# Patient Record
Sex: FEMALE | Race: WHITE | Employment: UNEMPLOYED
[De-identification: names, ages, dates, MRNs, and addresses within clinical notes are randomized per-mention and may not be internally consistent; named-entity substitution may affect disease eponyms.]

---

## 2017-06-17 ENCOUNTER — HEALTH MAINTENANCE LETTER (OUTPATIENT)
Age: 47
End: 2017-06-17

## 2018-09-27 ENCOUNTER — OFFICE VISIT (OUTPATIENT)
Dept: FAMILY MEDICINE | Facility: CLINIC | Age: 48
End: 2018-09-27

## 2018-09-27 VITALS
DIASTOLIC BLOOD PRESSURE: 74 MMHG | HEIGHT: 58 IN | HEART RATE: 59 BPM | SYSTOLIC BLOOD PRESSURE: 116 MMHG | RESPIRATION RATE: 16 BRPM | BODY MASS INDEX: 18.77 KG/M2 | OXYGEN SATURATION: 99 % | WEIGHT: 89.4 LBS

## 2018-09-27 DIAGNOSIS — Z12.11 SCREEN FOR COLON CANCER: ICD-10-CM

## 2018-09-27 DIAGNOSIS — R10.13 ABDOMINAL PAIN, EPIGASTRIC: Primary | ICD-10-CM

## 2018-09-27 DIAGNOSIS — R13.10 DYSPHAGIA, UNSPECIFIED TYPE: ICD-10-CM

## 2018-09-27 DIAGNOSIS — E03.9 HYPOTHYROIDISM, UNSPECIFIED TYPE: ICD-10-CM

## 2018-09-27 PROCEDURE — 99203 OFFICE O/P NEW LOW 30 MIN: CPT | Performed by: FAMILY MEDICINE

## 2018-09-27 RX ORDER — SUCRALFATE ORAL 1 G/10ML
1 SUSPENSION ORAL 4 TIMES DAILY
Qty: 420 ML | Refills: 1 | Status: SHIPPED | OUTPATIENT
Start: 2018-09-27 | End: 2022-01-22

## 2018-09-27 NOTE — LETTER
[facility]  [address] [place] [address] [phone]      September 27, 2018      Miranda Dover  [address] [facility] [address]      EMERGENCY CARE PLAN  Presenting Problem Treatment Plan   Questions or concerns during clinic hours I will call the clinic directly:    [facility]  [address] [place], [place] [id number] [phone]   Questions or concerns outside clinic hours I will call the after-hours on-call line at [phone]   Patient needs to schedule an appointment I will call the scheduling team during business hours at [phone]   Same day treatment  I will call the clinic first, then urgent care and express care if needed   Clinic Care Coordinators VARSHA Ernst:  [phone]  Yesenia Hernandez RN: [phone]   Crisis Services:  Behavioral or Mental Health [facility] ([facility])   [phone]   Emergency treatment--Immediately CALL 261

## 2018-09-27 NOTE — PROGRESS NOTES
Problem(s) Oriented visit      SUBJECTIVE:                                                    Miranda Dover is a 48 year old female who presents to clinic today for:  Patient presents with:  New Patient: To [facility]  Recheck Medication: Not taking thyroid med because patient does not want to. Med changed according to pharmacy  Breast Pain    Pt is new to [facility]. Is transferring care from [facility]. I was pt's PCP at [facility].   Miranda has Down's Syndrome and is here with her parents Ed and Lil who I also know from [facility].     She has a long hx of hypothyroidism. Thyroid function has been difficult to optimize. She has been on levothyroxine but the  of her levothyroxine changed by the pharmacy. She thinks the new pills have a terrible taste and give her GERD, so she has declined to take them. Last TSH 18. She has experienced marked hair loss and fatigue. She is a night own but tends to sleep up to half of the day. She states she occasionally has some difficulty swallowing.     She notes today some pain inferior to her L breast just at the ribs inferior and slightly lateral to breast. No breast changes. Pain started perhaps a month ago. Pain is burning. Local. Does not radiate to her back. Has known scoliosis. Not sure if getting better or worse overall. Pain comes and goes and is worse after she eats. No n/v. She has tried taking on OTC called Acid relief - 360. Unclear active ingredient, but suspect magnesium. Also taking Papaya - she thinks this is helpful.     Problem list, Medication list, Allergies, and Medical/Social/Surgical histories reviewed in [facility] and updated as appropriate.     ROS:  10 point ROS completed and negative except noted above, including Gen, HEENT, CV, Resp, GI, , MS, Neurologic, Psych    Histories:   Patient Active Problem List   Diagnosis     Hypothyroidism     Down's syndrome     CKD (chronic kidney disease) stage 3, GFR 30-59 ml/min     Serum creatinine raised  "    Hair loss     Malaise and fatigue     Routine health maintenance     Scoliosis     Thoracogenic scoliosis of thoracic region     Vitamin D deficiency     Past Medical History:   Diagnosis Date     Down's syndrome      Hypothyroidism      No past surgical history on file.  Social History   Substance Use Topics     Smoking status: Never Smoker     Smokeless tobacco: Never Used     Alcohol use No     Family History   Problem Relation Age of Onset     Asthma Maternal Grandmother        OBJECTIVE:                                                    /74  Pulse 59  Resp 16  Ht 1.473 m (4' 10\")  Wt 40.6 kg (89 lb 6.4 oz)  SpO2 99%  BMI 18.68 kg/m2  Body mass index is 18.68 kg/(m^2).   Gen: Cooperative. No acute distress. Low normal BMI noted. She looks older than her 48 yr due to thin face and marked hair throughout her scalp, with loss of all hair on anterior 25% of scalp. She is soft spoken and a little shy. She sometimes whisper something to her mother before she will speak with me.   Eyes: PERRL, sclera white.    Neck: Supple, without masses, lymphadenopathy or tenderness. No obvious goiter present.   Heart: Regular rate and rhythm without murmurs, rubs, or gallops.   Lungs: Normal respiratory effort. Lungs are clear to auscultation. Good breath sounds bilaterally.   Abdomen: Soft, she notes tenderness w/ light palpation on ribs inferior and slightly lateral to L breast. No lumps or masses appreciated. Normal bowel sounds present. No organomegaly. Overlying skin w/o rash. No crepitus. No breast masses noted.   Musculoskeletal: No lower extremity edema.   Skin: Warm and well perfused. She is quite pale.   Neurologic: Alert.  Psych:  Mood and affect are appropriate.     ASSESSMENT/PLAN:                                                      Miranda was seen today for new patient, recheck medication and breast pain.    Diagnoses and all orders for this visit:    Hypothyroidism, unspecified type  -     " Referral to [facility]  -     levothyroxine (SYNTHROID) 25 mcg/mL SUSP; Take 2 mLs (50 mcg) by mouth every morning (before breakfast)  -     Referral to [facility]    Abdominal pain, epigastric  -     sucralfate (CARAFATE) 1 GM/10ML suspension; Take 10 mLs (1 g) by mouth 4 times daily  -     ranitidine (ZANTAC) 150 MG/10ML syrup; Take 5 mLs (75 mg) by mouth 2 times daily    Dysphagia, unspecified type  -     Referral to [facility]    Screen for colon cancer    She prefers liquid medication to pills. She is willing to liquid levothyroxine.  We discussed, but opted not to check labs today b/c she has not been taking any thyroid medication recently. Thus, would not expect her TSH to be changed significantly from prior. Check labs at her next visit in 1 month.     Pt's mother declines interventions that would exposure Miranda to radiation. Xray not done today.   Plan to tx conservatively as a ulcer. If not better will check H pylori by blood (mother does not think Miranda would be able to collect stool for a H pylori stool test.     Miranda's mother is willing to work with Miranda to try to collect stool for Cologard test. She has not had any colon cancer screening and both parents decline colon cancer screening.     Cologuard form filled out and faxed.  Daphne Barnes CMA 10/4/18       The following health maintenance items are reviewed in Epic and correct as of today:  Health Maintenance   Topic Date Due     PHQ-2 Q1 YR  09/06/1984     HIV SCREEN (SYSTEM ASSIGNED)  09/06/1990     PAP SCREENING Q3 YR (SYSTEM ASSIGNED)  09/06/1993     TSH Q1 YEAR  08/07/2009     LIPID SCREEN Q5 YR FEMALE (SYSTEM ASSIGNED)  09/06/2017     INFLUENZA VACCINE (1) 09/01/2018     TETANUS IMMUNIZATION (SYSTEM ASSIGNED)  02/26/2028       Tea Jenkins MD  Family Medicine    For any issues, please call my office  [facility] at [phone].

## 2018-09-27 NOTE — MR AVS SNAPSHOT
"              After Visit Summary   9/27/2018    Miranda Dover    MRN: [id number]           Patient Information     Date Of Birth          1970        Visit Information        Provider Department      9/27/2018 11:30 AM Tea Jenkins MD [facility]        Today's Diagnoses     Hypothyroidism, unspecified type    -  1    Abdominal pain, epigastric        Dysphagia, unspecified type           Follow-ups after your visit        Additional Services     Referral to [facility]       Referral to [facility]  Phone: [phone]  Fax: [phone]  Reason for referral: hypothryodism - diff to manage. Please assess for nodules. Tnx!                  Who to contact     If you have questions or need follow up information about today's clinic visit or your schedule please contact [facility] directly at [phone].  Normal or non-critical lab and imaging results will be communicated to you by [employer], letter or phone within 4 business days after the clinic has received the results. If you do not hear from us within 7 days, please contact the clinic through [employer] or phone. If you have a critical or abnormal lab result, we will notify you by phone as soon as possible.  Submit refill requests through [employer] or call your pharmacy and they will forward the refill request to us. Please allow 3 business days for your refill to be completed.          Additional Information About Your Visit        MyChart Information     [employer] lets you send messages to your doctor, view your test results, renew your prescriptions, schedule appointments and more. To sign up, go to [email or web address] . Click on \"Log in\" on the left side of the screen, which will take you to the Welcome page. Then click on \"Sign up Now\" on the right side of the page.     You will be asked to enter the access code listed below, as well as some personal information. Please follow the directions to create your " "username and password.     Your access code is: [email or web address]  Expires: 2018 12:20 AM     Your access code will  in 90 days. If you need help or a new code, please call your [place] clinic or [phone].        Care EveryWhere ID     This is your Care EveryWhere ID. This could be used by other organizations to access your [place] medical records  [id number]        Your Vitals Were     Pulse Respirations Height Pulse Oximetry BMI (Body Mass Index)       59 16 1.473 m (4' 10\") 99% 18.68 kg/m2        Blood Pressure from Last 3 Encounters:   18 116/74   08 116/72   08 122/72    Weight from Last 3 Encounters:   18 40.6 kg (89 lb 6.4 oz)   08 60.3 kg (133 lb)   08 60.8 kg (134 lb)              We Performed the Following     Referral to [facility]          Today's Medication Changes          These changes are accurate as of 18 12:50 PM.  If you have any questions, ask your nurse or doctor.               Start taking these medicines.        Dose/Directions    ranitidine 150 MG/10ML syrup   Commonly known as:  Zantac   Used for:  Abdominal pain, epigastric   Started by:  Tea Jenkins MD        Dose:  4 mg/kg/day   Take 5 mLs (75 mg) by mouth 2 times daily   Quantity:  200 mL   Refills:  3       sucralfate 1 GM/10ML suspension   Commonly known as:  CARAFATE   Used for:  Abdominal pain, epigastric   Started by:  Tea Jenkins MD        Dose:  1 g   Take 10 mLs (1 g) by mouth 4 times daily   Quantity:  420 mL   Refills:  1         These medicines have changed or have updated prescriptions.        Dose/Directions    * levothyroxine 50 MCG tablet   Commonly known as:  SYNTHROID/LEVOTHROID   This may have changed:  Another medication with the same name was added. Make sure you understand how and when to take each.   Used for:  Hypothyroidism   Changed by:  Tea Jenkins MD        1 TABLET DAILY   Quantity:  30   Refills:  0       * " levothyroxine 25 mcg/mL Susp   Commonly known as:  SYNTHROID   This may have changed:  You were already taking a medication with the same name, and this prescription was added. Make sure you understand how and when to take each.   Used for:  Hypothyroidism, unspecified type   Changed by:  Tea Jenkins MD        Dose:  50 mcg   Take 2 mLs (50 mcg) by mouth every morning (before breakfast)   Quantity:  200 mL   Refills:  1       * Notice:  This list has 2 medication(s) that are the same as other medications prescribed for you. Read the directions carefully, and ask your doctor or other care provider to review them with you.         Where to get your medicines      These medications were sent to [id number] - [place], [place] - [address]  [address] [address]     Phone:  [phone]     levothyroxine 25 mcg/mL Susp    ranitidine 150 MG/10ML syrup    sucralfate 1 GM/10ML suspension                Primary Care Provider Office Phone # Fax #    Tea Jenkins [phone] [address] [facility] [address]        Equal Access to Services     [facility] AH: Hadii aad ku hadasho Soomaali, waaxda luqadaha, qaybta kaalmada adeegyada, waxay everin hayaan mason workman ah. So [facility] [phone].    ATENCIÓN: Si habla español, tiene a oconnell disposición servicios gratuitos de asistencia lingüística. Llame al [phone].    We comply with applicable federal civil rights laws and [place] laws. We do not discriminate on the basis of race, color, national origin, age, disability, sex, sexual orientation, or gender identity.            Thank you!     Thank you for choosing [facility]  for your care. Our goal is always to provide you with excellent care. Hearing back from our patients is one way we can continue to improve our services. Please take a few minutes to complete the written survey that you may receive in the mail after your visit  with us. Thank you!             Your Updated Medication List - Protect others around you: Learn how to safely use, store and throw away your medicines at www.disposemymeds.org.          This list is accurate as of 9/27/18 12:50 PM.  Always use your most recent med list.                   Brand Name Dispense Instructions for use Diagnosis    * levothyroxine 50 MCG tablet    SYNTHROID/LEVOTHROID    30    1 TABLET DAILY    Hypothyroidism       * levothyroxine 25 mcg/mL Susp    SYNTHROID    200 mL    Take 2 mLs (50 mcg) by mouth every morning (before breakfast)    Hypothyroidism, unspecified type       ranitidine 150 MG/10ML syrup    Zantac    200 mL    Take 5 mLs (75 mg) by mouth 2 times daily    Abdominal pain, epigastric       sucralfate 1 GM/10ML suspension    CARAFATE    420 mL    Take 10 mLs (1 g) by mouth 4 times daily    Abdominal pain, epigastric       * Notice:  This list has 2 medication(s) that are the same as other medications prescribed for you. Read the directions carefully, and ask your doctor or other care provider to review them with you.

## 2018-09-30 PROBLEM — R53.83 MALAISE AND FATIGUE: Status: ACTIVE | Noted: 2017-06-01

## 2018-09-30 PROBLEM — L65.9 HAIR LOSS: Status: ACTIVE | Noted: 2017-06-01

## 2018-09-30 PROBLEM — R53.81 MALAISE AND FATIGUE: Status: ACTIVE | Noted: 2017-06-01

## 2018-09-30 PROBLEM — M41.34 THORACOGENIC SCOLIOSIS OF THORACIC REGION: Status: ACTIVE | Noted: 2018-05-18

## 2018-09-30 PROBLEM — Z00.00 ROUTINE HEALTH MAINTENANCE: Status: ACTIVE | Noted: 2018-05-18

## 2018-09-30 PROBLEM — R79.89 SERUM CREATININE RAISED: Status: ACTIVE | Noted: 2017-06-01

## 2018-09-30 PROBLEM — M41.9 SCOLIOSIS: Status: ACTIVE | Noted: 2017-06-01

## 2018-10-02 ENCOUNTER — TRANSFERRED RECORDS (OUTPATIENT)
Dept: FAMILY MEDICINE | Facility: CLINIC | Age: 48
End: 2018-10-02

## 2018-10-11 ENCOUNTER — TELEPHONE (OUTPATIENT)
Dept: FAMILY MEDICINE | Facility: CLINIC | Age: 48
End: 2018-10-11

## 2018-10-11 DIAGNOSIS — E03.9 HYPOTHYROIDISM: Primary | ICD-10-CM

## 2018-10-11 NOTE — TELEPHONE ENCOUNTER
Spoke with patient mother regarding thyroid U/S results-per Dr Franco patient is to observe and recheck TSH in 4-6 weeks after starting liquid levothyroxine.  Liquid levothyroxine sent to  compounding pharmacy. Future lab order entered.  Erika Moran

## 2019-03-18 DIAGNOSIS — R10.13 ABDOMINAL PAIN, EPIGASTRIC: ICD-10-CM

## 2019-09-09 DIAGNOSIS — R10.13 ABDOMINAL PAIN, EPIGASTRIC: ICD-10-CM

## 2019-09-12 NOTE — TELEPHONE ENCOUNTER
9/12/19 left message for the patient to call the office to schedule an appointment.  Seen over a year ago, did she transfer back to [facility]?    Manuel Brooks,   [facility]  [phone]

## 2019-10-08 ENCOUNTER — TRANSFERRED RECORDS (OUTPATIENT)
Dept: HEALTH INFORMATION MANAGEMENT | Facility: CLINIC | Age: 49
End: 2019-10-08

## 2019-10-08 ENCOUNTER — HOSPITAL ENCOUNTER (EMERGENCY)
Facility: CLINIC | Age: 49
Discharge: HOME | End: 2019-10-08
Attending: EMERGENCY MEDICINE | Admitting: EMERGENCY MEDICINE
Payer: COMMERCIAL

## 2019-10-08 VITALS
DIASTOLIC BLOOD PRESSURE: 82 MMHG | WEIGHT: 89 LBS | TEMPERATURE: 97.5 F | RESPIRATION RATE: 25 BRPM | SYSTOLIC BLOOD PRESSURE: 129 MMHG | BODY MASS INDEX: 20.02 KG/M2 | OXYGEN SATURATION: 100 % | HEIGHT: 56 IN

## 2019-10-08 DIAGNOSIS — R10.13 ABDOMINAL PAIN, EPIGASTRIC: ICD-10-CM

## 2019-10-08 LAB
ALBUMIN SERPL-MCNC: 3.9 G/DL (ref 3.4–5)
ALP SERPL-CCNC: 65 U/L (ref 40–150)
ALT SERPL W P-5'-P-CCNC: 31 U/L (ref 0–50)
ANION GAP SERPL CALCULATED.3IONS-SCNC: 1 MMOL/L (ref 3–14)
AST SERPL W P-5'-P-CCNC: 27 U/L (ref 0–45)
BASOPHILS # BLD AUTO: 0 10E9/L (ref 0–0.2)
BASOPHILS NFR BLD AUTO: 0.2 %
BILIRUB SERPL-MCNC: 0.4 MG/DL (ref 0.2–1.3)
BUN SERPL-MCNC: 23 MG/DL (ref 7–30)
CALCIUM SERPL-MCNC: 9 MG/DL (ref 8.5–10.1)
CHLORIDE SERPL-SCNC: 107 MMOL/L (ref 94–109)
CO2 SERPL-SCNC: 32 MMOL/L (ref 20–32)
CREAT SERPL-MCNC: 1 MG/DL (ref 0.52–1.04)
DIFFERENTIAL METHOD BLD: NORMAL
EOSINOPHIL # BLD AUTO: 0.4 10E9/L (ref 0–0.7)
EOSINOPHIL NFR BLD AUTO: 10.1 %
ERYTHROCYTE [DISTWIDTH] IN BLOOD BY AUTOMATED COUNT: 13.2 % (ref 10–15)
GFR SERPL CREATININE-BSD FRML MDRD: 67 ML/MIN/{1.73_M2}
GLUCOSE SERPL-MCNC: 71 MG/DL (ref 70–99)
HCT VFR BLD AUTO: 44.2 % (ref 35–47)
HGB BLD-MCNC: 14.7 G/DL (ref 11.7–15.7)
IMM GRANULOCYTES # BLD: 0 10E9/L (ref 0–0.4)
IMM GRANULOCYTES NFR BLD: 0 %
INTERPRETATION ECG - MUSE: NORMAL
INTERPRETATION ECG - MUSE: NORMAL
LIPASE SERPL-CCNC: 135 U/L (ref 73–393)
LYMPHOCYTES # BLD AUTO: 1.7 10E9/L (ref 0.8–5.3)
LYMPHOCYTES NFR BLD AUTO: 39.7 %
MCH RBC QN AUTO: 30.9 PG (ref 26.5–33)
MCHC RBC AUTO-ENTMCNC: 33.3 G/DL (ref 31.5–36.5)
MCV RBC AUTO: 93 FL (ref 78–100)
MONOCYTES # BLD AUTO: 0.4 10E9/L (ref 0–1.3)
MONOCYTES NFR BLD AUTO: 8.7 %
NEUTROPHILS # BLD AUTO: 1.7 10E9/L (ref 1.6–8.3)
NEUTROPHILS NFR BLD AUTO: 41.3 %
NRBC # BLD AUTO: 0 10*3/UL
NRBC BLD AUTO-RTO: 0 /100
PLATELET # BLD AUTO: 160 10E9/L (ref 150–450)
POTASSIUM SERPL-SCNC: 3.6 MMOL/L (ref 3.4–5.3)
PROT SERPL-MCNC: 8 G/DL (ref 6.8–8.8)
RBC # BLD AUTO: 4.75 10E12/L (ref 3.8–5.2)
SODIUM SERPL-SCNC: 140 MMOL/L (ref 133–144)
TROPONIN I SERPL-MCNC: <0.015 UG/L (ref 0–0.04)
WBC # BLD AUTO: 4.2 10E9/L (ref 4–11)

## 2019-10-08 PROCEDURE — 96374 THER/PROPH/DIAG INJ IV PUSH: CPT

## 2019-10-08 PROCEDURE — 93005 ELECTROCARDIOGRAM TRACING: CPT | Mod: 76

## 2019-10-08 PROCEDURE — 80053 COMPREHEN METABOLIC PANEL: CPT | Performed by: EMERGENCY MEDICINE

## 2019-10-08 PROCEDURE — 84484 ASSAY OF TROPONIN QUANT: CPT | Performed by: EMERGENCY MEDICINE

## 2019-10-08 PROCEDURE — 83690 ASSAY OF LIPASE: CPT | Performed by: EMERGENCY MEDICINE

## 2019-10-08 PROCEDURE — 99284 EMERGENCY DEPT VISIT MOD MDM: CPT | Mod: 25

## 2019-10-08 PROCEDURE — 85025 COMPLETE CBC W/AUTO DIFF WBC: CPT | Performed by: EMERGENCY MEDICINE

## 2019-10-08 PROCEDURE — 25000125 ZZHC RX 250: Performed by: EMERGENCY MEDICINE

## 2019-10-08 PROCEDURE — 93005 ELECTROCARDIOGRAM TRACING: CPT

## 2019-10-08 RX ADMIN — FAMOTIDINE 20 MG: 10 INJECTION, SOLUTION INTRAVENOUS at 16:01

## 2019-10-08 ASSESSMENT — MIFFLIN-ST. JEOR: SCORE: 896.7

## 2019-10-08 ASSESSMENT — ENCOUNTER SYMPTOMS
COUGH: 0
ABDOMINAL PAIN: 1
FEVER: 0
VOMITING: 0
SHORTNESS OF BREATH: 0

## 2019-10-08 NOTE — ED TRIAGE NOTES
Pt was sent into ED from [facility] clinic for chronic abd pain. PCP thinks pt may have heartburn and was prescribed zantac. Pt had increased abd pain and lower chest pain. Clinic gave 324 ASA and 1 nitrogl. EKG states STEMI

## 2019-10-08 NOTE — ED NOTES
Bed: ED12  Expected date:   Expected time:   Means of arrival:   Comments:  rey - 516 - 49 F abd pain eta 0232

## 2019-10-08 NOTE — DISCHARGE INSTRUCTIONS
Consider over the counter Pepcid since there are concerns for Zantac.  Avoid any greasy, spicy, fatty/rich foods.  Avoid caffeine, cigarettes, alcohol, or any other foods that you noticed worsen your pain.  Heart tests look good today.  Follow-up with your primary care provider to discuss ongoing abdominal pain and if you need further outpatient work-up or treatment including endoscopy or CT scan.  You should also contact your gastroenterologist.  Return immediately with worsening or change in pain or development of any new or concerning symptoms as we discussed.

## 2019-10-08 NOTE — ED PROVIDER NOTES
History     Chief Complaint:  Abdominal Pain    HPI:   The history is provided by the patient and a parent.      Miranda Dover is a 49 year old female with history of hypothyroidism and Down's syndrome who presents with her mother for evaluation of abdominal pain. Miranda has been complaining of intermittent left upper/episgastric abdominal pain, usually after eating, for the past month per mother. Review of records indicates she has had this pain since at least 7/30/19, over 2 months, when she was first seen by GI. Their recommendation was to use omeprazole. Mother reports they were trying Zantac instead - which didn't seem to help the pain - but have stopped this due to a recall. Her pain did seem improved with homeopathic remedies such as Vietnamese cervantes, slippery elm, and papaya.    Miranda's mother became concerned that her symptoms may actually be due to pericarditis as the patient's mother herself had this years ago. As such she went to primary care where a CXR (as below) and an EKG was performed. This was abnormal and prompted administration of aspirin and nitroglycerin - though patient had no pain - and referral to the ER. Miranda has had no epigastric pain today. She denies chest pain. She has had no associated leg pain or swelling, shortness of breath, recent cold symptoms, or fever.     XR Chest 2 Views (10/8/2019  2:22 PM) Two views were obtained.  The lungs and costophrenic angles are clear.  Heart size and pulmonary vascularity are within normal limits.  There is no evidence of pneumothorax or pleural effusion. Bony thorax is unremarkable.    Allergies:  No known drug allergies      Medications:    [place] Thyroid 30 mg     Past Medical History:    Down's syndrome   Hypothyroid   Scoliosis   CKD stage 3     Past Surgical History:    History reviewed. No pertinent surgical history.     Family History:    Asthma     Social History:  PCP: Tea Jenkins   Marital Status:    Smoking status:  "Never  Alcohol use: Negative  Drug use: Negative.     Presents with mother.    Review of Systems   Constitutional: Negative for fever.   Respiratory: Negative for cough and shortness of breath.    Cardiovascular: Negative for chest pain and leg swelling.   Gastrointestinal: Positive for abdominal pain. Negative for vomiting.   All other systems reviewed and are negative.    Physical Exam     Patient Vitals for the past 24 hrs:   BP Temp Temp src Heart Rate Resp SpO2 Height Weight   10/08/19 1559 -- -- -- 68 25 100 % -- --   10/08/19 1456 129/82 97.5  F (36.4  C) Oral 98 16 99 % 1.422 m (4' 8\") 40.4 kg (89 lb)        Physical Exam  General: Thin. Well appearing middle aged  woman. Cooperative.  Head:  Atraumatic.  Alopecia.  Eyes:  Conjunctivae, lids, and sclerae are normal.  ENT:    Normal nose. Moist mucous membranes.  Neck:  Supple. Normal range of motion.  CV:  Regular rate and rhythm. Normal heart sounds with no murmurs, rubs, or gallops detected.  Resp:  No respiratory distress. Clear to auscultation bilaterally without decreased breath sounds, wheezing, rales, or rhonchi.  GI:  Soft. Non-distended.  Minimal tenderness to the left epigastrium.    MS:  Normal ROM. No bilateral lower extremity edema or calf tenderness.  No chest wall tenderness.  Skin:  Warm. Non-diaphoretic. No pallor.  Neuro:  Awake. A&Ox3. Normal strength.  Psych: Normal mood and affect. Normal speech.  Vitals reviewed.    Emergency Department Course     #1 EKG  Indication: abdominal pain  Time: 1447  Rate 73 bpm. [place] interval 122. QRS duration 78. QT/QTc 388/427.   Sinus rhythm with sinus arrhythmia with occasional and consecutive premature ventricular complexes.  Baseline artifact.   No acute ST changes.  No prior for comparison.    #2 EKG  Indication: abdominal pain  Time: 1554  Rate 54 bpm. NY interval 130. QRS duration 84. QT/QTc 404/383.   Sinus bradycardia  Otherwise normal ECG   No acute ST changes.  No change as compared to " prior, above.     Laboratory:  CBC: WNL (WBC 4.2, HGB 14.7, )  CMP: anion gap 1, o/w WNL (Creatinine 1.00)  Troponin I: <0.015  Lipase: 135    Interventions:  1601: Pepcid 20 mg, IV      Emergency Department Course:  Past medical records, nursing notes, and vitals reviewed.  1520: I performed an exam of the patient and obtained history, as documented above.  Blood drawn for laboratory testing. Results are as above.       1625: I rechecked the patient and explained findings to patient and mother. We discussed plan for discharge.     Findings and plan explained to the patient and mother. Patient discharged home with instructions regarding supportive care, medications, and reasons to return. The importance of close follow-up was reviewed.     Impression & Plan      Medical Decision Making:  Miranda is a 49-year-old female who is been struggling with epigastric pain for several months.  She has been seen by GI already who recommended omeprazole and control of hypothyroidism. Mother was using Zantac instead which she has since stopped Zantac due to concerns for recall she read about in the news.  Miranda's pain is worse after eating and somewhat improved with Monegasque [place], slippery elm, and papaya.  However, her mother became concerned that the pain may actually be due to her heart and was particularly concerned for pericarditis as the mother herself has had this in the past prompting visit to primary care.  Primary care performed an EKG which the computer read as STEMI and prompted referral to the emergency department.  However, patient has no current epigastric or chest pain and adamantly states that the pain is not in her chest, but rather in her epigastrium and the area she indicates is actually only a few centimeters from her umbilicus towards the left.  It is highly unlikely, based on her history and exam, that this is cardiac in etiology - particularly with the same pain for greater than 2 months.  Further,  EKGs reviewed from urgent care and also 2 separate EKGs obtained in the emergency department showed no evidence of STEMI including no acute ST elevations or depressions.  Further, there is no evidence of pericarditis on these EKGs.  Laboratory studies are unremarkable including liver enzymes and lipase.  Troponin is negative as expected given low concern for cardiac cause.  Patient was given Pepcid though she has no current pain and because her work-up here is reassuring including normal EKGs, she is appropriate for discharge.  I have high suspicion that her pain is related to gastritis/acid reflux which is likely been inadequately treated as she has stopped Zantac and did not use recommended omeprazole. Because mother is concerned about recall of Zantac, I recommended Pepcid instead as well as dietary changes and information regarding this was provided.  I recommended she follow-up with her primary care provider to discuss ongoing abdominal pain.  At this time, without significant tenderness in the abdomen, CT imaging can safely be deferred.  However, I discussed with her mother that if symptoms worsen or change, she should return to the emergency department or discuss with her primary care provider if she needs a CT.  We also discussed that this could be an atypical presentation of appendicitis and urged her to return should the pain localize to the right lower quadrant or she develops other symptoms such as fever or vomiting.  I also recommended return visit to the gastroenterologist or second opinion if they are not currently pleased with their care.  The patient and her mother verbalized understanding of results, outpatient follow-up, and treatment plan as well as low threshold for return with change or worsening of pain or development of new concerns.  All questions answered.      Diagnosis:    ICD-10-CM    1. Abdominal pain, epigastric R10.13        Disposition:  discharged home    I, Megan Beh, am serving as  a scribe at 3:20 PM on 10/8/2019 to document services personally performed by Blossom Griggs MD based on my observations and the provider's statements to me.      10/8/2019    EMERGENCY DEPARTMENT       Blossom Griggs MD  10/10/19 1027

## 2019-10-08 NOTE — ED AVS SNAPSHOT
Emergency Department  [address] [address]  Phone:  [phone]  Fax:  [phone]                                    Miranda Dover   MRN: [id number]    Department:   Emergency Department   Date of Visit:  10/8/2019           After Visit Summary Signature Page    I have received my discharge instructions, and my questions have been answered. I have discussed any challenges I see with this plan with the nurse or doctor.    ..........................................................................................................................................  Patient/Patient Representative Signature      ..........................................................................................................................................  Patient Representative Print Name and Relationship to Patient    ..................................................               ................................................  Date                                   Time    ..........................................................................................................................................  Reviewed by Signature/Title    ...................................................              ..............................................  Date                                               Time          22EPIC Rev 08/18

## 2021-02-23 ENCOUNTER — HOSPITAL ENCOUNTER (EMERGENCY)
Facility: CLINIC | Age: 51
Discharge: HOME | End: 2021-02-23
Attending: NURSE PRACTITIONER | Admitting: NURSE PRACTITIONER
Payer: COMMERCIAL

## 2021-02-23 VITALS
WEIGHT: 74 LBS | HEIGHT: 60 IN | HEART RATE: 78 BPM | TEMPERATURE: 97.8 F | BODY MASS INDEX: 14.53 KG/M2 | RESPIRATION RATE: 20 BRPM | DIASTOLIC BLOOD PRESSURE: 67 MMHG | SYSTOLIC BLOOD PRESSURE: 99 MMHG | OXYGEN SATURATION: 95 %

## 2021-02-23 DIAGNOSIS — R55 VASOVAGAL SYNCOPE: ICD-10-CM

## 2021-02-23 LAB
ALBUMIN SERPL-MCNC: 3.8 G/DL (ref 3.4–5)
ALBUMIN UR-MCNC: 10 MG/DL
ALP SERPL-CCNC: 75 U/L (ref 40–150)
ALT SERPL W P-5'-P-CCNC: 38 U/L (ref 0–50)
ANION GAP SERPL CALCULATED.3IONS-SCNC: 6 MMOL/L (ref 3–14)
APPEARANCE UR: CLEAR
AST SERPL W P-5'-P-CCNC: 25 U/L (ref 0–45)
BASOPHILS # BLD AUTO: 0 10E9/L (ref 0–0.2)
BASOPHILS NFR BLD AUTO: 0.4 %
BILIRUB SERPL-MCNC: 1.1 MG/DL (ref 0.2–1.3)
BILIRUB UR QL STRIP: NEGATIVE
BUN SERPL-MCNC: 21 MG/DL (ref 7–30)
CALCIUM SERPL-MCNC: 9.2 MG/DL (ref 8.5–10.1)
CHLORIDE SERPL-SCNC: 104 MMOL/L (ref 94–109)
CO2 SERPL-SCNC: 28 MMOL/L (ref 20–32)
COLOR UR AUTO: YELLOW
CREAT SERPL-MCNC: 1.15 MG/DL (ref 0.52–1.04)
DIFFERENTIAL METHOD BLD: ABNORMAL
EOSINOPHIL # BLD AUTO: 0 10E9/L (ref 0–0.7)
EOSINOPHIL NFR BLD AUTO: 0.1 %
ERYTHROCYTE [DISTWIDTH] IN BLOOD BY AUTOMATED COUNT: 12.7 % (ref 10–15)
GFR SERPL CREATININE-BSD FRML MDRD: 56 ML/MIN/{1.73_M2}
GLUCOSE SERPL-MCNC: 117 MG/DL (ref 70–99)
GLUCOSE UR STRIP-MCNC: NEGATIVE MG/DL
HCT VFR BLD AUTO: 41.6 % (ref 35–47)
HGB BLD-MCNC: 13.5 G/DL (ref 11.7–15.7)
HGB UR QL STRIP: NEGATIVE
HYALINE CASTS #/AREA URNS LPF: 3 /LPF (ref 0–2)
IMM GRANULOCYTES # BLD: 0 10E9/L (ref 0–0.4)
IMM GRANULOCYTES NFR BLD: 0.3 %
INTERPRETATION ECG - MUSE: NORMAL
KETONES UR STRIP-MCNC: 5 MG/DL
LEUKOCYTE ESTERASE UR QL STRIP: NEGATIVE
LYMPHOCYTES # BLD AUTO: 0.7 10E9/L (ref 0.8–5.3)
LYMPHOCYTES NFR BLD AUTO: 9.8 %
MCH RBC QN AUTO: 30.5 PG (ref 26.5–33)
MCHC RBC AUTO-ENTMCNC: 32.5 G/DL (ref 31.5–36.5)
MCV RBC AUTO: 94 FL (ref 78–100)
MONOCYTES # BLD AUTO: 0.2 10E9/L (ref 0–1.3)
MONOCYTES NFR BLD AUTO: 3.4 %
MUCOUS THREADS #/AREA URNS LPF: PRESENT /LPF
NEUTROPHILS # BLD AUTO: 5.9 10E9/L (ref 1.6–8.3)
NEUTROPHILS NFR BLD AUTO: 86 %
NITRATE UR QL: NEGATIVE
NRBC # BLD AUTO: 0 10*3/UL
NRBC BLD AUTO-RTO: 0 /100
PH UR STRIP: 7 PH (ref 5–7)
PLATELET # BLD AUTO: 212 10E9/L (ref 150–450)
POTASSIUM SERPL-SCNC: 4.1 MMOL/L (ref 3.4–5.3)
PROT SERPL-MCNC: 7.7 G/DL (ref 6.8–8.8)
RBC # BLD AUTO: 4.42 10E12/L (ref 3.8–5.2)
RBC #/AREA URNS AUTO: <1 /HPF (ref 0–2)
SODIUM SERPL-SCNC: 138 MMOL/L (ref 133–144)
SOURCE: ABNORMAL
SP GR UR STRIP: 1.02 (ref 1–1.03)
T4 FREE SERPL-MCNC: 0.76 NG/DL (ref 0.76–1.46)
TROPONIN I SERPL-MCNC: <0.015 UG/L (ref 0–0.04)
TSH SERPL DL<=0.005 MIU/L-ACNC: 15.02 MU/L (ref 0.4–4)
UROBILINOGEN UR STRIP-MCNC: 0 MG/DL (ref 0–2)
WBC # BLD AUTO: 6.9 10E9/L (ref 4–11)
WBC #/AREA URNS AUTO: 1 /HPF (ref 0–5)

## 2021-02-23 PROCEDURE — 84443 ASSAY THYROID STIM HORMONE: CPT | Performed by: NURSE PRACTITIONER

## 2021-02-23 PROCEDURE — 80053 COMPREHEN METABOLIC PANEL: CPT | Performed by: NURSE PRACTITIONER

## 2021-02-23 PROCEDURE — 84484 ASSAY OF TROPONIN QUANT: CPT | Performed by: NURSE PRACTITIONER

## 2021-02-23 PROCEDURE — 85025 COMPLETE CBC W/AUTO DIFF WBC: CPT | Performed by: NURSE PRACTITIONER

## 2021-02-23 PROCEDURE — 99284 EMERGENCY DEPT VISIT MOD MDM: CPT

## 2021-02-23 PROCEDURE — 93005 ELECTROCARDIOGRAM TRACING: CPT

## 2021-02-23 PROCEDURE — 81001 URINALYSIS AUTO W/SCOPE: CPT | Performed by: NURSE PRACTITIONER

## 2021-02-23 PROCEDURE — 84439 ASSAY OF FREE THYROXINE: CPT | Performed by: NURSE PRACTITIONER

## 2021-02-23 ASSESSMENT — MIFFLIN-ST. JEOR: SCORE: 887.16

## 2021-02-23 NOTE — ED TRIAGE NOTES
Pt went to clinic today for abdominal pain. Family note pt has had decreased appetite and weight loss. Pt had a 2-3 min syncope episode while getting her blood drawn. Clinic staff found pt's BP to be in the 60's systolic.

## 2021-02-24 NOTE — ED PROVIDER NOTES
"  History   Chief Complaint:  Syncope and Abdominal Pain       HPI   Miranda Dover is a 50 year old female with history of Down's Syndrome and hypothyroidism who presents after a syncopal episode. The patient was seen at her primary care provider's office earlier today to draw labs and discuss treatment regarding her hypothyroidism when she fainted during a blood draw. She lost consciousness for one-two minutes and, upon regaining consciousness, her blood pressure was found to be 61/35 and the paramedics were called. Her blood pressure had returned to baseline, about 100/67 with a pulse of 65, by the time the paramedics arrived and bought the patient here to the emergency department.  Of note, her mother states that she had not eaten anything today.  She does not have a history of syncope but her mother notes she was extremely anxious about the blood draw today.  She has follow-up scheduled with neurology for newer onset of involuntary tongue movements and finger movements.  Was also recommended to have EGD per gastroenterology which has not been completed yet.    Documentation from Office Visit, [facility] Family Visit, 2/23/2021  HPI/ROS: Miranda is a 50 y.o. female with Down's syndrome here with her mother. Her mother would like \"all labs drawn\". Miranda continues to loose weight. She last saw the gastroenterologist about 14 months ago for issues of abdominal pain and wt loss. At that time, she had a CT scan done which showed thickening of the pylorus. Gastroenterologist recommended an EGD. This has not been pursued. Additionally there was discussion about under-treated hypothyroidism. Mom prefers to treat this with homeopathic remedies although currently Miranda is not taking any of them. Miranda is normally not eating breakfast and eats a late lunch and dinner. Mom would like to check current lab values. Has developed involuntary tongue movements and finger movements    Assessment/ Plan: "   1. Involuntary movements   2. Hypothyroidism, unspecified type (HR)   3. Abnormal weight loss   4. Syncope and collapse   5. Loss of appetite   6. Constipation, unspecified constipation type   7. Abnormal CT of the abdomen   8. Down's syndrome ([facility])       Review of Systems   Neurological: Positive for syncope.   All other systems reviewed and are negative.  ROS obtained from patient and mother    Allergies:  The patient has no known allergies.     Medications:  Levothyroxine  Zantac  Carafate    Past Medical History:    Down's Syndrome  Hypothyroidism  CKD  Thoracogenic scoliosis of thoracic region     Social History:  The patient presents in the emergency department with her mother.    Physical Exam     Patient Vitals for the past 24 hrs:   BP Temp Temp src Pulse Resp SpO2 Height Weight   02/23/21 2100 -- -- -- -- -- 95 % -- --   02/23/21 1927 99/67 -- -- 78 -- -- -- --   02/23/21 1653 -- 97.8  F (36.6  C) Temporal 76 20 97 % 1.524 m (5') 33.6 kg (74 lb)       Physical Exam  Nursing notes reviewed. Vitals reviewed.  General: Alert. Cachectic.  Eyes:  Conjunctiva non-injected, non-icteric.  Neck/Throat: Moist mucous membranes.  Normal voice.  Cardiac: Regular rhythm. Normal heart sounds with no murmur/rubs/click.   Pulmonary: Clear and equal breath sounds bilaterally. No crackles/rales. No wheezing  Abdomen: Soft. Non-distended. Non-tender to palpation. No masses. No guarding or rebound.  Musculoskeletal: Normal gross range of motion of all 4 extremities.    Neurological: Alert and oriented x person, situation.   Skin: Warm and dry without rashes or petechiae. Normal appearance of visualized exposed skin.  Psych: Affect normal. [facility]    Emergency Department Course   ECG  ECG taken at 1904, ECG read at 1931  Normal sinus rhythm.   Rate 77 bpm. MS interval 120 ms. QRS duration 76 ms. QT/QTc 392/443 ms. P-R-T axes 72 74 75.     Laboratory:  CBC: WNL (WBC 6.9, HGB 13.5, )     CMP: Glucose 117 (H),  Creatinine 1.15 (H), GFR 56 (L), o/w WNL     (Collected 1931) Troponin I: <0.015    TSH with Free T4: 15.02 (H)    T4 Free: 0.76    UA: Ketones 5 (A), Albumin 10 (A), mucous present (A), hyaline cases 3 (H), o/w Negative      Emergency Department Course:  Reviewed:  I reviewed the patient's nursing notes, vitals, past medical records, Care Everywhere.     Assessments:  1810 I performed an exam of the patient and obtained history, as documented above.     2041 I rechecked the patient and updated her and her mother. Both are comfortable with discharge at this time.    Disposition:  The patient was discharged to home.     Impression & Plan   Medical Decision Making:  Miranda Dover is a 50 year old female who presents with a history and clinical exam consistent with syncope. While vasovagal syncope was the most likely etiology given the history of this patient, I considered a broad differential for their syncope today including cardiac arrhythmia, ACS, aortic stenosis, HOCM, PE, orthostatic hypotension, drugs, situational, carotid hypersensitivity, seizure, TIA, and stroke.  She has no signs of a concerning etiology for syncope at this point. In addition,she has no family history of sudden death, no chest pain, no seizure activity or post-ictal period, no murmur, and no signs of orthostasis in the ED, no focal neurologic symptoms, and no complaints of concerning headache.  She ambulated in the ED without difficulty and tolerated a full meal prior to discharge.  The workup in the ED is negative and the physical exam is re-assuring. Supportive outpatient management is therefore indicated.      Diagnosis:    ICD-10-CM    1. Vasovagal syncope  R55        Discharge Medications:  New Prescriptions    No medications on file       Scribe Disclosure:  Jennifer FRANCISCO, am serving as a scribe at 6:10 PM on 2/23/2021 to document services personally performed by Jeannie Kelly DNP based on my observations and the provider's  statements to me.     February 23, 2021   [facility] Emergency Department     Jeannie Kelly, CNP  02/24/21 0034

## 2021-02-24 NOTE — ED NOTES
Patient refusing blood pressure and EKG. Patient's guardian states she has not eaten all day so brought her a courtesy meal. Told patient and guardian I will be back after she has eaten to try for vitals and tests.

## 2022-01-22 ENCOUNTER — HOSPITAL ENCOUNTER (INPATIENT)
Facility: CLINIC | Age: 52
LOS: 157 days | Discharge: NURSING, REHAB OR LONG-TERM CARE | DRG: 177 | End: 2022-06-28
Attending: EMERGENCY MEDICINE | Admitting: HOSPITALIST
Payer: COMMERCIAL

## 2022-01-22 ENCOUNTER — APPOINTMENT (OUTPATIENT)
Dept: CT IMAGING | Facility: CLINIC | Age: 52
DRG: 177 | End: 2022-01-22
Attending: EMERGENCY MEDICINE
Payer: COMMERCIAL

## 2022-01-22 DIAGNOSIS — K59.09 CHRONIC CONSTIPATION: Primary | ICD-10-CM

## 2022-01-22 DIAGNOSIS — R09.02 HYPOXIA: ICD-10-CM

## 2022-01-22 DIAGNOSIS — U07.1 COVID-19: ICD-10-CM

## 2022-01-22 DIAGNOSIS — Q90.9 DOWN'S SYNDROME: ICD-10-CM

## 2022-01-22 LAB
ANION GAP SERPL CALCULATED.3IONS-SCNC: 4 MMOL/L (ref 3–14)
ATRIAL RATE - MUSE: 78 BPM
ATRIAL RATE - MUSE: 83 BPM
BASOPHILS # BLD AUTO: 0 10E3/UL (ref 0–0.2)
BASOPHILS NFR BLD AUTO: 0 %
BUN SERPL-MCNC: 21 MG/DL (ref 7–30)
CALCIUM SERPL-MCNC: 8.2 MG/DL (ref 8.5–10.1)
CHLORIDE BLD-SCNC: 105 MMOL/L (ref 94–109)
CO2 SERPL-SCNC: 32 MMOL/L (ref 20–32)
CREAT SERPL-MCNC: 0.74 MG/DL (ref 0.52–1.04)
CRP SERPL-MCNC: 31.7 MG/L (ref 0–8)
D DIMER PPP FEU-MCNC: 0.81 UG/ML FEU (ref 0–0.5)
D DIMER PPP FEU-MCNC: 1.66 UG/ML FEU (ref 0–0.5)
DIASTOLIC BLOOD PRESSURE - MUSE: NORMAL MMHG
DIASTOLIC BLOOD PRESSURE - MUSE: NORMAL MMHG
EOSINOPHIL # BLD AUTO: 0 10E3/UL (ref 0–0.7)
EOSINOPHIL NFR BLD AUTO: 0 %
ERYTHROCYTE [DISTWIDTH] IN BLOOD BY AUTOMATED COUNT: 13.2 % (ref 10–15)
FLUAV RNA SPEC QL NAA+PROBE: NEGATIVE
FLUBV RNA RESP QL NAA+PROBE: NEGATIVE
GFR SERPL CREATININE-BSD FRML MDRD: >90 ML/MIN/1.73M2
GLUCOSE BLD-MCNC: 101 MG/DL (ref 70–99)
HCT VFR BLD AUTO: 45.1 % (ref 35–47)
HGB BLD-MCNC: 14.1 G/DL (ref 11.7–15.7)
IMM GRANULOCYTES # BLD: 0 10E3/UL
IMM GRANULOCYTES NFR BLD: 0 %
INTERPRETATION ECG - MUSE: NORMAL
INTERPRETATION ECG - MUSE: NORMAL
LYMPHOCYTES # BLD AUTO: 0.6 10E3/UL (ref 0.8–5.3)
LYMPHOCYTES NFR BLD AUTO: 18 %
MCH RBC QN AUTO: 30.1 PG (ref 26.5–33)
MCHC RBC AUTO-ENTMCNC: 31.3 G/DL (ref 31.5–36.5)
MCV RBC AUTO: 96 FL (ref 78–100)
MONOCYTES # BLD AUTO: 0.2 10E3/UL (ref 0–1.3)
MONOCYTES NFR BLD AUTO: 6 %
NEUTROPHILS # BLD AUTO: 2.7 10E3/UL (ref 1.6–8.3)
NEUTROPHILS NFR BLD AUTO: 76 %
NRBC # BLD AUTO: 0 10E3/UL
NRBC BLD AUTO-RTO: 0 /100
P AXIS - MUSE: 54 DEGREES
P AXIS - MUSE: 56 DEGREES
PLAT MORPH BLD: NORMAL
PLATELET # BLD AUTO: 119 10E3/UL (ref 150–450)
POTASSIUM BLD-SCNC: 3.8 MMOL/L (ref 3.4–5.3)
PR INTERVAL - MUSE: 114 MS
PR INTERVAL - MUSE: 118 MS
QRS DURATION - MUSE: 70 MS
QRS DURATION - MUSE: 74 MS
QT - MUSE: 342 MS
QT - MUSE: 354 MS
QTC - MUSE: 401 MS
QTC - MUSE: 403 MS
R AXIS - MUSE: 43 DEGREES
R AXIS - MUSE: 46 DEGREES
RBC # BLD AUTO: 4.68 10E6/UL (ref 3.8–5.2)
RBC MORPH BLD: NORMAL
SARS-COV-2 RNA RESP QL NAA+PROBE: POSITIVE
SODIUM SERPL-SCNC: 141 MMOL/L (ref 133–144)
SYSTOLIC BLOOD PRESSURE - MUSE: NORMAL MMHG
SYSTOLIC BLOOD PRESSURE - MUSE: NORMAL MMHG
T AXIS - MUSE: 82 DEGREES
T AXIS - MUSE: 99 DEGREES
TROPONIN I SERPL HS-MCNC: 23 NG/L
VENTRICULAR RATE- MUSE: 78 BPM
VENTRICULAR RATE- MUSE: 83 BPM
WBC # BLD AUTO: 3.6 10E3/UL (ref 4–11)

## 2022-01-22 PROCEDURE — 85379 FIBRIN DEGRADATION QUANT: CPT | Performed by: EMERGENCY MEDICINE

## 2022-01-22 PROCEDURE — 71275 CT ANGIOGRAPHY CHEST: CPT

## 2022-01-22 PROCEDURE — 250N000011 HC RX IP 250 OP 636: Performed by: EMERGENCY MEDICINE

## 2022-01-22 PROCEDURE — 85025 COMPLETE CBC W/AUTO DIFF WBC: CPT | Performed by: EMERGENCY MEDICINE

## 2022-01-22 PROCEDURE — 86140 C-REACTIVE PROTEIN: CPT | Performed by: HOSPITALIST

## 2022-01-22 PROCEDURE — 250N000013 HC RX MED GY IP 250 OP 250 PS 637: Performed by: HOSPITALIST

## 2022-01-22 PROCEDURE — 80048 BASIC METABOLIC PNL TOTAL CA: CPT | Performed by: EMERGENCY MEDICINE

## 2022-01-22 PROCEDURE — 36415 COLL VENOUS BLD VENIPUNCTURE: CPT | Performed by: HOSPITALIST

## 2022-01-22 PROCEDURE — 258N000003 HC RX IP 258 OP 636: Performed by: HOSPITALIST

## 2022-01-22 PROCEDURE — 250N000011 HC RX IP 250 OP 636: Performed by: HOSPITALIST

## 2022-01-22 PROCEDURE — 85379 FIBRIN DEGRADATION QUANT: CPT | Performed by: HOSPITALIST

## 2022-01-22 PROCEDURE — 87636 SARSCOV2 & INF A&B AMP PRB: CPT | Performed by: EMERGENCY MEDICINE

## 2022-01-22 PROCEDURE — 250N000009 HC RX 250: Performed by: EMERGENCY MEDICINE

## 2022-01-22 PROCEDURE — C9803 HOPD COVID-19 SPEC COLLECT: HCPCS

## 2022-01-22 PROCEDURE — 36415 COLL VENOUS BLD VENIPUNCTURE: CPT | Performed by: EMERGENCY MEDICINE

## 2022-01-22 PROCEDURE — 99223 1ST HOSP IP/OBS HIGH 75: CPT | Mod: AI | Performed by: HOSPITALIST

## 2022-01-22 PROCEDURE — 250N000009 HC RX 250: Performed by: HOSPITALIST

## 2022-01-22 PROCEDURE — 120N000001 HC R&B MED SURG/OB

## 2022-01-22 PROCEDURE — 99285 EMERGENCY DEPT VISIT HI MDM: CPT | Mod: 25

## 2022-01-22 PROCEDURE — 84484 ASSAY OF TROPONIN QUANT: CPT | Performed by: EMERGENCY MEDICINE

## 2022-01-22 PROCEDURE — 93005 ELECTROCARDIOGRAM TRACING: CPT

## 2022-01-22 PROCEDURE — 93005 ELECTROCARDIOGRAM TRACING: CPT | Mod: 76

## 2022-01-22 RX ORDER — ONDANSETRON 2 MG/ML
4 INJECTION INTRAMUSCULAR; INTRAVENOUS EVERY 6 HOURS PRN
Status: DISCONTINUED | OUTPATIENT
Start: 2022-01-22 | End: 2022-03-27

## 2022-01-22 RX ORDER — IOPAMIDOL 755 MG/ML
55 INJECTION, SOLUTION INTRAVASCULAR ONCE
Status: COMPLETED | OUTPATIENT
Start: 2022-01-22 | End: 2022-01-22

## 2022-01-22 RX ORDER — SODIUM CHLORIDE, SODIUM LACTATE, POTASSIUM CHLORIDE, CALCIUM CHLORIDE 600; 310; 30; 20 MG/100ML; MG/100ML; MG/100ML; MG/100ML
INJECTION, SOLUTION INTRAVENOUS CONTINUOUS
Status: DISCONTINUED | OUTPATIENT
Start: 2022-01-22 | End: 2022-01-23

## 2022-01-22 RX ORDER — LIDOCAINE 40 MG/G
CREAM TOPICAL
Status: DISCONTINUED | OUTPATIENT
Start: 2022-01-22 | End: 2022-03-27

## 2022-01-22 RX ORDER — ACETAMINOPHEN 650 MG/1
650 SUPPOSITORY RECTAL EVERY 6 HOURS PRN
Status: DISCONTINUED | OUTPATIENT
Start: 2022-01-22 | End: 2022-03-14

## 2022-01-22 RX ORDER — ALBUTEROL SULFATE 90 UG/1
2 AEROSOL, METERED RESPIRATORY (INHALATION) 4 TIMES DAILY
Status: DISCONTINUED | OUTPATIENT
Start: 2022-01-22 | End: 2022-02-13

## 2022-01-22 RX ORDER — ALBUTEROL SULFATE 90 UG/1
2 AEROSOL, METERED RESPIRATORY (INHALATION) EVERY 4 HOURS PRN
Status: DISCONTINUED | OUTPATIENT
Start: 2022-01-22 | End: 2022-06-28 | Stop reason: HOSPADM

## 2022-01-22 RX ORDER — POLYETHYLENE GLYCOL 3350 17 G/17G
17 POWDER, FOR SOLUTION ORAL DAILY PRN
Status: DISCONTINUED | OUTPATIENT
Start: 2022-01-22 | End: 2022-01-31

## 2022-01-22 RX ORDER — ACETAMINOPHEN 325 MG/1
650 TABLET ORAL EVERY 6 HOURS PRN
Status: DISCONTINUED | OUTPATIENT
Start: 2022-01-22 | End: 2022-06-28 | Stop reason: HOSPADM

## 2022-01-22 RX ORDER — ONDANSETRON 4 MG/1
4 TABLET, ORALLY DISINTEGRATING ORAL EVERY 6 HOURS PRN
Status: DISCONTINUED | OUTPATIENT
Start: 2022-01-22 | End: 2022-06-28 | Stop reason: HOSPADM

## 2022-01-22 RX ADMIN — DEXAMETHASONE 6 MG: 2 TABLET ORAL at 18:26

## 2022-01-22 RX ADMIN — REMDESIVIR 200 MG: 100 INJECTION, POWDER, LYOPHILIZED, FOR SOLUTION INTRAVENOUS at 19:22

## 2022-01-22 RX ADMIN — SODIUM CHLORIDE 50 ML: 9 INJECTION, SOLUTION INTRAVENOUS at 19:24

## 2022-01-22 RX ADMIN — SODIUM CHLORIDE 74 ML: 9 INJECTION, SOLUTION INTRAVENOUS at 13:17

## 2022-01-22 RX ADMIN — ALBUTEROL SULFATE 2 PUFF: 90 AEROSOL, METERED RESPIRATORY (INHALATION) at 20:34

## 2022-01-22 RX ADMIN — IOPAMIDOL 55 ML: 755 INJECTION, SOLUTION INTRAVENOUS at 13:17

## 2022-01-22 RX ADMIN — ALBUTEROL SULFATE 2 PUFF: 90 AEROSOL, METERED RESPIRATORY (INHALATION) at 22:53

## 2022-01-22 RX ADMIN — SODIUM CHLORIDE, POTASSIUM CHLORIDE, SODIUM LACTATE AND CALCIUM CHLORIDE: 600; 310; 30; 20 INJECTION, SOLUTION INTRAVENOUS at 16:55

## 2022-01-22 ASSESSMENT — MIFFLIN-ST. JEOR: SCORE: 930.13

## 2022-01-22 ASSESSMENT — ACTIVITIES OF DAILY LIVING (ADL)
ADLS_ACUITY_SCORE: 14
ADLS_ACUITY_SCORE: 8
ADLS_ACUITY_SCORE: 14
ADLS_ACUITY_SCORE: 14
ADLS_ACUITY_SCORE: 8

## 2022-01-22 NOTE — ED NOTES
[facility]  ED Nurse Handoff Report    ED Chief complaint: Shortness of Breath      ED Diagnosis:   Final diagnoses:   None       Code Status: TBD by hospitalist at admission    Allergies: No Known Allergies    Patient Story:   51 year old female with history of Down's syndrome who presents via EMS with shortness of breath. RN reports that the patient recently developed shortness of breath and was satting at 92% on room air. States that the patient's family have Covid at home. Patient denies abdominal pain, headache and chest pain. Not vaccinated to Covid    Focused Assessment:    Alert to self, can answer some questions appropriately, Has Downs and would seem to be at her baseline of functioning. Lung sounds decreased slightly right lower posteriorly. Has not walked since arrival- seems very weak and deconditioned    Labs Ordered and Resulted from Time of ED Arrival to Time of ED Departure   BASIC METABOLIC PANEL - Abnormal       Result Value    Sodium 141      Potassium 3.8      Chloride 105      Carbon Dioxide (CO2) 32      Anion Gap 4      Urea Nitrogen 21      Creatinine 0.74      Calcium 8.2 (*)     Glucose 101 (*)     GFR Estimate >90     INFLUENZA A/B & SARS-COV2 PCR MULTIPLEX - Abnormal    Influenza A PCR Negative      Influenza B PCR Negative      SARS CoV2 PCR Positive (*)    D DIMER QUANTITATIVE - Abnormal    D-Dimer Quantitative 0.81 (*)    CBC WITH PLATELETS AND DIFFERENTIAL - Abnormal    WBC Count 3.6 (*)     RBC Count 4.68      Hemoglobin 14.1      Hematocrit 45.1      MCV 96      MCH 30.1      MCHC 31.3 (*)     RDW 13.2      Platelet Count 119 (*)     % Neutrophils 76      % Lymphocytes 18      % Monocytes 6      % Eosinophils 0      % Basophils 0      % Immature Granulocytes 0      NRBCs per 100 WBC 0      Absolute Neutrophils 2.7      Absolute Lymphocytes 0.6 (*)     Absolute Monocytes 0.2      Absolute Eosinophils 0.0      Absolute Basophils 0.0      Absolute Immature  "Granulocytes 0.0      Absolute NRBCs 0.0     TROPONIN I - Normal    Troponin I High Sensitivity 23     RBC AND PLATELET MORPHOLOGY    Platelet Assessment        Value: Automated Count Confirmed. Platelet morphology is normal.    RBC Morphology Confirmed RBC Indices          CT Chest Pulmonary Embolism w Contrast   Preliminary Result   IMPRESSION:   1.  No evidence for pulmonary embolism.   2.  Patchy predominantly groundglass opacities in both lungs, likely related to COVID-19 pneumonia.   3.  Indeterminate left thyroid nodule measures 1.6 cm. Thyroid ultrasound may be helpful for further characterization.               Treatments and/or interventions provided:     Medications   iopamidol (ISOVUE-370) solution 55 mL (55 mLs Intravenous Given 1/22/22 1317)   Saline (74 mLs As instructed Given 1/22/22 1317)        Patient's response to treatments and/or interventions:       To be done/followed up on inpatient unit:    See any in-patient orders    Does this patient have any cognitive concerns?: Downs Synd at baseline    Activity level - Baseline/Home:    Unknown- but answers yes to using a wheelchair    Activity Level - Current:     Total Care    Patient's Preferred language: English     Needed?: No    Isolation: COVID r/o and special precautions  Infection: COVID r/o and special precautions  Patient tested for COVID 19 prior to admission: YES    Bariatric?: No    Vital Signs:   Vitals:    01/22/22 1140 01/22/22 1148   BP: 100/68    Pulse: 85 82   Resp: 18 18   Temp: 98.8  F (37.1  C)    TempSrc: Temporal    SpO2: (!) 89% (!) 87%   Weight: 33.6 kg (74 lb 1.2 oz)    Height: 1.6 m (5' 3\")        Cardiac Rhythm:     Was the PSS-3 completed:   Yes  What interventions are required if any?               Family Comments: brother available by phone. Mother and father both admitted currently to this hospital  OBS brochure/video discussed/provided to patient/family: Yes              Name of person given brochure if " not patient:               Relationship to patient:     For the majority of the shift this patient's behavior was Green.   Behavioral interventions performed were.    ED NURSE PHONE NUMBER: *[phone]

## 2022-01-22 NOTE — ED NOTES
Bed: ED02  Expected date:   Expected time:   Means of arrival:   Comments:  Keegan - 513 - 51 f SOB eta 1125

## 2022-01-22 NOTE — ED PROVIDER NOTES
"  History   Chief Complaint:  Shortness of Breath     The history is provided by the EMS personnel. The history is limited by a developmental delay.      Miranda Dover is a 51 year old female with history of Down's syndrome who presents via EMS with shortness of breath. RN reports that the patient recently developed shortness of breath and was satting at 92% on room air. States that the patient's family have Covid at home. Patient denies abdominal pain, headache and chest pain. Not vaccinated to Covid.     Review of Systems   Reason unable to perform ROS: Developmental Delay.     Allergies:  No Known Allergies    Medications:  Synthroid  Carafate    Past Medical History:     Down's syndrome  Hypothyroidism  CKD    Social History:  Patient presents alone  Presents via EMS  Lives with mom and dad    Physical Exam     Patient Vitals for the past 24 hrs:   BP Temp Temp src Pulse Resp SpO2 Height Weight   01/22/22 1148 -- -- -- 82 18 (!) 87 % -- --   01/22/22 1140 100/68 98.8  F (37.1  C) Temporal 85 18 (!) 89 % 1.6 m (5' 3\") 33.6 kg (74 lb 1.2 oz)       Physical Exam  Physical Exam   General:  Sitting on bed.   HENT:  No obvious trauma to head  Right Ear:  External ear normal.   Left Ear:  External ear normal.   Nose:  Nose normal.   Eyes:  Conjunctivae and EOM are normal. Pupils are equal, round, and reactive.   Neck: Normal range of motion. Neck supple. No tracheal deviation present.   CV:  Normal heart sounds. No murmur heard.  Pulm/Chest: Effort normal and breath sounds normal.   Abd: Soft. No distension. There is no tenderness. There is no rigidity, no rebound and no guarding.   M/S: Normal range of motion.   Neuro: Alert. GCS 15.  Skin: Skin is warm and dry. No rash noted. Not diaphoretic.   Psych: Normal mood and affect. Behavior is normal.     Emergency Department Course   ECG #1  ECG obtained at 1203, ECG read at 1204  NSR  Possible left atrial enlargement  ST elevation, consider inferior injury or acute " infarct  Consider right ventricular involvement in acute inferior infarct   No significant change as compared to prior, dated 02/23/21.  Rate 83 bpm. AL interval 114 ms. QRS duration 70 ms. QT/QTc 342/401 ms. P-R-T axes 54 43 99.    ECG #2  ECG taken at 1209, ECG read at 1209  NSR  ST elevation, consider inferior injury or acute infarct  Consider right ventricular involvement in acute inferior infarct  No significant change as compared to prior, dated 02/23/21.  Rate 78 bpm. AL interval 118 ms. QRS duration 74 ms. QT/QTc 354/403 ms. P-R-T axes 56 46 82.      Imaging:  CT Chest Pulmonary Embolism w Contrast   Preliminary Result   IMPRESSION:   1.  No evidence for pulmonary embolism.   2.  Patchy predominantly groundglass opacities in both lungs, likely related to COVID-19 pneumonia.   3.  Indeterminate left thyroid nodule measures 1.6 cm. Thyroid ultrasound may be helpful for further characterization.           Report per radiology.    Laboratory:  Labs Ordered and Resulted from Time of ED Arrival to Time of ED Departure   BASIC METABOLIC PANEL - Abnormal       Result Value    Sodium 141      Potassium 3.8      Chloride 105      Carbon Dioxide (CO2) 32      Anion Gap 4      Urea Nitrogen 21      Creatinine 0.74      Calcium 8.2 (*)     Glucose 101 (*)     GFR Estimate >90     INFLUENZA A/B & SARS-COV2 PCR MULTIPLEX - Abnormal    Influenza A PCR Negative      Influenza B PCR Negative      SARS CoV2 PCR Positive (*)    D DIMER QUANTITATIVE - Abnormal    D-Dimer Quantitative 0.81 (*)    CBC WITH PLATELETS AND DIFFERENTIAL - Abnormal    WBC Count 3.6 (*)     RBC Count 4.68      Hemoglobin 14.1      Hematocrit 45.1      MCV 96      MCH 30.1      MCHC 31.3 (*)     RDW 13.2      Platelet Count 119 (*)     % Neutrophils 76      % Lymphocytes 18      % Monocytes 6      % Eosinophils 0      % Basophils 0      % Immature Granulocytes 0      NRBCs per 100 WBC 0      Absolute Neutrophils 2.7      Absolute Lymphocytes 0.6 (*)      Absolute Monocytes 0.2      Absolute Eosinophils 0.0      Absolute Basophils 0.0      Absolute Immature Granulocytes 0.0      Absolute NRBCs 0.0     TROPONIN I - Normal    Troponin I High Sensitivity 23     RBC AND PLATELET MORPHOLOGY    Platelet Assessment        Value: Automated Count Confirmed. Platelet morphology is normal.    RBC Morphology Confirmed RBC Indices         Procedures    Emergency Department Course:  Reviewed:  I reviewed nursing notes, vitals, past medical history, Care Everywhere and [facility]    Assessments/Consults:  ED Course as of 01/22/22 1532   Sat Jan 22, 2022   1138 I obtained history and examined the patient.   1403 I spoke to Dr. Mendoza, of the hospital regarding admission of this patient.    1416 Rechecked the patient.   1532 Updated mother on patient's condition.     Disposition:  The patient was admitted to the hospital under the care of Dr. Mendoza.     Impression & Plan   Medical Decision Making:  Miranda Dover is a very pleasant 51 year old year old patient who presents to the emergency department with concern of shortness of breath.  The patient's father is hospitalized with COVID.  The patient's mother was diagnosed with COVID as well today.  The patient was found to be hypoxic.  She responded well to supplemental nasal cannula oxygen.  She reports some diffuse body aches and feeling short of breath but improved while on oxygen.  The patient denies any definitive chest pain.  I did obtain a screening EKG with the admission in the computer question if there is ST elevation in inferior leads.  Troponin is negative.  In light of the COVID-positive diagnosis I do not believe activation of the Cath Lab is indicated at this time.  Serial troponins could be of benefit and additional EKGs if she develops symptoms.  I spoke to the hospitalist,  who has agreed to admit the patient for continued evaluation and treatment.  Since the patient is developmentally delayed, I did inform  her mother of the patient's status as well.    Covid-19  Miranda Dover was evaluated during a global COVID-19 pandemic, which necessitated consideration that the patient might be at risk for infection with the SARS-CoV-2 virus that causes COVID-19.   Applicable protocols for evaluation were followed during the patient's care.   COVID-19 was considered as part of the patient's evaluation. The plan for testing is:  a test was obtained during this visit.     Diagnosis:    ICD-10-CM    1. COVID-19  U07.1    2. Hypoxia  R09.02        Scribe Disclosure:  Nabil FRANCISCO, am serving as a scribe at 11:34 AM on 1/22/2022 to document services personally performed by Ignacio Gonzalez DO based on my observations and the provider's statements to me.             Ignacio Gonzalez DO  01/22/22 1532

## 2022-01-22 NOTE — PHARMACY-ADMISSION MEDICATION HISTORY
Pharmacy Medication History  Admission medication history interview status for the 1/22/2022  admission is complete. See EPIC admission navigator for prior to admission medications     Location of Interview: Phone  Medication history sources: Patient's family/friend (mother)    Significant changes made to the medication list:  Removed all    In the past week, patient estimated taking medication this percent of the time: N/A    Additional medication history information:   Spoke on the phone with the patient's mother who is herself also a patient in the ED. Difficult to understand but she appears to be saying the patient does not take any medications.    Medication reconciliation completed by provider prior to medication history? No    Time spent in this activity: 5min    Prior to Admission medications    Not on File       The information provided in this note is only as accurate as the sources available at the time of update(s)

## 2022-01-22 NOTE — PROGRESS NOTES
RECEIVING UNIT ED HANDOFF REVIEW    ED Nurse Handoff Report was reviewed by: Dior Reed RN on January 22, 2022 at 3:33 PM

## 2022-01-22 NOTE — H&P
[facility]  History and Physical - Hospitalist Service       Date of Admission:  1/22/2022    Assessment & Plan      Miranda Dover is a 51 year old female with Down syndrome, was brought to the ER for evaluation of concern for COVID-19 and is being admitted on 1/22/2022 for further management.    # Confirmed COVID-19 infection    # Acute Hypoxic Respiratory Failure secondary to COVID-19 infection  # Viral Pneumonia secondary to COVID-19 infection     Symptom Onset 1/19/22   Date of 1st Positive Test 1/22/22   Vaccination Status Declines Vaccine       - COVID-19 special precautions, continuous pulse-ox  - Oxygen: continue current support with nasal cannula at 2 L/min; titrate to keep SpO2 between 90-96%  - Labs: Standard COVID admission labs ordered (CBC with diff, CMP, INR, D-dimer, CRP).   - Imaging: CT PE protocol in ER  showed no PE but  Patchy predominantly groundglass opacities in both lungs, likely related to COVID-19 pneumonia. Indeterminate left thyroid nodule measures 1.6 cm. Thyroid ultrasound may be helpful for further characterization.  - Breathing treatments: albuterol inhaler four times a day scheduled and PRN; avoid nebulizers in favor of MDIs   - IV fluids: ordered at a rate of 75 mL/hr; hydrate cautiously to avoid worsening respiratory status with volume overload.  - Antibiotics: not indicated   - COVID-Focused Medications: Dexamethasone 6 mg x 10 days or until hospital discharge, started on 1/22/22 and Remdesivir x 5 days or until hospital discharge, started on 1/22/22    - DVT Prophylaxis: at high risk of thrombotic complications due to COVID-19 (DDimer = 0.81 ug/mL FEU (Ref range: 0.00 - 0.50 ug/mL FEU) ).          - PROPHYLACTIC dosing: lovenox 40mg daily        - consider anticoag on discharge for 30 days & until return to normal mobility    Thrombocytopenia  Leukopenia  Platelet count 119 and WBC count 3.6.  Suspect low cell counts due to acute  "infection.  -Monitor  -Hold anticoagulation if platelet count<50 K or any signs of bleeding    Thyroid nodule  Incidentally noted on CT PE study.  Further work-up as outpatient once acute illness resolves.  -Patient does have elevated TSH in the past, check thyroid function.    Down syndrome  Noted, patient lives with her parents, unfortunately they both have been admitted to the hospital.  - for discharge planning       Diet:   regular diet  DVT Prophylaxis: Enoxaparin (Lovenox) SQ  Zhu Catheter: Not present  Central Lines: None  Cardiac Monitoring: None  Code Status:   full code    Clinically Significant Risk Factors Present on Admission              # Thrombocytopenia: Plts = 119 10e3/uL (Ref range: 150 - 450 10e3/uL) on admission, will monitor for bleeding       Disposition Plan   Expected Discharge:  2-3 days   Anticipated discharge location:  Awaiting care coordination huddle  Delays:              The patient's care was discussed with the Bedside Nurse, Patient, Patient's Family and ER physician.    Lynn Mendoza MD  Hospitalist Service  [facility]  Securely message with the Vocera Web Console (learn more here)  Text page via [employer] Paging/Directory         ______________________________________________________________________    Chief Complaint   \"I may have COVID infection\"      History is obtained from the patient, chart review, her mom, discussion with ER physician    History of Present Illness   Miranda Dover is a 51 year old female with Down syndrome who stays with her parents.  Her father has been admitted to the hospital for COVID-19.  Her mom was just brought to the ER with COVID-19-like symptoms and has been diagnosed and is being admitted.  Patient tells that she was also suspected to have COVID-19 and was brought to the ER.  Patient denies any fever, cough or chest pain.  She did report \"my stomach hurts\".  No nausea.  No diarrhea.  History is " somewhat limited given her Down syndrome and possible MR.    In ER, patient was evaluated by Dr. Gonzalez.  Patient was mildly hypoxic, 87% on room air, was placed on 2 LPM NC O2.  Labs including CBC BMP was done which showed mild leukopenia and thrombocytopenia otherwise unremarkable.  High-sensitivity troponin was negative. D-dimer mildly elevated at 0.81 and so CT PE study was done which was negative for PE but showed bilateral groundglass opacity concerning for COVID-19 pneumonia.  An admission was requested for further management.  Patient will be started on dexamethasone and remdesivir.    Review of Systems    The 10 point Review of Systems is negative other than noted in the HPI or here.      Past Medical History    I have reviewed this patient's medical history and updated it with pertinent information if needed.   Past Medical History:   Diagnosis Date     Down's syndrome      Hypothyroidism        Past Surgical History   I have reviewed this patient's surgical history and updated it with pertinent information if needed.  History reviewed. No pertinent surgical history.    Social History   I have reviewed this patient's social history and updated it with pertinent information if needed.  Social History     Tobacco Use     Smoking status: Never Smoker     Smokeless tobacco: Never Used   Substance Use Topics     Alcohol use: No     Drug use: No       Family History   I have reviewed this patient's family history and updated it with pertinent information if needed.  Family History   Problem Relation Age of Onset     Asthma Maternal Grandmother        Prior to Admission Medications   None     Allergies   No Known Allergies    Physical Exam   Vital Signs: Temp: 98.8  F (37.1  C) Temp src: Temporal BP: 100/68 Pulse: 82   Resp: 18 SpO2: (!) 87 % O2 Device: None (Room air)    Weight: 74 lbs 1.19 oz    General: Alert awake, oriented to self, knows he is in hospital, appears comfortable.  HEENT: PERRLA EOMI. Mucosa  dry  Lungs: Bilateral equal air entry.  Breath sounds are coarse with no overt crackles or wheezing, normal work of breathing.   CVS: S1S2 regular, no tachycardia or murmur.   Abdomen: Soft, mild epigastric tenderness otherwise no guarding or rigidity, no abdominal distention. BS heard.  MSK: No edema or deformities.  Neuro: AAOX3. CN 2-12 normal. Strength symmetrical.  Skin: No rash.       Data   Data reviewed today: I reviewed all medications, new labs and imaging results over the last 24 hours. I personally reviewed  CT pe protocol as above, no PE but GGO bilateral and also noted thyroid nodule    Recent Labs   Lab 01/22/22  1202   WBC 3.6*   HGB 14.1   MCV 96   *      POTASSIUM 3.8   CHLORIDE 105   CO2 32   BUN 21   CR 0.74   ANIONGAP 4   JOSUÉ 8.2*   *     3.6 (L)    \    14.1    /    119 (L)   N 76    L N/A    141    105    21 /   ------------------------------------ 101 (H)   ALT N/A   AST N/A   AP N/A   ALB N/A   Ca 8.2 (L)  3.8    32    0.74 \    % RETIC N/A    LDH N/A  Troponin N/A    BNP N/A    CK N/A  INR N/A   PTT N/A    D-dimer 0.81 (H)    Fibrinogen N/A    Antithrombin N/A  Ferritin N/A  CRP N/A    IL-6 N/A  Recent Results (from the past 24 hour(s))   CT Chest Pulmonary Embolism w Contrast    Narrative    EXAM: CT CHEST PULMONARY EMBOLISM WITH CONTRAST  LOCATION: [facility]  DATE/TIME: 01/22/2022, 1:01 PM    INDICATION: Shortness of breath. Positive d-dimer.  COMPARISON: None.  TECHNIQUE: CT chest pulmonary angiogram during arterial phase injection of IV contrast. Multiplanar reformats and MIP reconstructions were performed. Dose reduction techniques were used.   CONTRAST: 55 mL Isovue-370.    FINDINGS: Multiple images through the chest are degraded by artifact related to patient motion.    ANGIOGRAM CHEST: Pulmonary arteries are normal caliber and negative for pulmonary emboli. Thoracic aorta is negative for dissection. No CT evidence of right heart  strain.    LUNGS AND PLEURA: Patchy predominantly groundglass opacities in both lungs are likely related to COVID-19 pneumonia. No pneumothorax. No pleural effusions.    MEDIASTINUM/AXILLAE: Indeterminate left thyroid nodule measures 1.6 cm, not well defined on this exam. No enlarged lymph nodes are identified in the chest. Small pericardial effusion.    CORONARY ARTERY CALCIFICATION: None.    UPPER ABDOMEN: Limited views of the upper abdomen are unremarkable.    MUSCULOSKELETAL: Mild thoracolumbar curve.      Impression    IMPRESSION:  1.  No evidence for pulmonary embolism.  2.  Patchy predominantly groundglass opacities in both lungs, likely related to COVID-19 pneumonia.  3.  Indeterminate left thyroid nodule measures 1.6 cm. Thyroid ultrasound may be helpful for further characterization.

## 2022-01-22 NOTE — ED TRIAGE NOTES
Arrives via EMS from home, reports of SOB. Mother and father both sick with covid-like symptoms. Father hospitalized currently, mother in ED. Room air sats reported in low 90's by EMS.

## 2022-01-23 LAB
ANION GAP SERPL CALCULATED.3IONS-SCNC: 3 MMOL/L (ref 3–14)
BUN SERPL-MCNC: 19 MG/DL (ref 7–30)
CALCIUM SERPL-MCNC: 8.1 MG/DL (ref 8.5–10.1)
CHLORIDE BLD-SCNC: 105 MMOL/L (ref 94–109)
CO2 SERPL-SCNC: 32 MMOL/L (ref 20–32)
CREAT SERPL-MCNC: 0.75 MG/DL (ref 0.52–1.04)
CRP SERPL-MCNC: 41.1 MG/L (ref 0–8)
D DIMER PPP FEU-MCNC: 1.27 UG/ML FEU (ref 0–0.5)
ERYTHROCYTE [DISTWIDTH] IN BLOOD BY AUTOMATED COUNT: 13.1 % (ref 10–15)
GFR SERPL CREATININE-BSD FRML MDRD: >90 ML/MIN/1.73M2
GLUCOSE BLD-MCNC: 134 MG/DL (ref 70–99)
HCT VFR BLD AUTO: 46.5 % (ref 35–47)
HGB BLD-MCNC: 14.6 G/DL (ref 11.7–15.7)
LDH SERPL L TO P-CCNC: 260 U/L (ref 81–234)
MCH RBC QN AUTO: 30.2 PG (ref 26.5–33)
MCHC RBC AUTO-ENTMCNC: 31.4 G/DL (ref 31.5–36.5)
MCV RBC AUTO: 96 FL (ref 78–100)
PLATELET # BLD AUTO: 125 10E3/UL (ref 150–450)
POTASSIUM BLD-SCNC: 4.4 MMOL/L (ref 3.4–5.3)
RBC # BLD AUTO: 4.84 10E6/UL (ref 3.8–5.2)
SODIUM SERPL-SCNC: 140 MMOL/L (ref 133–144)
WBC # BLD AUTO: 4.3 10E3/UL (ref 4–11)

## 2022-01-23 PROCEDURE — 250N000011 HC RX IP 250 OP 636: Performed by: HOSPITALIST

## 2022-01-23 PROCEDURE — 258N000003 HC RX IP 258 OP 636: Performed by: HOSPITALIST

## 2022-01-23 PROCEDURE — 83615 LACTATE (LD) (LDH) ENZYME: CPT | Performed by: HOSPITALIST

## 2022-01-23 PROCEDURE — 82310 ASSAY OF CALCIUM: CPT | Performed by: HOSPITALIST

## 2022-01-23 PROCEDURE — XW033E5 INTRODUCTION OF REMDESIVIR ANTI-INFECTIVE INTO PERIPHERAL VEIN, PERCUTANEOUS APPROACH, NEW TECHNOLOGY GROUP 5: ICD-10-PCS | Performed by: HOSPITALIST

## 2022-01-23 PROCEDURE — C9113 INJ PANTOPRAZOLE SODIUM, VIA: HCPCS | Performed by: HOSPITALIST

## 2022-01-23 PROCEDURE — 250N000013 HC RX MED GY IP 250 OP 250 PS 637: Performed by: HOSPITALIST

## 2022-01-23 PROCEDURE — 36415 COLL VENOUS BLD VENIPUNCTURE: CPT | Performed by: HOSPITALIST

## 2022-01-23 PROCEDURE — 250N000009 HC RX 250: Performed by: HOSPITALIST

## 2022-01-23 PROCEDURE — 85014 HEMATOCRIT: CPT | Performed by: HOSPITALIST

## 2022-01-23 PROCEDURE — 99233 SBSQ HOSP IP/OBS HIGH 50: CPT | Performed by: HOSPITALIST

## 2022-01-23 PROCEDURE — 85379 FIBRIN DEGRADATION QUANT: CPT | Performed by: HOSPITALIST

## 2022-01-23 PROCEDURE — 86140 C-REACTIVE PROTEIN: CPT | Performed by: HOSPITALIST

## 2022-01-23 PROCEDURE — 120N000001 HC R&B MED SURG/OB

## 2022-01-23 RX ORDER — DEXAMETHASONE SODIUM PHOSPHATE 4 MG/ML
6 INJECTION, SOLUTION INTRA-ARTICULAR; INTRALESIONAL; INTRAMUSCULAR; INTRAVENOUS; SOFT TISSUE EVERY 24 HOURS
Status: DISPENSED | OUTPATIENT
Start: 2022-01-23 | End: 2022-02-01

## 2022-01-23 RX ADMIN — REMDESIVIR 100 MG: 100 INJECTION, POWDER, LYOPHILIZED, FOR SOLUTION INTRAVENOUS at 17:01

## 2022-01-23 RX ADMIN — DEXAMETHASONE 6 MG: 2 TABLET ORAL at 08:59

## 2022-01-23 RX ADMIN — SODIUM CHLORIDE, POTASSIUM CHLORIDE, SODIUM LACTATE AND CALCIUM CHLORIDE 200 ML: 600; 310; 30; 20 INJECTION, SOLUTION INTRAVENOUS at 10:28

## 2022-01-23 RX ADMIN — SODIUM CHLORIDE 50 ML: 9 INJECTION, SOLUTION INTRAVENOUS at 17:04

## 2022-01-23 RX ADMIN — PANTOPRAZOLE SODIUM 40 MG: 40 INJECTION, POWDER, FOR SOLUTION INTRAVENOUS at 10:27

## 2022-01-23 RX ADMIN — RIVAROXABAN 10 MG: 10 TABLET, FILM COATED ORAL at 17:01

## 2022-01-23 ASSESSMENT — ACTIVITIES OF DAILY LIVING (ADL)
ADLS_ACUITY_SCORE: 14

## 2022-01-23 NOTE — PLAN OF CARE
Summary: Covid positive, hypoxia, hx: Down Syndrom    DATE & TIME: 1/22/22 5582-5121  Cognitive Concerns/ Orientation : Oriented to person and place and situation, tearful, expressing fear at times  BEHAVIOR & AGGRESSION TOOL COLOR: Green  CIWA SCORE: N/A  ABNL VS/O2: VSS on 2L O2 NC sats 90-94%  MOBILITY: A X1/SBA with gaitbelt, refused walker to BSC  PAIN MANAGMENT: Denies pain  DIET: Regular  BOWEL/BLADDER: Continent of B&B  ABNL LAB/BG: d-dimer 1.66, CRP 31.7,   DRAIN/DEVICES: PIV left forearm infusing LR @ 75 ml/hr  TELEMETRY RHYTHM:N/A  SKIN: Redness blanchable, WDL  TESTS/PROCEDURES: none this shift  D/C DAY/GOALS/PLACE: pending improvement  OTHER IMPORTANT INFO: Both parents who are pt's caregivers are also hospitalized.   Remdesivir first dose given last shift, refused Lovenox on evening shift

## 2022-01-23 NOTE — PROGRESS NOTES
VS on 1L with soft BP. Bolus of fluid given this afternoon. PIV SL. A&Ox3, disoriented to situation. Refusing repositioning. Buttock area has dry skin, refusing lotion. AX1 to BSC. Regular diet, poor appetite. Needs encouragement. Trying to keep a list on pt's board in room of what pt seems to enjoy eating. Updated brother Maxi today on telephone. CRP 41.1 and D-dimer 1.27. SW consult placed. Discharge pending.

## 2022-01-23 NOTE — PROGRESS NOTES
[facility]    Hospitalist Progress Note      Assessment & Plan   Miranda Dover is a 51 year old female who was admitted on 1/22/2022 with hypoxic respiratory failure in setting of COVID-19 pneumonia.    Confirmed COVID-19 infection    Acute Hypoxic Respiratory Failure secondary to COVID-19 infection  Viral Pneumonia secondary to COVID-19 infection  Symptom Onset 1/19/22   Date of 1st Positive Test 1/22/22   Vaccination Status Declines Vaccine   - Continue NC at 1-2LPM titrate to keep SpO2 between 90-96%  -CT PE protocol in ER showed no PE but Patchy predominantly groundglass opacities in both lungs, likely related to COVID-19 pneumonia. Indeterminate left thyroid nodule measures 1.6 cm. Thyroid ultrasound may be helpful for further characterization.  -albuterol inhaler four times a day scheduled and PRN; avoid nebulizers in favor of MDIs   - Received IVF x12 hr on admission. Give small intermittent bolus for poor oral intake  - Dexamethasone 6 mg x 10 days or until hospital discharge (IV or oral, whichever she will take), started on 1/22/22 and Remdesivir x 5 days or until hospital discharge, started on 1/22/22    - Refused lovenox, switched to xarelto daily on 1/23 (hopefully she will agree)  - protonix IV daily     Thrombocytopenia  Leukopenia  Platelet count 119 and WBC count 3.6.  Suspect low cell counts due to acute infection.  -Monitor  -Hold anticoagulation if platelet count<50 K or any signs of bleeding     Thyroid nodule  Incidentally noted on CT PE study. TSH elevated at 15.02, T4 free WNL  - Further work-up as outpatient with dedicated thyroid US once acute illness resolves.     Down syndrome  Noted, patient lives with her parents, unfortunately they both have been admitted to the hospital.  -care transitions consulted as patient will likely be ready for discharge prior to her parents being able to care for her again    Clinically Significant Risk Factors Present on Admission                   DVT Prophylaxis: DOAC  Code Status: Full Code  Expected Discharge: 01/25/2022     Anticipated discharge location:  Awaiting care coordination huddle  Delays:    wean O2, stable respiratory status and plan for       Chiquis Blanton DO  Hospitalist [facility]  Securely message with the Vocera Web Console (learn more here)  Text Page (7am - 6pm) via [employer] Paging/Directory      Interval History   Patient seen and examined. She is feeling okay, having a little belly pain. She has a poor appetite. She doesn't want to take any medications or foods that might make her stomach hurt. She was maybe agreeable to toast. She denies any trouble breathing. Discussed with RN.  Spent 35 minutes with >50% time reviewing chart, evaluating patient, discussing with RN and updating brother Maxi via phone.    -Data reviewed today: I reviewed all new labs and imaging results over the last 24 hours. I personally reviewed no images or EKG's today.    Physical Exam   Temp: 98.4  F (36.9  C) Temp src: Oral BP: 90/58 Pulse: 60   Resp: 16 SpO2: 92 % O2 Device: Nasal cannula Oxygen Delivery: 1 LPM  Vitals:    01/22/22 1140   Weight: 33.6 kg (74 lb 1.2 oz)     Vital Signs with Ranges  Temp:  [97.4  F (36.3  C)-98.4  F (36.9  C)] 98.4  F (36.9  C)  Pulse:  [59-80] 60  Resp:  [16] 16  BP: ()/(54-66) 90/58  SpO2:  [92 %-95 %] 92 %  I/O last 3 completed shifts:  In: -   Out: 200 [Urine:200]    Constitutional: Awake, alert, cooperative, no apparent distress, tacky mucosa  Respiratory: Clear to auscultation bilaterally, no crackles or wheezing  Cardiovascular: Regular rate and rhythm, normal S1 and S2, and no murmur noted  GI: Normal bowel sounds, soft, non-distended, non-tender  Skin/Integumen: No rashes, no cyanosis, no edema  Other: Oriented to person, place, time.    Medications     - MEDICATION INSTRUCTIONS -         remdesivir  100 mg Intravenous Q24H    And     sodium chloride 0.9%  50 mL  Intravenous Q24H     albuterol  2 puff Inhalation 4x Daily     dexamethasone  6 mg Oral Q24H    Or     dexamethasone  6 mg Intravenous Q24H     pantoprazole (PROTONIX) IV  40 mg Intravenous Daily with breakfast     rivaroxaban ANTICOAGULANT  10 mg Oral Daily with supper     sodium chloride (PF)  3 mL Intracatheter Q8H       Data   Recent Labs   Lab 01/23/22  1050 01/22/22  1202   WBC 4.3 3.6*   HGB 14.6 14.1   MCV 96 96   * 119*    141   POTASSIUM 4.4 3.8   CHLORIDE 105 105   CO2 32 32   BUN 19 21   CR 0.75 0.74   ANIONGAP 3 4   JOSUÉ 8.1* 8.2*   * 101*       No results found for this or any previous visit (from the past 24 hour(s)).

## 2022-01-23 NOTE — PLAN OF CARE
Summary: Covid positive, hypoxia, hx: Down Syndrom    DATE & TIME: 1/22/22 7158-5586  Cognitive Concerns/ Orientation : Oriented to person and place, tearful, expressing fear at times  BEHAVIOR & AGGRESSION TOOL COLOR: green  CIWA SCORE: N/A  ABNL VS/O2: VSS on 2L O2 NC sats 90-94%  MOBILITY: SBA with gaitbelt, refused walker  PAIN MANAGMENT: denies pain  DIET: regular  BOWEL/BLADDER: continent  ABNL LAB/BG: d-dimer 1.66, CRP 31.7,   DRAIN/DEVICES: PIV left forearm infusing LR @ 75 ml/hr  TELEMETRY RHYTHM:N/A  SKIN: WDL  TESTS/PROCEDURES: none this shift  D/C DAY/GOALS/PLACE: pending improvement  OTHER IMPORTANT INFO: Both parents who are pt's caregivers are also hospitalized.   Remdesivir first dose given, refused Lovenox

## 2022-01-23 NOTE — PROGRESS NOTES
Spoke with lab regarding pt's status on lab draws. They are delayed due to short staffing and should be coming at some point this AM to draw; hence why labs may be delayed until mid-morning/afternoon.

## 2022-01-24 ENCOUNTER — APPOINTMENT (OUTPATIENT)
Dept: PHYSICAL THERAPY | Facility: CLINIC | Age: 52
DRG: 177 | End: 2022-01-24
Attending: HOSPITALIST
Payer: COMMERCIAL

## 2022-01-24 ENCOUNTER — APPOINTMENT (OUTPATIENT)
Dept: OCCUPATIONAL THERAPY | Facility: CLINIC | Age: 52
DRG: 177 | End: 2022-01-24
Attending: HOSPITALIST
Payer: COMMERCIAL

## 2022-01-24 PROCEDURE — 250N000013 HC RX MED GY IP 250 OP 250 PS 637: Performed by: HOSPITALIST

## 2022-01-24 PROCEDURE — 250N000011 HC RX IP 250 OP 636: Performed by: HOSPITALIST

## 2022-01-24 PROCEDURE — 250N000009 HC RX 250: Performed by: HOSPITALIST

## 2022-01-24 PROCEDURE — 99233 SBSQ HOSP IP/OBS HIGH 50: CPT | Performed by: HOSPITALIST

## 2022-01-24 PROCEDURE — 258N000003 HC RX IP 258 OP 636: Performed by: HOSPITALIST

## 2022-01-24 PROCEDURE — 97165 OT EVAL LOW COMPLEX 30 MIN: CPT | Mod: GO | Performed by: OCCUPATIONAL THERAPIST

## 2022-01-24 PROCEDURE — 97162 PT EVAL MOD COMPLEX 30 MIN: CPT | Mod: GP

## 2022-01-24 PROCEDURE — 258N000003 HC RX IP 258 OP 636: Performed by: INTERNAL MEDICINE

## 2022-01-24 PROCEDURE — 120N000001 HC R&B MED SURG/OB

## 2022-01-24 PROCEDURE — C9113 INJ PANTOPRAZOLE SODIUM, VIA: HCPCS | Performed by: HOSPITALIST

## 2022-01-24 PROCEDURE — 97530 THERAPEUTIC ACTIVITIES: CPT | Mod: GP

## 2022-01-24 RX ORDER — SODIUM CHLORIDE 9 MG/ML
INJECTION, SOLUTION INTRAVENOUS CONTINUOUS
Status: DISCONTINUED | OUTPATIENT
Start: 2022-01-24 | End: 2022-01-24

## 2022-01-24 RX ADMIN — ACETAMINOPHEN 650 MG: 325 TABLET, FILM COATED ORAL at 16:13

## 2022-01-24 RX ADMIN — SODIUM CHLORIDE 250 ML: 9 INJECTION, SOLUTION INTRAVENOUS at 21:05

## 2022-01-24 RX ADMIN — SODIUM CHLORIDE: 9 INJECTION, SOLUTION INTRAVENOUS at 02:11

## 2022-01-24 RX ADMIN — ALBUTEROL SULFATE 2 PUFF: 90 AEROSOL, METERED RESPIRATORY (INHALATION) at 10:51

## 2022-01-24 RX ADMIN — SODIUM CHLORIDE 50 ML: 9 INJECTION, SOLUTION INTRAVENOUS at 16:20

## 2022-01-24 RX ADMIN — RIVAROXABAN 10 MG: 10 TABLET, FILM COATED ORAL at 16:13

## 2022-01-24 RX ADMIN — DEXAMETHASONE 6 MG: 2 TABLET ORAL at 10:36

## 2022-01-24 RX ADMIN — PANTOPRAZOLE SODIUM 40 MG: 40 INJECTION, POWDER, FOR SOLUTION INTRAVENOUS at 10:36

## 2022-01-24 RX ADMIN — SODIUM CHLORIDE 500 ML: 9 INJECTION, SOLUTION INTRAVENOUS at 01:09

## 2022-01-24 RX ADMIN — ALBUTEROL SULFATE 2 PUFF: 90 AEROSOL, METERED RESPIRATORY (INHALATION) at 19:47

## 2022-01-24 RX ADMIN — REMDESIVIR 100 MG: 100 INJECTION, POWDER, LYOPHILIZED, FOR SOLUTION INTRAVENOUS at 16:16

## 2022-01-24 ASSESSMENT — ACTIVITIES OF DAILY LIVING (ADL)
ADLS_ACUITY_SCORE: 14

## 2022-01-24 NOTE — PLAN OF CARE
Summary: Covid positive, hypoxia, hx: Down Syndrom    DATE & TIME: 1/23/22 2652-9493  Cognitive Concerns/ Orientation : Oriented to person and place and situation, tearful, expressing fear at times  BEHAVIOR & AGGRESSION TOOL COLOR: Green  CIWA SCORE: N/A  ABNL VS/O2: VSS, except. Low BP 84/56, bolus given and IVF infusing, on 1L O2 NC sats 90-94%  MOBILITY: A X1/SBA with gaitbelt, refused walker to [facility]  PAIN MANAGMENT: Denies pain  DIET: Regular, very poor intake, only eating pudding  BOWEL/BLADDER: Continent of B&B  ABNL LAB/BG: d-dimer 1.27, CRP 41.1   DRAIN/DEVICES: PIV left forearm SL  TELEMETRY RHYTHM: N/A  SKIN: Redness blanchable coccyx  TESTS/PROCEDURES: none this shift  D/C DAY/GOALS/PLACE: pending improvement  OTHER IMPORTANT INFO: Both parents who are pt's caregivers are also hospitalized.   Remdesivir first dose 1/22/22. refused albuterol inhaler

## 2022-01-24 NOTE — PROGRESS NOTES
01/24/22 1136   Quick Adds   Type of Visit Initial PT Evaluation   Living Environment   People in home parent(s)   Living Environment Comments Pt unable to provide accurate history 2/2 developmental delay. Per chart review pt lives with her parents. Unclear if pt needs to negotiate stairs. Per chart pt typically moves extremeties against gravity, unassisted gait.    Self-Care   Equipment Currently Used at Home none   Activity/Exercise/Self-Care Comment Appears pt does not use walker at baseline, will need to confrim with family. MD updated on pt performance and will be talking with family today. Per chart pt's baseline unassisted gait 2/2021.    Disability/Function   Change in Functional Status Since Onset of Current Illness/Injury yes   General Information   Onset of Illness/Injury or Date of Surgery 01/22/22   Referring Physician Chiquis Blanton, DO   Pertinent History of Current Problem (include personal factors and/or comorbidities that impact the POC) Miranda Dover is a 51 year old female with Down syndrome, was brought to the ER for evaluation of concern for COVID-19 and is being admitted on 1/22/2022 for further management.   Existing Precautions/Restrictions fall   Cognition   Orientation Status (Cognition) person   Cognitive Status Comments Pt poor historian, confused at times. Pt has baseline developmental delay    Posture    Posture Forward head position   Range of Motion (ROM)   ROM Comment BLE WFL   Strength   Strength Comments Pt demonstrates gross functional weakness, unable to lift UE or LE from bed. Unclear if pt giving full effort.    Bed Mobility   Comment (Bed Mobility) Supine > sit mod A x 1    Transfers   Transfer Safety Comments STS HHA min A, pt refuses walker   Gait/Stairs (Locomotion)   Comment (Gait/Stairs) Pt unable to safely ambulate   Balance   Balance Comments Impaired dynamic balance from baseline   Clinical Impression   Criteria for Skilled Therapeutic Intervention  yes, treatment indicated   PT Diagnosis (PT) impaired IND with mobility from baseline.    Influenced by the following impairments Functional weakness, impaired balance, cognition   Functional limitations due to impairments Pt demonstrates impaired bed mobility, transfers, and gait. Pt unable to safely ambulate this AM during PT    Clinical Presentation Evolving/Changing   Clinical Presentation Rationale level of assist, cognition, clinical judgement   Clinical Decision Making (Complexity) low complexity   Therapy Frequency (PT) 5x/week   Predicted Duration of Therapy Intervention (days/wks) 1 week   Planned Therapy Interventions (PT) balance training;bed mobility training;gait training;patient/family education;strengthening;transfer training   Risk & Benefits of therapy have been explained evaluation/treatment results reviewed;care plan/treatment goals reviewed;risks/benefits reviewed;patient   PT Discharge Planning    PT Discharge Recommendation (DC Rec) Transitional Care Facility;home with assist;home with home care physical therapy   PT Rationale for DC Rec Pt demonstrates gross functional weakness on evaluation this AM, unable to lift UE or LE against gravity. Pt requires min-mod A with bed mobility and transfers. Pt unsteady, refusing walker, unable to safely ambulate this AM. Recommend TCU at DC as pt appears below baseline fo rmobility. Pending LOS and progress pt may progress to be safe to DC to home with family assist. Will continue to update recs   Total Evaluation Time   Total Evaluation Time (Minutes) 15

## 2022-01-24 NOTE — PLAN OF CARE
Summary: Covid positive, hypoxia, hx: Down Syndrom    DATE & TIME: 1/23/22 2363-4717  Cognitive Concerns/ Orientation : Oriented to person and place and situation, tearful, expressing fear at times  BEHAVIOR & AGGRESSION TOOL COLOR: Green  CIWA SCORE: N/A  ABNL VS/O2: VSS on 1L O2 NC sats 90-94%  MOBILITY: A X1/SBA with gaitbelt, refused walker to [facility]  PAIN MANAGMENT: Denies pain  DIET: Regular, very poor intake, only eating pudding  BOWEL/BLADDER: Continent of B&B  ABNL LAB/BG: d-dimer 1.27, CRP 41.1   DRAIN/DEVICES: PIV left forearm SL  TELEMETRY RHYTHM: N/A  SKIN: Redness blanchable coccyx  TESTS/PROCEDURES: none this shift  D/C DAY/GOALS/PLACE: pending improvement  OTHER IMPORTANT INFO: Both parents who are pt's caregivers are also hospitalized.   Remdesivir first dose 1/22/22. refused albuterol inhaler

## 2022-01-24 NOTE — PLAN OF CARE
Summary: Covid positive, hypoxia, hx: Down Syndrome    DATE & TIME: 1/24/22 8042-5336  Cognitive Concerns/ Orientation : Oriented to person and place and situation, tearful, expressing fear at times  BEHAVIOR & AGGRESSION TOOL COLOR: Green  CIWA SCORE: N/A  ABNL VS/O2: 1L O2 NC sats 90-94%  MOBILITY: A X1/SBA with gaitbelt, refused walker to B[facility]  PAIN MANAGMENT: Denies pain  DIET: Regular, very poor intake, eats with assistance  BOWEL/BLADDER: Continent of B&B  ABNL LAB/BG: d-dimer 1.27, CRP 41.1   DRAIN/DEVICES: PIV left forearm SL  TELEMETRY RHYTHM: N/A  SKIN: Redness blanchable coccyx  TESTS/PROCEDURES: none this shift  D/C DAY/GOALS/PLACE: pending improvement  OTHER IMPORTANT INFO: Both parents who are pt's caregivers are also hospitalized.   Remdesivir first dose 1/22/22.

## 2022-01-24 NOTE — PROGRESS NOTES
Xcoverage: paged by RN because soft BP 84/56, she refused to eat or drink; renal function was OK; will give a bolus NS 500cc now, followed by IV fluids NS at 75cc/h overnight; monitor fluid status in the setting of COVID infection.    Janna Alvarado MD

## 2022-01-24 NOTE — PROGRESS NOTES
[facility]    Hospitalist Progress Note      Assessment & Plan   Miranda Dover is a 51 year old female who was admitted on 1/22/2022 with hypoxic respiratory failure in setting of COVID-19 pneumonia.    Confirmed COVID-19 infection    Acute Hypoxic Respiratory Failure secondary to COVID-19 infection  Viral Pneumonia secondary to COVID-19 infection  Symptom Onset 1/19/22   Date of 1st Positive Test 1/22/22   Vaccination Status Declines Vaccine   - Continue NC at 1-2LPM titrate to keep SpO2 between 90-96%  -CT PE protocol in ER showed no PE but Patchy predominantly groundglass opacities in both lungs, likely related to COVID-19 pneumonia. Indeterminate left thyroid nodule measures 1.6 cm. Thyroid ultrasound may be helpful for further characterization.  -albuterol inhaler four times a day scheduled and PRN; avoid nebulizers in favor of MDIs   - Received IVF x12 hr on admission. Give small intermittent bolus for SBP<90 (available q4h)  - Dexamethasone 6 mg x 10 days or until hospital discharge (IV or oral, whichever she will take), started on 1/22/22 and Remdesivir x 5 days or until hospital discharge, started on 1/22/22    - Refused lovenox, switched to xarelto daily on 1/23, continue for 30 days total  - protonix IV daily     Thrombocytopenia  Leukopenia  Platelet count 119 and WBC count 3.6.  Suspect low cell counts due to acute infection.  -Monitor  -Hold anticoagulation if platelet count<50 K or any signs of bleeding     Thyroid nodule  Incidentally noted on CT PE study. TSH elevated at 15.02, T4 free WNL  - Further work-up as outpatient with dedicated thyroid US once acute illness resolves.(not yet discussed with brother Maxi given severity of illness of other family members)     Down syndrome  Developmental delay  Noted, patient lives with her parents, unfortunately they both have been admitted to the hospital.  - recommended TCU by PT/OT     Clinically Significant Risk Factors  Present on Admission                  DVT Prophylaxis: DOAC  Code Status: Full Code  Expected Discharge: 01/27/2022     Anticipated discharge location:  Awaiting care coordination huddle  Delays:    wean O2, stable respiratory status and plan for TCU      Chiquis Blanton DO  Hospitalist Service  [facility]  Securely message with the Vocera Web Console (learn more here)  Text Page (7am - 6pm) via Personaling Paging/Directory      Interval History   Patient seen and examined. She reports that her stomach is fragile. She took one bite of a plain turkey on white bread sandwich and refused the rest. Agreeable to albuterol this morning, but not this afternoon. Did take her decadron this morning and her xarelto last night. Worked with physical therapy, but difficulty following commands.  Brother reports he would not be able to accommodate patient coming to live with him and she would need to go to facility and that in the future patient likely won't be able to return home with her parents.  Spent 35 minutes with >50% time reviewing chart, evaluating patient, discussing with RN and updating brother Maxi via phone.    -Data reviewed today: I reviewed all new labs and imaging results over the last 24 hours. I personally reviewed no images or EKG's today.    Physical Exam   Temp: 98.9  F (37.2  C) Temp src: Axillary BP: 95/59 Pulse: 70   Resp: 16 SpO2: 94 % O2 Device: Nasal cannula Oxygen Delivery: 2 LPM  Vitals:    01/22/22 1140   Weight: 33.6 kg (74 lb 1.2 oz)     Vital Signs with Ranges  Temp:  [97.9  F (36.6  C)-98.9  F (37.2  C)] 98.9  F (37.2  C)  Pulse:  [59-70] 70  Resp:  [16] 16  BP: ()/(56-63) 95/59  SpO2:  [93 %-95 %] 94 %  I/O last 3 completed shifts:  In: 620 [P.O.:620]  Out: 400 [Urine:400]    Constitutional: Awake, alert, cooperative, no apparent distress, chapped lips  Respiratory: Clear to auscultation bilaterally, no crackles or wheezing  Cardiovascular: Regular rate and rhythm,  normal S1 and S2, and no murmur noted  GI: Normal bowel sounds, soft, non-distended, non-tender  Skin/Integumen: No rashes, no cyanosis, no edema  Other: Oriented to person, place    Medications     - MEDICATION INSTRUCTIONS -         albuterol  2 puff Inhalation 4x Daily     dexamethasone  6 mg Oral Q24H    Or     dexamethasone  6 mg Intravenous Q24H     pantoprazole (PROTONIX) IV  40 mg Intravenous Daily with breakfast     remdesivir  100 mg Intravenous Q24H     rivaroxaban ANTICOAGULANT  10 mg Oral Daily with supper     sodium chloride (PF)  3 mL Intracatheter Q8H       Data   Recent Labs   Lab 01/23/22  1050 01/22/22  1202   WBC 4.3 3.6*   HGB 14.6 14.1   MCV 96 96   * 119*    141   POTASSIUM 4.4 3.8   CHLORIDE 105 105   CO2 32 32   BUN 19 21   CR 0.75 0.74   ANIONGAP 3 4   JOSUÉ 8.1* 8.2*   * 101*       No results found for this or any previous visit (from the past 24 hour(s)).

## 2022-01-24 NOTE — PROVIDER NOTIFICATION
MD Notification    Notified Person: MD    Notified Person Name: YUE LOPEZ MD    Notification Date/Time:01/21/22 0037    Notification Interaction: Amc    Purpose of Notification: 634-2 BP 84/56. pt refused to eat or drink. PIV SL. Please advice  Thanks  MAXX BLAKE  *[id number]    Orders Received:    Comments:

## 2022-01-24 NOTE — PROGRESS NOTES
"   01/24/22 9103   Quick Adds   Type of Visit Initial Occupational Therapy Evaluation   Living Environment   People in home parent(s)   Transportation Anticipated family or friend will provide   Living Environment Comments Lives in a home with her parents as caregivers. currently both parents are hospitalized with covid. pt unable to provide PLOF or information about home   Self-Care   Usual Activity Tolerance good   Current Activity Tolerance fair   Activity/Exercise/Self-Care Comment pt states her mom cooks and she is able to bath and dress herself. unsure about any other info.    Instrumental Activities of Daily Living (IADL)   IADL Comments appears her parents complete all IADL's   General Information   Onset of Illness/Injury or Date of Surgery 01/22/22   Referring Physician Chiquis Blanton DO   Patient/Family Therapy Goal Statement (OT) none stated, was perseverating on IV   Additional Occupational Profile Info/Pertinent History of Current Problem Miranda Dover is a 51 year old female with Down syndrome, was brought to the ER for evaluation of concern for COVID-19 and is being admitted on 1/22/2022 for further management.   Existing Precautions/Restrictions fall   General Observations and Info pt was supine in bed, very fixated on her L arm IV. not very participitory   Cognitive Status Examination   Orientation Status person;place   Cognitive Status Comments knew it was \"[place]\" and here with coronavirus   Visual Perception   Visual Impairment/Limitations WNL   Sensory   Sensory Quick Adds No deficits were identified   Pain Assessment   Patient Currently in Pain Yes, see Vital Sign flowsheet   Integumentary/Edema   Integumentary/Edema no deficits were identifed   Posture   Posture not impaired   Range of Motion Comprehensive   Comment, General Range of Motion pt only let me PROM R UE as she was afraid to move her L arm with the IV   Strength Comprehensive (MMT)   Comment, General Manual Muscle Testing " (MMT) Assessment unable to test   Muscle Tone Assessment   Muscle Tone Quick Adds No deficits were identified   Coordination   Upper Extremity Coordination No deficits were identified   Bed Mobility   Comment (Bed Mobility) pt refused   Clinical Impression   Criteria for Skilled Therapeutic Interventions Met (OT) yes;skilled treatment is necessary   OT Diagnosis decreased I with ADL's and functional mobility   OT Problem List-Impairments impacting ADL problems related to;activity tolerance impaired;balance;cognition;strength;inability to direct their own care   Assessment of Occupational Performance 1-3 Performance Deficits   Planned Therapy Interventions (OT) ADL retraining;balance training;bed mobility training;cognition;ROM;strengthening;transfer training;progressive activity/exercise   Intervention Comments decreased I with ADL's and functional mobility   Clinical Decision Making Complexity (OT) low complexity   Therapy Frequency (OT) 3x/week   Predicted Duration of Therapy 2 weeks   Risk & Benefits of therapy have been explained evaluation/treatment results reviewed;care plan/treatment goals reviewed;risks/benefits reviewed;current/potential barriers reviewed;participants voiced agreement with care plan;participants included;patient   OT Discharge Planning    OT Discharge Recommendation (DC Rec) Transitional Care Facility   OT Rationale for DC Rec pt is below baseline with ADLs and functional mobility, would benefit from TCU stay to increase I with ADL's and functional mobility prior to returning home   Total Evaluation Time (Minutes)   Total Evaluation Time (Minutes) 15

## 2022-01-25 PROCEDURE — 99232 SBSQ HOSP IP/OBS MODERATE 35: CPT | Performed by: HOSPITALIST

## 2022-01-25 PROCEDURE — C9113 INJ PANTOPRAZOLE SODIUM, VIA: HCPCS | Performed by: HOSPITALIST

## 2022-01-25 PROCEDURE — 250N000011 HC RX IP 250 OP 636: Performed by: HOSPITALIST

## 2022-01-25 PROCEDURE — 250N000013 HC RX MED GY IP 250 OP 250 PS 637: Performed by: HOSPITALIST

## 2022-01-25 PROCEDURE — 258N000003 HC RX IP 258 OP 636: Performed by: HOSPITALIST

## 2022-01-25 PROCEDURE — 250N000009 HC RX 250: Performed by: HOSPITALIST

## 2022-01-25 PROCEDURE — 120N000001 HC R&B MED SURG/OB

## 2022-01-25 PROCEDURE — 93005 ELECTROCARDIOGRAM TRACING: CPT

## 2022-01-25 RX ORDER — PANTOPRAZOLE SODIUM 40 MG/1
40 TABLET, DELAYED RELEASE ORAL
Status: DISCONTINUED | OUTPATIENT
Start: 2022-01-26 | End: 2022-06-28 | Stop reason: HOSPADM

## 2022-01-25 RX ADMIN — PANTOPRAZOLE SODIUM 40 MG: 40 INJECTION, POWDER, FOR SOLUTION INTRAVENOUS at 10:04

## 2022-01-25 RX ADMIN — REMDESIVIR 100 MG: 100 INJECTION, POWDER, LYOPHILIZED, FOR SOLUTION INTRAVENOUS at 13:33

## 2022-01-25 RX ADMIN — ACETAMINOPHEN 650 MG: 325 TABLET, FILM COATED ORAL at 13:36

## 2022-01-25 RX ADMIN — SODIUM CHLORIDE 50 ML: 900 INJECTION INTRAVENOUS at 13:40

## 2022-01-25 RX ADMIN — DEXAMETHASONE SODIUM PHOSPHATE 6 MG: 4 INJECTION, SOLUTION INTRAMUSCULAR; INTRAVENOUS at 10:05

## 2022-01-25 RX ADMIN — ONDANSETRON 4 MG: 2 INJECTION INTRAMUSCULAR; INTRAVENOUS at 13:41

## 2022-01-25 ASSESSMENT — ACTIVITIES OF DAILY LIVING (ADL)
ADLS_ACUITY_SCORE: 10
WHICH_OF_THE_ABOVE_FUNCTIONAL_RISKS_HAD_A_RECENT_ONSET_OR_CHANGE?: AMBULATION;TOILETING
WALKING_OR_CLIMBING_STAIRS_DIFFICULTY: NO
ADLS_ACUITY_SCORE: 14
DRESSING/BATHING_DIFFICULTY: NO
ADLS_ACUITY_SCORE: 14
DIFFICULTY_COMMUNICATING: NO
ADLS_ACUITY_SCORE: 14
ADLS_ACUITY_SCORE: 10
ADLS_ACUITY_SCORE: 10
ADLS_ACUITY_SCORE: 14
WEAR_GLASSES_OR_BLIND: NO
ADLS_ACUITY_SCORE: 14
PATIENT_/_FAMILY_COMMUNICATION_STYLE: SPOKEN LANGUAGE (ENGLISH OR BILINGUAL)
ADLS_ACUITY_SCORE: 14
ADLS_ACUITY_SCORE: 10
DIFFICULTY_EATING/SWALLOWING: NO
ADLS_ACUITY_SCORE: 14
ADLS_ACUITY_SCORE: 14
HEARING_DIFFICULTY_OR_DEAF: NO
ADLS_ACUITY_SCORE: 10
CONCENTRATING,_REMEMBERING_OR_MAKING_DECISIONS_DIFFICULTY: NO
DOING_ERRANDS_INDEPENDENTLY_DIFFICULTY: YES
ADLS_ACUITY_SCORE: 10
FALL_HISTORY_WITHIN_LAST_SIX_MONTHS: NO
ADLS_ACUITY_SCORE: 10
ADLS_ACUITY_SCORE: 14
ADLS_ACUITY_SCORE: 10
TOILETING_ISSUES: NO
ADLS_ACUITY_SCORE: 14

## 2022-01-25 NOTE — PLAN OF CARE
Summary: Covid positive  DATE & TIME: 1/25/22 5513-9181  Cognitive Concerns/ Orientation : A&O x3-4. Forgetful to situation at times. Tearful, expressing fear at times. Hx developmental delay/down syndrome  BEHAVIOR & AGGRESSION TOOL COLOR: Green; refuses cares a times.  CIWA SCORE: N/A  ABNL VS/O2: BP soft 96/64 this shift, (has PRN bolus for <90 SBP) Other VSS on 2-3 O2 NC- wean as able    MOBILITY: SBA w/GB. Up in chair today.  PAIN MANAGMENT: Chest pain improved after RRT. Intermittent abdominal pain but none reported this shift  DIET: Regular, very poor intake, eats with assistance- needs encouragement  BOWEL/BLADDER: Continent of B&B  ABNL LAB/BG: d-dimer 1.27, CRP 41.1  DRAIN/DEVICES: PIV left forearm SL   TELEMETRY RHYTHM: Refused placement of telemetry after RRT  SKIN: Redness blanchable coccyx  TESTS/PROCEDURES: plan for echo and serial trops   D/C DAY/GOALS/PLACE: pending able to wean oxygen, TCU placement   OTHER IMPORTANT INFO: Both parents who are pt's caregivers are also hospitalized. Needs a lot of encouragement to eat and ambulate. Continue decadron and remdesivir (last dose 1/26)

## 2022-01-25 NOTE — CODE/RAPID RESPONSE
House officer note:  RRT called for concern of chest pain.  Difficult to get a good exam as and has Down syndrome and is quite upset with being monitored and touched.  She is in no acute distress, warm, dry, appears adequately perfused, and is nontoxic in appearance.  EKG concerning for ST segment changes in the inferior and lateral leads.  EKG obtained on 1/22/2022 in the emergency department had concern for same.  At that time cardiology consultation was not pursued as high-sensitivity troponin returned at 23; serial troponin was not obtained.  EKG today repeated with out significant ST segment changes in the inferior and lateral leads.    Plan:  -Discussed with Dr. Mendoza, hospitalist  -Serial troponin, D-dimer  -Echocardiogram      NAN Hollingsworth, CNP  Hospitalist Service, House Officer  [facility]     Text Page  Pager: [phone]

## 2022-01-25 NOTE — PROVIDER NOTIFICATION
Notified Dr. Mendoza that patient is refusing troponin and d-dimer, Xaerlto and telemetry. He said to keep trying every 4 hrs for trops if patient allows otherwise no other interventions.

## 2022-01-25 NOTE — CONSULTS
Care Management Initial Consult    General Information  Assessment completed with: Other (brother, bridgett),    Type of CM/SW Visit: Initial Assessment    Primary Care Provider verified and updated as needed:     Readmission within the last 30 days: no previous admission in last 30 days      Reason for Consult: discharge planning  Advance Care Planning: Advance Care Planning Reviewed: questions answered,concerns discussed          Communication Assessment  Patient's communication style: spoken language (English or Bilingual)    Hearing Difficulty or Deaf: no   Wear Glasses or Blind: no    Cognitive  Cognitive/Neuro/Behavioral: .WDL except  Level of Consciousness: alert  Arousal Level: opens eyes spontaneously  Orientation: disoriented to,place  Mood/Behavior: anxious  Best Language: 0 - No aphasia  Speech: clear    Living Environment:   People in home: parent(s)     Current living Arrangements: house      Able to return to prior arrangements: no       Family/Social Support:  Care provided by: parent(s)  Provides care for: no one, unable/limited ability to care for self  Marital Status: Single  Parent(s),Sibling(s)          Description of Support System: Supportive,Involved    Support Assessment: Adequate family and caregiver support,Adequate social supports    Current Resources:   Patient receiving home care services:       Community Resources:    Equipment currently used at home: none  Supplies currently used at home:      Employment/Financial:  Employment Status: disabled        Financial Concerns: No concerns identified           Lifestyle & Psychosocial Needs:  Social Determinants of Health     Tobacco Use: Low Risk      Smoking Tobacco Use: Never Smoker     Smokeless Tobacco Use: Never Used   Alcohol Use: Not on file   Financial Resource Strain: Not on file   Food Insecurity: Not on file   Transportation Needs: Not on file   Physical Activity: Not on file   Stress: Not on file   Social Connections: Not on file    Intimate Partner Violence: Not on file   Depression: Not on file   Housing Stability: Not on file       Functional Status:  Prior to admission patient needed assistance:              Mental Health Status:          Chemical Dependency Status:                Values/Beliefs:  Spiritual, Cultural Beliefs, Alevism Practices, Values that affect care:  (Confucianism)               Additional Information:    Pt was admitted on 1/22/22 for hypoxia; pt is COVID+.  PT/OT recommend TCU.  Per PT, pt is 2/2 developmental delay.  Pt is diagnosed with down's syndrome; parents are caregivers and also hospitalized with COVID currently, per chart review.  Sw consult ordered for discharge planning.    Sw attempted to connect with pt via phone but phone was not answered.  Elsi then called emergency contact, Maxi (sibling to pt), to discuss pt's living situation and discharge plans.  Maxi confirmed that pt resides in a house with parents.  Per Maxi, pt is unable to care for herself, is unable to feed/drink independently, was independent with mobility at baseline but had been weakening as of recent (prior to COVID infection).  Maxi was unsure if parents have guardianship of pt.  Maxi confirmed that parents (pt's next of kin) are ill and currently hospitalized with COVID.  Maxi stated that prior to becoming infected with COVID, pt's mom had stated that they needed more supports at home and was considering looking into group home.  Now that pt's parents are hospitalized with COVID, Maxi is concerned that parent's will no longer be able to maintain care-taking of pt.  Elsi explained that guardianship status will likely need to be determined prior to finding placement.  Elsi also explained that given COVID status, this will be barrier to placement until pt is COVID recovered.  Maxi stated that he will do what is needed in terms of securing guardianship for sister (if necessary) but would not be able to be her full-time caretaker if parents are unable to  resume this role.  Sw to determine guardianship status with parents and continue to involve brother, Maxi, on discharge plans.        Connie Smalls, LICSW

## 2022-01-25 NOTE — PROGRESS NOTES
[facility]  Hospitalist Progress Note    When I evaluated patient: 01/25/2022    Assessment & Plan   Miranda Dover is a 51 year old female who was admitted on 1/22/2022 with hypoxic respiratory failure in setting of COVID-19 pneumonia.    Confirmed COVID-19 infection    Acute Hypoxic Respiratory Failure secondary to COVID-19 infection  Viral Pneumonia secondary to COVID-19 infection  Symptom Onset 1/19/22   Date of 1st Positive Test 1/22/22   Vaccination Status Declines Vaccine   - Continue NC at 1-2LPM titrate to keep SpO2 between 90-96%, wean as able. Patient not using IS per staff.   - CT PE protocol in ER showed no PE but Patchy predominantly groundglass opacities in both lungs, likely related to COVID-19 pneumonia. Indeterminate left thyroid nodule measures 1.6 cm. Thyroid ultrasound may be helpful for further characterization- defer as outpatient to PCP.  -albuterol inhaler four times a day scheduled and PRN; avoid nebulizers in favor of MDIs   - Received IVF x12 hr on admission. Give small intermittent bolus for SBP<90 (available q4h)  - Dexamethasone 6 mg x 10 days or until hospital discharge (IV or oral, whichever she will take), started on 1/22/22 and Remdesivir x 5 days or until hospital discharge, started on 1/22/22    - Refused lovenox, switched to xarelto daily on 1/23, continue for 30 days total  - protonix IV daily     Thrombocytopenia  Leukopenia  Platelet count 119 and WBC count 3.6.  Suspect low cell counts due to acute infection.  -Monitor  -Hold anticoagulation if platelet count<50 K or any signs of bleeding     Thyroid nodule  Incidentally noted on CT PE study. TSH elevated at 15.02, T4 free WNL  - Further work-up as outpatient with dedicated thyroid US once acute illness resolves.(not yet discussed with brother Maxi given severity of illness of other family members)     Down syndrome  Developmental delay  Noted, patient lives with her parents, unfortunately they  "both have been admitted to the hospital.  - recommended TCU by PT/OT     Clinically Significant Risk Factors Present on Admission                  DVT Prophylaxis: DOAC  Code Status: Full Code  Expected Discharge: 01/31/2022     Anticipated discharge location: inpatient rehabilitation facility    Delays:    wean O2, stable respiratory status and plan for TCU      Lynn Mendoza MD  Hospitalist Service  [facility]  Securely message with the Vocera Web Console (learn more here)  Text Page (7am - 6pm) via Friends Around Paging/Directory      Interval History   Patient seen and examined. Sitter in the room. Patient remains afebrile, stable on 1-2 LPM O2, declines IS per RN. BP was soft, 86/55 and received bolus last night. Patient states \"don't do anything now\". Denies pain or dyspnea.     I have called her brother multiple times this am and have not been able to reach but per prior discusison with my colleague, he  would not be able to accommodate patient coming to live with him and she would need to go to facility and that in the future patient likely won't be able to return home with her parents.     -Data reviewed today: I reviewed all new labs and imaging results over the last 24 hours. I personally reviewed no images or EKG's today.    Physical Exam   Temp: 98.7  F (37.1  C) Temp src: Axillary BP: 100/65 Pulse: 75   Resp: 16 SpO2: 92 % O2 Device: Nasal cannula Oxygen Delivery: 2 LPM  Vitals:    01/22/22 1140   Weight: 33.6 kg (74 lb 1.2 oz)     Vital Signs with Ranges  Temp:  [97.8  F (36.6  C)-98.9  F (37.2  C)] 98.7  F (37.1  C)  Pulse:  [65-75] 75  Resp:  [16-17] 16  BP: ()/(55-65) 100/65  SpO2:  [92 %-96 %] 92 %  I/O last 3 completed shifts:  In: 360 [P.O.:360]  Out: -     Constitutional: Awake, alert, cooperative, no apparent distress, chapped lips  Respiratory: Clear to auscultation bilaterally, no crackles or wheezing. On 1-2 LPM O2  Cardiovascular: Regular S1 and S2, no murmur / " tachycardia  GI: Normal bowel sounds, soft, non-distended, non-tender  Skin/Integumen: No rashes, no cyanosis, no edema  Other: Oriented to person, place    Medications     - MEDICATION INSTRUCTIONS -         albuterol  2 puff Inhalation 4x Daily     dexamethasone  6 mg Oral Q24H    Or     dexamethasone  6 mg Intravenous Q24H     [START ON 1/26/2022] pantoprazole  40 mg Oral QAM AC     rivaroxaban ANTICOAGULANT  10 mg Oral Daily with supper     sodium chloride (PF)  3 mL Intracatheter Q8H       Data   Recent Labs   Lab 01/23/22  1050 01/22/22  1202   WBC 4.3 3.6*   HGB 14.6 14.1   MCV 96 96   * 119*    141   POTASSIUM 4.4 3.8   CHLORIDE 105 105   CO2 32 32   BUN 19 21   CR 0.75 0.74   ANIONGAP 3 4   JOSUÉ 8.1* 8.2*   * 101*       No results found for this or any previous visit (from the past 24 hour(s)).

## 2022-01-25 NOTE — PLAN OF CARE
Summary: Covid positive, hypoxia, hx: Down Syndrome    DATE & TIME: 1/25/22 4088-8785  Cognitive Concerns/ Orientation : Oriented to person and place and situation, tearful, expressing fear at times  BEHAVIOR & AGGRESSION TOOL COLOR: Green  CIWA SCORE: N/A  ABNL VS/O2: VSS on 2L O2 NC sats 90-94% Softer Bp's. MD added PRN bolus for when SBP less than 90.   MOBILITY: SBA with gb to bsc. Up to chair this shift  PAIN MANAGMENT: no pain   DIET: Regular, very poor intake, eats with assistance  BOWEL/BLADDER: Continent of B&B  ABNL LAB/BG: d-dimer 1.27, CRP 41.1 Plt 125  DRAIN/DEVICES: PIV left forearm SL with int. Remdesivir  TELEMETRY RHYTHM: N/A  SKIN: Redness blanchable coccyx  TESTS/PROCEDURES: none this shift  D/C DAY/GOALS/PLACE: pending improvement  OTHER IMPORTANT INFO: Both parents who are pt's caregivers are also hospitalized. Needs a lot of encouragement to eat and ambulate.   Remdesivir second dose 1/25/22. PT RRT called for EKG following chest pain.

## 2022-01-25 NOTE — PLAN OF CARE
Summary: Covid positive, hypoxia, hx: Down Syndrome    DATE & TIME: 1/24/22-1/25/22 4546-9984  Cognitive Concerns/ Orientation : Oriented to person and place and situation, tearful, expressing fear at times  BEHAVIOR & AGGRESSION TOOL COLOR: Green  CIWA SCORE: N/A  ABNL VS/O2: VSS on 2L O2 NC sats 90-94% Softer Bp's. MD added PRN bolus for when SBP less than 90.   MOBILITY: SBA with gb to bsc. Up to chair this shift  PAIN MANAGMENT: no pain   DIET: Regular, very poor intake, eats with assistance  BOWEL/BLADDER: Continent of B&B  ABNL LAB/BG: d-dimer 1.27, CRP 41.1 Plt 125  DRAIN/DEVICES: PIV left forearm SL with int. Remdesivir  TELEMETRY RHYTHM: N/A  SKIN: Redness blanchable coccyx  TESTS/PROCEDURES: none this shift  D/C DAY/GOALS/PLACE: pending improvement  OTHER IMPORTANT INFO: Both parents who are pt's caregivers are also hospitalized. Needs a lot of encouragement to eat and ambulate.   Remdesivir first dose 1/22/22.

## 2022-01-25 NOTE — PROGRESS NOTES
Lab called, there are STAT troponin's ordered at 1500 and no one has come to draw. They stated they would send someone...    Called lab again at 1/25/2022 as STAT labs still not drawn

## 2022-01-25 NOTE — PLAN OF CARE
Summary: Covid positive, hypoxia, hx: Down Syndrome    DATE & TIME: 1/24/22 5495-0851  Cognitive Concerns/ Orientation : Oriented to person and place and situation, tearful, expressing fear at times  BEHAVIOR & AGGRESSION TOOL COLOR: Green  CIWA SCORE: N/A  ABNL VS/O2: VSS on 2L O2 NC sats 90-94% Softer Bp's. MD added PRN bolus for when SBP less than 90.   MOBILITY: SBA with gb to bsc. Up to chair this shift  PAIN MANAGMENT: Tylenol given x1 for abdominal pain   DIET: Regular, very poor intake, eats with assistance  BOWEL/BLADDER: Continent of B&B  ABNL LAB/BG: d-dimer 1.27, CRP 41.1 Plt 125  DRAIN/DEVICES: PIV left forearm SL with int. Remdesivir  TELEMETRY RHYTHM: N/A  SKIN: Redness blanchable coccyx  TESTS/PROCEDURES: none this shift  D/C DAY/GOALS/PLACE: pending improvement  OTHER IMPORTANT INFO: Both parents who are pt's caregivers are also hospitalized. Needs a lot of encouragement to eat and ambulate.   Remdesivir first dose 1/22/22.

## 2022-01-26 ENCOUNTER — APPOINTMENT (OUTPATIENT)
Dept: OCCUPATIONAL THERAPY | Facility: CLINIC | Age: 52
DRG: 177 | End: 2022-01-26
Payer: COMMERCIAL

## 2022-01-26 ENCOUNTER — APPOINTMENT (OUTPATIENT)
Dept: PHYSICAL THERAPY | Facility: CLINIC | Age: 52
DRG: 177 | End: 2022-01-26
Payer: COMMERCIAL

## 2022-01-26 ENCOUNTER — APPOINTMENT (OUTPATIENT)
Dept: GENERAL RADIOLOGY | Facility: CLINIC | Age: 52
DRG: 177 | End: 2022-01-26
Attending: NURSE PRACTITIONER
Payer: COMMERCIAL

## 2022-01-26 LAB
ATRIAL RATE - MUSE: 63 BPM
ATRIAL RATE - MUSE: 65 BPM
DIASTOLIC BLOOD PRESSURE - MUSE: NORMAL MMHG
DIASTOLIC BLOOD PRESSURE - MUSE: NORMAL MMHG
INTERPRETATION ECG - MUSE: NORMAL
INTERPRETATION ECG - MUSE: NORMAL
P AXIS - MUSE: 43 DEGREES
P AXIS - MUSE: 63 DEGREES
PR INTERVAL - MUSE: 114 MS
PR INTERVAL - MUSE: 116 MS
QRS DURATION - MUSE: 66 MS
QRS DURATION - MUSE: 70 MS
QT - MUSE: 350 MS
QT - MUSE: 376 MS
QTC - MUSE: 358 MS
QTC - MUSE: 391 MS
R AXIS - MUSE: 48 DEGREES
R AXIS - MUSE: 48 DEGREES
SYSTOLIC BLOOD PRESSURE - MUSE: NORMAL MMHG
SYSTOLIC BLOOD PRESSURE - MUSE: NORMAL MMHG
T AXIS - MUSE: 74 DEGREES
T AXIS - MUSE: 81 DEGREES
VENTRICULAR RATE- MUSE: 63 BPM
VENTRICULAR RATE- MUSE: 65 BPM

## 2022-01-26 PROCEDURE — 97110 THERAPEUTIC EXERCISES: CPT | Mod: GP

## 2022-01-26 PROCEDURE — 120N000001 HC R&B MED SURG/OB

## 2022-01-26 PROCEDURE — 250N000011 HC RX IP 250 OP 636: Performed by: HOSPITALIST

## 2022-01-26 PROCEDURE — 97530 THERAPEUTIC ACTIVITIES: CPT | Mod: GP

## 2022-01-26 PROCEDURE — 250N000009 HC RX 250: Performed by: HOSPITALIST

## 2022-01-26 PROCEDURE — 258N000003 HC RX IP 258 OP 636: Performed by: HOSPITALIST

## 2022-01-26 PROCEDURE — 250N000013 HC RX MED GY IP 250 OP 250 PS 637: Performed by: HOSPITALIST

## 2022-01-26 PROCEDURE — 71045 X-RAY EXAM CHEST 1 VIEW: CPT

## 2022-01-26 PROCEDURE — 250N000011 HC RX IP 250 OP 636: Performed by: NURSE PRACTITIONER

## 2022-01-26 PROCEDURE — 99232 SBSQ HOSP IP/OBS MODERATE 35: CPT | Performed by: INTERNAL MEDICINE

## 2022-01-26 PROCEDURE — 97535 SELF CARE MNGMENT TRAINING: CPT | Mod: GO | Performed by: OCCUPATIONAL THERAPIST

## 2022-01-26 PROCEDURE — 250N000009 HC RX 250

## 2022-01-26 RX ORDER — LORAZEPAM 2 MG/ML
0.25 INJECTION INTRAMUSCULAR ONCE
Status: COMPLETED | OUTPATIENT
Start: 2022-01-26 | End: 2022-01-26

## 2022-01-26 RX ORDER — OLANZAPINE 10 MG/2ML
1.25 INJECTION, POWDER, FOR SOLUTION INTRAMUSCULAR ONCE
Status: COMPLETED | OUTPATIENT
Start: 2022-01-26 | End: 2022-01-26

## 2022-01-26 RX ORDER — WATER 10 ML/10ML
INJECTION INTRAMUSCULAR; INTRAVENOUS; SUBCUTANEOUS
Status: COMPLETED
Start: 2022-01-26 | End: 2022-01-26

## 2022-01-26 RX ADMIN — OLANZAPINE 1.3 MG: 10 INJECTION, POWDER, FOR SOLUTION INTRAMUSCULAR at 20:29

## 2022-01-26 RX ADMIN — LORAZEPAM 0.25 MG: 2 INJECTION INTRAMUSCULAR; INTRAVENOUS at 03:43

## 2022-01-26 RX ADMIN — DEXAMETHASONE SODIUM PHOSPHATE 6 MG: 4 INJECTION, SOLUTION INTRAMUSCULAR; INTRAVENOUS at 08:31

## 2022-01-26 RX ADMIN — SODIUM CHLORIDE 50 ML: 900 INJECTION INTRAVENOUS at 12:13

## 2022-01-26 RX ADMIN — WATER 10 ML: 1 INJECTION, SOLUTION INTRAMUSCULAR; INTRAVENOUS; SUBCUTANEOUS at 20:29

## 2022-01-26 RX ADMIN — REMDESIVIR 100 MG: 100 INJECTION, POWDER, LYOPHILIZED, FOR SOLUTION INTRAVENOUS at 12:12

## 2022-01-26 ASSESSMENT — ACTIVITIES OF DAILY LIVING (ADL)
ADLS_ACUITY_SCORE: 12
ADLS_ACUITY_SCORE: 10
ADLS_ACUITY_SCORE: 10
ADLS_ACUITY_SCORE: 12
ADLS_ACUITY_SCORE: 10
ADLS_ACUITY_SCORE: 12
ADLS_ACUITY_SCORE: 12
ADLS_ACUITY_SCORE: 10
ADLS_ACUITY_SCORE: 12
ADLS_ACUITY_SCORE: 10

## 2022-01-26 NOTE — PROGRESS NOTES
MD Notification    Notified Person: NP    Notified Person Name: Perla    Notification Date/Time: 1/25/22 4701    Notification Interaction: Paged    Purpose of Notification: Pt needing something IV for anxiety. Very hysterical and inconsolable.    Orders Received: One time dose of 0.25 mg IV Ativan ordered.     Comments:

## 2022-01-26 NOTE — PROGRESS NOTES
Spoke with brother Maxi, per report from previous RN plan was to allow patient to go downstairs to see her father who is dying. Notified Maxi of RRT, that patient is refusing cares and lab work and not cooperating with staff. With recent chest pain, patient being uncooperative, and because she does not know her father is not doing well, writer advised against patient leaving floor to see father. Maxi was in agreement with this and unsure how she would respond to seeing her dad like this anyway.

## 2022-01-26 NOTE — CONSULTS
"CLINICAL NUTRITION SERVICES  -  ASSESSMENT NOTE      RECOMMENDATIONS FOR MD/PROVIDER TO ORDER: Consider nutrition support due to limited intake x 5 days and very low body weight   Recommendations Ordered by Registered Dietitian (RD): Ensure with meals   Malnutrition: % Weight Loss:  None noted (stable over the last year per Epic)  % Intake:  </= 50% for >/= 5 days (severe malnutrition)  Subcutaneous Fat Loss:  Unable to observe but suspected  Muscle Loss:  Unable to observe but suspected   Fluid Retention:  None noted    Malnutrition Diagnosis: Unable to determine due to inability to perform a Nutrition Focused Physical Exam but malnutrition suspected         REASON FOR ASSESSMENT  Miranda Dover is a 51 year old female seen by Registered Dietitian for Admission Nutrition Risk Screen for Have you recently lost weight without trying? - Unsure   Have you eaten poorly because of a decreased appetite? - Yes       NUTRITION HISTORY  - Information obtained from [facility] as patient in COVID isolation, also with history of Down's Syndrome and is tearful, fearful, and agitated.    - Patient had noted to MD that her \"stomach is fragile\" and that is why she isn't eating much.      CURRENT NUTRITION ORDERS  Diet Order:     Regular     Current Intake/Tolerance:   Patient is refusing meals (taking a few bites here and there)  Day #5 sub-optimal nutrition       NUTRITION FOCUSED PHYSICAL ASSESSMENT FOR DIAGNOSING MALNUTRITION)  No:  COVID+                Observed:    Unable     Obtained from Chart/Interdisciplinary Team:  None     ANTHROPOMETRICS  Height: 5' 3\"  Weight: 33.6 kg (74#)(1/22)  Body mass index is 13.12 kg/m   Weight Status:  Underweight BMI <18.5  IBW: 52.3 kg   % IBW: 64%  Weight History:   Wt Readings from Last 10 Encounters:   01/22/22 33.6 kg (74 lb 1.2 oz)   02/23/21 33.6 kg (74 lb)   10/08/19 40.4 kg (89 lb)   09/27/18 40.6 kg (89 lb 6.4 oz)   08/26/08 60.3 kg (133 lb)   08/07/08 60.8 kg (134 lb)     Appears that " weight has been stable over the last year     LABS  Labs reviewed    MEDICATIONS  Medications reviewed      ASSESSED NUTRITION NEEDS PER APPROVED PRACTICE GUIDELINES:    Dosing Weight 33.6 kg   Estimated Energy Needs: 8975-5558 kcals (35-40 Kcal/Kg)  Justification: repletion and underweight  Estimated Protein Needs: 50-70 grams protein (1.5-2 g pro/Kg)  Justification: repletion and hypercatabolism with acute illness  Estimated Fluid Needs: 1675-4356 mL (1 mL/Kcal)  Justification: maintenance    MALNUTRITION:  % Weight Loss:  None noted (stable over the last year per Epic)  % Intake:  </= 50% for >/= 5 days (severe malnutrition)  Subcutaneous Fat Loss:  Unable to observe but suspected  Muscle Loss:  Unable to observe but suspected   Fluid Retention:  None noted    Malnutrition Diagnosis: Unable to determine due to inability to perform a Nutrition Focused Physical Exam but malnutrition suspected     NUTRITION DIAGNOSIS:  Inadequate oral intake related to developmental delay, likely reduced appetite with COVID as evidenced by minimal intake since admit       NUTRITION INTERVENTIONS  Recommendations / Nutrition Prescription  Continue regular diet as tolerated  Ensure with meals    Consider nutrition support due to limited intake x 5 days and very low body weight    Implementation  Nutrition education: Not appropriate at this time due to patient condition  Medical Food Supplement:  Ordered as above     Nutrition Goals  Patient will consume at least 50% meals and supplements     MONITORING AND EVALUATION:  Progress towards goals will be monitored and evaluated per protocol and Practice Guidelines    Maddy Salguero RD, LD, CNSC   Clinical Dietitian - [facility]

## 2022-01-26 NOTE — PLAN OF CARE
Summary: Covid positive  DATE & TIME: 1/25/22 7467-5707  Cognitive Concerns/ Orientation : A&Ox3-4. Forgetful to situation at times. Tearful, expressing fear at times. Yelling out overnight, wanting someone to stay with her at all times. Ativan given x1.  Hx developmental delay/down syndrome  BEHAVIOR & AGGRESSION TOOL COLOR: Green; refuses cares a times.  CIWA SCORE: N/A  ABNL VS/O2: VSS, hypotensive at times (has PRN bolus for <90 SBP). On 2-3 O2 NC- wean as able    MOBILITY: SBA w/ GB.   PAIN MANAGMENT: C/o ear and arm pain at the IV site. Tylenol offered but patient refused  DIET: Regular, very poor intake, eats with assistance- needs encouragement  BOWEL/BLADDER: Continent of B&B  ABNL LAB/BG: Refused labs, last d-dimer 1.27, CRP 41.1  DRAIN/DEVICES: PIV LFA SL   TELEMETRY RHYTHM: Refused placement of telemetry after RRT 1/25  SKIN: Redness blanchable coccyx  TESTS/PROCEDURES: plan for echo and serial trops   D/C DAY/GOALS/PLACE: pending able to wean oxygen, TCU placement   OTHER IMPORTANT INFO: Both parents who are pt's caregivers are also hospitalized. Needs a lot of encouragement to eat and ambulate. Continue decadron and remdesivir (last dose 1/26)

## 2022-01-26 NOTE — PLAN OF CARE
"Summary: Covid positive  DATE & TIME: 1/26/22 6578-3107  Cognitive Concerns/ Orientation : A&O x3-4. Forgetful to situation at times. Tearful, cortney out, expressing fear at times. Hx developmental delay/down syndrome  BEHAVIOR & AGGRESSION TOOL COLOR: Green; refuses most cares  CIWA SCORE: N/A  ABNL VS/O2: BP soft 98/59 this shift, (has PRN bolus for <90 SBP) Other VSS on 4 LPM NC- wean as able. Desats with activity to mid 80's, unable to wean  MOBILITY: Ax1 w/GB. Up in chair today with much encouragement.  PAIN MANAGMENT: Denies chest pain. C/o left arm pain (on-going, does not like IV site, no redness/swelling). Also complains of \"suffocating\" though sats mid 90's. Keeps asking for us to take \"it\" off her throat. O2 tubing is loose around neck.  DIET: Regular, very poor intake- wont eat or drink anything  BOWEL/BLADDER: Continent of B&B  ABNL LAB/BG: d-dimer 1.27, CRP 41.1  DRAIN/DEVICES: PIV left forearm SL   TELEMETRY RHYTHM: NA  SKIN: Redness blanchable coccyx  TESTS/PROCEDURES: needs echo and labs/trops but patient is refusing (MD aware)  D/C DAY/GOALS/PLACE: pending able to wean oxygen, TCU placement   OTHER IMPORTANT INFO: Both parents who are pt's caregivers are also hospitalized. Continue decadron and remdesivir (last dose 1/26). Very dry mouth/crusted lips- refuses oral cares. Sitter bedside but patient has not been impulsive nor pulling at lines, likely no longer needed (calls out scared but can be reassured)  "

## 2022-01-26 NOTE — PROGRESS NOTES
[facility]    Medicine Progress Note - Hospitalist Service       Date of Admission:  1/22/2022    Assessment & Plan   Miranda Dover is a 51 year old female with hx of Down syndrome who was admitted on 1/22/2022 for acute hypoxic respiratory failure due to COVID-19 pneumonia    Acute hypoxic respiratory failure due to COVID-19 pneumonia  * Unvaccinated  * Symptom onset 1/19/22  * Tested positive for COVID on 1/22/22  * Chest CT on arrival showed bilateral infiltrates  * Initiated on dexamethasone and remdesivir on 1/22  - On 4 lpm/NC, wean as tolerated  - Will complete 5-day course of remdesivir today, 1/26  - Continues on dexamethasone to complete a 10 day course (last day: 1/31)  - On rivaroxaban for VTE ppx, plan 30-day course  Recent Labs   Lab 01/23/22  1050 01/22/22  1649 01/22/22  1202   CRP 41.1*  --  31.7*   DD 1.27* 1.66* 0.81*   *  --   --        Leukopenia and thrombocytopenia, Resolving  WBC ron 3.6k; PLT ron 119k - likely due to infection  - WBC normal, PLT 125k today    Thyroid nodule, possible hypothyroidism  Incidentally noted on admission chest CT. TSH was elevated to 15, but T4 was normal  - Recommend repeat thyroid studies and possible thyroid US once patient recovers from COVID    Down syndrome  Developmental delay  Patient lives with her parents who unfortunately were hospitalized with COVID on 1/22 as well. Her father passed away on 1/26; her mother remains hospitalized with guarded prognosis  - PT/OT recommending TCU; will need placement following TCU stay     Diet: Combination Diet Regular Diet Adult  Snacks/Supplements Pediatric: Ensure Enlive; With Meals    DVT Prophylaxis: DOAC - rivaroxaban  Zhu Catheter: Not present  Code Status: Full Code         Disposition: Expected discharge to TCU pending ongoing improvement in respiratory status    The patient's care was discussed with the Bedside Nurse, Patient and Patient's Family.    Briana Desouza  MD Galen  Hospitalist Service  [facility]  Contact information available via [facility] Paging/Directory    ______________________________________________________________________    Interval History   No events overnight. Offers no complaints. Does not want to engage in conversation     Data reviewed today: I reviewed all medications, new labs and imaging results over the last 24 hours. I personally reviewed no images or EKG's today.    Physical Exam   Vital Signs: Temp: 97.8  F (36.6  C) Temp src: Oral BP: 98/59 Pulse: 61   Resp: 18 SpO2: 92 % O2 Device: Nasal cannula Oxygen Delivery: 4 LPM (tried to wean to 3, dropped to mid 80's)  Weight: 74 lbs 1.19 oz  Constitutional: Resting in bed, NAD  HEENT: Sclera white, MMM  Respiratory: Breathing non-labored. Lungs CTAB - no wheezes, crackles, or rhonchi  Cardiovascular: Heart RRR, no m/r/g. No pedal edema  GI: +BS, abd soft/NT  Skin/Integument: No rash  Musculoskeletal: Normal muscle bulk and tone  Neuro: Alert, +mental delay. CARBONE  Psych: Flat affect    Data   Recent Labs   Lab 01/23/22  1050 01/22/22  1202   WBC 4.3 3.6*   HGB 14.6 14.1   MCV 96 96   * 119*    141   POTASSIUM 4.4 3.8   CHLORIDE 105 105   CO2 32 32   BUN 19 21   CR 0.75 0.74   ANIONGAP 3 4   JOSUÉ 8.1* 8.2*   * 101*         No results found for this or any previous visit (from the past 24 hour(s)).    Medications     - MEDICATION INSTRUCTIONS -         remdesivir  100 mg Intravenous Q24H    And     sodium chloride 0.9%  50 mL Intravenous Q24H     albuterol  2 puff Inhalation 4x Daily     dexamethasone  6 mg Oral Q24H    Or     dexamethasone  6 mg Intravenous Q24H     pantoprazole  40 mg Oral QAM AC     rivaroxaban ANTICOAGULANT  10 mg Oral Daily with supper     sodium chloride (PF)  3 mL Intracatheter Q8H

## 2022-01-27 ENCOUNTER — DOCUMENTATION ONLY (OUTPATIENT)
Dept: OTHER | Facility: CLINIC | Age: 52
End: 2022-01-27
Payer: COMMERCIAL

## 2022-01-27 ENCOUNTER — APPOINTMENT (OUTPATIENT)
Dept: PHYSICAL THERAPY | Facility: CLINIC | Age: 52
DRG: 177 | End: 2022-01-27
Payer: COMMERCIAL

## 2022-01-27 PROCEDURE — 250N000013 HC RX MED GY IP 250 OP 250 PS 637: Performed by: HOSPITALIST

## 2022-01-27 PROCEDURE — 250N000011 HC RX IP 250 OP 636: Performed by: INTERNAL MEDICINE

## 2022-01-27 PROCEDURE — 250N000011 HC RX IP 250 OP 636: Performed by: HOSPITALIST

## 2022-01-27 PROCEDURE — 97110 THERAPEUTIC EXERCISES: CPT | Mod: GP

## 2022-01-27 PROCEDURE — 99232 SBSQ HOSP IP/OBS MODERATE 35: CPT | Performed by: INTERNAL MEDICINE

## 2022-01-27 PROCEDURE — 120N000001 HC R&B MED SURG/OB

## 2022-01-27 RX ORDER — LORAZEPAM 2 MG/ML
.25-.5 INJECTION INTRAMUSCULAR EVERY 4 HOURS PRN
Status: DISCONTINUED | OUTPATIENT
Start: 2022-01-27 | End: 2022-02-08

## 2022-01-27 RX ORDER — LORAZEPAM 0.5 MG/1
.25-.5 TABLET ORAL EVERY 4 HOURS PRN
Status: DISCONTINUED | OUTPATIENT
Start: 2022-01-27 | End: 2022-02-08

## 2022-01-27 RX ORDER — LORAZEPAM 2 MG/ML
1 INJECTION INTRAMUSCULAR ONCE
Status: DISCONTINUED | OUTPATIENT
Start: 2022-01-27 | End: 2022-01-28

## 2022-01-27 RX ORDER — LORAZEPAM 1 MG/1
1 TABLET ORAL ONCE
Status: DISCONTINUED | OUTPATIENT
Start: 2022-01-27 | End: 2022-01-27

## 2022-01-27 RX ADMIN — LORAZEPAM 0.25 MG: 2 INJECTION INTRAMUSCULAR; INTRAVENOUS at 21:28

## 2022-01-27 RX ADMIN — DEXAMETHASONE SODIUM PHOSPHATE 6 MG: 4 INJECTION, SOLUTION INTRAMUSCULAR; INTRAVENOUS at 09:27

## 2022-01-27 RX ADMIN — RIVAROXABAN 10 MG: 10 TABLET, FILM COATED ORAL at 16:40

## 2022-01-27 RX ADMIN — ACETAMINOPHEN 650 MG: 325 TABLET, FILM COATED ORAL at 16:39

## 2022-01-27 ASSESSMENT — ACTIVITIES OF DAILY LIVING (ADL)
ADLS_ACUITY_SCORE: 10
ADLS_ACUITY_SCORE: 11
ADLS_ACUITY_SCORE: 10
ADLS_ACUITY_SCORE: 11
ADLS_ACUITY_SCORE: 10
ADLS_ACUITY_SCORE: 11
ADLS_ACUITY_SCORE: 10

## 2022-01-27 NOTE — PLAN OF CARE
"Summary: Covid positive  DATE & TIME: 1/26/22 1900- 1/27/22 4427  Cognitive Concerns/ Orientation : A&O x3-4. Forgetful to situation at times. Tearful, cries out, irritable/restless. Hx developmental delay/down syndrome  BEHAVIOR & AGGRESSION TOOL COLOR: Green; refuses most cares  CIWA SCORE: N/A  ABNL VS/O2:  BP soft (91/60 and 105/70) on 4 L NC   MOBILITY: Ax1 w/GB. Up in chair today-needs encouragement.  PAIN MANAGMENT:  Hard to assess, pt is reporting pain to back at times and stating \"my skin\", refused interventions.  Stated her chest hurt, MD notified, Ativan 1 time dose ordered to help with anxiety but pt calmed down and slept.   DIET: Regular, very poor intake- won't eat or drink anything  BOWEL/BLADDER: BS active x 4. Continent of B&B  ABNL LAB/BG: None new, pt refusing lab draws  DRAIN/DEVICES: PIV left forearm SL   TELEMETRY RHYTHM: NA  SKIN: Redness blanchable coccyx  TESTS/PROCEDURES: needs echo and labs/trops but patient is refusing (MD aware).  CXR complete, slightly increased patchy infiltrates  D/C DAY/GOALS/PLACE: pending able to wean oxygen, TCU placement   OTHER IMPORTANT INFO: Continues on  decadron, remdesivir complete. Very dry mouth/crusted lips- refuses oral cares at this time. Special precautions maintained.  Zyprex IM one time dose given for anxiety, pt not keeping nasal cannula on.    "

## 2022-01-27 NOTE — PROVIDER NOTIFICATION
MD Notification    Notified Person: MD    Notified Person Name: Galen     Notification Date/Time: 1/27/22, 1658    Notification Interaction: [employer] messaging     Purpose of Notification: Patient becoming more anxious, yelling out, restless. Increased O2 to 6L because of this. Wondering if we can get a PRN medication? Please advise, thanks!    Orders Received:    Comments:

## 2022-01-27 NOTE — PROGRESS NOTES
Paged for recurrent chest pain; per nursing staff she complains of pain but declines blood draws or other interventions, and asks for someone to keep her company in her room because she feels very anxious. Case reviewed. I will order a one time dose of Ativan. If her symptoms persist after that we will try again to convince her to undergo EKG and serial enzyme testing.

## 2022-01-27 NOTE — PLAN OF CARE
"Summary: Covid positive  DATE & TIME: 1/26/22, 4363-6172   Cognitive Concerns/ Orientation : A&O x3-4. Forgetful to situation at times. Tearful, cortney out, irritable/restless. Hx developmental delay/down syndrome  BEHAVIOR & AGGRESSION TOOL COLOR: Green; refuses most cares  CIWA SCORE: N/A  ABNL VS/O2:  BP soft other VSS on 6 L NC (increased from 4 L throughout the day), O2 maintained at low to mid 90s, appears to not be in respiratory distress at this time  MOBILITY: Ax1 w/GB. Up in chair today-needs encouragement.  PAIN MANAGMENT: hard to assess, pt. Is reporting pain to back and stating \"my skin\", refused repositioning, tylenol, lotion, and heat packs  DIET: Regular, very poor intake- wont eat or drink anything, offered throughout shift  BOWEL/BLADDER: BS active x 4. Continent of B&B  ABNL LAB/BG: d-dimer 1.27, CRP 41.1  DRAIN/DEVICES: PIV left forearm SL   TELEMETRY RHYTHM: NA  SKIN: Redness blanchable coccyx  TESTS/PROCEDURES: needs echo and labs/trops but patient is refusing (MD aware)  D/C DAY/GOALS/PLACE: pending able to wean oxygen, TCU placement   OTHER IMPORTANT INFO: Continues on  decadron and remdesivir (last dose 1/26). Very dry mouth/crusted lips- refuses oral cares at this time. Special precautions maintained.         "

## 2022-01-27 NOTE — PROVIDER NOTIFICATION
MD Notification    Notified Person: MD    Notified Person Name:  Rachel    Notification Date/Time: 1/27/22 4816    Notification Interaction: Phone conversation    Purpose of Notification: Pt with pain in her chest; VSS, no SOB.  Similar episode 2 nights ago, refused all labs and interventions. Asking for someone to stay with her.    Orders Received: Ativan 1 time dose    Comments:

## 2022-01-27 NOTE — PROVIDER NOTIFICATION
Hospitalist service cross cover:    Paged by nursing staff regarding agitation and hypoxia    PLAN:  -- 1.3 mg IM zyprexa  -- pCXR for hypoxia    Please page with additional concerns,     NAN Waggoner, CNP  Hospitalist  Text Page

## 2022-01-27 NOTE — PLAN OF CARE
"DATE & TIME: 1/27/22, 2206-7282:   Cognitive Concerns/ Orientation : alert and oriented x3-4, speech slower, anxious at times  BEHAVIOR & AGGRESSION TOOL COLOR: green  CIWA SCORE: N/A   ABNL VS/O2: VSS on 6L/NC. LS diminished. Encouraging use of IS, refuses a lot of the time, refuses scheduled inhaler  MOBILITY: up assist x1, W/GB  PAIN MANAGMENT: hard to assess at times, c/o generalized pain, \"my skin\", refusing ice/heat, lotion, occasionally compliant with repos. Took tylenol this evening with pudding.   DIET: regular diet, does not feed herself well. Feeder because of this.  BOWEL/BLADDER: continent, no BM, bowel sounds audible, active  ABNL LAB/BG: refusing labs still  DRAIN/DEVICES: PIV x1 - patent/SL  TELEMETRY RHYTHM: N/A  SKIN: blanchable redness to coccyx, turn and reposition Q2H, refuses sometimes. Skin very dry.  TESTS/PROCEDURES: none  D/C DATE: TBD  Discharge Barriers: safe placement, needs TCU  OTHER IMPORTANT INFO: Continues on  decadron, remdesivir complete. Oral cares done today. Special precautions maintained. Anxious and tearful at times. Increased O2 needs this evening d/t anxiety. Low dose PRN ativan added. Not impulsive. No episodes of chest pain today, has refused workup in the past. Monitoring for now. Patient likes vanilla pudding. Took medications tonight whole with pudding.     IS THE PATIENT ON REMDESIVIR? DATE OF LAST SCHEDULED DOSE. No, completed remdesivir 1/26.   "

## 2022-01-27 NOTE — PROGRESS NOTES
[facility]    Medicine Progress Note - Hospitalist Service       Date of Admission:  1/22/2022    Assessment & Plan   Miranda Dover is a 51 year old female with hx of Down syndrome who was admitted on 1/22/2022 for acute hypoxic respiratory failure due to COVID-19 pneumonia    Acute hypoxic respiratory failure due to COVID-19 pneumonia  * Unvaccinated  * Symptom onset 1/19/22  * Tested positive for COVID on 1/22/22  * Chest CT on arrival showed bilateral infiltrates  * Initiated on dexamethasone and remdesivir on 1/22  * Completed 5-day course of remdesivir on 1/26  - On 4 lpm/NC, wean as tolerated  - Continues on dexamethasone to complete a 10 day course (last day: 1/31)  - On rivaroxaban for VTE ppx, plan 30-day course  Recent Labs   Lab 01/23/22  1050 01/22/22  1649 01/22/22  1202   CRP 41.1*  --  31.7*   DD 1.27* 1.66* 0.81*   *  --   --        Chest pain  Intermittent chest pain noted during this hospitalization. Patient refused serial troponins and echo.    Leukopenia and thrombocytopenia, Resolving  WBC ron 3.6k; PLT ron 119k - likely due to infection  - Has been refusing labs. Will follow periodically    Thyroid nodule, possible hypothyroidism  Incidentally noted on admission chest CT. TSH was elevated to 15, but T4 was normal  - Recommend repeat thyroid studies and possible thyroid US once patient recovers from COVID    Down syndrome  Developmental delay  Patient lives with her parents who unfortunately were hospitalized with COVID on 1/22 as well. Her father passed away on 1/26; her mother remains hospitalized with guarded prognosis  - PT/OT recommending TCU; will need placement following TCU stay     Diet: Combination Diet Regular Diet Adult  Snacks/Supplements Pediatric: Ensure Enlive; With Meals    DVT Prophylaxis: DOAC - rivaroxaban  Zhu Catheter: Not present  Code Status: Full Code         Disposition: Expected discharge to TCU once stable on 2-3 lpm/NC, 3-5  more days    The patient's care was discussed with the Patient and Patient's Family.    Briana Aaron MD  Hospitalist Service  [facility]  Contact information available via [facility] Paging/Directory    ______________________________________________________________________    Interval History   Reported chest pain overnight, but has refused interventions. Reports left-sided neck pain today, improved with gentle massage    Data reviewed today: I reviewed all medications, new labs and imaging results over the last 24 hours. I personally reviewed no images or EKG's today.    Physical Exam   Vital Signs: Temp: 97.6  F (36.4  C) Temp src: Axillary BP: 106/75 Pulse: 63   Resp: 22 SpO2: 94 % O2 Device: Nasal cannula with humidification Oxygen Delivery: 4 LPM  Weight: 74 lbs 1.19 oz  Constitutional: Resting in bed, NAD  HEENT: Sclera white, MMM  Respiratory: Breathing non-labored. Lungs CTAB - no wheezes, crackles, or rhonchi  Cardiovascular: Heart RRR, no m/r/g. No pedal edema  GI: +BS, abd soft/NT  Skin/Integument: No rash  Musculoskeletal: Normal muscle bulk and tone  Neuro: Alert, +mental delay. CARBONE  Psych: Flat affect    Data   Recent Labs   Lab 01/23/22  1050 01/22/22  1202   WBC 4.3 3.6*   HGB 14.6 14.1   MCV 96 96   * 119*    141   POTASSIUM 4.4 3.8   CHLORIDE 105 105   CO2 32 32   BUN 19 21   CR 0.75 0.74   ANIONGAP 3 4   JOSUÉ 8.1* 8.2*   * 101*         Recent Results (from the past 24 hour(s))   XR Chest Port 1 View    Narrative    EXAM: XR CHEST PORT 1 VIEW  LOCATION: [facility]  DATE/TIME: 1/26/2022 8:30 PM    INDICATION: hypoxia  COMPARISON: 01/22/2022      Impression    IMPRESSION: Patchy infiltrates in the left upper lobe and bilateral lung bases, consistent with pneumonia, slightly increased since the CT on 01/22/2022. No pleural effusion or pneumothorax. Normal heart size.       Medications     - MEDICATION INSTRUCTIONS -          albuterol  2 puff Inhalation 4x Daily     dexamethasone  6 mg Oral Q24H    Or     dexamethasone  6 mg Intravenous Q24H     LORazepam  1 mg Intravenous Once     pantoprazole  40 mg Oral QAM AC     rivaroxaban ANTICOAGULANT  10 mg Oral Daily with supper     sodium chloride (PF)  3 mL Intracatheter Q8H

## 2022-01-28 PROCEDURE — 120N000001 HC R&B MED SURG/OB

## 2022-01-28 PROCEDURE — 250N000011 HC RX IP 250 OP 636: Performed by: INTERNAL MEDICINE

## 2022-01-28 PROCEDURE — 250N000011 HC RX IP 250 OP 636: Performed by: HOSPITALIST

## 2022-01-28 PROCEDURE — 99233 SBSQ HOSP IP/OBS HIGH 50: CPT | Performed by: INTERNAL MEDICINE

## 2022-01-28 PROCEDURE — 250N000013 HC RX MED GY IP 250 OP 250 PS 637: Performed by: HOSPITALIST

## 2022-01-28 RX ADMIN — DEXAMETHASONE SODIUM PHOSPHATE 6 MG: 4 INJECTION, SOLUTION INTRAMUSCULAR; INTRAVENOUS at 09:29

## 2022-01-28 RX ADMIN — LORAZEPAM 0.25 MG: 2 INJECTION INTRAMUSCULAR; INTRAVENOUS at 01:37

## 2022-01-28 RX ADMIN — ACETAMINOPHEN 650 MG: 325 TABLET, FILM COATED ORAL at 21:27

## 2022-01-28 ASSESSMENT — ACTIVITIES OF DAILY LIVING (ADL)
ADLS_ACUITY_SCORE: 13
ADLS_ACUITY_SCORE: 11
ADLS_ACUITY_SCORE: 11
ADLS_ACUITY_SCORE: 13
ADLS_ACUITY_SCORE: 11
ADLS_ACUITY_SCORE: 13
ADLS_ACUITY_SCORE: 13
ADLS_ACUITY_SCORE: 11
ADLS_ACUITY_SCORE: 13
ADLS_ACUITY_SCORE: 11
ADLS_ACUITY_SCORE: 13
ADLS_ACUITY_SCORE: 11
ADLS_ACUITY_SCORE: 13
ADLS_ACUITY_SCORE: 11

## 2022-01-28 NOTE — PLAN OF CARE
DATE & TIME: 1/28/22 (5455-1618)   Cognitive Concerns/ Orientation : A&O x3-4. Understands some parts of situation. Pt has down syndrome. Anxious at times, needs lots of reassurance, PRN Ativan available   BEHAVIOR & AGGRESSION TOOL COLOR: Green  ABNL VS/O2: VSS on 4-6L NC.  Encouraging use of IS, refusing. Refused labs, refusing oral medications/inhaler.  Iv Decadron given.    MOBILITY: A x1 GB/W. Needs encouragement to ambulate. T&R promoted while in bed, often refuses, up in recliner chair this shift.  Up to commode x2.    PAIN MANAGMENT: Throat pain, appears extremely dry and flaky. Declines medication or interventions. Encouraged water intake.   DIET: Regular diet. Total feed. Needs encouragement. Patient only ate 2 vanilla puddings and sips of water this shift.   BOWEL/BLADDER: Continent, up to BR   ABNL LAB/BG: COVID positive 1/22, refused labs today  DRAIN/DEVICES: PIV SL  TELEMETRY RHYTHM: N/A  SKIN: Blanchable redness to coccyx (new Mepilex placed), skin and mouth very dry, cracked and flaky   TESTS/PROCEDURES: None  D/C DATE: TBD  Discharge Barriers: Safe placement. Pt mom is caregiver and hospitalized at [facility].  OTHER IMPORTANT INFO:Special precautions maintained.

## 2022-01-28 NOTE — PROGRESS NOTES
[facility]    Medicine Progress Note - Hospitalist Service       Date of Admission:  1/22/2022    Assessment & Plan   Miranda Dover is a 51 year old female with hx of Down syndrome who was admitted on 1/22/2022 for acute hypoxic respiratory failure due to COVID-19 pneumonia    Acute hypoxic respiratory failure due to COVID-19 pneumonia  * Unvaccinated  * Symptom onset 1/19/22  * Tested positive for COVID on 1/22/22  * Chest CT on arrival showed bilateral infiltrates  * Initiated on dexamethasone and remdesivir on 1/22  * Completed 5-day course of remdesivir on 1/26  - On 4 lpm/NC, wean as tolerated  - Continues on dexamethasone to complete a 10 day course (last day: 1/31)  - On rivaroxaban for VTE ppx, plan 30-day course  Recent Labs   Lab 01/23/22  1050 01/22/22  1649 01/22/22  1202   CRP 41.1*  --  31.7*   DD 1.27* 1.66* 0.81*   *  --   --        Chest pain  Intermittent chest pain noted during this hospitalization. Patient refused serial troponins and echo.    Leukopenia and thrombocytopenia, Resolving  WBC ron 3.6k; PLT ron 119k - likely due to infection  - Has been refusing labs. Will follow periodically    Thyroid nodule, possible hypothyroidism  Incidentally noted on admission chest CT. TSH was elevated to 15, but T4 was normal  - Recommend repeat thyroid studies and possible thyroid US once patient recovers from COVID    Down syndrome  Developmental delay  Patient lives with her parents who unfortunately were hospitalized with COVID on 1/22 as well. Her father passed away on 1/26; her mother remains hospitalized with guarded prognosis  - Patient has been intermittent anxious and agitated, sometimes yelling/screaming. Lorazepam prn available, but I suspect she is scared and lonely. Long-arm sitter currently in place  - PT/OT recommending TCU; placement will be difficult as patient will need guardianship re-established. Guardianship will need to be discussed with the  "patient's son; conversation currently on hold while he navigates recent events regarding his parents    Underweight  BMI ~13  - On Ensure Enlive as per Nutrition     Diet: Combination Diet Regular Diet Adult  Snacks/Supplements Pediatric: Ensure Enlive; With Meals    DVT Prophylaxis: DOAC - rivaroxaban  Zhu Catheter: Not present  Code Status: Full Code         Disposition: Expected discharge pending placement, TBD    The patient's care was discussed with the Care Coordinator/ and Patient.    Briana Aaron MD  Hospitalist Service  [facility]  Contact information available via [facility] Paging/Directory    ______________________________________________________________________    Interval History   No acute events overnight. Intermittently anxious and agitated. Asked \"what's wrong with me?\" today. Explained her diagnosis of pneumonia    Time Spent on this Encounter   I spent 35 minutes on the unit/floor managing the care of Miranda Dover. Over 50% of my time was spent on the following:   - Coordination of care with the: care coordinator/ and nurse. Patient has been intermittently yelling and screaming. PRN medication was requested, but I believe she is simply lonely and scared. Sitter requested    Briana Aaron MD    Data reviewed today: I reviewed all medications, new labs and imaging results over the last 24 hours. I personally reviewed no images or EKG's today.    Physical Exam   Vital Signs: Temp: 97.7  F (36.5  C) Temp src: Oral BP: 91/63 Pulse: 66   Resp: 20 SpO2: 98 % O2 Device: Nasal cannula with humidification Oxygen Delivery: 4 LPM  Weight: 74 lbs 1.19 oz  Constitutional: Resting in bed, NAD  HEENT: Sclera white, MMM  Respiratory: Breathing non-labored. Lungs CTAB - no wheezes, crackles, or rhonchi  Cardiovascular: Heart RRR, no m/r/g. No pedal edema  GI: +BS, abd soft/NT  Skin/Integument: No rash  Musculoskeletal: Normal muscle bulk and " tone  Neuro: Alert, +mental delay. CARBONE  Psych: Flat affect    Data   Recent Labs   Lab 01/23/22  1050 01/22/22  1202   WBC 4.3 3.6*   HGB 14.6 14.1   MCV 96 96   * 119*    141   POTASSIUM 4.4 3.8   CHLORIDE 105 105   CO2 32 32   BUN 19 21   CR 0.75 0.74   ANIONGAP 3 4   JOSUÉ 8.1* 8.2*   * 101*         No results found for this or any previous visit (from the past 24 hour(s)).    Medications     - MEDICATION INSTRUCTIONS -         albuterol  2 puff Inhalation 4x Daily     dexamethasone  6 mg Oral Q24H    Or     dexamethasone  6 mg Intravenous Q24H     pantoprazole  40 mg Oral QAM AC     rivaroxaban ANTICOAGULANT  10 mg Oral Daily with supper     sodium chloride (PF)  3 mL Intracatheter Q8H

## 2022-01-28 NOTE — PLAN OF CARE
DATE & TIME: 1/27/22 1900-0730   Cognitive Concerns/ Orientation : A&O x3-4. Understands some parts of situation. Pt has down syndrome. Anxious at times, needs lots of reassurance, PRN 0.25mg IV Ativan given x2 with relief. Voice slow, hoarse at times  BEHAVIOR & AGGRESSION TOOL COLOR: Green   ABNL VS/O2: VSS on 4-6L NC. Increased O2 when anxious. Encouraging use of IS, refusing. Refused q4h vitals  MOBILITY: A x1 GB/W. Needs encouragement to ambulate. T&R promoted while in bed, often refuses  PAIN MANAGMENT: Throat pain, appears extremely dry and flaky. Declines medication or interventions. Encouraged water intake.   DIET: Regular diet. Total feed. Needs encouragement. Ate 1 pudding cup and drank 50mL's overnight   BOWEL/BLADDER: Continent, up to BR   ABNL LAB/BG: COVID positive 1/22  DRAIN/DEVICES: PIV SL  TELEMETRY RHYTHM: N/A  SKIN: Blanchable redness to coccyx (refused mepilex), skin and mouth very dry, cracked and flaky   TESTS/PROCEDURES: None  D/C DATE: TBD  Discharge Barriers: Safe placement. Pt mom is caregiver and hospitalized at [facility].  OTHER IMPORTANT INFO: Continues on  IV decadron. Special precautions maintained.

## 2022-01-29 PROCEDURE — 258N000003 HC RX IP 258 OP 636: Performed by: HOSPITALIST

## 2022-01-29 PROCEDURE — 250N000013 HC RX MED GY IP 250 OP 250 PS 637: Performed by: HOSPITALIST

## 2022-01-29 PROCEDURE — 258N000003 HC RX IP 258 OP 636: Performed by: INTERNAL MEDICINE

## 2022-01-29 PROCEDURE — 250N000011 HC RX IP 250 OP 636: Performed by: HOSPITALIST

## 2022-01-29 PROCEDURE — 120N000001 HC R&B MED SURG/OB

## 2022-01-29 PROCEDURE — 250N000011 HC RX IP 250 OP 636: Performed by: INTERNAL MEDICINE

## 2022-01-29 PROCEDURE — 99232 SBSQ HOSP IP/OBS MODERATE 35: CPT | Performed by: INTERNAL MEDICINE

## 2022-01-29 RX ADMIN — PANTOPRAZOLE SODIUM 40 MG: 40 TABLET, DELAYED RELEASE ORAL at 10:04

## 2022-01-29 RX ADMIN — SODIUM CHLORIDE 250 ML: 9 INJECTION, SOLUTION INTRAVENOUS at 15:39

## 2022-01-29 RX ADMIN — RIVAROXABAN 10 MG: 10 TABLET, FILM COATED ORAL at 18:58

## 2022-01-29 RX ADMIN — DEXAMETHASONE 6 MG: 2 TABLET ORAL at 10:04

## 2022-01-29 RX ADMIN — DEXTROSE AND SODIUM CHLORIDE: 5; 900 INJECTION, SOLUTION INTRAVENOUS at 19:44

## 2022-01-29 RX ADMIN — SODIUM CHLORIDE 250 ML: 9 INJECTION, SOLUTION INTRAVENOUS at 00:44

## 2022-01-29 RX ADMIN — SODIUM CHLORIDE 1000 ML: 9 INJECTION, SOLUTION INTRAVENOUS at 16:14

## 2022-01-29 RX ADMIN — DEXTROSE AND SODIUM CHLORIDE: 5; 900 INJECTION, SOLUTION INTRAVENOUS at 10:21

## 2022-01-29 RX ADMIN — LORAZEPAM 0.25 MG: 2 INJECTION INTRAMUSCULAR; INTRAVENOUS at 01:29

## 2022-01-29 ASSESSMENT — ACTIVITIES OF DAILY LIVING (ADL)
ADLS_ACUITY_SCORE: 12
ADLS_ACUITY_SCORE: 11
ADLS_ACUITY_SCORE: 13
ADLS_ACUITY_SCORE: 12
ADLS_ACUITY_SCORE: 13
ADLS_ACUITY_SCORE: 11
ADLS_ACUITY_SCORE: 12
ADLS_ACUITY_SCORE: 11
ADLS_ACUITY_SCORE: 12
ADLS_ACUITY_SCORE: 11
ADLS_ACUITY_SCORE: 12
ADLS_ACUITY_SCORE: 12
ADLS_ACUITY_SCORE: 11
ADLS_ACUITY_SCORE: 12
ADLS_ACUITY_SCORE: 12
ADLS_ACUITY_SCORE: 11
ADLS_ACUITY_SCORE: 12
ADLS_ACUITY_SCORE: 11
ADLS_ACUITY_SCORE: 13
ADLS_ACUITY_SCORE: 11
ADLS_ACUITY_SCORE: 11

## 2022-01-29 NOTE — PROVIDER NOTIFICATION
Notified Dr. Aaron that patient was hypotensive at 80/48, HR 67. MD ordered 1 L bolus over 1 hour. Started.

## 2022-01-29 NOTE — PLAN OF CARE
DATE & TIME: 1/28/22 3345-7932   Cognitive Concerns/ Orientation : A&O x3-4. Understands some parts of situation. Pt has down syndrome. Anxious at times, needs lots of reassurance, PRN Ativan available   BEHAVIOR & AGGRESSION TOOL COLOR: Green  ABNL VS/O2: VSS on 4-6L NC.  Encouraging use of IS, refusing. Refused labs, refusing oral medications/inhaler.  MOBILITY: A x1 GB/W. Needs encouragement to ambulate. T&R promoted while in bed, up in recliner chair this shift.    PAIN MANAGMENT: Throat , neck and shoulder pain, appears extremely dry and flaky. Gave Tylenol x1, refused heat back. Encouraged water intake.   DIET: Regular diet. Total feed. Needs encouragement. Patient only ate 3 vanilla puddings and sips of water this shift.   BOWEL/BLADDER: Continent, up to BR   ABNL LAB/BG: COVID positive 1/22, refused labs today  DRAIN/DEVICES: PIV SL  TELEMETRY RHYTHM: N/A  SKIN: Blanchable redness to coccyx (new Mepilex placed), skin and mouth very dry, cracked and flaky   TESTS/PROCEDURES: None  D/C DATE: TBD  Discharge Barriers: Safe placement. Pt mom is caregiver and hospitalized at [facility].  OTHER IMPORTANT INFO:Special precautions maintained.

## 2022-01-29 NOTE — PLAN OF CARE
Cognitive Concerns/ Orientation : A&O x3-4. Understands some parts of situation. Pt has down syndrome. Anxious at times, needs lots of reassurance, PRN Ativan given   BEHAVIOR & AGGRESSION TOOL COLOR: Green  ABNL VS/O2: VSS on 4-6L NC.  Soft BP.Encouraging use of IS, refusing. Refused labs, refusing oral medications/inhaler.  MOBILITY: A x1 GB/W. Needs encouragement to ambulate. T&R promoted while in bed, up in recliner chair this shift.    PAIN MANAGMENT: Throat , neck and shoulder pain, appears extremely dry and flaky. refused heat back. Encouraged water intake.   DIET: Regular diet. Total feed. Needs encouragement. Patient only ate 1 vanilla puddings and sips of water this shift.   BOWEL/BLADDER: Continent, up to BR   ABNL LAB/BG: COVID positive 1/22, DRAIN/DEVICES: PIV SL  TELEMETRY RHYTHM: N/A  SKIN: Blanchable redness to coccyx (new Mepilex placed), skin and mouth very dry, cracked and flaky   TESTS/PROCEDURES: None  D/C DATE: TBD  Discharge Barriers: Safe placement. Pt mom is caregiver and hospitalized at [facility].  OTHER IMPORTANT INFO:Special precautions maintained.Systolic BP was low 90s PRN bolus given.Poor oral intake too.Refuse morning labs.

## 2022-01-29 NOTE — PROGRESS NOTES
"[facility]    Medicine Progress Note - Hospitalist Service       Date of Admission:  1/22/2022    Assessment & Plan   Miranda Dover is a 51 year old female with hx of Down syndrome who was admitted on 1/22/2022 for acute hypoxic respiratory failure due to COVID-19 pneumonia    Acute hypoxic respiratory failure due to COVID-19 pneumonia  * Unvaccinated  * Symptom onset 1/19/22  * Tested positive for COVID on 1/22/22  * Chest CT on arrival showed bilateral infiltrates  * Initiated on dexamethasone and remdesivir on 1/22  * Completed 5-day course of remdesivir on 1/26  - On 4 lpm/NC, wean as tolerated  - Continues on dexamethasone to complete a 10 day course (last day: 1/31)  - On rivaroxaban for VTE ppx, plan 30-day course  Recent Labs   Lab 01/23/22  1050 01/22/22  1649 01/22/22  1202   CRP 41.1*  --  31.7*   DD 1.27* 1.66* 0.81*   *  --   --          Down syndrome  Developmental delay  * Patient lives with her parents who unfortunately were hospitalized with COVID on 1/22 as well. Her father passed away on 1/26; her mother remains hospitalized with guarded prognosis  * Patient has been intermittently anxious and agitated, sometimes yelling/screaming. She doesn't know why she's here and has repeatedly asked \"what's wrong with me?\" Since admission, she has become increasingly withdrawn, refusing medications, and now taking minimal PO  - Lorazepam prn available for agitation, but she is mainly scared and lonely  - Offer bedside sitter staffing allows  - PT/OT recommending TCU; placement will be difficult as patient will need guardianship re-established. Guardianship will need to be discussed with the patient's son; conversation currently on hold while he navigates recent events with his parents    Borderline hypotension  Underweight with cachexia  Failure to thrive  Baseline blood pressures 90s-100s systolic. Decreased to mid-80s systolic in the setting of minimal PO intake. BMI " ~13  - Start D5NS at 100 ml/h today, 1/29  - On Ensure Enlive as per Nutrition    Chest pain  Intermittent chest pain noted during this hospitalization. Patient refused serial troponins and echo.    Leukopenia and thrombocytopenia, Resolving  WBC ron 3.6k; PLT ron 119k - likely due to infection  - Has been refusing labs. Will follow periodically    Thyroid nodule, possible hypothyroidism  Incidentally noted on admission chest CT. TSH was elevated to 15, but T4 was normal  - Consider repeat thyroid studies and possible thyroid US once patient recovers from COVID     Diet: Combination Diet Regular Diet Adult  Snacks/Supplements Pediatric: Ensure Enlive; With Meals    DVT Prophylaxis: DOAC - rivaroxaban  Zhu Catheter: Not present  Code Status: Full Code         Disposition: Expected discharge pending placement, TBD    The patient's care was discussed with the Bedside Nurse.    Briana Aaron MD  Hospitalist Service  [facility]  Contact information available via [facility] Paging/Directory    ______________________________________________________________________    Interval History   Given a small bolus of IV fluids overnight for decrease in blood pressure to 80s systolic. Has been refusing meds and taking minimal PO. Remains anxious, intermittently agitated.    Briana Aaron MD    Data reviewed today: I reviewed all medications, new labs and imaging results over the last 24 hours. I personally reviewed no images or EKG's today.    Physical Exam   Vital Signs: Temp: 97.8  F (36.6  C) Temp src: Axillary BP: 97/63 Pulse: 61   Resp: 16 SpO2: 98 % O2 Device: Nasal cannula with humidification Oxygen Delivery: 4 LPM  Weight: 74 lbs 1.19 oz  Constitutional: Resting in bed, NAD  HEENT: Sclera white, MMM  Respiratory: Breathing non-labored. Lungs CTAB - no wheezes, crackles, or rhonchi  Cardiovascular: Heart RRR, no m/r/g. No pedal edema  GI: +BS, abd soft/NT  Skin/Integument: No  rash  Musculoskeletal: Normal muscle bulk and tone  Neuro: Alert, +mental delay. CARBONE  Psych: Flat affect    Data   Recent Labs   Lab 01/23/22  1050 01/22/22  1202   WBC 4.3 3.6*   HGB 14.6 14.1   MCV 96 96   * 119*    141   POTASSIUM 4.4 3.8   CHLORIDE 105 105   CO2 32 32   BUN 19 21   CR 0.75 0.74   ANIONGAP 3 4   JOSUÉ 8.1* 8.2*   * 101*         No results found for this or any previous visit (from the past 24 hour(s)).    Medications     dextrose 5% and 0.9% NaCl       - MEDICATION INSTRUCTIONS -         albuterol  2 puff Inhalation 4x Daily     dexamethasone  6 mg Oral Q24H    Or     dexamethasone  6 mg Intravenous Q24H     pantoprazole  40 mg Oral QAM AC     rivaroxaban ANTICOAGULANT  10 mg Oral Daily with supper     sodium chloride (PF)  3 mL Intracatheter Q8H

## 2022-01-29 NOTE — PLAN OF CARE
DATE & TIME: 1/29/22 Day shift  Cognitive Concerns/ Orientation : A&O x2. Understands some parts of situation. Pt has down syndrome. Anxious at times, needs lots of reassurance, but redirectable  BEHAVIOR & AGGRESSION TOOL COLOR: Green  ABNL VS/O2: VSS on 1L NC.  Soft BP.Encouraging use of IS, refusing. Refused labs, refusing oral medications/inhaler.  MOBILITY: A x1 GB/W. Needs encouragement to ambulate. T&R promoted while in bed, up in recliner chair this shift.    PAIN MANAGMENT: Throat , neck and shoulder pain, appears extremely dry and flaky. refused heat back. Encouraged water intake.   DIET: Regular diet. Total feed. Needs encouragement. Patient only ate 3 vanilla puddings and sips of water this shift.   BOWEL/BLADDER: Continent, up to BR   ABNL LAB/BG: COVID positive 1/22, DRAIN/DEVICES: PIV SL  TELEMETRY RHYTHM: N/A  SKIN: Blanchable redness to coccyx (new Mepilex placed), skin and mouth very dry, cracked and flaky   TESTS/PROCEDURES: None  D/C DATE: TBD  Discharge Barriers: Safe placement. Will possible need a new guardian  OTHER IMPORTANT INFO:Special precautions maintained.Systolic BP was low 90s- started IV fluids

## 2022-01-30 PROCEDURE — 250N000011 HC RX IP 250 OP 636: Performed by: INTERNAL MEDICINE

## 2022-01-30 PROCEDURE — 250N000011 HC RX IP 250 OP 636: Performed by: HOSPITALIST

## 2022-01-30 PROCEDURE — 120N000001 HC R&B MED SURG/OB

## 2022-01-30 PROCEDURE — 258N000003 HC RX IP 258 OP 636: Performed by: INTERNAL MEDICINE

## 2022-01-30 PROCEDURE — 99232 SBSQ HOSP IP/OBS MODERATE 35: CPT | Performed by: INTERNAL MEDICINE

## 2022-01-30 PROCEDURE — 250N000013 HC RX MED GY IP 250 OP 250 PS 637: Performed by: HOSPITALIST

## 2022-01-30 RX ADMIN — LORAZEPAM 0.5 MG: 2 INJECTION INTRAMUSCULAR; INTRAVENOUS at 01:44

## 2022-01-30 RX ADMIN — RIVAROXABAN 10 MG: 10 TABLET, FILM COATED ORAL at 16:58

## 2022-01-30 RX ADMIN — PANTOPRAZOLE SODIUM 40 MG: 40 TABLET, DELAYED RELEASE ORAL at 10:13

## 2022-01-30 RX ADMIN — DEXTROSE AND SODIUM CHLORIDE: 5; 900 INJECTION, SOLUTION INTRAVENOUS at 05:25

## 2022-01-30 RX ADMIN — DEXAMETHASONE 6 MG: 2 TABLET ORAL at 10:13

## 2022-01-30 ASSESSMENT — ACTIVITIES OF DAILY LIVING (ADL)
ADLS_ACUITY_SCORE: 18
ADLS_ACUITY_SCORE: 12
ADLS_ACUITY_SCORE: 18
ADLS_ACUITY_SCORE: 12
ADLS_ACUITY_SCORE: 18
ADLS_ACUITY_SCORE: 12
ADLS_ACUITY_SCORE: 18
ADLS_ACUITY_SCORE: 18
ADLS_ACUITY_SCORE: 12
ADLS_ACUITY_SCORE: 18
ADLS_ACUITY_SCORE: 12
ADLS_ACUITY_SCORE: 18
ADLS_ACUITY_SCORE: 18

## 2022-01-30 NOTE — PROGRESS NOTES
"[facility]    Medicine Progress Note - Hospitalist Service       Date of Admission:  1/22/2022    Assessment & Plan   Miranda Dover is a 51 year old female with hx of Down syndrome who was admitted on 1/22/2022 for acute hypoxic respiratory failure due to COVID-19 pneumonia    Acute hypoxic respiratory failure due to COVID-19 pneumonia, Improving  * Unvaccinated  * Symptom onset 1/19/22  * Tested positive for COVID on 1/22/22  * Chest CT on arrival showed bilateral infiltrates  * Initiated on dexamethasone and remdesivir on 1/22  * Completed 5-day course of remdesivir on 1/26  - On 1 lpm/NC, wean as tolerated  - Will complete 10-day course of dexamethasone on 1/31  - On rivaroxaban for VTE ppx, plan 30-day course (last day: 2/22)  Recent Labs   Lab 01/23/22  1050   CRP 41.1*   DD 1.27*   *       Down syndrome  Developmental delay  * Patient lives with her parents who unfortunately were hospitalized with COVID on 1/22 as well. Her father passed away on 1/26; her mother remains hospitalized with guarded prognosis  * Patient has been intermittently anxious and agitated, sometimes yelling/screaming. She doesn't know why she's here and has repeatedly asked \"what's wrong with me?\" Following admission, she became increasingly withdrawn, refusing medications and meals at times  - Offer bedside sitter as staffing allows  - PT/OT recommending TCU; placement will be difficult as patient will need guardianship re-established. Guardianship will need to be discussed with the patient's son; conversation currently on hold while he navigates recent events with his parents  - Palliative Care consult requested for emotional/decisional support    Borderline hypotension  Underweight with cachexia  Failure to thrive  Baseline blood pressures 90s-100s systolic. Decreased to mid-80s systolic in the setting of minimal PO intake. BMI ~13  - She was initiated on IV fluids on 1/30: D5NS at 75 ml/h   - On " Ensure Enlive as per Nutrition    Chest pain  Intermittent chest pain noted during this hospitalization. Patient refused serial troponins and echo.    Leukopenia and thrombocytopenia, Resolving  WBC ron 3.6k; PLT ron 119k - likely due to infection  - Has been refusing labs. Will follow periodically    Thyroid nodule, possible hypothyroidism  Incidentally noted on admission chest CT. TSH was elevated to 15, but T4 was normal  - Consider repeat thyroid studies and possible thyroid US once patient recovers from COVID     Diet: Combination Diet Regular Diet Adult  Snacks/Supplements Pediatric: Ensure Enlive; With Meals    DVT Prophylaxis: DOAC - rivaroxaban  Zhu Catheter: Not present  Code Status: Full Code         Disposition: Expected discharge pending placement, which will be difficult    The patient's care was discussed with the Bedside Nurse.    Briana Aaron MD  Hospitalist Service  [facility]  Contact information available via [facility] Paging/Directory    ______________________________________________________________________    Interval History   No events overnight. Blood pressure stable in 90s-100s range since initiation of IV fluids yesterday. Has been taking more PO with strong encouragement    Briana Aaron MD    Data reviewed today: I reviewed all medications, new labs and imaging results over the last 24 hours. I personally reviewed no images or EKG's today.    Physical Exam   Vital Signs: Temp: 97.9  F (36.6  C) Temp src: Axillary BP: 90/53 Pulse: 89   Resp: 18 SpO2: 94 % O2 Device: None (Room air) Oxygen Delivery: 1 LPM  Weight: 74 lbs 1.19 oz  Constitutional: Resting in chair, NAD  Respiratory: Breathing non-labored. Lungs CTAB - no wheezes, crackles, or rhonchi  Cardiovascular: Heart RRR, no m/r/g. No pedal edema  GI: +BS, abd soft/NT  Skin/Integument: No rash  Musculoskeletal: +cachexia  Neuro: Alert, +mental delay. CARBONE  Psych: Flat affect    Data   Recent  Labs   Lab 01/23/22  1050   WBC 4.3   HGB 14.6   MCV 96   *      POTASSIUM 4.4   CHLORIDE 105   CO2 32   BUN 19   CR 0.75   ANIONGAP 3   JOSUÉ 8.1*   *         No results found for this or any previous visit (from the past 24 hour(s)).    Medications     dextrose 5% and 0.9% NaCl 100 mL/hr at 01/30/22 0525     - MEDICATION INSTRUCTIONS -         albuterol  2 puff Inhalation 4x Daily     dexamethasone  6 mg Oral Q24H    Or     dexamethasone  6 mg Intravenous Q24H     pantoprazole  40 mg Oral QAM AC     rivaroxaban ANTICOAGULANT  10 mg Oral Daily with supper     sodium chloride (PF)  3 mL Intracatheter Q8H

## 2022-01-30 NOTE — PLAN OF CARE
DATE & TIME: 1/29/22 Evening shift  Cognitive Concerns/ Orientation : A&Ox2. Understands some parts of situation. Pt has down syndrome. Anxious at times, needs lots of reassurance, but redirectable  BEHAVIOR & AGGRESSION TOOL COLOR: Green  ABNL VS/O2: VSS on 1L NC.  Soft BP.Encouraging use of IS, refusing. Refused labs, refusing inhaler.    MOBILITY: A x1 GB/W. Needs encouragement to ambulate. T&R promoted while in bed, up in recliner chair this shift and to commode in bathroom  PAIN MANAGMENT: Throat , neck and shoulder pain- but doesn't really make senses; Appears extremely dry and flaky. refused heat back.   DIET: Regular diet. Total feed. Needs encouragement. Patient ate dinner in between bites of pudding.  BOWEL/BLADDER: Continent, up to BR at times, other times incontinent. No BM.  ABNL LAB/BG: COVID positive 1/22, DRAIN/DEVICES: PIV SL  TELEMETRY RHYTHM: N/A  SKIN: Blanchable redness to coccyx (new Mepilex placed), skin and mouth very dry, cracked and flaky   TESTS/PROCEDURES: None  D/C DATE: TBD  Discharge Barriers: Safe placement. Will possible need a new guardian  OTHER IMPORTANT INFO:Special precautions maintained. Brother called to get update, hasn't come up to visit because he doesn't want to leave mom's side in ICU

## 2022-01-30 NOTE — PLAN OF CARE
DATE & TIME: 1/30/22 Day shift  Cognitive Concerns/ Orientation : A&Ox1.  Pt has down syndrome. Anxious at times.   BEHAVIOR & AGGRESSION TOOL COLOR: Green  ABNL VS/O2: VSS on RA.  Soft BP, on IVF    MOBILITY: A x1 GB/W. Needs encouragement to ambulate  PAIN MANAGMENT: Unable to verbalize pain location  DIET: Regular diet. Total feed. Needs encouragement. BOWEL/BLADDER: Continent, up to BR at times, other times incontinent. No BM. Patient likes to sit in commode and not move  ABNL LAB/BG: COVID positive 1/22, DRAIN/DEVICES: PIV SL  TELEMETRY RHYTHM: N/A  SKIN: Blanchable redness to coccyx (new Mepilex placed), skin and mouth very dry, cracked and flaky; pale and cool skin  TESTS/PROCEDURES: None  D/C DATE: TBD  Discharge Barriers: Safe placement. Will possibly need a new guardian  OTHER IMPORTANT INFO:Special precautions maintained. Mom in ICU.

## 2022-01-30 NOTE — PLAN OF CARE
DATE & TIME: 1/29/22 Night  Cognitive Concerns/ Orientation : A&Ox2. Understands some parts of situation. Pt has down syndrome. Anxious at times, PRN Ativan x1 during night  BEHAVIOR & AGGRESSION TOOL COLOR: Green  ABNL VS/O2: VSS on 1L NC.  Soft BP, now on IVF    MOBILITY: A x1 GB/W. Needs encouragement to ambulate  PAIN MANAGMENT: Unable to verbalize pain location  DIET: Regular diet. Total feed. Needs encouragement. Patient ate dinner in between bites of pudding.  BOWEL/BLADDER: Continent, up to BR at times, other times incontinent. No BM.  ABNL LAB/BG: COVID positive 1/22, DRAIN/DEVICES: PIV SL  TELEMETRY RHYTHM: N/A  SKIN: Blanchable redness to coccyx (new Mepilex placed), skin and mouth very dry, cracked and flaky   TESTS/PROCEDURES: None  D/C DATE: TBD  Discharge Barriers: Safe placement. Will possibly need a new guardian  OTHER IMPORTANT INFO:Special precautions maintained. Mom in ICU.

## 2022-01-31 PROCEDURE — 250N000011 HC RX IP 250 OP 636: Performed by: HOSPITALIST

## 2022-01-31 PROCEDURE — 99232 SBSQ HOSP IP/OBS MODERATE 35: CPT | Performed by: INTERNAL MEDICINE

## 2022-01-31 PROCEDURE — 250N000013 HC RX MED GY IP 250 OP 250 PS 637: Performed by: HOSPITALIST

## 2022-01-31 PROCEDURE — 120N000001 HC R&B MED SURG/OB

## 2022-01-31 PROCEDURE — 99221 1ST HOSP IP/OBS SF/LOW 40: CPT | Performed by: REGISTERED NURSE

## 2022-01-31 PROCEDURE — 250N000013 HC RX MED GY IP 250 OP 250 PS 637: Performed by: REGISTERED NURSE

## 2022-01-31 PROCEDURE — 250N000011 HC RX IP 250 OP 636: Performed by: INTERNAL MEDICINE

## 2022-01-31 RX ORDER — POLYETHYLENE GLYCOL 3350 17 G/17G
17 POWDER, FOR SOLUTION ORAL DAILY
Status: DISCONTINUED | OUTPATIENT
Start: 2022-01-31 | End: 2022-05-08

## 2022-01-31 RX ADMIN — PANTOPRAZOLE SODIUM 40 MG: 40 TABLET, DELAYED RELEASE ORAL at 09:52

## 2022-01-31 RX ADMIN — DEXAMETHASONE 6 MG: 2 TABLET ORAL at 09:52

## 2022-01-31 RX ADMIN — LORAZEPAM 0.5 MG: 2 INJECTION INTRAMUSCULAR; INTRAVENOUS at 00:41

## 2022-01-31 ASSESSMENT — ACTIVITIES OF DAILY LIVING (ADL)
ADLS_ACUITY_SCORE: 18

## 2022-01-31 NOTE — PROGRESS NOTES
CLINICAL NUTRITION SERVICES - REASSESSMENT NOTE    Recommendations Ordered by Registered Dietitian (RD):   Regular diet  Ensure w/ meals   Future/Additional Recommendations:   Consider Nutrition support if oral intakes do not improve    Malnutrition: (1/26)  % Weight Loss:  None noted (stable over the last year per Epic)  % Intake:  </= 50% for >/= 5 days (severe malnutrition)  Subcutaneous Fat Loss:  Unable to observe but suspected  Muscle Loss:  Unable to observe but suspected   Fluid Retention:  None noted     Malnutrition Diagnosis: Unable to determine due to inability to perform a Nutrition Focused Physical Exam but malnutrition suspected      EVALUATION OF PROGRESS TOWARD GOALS   Diet: Regular + Ensure Enlive w/ meals  Intake/Tolerance:   - Eating poorly. Noted requiring encouragement. This morning she took just 3 bites of pudding. Reviewed flowhsheet records over the past 1 week. She is eating mostly just vanilla pudding and little else. Supplements are not documented.     - No new weight measured.   - No BM recorded since admission? --> miralax scheduled    ASSESSED NUTRITION NEEDS:  Dosing Weight 33.6 kg   Estimated Energy Needs: 6884-7720 kcals (35-40 Kcal/Kg)  Justification: repletion and underweight  Estimated Protein Needs: 50-70 grams protein (1.5-2 g pro/Kg)  Justification: repletion and hypercatabolism with acute illness    NEW FINDINGS:   - Discharge planning to inpatient rehab    Previous Goals:   Patient will consume at least 50% meals and supplements   Evaluation: Not met    Previous Nutrition Diagnosis:   Inadequate oral intake related to developmental delay, likely reduced appetite with COVID as evidenced by minimal intake since admit   Evaluation: No change    CURRENT NUTRITION DIAGNOSIS  Inadequate oral intake related to poor appetite, ?disinterst in meals ordered as evidenced by pt getting most of her nourishment from pudding for the past 9 days.     INTERVENTIONS  Recommendations /  Nutrition Prescription  Regular diet  Ensure w/ meals    Consider Nutrition support if oral intakes do not improve     Implementation  None new - continue the above interventions    Goals  Pt to tolerate at least 50% of meals BID-TID vs consider nutrition support in the next 3 days.       MONITORING AND EVALUATION:  Progress towards goals will be monitored and evaluated per protocol and Practice Guidelines    Rosalba Adams RD, LD  [facility] [place], [facility], [facility], [facility]  Pager: [phone]  Weekend Pager: [phone]

## 2022-01-31 NOTE — PROGRESS NOTES
"SPIRITUAL HEALTH SERVICES  SPIRITUAL ASSESSMENT Progress Note (Palliative Focus)  [facility]    PRIMARY FOCUS:   Emotional/spiritual/Nondenominational distress  Support for coping    REFERRAL SOURCE: palliative consult    ILLNESS CIRCUMSTANCES:   Reviewed documentation. Reflective conversation shared with pt Miranda's brother, Maxi, which integrated elements of illness and family narratives.   Context of Serious Illness/Symptom(s) - Miranda and Maxi's parents were hospitalized with COVID, as well. Their father passed away 1/26. Maxi was present with his mother, in-hospital, during phone conversation.  Resources for Support - Maxi shared that he has support - but due to his COVID exposure, it is all over the phone, at this point.    DISTRESS:   Emotional/Spiritual/Existential Distress - Maxi's primary concern is for his mother right now, and that he will make \"good decisions.\" He shared that \"Miranda is doing fine.\"  Social Distress - While Maxi has supportive connections in his life, he reflected on his experience of isolation due to his COVID exposure - and only being able to connect via telephone.    SPIRITUAL/[employer]ybeterian COPING:   Spiritual Practice(s) - Maxi finds prayer meaningful. We shared in prayer together.  Existential Connections - Maxi named his dora in God's will. \"I'm continuing to hope and pray,\" he shared.   Meaning/Sense-Making - Maxi said he's finding peace in \"doing all he can\" and \"leaving the rest to God.\"    I offered emotional support through reflective listening, validation of experiences/emotions, and words of comfort.    PLAN: Spiritual Health remains available, as needed.    Alda Peraza  Associate   Phone: [phone]   "

## 2022-01-31 NOTE — PROGRESS NOTES
"[facility]    Medicine Progress Note - Hospitalist Service    Date of Admission:  1/22/2022    Assessment & Plan        Miranda Dover is a 51 year old female with hx of Down syndrome who was admitted on 1/22/2022 for acute hypoxic respiratory failure due to COVID-19 pneumonia     Acute hypoxic respiratory failure due to COVID-19 pneumonia. Resolved   * Unvaccinated  * Symptom onset 1/19/22  * Tested positive for COVID on 1/22/22  * Chest CT on arrival showed bilateral infiltrates  * Initiated on dexamethasone and remdesivir on 1/22  * Completed 5-day course of remdesivir on 1/26  - O2 as needed   - Last day of 10-day course of dexamethasone on 1/31  - On rivaroxaban for VTE ppx, plan 30-day course (last day: 2/22)    Down syndrome  Developmental delay  * Patient lives with her parents who unfortunately were hospitalized with COVID on 1/22 as well. Her father passed away on 1/26; her mother remains hospitalized with guarded prognosis  * Patient has been intermittently anxious and agitated, sometimes yelling/screaming. She doesn't know why she's here and has repeatedly asked \"what's wrong with me?\" Following admission, she became increasingly withdrawn, refusing medications and meals at times  - Offer bedside sitter as staffing allows  - PT/OT recommending TCU; placement will be difficult as patient will need guardianship re-established. Guardianship will need to be discussed with the patient's son; conversation currently on hold while he navigates recent events with his parents   - Palliative Care consult requested for emotional/decisional support     Borderline hypotension  Underweight with cachexia  Failure to thrive  Baseline blood pressures 90s-100s systolic. Decreased to mid-80s systolic in the setting of minimal PO intake. BMI ~13  - She was initiated on IV fluids on 1/30: D5NS at 75 ml/h   - On Ensure Enlive as per Nutrition     Chest pain.  Resolved   Intermittent chest pain " noted during this hospitalization. Patient refused serial troponins and echo.  No recent complaints      Leukopenia and thrombocytopenia.  Improved   WBC ron 3.6k; PLT ron 119k - likely due to infection  - Has been refusing labs. Will follow periodically     Thyroid nodule, possible hypothyroidism  Incidentally noted on admission chest CT. TSH was elevated to 15, but T4 was normal  - Consider repeat thyroid studies and possible thyroid US once patient recovers from COVID           Diet: Combination Diet Regular Diet Adult  Snacks/Supplements Pediatric: Ensure Enlive; With Meals    DVT Prophylaxis: DOAC  Zhu Catheter: Not present  Central Lines: None  Cardiac Monitoring: None  Code Status: Full Code      Disposition Plan   Expected Discharge: 02/28/2022     Anticipated discharge location: inpatient rehabilitation facility    Delays:     Placement - TCU            The patient's care was discussed with the Bedside Nurse, Care Coordinator/ and Patient.    Tariq Loaiza DO  Hospitalist Service  [facility]  Securely message with the Vocera Web Console (learn more here)  Text page via [employer] Paging/Directory         Clinically Significant Risk Factors Present on Admission                    ______________________________________________________________________    Interval History   Patient seen and evaluated.  No acute events over night.  Pulled out her IV.  No pain.  No difficulties with breathing on RA.  Tolerating diet.  No fevers     Data reviewed today: I reviewed all medications, new labs and imaging results over the last 24 hours. I personally reviewed no images or EKG's today.    Physical Exam   Vital Signs: Temp: 98.5  F (36.9  C) Temp src: Axillary BP: 116/80 Pulse: 91   Resp: 16 SpO2: 93 % O2 Device: None (Room air)    Weight: 74 lbs 1.19 oz  General Appearance: Resting comfortably. NAD   Respiratory: No respiratory distress on RA   Cardiovascular: RRR.  Appears  well perfused  GI: Soft.  Non-distended  Skin: No obvious rashes or cyanosis  Other: Alert.  Moving all extremities grossly      Data   No lab results found in last 7 days.    No results found for this or any previous visit (from the past 24 hour(s)).

## 2022-01-31 NOTE — PROGRESS NOTES
[facility]  Palliative Care Consultation Note    Patient: Miranda Dover  Date of Admission:  2022    Requesting Clinician / Team: Dr Aaron  Reason for consult: Decisional support  Patient and family support    Recommendations:    Goals of Care (see in depth discussion below): Miranda is recovering from Covid 19 pneumonia. Her brother Maxi is the only remaining relative to help with decisions at this time as their father   (palliative followed) of Covid here at the hospital and mother is also very ill in ICU with Covid. Miranda's parents were her caretakers.    Miranda's brother Maxi has spoken with palliative  Alda today and expresses that his focus is on his mother's health at this time as her condition is critical; he defers any discussion regarding Miranda at this time. Palliative care will remain available to offer support to patient and family.     Code status: Full Code    Advanced Care Planning:    Patient has a completed Health Care Directive: No.     Symptom Management:    Constipation related to decreased activity, decreased intake. No BM recorded since admission.  -Recommend scheduled Miralax daily.     Anxiety related to hospitalization, isolation from family.  -Offer supportive milieu   -Agree with lorazepam as needed.  -Palliative care KRYSTIN, Erika following.    Patient case was reviewed with RN,     Kalee Herron, CNS  [facility]  Contact information available via [facility] Paging/Directory        Thank you for the opportunity to participate in the care of this patient and family. Our team: will continue to follow.     During regular M-F work hours (6204-4414) -- if you are not sure who specifically to contact -- please contact us on [facility] Smart Web.     After regular work hours and on weekends/holidays, you can call our answering service at [phone].     Attestation:  Total time on the floor involved in the patient's  care: 45 minutes  Total time spent in counseling/care coordination: >50% discussing patient condition, assessment of symptoms, reviewing current status with MD and RN,  and SW.    Assessments:  Miranda Dover is a 51 year old female with PMH significant for Down's syndrome, hypothyroidism, GERD, CKD, thoracogenic scoliosis of thoracic region. She was brought to ED for suspected Covid 19 infection. Upon evaluation she was found to have mild hypoxia 87%, mild leukopenia and thrombocytopenia. She was admitted for management with dexamethasone and remdesivir.      Today, the patient was seen for:   Decisional support, symptom assessment  Patient and family support     Brother Maxi is unavailable at this time. Palliative  had met with Maxi earlier this morning and he explained that he is very overwhelmed and has deferred any other activites/discussion besides being at his mother's bedside, as she is critically ill with Covid. I had spoken with Maxi last week as palliative care was consulted to see their father, Charisma, who passed away on 1/26 due to Covid. At that time Maxi stated that   It is unlikely that Miranda will be able to return home, as his parents were her caregivers and Maxi would not be able to provide care for her. Per KRYSTIN notes the parents may have recently been looking into an assisted living facility for Miranda prior to gissell Covid.    Current condition:   I met with patient in her room- she is still on Covid isolation. Patient is lying in bed, appears very thin and frail. She is alert and protests when RN is taking her vital signs, lightly crying, and refusing to take her neb. She is unable to explain how she is feeling or discuss possible symptoms; is very emotionally labile. She appears sad at time and becomes mad as the RN takes her blood pressure. RN states that Miranda pulled her IV our earlier this morning and it was decided not to replace it. Miranda does not appear  to have any symptoms such as pain, sob, or nausea at this time.      Prognosis, Goals, & Planning:        Prognosis, Goals, and/or Advance Care Planning were addressed today: No      Functional Status just prior to hospitalization: 2 (Ambulatory and capable of all selfcare but unable to carry out any work activities; may need help with IADLs up and about > 50% of waking hours)          Patient has decision-making capacity today for complex decisions: No      Patient's decision making preferences: unable to assess          I have concerns about the patient/family's health literacy today: No           Coping, Meaning, & Spirituality:   Mood, coping, and/or meaning in the context of serious illness were addressed today: Yes  Summary/Comments: palliative  met with brother Maxi this morning and will follow for support as needed.    Social:     Living situation: had lived at home with father and mother; entire family contracted Covid and were hospitalized. Father passed away and mother critically ill in ICU.    Key family / caregivers: Maxi Dover - brother    Occupational history: Has not worked for 6-7 years    History of Present Illness:   History gathered today from: medical chart, medical team members, unit team members  Adopted from H&P:  Miranda Dover is a 51 year old female with PMH significant for Down's syndrome, hypothyroidism (hx of small goiter), GERD, CKD, thoracogenic scoliosis of thoracic region. She was brought to ED for suspected Covid 19 infection. Upon evaluation she was found to be Covid positive with mild hypoxia 87%, mild leukopenia and thrombocytopenia. She was admitted for management with dexamethasone and remdesivir.     Key Palliative Symptom Data:   We are not helping to manage these symptoms currently in this patient.    ROS:  Comprehensive ROS is unobtainable as patient cannot answer questions.     Past Medical History:  Past Medical History:   Diagnosis Date     Down's syndrome       Hypothyroidism         Past Surgical History:  History reviewed. No pertinent surgical history.      Family History:  Family History   Problem Relation Age of Onset     Asthma Maternal Grandmother          Allergies:  No Known Allergies     Medications:  I have reviewed this patient's medication profile and medications from this hospitalization.     Noted scheduled meds are:    albuterol  2 puff Inhalation 4x Daily     dexamethasone  6 mg Oral Q24H    Or     dexamethasone  6 mg Intravenous Q24H     pantoprazole  40 mg Oral QAM AC     rivaroxaban ANTICOAGULANT  10 mg Oral Daily with supper     sodium chloride (PF)  3 mL Intracatheter Q8H       Noted PRN meds are:  sodium chloride 0.9%, acetaminophen **OR** acetaminophen, albuterol, lidocaine 4%, lidocaine (buffered or not buffered), LORazepam **OR** LORazepam, - MEDICATION INSTRUCTIONS -, ondansetron **OR** ondansetron, polyethylene glycol, sodium chloride (PF)    Physical Exam:  Vital Signs: Temp: 98.5  F (36.9  C) Temp src: Axillary BP: 116/80 Pulse: 91   Resp: 16 SpO2: 93 % O2 Device: None (Room air)    Weight: 74 lbs 1.19 oz  GENERAL:  Alert, mild distress, low BMI, pale.  HEAD: Normocephalic atraumatic  SCLERA: Anicteric  EXTREMITIES: Warm; no edema  RESPIRATORY: Breathing non labored on 1L.  ABDOMEN: Soft, flat   NEUROLOGIC: Alert.  PSYCH: Anxious, uncooperative.    Data reviewed:  Recent imaging reviewed.  Results for orders placed or performed during the hospital encounter of 01/22/22   CT Chest Pulmonary Embolism w Contrast    Impression    IMPRESSION:  1.  No evidence for pulmonary embolism.  2.  Patchy predominantly groundglass opacities in both lungs, likely related to COVID-19 pneumonia.  3.  Indeterminate left thyroid nodule measures 1.6 cm. Thyroid ultrasound may be helpful for further characterization.     XR Chest Port 1 View    Impression    IMPRESSION: Patchy infiltrates in the left upper lobe and bilateral lung bases, consistent with  pneumonia, slightly increased since the CT on 01/22/2022. No pleural effusion or pneumothorax. Normal heart size.     Lab Results   Component Value Date    WBC 4.3 01/23/2022    WBC 3.6 (L) 01/22/2022    WBC 6.9 02/23/2021    HGB 14.6 01/23/2022    HGB 14.1 01/22/2022    HGB 13.5 02/23/2021    HCT 46.5 01/23/2022    HCT 45.1 01/22/2022    HCT 41.6 02/23/2021     (L) 01/23/2022     (L) 01/22/2022     02/23/2021     01/23/2022     01/22/2022     02/23/2021    POTASSIUM 4.4 01/23/2022    POTASSIUM 3.8 01/22/2022    POTASSIUM 4.1 02/23/2021    CHLORIDE 105 01/23/2022    CHLORIDE 105 01/22/2022    CHLORIDE 104 02/23/2021    CO2 32 01/23/2022    CO2 32 01/22/2022    CO2 28 02/23/2021    BUN 19 01/23/2022    BUN 21 01/22/2022    BUN 21 02/23/2021    CR 0.75 01/23/2022    CR 0.74 01/22/2022    CR 1.15 (H) 02/23/2021     (H) 01/23/2022     (H) 01/22/2022     (H) 02/23/2021    DD 1.27 (H) 01/23/2022    DD 1.66 (H) 01/22/2022    DD 0.81 (H) 01/22/2022    TROPI <0.015 02/23/2021    TROPI <0.015 10/08/2019    AST 25 02/23/2021    AST 27 10/08/2019    ALT 38 02/23/2021    ALT 31 10/08/2019    ALKPHOS 75 02/23/2021    ALKPHOS 65 10/08/2019    BILITOTAL 1.1 02/23/2021    BILITOTAL 0.4 10/08/2019      Lab Results   Component Value Date    ALBUMIN 3.8 02/23/2021

## 2022-01-31 NOTE — PLAN OF CARE
DATE & TIME: 1/30/22 Evening shift  Cognitive Concerns/ Orientation : A&Ox1.  Pt has down syndrome. Anxious at times.   BEHAVIOR & AGGRESSION TOOL COLOR: Green  ABNL VS/O2: VSS on RA.  Soft BP, on IVF    MOBILITY: A x1 GB/W. Needs encouragement to ambulate  PAIN MANAGMENT: Unable to verbalize pain location  DIET: Regular diet. Total feed. Needs encouragement. BOWEL/BLADDER: Incontinent this shift; No BM. Patient likes to sit in commode and not move  ABNL LAB/BG: COVID positive 1/22   DRAIN/DEVICES: PIV SL  TELEMETRY RHYTHM: N/A  SKIN: Blanchable redness to coccyx (new Mepilex placed), skin and mouth very dry, cracked and flaky; pale and cool skin  TESTS/PROCEDURES: None  D/C DATE: TBD  Discharge Barriers: Safe placement.   OTHER IMPORTANT INFO:Special precautions maintained. Mom in ICU and is sole care-giver

## 2022-01-31 NOTE — PLAN OF CARE
DATE & TIME: 1/31/22 1430  Cognitive Concerns/ Orientation : Alert and Oriented x1  Pt has down syndrome. Anxious and tearful at times.   BEHAVIOR & AGGRESSION TOOL COLOR: Green  ABNL VS/O2: VSS, on RA   MOBILITY: A x1 GB/W. Needs encouragement to ambulate  PAIN MANAGMENT:Denies   DIET: Regular diet. Total feed. Needs encouragement.   BOWEL/BLADDER: Incontinent.  ABNL LAB/BG: COVID positive 1/22   DRAIN/DEVICES: PIV SL  TELEMETRY RHYTHM: N/A  SKIN: Cool and Pale skin.  TESTS/PROCEDURES: None  D/C DATE: TBD  Discharge Barriers: Safe placement.   OTHER IMPORTANT INFO:Special precautions maintained. Mom in CICU and is sole care-giver.

## 2022-01-31 NOTE — PLAN OF CARE
"DATE & TIME: 1/31/22  Cognitive Concerns/ Orientation : Alert and Oriented x1  Pt has down syndrome. Anxious at times.   BEHAVIOR & AGGRESSION TOOL COLOR: Green  ABNL VS/O2: Patient refusing to allow vital signs to be taken, would yell at nurse and say \"your gonna break my funny bone\".     MOBILITY: A x1 GB/W. Needs encouragement to ambulate  PAIN MANAGMENT: Unable to verbalize pain location, possibly sore throat, refusing anything to drink.    DIET: Regular diet. Total feed. Needs encouragement.   BOWEL/BLADDER: Incontinent, patient refusing to have her brief changed or checked.    ABNL LAB/BG: COVID positive 1/22   DRAIN/DEVICES: PIV SL  TELEMETRY RHYTHM: N/A  SKIN: Cool and Pale skin, refusing full skin assessment, continues to fear a broken funny bone from skin assessment.  Writer attempted to reassure with no success.    TESTS/PROCEDURES: None  D/C DATE: TBD  Discharge Barriers: Safe placement.   OTHER IMPORTANT INFO:Special precautions maintained. Mom in ICU and is sole care-giver.  Ativan given x1 for anxiety, patient appeared to rest comfortably afterwards, slept with lights on.      "

## 2022-02-01 ENCOUNTER — APPOINTMENT (OUTPATIENT)
Dept: OCCUPATIONAL THERAPY | Facility: CLINIC | Age: 52
DRG: 177 | End: 2022-02-01
Payer: COMMERCIAL

## 2022-02-01 PROCEDURE — 250N000013 HC RX MED GY IP 250 OP 250 PS 637: Performed by: REGISTERED NURSE

## 2022-02-01 PROCEDURE — 97535 SELF CARE MNGMENT TRAINING: CPT | Mod: GO | Performed by: OCCUPATIONAL THERAPIST

## 2022-02-01 PROCEDURE — 99232 SBSQ HOSP IP/OBS MODERATE 35: CPT | Performed by: INTERNAL MEDICINE

## 2022-02-01 PROCEDURE — 120N000001 HC R&B MED SURG/OB

## 2022-02-01 PROCEDURE — 250N000013 HC RX MED GY IP 250 OP 250 PS 637: Performed by: HOSPITALIST

## 2022-02-01 ASSESSMENT — ACTIVITIES OF DAILY LIVING (ADL)
ADLS_ACUITY_SCORE: 22
ADLS_ACUITY_SCORE: 20
ADLS_ACUITY_SCORE: 22
ADLS_ACUITY_SCORE: 20
ADLS_ACUITY_SCORE: 20
ADLS_ACUITY_SCORE: 22
ADLS_ACUITY_SCORE: 22
ADLS_ACUITY_SCORE: 20
ADLS_ACUITY_SCORE: 22

## 2022-02-01 NOTE — PLAN OF CARE
Cognitive Concerns/ Orientation : Alert and Oriented x1  Pt has down syndrome. Anxious and tearful at times. Refuses and resistive with cares at times  BEHAVIOR & AGGRESSION TOOL COLOR: Green  ABNL VS/O2: VSS, on RA   MOBILITY: A x1-2 GB/W. Needs encouragement to ambulate  PAIN MANAGMENT:Denies   DIET: Regular diet. Total feed. Needs encouragement.   BOWEL/BLADDER: Incontinent.  ABNL LAB/BG: COVID positive 1/22   DRAIN/DEVICES: PIV SL  TELEMETRY RHYTHM: N/A  SKIN: Cool and Pale skin.  TESTS/PROCEDURES: None  D/C DATE: TBD. Pending TCU placement  Discharge Barriers: Safe placement.   OTHER IMPORTANT INFO:Special precautions maintained. Mom in CICU and is sole care-giver.

## 2022-02-01 NOTE — PROGRESS NOTES
"[facility]    Medicine Progress Note - Hospitalist Service    Date of Admission:  1/22/2022    Assessment & Plan         Miranda Dover is a 51 year old female with hx of Down syndrome who was admitted on 1/22/2022 for acute hypoxic respiratory failure due to COVID-19 pneumonia     Acute hypoxic respiratory failure due to COVID-19 pneumonia. Resolved   * Unvaccinated  * Symptom onset 1/19/22  * Tested positive for COVID on 1/22/22  * Chest CT on arrival showed bilateral infiltrates  * Initiated on dexamethasone and remdesivir on 1/22  * Completed 5-day course of remdesivir on 1/26  - O2 as needed   - Last day of 10-day course of dexamethasone on 1/31  - On rivaroxaban for VTE ppx, plan 30-day course (last day: 2/22)  - Can be considered COVID recovered at midnight     Down syndrome  Developmental delay  * Patient lives with her parents who unfortunately were hospitalized with COVID on 1/22 as well. Her father passed away on 1/26; her mother remains hospitalized with guarded prognosis  * Patient has been intermittently anxious and agitated, sometimes yelling/screaming. She doesn't know why she's here and has repeatedly asked \"what's wrong with me?\" Following admission, she became increasingly withdrawn, refusing medications and meals at times  - Offer bedside sitter as staffing allows  - PT/OT recommending TCU; placement will be difficult as patient will need guardianship re-established. Guardianship will need to be discussed with the patient's son; conversation currently on hold while he navigates recent events with his parents   - Palliative Care consult requested for emotional/decisional support     Borderline hypotension  Underweight with cachexia  Failure to thrive  Baseline blood pressures 90s-100s systolic. Decreased to mid-80s systolic in the setting of minimal PO intake. BMI ~13  - She was initiated on IV fluids on 1/30: D5NS at 75 ml/h   - On Ensure Enlive as per " Nutrition     Chest pain.  Resolved   Intermittent chest pain noted during this hospitalization. Patient refused serial troponins and echo.  No recent complaints      Leukopenia and thrombocytopenia.  Improved   WBC ron 3.6k; PLT ron 119k - likely due to infection  - Has been refusing labs. Will follow periodically     Thyroid nodule, possible hypothyroidism  Incidentally noted on admission chest CT. TSH was elevated to 15, but T4 was normal  - Consider repeat thyroid studies and possible thyroid US once patient recovers from COVID             Diet: Combination Diet Regular Diet Adult  Snacks/Supplements Pediatric: Ensure Enlive; With Meals    DVT Prophylaxis: DOAC  Zhu Catheter: Not present  Central Lines: None  Cardiac Monitoring: None  Code Status: Full Code      Disposition Plan   Expected Discharge: 02/28/2022     Anticipated discharge location: inpatient rehabilitation facility    Delays:     Placement - TCU            The patient's care was discussed with the Bedside Nurse and Care Coordinator/.    Tariq Loaiza DO  Hospitalist Service  [facility]  Securely message with the Vocera Web Console (learn more here)  Text page via [employer] Paging/Directory         Clinically Significant Risk Factors Present on Admission                    ______________________________________________________________________    Interval History   Patient seen and evaluated.  No acute events over night.  Poor PO intake.  No fevers.  No hypoxia noted.      Data reviewed today: I reviewed all medications, new labs and imaging results over the last 24 hours. I personally reviewed no images or EKG's today.    Physical Exam   Vital Signs: Temp: 98  F (36.7  C) Temp src: Axillary BP: 99/55 Pulse: 81   Resp: 18 SpO2: 91 % O2 Device: None (Room air) Oxygen Delivery: 1 LPM (per patient's request)  Weight: 74 lbs 1.19 oz  General Appearance: Resting comfortably.  NAD   Respiratory: No respiratory  distress  Cardiovascular: RRR.  Appears well perfused  GI: Soft.  Non-distended  Skin: No obvious rashes or cyanosis  Other: Alert.  Moving all extremities grossly      Data   No lab results found in last 7 days.    No results found for this or any previous visit (from the past 24 hour(s)).

## 2022-02-01 NOTE — PLAN OF CARE
DATE & TIME: 2/1/22 1300  Cognitive Concerns/ Orientation : Alert and Oriented x1  Pt has down syndrome. Anxious and tearful at times. Refuses and resistive with cares most of time. Refusing all meds.  BEHAVIOR & AGGRESSION TOOL COLOR: Green  ABNL VS/O2: VSS, on RA   MOBILITY: A x1-2 GB/W. Has been unwilling to get out of bed today to sit in chair.  PAIN MANAGMENT:Denies   DIET: Regular diet. Total feed. Needs much encouragement, ate a couple puddings around lunch time, unwilling to take in any fluids however.  BOWEL/BLADDER: Incontinent.  ABNL LAB/BG: COVID positive 1/22   DRAIN/DEVICES: PIV SL  TELEMETRY RHYTHM: N/A  SKIN: Cool and Pale skin.  TESTS/PROCEDURES: None  D/C DATE: TBD. Pending TCU placement  Discharge Barriers: Safe placement.   OTHER IMPORTANT INFO:Special precautions maintained. Mom in CICU and is sole care-giver.

## 2022-02-02 PROCEDURE — 250N000013 HC RX MED GY IP 250 OP 250 PS 637: Performed by: HOSPITALIST

## 2022-02-02 PROCEDURE — 99232 SBSQ HOSP IP/OBS MODERATE 35: CPT | Performed by: PHYSICIAN ASSISTANT

## 2022-02-02 PROCEDURE — 120N000001 HC R&B MED SURG/OB

## 2022-02-02 PROCEDURE — 258N000003 HC RX IP 258 OP 636: Performed by: PHYSICIAN ASSISTANT

## 2022-02-02 RX ADMIN — DEXTROSE AND SODIUM CHLORIDE: 5; 900 INJECTION, SOLUTION INTRAVENOUS at 15:14

## 2022-02-02 RX ADMIN — RIVAROXABAN 10 MG: 10 TABLET, FILM COATED ORAL at 17:52

## 2022-02-02 ASSESSMENT — ACTIVITIES OF DAILY LIVING (ADL)
ADLS_ACUITY_SCORE: 20
ADLS_ACUITY_SCORE: 14
ADLS_ACUITY_SCORE: 16
ADLS_ACUITY_SCORE: 20
ADLS_ACUITY_SCORE: 14
ADLS_ACUITY_SCORE: 20
ADLS_ACUITY_SCORE: 20
ADLS_ACUITY_SCORE: 14
ADLS_ACUITY_SCORE: 20
ADLS_ACUITY_SCORE: 20
ADLS_ACUITY_SCORE: 16
ADLS_ACUITY_SCORE: 20
ADLS_ACUITY_SCORE: 14
ADLS_ACUITY_SCORE: 20
ADLS_ACUITY_SCORE: 16
ADLS_ACUITY_SCORE: 20
ADLS_ACUITY_SCORE: 14
ADLS_ACUITY_SCORE: 20
ADLS_ACUITY_SCORE: 14

## 2022-02-02 NOTE — PLAN OF CARE
DATE & TIME: 2/2/22 2938-3282  Cognitive Concerns/ Orientation : Alert and Oriented x1  Pt has down syndrome. Anxious and tearful at times. Refuses and resistive with cares most of time. Refused all meds.  Refused all oral intake; tongue dry and cracked.  BEHAVIOR & AGGRESSION TOOL COLOR: Green  ABNL VS/O2: VSS, on 1L per Pt request;   MOBILITY: A x1-2 GB/W. Refused out of bed.  PAIN MANAGMENT:Denies   DIET: Regular diet. Total feed. Refused any intake despite cajoling and encouragement, offering many alternatives.  BOWEL/BLADDER: Incontinent but no void this shift.  ABNL LAB/BG:   DRAIN/DEVICES: PIV SL  TELEMETRY RHYTHM: N/A  SKIN: Cool and Pale skin.  TESTS/PROCEDURES: None  D/C DATE: TBD. Pending TCU placement  Discharge Barriers: Safe placement.   OTHER IMPORTANT INFO:Special precautions maintained. patient's brother, Maxi, is working on establishing guardianship. Her mother passed away today, per Maxi, he will come and talke with patient.  SW is following.  Since no intake and no void, updated PA via text page.    Addendum:  IVF ordered;  Able to place saline lock and started IVF.

## 2022-02-02 NOTE — PROGRESS NOTES
Care Management Follow Up    Length of Stay (days): 11    Expected Discharge Date: 02/28/2022     Concerns to be Addressed: discharge planning     Patient plan of care discussed at interdisciplinary rounds: Yes    Anticipated Discharge Disposition: Transitional Care     Anticipated Discharge Services:    Anticipated Discharge DME:      Patient/family educated on Medicare website which has current facility and service quality ratings: no  Education Provided on the Discharge Plan:    Patient/Family in Agreement with the Plan: yes    Referrals Placed by CM/SW: Post Acute Facilities  Private pay costs discussed: Not applicable    Additional Information:  Received a message from Bao (financial counselor) stating that writer needs to contact Pito (financial counselor) about patient possibly getting MA. Emailed Pito asking if she could look into this. She said that she would follow up with patient's brother in the next couple of days.    Addendum: Hospitalist stated that patient's mother just passed away. He asked for writer to check in with patient's son on Friday about guardianship again. RN said that brother said that he's pursuing guardianship for patient and is coming in this afternoon to talk to her about her mother's passing.    Will continue to follow.    VARSHA Demarco

## 2022-02-02 NOTE — PROGRESS NOTES
Paged by RN. Poor oral intake today. Low urine output. Recommend re-establishing IV as patient will allow and restarting IVF. Orders placed. Further goals of care discussions tomorrow if appetite continues to be poor.

## 2022-02-02 NOTE — PROGRESS NOTES
"[facility]    Medicine Progress Note - Hospitalist Service    Date of Admission:  1/22/2022    Assessment & Plan         Miranda Dover is a 51 year old female with hx of Down syndrome who was admitted on 1/22/2022 for acute hypoxic respiratory failure due to COVID-19 pneumonia.      Acute hypoxic respiratory failure due to COVID-19 pneumonia, resolved, now COVID RECOVERED  * Unvaccinated  * Symptom onset 1/19/22  * Tested positive for COVID on 1/22/22  * Chest CT on arrival showed bilateral infiltrates  * Initiated on dexamethasone and remdesivir on 1/22  * Completed 5-day course of remdesivir on 1/26  - O2 as needed   - Last day of 10-day course of dexamethasone on 1/31  - On rivaroxaban for VTE ppx, plan 30-day course (last day: 2/22)    Down syndrome  Developmental delay  * Patient lives with her parents who unfortunately were hospitalized with COVID on 1/22 as well. Her father passed away on 1/26; her mother remains hospitalized with guarded prognosis  * Patient has been intermittently anxious and agitated, sometimes yelling/screaming. She doesn't know why she's here and has repeatedly asked \"what's wrong with me?\" Following admission, she became increasingly withdrawn, refusing medications and meals at times  - Offer bedside sitter as staffing allows  - PT/OT recommending TCU; placement will be difficult as patient will need guardianship re-established. Guardianship will need to be discussed with the patient's son; conversation currently on hold while he navigates recent events with his parents. Refer to SW note from 2/1.   - Palliative Care consult requested for emotional/decisional support, refer to note from 1/31/22      Borderline hypotension, resolved  Underweight with cachexia  Failure to thrive  Baseline blood pressures 90s-100s systolic. Decreased to mid-80s systolic in the setting of minimal PO intake. BMI ~13  * Treated with IVF from 1/29-1/31   - On Ensure Enlive as per " Nutrition  - Encourage oral intake, pt currently refusing     Chest pain, resolved   Intermittent chest pain noted during this hospitalization. Patient refused serial troponins and echo.  No recent complaints      Leukopenia and thrombocytopenia, improved   WBC ron 3.6k; PLT ron 119k - likely due to infection  - Has been refusing labs. Will follow periodically     Thyroid nodule, possible hypothyroidism: Incidentally noted on admission chest CT. TSH was elevated to 15, but T4 was normal  - Consider repeat thyroid studies and possible thyroid US once patient recovers from COVID     Diet: Combination Diet Regular Diet Adult  Snacks/Supplements Pediatric: Ensure Enlive; With Meals    DVT Prophylaxis: DOAC  Zhu Catheter: Not present  Central Lines: None  Cardiac Monitoring: None  Code Status: Full Code      Disposition Plan   Expected Discharge: 02/28/2022     Anticipated discharge location: inpatient rehabilitation facility    Delays:     Placement - TCU          The patient's care was discussed with bedside RN and Dr. Loaiza who agrees with the plan as outlined above.     Eleanor Larry PA-C  Hospitalist Service  [facility]  Securely message with the Vocera Web Console (learn more here)  Text page via [employer] Paging/Directory   ___________________________________________________________    Interval History   Refusing cares, lab draws. Refusing to eat. Vitals stable. Saturating in the low 90s on room air. Afebrile.     Data reviewed today: I reviewed all medications, new labs and imaging results over the last 24 hours. I personally reviewed no images or EKG's today.    Physical Exam   Vital Signs: Temp: 98.3  F (36.8  C) Temp src: Axillary BP: 119/70 Pulse: 100   Resp: 18 SpO2: 95 % O2 Device: Nasal cannula Oxygen Delivery: 1 LPM  Weight: 74 lbs 1.19 oz    CONSTITUTIONAL: Frail, cachectic female laying in bed, dressed in hospital garb. Appears comfortable, but politely  requesting to be left alone.   HEENT: Normocephalic, atraumatic. Oral mucosa dry appearing (applied lip balm and offered beverage for which patient refused)  CARDIOVASCULAR: Pt declined physical exam.   RESPIRATORY: No increased work of breathing.  Supplemental oxygenation  weened off and saturating in the low 90s on room air. Pt declined physical exam   GASTROINTESTINAL:  Pt declined physical exam.   MUSCULOSKELETAL: Moves all four extremities spontaneously.   NEUROLOGIC: Alert, but declining interview.   SKIN: Warm, dry, intact.      Data   No lab results found in last 7 days.    No results found for this or any previous visit (from the past 24 hour(s)).

## 2022-02-02 NOTE — PLAN OF CARE
"Cognitive Concerns/ Orientation : Alert and Oriented x1  Pt has down syndrome. Anxious and tearful at times. Refuses and resistive with cares most of time. Refused all meds.  BEHAVIOR & AGGRESSION TOOL COLOR: Green  ABNL VS/O2: VSS, on 1L per Pt request; stated ,\"I can't breath\". O2 sats 93% on RA; 95-96% on 1L.  MOBILITY: A x1-2 GB/W. Up in the chair x1 this evening  PAIN MANAGMENT:Denies   DIET: Regular diet. Total feed. Refused any liquids; had a bite of Vanila pudding  BOWEL/BLADDER: Incontinent.  ABNL LAB/BG: COVID positive 1/22   DRAIN/DEVICES: PIV SL  TELEMETRY RHYTHM: N/A  SKIN: Cool and Pale skin.  TESTS/PROCEDURES: None  D/C DATE: TBD. Pending TCU placement  Discharge Barriers: Safe placement.   OTHER IMPORTANT INFO:Special precautions maintained. Mom in CICU, she was sole care-giver. The plan is for patient's brother,Maxi, to work on establishing guardianship. SW is following.  "

## 2022-02-03 ENCOUNTER — APPOINTMENT (OUTPATIENT)
Dept: SPEECH THERAPY | Facility: CLINIC | Age: 52
DRG: 177 | End: 2022-02-03
Attending: INTERNAL MEDICINE
Payer: COMMERCIAL

## 2022-02-03 PROCEDURE — 92526 ORAL FUNCTION THERAPY: CPT | Mod: GN | Performed by: SPEECH-LANGUAGE PATHOLOGIST

## 2022-02-03 PROCEDURE — 99232 SBSQ HOSP IP/OBS MODERATE 35: CPT | Performed by: INTERNAL MEDICINE

## 2022-02-03 PROCEDURE — 250N000013 HC RX MED GY IP 250 OP 250 PS 637: Performed by: REGISTERED NURSE

## 2022-02-03 PROCEDURE — 250N000011 HC RX IP 250 OP 636: Performed by: INTERNAL MEDICINE

## 2022-02-03 PROCEDURE — 250N000013 HC RX MED GY IP 250 OP 250 PS 637: Performed by: HOSPITALIST

## 2022-02-03 PROCEDURE — 250N000013 HC RX MED GY IP 250 OP 250 PS 637: Performed by: INTERNAL MEDICINE

## 2022-02-03 PROCEDURE — 120N000001 HC R&B MED SURG/OB

## 2022-02-03 PROCEDURE — 92610 EVALUATE SWALLOWING FUNCTION: CPT | Mod: GN | Performed by: SPEECH-LANGUAGE PATHOLOGIST

## 2022-02-03 PROCEDURE — 258N000003 HC RX IP 258 OP 636: Performed by: PHYSICIAN ASSISTANT

## 2022-02-03 RX ADMIN — DEXTROSE AND SODIUM CHLORIDE: 5; 900 INJECTION, SOLUTION INTRAVENOUS at 04:01

## 2022-02-03 RX ADMIN — RIVAROXABAN 10 MG: 10 TABLET, FILM COATED ORAL at 17:39

## 2022-02-03 RX ADMIN — DEXTROSE AND SODIUM CHLORIDE: 5; 900 INJECTION, SOLUTION INTRAVENOUS at 17:42

## 2022-02-03 RX ADMIN — POLYETHYLENE GLYCOL 3350 17 G: 17 POWDER, FOR SOLUTION ORAL at 10:56

## 2022-02-03 RX ADMIN — LORAZEPAM 0.5 MG: 2 INJECTION INTRAMUSCULAR; INTRAVENOUS at 00:28

## 2022-02-03 RX ADMIN — LORAZEPAM 0.5 MG: 2 INJECTION INTRAMUSCULAR; INTRAVENOUS at 11:13

## 2022-02-03 ASSESSMENT — ACTIVITIES OF DAILY LIVING (ADL)
ADLS_ACUITY_SCORE: 18
ADLS_ACUITY_SCORE: 12
ADLS_ACUITY_SCORE: 18
ADLS_ACUITY_SCORE: 12
ADLS_ACUITY_SCORE: 18
ADLS_ACUITY_SCORE: 12
ADLS_ACUITY_SCORE: 18
ADLS_ACUITY_SCORE: 12
ADLS_ACUITY_SCORE: 14
ADLS_ACUITY_SCORE: 14
ADLS_ACUITY_SCORE: 18
ADLS_ACUITY_SCORE: 12
ADLS_ACUITY_SCORE: 18
ADLS_ACUITY_SCORE: 12
ADLS_ACUITY_SCORE: 18

## 2022-02-03 NOTE — PROGRESS NOTES
CLINICAL NUTRITION SERVICES - REASSESSMENT NOTE    RECOMMENDATIONS FOR MD/PROVIDER TO ORDER:   - Bowel program?  - Consider nutrition support if intakes remain poor & if in alignment with goals of care    Recommendations Ordered by Registered Dietitian (RD):   - Weigh patient  - Stop Ensure, Trial [place] Instant Breakfast in Whole milk   Malnutrition: (2/3)  % Weight Loss: Unable to evaluate   % Intake:  </= 50% for >/= 5 days (severe malnutrition)  Subcutaneous Fat Loss:  Orbital region moderate depletion, Upper arm region moderate depletion and Thoracic region moderate depletion  Muscle Loss:  Temporal region moderate depletion, Clavicle bone region moderate depletion and Dorsal hand region moderate depletion  Fluid Retention:  None noted    Malnutrition Diagnosis: Severe malnutrition  In Context of:  Acute illness or injury  Chronic illness or disease     EVALUATION OF PROGRESS TOWARD GOALS   Diet: Soft and Bite sized Level 6 + Thin liquids Level 0 (2/3)   Ensure TID w/ meals     Intake/Tolerance:   - For the past few days pt has been refusing most PO. She occasionally will eat pudding. Reportedly accepted some deli turkey and pudding during SLP eval today.     - Pt seen in room. She does not like the Ensure and doesn't want it sent. She did note that she would try a vanilla flavored supplement of another kind (will try [place]). Dislikes jello, potatoes, chicken, chocolate. She does like pudding.   - No updated wt on file  - No BM this admission? - note miralax is ordered 1x daily.     ASSESSED NUTRITION NEEDS:  Dosing Weight 33.6 kg   Estimated Energy Needs: 1642-3595 kcals (35-40 Kcal/Kg)  Justification: repletion and underweight  Estimated Protein Needs: 50-70 grams protein (1.5-2 g pro/Kg)  Justification: repletion and hypercatabolism with acute illness    NEW FINDINGS:   2/3 - Swallow Eval completed by SLP: diet downgraded to Soft and Bite sized (level 6) + Thin liquids    Previous Goals:   Pt to  tolerate at least 50% of meals BID-TID vs consider nutrition support in the next 3 days  Evaluation: Not met    Previous Nutrition Diagnosis:   Inadequate oral intake related to poor appetite, ?disinterst in meals ordered as evidenced by pt getting most of her nourishment from pudding for the past 9 days  Evaluation: No change    MALNUTRITION  % Weight Loss: Unable to evaluate   % Intake:  </= 50% for >/= 5 days (severe malnutrition)  Subcutaneous Fat Loss:  Orbital region moderate depletion, Upper arm region moderate depletion and Thoracic region moderate depletion  Muscle Loss:  Temporal region moderate depletion, Clavicle bone region moderate depletion and Dorsal hand region moderate depletion  Fluid Retention:  None noted    Malnutrition Diagnosis: Severe malnutrition  In Context of:  Acute illness or injury  Chronic illness or disease    CURRENT NUTRITION DIAGNOSIS  Inadequate oral intake related to lack of appetite, disinterest in food as evidenced by patient declines most food aside from pudding for the past 12 days of admission.     INTERVENTIONS  Recommendations / Nutrition Prescription  Diet per SLP  Stop ensure per patient request   Trial [place] Instant breakfast - send @ 10 am     Implementation  Medical Food Supplement: updated     Goals  Intake of 50% of meals vs consider nutrition support in the next 1-2 days.       MONITORING AND EVALUATION:  Progress towards goals will be monitored and evaluated per protocol and Practice Guidelines    Rosalba Adams RD, LD  [facility]  Pager: [phone]  Weekend Pager: [phone]

## 2022-02-03 NOTE — PROGRESS NOTES
CLINICAL SWALLOW EVALUATION       02/03/22 1200   General Information   Onset of Illness/Injury or Date of Surgery 01/22/22   Referring Physician Dr. Loaiza   Patient/Family Therapy Goal Statement (SLP) Patient agreeable to eating turkey and pudding.   Pertinent History of Current Problem Patient admitted with COVID pneumonia, with PMH significant for Down Syndrome.  She previously lived at home with both parents, who have passed away since her hospital admission.     General Observations Patient reported to refuse intake, poor intake currently.    Past History of Dysphagia None noted per EMR   Type of Evaluation   Type of Evaluation Swallow Evaluation   Oral Motor   Oral Musculature generally intact   Structural Abnormalities present  (Large tongue associated with DS)   Mucosal Quality good;dry   Dentition (Oral Motor)   Dentition (Oral Motor) some missing teeth;natural dentition   Facial Symmetry (Oral Motor)   Facial Symmetry (Oral Motor) WNL   Lip Function (Oral Motor)   Lip Range of Motion (Oral Motor) WNL   Tongue Function (Oral Motor)   Tongue ROM (Oral Motor) WNL   Vocal Quality/Secretion Management (Oral Motor)   Vocal Quality (Oral Motor) WNL   General Swallowing Observations   Current Diet/Method of Nutritional Intake (General Swallowing Observations, NIS) regular diet;thin liquids (level 0)   Respiratory Support (General Swallowing Observations) none   Comment, Secretions/Suctioning Managing own secretions, dried mucus in nose   Swallowing Evaluation Clinical swallow evaluation   Clinical Swallow Evaluation   Feeding Assistance frequent cues/help required   Additional evaluation(s) completed today No   Clinical Swallow Evaluation Textures Trialed thin liquids;pureed;soft & bite-sized;solid foods   Clinical Swallow Eval: Thin Liquid Texture Trial   Mode of Presentation, Thin Liquids cup;straw;self-fed;fed by clinician   Volume of Liquid or Food Presented 4 oz   Oral Phase of Swallow WFL;premature  pharyngeal entry   Pharyngeal Phase of Swallow intact;other (see comments)   Diagnostic Statement Possible esophageal dysfunction vs. air swallowing with premature entry, belching after thin liquids without overt Sx of aspiration by straw   Clinical Swallow Evaluation: Puree Solid Texture Trial   Mode of Presentation, Puree spoon;fed by clinician   Volume of Puree Presented 4 oz   Oral Phase, Puree WFL   Pharyngeal Phase, Puree intact   Diagnostic Statement No overt Sx of aspiratio, tongue thrust during A-P propulsion   Clinical Swallow Eval: Soft & Bite Sized   Mode of Presentation spoon;fed by clinician   Volume Presented Cut deli turkey   Oral Phase WFL   Pharyngeal Phase intact   Diagnostic Statement No overt Sx of aspiratio, tongue thrust during A-P propulsion   Clinical Swallow Evaluation: Solid Food Texture Trial   Mode of Presentation fed by clinician   Volume Presented 2 bites turkey sandwich   Diagnostic Statement Diffcult and slow oral phase with bread, ineffective clearance. No overt Sx of aspiratio, tongue thrust during A-P propulsion   Swallowing Recommendations   Diet Consistency Recommendations soft & bite-sized (level 6);thin liquids (level 0)   Supervision Level for Intake 1:1 supervision needed   Mode of Delivery Recommendations bolus size, small  (Finger foods)   Swallowing Maneuver Recommendations alternate food and liquid intake   Monitoring/Assistance Required (Eating/Swallowing) stop eating activities when fatigue is present;monitor for cough or change in vocal quality with intake   SLP Therapy Assessment/Plan   Criteria for Skilled Therapeutic Interventions Met (SLP Eval) yes;treatment indicated   SLP Diagnosis Mild oropharyngeal dysphagia, deconditioned   Rehab Potential (SLP Eval) good, to achieve stated therapy goals   Therapy Frequency (SLP Eval) 3 times/wk   Predicted Duration of Therapy Intervention (SLP Eval) 1 week   Therapy Plan Review/Discharge Plan (SLP)   Therapy Plan Review  (SLP) evaluation/treatment results reviewed;patient   SLP Discharge Planning    SLP Discharge Recommendation (DC Rec) home with assist   SLP Rationale for DC Rec Expect patient's swallowing goals to be met by discharge, will likely require 24 hour care as her baseline at discharge defer to care team for other safety/mobility needs after this illness   SLP Brief overview of current status  Clinical swallow evaluation completed: recommend soft and bite sized diet with thin liquids by straw, 1:1 feeding assistance and encouragement.  Patient ate deli turkey and vanilla pudding, may benefit from further nutrition consult to determine menu item likes/dislikes, tolerable supplements as patient's interest in food is limited at this time.     Total Evaluation Time   Total Evaluation Time (Minutes) 45

## 2022-02-03 NOTE — PLAN OF CARE
DATE & TIME: 2/2/22 3-11pm  Cognitive Concerns/ Orientation : Alert and Oriented to self  Pt has down syndrome. Anxious and tearful at times. Refuses and resistive with cares most of time. Refused her Albuterol inhaler except Xarelto crushed and mixed with small vanilla pudding.   BEHAVIOR & AGGRESSION TOOL COLOR: Green  ABNL VS/O2: VSS, on RA  MOBILITY: A x1-2 GB/W.   PAIN MANAGMENT:Denies   DIET: Regular diet. Total feed. Refused dinner  BOWEL/BLADDER: Incontinent. Up to bedside commode, voided. No bm  ABNL LAB/BG: none today  DRAIN/DEVICES: PIV infusing D5+NS @ 75ml/hr  TELEMETRY RHYTHM: N/A  SKIN: Pale skin  TESTS/PROCEDURES: None  D/C DATE: TBD. Pending TCU placement  Discharge Barriers: Safe placement.   OTHER IMPORTANT INFO: Patient is recovered COVID. patient's brother, Maxi, is working on establishing guardianship. Her mother passed away today, per Maxi, he will come and talk with patient.  SW is following.

## 2022-02-03 NOTE — PROGRESS NOTES
Palliative care brief progress note.    Palliative care APRN reviewed patient case with Dr Loaiza, KRYSTIN and RN. Discussed how our service could best assist at this time. Miranda is very distraught and upset so will have our palliative SW check in with her to offer emotional support.

## 2022-02-03 NOTE — PLAN OF CARE
"DATE & TIME: 02/3/22 0689-6319  Cognitive Concerns/ Orientation : Alert and Oriented to self.  Pt has down syndrome. Anxious, tearful, and agitated all shift. Refuses and resistive with cares.   BEHAVIOR & AGGRESSION TOOL COLOR: Green/yellow  ABNL VS/O2: VSS on RA except tachycardia  MOBILITY: SBA w/ gait belt. Did not want walker. Writer ambulated pt to BR did well.   PAIN MANAGMENT: C/O of pain in back and all over. Refused Tylenol or any intervention I offered.   DIET: Soft and Bite Sized Diet (level 6); Thin Liquids (level 0). Has only eaten very small amount of soft food. Has taken in no oral liquids.   BOWEL/BLADDER: Was continent this shift, called for \"toilet\". Urinated x1. No BM charted this stay, added Miralax in water but pt won't drink.   ABNL LAB/BG: Refused lab draw.   DRAIN/DEVICES: PIV infusing D5+NS @ 75 mL/hr  TELEMETRY RHYTHM: N/A  SKIN: Pale skin, very dry. Abrasion scratches to R collar bone. Buttock is red blanchable/non blanchable with small open areas. WOC consulted. Mepilex placed and chair cushion ordered.   TESTS/PROCEDURES: None  D/C DATE: TBD. Pending   Discharge Barriers: Safe placement.   OTHER IMPORTANT INFO: Patient is recovered COVID. Pt has been refusing all care all shift. Gets increasingly agitated when attempting anything. Yells/screams/swears. IV Ativan given x1 for anxiety. Most of the pt's statements today were that she fears of multiple bones breaking, encouraged her they were not. Wanting to go home. Thinking she swallowed her tongue. Many disorganized thoughts.   "

## 2022-02-03 NOTE — PLAN OF CARE
DATE & TIME: 2/3/22 6498-8433  Cognitive Concerns/ Orientation : Alert and Oriented to self  Pt has down syndrome. Anxious and tearful at times. Refuses and resistive with cares most of the time.   BEHAVIOR & AGGRESSION TOOL COLOR: Green  ABNL VS/O2: VSS, on RA  MOBILITY: A x1-2 GB/W.   PAIN MANAGMENT:Denies   DIET: Regular diet. Total feed. Refused dinner  BOWEL/BLADDER: Incontinent.   ABNL LAB/BG: none today  DRAIN/DEVICES: PIV infusing D5+NS @ 75ml/hr  TELEMETRY RHYTHM: N/A  SKIN: Pale skin  TESTS/PROCEDURES: None  D/C DATE: TBD. Pending TCU placement  Discharge Barriers: Safe placement.   OTHER IMPORTANT INFO: Patient is recovered COVID. patient's brother, Maxi, is working on establishing guardianship. Her mother passed away today, per Maxi, he will come and talk with patient.  SW is following. Patient refused all assessments and VS this shift. Ativan given x1 for anxiety.

## 2022-02-03 NOTE — PROGRESS NOTES
"[facility]    Medicine Progress Note - Hospitalist Service    Date of Admission:  1/22/2022    Assessment & Plan        Miranda Dover is a 51 year old female with hx of Down syndrome who was admitted on 1/22/2022 for acute hypoxic respiratory failure due to COVID-19 pneumonia.      Acute hypoxic respiratory failure due to COVID-19 pneumonia, resolved, now COVID RECOVERED  * Unvaccinated  * Symptom onset 1/19/22  * Tested positive for COVID on 1/22/22  * Chest CT on arrival showed bilateral infiltrates  * Initiated on dexamethasone and remdesivir on 1/22  * Completed 5-day course of remdesivir on 1/26  - O2 as needed   - Last day of 10-day course of dexamethasone on 1/31  - On rivaroxaban for VTE ppx, plan 30-day course (last day: 2/22)     Down syndrome  Developmental delay  * Patient lives with her parents who unfortunately were hospitalized with COVID on 1/22 as well. Her father passed away on 1/26; her mother remains hospitalized with guarded prognosis  * Patient has been intermittently anxious and agitated, sometimes yelling/screaming. She doesn't know why she's here and has repeatedly asked \"what's wrong with me?\" Following admission, she became increasingly withdrawn, refusing medications and meals at times  - PT/OT recommending TCU; placement will be difficult as patient will need guardianship re-established. Guardianship will need to be discussed with the patient's son; conversation currently on hold while he navigates recent events with his parents. Refer to SW note from 2/1.   - Palliative Care consult requested for emotional/decisional support, refer to note from 1/31/22     Poor nutrition and dehydration  During past 1-2 days patient's PO continues to worsen.  Now she is reporting concerns with swallowing but it is unclear if this is due to pain, dysphagia or other cause.  Fortunately patient has been agreeable to restarting IVF  - Continue IVF  - Continue to try to " encourage PO   - Speech consult for possible dysphagia.  If nothing found there may consider GI evaluation   - If continues to deteriorate will need to discuss goals with patient's brother.  Unfortunately the son has been dealing with the deaths of their parents from COVID over the past week      Borderline hypotension, resolved  Underweight with cachexia  Failure to thrive  Baseline blood pressures 90s-100s systolic. Decreased to mid-80s systolic in the setting of minimal PO intake. BMI ~13  * Treated with IVF from 1/29-1/31   - On Ensure Enlive as per Nutrition  - Encourage oral intake, pt currently refusing     Chest pain, resolved   Intermittent chest pain noted during this hospitalization. Patient refused serial troponins and echo.  No recent complaints      Leukopenia and thrombocytopenia, improved   WBC ron 3.6k; PLT ron 119k - likely due to infection  - Has been refusing labs. Will follow periodically     Thyroid nodule, possible hypothyroidism: Incidentally noted on admission chest CT. TSH was elevated to 15, but T4 was normal  - Consider repeat thyroid studies and possible thyroid US once patient recovers from COVID         Diet: Combination Diet Regular Diet Adult  Snacks/Supplements Pediatric: Ensure Enlive; With Meals    DVT Prophylaxis: DOAC  Zhu Catheter: Not present  Central Lines: None  Cardiac Monitoring: None  Code Status: Full Code      Disposition Plan   Expected Discharge: 02/28/2022     Anticipated discharge location: inpatient rehabilitation facility    Delays:     Placement - TCU            The patient's care was discussed with the Bedside Nurse, Care Coordinator/ and Patient.    Tariq Loaiza DO  Hospitalist Service  [facility]  Securely message with the Vocera Web Console (learn more here)  Text page via [email or web address] Paging/Directory         Clinically Significant Risk Factors Present on Admission                     ______________________________________________________________________    Interval History   Patient seen and examined.  Continues to refuse PO intake.  Unclear why but patient is very anxious today and keeps stating that she doesn't want to swallow.  No fevers.  No hypoxia.      Data reviewed today: I reviewed all medications, new labs and imaging results over the last 24 hours. I personally reviewed no images or EKG's today.    Physical Exam   Vital Signs: Temp: 98  F (36.7  C) Temp src: Axillary BP: 117/63 Pulse: (!) 122   Resp: 20 SpO2: 90 % O2 Device: None (Room air)    Weight: 74 lbs 1.19 oz  General Appearance: Anxious, otherwise appears comfortable  Respiratory: No respiratory distress.  Lungs sound clear  Cardiovascular: Tachycardiac.  Appears well perfused  GI: Soft.  Non-distended  Skin: No obvious rashes or cyanosis  Other: Alert.  Dry mucous membranes      Data   No lab results found in last 7 days.    No results found for this or any previous visit (from the past 24 hour(s)).

## 2022-02-04 ENCOUNTER — APPOINTMENT (OUTPATIENT)
Dept: SPEECH THERAPY | Facility: CLINIC | Age: 52
DRG: 177 | End: 2022-02-04
Payer: COMMERCIAL

## 2022-02-04 LAB
CREAT SERPL-MCNC: 0.52 MG/DL (ref 0.52–1.04)
ERYTHROCYTE [DISTWIDTH] IN BLOOD BY AUTOMATED COUNT: 12.4 % (ref 10–15)
GFR SERPL CREATININE-BSD FRML MDRD: >90 ML/MIN/1.73M2
HCT VFR BLD AUTO: 43.3 % (ref 35–47)
HGB BLD-MCNC: 13.6 G/DL (ref 11.7–15.7)
MCH RBC QN AUTO: 30 PG (ref 26.5–33)
MCHC RBC AUTO-ENTMCNC: 31.4 G/DL (ref 31.5–36.5)
MCV RBC AUTO: 95 FL (ref 78–100)
PLATELET # BLD AUTO: 149 10E3/UL (ref 150–450)
RBC # BLD AUTO: 4.54 10E6/UL (ref 3.8–5.2)
WBC # BLD AUTO: 6 10E3/UL (ref 4–11)

## 2022-02-04 PROCEDURE — 120N000001 HC R&B MED SURG/OB

## 2022-02-04 PROCEDURE — 99232 SBSQ HOSP IP/OBS MODERATE 35: CPT | Performed by: INTERNAL MEDICINE

## 2022-02-04 PROCEDURE — 250N000013 HC RX MED GY IP 250 OP 250 PS 637: Performed by: HOSPITALIST

## 2022-02-04 PROCEDURE — 85027 COMPLETE CBC AUTOMATED: CPT | Performed by: HOSPITALIST

## 2022-02-04 PROCEDURE — 92526 ORAL FUNCTION THERAPY: CPT | Mod: GN | Performed by: SPEECH-LANGUAGE PATHOLOGIST

## 2022-02-04 PROCEDURE — 82565 ASSAY OF CREATININE: CPT | Performed by: INTERNAL MEDICINE

## 2022-02-04 PROCEDURE — 250N000013 HC RX MED GY IP 250 OP 250 PS 637: Performed by: INTERNAL MEDICINE

## 2022-02-04 PROCEDURE — 250N000011 HC RX IP 250 OP 636: Performed by: INTERNAL MEDICINE

## 2022-02-04 PROCEDURE — 36415 COLL VENOUS BLD VENIPUNCTURE: CPT | Performed by: INTERNAL MEDICINE

## 2022-02-04 PROCEDURE — 258N000003 HC RX IP 258 OP 636: Performed by: PHYSICIAN ASSISTANT

## 2022-02-04 PROCEDURE — 250N000013 HC RX MED GY IP 250 OP 250 PS 637: Performed by: REGISTERED NURSE

## 2022-02-04 PROCEDURE — G0463 HOSPITAL OUTPT CLINIC VISIT: HCPCS

## 2022-02-04 RX ADMIN — RIVAROXABAN 10 MG: 10 TABLET, FILM COATED ORAL at 18:34

## 2022-02-04 RX ADMIN — LORAZEPAM 0.5 MG: 2 INJECTION INTRAMUSCULAR; INTRAVENOUS at 22:07

## 2022-02-04 RX ADMIN — DEXTROSE AND SODIUM CHLORIDE: 5; 900 INJECTION, SOLUTION INTRAVENOUS at 18:40

## 2022-02-04 RX ADMIN — LORAZEPAM 0.5 MG: 0.5 TABLET ORAL at 02:50

## 2022-02-04 RX ADMIN — DEXTROSE AND SODIUM CHLORIDE: 5; 900 INJECTION, SOLUTION INTRAVENOUS at 05:48

## 2022-02-04 RX ADMIN — POLYETHYLENE GLYCOL 3350 17 G: 17 POWDER, FOR SOLUTION ORAL at 09:54

## 2022-02-04 RX ADMIN — LORAZEPAM 0.5 MG: 2 INJECTION INTRAMUSCULAR; INTRAVENOUS at 11:34

## 2022-02-04 RX ADMIN — ACETAMINOPHEN 650 MG: 325 TABLET, FILM COATED ORAL at 18:34

## 2022-02-04 ASSESSMENT — ACTIVITIES OF DAILY LIVING (ADL)
ADLS_ACUITY_SCORE: 12
ADLS_ACUITY_SCORE: 16
ADLS_ACUITY_SCORE: 12
ADLS_ACUITY_SCORE: 12
ADLS_ACUITY_SCORE: 16
ADLS_ACUITY_SCORE: 12
ADLS_ACUITY_SCORE: 16

## 2022-02-04 NOTE — PROGRESS NOTES
"[facility]  Palliative Care Social Work Note:    Patient Info:  Miranda Dover is a 51 year old female with history of down syndrome, history of  COVID19, not covid19 recovered.  Her parents also had COVID19 and were hospitalized at the same time as Miranda and both  during this hospitalization. Mirnada has not been told of this yet.      Brief summary of visit: Visited with Miranda this morning.  She allowed me to visit and asked for help with positioning cotton balls on her hands.  Shared that she felt scared, asked me if she was dying. Asked that I listen to her breathing (I asked her RN to follow up on this). She was calm when I left the room.    I called her brother Maxi to introduce my role and to offer support given the multiple losses that he is facing.  Maxi shared that most of his time and energy have been going towards making arrangements for his parents.  He is working with a  to start the process for emergency guardianship for Miranda, but he does not know how long it will take. He gave some of Miranda's more recent social history--    Miranda has always lived with her parents.  It sounds like in recent years due to a combination of his parents aging and possible changes in Miranda's memory Miranda and her parents spent very little time outside of the home.  He observed some possible cognitive and behavioral changes in Miranda in recent years, the one he shared today was a habit she developed of \"writing everything down\" -- she used a notebook to keep track of sleep/wake habits, what she ate \"she spent more time writing down what she ate than she spent eating\"    Maxi has not told Miranda about her parents yet as he has been distracted by getting his parents affairs in order and has not been able to come to the hospital.  She is also just recently out of COVID19 precautions.            Date of Admission: 2022    Reason for consult: Patient and " family support    Sources of information: Patient  Family member Brother Maxi    Recommendations & Plan:  Will continue to follow  Provided Maxi with my contact information      These recommendations have been discussed with team, family.    Symptoms & Concerns Addressed Today:      Strengths Identified:    Maxi providing support as best he can    Relationships & Support:  Aspects of relationships and support assessed today:    Identified family members: Brother Maxi.  Both parents  from COVID19 in the past 2 weeks     Professional supports:     Family coping:     Bereavement Risk concerns: see above    Coping, Mental Health & Adjustment to Illness:       Goals, Decision Making & Advance Care Planning:   Prognosis, Goals, and/or Advance Care Planning were assessed today: No  If yes, brief summary of discussion:   Preferred language:  Patient speaks english but is from a bilingual house hold where [place] was spoken  Patient's decision making preferences: not assessed  I have concerns about the patient/family's health literacy today: No  Patient has a completed Health Care Directive: No.   Code status per chart review: Full Code    Key Palliative Symptom Data:  We are not helping to manage these symptoms currently in this patient.      Clinical Social Work Interventions:   Assessment of palliative specific issues    Introduction of Palliative clinical social work interventions  Facilitation of processing of thoughts/feelings  Family communication facilitated      JOSE Massey, [facility]   Palliative Care Clinical   Ph: [phone]

## 2022-02-04 NOTE — PROGRESS NOTES
"[facility]    Medicine Progress Note - Hospitalist Service    Date of Admission:  1/22/2022    Assessment & Plan          Miranda Dover is a 51 year old female with hx of Down syndrome who was admitted on 1/22/2022 for acute hypoxic respiratory failure due to COVID-19 pneumonia.      Acute hypoxic respiratory failure due to COVID-19 pneumonia, resolved  COVID RECOVERED  * Unvaccinated  * Symptom onset 1/19/22  * Tested positive for COVID on 1/22/22  * Chest CT on arrival showed bilateral infiltrates  * Initiated on dexamethasone and remdesivir on 1/22  * Completed 5-day course of remdesivir on 1/26  - O2 as needed   - Last day of 10-day course of dexamethasone on 1/31  - On rivaroxaban for VTE ppx, plan 30-day course (last day: 2/22)     Down syndrome  Developmental delay  * Patient lives with her parents who unfortunately were hospitalized with COVID on 1/22 as well. Her father passed away on 1/26; her mother passed on 2/2  * Patient has been intermittently anxious and agitated, sometimes yelling/screaming. She doesn't know why she's here and has repeatedly asked \"what's wrong with me?\" Following admission, she became increasingly withdrawn, refusing medications and meals at times  - PT/OT recommending TCU; placement will be difficult as patient will need guardianship re-established. Guardianship will need to be discussed with the patient's brother; conversation currently on hold while he navigates recent events with his parents passing. Refer to SW note from 2/1.   - Palliative Care consult requested for emotional/decisional support, refer to note from 1/31/22     Poor nutrition and dehydration  Underweight with cachexia  Failure to thrive  During past 1-2 days patient's PO continues to worsen.  Now she is reporting concerns with swallowing but it is unclear if this is due to pain, dysphagia or other cause.  This appears to be a chronic issues for patient.  BMI is ~13 Fortunately " patient has been agreeable to restarting IVF.  Discussed dietary needs with patient and what she enjoys.  She states she likes turkey and white bread sandwiches  - Continue IVF  - On Ensure Enlive as per Nutrition  - Continue to try to encourage PO   - Speech consult for possible dysphagia.  If nothing found there may consider GI evaluation   - If continues to deteriorate will need to discuss goals with patient's brother.  Unfortunately the son has been dealing with the deaths of their parents from COVID over the past week      Borderline hypotension, resolved  Underweight with cachexia  Failure to thrive  Baseline blood pressures 90s-100s systolic. Decreased to mid-80s systolic in the setting of minimal PO intake.  * Treated with IVF from 1/29-1/31      Chest pain, resolved   Intermittent chest pain noted during this hospitalization. Patient refused serial troponins and echo.  No recent complaints      Leukopenia and thrombocytopenia, improved   WBC ron 3.6k; PLT ron 119k - likely due to infection  - Has been refusing labs. Will follow periodically     Thyroid nodule, possible hypothyroidism  Incidentally noted on admission chest CT. TSH was elevated to 15, but T4 was normal  - Consider repeat thyroid studies and possible thyroid US once patient recovers from COVID         Diet: Combination Diet Soft and Bite Sized Diet (level 6); Thin Liquids (level 0)  Snacks/Supplements Pediatric: Other - Please comment; [place] Instant Breakfast @ 10 am (vanilla + whole milk); Between Meals    DVT Prophylaxis: DOAC  Zhu Catheter: Not present  Central Lines: None  Cardiac Monitoring: None  Code Status: Full Code      Disposition Plan   Expected Discharge: 02/28/2022     Anticipated discharge location: inpatient rehabilitation facility    Delays:     Placement - TCU            The patient's care was discussed with the Bedside Nurse, Care Coordinator/ and Patient.    Tariq Loaiza DO  Hospitalist  [facility]  Securely message with the [employer] Web Console (learn more here)  Text page via [employer] Paging/Directory         Clinically Significant Risk Factors Present on Admission                    ______________________________________________________________________    Interval History   Patient seen and examined.  No acute events over night.  Intermittently refusing PO but at times will eat.  Discussed with patient her favorite things to eat and she stated turkey sandwichs.  Nursing aware and will try to accomodate.  No fevers.  No hypoxia.  Tolerating IVF.  Patient complained of right foot pain but unable to reproduce     Data reviewed today: I reviewed all medications, new labs and imaging results over the last 24 hours. I personally reviewed no images or EKG's today.    Physical Exam   Vital Signs: Temp: 97  F (36.1  C) Temp src: Axillary BP: 97/65 Pulse: 79   Resp: 16 SpO2: 95 % O2 Device: None (Room air)    Weight: 74 lbs 1.19 oz  General Appearance: Resting comfortably. NAD   Respiratory: Clear to auscultation.  No respiratory distress  Cardiovascular: RRR.  No obvious murmurs  GI: Soft.  Non-distended  Skin: No obvious rashes or cyanosis to exposed skin.  Skin is very dry   Other: Right foot examined.  Could not reproduce any pain with palpation or ROM.  No edema    Data   Recent Labs   Lab 02/04/22  0903   WBC 6.0   HGB 13.6   MCV 95   *   CR 0.52     No results found for this or any previous visit (from the past 24 hour(s)).

## 2022-02-04 NOTE — PLAN OF CARE
SUMMARY: COVID recovered  DATE & TIME: 02/3/22 15  Cognitive Concerns/ Orientation : A&O to self, able to spell last name and state .  Answers no when asking to take vitals, perform cares, but allows some cares without agitation.   BEHAVIOR & AGGRESSION TOOL COLOR: Green  ABNL VS/O2: VSS on RA   MOBILITY: SBA w/ gait belt. Sitting in recliner, refuses to assist with weight shifting or repos.  PAIN MANAGMENT: Appears comfortable.   DIET: Soft and Bite Sized Diet (level 6); Thin Liquids (level 0). Ate 1 container of pudding, with whole pill, declines water when offered.   BOWEL/BLADDER: No void.   ABNL LAB/BG: Refused lab draw.   DRAIN/DEVICES: R PIV infusing D5+NS @ 75 mL/hr.  TELEMETRY RHYTHM: N/A  SKIN: Pale skin, very dry. Abrasion scratches to R collar bone. CAM/refused to get out of chair, refused repos in chair, refused chair cushion.   TESTS/PROCEDURES: None  D/C DATE: TBD. Pending   Discharge Barriers: Safe placement.  OTHER IMPORTANT INFO:Content watching tv all shift while sitting in the recliner. When touched for vitals or fed pudding, pt fixated on broken bones, legs or chin. Cont pulse ox on. Needs current weight, bed zero'd, refuses at this time.

## 2022-02-04 NOTE — PLAN OF CARE
SUMMARY: COVID recovered  DATE & TIME: 02/3/22-2/4/22 8026-5670  Cognitive Concerns/ Orientation : A&O to self,   BEHAVIOR & AGGRESSION TOOL COLOR: Green > agitated and irritable at times.  ABNL VS/O2: VSS on RA  MOBILITY: SBA w/ gait belt. Sitting in recliner all night  PAIN MANAGMENT: Appears comfortable. But fixated on broken thumb, bones  DIET: Soft and Bite Sized Diet (level 6); Thin Liquids (level 0).   BOWEL/BLADDER: Incontinent of urine, no BM  ABNL LAB/BG: Refused lab draw yesterday  DRAIN/DEVICES: R PIV infusing D5+NS @ 75 mL/hr.  TELEMETRY RHYTHM: N/A  SKIN: Pale skin, very dry lips. Abrasion scratches to R collar bone. CAM rest of skin, refused assessment,   TESTS/PROCEDURES: None  D/C DATE: TBD. Pending   Discharge Barriers: Safe placement.  OTHER IMPORTANT INFO:Cont pulse ox on. Needs current weight.

## 2022-02-04 NOTE — CONSULTS
[facility]  WO Nurse Inpatient Wound Assessment     Reason for consultation: Evaluate and treat buttocks      Assessment  Coccyx/ buttocks: reddened but appear intact and blanchable today 2/4.  Pt at risk for pressure injury due to strong resistance to repositioning and lying in the chair for extended periods of time.      Treatment Plan  Coccyx/ buttocks: every other day and prn:   1.  Cleanse as needed with soap and water, dry well  2.  Swab coccyx/ inner buttock areas with Cavilon, let dry  3.  Apply Mepilex Sacral to upper coccyx/sacrum  4.  PIP measures.  Be very persistent with attempts to reposition.     Pressure Injury Prevention (PIP) Plan:  -If patient is declining pressure injury prevention interventions: Explore reason why and address patient's concerns  -Mattress: Follow bed algorithm, reassess daily and order specialty mattress, if indicated.  -HOB: Maintain at or below 30 degrees, unless contraindicated  -Repositioning in bed: Every 1-2 hours , Left/right positioning; avoid supine and Raise foot of bed prior to raising head of bed, to reduce patient sliding down (shear)  -Heels: Keep elevated off mattress and Pillows under calves  -Protective Dressing: Sacral Mepilex for prevention (#869972),  especially for the agitated patient   -Chair positioning: Chair cushion (#[id number])  and Assist patient to reposition hourly   -Moisture Management: Perineal cleansing /protection: Follow Incontinence Protocol  -Under Devices: Inspect skin under all medical devices during skin inspection , Ensure tubes are stabilized without tension and Ensure patient is not lying on medical devices or equipment when repositioned      Orders Written  Recommended provider order: None, at this time  [facility] Nurse follow-up plan: signing off - please reconsult if further issues  Nursing to notify the Provider(s) and re-consult the [facility] Nurse if wound(s) deteriorates or new skin concern.    Patient  History  According to provider note(s):  Miranda Dover is a 51 year old female with hx of Down syndrome who was admitted on 1/22/2022 for acute hypoxic respiratory failure due to COVID-19 pneumonia.     Objective Data  Containment of urine/stool: up to bathroom with much encouragement    Active Diet Order:  Orders Placed This Encounter      Combination Diet Soft and Bite Sized Diet (level 6); Thin Liquids (level 0)    Output:   I/O last 3 completed shifts:  In: 2557 [I.V.:2557]  Out: -     Risk Assessment:   Sensory Perception: 3-->slightly limited  Moisture: 3-->occasionally moist  Activity: 3-->walks occasionally  Mobility: 3-->slightly limited  Nutrition: 2-->probably inadequate  Friction and Shear: 2-->potential problem  Troy Score: 16                          Labs: Recent Labs   Lab 02/04/22  0903   HGB 13.6   WBC 6.0       Physical Exam  Skin inspection: focused coccyx/buttocks, feet    Wound Location:  Coccyx/ inner buttocks  Date of last photo:  2-4-22      Wound History: pt has been refusing to get out of chair, does not want to move or reposition.  Previously refused chair cushion per notes.  Briefs were clean/dry.  Pt got up with assist x 1-2 after a lot of encouragement from staff, but continues to yell out.   Skin: intact, reddened but blanchable, inner buttocks look mildly dry/scuffed but no obvious open areas noted.  Coccyx intact.  No c/o pain to area.    Interventions  Current support surface: Standard  Atmos Air mattress  Current off-loading measures: Pillows; chair cushion ordered again  Visual inspection of wound(s) completed  Wound Care: done per plan of care  Supplies: reviewed  Education provided: importance of repositioning, plan of care and Off-loading pressure  Discussed plan of care with Patient and Nurse    Aileen Holcomb RN, CWOCN

## 2022-02-05 LAB
ERYTHROCYTE [DISTWIDTH] IN BLOOD BY AUTOMATED COUNT: 12.4 % (ref 10–15)
HCT VFR BLD AUTO: 37 % (ref 35–47)
HGB BLD-MCNC: 11.8 G/DL (ref 11.7–15.7)
MCH RBC QN AUTO: 29.9 PG (ref 26.5–33)
MCHC RBC AUTO-ENTMCNC: 31.9 G/DL (ref 31.5–36.5)
MCV RBC AUTO: 94 FL (ref 78–100)
PLATELET # BLD AUTO: 148 10E3/UL (ref 150–450)
RBC # BLD AUTO: 3.94 10E6/UL (ref 3.8–5.2)
WBC # BLD AUTO: 6.7 10E3/UL (ref 4–11)

## 2022-02-05 PROCEDURE — 250N000011 HC RX IP 250 OP 636: Performed by: INTERNAL MEDICINE

## 2022-02-05 PROCEDURE — 99232 SBSQ HOSP IP/OBS MODERATE 35: CPT | Performed by: INTERNAL MEDICINE

## 2022-02-05 PROCEDURE — 120N000001 HC R&B MED SURG/OB

## 2022-02-05 PROCEDURE — 258N000003 HC RX IP 258 OP 636: Performed by: PHYSICIAN ASSISTANT

## 2022-02-05 PROCEDURE — 85027 COMPLETE CBC AUTOMATED: CPT | Performed by: HOSPITALIST

## 2022-02-05 PROCEDURE — 250N000013 HC RX MED GY IP 250 OP 250 PS 637: Performed by: REGISTERED NURSE

## 2022-02-05 PROCEDURE — 36415 COLL VENOUS BLD VENIPUNCTURE: CPT | Performed by: HOSPITALIST

## 2022-02-05 PROCEDURE — 250N000013 HC RX MED GY IP 250 OP 250 PS 637: Performed by: HOSPITALIST

## 2022-02-05 RX ADMIN — DEXTROSE AND SODIUM CHLORIDE: 5; 900 INJECTION, SOLUTION INTRAVENOUS at 09:18

## 2022-02-05 RX ADMIN — LORAZEPAM 0.5 MG: 2 INJECTION INTRAMUSCULAR; INTRAVENOUS at 23:52

## 2022-02-05 RX ADMIN — LORAZEPAM 0.5 MG: 2 INJECTION INTRAMUSCULAR; INTRAVENOUS at 12:01

## 2022-02-05 RX ADMIN — PANTOPRAZOLE SODIUM 40 MG: 40 TABLET, DELAYED RELEASE ORAL at 09:24

## 2022-02-05 RX ADMIN — ACETAMINOPHEN 650 MG: 325 TABLET, FILM COATED ORAL at 09:25

## 2022-02-05 RX ADMIN — DEXTROSE AND SODIUM CHLORIDE: 5; 900 INJECTION, SOLUTION INTRAVENOUS at 22:04

## 2022-02-05 RX ADMIN — POLYETHYLENE GLYCOL 3350 17 G: 17 POWDER, FOR SOLUTION ORAL at 09:24

## 2022-02-05 ASSESSMENT — ACTIVITIES OF DAILY LIVING (ADL)
ADLS_ACUITY_SCORE: 16

## 2022-02-05 ASSESSMENT — MIFFLIN-ST. JEOR: SCORE: 1032.3

## 2022-02-05 NOTE — PROGRESS NOTES
"[facility]    Medicine Progress Note - Hospitalist Service    Date of Admission:  1/22/2022    Assessment & Plan        brynn Dover is a 51 year old female with hx of Down syndrome who was admitted on 1/22/2022 for acute hypoxic respiratory failure due to COVID-19 pneumonia.      Acute hypoxic respiratory failure due to COVID-19 pneumonia. Resolved   COVID RECOVERED  * Unvaccinated  * Symptom onset 1/19/22  * Tested positive for COVID on 1/22/22  * Chest CT on arrival showed bilateral infiltrates  * Initiated on dexamethasone and remdesivir on 1/22  * Completed 5-day course of remdesivir on 1/26  - O2 as needed   - Last day of 10-day course of dexamethasone on 1/31  - On rivaroxaban for VTE ppx, plan 30-day course (last day: 2/22)     Down syndrome  Developmental delay  * Patient lives with her parents who unfortunately were hospitalized with COVID on 1/22 as well. Her father passed away on 1/26; her mother passed on 2/2  * Patient has been intermittently anxious and agitated, sometimes yelling/screaming. She doesn't know why she's here and has repeatedly asked \"what's wrong with me?\" Following admission, she became increasingly withdrawn, refusing medications and meals at times  - PT/OT recommending TCU; placement will be difficult as patient will need guardianship re-established. Guardianship will need to be discussed with the patient's brother; conversation currently on hold while he navigates recent events with his parents passing. Refer to SW note from 2/1.   - Palliative Care consult requested for emotional/decisional support, refer to note from 1/31/22     Poor nutrition and dehydration  Underweight with cachexia  Failure to thrive  During past 1-2 days patient's PO continues to worsen.  Now she is reporting concerns with swallowing but it is unclear if this is due to pain, dysphagia or other cause.  This appears to be a chronic issues for patient.  BMI is ~13 Fortunately patient " has been agreeable to restarting IVF.  Discussed dietary needs with patient and what she enjoys.  She states she likes turkey and white bread sandwiches  - Continue IVF  - On Ensure Enlive as per Nutrition  - Continue to try to encourage PO   - Speech consult for possible dysphagia   - If continues to deteriorate will need to discuss goals with patient's brother.  Unfortunately the son has been dealing with the deaths of their parents from COVID over the past week      Borderline hypotension, resolved  Underweight with cachexia  Failure to thrive  Baseline blood pressures 90s-100s systolic. Decreased to mid-80s systolic in the setting of minimal PO intake.  * Treated with IVF from 1/29-1/31      Chest pain, resolved   Intermittent chest pain noted during this hospitalization. Patient refused serial troponins and echo.  No recent complaints      Leukopenia and thrombocytopenia, improved   WBC ron 3.6k; PLT ron 119k - likely due to infection  - Has been refusing labs. Will follow periodically     Thyroid nodule, possible hypothyroidism  Incidentally noted on admission chest CT. TSH was elevated to 15, but T4 was normal  - Consider repeat thyroid studies and possible thyroid US once patient recovers from COVID           Diet: Combination Diet Soft and Bite Sized Diet (level 6); Thin Liquids (level 0)  Snacks/Supplements Pediatric: Other - Please comment; [place] Instant Breakfast @ 10 am (vanilla + whole milk); Between Meals    DVT Prophylaxis: DOAC  Zhu Catheter: Not present  Central Lines: None  Cardiac Monitoring: None  Code Status: Full Code      Disposition Plan   Expected Discharge: 02/28/2022     Anticipated discharge location: inpatient rehabilitation facility    Delays:     Placement - TCU            The patient's care was discussed with the Bedside Nurse, Care Coordinator/ and Patient.    Tariq Loaiza DO  Hospitalist Service  [facility]  Securely message  with the [employer] Web Console (learn more here)  Text page via [employer] Paging/Directory         Clinically Significant Risk Factors Present on Admission                    ______________________________________________________________________    Interval History   Patient seen and examined.  No acute events over night.  Continues to refuse PO majority of the time.  Complaining of left 5th finger pain on my evaluation today.  No hypoxia.  No fevers.  Anxious     Data reviewed today: I reviewed all medications, new labs and imaging results over the last 24 hours. I personally reviewed no images or EKG's today.    Physical Exam   Vital Signs: Temp: 97.8  F (36.6  C) Temp src: Axillary BP: 93/56 Pulse: 66   Resp: 14 SpO2: 97 % O2 Device: None (Room air)    Weight: 96 lbs 9.6 oz  General Appearance: Anxious but otherwise appears comfortable  Respiratory: Clear to auscultation.  No respiratory distress  Cardiovascular: RRR.  No obvious murmurs  GI: Soft.  Non-distended  Skin: No obvious rashes or cyanosis.  Skin is very dry  Other: Alert.  Dry mucous membranes      Data   Recent Labs   Lab 02/04/22  0903   WBC 6.0   HGB 13.6   MCV 95   *   CR 0.52     No results found for this or any previous visit (from the past 24 hour(s)).

## 2022-02-05 NOTE — PLAN OF CARE
"SUMMARY: COVID recovered  DATE & TIME: 02/4/22 7568-7266  Cognitive Concerns/ Orientation : A&O to self; needs frequent reassurance/encouragement/support - fixated on \"being broken\" and states finger, foot, teeth and chest are broken  BEHAVIOR & AGGRESSION TOOL COLOR: Yellow - agitated and irritable at times - Ativan given x1  ABNL VS/O2: VSS on RA  MOBILITY: SBA w/ gait belt - ambulated to the BR x3; otherwise sitting in recliner all day  PAIN MANAGMENT: Tylenol given x1 for generalized pain - \"I hurt all over.\"  DIET: Soft and Bite Sized Diet (level 6); Thin Liquids (level 0) - refused to eat - only had a few bites of pudding throughout the day  BOWEL/BLADDER: Incontinent of urine at times; no BM  ABNL LAB/BG: WNL  DRAIN/DEVICES: R PIV infusing D5NS @ 75 mL/hr.  TELEMETRY RHYTHM: N/A  SKIN: Pale skin, very dry lips. Abrasion scratches to R collar bone. Blanchable erythema on buttocks - mepilex applied (WOC following)  TESTS/PROCEDURES: None  D/C DATE: TBD. Pending   Discharge Barriers: Safe placement.  OTHER IMPORTANT INFO: Father passed on 1/26 and mother passed on 2/2. Brother Maxi has not mentioned this to the patient per Palliative SW. He is in the process of acquiring emergency guardianship for Miranda.     "

## 2022-02-05 NOTE — PLAN OF CARE
"SUMMARY: COVID recovered  DATE & TIME: 02/4/22-2/5/22 6665-2930  Cognitive Concerns/ Orientation : A&O to self; needs frequent reassurance/encouragement/support - fixated on \"being broken\" and states finger, foot, teeth and chest are broken  BEHAVIOR & AGGRESSION TOOL COLOR: Yellow - agitated and irritable at times - Ativan given x1  ABNL VS/O2: VSS on RA  MOBILITY: SBA w/ gait belt - otherwise sitting in recliner, refused to get up into bed  PAIN MANAGMENT: Appears comfortable. But fixated on broken bones  DIET: Soft and Bite Sized Diet (level 6); Thin Liquids (level 0) - refused to eat   BOWEL/BLADDER: Incontinent of urine x1, no BM  ABNL LAB/BG: WNL  DRAIN/DEVICES: R PIV infusing D5NS @ 75 mL/hr.  TELEMETRY RHYTHM: N/A  SKIN: Pale skin, very dry lips. Abrasion scratches to R collar bone. Blanchable erythema on buttocks - mepilex applied  TESTS/PROCEDURES: None  D/C DATE: TBD. Pending   Discharge Barriers: Safe placement.  OTHER IMPORTANT INFO:Cont pulse ox on. Needs current weight.   "

## 2022-02-06 LAB
ERYTHROCYTE [DISTWIDTH] IN BLOOD BY AUTOMATED COUNT: 12.5 % (ref 10–15)
HCT VFR BLD AUTO: 34.8 % (ref 35–47)
HGB BLD-MCNC: 11.2 G/DL (ref 11.7–15.7)
MCH RBC QN AUTO: 30.3 PG (ref 26.5–33)
MCHC RBC AUTO-ENTMCNC: 32.2 G/DL (ref 31.5–36.5)
MCV RBC AUTO: 94 FL (ref 78–100)
PLATELET # BLD AUTO: 136 10E3/UL (ref 150–450)
RBC # BLD AUTO: 3.7 10E6/UL (ref 3.8–5.2)
WBC # BLD AUTO: 5.1 10E3/UL (ref 4–11)

## 2022-02-06 PROCEDURE — 258N000003 HC RX IP 258 OP 636: Performed by: PHYSICIAN ASSISTANT

## 2022-02-06 PROCEDURE — 85027 COMPLETE CBC AUTOMATED: CPT | Performed by: HOSPITALIST

## 2022-02-06 PROCEDURE — 99232 SBSQ HOSP IP/OBS MODERATE 35: CPT | Performed by: INTERNAL MEDICINE

## 2022-02-06 PROCEDURE — 120N000001 HC R&B MED SURG/OB

## 2022-02-06 PROCEDURE — 250N000011 HC RX IP 250 OP 636: Performed by: INTERNAL MEDICINE

## 2022-02-06 PROCEDURE — 36415 COLL VENOUS BLD VENIPUNCTURE: CPT | Performed by: HOSPITALIST

## 2022-02-06 RX ADMIN — DEXTROSE AND SODIUM CHLORIDE: 5; 900 INJECTION, SOLUTION INTRAVENOUS at 10:23

## 2022-02-06 RX ADMIN — LORAZEPAM 0.5 MG: 2 INJECTION INTRAMUSCULAR; INTRAVENOUS at 17:01

## 2022-02-06 RX ADMIN — LORAZEPAM 0.5 MG: 2 INJECTION INTRAMUSCULAR; INTRAVENOUS at 08:00

## 2022-02-06 RX ADMIN — DEXTROSE AND SODIUM CHLORIDE: 5; 900 INJECTION, SOLUTION INTRAVENOUS at 23:12

## 2022-02-06 ASSESSMENT — ACTIVITIES OF DAILY LIVING (ADL)
ADLS_ACUITY_SCORE: 16
ADLS_ACUITY_SCORE: 18
ADLS_ACUITY_SCORE: 18
ADLS_ACUITY_SCORE: 16
ADLS_ACUITY_SCORE: 18
ADLS_ACUITY_SCORE: 16
ADLS_ACUITY_SCORE: 18
ADLS_ACUITY_SCORE: 16
ADLS_ACUITY_SCORE: 18
ADLS_ACUITY_SCORE: 16
ADLS_ACUITY_SCORE: 16
ADLS_ACUITY_SCORE: 18
ADLS_ACUITY_SCORE: 16
ADLS_ACUITY_SCORE: 16

## 2022-02-06 NOTE — PLAN OF CARE
"SUMMARY: COVID recovered  DATE & TIME: 02/5/22 6898-8463  Cognitive Concerns/ Orientation : A&O to self; needs frequent reassurance/encouragement/support - still fixated on \"being broken\" and states finger, foot, teeth and chest are broken  BEHAVIOR & AGGRESSION TOOL COLOR: Yellow - anxious, agitated and irritable at times - Ativan given x1  ABNL VS/O2: VSS on RA - refuses at times  MOBILITY: SBA w/ gait belt - ambulated to the BR x2; up in chair this pm - refuses repositioning at times  PAIN MANAGMENT: Tylenol given x1 for generalized pain - \"I hurt all over.\"  DIET: Soft and Bite Sized Diet (level 6); Thin Liquids (level 0) - refused to eat - only had a few bites of pudding throughout the day  BOWEL/BLADDER: Incontinent of urine at times; has not had a BM for days (last unknown)  ABNL LAB/BG: WNL  DRAIN/DEVICES: R PIV infusing D5NS @ 75 mL/hr.  TELEMETRY RHYTHM: N/A  SKIN: Pale skin, very dry lips. Blanchable erythema on buttocks - mepilex applied (WOC following)  TESTS/PROCEDURES: None  D/C DATE: TBD. Pending   Discharge Barriers: Safe placement.  OTHER IMPORTANT INFO: Father passed on 1/26 and mother passed on 2/2. Brother Maxi has not mentioned this to the patient per Palliative SW. He is in the process of acquiring emergency guardianship for Miranda.     "

## 2022-02-06 NOTE — PLAN OF CARE
"SUMMARY: COVID recovered  DATE & TIME: 02/5/22-2/6/22 9813-4104  Cognitive Concerns/ Orientation : A&O to self; needs frequent reassurance/encouragement/support - still fixated on \"being broken\" and states finger, foot, teeth and chest are broken  BEHAVIOR & AGGRESSION TOOL COLOR: Yellow - agitated and irritable at times, ativan given x1  ABNL VS/O2: VSS on RA  MOBILITY: SBA w/ gait belt - ambulated to the BR x1; up in chair this pm  PAIN MANAGMENT: denied pain  DIET: Soft and Bite Sized Diet (level 6); Thin Liquids (level 0) - refused to eat   BOWEL/BLADDER: Incontinent of urine at times; no BM  ABNL LAB/BG: WNL  DRAIN/DEVICES: R PIV infusing D5NS @ 75 mL/hr.  TELEMETRY RHYTHM: N/A  SKIN: Pale skin, very dry lips. Blanchable erythema on buttocks - mepilex applied (WOC following)  TESTS/PROCEDURES: None  D/C DATE: TBD. Pending   Discharge Barriers: Safe placement.  OTHER IMPORTANT INFO: Father passed on 1/26 and mother passed on 2/2. Brother Maxi has not mentioned this to the patient per Palliative SW. He is in the process of acquiring emergency guardianship for Miranda.   "

## 2022-02-07 ENCOUNTER — APPOINTMENT (OUTPATIENT)
Dept: OCCUPATIONAL THERAPY | Facility: CLINIC | Age: 52
DRG: 177 | End: 2022-02-07
Payer: COMMERCIAL

## 2022-02-07 ENCOUNTER — APPOINTMENT (OUTPATIENT)
Dept: PHYSICAL THERAPY | Facility: CLINIC | Age: 52
DRG: 177 | End: 2022-02-07
Payer: COMMERCIAL

## 2022-02-07 PROCEDURE — 250N000013 HC RX MED GY IP 250 OP 250 PS 637: Performed by: INTERNAL MEDICINE

## 2022-02-07 PROCEDURE — 999N000128 HC STATISTIC PERIPHERAL IV START W/O US GUIDANCE

## 2022-02-07 PROCEDURE — 99207 PR CDG-MDM COMPONENT: MEETS MODERATE - UP CODED: CPT | Performed by: HOSPITALIST

## 2022-02-07 PROCEDURE — 250N000013 HC RX MED GY IP 250 OP 250 PS 637: Performed by: REGISTERED NURSE

## 2022-02-07 PROCEDURE — 120N000001 HC R&B MED SURG/OB

## 2022-02-07 PROCEDURE — 250N000013 HC RX MED GY IP 250 OP 250 PS 637: Performed by: HOSPITALIST

## 2022-02-07 PROCEDURE — 97530 THERAPEUTIC ACTIVITIES: CPT | Mod: GO | Performed by: OCCUPATIONAL THERAPIST

## 2022-02-07 PROCEDURE — 97530 THERAPEUTIC ACTIVITIES: CPT | Mod: GP | Performed by: PHYSICAL THERAPIST

## 2022-02-07 PROCEDURE — 99232 SBSQ HOSP IP/OBS MODERATE 35: CPT | Performed by: HOSPITALIST

## 2022-02-07 PROCEDURE — 258N000003 HC RX IP 258 OP 636: Performed by: PHYSICIAN ASSISTANT

## 2022-02-07 PROCEDURE — 250N000011 HC RX IP 250 OP 636: Performed by: INTERNAL MEDICINE

## 2022-02-07 RX ORDER — AMOXICILLIN 250 MG
1 CAPSULE ORAL 2 TIMES DAILY
Status: DISCONTINUED | OUTPATIENT
Start: 2022-02-07 | End: 2022-06-28 | Stop reason: HOSPADM

## 2022-02-07 RX ORDER — BISACODYL 10 MG
10 SUPPOSITORY, RECTAL RECTAL DAILY PRN
Status: DISCONTINUED | OUTPATIENT
Start: 2022-02-07 | End: 2022-06-28 | Stop reason: HOSPADM

## 2022-02-07 RX ADMIN — DEXTROSE AND SODIUM CHLORIDE: 5; 900 INJECTION, SOLUTION INTRAVENOUS at 14:07

## 2022-02-07 RX ADMIN — ALBUTEROL SULFATE 2 PUFF: 90 AEROSOL, METERED RESPIRATORY (INHALATION) at 09:02

## 2022-02-07 RX ADMIN — PANTOPRAZOLE SODIUM 40 MG: 40 TABLET, DELAYED RELEASE ORAL at 09:02

## 2022-02-07 RX ADMIN — POLYETHYLENE GLYCOL 3350 17 G: 17 POWDER, FOR SOLUTION ORAL at 09:02

## 2022-02-07 RX ADMIN — LORAZEPAM 0.5 MG: 2 INJECTION INTRAMUSCULAR; INTRAVENOUS at 01:18

## 2022-02-07 RX ADMIN — BISACODYL 10 MG: 10 SUPPOSITORY RECTAL at 13:19

## 2022-02-07 RX ADMIN — LORAZEPAM 0.5 MG: 0.5 TABLET ORAL at 13:02

## 2022-02-07 RX ADMIN — DEXTROSE AND SODIUM CHLORIDE: 5; 900 INJECTION, SOLUTION INTRAVENOUS at 22:14

## 2022-02-07 RX ADMIN — ALBUTEROL SULFATE 2 PUFF: 90 AEROSOL, METERED RESPIRATORY (INHALATION) at 21:14

## 2022-02-07 ASSESSMENT — ACTIVITIES OF DAILY LIVING (ADL)
ADLS_ACUITY_SCORE: 17
ADLS_ACUITY_SCORE: 18
ADLS_ACUITY_SCORE: 17
ADLS_ACUITY_SCORE: 18
ADLS_ACUITY_SCORE: 17
ADLS_ACUITY_SCORE: 18
ADLS_ACUITY_SCORE: 19
ADLS_ACUITY_SCORE: 18
ADLS_ACUITY_SCORE: 16
ADLS_ACUITY_SCORE: 19
ADLS_ACUITY_SCORE: 19
ADLS_ACUITY_SCORE: 17
ADLS_ACUITY_SCORE: 16
ADLS_ACUITY_SCORE: 17
ADLS_ACUITY_SCORE: 18
ADLS_ACUITY_SCORE: 19
ADLS_ACUITY_SCORE: 19
ADLS_ACUITY_SCORE: 17
ADLS_ACUITY_SCORE: 17
ADLS_ACUITY_SCORE: 18

## 2022-02-07 NOTE — PROGRESS NOTES
"[facility]  Medicine Progress Note - Hospitalist Service    Date of Admission:  1/22/2022    Assessment & Plan          Miranda Dover is a 51 year old female with hx of Down syndrome who was admitted on 1/22/2022 for acute hypoxic respiratory failure due to COVID-19 pneumonia.      Acute hypoxic respiratory failure due to COVID-19 pneumonia. Resolved   COVID RECOVERED  * Unvaccinated  * Symptom onset 1/19/22  * Tested positive for COVID on 1/22/22  * Chest CT on arrival showed bilateral infiltrates  * Initiated on dexamethasone and remdesivir on 1/22  * Completed 5-day course of remdesivir on 1/26  - O2 as needed   - completed 10-day course of dexamethasone on 1/31  - On rivaroxaban for VTE ppx, plan 30-day course (last day: 2/22)     Down syndrome  Developmental delay  * Patient lives with her parents who unfortunately were hospitalized with COVID on 1/22 as well. Her father passed away on 1/26; her mother passed on 2/2  * Patient has been intermittently anxious and agitated, sometimes yelling/screaming. She doesn't know why she's here and has repeatedly asked \"what's wrong with me?\" Following admission, she became increasingly withdrawn, refusing medications and meals at times  - PT/OT recommending TCU; placement will be difficult as patient will need guardianship re-established. Guardianship will need to be discussed with the patient's brother; conversation currently on hold while he navigates recent events with his parents passing. Refer to SW note from 2/1.   - Palliative Care consult requested for emotional/decisional support, refer to note from 1/31/22     Poor nutrition and dehydration  Underweight with cachexia  Failure to thrive  During past 1-2 days patient's PO continues to worsen.  Now she is reporting concerns with swallowing but it is unclear if this is due to pain, dysphagia or other cause.  This appears to be a chronic issues for patient.  BMI is ~13 Fortunately patient " has been agreeable to restarting IVF. Discussed dietary needs with patient and what she enjoys.  She has stated she likes turkey and white bread sandwiches  - Continue IVF, Continue to try to encourage PO  - On Ensure Enlive as per Nutrition  - Speech consult for possible dysphagia. She actually did well with speech.      Borderline hypotension. Resolved   Baseline blood pressures 90s-100s systolic. Decreased to mid-80s systolic in the setting of minimal PO intake.  * Treated with IVF from 1/29-1/31      Chest pain.  Resolved  Intermittent chest pain noted during this hospitalization. Patient refused serial troponins and echo.  No recent complaints      Leukopenia and thrombocytopenia, improved   WBC ron 3.6k; PLT ron 119k - likely due to infection  - Has been refusing labs. Will follow periodically     Thyroid nodule, possible hypothyroidism  Incidentally noted on admission chest CT. TSH was elevated to 15, but T4 was normal  - Consider repeat thyroid studies and possible thyroid US once patient recovers from COVID    Malnutrition:    - Level of malnutrition: Severe   - nutrition supplement per RD    Constipation  Per RN, has not had BM for several days  Bowel regimen added  Encourage PO fluid       Diet: Combination Diet Soft and Bite Sized Diet (level 6); Thin Liquids (level 0)  Snacks/Supplements Adult: Beneprotein; With Meals    DVT Prophylaxis: DOAC  Zhu Catheter: Not present  Central Lines: None  Cardiac Monitoring: None  Code Status: Full Code      Disposition Plan   Expected Discharge: 02/28/2022     Anticipated discharge location: inpatient rehabilitation facility    Delays:     Placement - TCU            The patient's care was discussed with the Bedside Nurse and Patient.    Lynn Mendoza MD  Hospitalist Service  [facility]  Securely message with the [employer] Web Console (learn more here)  Text page via [employer] Paging/Directory         Clinically Significant Risk Factors  Present on Admission                    ______________________________________________________________________    Interval History   Patient seen and examined.  No acute events over night. Still not taking in good PO. No fevers noted. No hypoxia. No vomiting or diarrhea.     Data reviewed today: I reviewed all medications, new labs and imaging results over the last 24 hours. I personally reviewed no images or EKG's today.    Physical Exam   Vital Signs: Temp: 98.5  F (36.9  C) Temp src: Axillary BP: 92/56 Pulse: 69   Resp: 16 SpO2: 94 % O2 Device: None (Room air)    Weight: 96 lbs 9.6 oz  General Appearance: Resting comfortably.  Not anxious on my evaluation   Respiratory: No respiratory distress  Cardiovascular: RRR  GI: Soft.  Non-distended  Skin: Skin is still dry.  No rashes  Other: Alert.  Frail appearing      Data   Recent Labs   Lab 02/06/22  1247 02/05/22  1400 02/04/22  0903   WBC 5.1 6.7 6.0   HGB 11.2* 11.8 13.6   MCV 94 94 95   * 148* 149*   CR  --   --  0.52     No results found for this or any previous visit (from the past 24 hour(s)).

## 2022-02-07 NOTE — PROGRESS NOTES
"CLINICAL NUTRITION SERVICES - REASSESSMENT NOTE      RECOMMENDATIONS FOR MD/PROVIDER TO ORDER:   More aggressive bowel program needed - no BM this admission x16 days     Patient unable to meet nutritional needs from PO alone - discuss appropriatness & acceptance of feeding tube    Recommendations Ordered by Registered Dietitian (RD):   Discontinue [place] Instant Breakfast at patient not drinking   Add high protein pudding (vanilla pudding + beneprotein powder) TID with all meals    Malnutrition: (2/3)  % Weight Loss: Unable to evaluate   % Intake:  </= 50% for >/= 5 days (severe malnutrition)  Subcutaneous Fat Loss:  Orbital region moderate depletion, Upper arm region moderate depletion and Thoracic region moderate depletion  Muscle Loss:  Temporal region moderate depletion, Clavicle bone region moderate depletion and Dorsal hand region moderate depletion  Fluid Retention:  None noted     Malnutrition Diagnosis: Severe malnutrition  In Context of:  Acute illness or injury  Chronic illness or disease       EVALUATION OF PROGRESS TOWARD GOALS   Diet:  Soft and Bite sized diet, thin liquids  [place] instant breakfast at 10am     Intake/Tolerance:  Met with patient today  She does not communicate/respond to questions asked about appetite or food intake, focusing mainly on the pillows she resting on in the chair   Encouraged sips of protein drink and bites of egg (breakfast tray had just arrived) and patient responds with \"I think I have a hole in my tongue\"  Addressed not having a BM since admission and asked her about her refusal of miralax - she again does not respond       ASSESSED NUTRITION NEEDS:  Dosing Weight 33.6 kg   Estimated Energy Needs: 3542-8936 kcals (35-40 Kcal/Kg)  Justification: repletion and underweight  Estimated Protein Needs: 50-70 grams protein (1.5-2 g pro/Kg)  Justification: repletion and hypercatabolism with acute illness      NEW FINDINGS:   Per LICSW, pt needs guardianship and MA " application p/t TCU acceptance     Weight 43.8 kg  Vitals:    01/22/22 1140 02/05/22 0729   Weight: 33.6 kg (74 lb 1.2 oz) 43.8 kg (96 lb 9.6 oz)       Previous Goals:   Intake of 50% of meals vs consider nutrition support in the next 1-2 days.   Evaluation: Not met    Previous Nutrition Diagnosis:   Inadequate oral intake related to lack of appetite, disinterest in food as evidenced by patient declines most food aside from pudding for the past 12 days of admission.  Evaluation: No change      CURRENT NUTRITION DIAGNOSIS  Inadequate oral intake related to lack of appetite, disinterest in food as evidenced by patient declines most food aside from pudding for the past 16 days of admission.    INTERVENTIONS  Recommendations / Nutrition Prescription  Diet per SLP  Ongoing PO encouragement   Sending pudding mixed with beneprotein power TID with meals     Implementation  Medical Food Supplement: as above    Goals  Patient will consume 50% meals and supplements at least BID vs nutrition support  Patient will have a BM in 48 hrs       MONITORING AND EVALUATION:  Progress towards goals will be monitored and evaluated per protocol and Practice Guidelines      Sho Vo RD, LD  Clinical Dietitian

## 2022-02-07 NOTE — PLAN OF CARE
DATE & TIME: 2/7/22 1623-6140   Cognitive Concerns/ Orientation : Alert to self only  BEHAVIOR & AGGRESSION TOOL COLOR: Green.  Can be anxious and yell out at times   ABNL VS/O2: VSS  MOBILITY: Assist of 1 with GB  PAIN MANAGMENT: No s/s of pain  DIET: Small bite size, thin liquids.  Poor appetite  BOWEL/BLADDER: Inc at times.  Constipation. Refuses to drink miralax. Suppository ordered and given  ABNL LAB/BG: Refused labs this AM  DRAIN/DEVICES: Loss of IV site.  Will attempt to replace  SKIN: pale/dusky.  Blanchable redness on buttock.  Mepilex in place  TESTS/PROCEDURES: None  D/C DATE: 2/28.  Brother in process of acquiring guardianship  Discharge Barriers: Safe placement/guardianship  OTHER IMPORTANT INFO: COVID recovered.  Father and mother both passed away from COVID.  Pt unaware at this time.  Ativan given X1 for anxiousness

## 2022-02-07 NOTE — PLAN OF CARE
"SUMMARY: COVID recovered  DATE & TIME: 02/6/22-2/7/22 6820-0369  Cognitive Concerns/ Orientation : A&O to self; needs frequent reassurance/encouragement/support - still fixated on \"being broken\" and states \"You broke me;\" \"My pinky is broken;\" \"I lost my tongue;\" or \"I swallowed my tooth.\"  BEHAVIOR & AGGRESSION TOOL COLOR: Yellow - anxious, agitated and irritable at times - Ativan given x1  ABNL VS/O2: VSS on RA - refuses at times  MOBILITY: SBA w/ gait belt, up to commode; refuses repositioning at times  PAIN MANAGMENT: Denies pain  DIET: Soft and Bite Sized Diet (level 6); Thin Liquids (level 0) - refused to eat - was able to get her to eat one pudding and drink water  BOWEL/BLADDER: Incontinent of urine at times; has not had a BM for days (last unknown)  ABNL LAB/BG: WNL  DRAIN/DEVICES: R PIV infusing D5NS @ 75 mL/hr.  TELEMETRY RHYTHM: N/A  SKIN: Pale skin, very dry lips. Blanchable erythema on buttocks - mepilex applied (WOC following)  TESTS/PROCEDURES: None  D/C DATE: TBD. Pending   Discharge Barriers: Safe placement.  OTHER IMPORTANT INFO: Father passed on 1/26 and mother passed on 2/2. Brother Maxi has not mentioned this to the patient per Palliative SW. He is in the process of acquiring emergency guardianship for Miranda.   "

## 2022-02-07 NOTE — PROGRESS NOTES
Care Management Follow Up    Length of Stay (days): 16    Expected Discharge Date: 02/28/2022     Concerns to be Addressed: discharge planning     Patient plan of care discussed at interdisciplinary rounds: Yes    Anticipated Discharge Disposition: Transitional Care     Anticipated Discharge Services:    Anticipated Discharge DME:      Patient/family educated on Medicare website which has current facility and service quality ratings: no  Education Provided on the Discharge Plan:    Patient/Family in Agreement with the Plan: yes    Referrals Placed by CM/SW: Post Acute Facilities  Private pay costs discussed: Not applicable    Additional Information:    Sw sent TCU referrals at the request of the care team last week.  Sw confirmed with 2 facilities ([facility] and [facility]) that they will not consider pt for admission until 1.  Guardianship is obtained and 2. MA application is pending.      Sw reached out to FA Counseling to check in on progress with filling out MA maikel with brother; FA counseling is aware of case but also recognizes the sensitive nature of brother's situation and will f/u with Sw on progress made.    Sw will reach out to brother after FA counseling connects with him to further discuss guardianship and MA maikel status.        Connie Smalls, BARBIESW

## 2022-02-07 NOTE — PLAN OF CARE
"SUMMARY: COVID recovered  DATE & TIME: 02/6/22 9905-6502  Cognitive Concerns/ Orientation : A&O to self; needs frequent reassurance/encouragement/support - still fixated on \"being broken\" and repeatedly states \"You broke me;\" \"My pinky is broken;\" \"I lost my tongue;\" or \"I swallowed my tooth.\"  BEHAVIOR & AGGRESSION TOOL COLOR: Yellow - anxious, agitated and irritable at times - Ativan given x2  ABNL VS/O2: VSS on RA - refuses at times  MOBILITY: SBA w/ gait belt - ambulated to the BR x1 - refuses repositioning at times  PAIN MANAGMENT: Patient frequently moans and yells and states she is \"broken\" but refused intervention - repositioning helped  DIET: Soft and Bite Sized Diet (level 6); Thin Liquids (level 0) - refused to eat - only had 2 bites of vanilla pudding throughout the day  BOWEL/BLADDER: Incontinent of urine at times; has not had a BM for days (last unknown) - refuses miralax  ABNL LAB/BG: WNL  DRAIN/DEVICES: R PIV infusing D5NS @ 75 mL/hr.  TELEMETRY RHYTHM: N/A  SKIN: Pale skin, very dry lips. Blanchable erythema on buttocks - mepilex applied (WOC following)  TESTS/PROCEDURES: None  D/C DATE: TBD. Pending - Brother Maxi is in the process of acquiring emergency guardianship for Miranda.   Discharge Barriers: Safe placement.  OTHER IMPORTANT INFO: Father passed on 1/26 and mother passed on 2/2. Maxi has not mentioned this to the patient per Palliative SW.     "

## 2022-02-08 ENCOUNTER — APPOINTMENT (OUTPATIENT)
Dept: SPEECH THERAPY | Facility: CLINIC | Age: 52
DRG: 177 | End: 2022-02-08
Payer: COMMERCIAL

## 2022-02-08 LAB
ALBUMIN UR-MCNC: NEGATIVE MG/DL
APPEARANCE UR: ABNORMAL
BILIRUB UR QL STRIP: NEGATIVE
COLOR UR AUTO: YELLOW
ERYTHROCYTE [DISTWIDTH] IN BLOOD BY AUTOMATED COUNT: 12.4 % (ref 10–15)
GLUCOSE UR STRIP-MCNC: NEGATIVE MG/DL
HCT VFR BLD AUTO: 39.2 % (ref 35–47)
HGB BLD-MCNC: 12.4 G/DL (ref 11.7–15.7)
HGB UR QL STRIP: ABNORMAL
KETONES UR STRIP-MCNC: 10 MG/DL
LEUKOCYTE ESTERASE UR QL STRIP: ABNORMAL
MCH RBC QN AUTO: 29.8 PG (ref 26.5–33)
MCHC RBC AUTO-ENTMCNC: 31.6 G/DL (ref 31.5–36.5)
MCV RBC AUTO: 94 FL (ref 78–100)
MUCOUS THREADS #/AREA URNS LPF: PRESENT /LPF
NITRATE UR QL: NEGATIVE
PH UR STRIP: 7 [PH] (ref 5–7)
PLATELET # BLD AUTO: 140 10E3/UL (ref 150–450)
RBC # BLD AUTO: 4.16 10E6/UL (ref 3.8–5.2)
RBC URINE: 6 /HPF
SP GR UR STRIP: 1.01 (ref 1–1.03)
SQUAMOUS EPITHELIAL: 1 /HPF
UROBILINOGEN UR STRIP-MCNC: 4 MG/DL
WBC # BLD AUTO: 7.3 10E3/UL (ref 4–11)
WBC URINE: 5 /HPF

## 2022-02-08 PROCEDURE — 250N000013 HC RX MED GY IP 250 OP 250 PS 637: Performed by: HOSPITALIST

## 2022-02-08 PROCEDURE — 81001 URINALYSIS AUTO W/SCOPE: CPT | Performed by: HOSPITALIST

## 2022-02-08 PROCEDURE — 99232 SBSQ HOSP IP/OBS MODERATE 35: CPT | Performed by: HOSPITALIST

## 2022-02-08 PROCEDURE — 250N000013 HC RX MED GY IP 250 OP 250 PS 637: Performed by: REGISTERED NURSE

## 2022-02-08 PROCEDURE — 92526 ORAL FUNCTION THERAPY: CPT | Mod: GN

## 2022-02-08 PROCEDURE — 250N000013 HC RX MED GY IP 250 OP 250 PS 637: Performed by: INTERNAL MEDICINE

## 2022-02-08 PROCEDURE — 87086 URINE CULTURE/COLONY COUNT: CPT | Performed by: HOSPITALIST

## 2022-02-08 PROCEDURE — 85027 COMPLETE CBC AUTOMATED: CPT | Performed by: HOSPITALIST

## 2022-02-08 PROCEDURE — 36415 COLL VENOUS BLD VENIPUNCTURE: CPT | Performed by: HOSPITALIST

## 2022-02-08 PROCEDURE — 120N000001 HC R&B MED SURG/OB

## 2022-02-08 RX ADMIN — QUETIAPINE 12.5 MG: 25 TABLET, FILM COATED ORAL at 15:52

## 2022-02-08 RX ADMIN — ACETAMINOPHEN 650 MG: 325 TABLET, FILM COATED ORAL at 14:00

## 2022-02-08 RX ADMIN — ALBUTEROL SULFATE 2 PUFF: 90 AEROSOL, METERED RESPIRATORY (INHALATION) at 17:28

## 2022-02-08 RX ADMIN — LORAZEPAM 0.5 MG: 0.5 TABLET ORAL at 14:00

## 2022-02-08 RX ADMIN — SENNOSIDES AND DOCUSATE SODIUM 1 TABLET: 8.6; 5 TABLET ORAL at 21:59

## 2022-02-08 RX ADMIN — Medication 1 MG: at 21:59

## 2022-02-08 RX ADMIN — RIVAROXABAN 10 MG: 10 TABLET, FILM COATED ORAL at 17:27

## 2022-02-08 RX ADMIN — POLYETHYLENE GLYCOL 3350 17 G: 17 POWDER, FOR SOLUTION ORAL at 08:54

## 2022-02-08 ASSESSMENT — ACTIVITIES OF DAILY LIVING (ADL)
ADLS_ACUITY_SCORE: 19
ADLS_ACUITY_SCORE: 17
ADLS_ACUITY_SCORE: 19
ADLS_ACUITY_SCORE: 18
ADLS_ACUITY_SCORE: 19
ADLS_ACUITY_SCORE: 19
ADLS_ACUITY_SCORE: 18
ADLS_ACUITY_SCORE: 20
ADLS_ACUITY_SCORE: 17
ADLS_ACUITY_SCORE: 19
ADLS_ACUITY_SCORE: 17
ADLS_ACUITY_SCORE: 20
ADLS_ACUITY_SCORE: 19
ADLS_ACUITY_SCORE: 17
ADLS_ACUITY_SCORE: 19
ADLS_ACUITY_SCORE: 19

## 2022-02-08 NOTE — PLAN OF CARE
SUMMARY: COVID recovered  DATE & TIME: 02/8/22 6924-4939  Cognitive Concerns/ Orientation : Alert and Oriented to self.  BEHAVIOR & AGGRESSION TOOL COLOR: Yellow - anxious, agitated and irritable at times, refusing cares and assessments.    ABNL VS/O2: VSS  MOBILITY: SBA w/ gait belt, up to commode; Up in chair part of shift.  Walked in leyva  PAIN MANAGMENT: C/O HA.  Tylenol given  DIET: Soft and Bite Sized Diet (level 6); Thin Liquids (level 0) - Ate 4 cups of pudding.    BOWEL/BLADDER: Incontinent of urine at times, BM this shift.    ABNL LAB/BG: WNL  DRAIN/DEVICES: Removed IV.  Refusing IV placement.  MD aware.  Ok to leave out at this time  TELEMETRY RHYTHM: N/A  SKIN: Pale skin, very dry lips. Blanchable erythema on buttocks - mepilex in place (WOC following). Left arm + 3 edema from iv infiltration.  Blisters noted.  Refusing warm pack on left arm. Dry skin peeling on tongue, oral cares performed.    TESTS/PROCEDURES: None  D/C DATE: TBD. Pending   Discharge Barriers: Safe placement.  OTHER IMPORTANT INFO: Father passed on 1/26 and mother passed on 2/2. Brother Maxi has not mentioned this to the patient per Palliative SW. He is in the process of acquiring emergency guardianship for Miranda.  Become agitated and restless in afternoon.  Ativan given.  Refused AM meds

## 2022-02-08 NOTE — PROVIDER NOTIFICATION
MD Notification    Notified Person: MD    Notified Person Name:  Kelly    Notification Date/Time:  02/08 1445    Notification Interaction:  Vocera page    Purpose of Notification:  Pt becoming increasingly agitated with hallucinations in afternoons.  Ativan ineffective.    Orders Received:  pending    Comments:

## 2022-02-08 NOTE — PLAN OF CARE
SUMMARY: COVID recovered  DATE & TIME: 02/7/22 3-11pm  Cognitive Concerns/ Orientation : A&O to self.  BEHAVIOR & AGGRESSION TOOL COLOR: Yellow - anxious, agitated and irritable at times.  ABNL VS/O2: VSS on RA - refuses at times  MOBILITY: SBA w/ gait belt, up to commode; refuses repositioning at times  PAIN MANAGMENT: Denies pain  DIET: Soft and Bite Sized Diet (level 6); Thin Liquids (level 0) - refused dinner  BOWEL/BLADDER: Incontinent of urine and stool. Had loose stool. Senna held.  ABNL LAB/BG: none  DRAIN/DEVICES: L PIV infusing D5 +NS @ 75 mL/hr.  TELEMETRY RHYTHM: N/A  SKIN: Pale skin, very dry lips. Blanchable erythema on buttocks - mepilex in place (WOC following). Left arm + 2 edema from iv infiltration. Refusing warm pack on left arm. Left arm elevated on pillow.  TESTS/PROCEDURES: None  D/C DATE: TBD. Pending   Discharge Barriers: Safe placement.  OTHER IMPORTANT INFO: Father passed on 1/26 and mother passed on 2/2. Brother Maxi has not mentioned this to the patient per Palliative SW. He is in the process of acquiring emergency guardianship for Miranda.

## 2022-02-08 NOTE — PROGRESS NOTES
MD Notification    Notified Person: MD    Notified Person Name:  Kelly    Notification Date/Time:  02/08 1200    Notification Interaction:  Face to face    Purpose of Notification:  Loss of IV access.     Orders Received:  Ok to leave out at this time    Comments:

## 2022-02-08 NOTE — PROGRESS NOTES
"[facility]  Medicine Progress Note - Hospitalist Service    Date of Admission:  1/22/2022    Assessment & Plan          Miranda Dover is a 51 year old female with hx of Down syndrome who was admitted on 1/22/2022 for acute hypoxic respiratory failure due to COVID-19 pneumonia.      Acute hypoxic respiratory failure due to COVID-19 pneumonia. Resolved   COVID RECOVERED  * Unvaccinated  * Symptom onset 1/19/22  * Tested positive for COVID on 1/22/22  * Chest CT on arrival showed bilateral infiltrates  * Initiated on dexamethasone and remdesivir on 1/22  * Completed 5-day course of remdesivir on 1/26  - O2 as needed   - completed 10-day course of dexamethasone on 1/31  - On rivaroxaban for VTE ppx, plan 30-day course (last day: 2/22)     Down syndrome  Developmental delay  * Patient lives with her parents who unfortunately were hospitalized with COVID on 1/22 as well. Her father passed away on 1/26; her mother passed on 2/2  * Patient has been intermittently anxious and agitated, sometimes yelling/screaming. She doesn't know why she's here and has repeatedly asked \"what's wrong with me?\" Following admission, she became increasingly withdrawn, refusing medications and meals at times  - PT/OT recommending TCU; placement will be difficult as patient will need guardianship re-established. Guardianship will need to be discussed with the patient's brother; conversation currently on hold while he navigates recent events with his parents passing. Refer to SW note from 2/1.   - Palliative Care consult requested for emotional/decisional support, refer to note from 1/31/22     Poor nutrition and dehydration  Underweight with cachexia  Failure to thrive  During past 1-2 days patient's PO continues to worsen.  Now she is reporting concerns with swallowing but it is unclear if this is due to pain, dysphagia or other cause.  This appears to be a chronic issues for patient.  BMI is ~13 Fortunately patient " has been agreeable to restarting IVF. Discussed dietary needs with patient and what she enjoys.  She has stated she likes turkey and white bread sandwiches  - Continue IVF, Continue to try to encourage PO  - On Ensure Enlive as per Nutrition  - Speech consult for possible dysphagia. She actually did well with speech.      Borderline hypotension. Resolved   Baseline blood pressures 90s-100s systolic. Decreased to mid-80s systolic in the setting of minimal PO intake.  * Treated with IVF from 1/29-1/31      Chest pain.  Resolved  Intermittent chest pain noted during this hospitalization. Patient refused serial troponins and echo.  No recent complaints      Leukopenia and thrombocytopenia, improved   WBC ron 3.6k; PLT ron 119k - likely due to infection  - Has been refusing labs. Will follow periodically     Thyroid nodule, possible hypothyroidism  Incidentally noted on admission chest CT. TSH was elevated to 15, but T4 was normal  - Consider repeat thyroid studies and possible thyroid US once patient recovers from COVID    Malnutrition:    - Level of malnutrition: Severe   - nutrition supplement per RD    Agitation/restlessness  Hallucinations  -check UA  - seroquel low dose PRN  - melatonin for sleep.     Constipation  Per RN, has not had BM for several days  Bowel regimen added  Encourage PO fluid       Diet: Combination Diet Soft and Bite Sized Diet (level 6); Thin Liquids (level 0)  Snacks/Supplements Adult: Beneprotein; With Meals    DVT Prophylaxis: DOAC  Zhu Catheter: Not present  Central Lines: None  Cardiac Monitoring: None  Code Status: Full Code      Disposition Plan   Expected Discharge: 02/28/2022     Anticipated discharge location: inpatient rehabilitation facility    Delays:     Placement - TCU            The patient's care was discussed with the Bedside Nurse and Patient.    Lynn Mendoza MD  Hospitalist Service  [facility]  Securely message with the Vocera Web Console  (learn more here)  Text page via [facility] Paging/Directory         Clinically Significant Risk Factors Present on Admission                    ______________________________________________________________________    Interval History   Patient seen and examined. She was restless and agitated this afternoon, ativan did not work. Per RN, patient also hallucinates although I was not able to obtain pertinent ROS as she is not communicating much.   - PO intake better today per RN.   - BM (+)    Data reviewed today: I reviewed all medications, new labs and imaging results over the last 24 hours. I personally reviewed no images or EKG's today.    Physical Exam   Vital Signs: Temp: 97.6  F (36.4  C) Temp src: Axillary BP: 99/65 Pulse: 81   Resp: 17 SpO2: 96 % O2 Device: None (Room air)    Weight: 96 lbs 9.6 oz     General Appearance: Resting comfortably.  Not anxious on my evaluation   Respiratory: No respiratory distress  Cardiovascular: RRR  GI: Soft.  Non-distended  Skin: Skin is still dry.  No rashes  Other: Alert.  Frail appearing      Data   Recent Labs   Lab 02/08/22  0843 02/06/22  1247 02/05/22  1400 02/04/22  0903   WBC 7.3 5.1 6.7 6.0   HGB 12.4 11.2* 11.8 13.6   MCV 94 94 94 95   * 136* 148* 149*   CR  --   --   --  0.52     No results found for this or any previous visit (from the past 24 hour(s)).

## 2022-02-08 NOTE — PROGRESS NOTES
[facility]  Palliative Care Social Work Note:    Patient Info:  Miranda Dover is a 51 year old female with history of down syndrome, history of  COVID19, not covid19 recovered.  Her parents also had COVID19 and were hospitalized at the same time as Miranda and both  during this hospitalization. Miranda has not been told of this yet.      Brief summary of visit: Visited with Miranda this afternoon.  She allowed me to sit with her and visit.  She appear to be experiencing hallucinations when I was there-- it is possible she believed she was conversing with her mother.      Staff are not sure if her brother has been to the hospital yet, we do not know if she has been told that her parents have .     Tried to call Maxi but did not get an answer.  Left a voice mail.       Date of Admission: 2022    Reason for consult: Patient and family support    Sources of information: Patient  Family member Brother Maxi    Recommendations & Plan:  Will continue to follow  Kristie German with my contact information      These recommendations have been discussed with team, family.    Symptoms & Concerns Addressed Today:      Strengths Identified:    Maxi providing support as best he can    Relationships & Support:  Aspects of relationships and support assessed today:    Identified family members: Brother Maxi.  Both parents  from COVID19 in the past 2 weeks     Professional supports:     Family coping:     Bereavement Risk concerns: see above    Coping, Mental Health & Adjustment to Illness:       Goals, Decision Making & Advance Care Planning:   Prognosis, Goals, and/or Advance Care Planning were assessed today: No  If yes, brief summary of discussion:   Preferred language:  Patient speaks english but is from a bilingual house hold where Polish was spoken  Patient's decision making preferences: not assessed  I have concerns about the patient/family's health literacy today: No  Patient  has a completed Health Care Directive: No.   Code status per chart review: Full Code    Key Palliative Symptom Data:  We are not helping to manage these symptoms currently in this patient.      Clinical Social Work Interventions:   Assessment of palliative specific issues    Introduction of Palliative clinical social work interventions  Facilitation of processing of thoughts/feelings  Family communication facilitated      JOSE Massey, [facility]   Palliative Care Clinical   Ph: [phone]

## 2022-02-08 NOTE — PLAN OF CARE
SUMMARY: COVID recovered  DATE & TIME: 02/8/22   Cognitive Concerns/ Orientation : Alert and Oriented to self.  BEHAVIOR & AGGRESSION TOOL COLOR: Yellow - anxious, agitated and irritable at times, refusing cares and assessments.    ABNL VS/O2: Patient wouldn't allow vitals to be taken, became very irritable when writer attempted.    MOBILITY: SBA w/ gait belt, up to commode; refuses repositioning all night.  Got up in chair at 0630.    PAIN MANAGMENT: Denies pain  DIET: Soft and Bite Sized Diet (level 6); Thin Liquids (level 0) - refused dinner  BOWEL/BLADDER: Incontinent of urine, no BM.    ABNL LAB/BG: none  DRAIN/DEVICES: L PIV infusing D5 +NS @ 75 mL/hr.  TELEMETRY RHYTHM: N/A  SKIN: Pale skin, very dry lips. Blanchable erythema on buttocks - mepilex in place (WOC following). Left arm + 3 edema from iv infiltration. Refusing warm pack on left arm. Left arm elevated on pillow.  Dry skin peeling on tongue, oral cares performed.    TESTS/PROCEDURES: None  D/C DATE: TBD. Pending   Discharge Barriers: Safe placement.  OTHER IMPORTANT INFO: Father passed on 1/26 and mother passed on 2/2. Brother Maxi has not mentioned this to the patient per Palliative SW. He is in the process of acquiring emergency guardianship for Miranda. Patient wouldn't allow assessments of vitals overnight.  She pulled out her IV and wouldn't let staff reinsert a new one, will continue to try when patient calms down.

## 2022-02-08 NOTE — PROGRESS NOTES
Care Management Follow Up    Length of Stay (days): 17    Expected Discharge Date: 02/28/2022     Concerns to be Addressed: discharge planning     Patient plan of care discussed at interdisciplinary rounds: Yes    Anticipated Discharge Disposition: Transitional Care     Anticipated Discharge Services:    Anticipated Discharge DME:      Patient/family educated on Medicare website which has current facility and service quality ratings: no  Education Provided on the Discharge Plan:    Patient/Family in Agreement with the Plan: yes    Referrals Placed by CM/SW: Post Acute Facilities  Private pay costs discussed: Not applicable    Additional Information:    Sw reached out to [facility] services to inquire into setting up a [place] assessment to connect pt to services within the community.  This would also begin the process towards getting pt placed into a group home and possibly assigning her a DD CM worker.  Per [facility] rep, they cannot take any referral until 1.  Emergency guardianship is obtained and 2. Medical assistance application is completed.  Sw reached out to [facility] to check on MA maikel status; left vm.  Sw will continue to follow for discharge planning services.      Connie Smalls, BARBIESW

## 2022-02-09 ENCOUNTER — APPOINTMENT (OUTPATIENT)
Dept: PHYSICAL THERAPY | Facility: CLINIC | Age: 52
DRG: 177 | End: 2022-02-09
Payer: COMMERCIAL

## 2022-02-09 ENCOUNTER — APPOINTMENT (OUTPATIENT)
Dept: OCCUPATIONAL THERAPY | Facility: CLINIC | Age: 52
DRG: 177 | End: 2022-02-09
Payer: COMMERCIAL

## 2022-02-09 LAB
ANION GAP SERPL CALCULATED.3IONS-SCNC: 6 MMOL/L (ref 3–14)
BUN SERPL-MCNC: 6 MG/DL (ref 7–30)
CALCIUM SERPL-MCNC: 8.9 MG/DL (ref 8.5–10.1)
CHLORIDE BLD-SCNC: 104 MMOL/L (ref 94–109)
CO2 SERPL-SCNC: 31 MMOL/L (ref 20–32)
CREAT SERPL-MCNC: 0.58 MG/DL (ref 0.52–1.04)
CREAT SERPL-MCNC: 0.64 MG/DL (ref 0.52–1.04)
GFR SERPL CREATININE-BSD FRML MDRD: >90 ML/MIN/1.73M2
GFR SERPL CREATININE-BSD FRML MDRD: >90 ML/MIN/1.73M2
GLUCOSE BLD-MCNC: 98 MG/DL (ref 70–99)
POTASSIUM BLD-SCNC: 2.8 MMOL/L (ref 3.4–5.3)
SODIUM SERPL-SCNC: 141 MMOL/L (ref 133–144)

## 2022-02-09 PROCEDURE — 36415 COLL VENOUS BLD VENIPUNCTURE: CPT | Performed by: INTERNAL MEDICINE

## 2022-02-09 PROCEDURE — 97116 GAIT TRAINING THERAPY: CPT | Mod: GP

## 2022-02-09 PROCEDURE — 120N000001 HC R&B MED SURG/OB

## 2022-02-09 PROCEDURE — 82565 ASSAY OF CREATININE: CPT | Performed by: INTERNAL MEDICINE

## 2022-02-09 PROCEDURE — 80048 BASIC METABOLIC PNL TOTAL CA: CPT | Performed by: HOSPITALIST

## 2022-02-09 PROCEDURE — 99232 SBSQ HOSP IP/OBS MODERATE 35: CPT | Performed by: HOSPITALIST

## 2022-02-09 PROCEDURE — 250N000013 HC RX MED GY IP 250 OP 250 PS 637: Performed by: REGISTERED NURSE

## 2022-02-09 PROCEDURE — 97530 THERAPEUTIC ACTIVITIES: CPT | Mod: GP

## 2022-02-09 PROCEDURE — 250N000013 HC RX MED GY IP 250 OP 250 PS 637: Performed by: HOSPITALIST

## 2022-02-09 PROCEDURE — 97530 THERAPEUTIC ACTIVITIES: CPT | Mod: GO | Performed by: OCCUPATIONAL THERAPIST

## 2022-02-09 RX ORDER — CEFUROXIME AXETIL 250 MG/1
250 TABLET ORAL EVERY 12 HOURS SCHEDULED
Status: DISCONTINUED | OUTPATIENT
Start: 2022-02-09 | End: 2022-02-12

## 2022-02-09 RX ADMIN — ACETAMINOPHEN 650 MG: 325 TABLET, FILM COATED ORAL at 13:31

## 2022-02-09 RX ADMIN — SENNOSIDES AND DOCUSATE SODIUM 1 TABLET: 8.6; 5 TABLET ORAL at 08:05

## 2022-02-09 RX ADMIN — QUETIAPINE 12.5 MG: 25 TABLET, FILM COATED ORAL at 16:14

## 2022-02-09 RX ADMIN — ALBUTEROL SULFATE 2 PUFF: 90 AEROSOL, METERED RESPIRATORY (INHALATION) at 13:30

## 2022-02-09 RX ADMIN — CEFUROXIME AXETIL 250 MG: 250 TABLET ORAL at 09:44

## 2022-02-09 RX ADMIN — CEFUROXIME AXETIL 250 MG: 250 TABLET ORAL at 21:21

## 2022-02-09 RX ADMIN — RIVAROXABAN 10 MG: 10 TABLET, FILM COATED ORAL at 16:14

## 2022-02-09 RX ADMIN — PANTOPRAZOLE SODIUM 40 MG: 40 TABLET, DELAYED RELEASE ORAL at 08:05

## 2022-02-09 RX ADMIN — SENNOSIDES AND DOCUSATE SODIUM 1 TABLET: 8.6; 5 TABLET ORAL at 21:21

## 2022-02-09 RX ADMIN — QUETIAPINE 12.5 MG: 25 TABLET, FILM COATED ORAL at 08:05

## 2022-02-09 RX ADMIN — POLYETHYLENE GLYCOL 3350 17 G: 17 POWDER, FOR SOLUTION ORAL at 08:05

## 2022-02-09 RX ADMIN — Medication 1 MG: at 21:21

## 2022-02-09 ASSESSMENT — ACTIVITIES OF DAILY LIVING (ADL)
ADLS_ACUITY_SCORE: 20
ADLS_ACUITY_SCORE: 22
ADLS_ACUITY_SCORE: 21
ADLS_ACUITY_SCORE: 20
ADLS_ACUITY_SCORE: 21
ADLS_ACUITY_SCORE: 20
ADLS_ACUITY_SCORE: 21
ADLS_ACUITY_SCORE: 20
ADLS_ACUITY_SCORE: 22
ADLS_ACUITY_SCORE: 20
ADLS_ACUITY_SCORE: 23
ADLS_ACUITY_SCORE: 21
ADLS_ACUITY_SCORE: 20
ADLS_ACUITY_SCORE: 22
ADLS_ACUITY_SCORE: 20
ADLS_ACUITY_SCORE: 20
ADLS_ACUITY_SCORE: 23
ADLS_ACUITY_SCORE: 20
ADLS_ACUITY_SCORE: 20
ADLS_ACUITY_SCORE: 23

## 2022-02-09 NOTE — PLAN OF CARE
SUMMARY: COVID recovered  DATE & TIME: 02/9/22 8879-7759  Cognitive Concerns/ Orientation : Alert and Oriented to self.  BEHAVIOR & AGGRESSION TOOL COLOR: Yellow - anxious, agitated and irritable at times, refusing cares and assessments.    ABNL VS/O2: Refused VS  MOBILITY: Assist x1 w/ gait belt, up to commode; Got up in wheelchair x1   PAIN MANAGMENT: Denies pain  DIET: Soft and Bite Sized Diet (level 6); Thin Liquids (level 0)   BOWEL/BLADDER: Incontinent of urine, no BM.    ABNL LAB/BG: none  DRAIN/DEVICES: No IV access  TELEMETRY RHYTHM: N/A  SKIN: Pale skin, very dry lips. Blanchable erythema on buttocks - mepilex in place (WOC following). Left arm + 2 edema from iv infiltration.  Dry skin peeling on tongue, refused oral cares.    TESTS/PROCEDURES: None  D/C DATE: TBD. Pending   Discharge Barriers: Safe placement.  OTHER IMPORTANT INFO: Father passed on 1/26 and mother passed on 2/2. Brother Maxi has not mentioned this to the patient per Palliative SW. He is in the process of acquiring emergency guardianship for [place]. Patient wouldn't allow assessments of vitals overnight.

## 2022-02-09 NOTE — PLAN OF CARE
DATE & TIME: 02/8/22 4362-2712  Cognitive Concerns/ Orientation : Alert and Oriented to self.  BEHAVIOR & AGGRESSION TOOL COLOR: Yellow - anxious, agitated and irritable at times, refusing cares and assessments.    ABNL VS/O2: VSS  MOBILITY: SBA w/ gait belt, up to commode; Up in chair most of shift. Refused to go to bed  Walked in leyva  PAIN MANAGMENT:  C/O General aches and pains.  Tylenol given.    DIET: Soft and Bite Sized Diet (level 6); Thin Liquids (level 0) - Ate 1 cup of pudding. Only sips taken   BOWEL/BLADDER: Incontinent of urine at times, Urine Rosalina colored    ABNL LAB/BG: +UA.  Oral Ceftin ordered q12 hrs.    DRAIN/DEVICES: Removed IV.  Refusing IV placement.  MD aware.  Ok to leave out at this time  TELEMETRY RHYTHM: N/A  SKIN: Pale skin, very dry lips. Blanchable erythema on buttocks - mepilex changed (WOC following). Left arm + 3 edema from iv infiltration.  Blisters noted.  Refusing warm pack on left arm. Dry skin peeling on tongue, oral cares performed.    TESTS/PROCEDURES: None  D/C DATE: TBD. Pending   Discharge Barriers: Safe placement.  OTHER IMPORTANT INFO: Father passed on 1/26 and mother passed on 2/2. Brother Maxi has not mentioned this to the patient per Palliative SW. He is in the process of acquiring emergency guardianship. Can become agitated and restless.  Seroquel given this AM.

## 2022-02-09 NOTE — PROGRESS NOTES
"[facility]  Medicine Progress Note - Hospitalist Service    Date of Admission:  1/22/2022    Assessment & Plan          Miranda Dover is a 51 year old female with hx of Down syndrome who was admitted on 1/22/2022 for acute hypoxic respiratory failure due to COVID-19 pneumonia.      Acute hypoxic respiratory failure due to COVID-19 pneumonia. Resolved   COVID RECOVERED  * Unvaccinated  * Symptom onset 1/19/22  * Tested positive for COVID on 1/22/22  * Chest CT on arrival showed bilateral infiltrates  * Initiated on dexamethasone and remdesivir on 1/22  * Completed 5-day course of remdesivir on 1/26  - O2 as needed   - completed 10-day course of dexamethasone on 1/31  - On rivaroxaban for VTE ppx, plan 30-day course (last day: 2/22)     Down syndrome  Developmental delay  * Patient lives with her parents who unfortunately were hospitalized with COVID on 1/22 as well. Her father passed away on 1/26; her mother passed on 2/2  * Patient has been intermittently anxious and agitated, sometimes yelling/screaming. She doesn't know why she's here and has repeatedly asked \"what's wrong with me?\" Following admission, she became increasingly withdrawn, refusing medications and meals at times  - PT/OT recommending TCU; placement will be difficult as patient will need guardianship re-established. Guardianship will need to be discussed with the patient's brother; conversation currently on hold while he navigates recent events with his parents passing. Refer to SW note from 2/1.   - Palliative Care consult requested for emotional/decisional support, refer to note from 1/31/22     Poor nutrition and dehydration  Underweight with cachexia  Failure to thrive  During past 1-2 days patient's PO continues to worsen.  Now she is reporting concerns with swallowing but it is unclear if this is due to pain, dysphagia or other cause.  This appears to be a chronic issues for patient.  BMI is ~13 Fortunately patient " has been agreeable to restarting IVF. Discussed dietary needs with patient and what she enjoys.  She has stated she likes turkey and white bread sandwiches  - Continue IVF, Continue to try to encourage PO  - On Ensure Enlive as per Nutrition  - Speech consult for possible dysphagia. She actually did well with speech.     UTI  Started ceftin PO 2/9, plan 5 days, follow culture     Borderline hypotension. Resolved   Baseline blood pressures 90s-100s systolic. Decreased to mid-80s systolic in the setting of minimal PO intake.  * Treated with IVF from 1/29-1/31      Chest pain.  Resolved  Intermittent chest pain noted during this hospitalization. Patient refused serial troponins and echo.  No recent complaints      Leukopenia and thrombocytopenia, improved   WBC ron 3.6k; PLT ron 119k - likely due to infection  - Has been refusing labs. Will follow periodically     Thyroid nodule, possible hypothyroidism  Incidentally noted on admission chest CT. TSH was elevated to 15, but T4 was normal  - Consider repeat thyroid studies and possible thyroid US once patient recovers from COVID    Malnutrition:    - Level of malnutrition: Severe   - nutrition supplement per RD    Agitation/restlessness  Hallucinations  -check UA  - seroquel low dose PRN  - melatonin for sleep.     Constipation  Per RN, did not have BM for several days  Bowel regimen added, BM (+)  Encourage PO fluid       Diet: Combination Diet Soft and Bite Sized Diet (level 6); Thin Liquids (level 0)  Snacks/Supplements Adult: Beneprotein; With Meals    DVT Prophylaxis: DOAC  Zhu Catheter: Not present  Central Lines: None  Cardiac Monitoring: None  Code Status: Full Code      Disposition Plan   Expected Discharge: 02/28/2022     Anticipated discharge location: inpatient rehabilitation facility    Delays:     Placement - TCU            The patient's care was discussed with the Bedside Nurse and Patient. Discussed with her brother Maxi 2/9    Lynn Mendoza,  MD  Hospitalist Service  [facility]  Securely message with the [employer] Web Console (learn more here)  Text page via [employer] Paging/Directory         Clinically Significant Risk Factors Present on Admission                    ______________________________________________________________________    Interval History   Patient seen and examined. Slept good per RN. Appears irritable this morning, minimally conversant but c/o about the pillow under the leg.   - minimal PO intake   - noted abnormal UA    Data reviewed today: I reviewed all medications, new labs and imaging results over the last 24 hours. I personally reviewed no images or EKG's today.    Physical Exam   Vital Signs: Temp: 98.9  F (37.2  C) Temp src: Oral BP: 96/65 Pulse: 90   Resp: 18 SpO2: 96 % O2 Device: None (Room air)    Weight: 96 lbs 9.6 oz     General Appearance: Resting comfortably.   HEENT: sunken eyes and temporal wasting.   Respiratory: No respiratory distress  Cardiovascular: RRR  GI: Soft.  Non-distended  Skin: Skin is still dry.  No rashes  Other: Alert. Frail appearing      Data   Recent Labs   Lab 02/09/22  1002 02/08/22  0843 02/06/22  1247 02/05/22  1400 02/04/22  0903   WBC  --  7.3 5.1 6.7 6.0   HGB  --  12.4 11.2* 11.8 13.6   MCV  --  94 94 94 95   PLT  --  140* 136* 148* 149*   CR 0.58  --   --   --  0.52     No results found for this or any previous visit (from the past 24 hour(s)).

## 2022-02-09 NOTE — PLAN OF CARE
SUMMARY: COVID recovered  DATE & TIME: 02/8/22 5833-3704  Cognitive Concerns/ Orientation : Alert and Oriented to self.  BEHAVIOR & AGGRESSION TOOL COLOR: Yellow - anxious, agitated and irritable at times, refusing cares and assessments.    ABNL VS/O2: VSS on RA.    MOBILITY: SBA w/ gait belt, up to commode;  Got up in wheelchair multiple times.    PAIN MANAGMENT: Denies pain  DIET: Soft and Bite Sized Diet (level 6); Thin Liquids (level 0) - refused dinner - ate magic cup, and multiple puddings  BOWEL/BLADDER: Incontinent of urine, no BM.    ABNL LAB/BG: none  DRAIN/DEVICES:   TELEMETRY RHYTHM: N/A  SKIN: Pale skin, very dry lips. Blanchable erythema on buttocks - mepilex in place (WOC following). Left arm + 2 edema from iv infiltration.  Dry skin peeling on tongue, refused oral cares.    TESTS/PROCEDURES: None  D/C DATE: TBD. Pending   Discharge Barriers: Safe placement.  OTHER IMPORTANT INFO: Father passed on 1/26 and mother passed on 2/2. Brother Maxi has not mentioned this to the patient per Palliative SW. He is in the process of acquiring emergency guardianship for Miranda. Patient wouldn't allow assessments of vitals overnight.  She pulled out her IV and wouldn't let staff reinsert a new one, will continue to try when patient calms down.

## 2022-02-10 LAB
BACTERIA UR CULT: ABNORMAL
BACTERIA UR CULT: ABNORMAL
POTASSIUM BLD-SCNC: 3.3 MMOL/L (ref 3.4–5.3)

## 2022-02-10 PROCEDURE — 250N000013 HC RX MED GY IP 250 OP 250 PS 637: Performed by: HOSPITALIST

## 2022-02-10 PROCEDURE — 36415 COLL VENOUS BLD VENIPUNCTURE: CPT | Performed by: HOSPITALIST

## 2022-02-10 PROCEDURE — 120N000001 HC R&B MED SURG/OB

## 2022-02-10 PROCEDURE — 84132 ASSAY OF SERUM POTASSIUM: CPT | Performed by: HOSPITALIST

## 2022-02-10 PROCEDURE — 99232 SBSQ HOSP IP/OBS MODERATE 35: CPT | Performed by: HOSPITALIST

## 2022-02-10 RX ORDER — POTASSIUM CHLORIDE 1500 MG/1
20 TABLET, EXTENDED RELEASE ORAL ONCE
Status: COMPLETED | OUTPATIENT
Start: 2022-02-10 | End: 2022-02-10

## 2022-02-10 RX ORDER — POTASSIUM CHLORIDE 1500 MG/1
40 TABLET, EXTENDED RELEASE ORAL ONCE
Status: COMPLETED | OUTPATIENT
Start: 2022-02-10 | End: 2022-02-10

## 2022-02-10 RX ADMIN — PANTOPRAZOLE SODIUM 40 MG: 40 TABLET, DELAYED RELEASE ORAL at 09:20

## 2022-02-10 RX ADMIN — CEFUROXIME AXETIL 250 MG: 250 TABLET ORAL at 09:17

## 2022-02-10 RX ADMIN — RIVAROXABAN 10 MG: 10 TABLET, FILM COATED ORAL at 17:40

## 2022-02-10 RX ADMIN — POTASSIUM CHLORIDE 40 MEQ: 1500 TABLET, EXTENDED RELEASE ORAL at 09:16

## 2022-02-10 RX ADMIN — CEFUROXIME AXETIL 250 MG: 250 TABLET ORAL at 20:20

## 2022-02-10 RX ADMIN — QUETIAPINE 12.5 MG: 25 TABLET, FILM COATED ORAL at 01:48

## 2022-02-10 RX ADMIN — POTASSIUM CHLORIDE 20 MEQ: 1500 TABLET, EXTENDED RELEASE ORAL at 15:47

## 2022-02-10 RX ADMIN — POTASSIUM CHLORIDE 40 MEQ: 1500 TABLET, EXTENDED RELEASE ORAL at 09:17

## 2022-02-10 ASSESSMENT — ACTIVITIES OF DAILY LIVING (ADL)
ADLS_ACUITY_SCORE: 17
ADLS_ACUITY_SCORE: 17
ADLS_ACUITY_SCORE: 20
ADLS_ACUITY_SCORE: 17
ADLS_ACUITY_SCORE: 20
ADLS_ACUITY_SCORE: 17
ADLS_ACUITY_SCORE: 20
ADLS_ACUITY_SCORE: 20
ADLS_ACUITY_SCORE: 17
ADLS_ACUITY_SCORE: 20
ADLS_ACUITY_SCORE: 17
ADLS_ACUITY_SCORE: 20
ADLS_ACUITY_SCORE: 17
ADLS_ACUITY_SCORE: 20
ADLS_ACUITY_SCORE: 17
ADLS_ACUITY_SCORE: 20
ADLS_ACUITY_SCORE: 20
ADLS_ACUITY_SCORE: 17

## 2022-02-10 NOTE — PROGRESS NOTES
CLINICAL NUTRITION SERVICES - REASSESSMENT NOTE      RECOMMENDATIONS FOR MD/PROVIDER TO ORDER:   ** Consider Nutrition support **  Poor PO/refusal of meals x19 day admission    Recommendations Ordered by Registered Dietitian (RD):   - Weigh patient  - Send pudding + benepro just 1x daily  - Encouraged intakes    Malnutrition:   (2/3)  % Weight Loss: Unable to evaluate   % Intake:  </= 50% for >/= 5 days (severe malnutrition)  Subcutaneous Fat Loss:  Orbital region moderate depletion, Upper arm region moderate depletion and Thoracic region moderate depletion  Muscle Loss:  Temporal region moderate depletion, Clavicle bone region moderate depletion and Dorsal hand region moderate depletion  Fluid Retention:  None noted     Malnutrition Diagnosis: Severe malnutrition  In Context of:  Acute illness or injury  Chronic illness or disease     EVALUATION OF PROGRESS TOWARD GOALS   Diet: Soft and Bite Sized + Thin Liquids  Vanilla pudding + Beneprotein w/ meals     Intake/Tolerance:   - Continues to refuse meals, will take pudding and sometimes magic cup. Tray had 4 vanilla puddings mixed with beneprotein on it, none appeared to be touched indicated pt has not been eating them.   - Noted pt preferences are listed on whiteboard.     - Pt seen in room this morning. She tells me she is looking at pictures. She didn't eat breakfast, tells me she is full. I showed her all the different containers of pudding on her tray table - she declined all. The only thing she wanted was 7up.     - Stooling: BMs x1-2 on 2/7, 2/8, 2/9 (first of admission!)  - Meds: Miralax daily (started 1/31), Senokot BID (started 2/7)  - Weight trending: No new wt since 2/5    ASSESSED NUTRITION NEEDS:  Dosing Weight 33.6 kg   Estimated Energy Needs: 6070-6485 kcals (35-40 Kcal/Kg)  Justification: repletion and underweight  Estimated Protein Needs: 50-70 grams protein (1.5-2 g pro/Kg)  Justification: repletion and hypercatabolism with acute illness    NEW  FINDINGS:   - Pt's brother working on emergency guardianship    Previous Goals:   Patient will consume 50% meals and supplements at least BID vs nutrition support  Evaluation: Not met  Patient will have a BM in 48 hrs   Evaluation: Met    Previous Nutrition Diagnosis:   Inadequate oral intake related to lack of appetite, disinterest in food as evidenced by patient declines most food aside from pudding for the past 16 days of admission.  Evaluation: No change    CURRENT NUTRITION DIAGNOSIS  Inadequate oral intake related to lack of appetite, disinterest in food as evidenced by patient declines most food aside from pudding for the past 19 days of admission.    INTERVENTIONS  Recommendations / Nutrition Prescription  Diet per SLP  Continue Pudding + Benepro, reduce to just 1/day given patient not taking consistently     Implementation  Medical Food Supplement: adjusted as above     Goals  Intake of 50% meals at least BID vs begin nutrition support in the next 3 days.       MONITORING AND EVALUATION:  Progress towards goals will be monitored and evaluated per protocol and Practice Guidelines    Rosalba Adams RD, LD  [facility]  Pager: [phone]  Weekend Pager: [phone]

## 2022-02-10 NOTE — PLAN OF CARE
SUMMARY: COVID recovered  DATE & TIME: 02/9/22-2/10/22 5660-7313  Cognitive Concerns/ Orientation : Alert and Oriented to self.  BEHAVIOR & AGGRESSION TOOL COLOR: Yellow - anxious, agitated and irritable at times, refusing cares.  Gave PRN seroquel x1   ABNL VS/O2: VSS. On RA  MOBILITY: Assist x1 w/ gait belt, up to bathroom; Up in chair   PAIN MANAGMENT: Denies pain  DIET: Soft and Bite Sized Diet (level 6); Thin Liquids (level 0).  BOWEL/BLADDER: Continent of urine, can be incontinent at times, no BM.    ABNL LAB/BG:   DRAIN/DEVICES: none  TELEMETRY RHYTHM: N/A  SKIN: Pale skin, very dry lips. Mepilex on coccyx CDI (WOC following). Left arm +1 edema from iv infiltration. Intact blister noted on left arm. Arm elevated on pillow.  TESTS/PROCEDURES: UC pending  D/C DATE: discharge to TCU on 2/28/22 per  note.   Discharge Barriers: Safe placement.  OTHER IMPORTANT INFO: Father passed on 1/26 and mother passed on 2/2. Brother Maxi has not mentioned this to the patient per Palliative SW. He is in the process of acquiring emergency guardianship for Miranda. Patient  on po antibiotic, Ceftin. Pills crush and mix with small amount of pudding.

## 2022-02-10 NOTE — PLAN OF CARE
SUMMARY: COVID recovered  DATE & TIME: 02/9/22 3-11pm  Cognitive Concerns/ Orientation : Alert and Oriented to self.  BEHAVIOR & AGGRESSION TOOL COLOR: Yellow - anxious, agitated and irritable at times, refusing cares.  Gave PRN seroquel  ABNL VS/O2: VSS. On RA  MOBILITY: Assist x1 w/ gait belt, up to commode; Up in chair   PAIN MANAGMENT: Denies pain  DIET: Soft and Bite Sized Diet (level 6); Thin Liquids (level 0). Refused dinner.   BOWEL/BLADDER: Incontinent of urine, no BM.    ABNL LAB/BG:   DRAIN/DEVICES: none  TELEMETRY RHYTHM: N/A  SKIN: Pale skin, very dry lips. Mepilex on coccyx CDI (WOC following). Left arm +1 edema from iv infiltration. Intact blister noted on left arm. Arm elevated on pillow.  TESTS/PROCEDURES: UC pending  D/C DATE: discharge to TCU on 2/28/22 per  note.   Discharge Barriers: Safe placement.  OTHER IMPORTANT INFO: Father passed on 1/26 and mother passed on 2/2. Brother Maxi has not mentioned this to the patient per Palliative SW. He is in the process of acquiring emergency guardianship for Miranda. Patient  on po antibiotic, Ceftin. Pills crush and mix with small amount of pudding. Patient took her pills but refused her Albuterol inhaler.

## 2022-02-10 NOTE — PROGRESS NOTES
"[facility]  Medicine Progress Note - Hospitalist Service    Date of Admission:  1/22/2022    Assessment & Plan          Miranda Dover is a 51 year old female with hx of Down syndrome who was admitted on 1/22/2022 for acute hypoxic respiratory failure due to COVID-19 pneumonia.      Acute hypoxic respiratory failure due to COVID-19 pneumonia. Resolved   COVID RECOVERED  * Unvaccinated  * Symptom onset 1/19/22  * Tested positive for COVID on 1/22/22  * Chest CT on arrival showed bilateral infiltrates  * Initiated on dexamethasone and remdesivir on 1/22  * Completed 5-day course of remdesivir on 1/26  - O2 as needed   - completed 10-day course of dexamethasone on 1/31  - On rivaroxaban for VTE ppx, plan 30-day course (last day: 2/22)     Down syndrome  Developmental delay  * Patient lives with her parents who unfortunately were hospitalized with COVID on 1/22 as well. Her father passed away on 1/26; her mother passed on 2/2  * Patient has been intermittently anxious and agitated, sometimes yelling/screaming. She doesn't know why she's here and has repeatedly asked \"what's wrong with me?\" Following admission, she became increasingly withdrawn, refusing medications and meals at times  - PT/OT recommending TCU; placement will be difficult as patient will need guardianship re-established. Guardianship will need to be discussed with the patient's brother; conversation currently on hold while he navigates recent events with his parents passing. Refer to SW note from 2/1.   - Palliative Care consult requested for emotional/decisional support, refer to note from 1/31/22     Poor nutrition and dehydration  Underweight with cachexia  Failure to thrive  During past 1-2 days patient's PO continues to worsen.  Now she is reporting concerns with swallowing but it is unclear if this is due to pain, dysphagia or other cause.  This appears to be a chronic issues for patient.  BMI is ~13 Fortunately patient " has been agreeable to restarting IVF. Discussed dietary needs with patient and what she enjoys.  She has stated she likes turkey and white bread sandwiches  - Continue IVF, Continue to try to encourage PO  - On Ensure Enlive as per Nutrition  - Speech consult for possible dysphagia. She actually did well with speech.     UTI  Started ceftin PO 2/9, plan 5 days, follow culture     Borderline hypotension. Resolved   Baseline blood pressures 90s-100s systolic. Decreased to mid-80s systolic in the setting of minimal PO intake.  * Treated with IVF from 1/29-1/31      Chest pain.  Resolved  Intermittent chest pain noted during this hospitalization. Patient refused serial troponins and echo.  No recent complaints      Leukopenia and thrombocytopenia, improved   WBC ron 3.6k; PLT ron 119k - likely due to infection  - Has been refusing labs. Will follow periodically    Hypokalemia  Suspect due to poor PO intake, replace per protocol.     Thyroid nodule, possible hypothyroidism  Incidentally noted on admission chest CT. TSH was elevated to 15, but T4 was normal  - Consider repeat thyroid studies and possible thyroid US once patient recovers from COVID    Malnutrition:    - Level of malnutrition: Severe   - nutrition supplement per RD    Agitation/restlessness  Hallucinations  -check UA  - seroquel low dose PRN  - melatonin for sleep.     Constipation  Per RN, did not have BM for several days  Bowel regimen added, BM (+)  Encourage PO fluid       Diet: Combination Diet Soft and Bite Sized Diet (level 6); Thin Liquids (level 0)  Snacks/Supplements Adult: Beneprotein; With Meals    DVT Prophylaxis: DOAC  Zhu Catheter: Not present  Central Lines: None  Cardiac Monitoring: None  Code Status: Full Code      Disposition Plan   Expected Discharge: 02/28/2022     Anticipated discharge location: inpatient rehabilitation facility    Delays:     Placement - TCU            The patient's care was discussed with the Bedside Nurse and  Patient. Discussed with her brother Maxi 2/9    Lynn Mendoza MD  Hospitalist Service  [facility]  Securely message with the [employer] Web Console (learn more here)  Text page via [employer] Paging/Directory         Clinically Significant Risk Factors Present on Admission                    ______________________________________________________________________    Interval History   Patient seen and examined. Ongoing poor PO intake. Feels irritated with cares and refuses at times.  Denies pain or dyspnea.       Data reviewed today: I reviewed all medications, new labs results over the last 24 hours. I personally reviewed no images or EKG's today.    Physical Exam   Vital Signs: Temp: 98.5  F (36.9  C) Temp src: Oral BP: 114/65 Pulse: 96   Resp: 16 SpO2: 96 % O2 Device: None (Room air)    Weight: 96 lbs 9.6 oz     General Appearance: Resting comfortably.   HEENT: sunken eyes and temporal wasting.   Respiratory: No respiratory distress  Cardiovascular: RRR  GI: Soft.  Non-distended  Skin: Skin is still dry.  No rashes  Other: Alert. Frail appearing      Data   Recent Labs   Lab 02/10/22  1303 02/09/22  1002 02/08/22  0843 02/06/22  1247 02/05/22  1400 02/04/22  0903   WBC  --   --  7.3 5.1 6.7 6.0   HGB  --   --  12.4 11.2* 11.8 13.6   MCV  --   --  94 94 94 95   PLT  --   --  140* 136* 148* 149*   NA  --  141  --   --   --   --    POTASSIUM 3.3* 2.8*  --   --   --   --    CHLORIDE  --  104  --   --   --   --    CO2  --  31  --   --   --   --    BUN  --  6*  --   --   --   --    CR  --  0.64  0.58  --   --   --  0.52   ANIONGAP  --  6  --   --   --   --    JOSUÉ  --  8.9  --   --   --   --    GLC  --  98  --   --   --   --      No results found for this or any previous visit (from the past 24 hour(s)).

## 2022-02-11 ENCOUNTER — APPOINTMENT (OUTPATIENT)
Dept: SPEECH THERAPY | Facility: CLINIC | Age: 52
DRG: 177 | End: 2022-02-11
Payer: COMMERCIAL

## 2022-02-11 LAB — POTASSIUM BLD-SCNC: 3.4 MMOL/L (ref 3.4–5.3)

## 2022-02-11 PROCEDURE — 250N000013 HC RX MED GY IP 250 OP 250 PS 637: Performed by: HOSPITALIST

## 2022-02-11 PROCEDURE — 92526 ORAL FUNCTION THERAPY: CPT | Mod: GN | Performed by: SPEECH-LANGUAGE PATHOLOGIST

## 2022-02-11 PROCEDURE — 36415 COLL VENOUS BLD VENIPUNCTURE: CPT | Performed by: HOSPITALIST

## 2022-02-11 PROCEDURE — 120N000001 HC R&B MED SURG/OB

## 2022-02-11 PROCEDURE — 99233 SBSQ HOSP IP/OBS HIGH 50: CPT | Performed by: HOSPITALIST

## 2022-02-11 PROCEDURE — 84132 ASSAY OF SERUM POTASSIUM: CPT | Performed by: HOSPITALIST

## 2022-02-11 RX ORDER — POTASSIUM CHLORIDE 1500 MG/1
20 TABLET, EXTENDED RELEASE ORAL ONCE
Status: COMPLETED | OUTPATIENT
Start: 2022-02-11 | End: 2022-02-11

## 2022-02-11 RX ORDER — POTASSIUM CHLORIDE 1500 MG/1
20 TABLET, EXTENDED RELEASE ORAL ONCE
Status: DISCONTINUED | OUTPATIENT
Start: 2022-02-11 | End: 2022-02-11

## 2022-02-11 RX ADMIN — ALBUTEROL SULFATE 2 PUFF: 90 AEROSOL, METERED RESPIRATORY (INHALATION) at 17:53

## 2022-02-11 RX ADMIN — RIVAROXABAN 10 MG: 10 TABLET, FILM COATED ORAL at 17:53

## 2022-02-11 RX ADMIN — CEFUROXIME AXETIL 250 MG: 250 TABLET ORAL at 09:16

## 2022-02-11 RX ADMIN — POTASSIUM CHLORIDE 20 MEQ: 1500 TABLET, EXTENDED RELEASE ORAL at 13:38

## 2022-02-11 RX ADMIN — QUETIAPINE 12.5 MG: 25 TABLET, FILM COATED ORAL at 01:50

## 2022-02-11 RX ADMIN — ACETAMINOPHEN 650 MG: 325 TABLET, FILM COATED ORAL at 17:59

## 2022-02-11 RX ADMIN — PANTOPRAZOLE SODIUM 40 MG: 40 TABLET, DELAYED RELEASE ORAL at 09:16

## 2022-02-11 ASSESSMENT — ACTIVITIES OF DAILY LIVING (ADL)
ADLS_ACUITY_SCORE: 17
ADLS_ACUITY_SCORE: 17
ADLS_ACUITY_SCORE: 16
ADLS_ACUITY_SCORE: 17
ADLS_ACUITY_SCORE: 18
ADLS_ACUITY_SCORE: 18
ADLS_ACUITY_SCORE: 17
ADLS_ACUITY_SCORE: 18
ADLS_ACUITY_SCORE: 16
ADLS_ACUITY_SCORE: 17
ADLS_ACUITY_SCORE: 16
ADLS_ACUITY_SCORE: 17

## 2022-02-11 NOTE — PLAN OF CARE
"SUMMARY: COVID recovered  DATE & TIME: 02/10/22 8805-0119  Cognitive Concerns/ Orientation : Alert and Oriented to self.  BEHAVIOR & AGGRESSION TOOL COLOR: Yellow - anxious, agitated and refusing cares at times; frequently moans/cries stating \"My mommy has passed and my dad is gone.\" - per chart, brother has not told patient their parents have passed.  ABNL VS/O2: Refused  MOBILITY: Assist x1 w/ gait belt, up to bathroom; Up in chair   PAIN MANAGMENT: Denies pain  DIET: Soft and Bite Sized Diet (level 6); Thin Liquids (level 0) - very poor appetite - only had a few bites of pudding and a few sips of 7-up and water today  BOWEL/BLADDER: Incontinent at times; no BM.    ABNL LAB/BG: K+ 2.8 to 3.3 - replaced; recheck in am  DRAIN/DEVICES: None  TELEMETRY RHYTHM: N/A  SKIN: Pale skin, very dry lips. Mepilex on coccyx CDI (WOC following). Left arm +1 edema from iv infiltration. Intact blister noted on left arm. Arm elevated on pillow.  TESTS/PROCEDURES: None  D/C DATE: discharge to TCU on 2/28/22 per  note.   Discharge Barriers: Safe placement.  OTHER IMPORTANT INFO: Father passed on 1/26 and mother passed on 2/2. Brother Maxi has not mentioned this to the patient per Palliative SW. He is in the process of acquiring emergency guardianship for Miranda. Patient  on po antibiotic, Ceftin. Takes pills crushed and mixed with small amount of pudding; however, refuses most of the time.    "

## 2022-02-11 NOTE — PLAN OF CARE
SUMMARY: COVID recovered  DATE & TIME: 02/10/22-2/11/22 1238-6723  Cognitive Concerns/ Orientation : Alert and Oriented to self.  BEHAVIOR & AGGRESSION TOOL COLOR: Yellow - anxious, agitated and and refusing cares at times.  ABNL VS/O2: Refused  MOBILITY: Assist x1 w/ gait belt, up to bathroom; Up in chair   PAIN MANAGMENT: Denies pain  DIET: Soft and Bite Sized Diet (level 6); Thin Liquids (level 0).  BOWEL/BLADDER: Incontinent at times up to bathroom x2; no BM.    ABNL LAB/BG: K+ 2.8 to 3.3 after replacement - will repeat protocol and recheck in am  DRAIN/DEVICES: None  TELEMETRY RHYTHM: N/A  SKIN: Pale skin, very dry lips. Mepilex on coccyx CDI (WOC following). Left arm +1 edema from iv infiltration. Intact blister noted on left arm. Arm elevated on pillow.  TESTS/PROCEDURES: None  D/C DATE: discharge to TCU on 2/28/22 per  note.   Discharge Barriers: Safe placement.  OTHER IMPORTANT INFO: Father passed on 1/26 and mother passed on 2/2. Brother Maxi has not mentioned this to the patient per Palliative SW. He is in the process of acquiring emergency guardianship for Miranda. Patient  on po antibiotic, Ceftin. Takes pills crushed and mixed with small amount of pudding; however, refuses most of the time. PRN Seroquel given for agitation, restlessness.

## 2022-02-11 NOTE — PROGRESS NOTES
Care Management Follow Up    Length of Stay (days): 20    Expected Discharge Date: 03/30/2022     Concerns to be Addressed: discharge planning     Patient plan of care discussed at interdisciplinary rounds: Yes    Anticipated Discharge Disposition: Group Home     Anticipated Discharge Services:    Anticipated Discharge DME:      Patient/family educated on Medicare website which has current facility and service quality ratings: no  Education Provided on the Discharge Plan:    Patient/Family in Agreement with the Plan: yes    Referrals Placed by CM/SW: Post Acute Facilities  Private pay costs discussed:     Additional Information:Patient's brother has petitioned for an Emergency Guardianship.  Today patient was interviewed via video by a court appointed , Sonali Hannah,  and on Monday the court appointed  to represent patient  will visit with patient via video.  SW on Monday will set up this video for 11:30.  The Court SW explained in simple terms to patient that her brother is seeking guardianship for patient in order to help arrange for her personal needs, housing and medical needs.  When asked if patient wants Matt to be her guardian she said yes.  The Guardianship hearing is on Tuesday.  Per Court SW, this  weekend brother Matt will be bringing papers for patient relating to the guardianship.        BARBIE MullinsSW

## 2022-02-11 NOTE — PROGRESS NOTES
Speech Language Therapy Discharge Summary    Reason for therapy discharge:    All goals and outcomes met, no further needs identified.    Progress towards therapy goal(s). See goals on Care Plan in [facility] electronic health record for goal details.  Goals met    Therapy recommendation(s):    No further therapy is recommended.

## 2022-02-11 NOTE — PLAN OF CARE
"SUMMARY: COVID recovered  DATE & TIME: 02/11/22 3475-8611  Cognitive Concerns/ Orientation : Alert and Oriented to self.  BEHAVIOR & AGGRESSION TOOL COLOR: Yellow - anxious, agitated and refusing cares at times; occasionally moans/cries stating \"My mommy and my dad are gone.\" - per chart, brother has not told patient their parents have passed.  ABNL VS/O2: Refused  MOBILITY: Assist x1 w/ gait belt, up to bathroom; Up in chair   PAIN MANAGMENT: Denies pain  DIET: Advanced to regular - ate a few bites of pudding, turkey and bread but appetite remains poor  BOWEL/BLADDER: Incontinent at times; no BM.    ABNL LAB/BG: K+ 3.4 - replaced, recheck in am  DRAIN/DEVICES: None  TELEMETRY RHYTHM: N/A  SKIN: Pale skin, very dry lips. Mepilex on coccyx CDI (WOC following). Left arm +1 edema from iv infiltration. Intact blister noted on left arm. Arm elevated on pillow.  TESTS/PROCEDURES: None  D/C DATE: discharge to TCU on 2/28/22 per  note.   Discharge Barriers: Safe placement.  OTHER IMPORTANT INFO: Father passed on 1/26 and mother passed on 2/2. Brother Maxi has not mentioned this to the patient per Palliative SW. He is in the process of acquiring emergency guardianship for Miranda. Patient  on po antibiotic, Ceftin. Takes pills crushed and mixed with small amount of pudding; however, refuses most of the time.     "

## 2022-02-11 NOTE — PROGRESS NOTES
"[facility]  Medicine Progress Note - Hospitalist Service    Date of Admission:  1/22/2022    Assessment & Plan          Miranda Dover is a 51 year old female with hx of Down syndrome who was admitted on 1/22/2022 for acute hypoxic respiratory failure due to COVID-19 pneumonia.      Acute hypoxic respiratory failure due to COVID-19 pneumonia. Resolved   COVID recovered  * Unvaccinated  * Symptom onset 1/19/22  * Tested positive for COVID on 1/22/22  * Chest CT on arrival showed bilateral infiltrates  * Initiated on dexamethasone and remdesivir on 1/22  * Completed 5-day course of remdesivir on 1/26  - O2 as needed   - completed 10-day course of dexamethasone on 1/31  - On rivaroxaban for VTE ppx, plan 30-day course (last day: 2/22)     Down syndrome  Developmental delay  * Patient lives with her parents who unfortunately were hospitalized with COVID on 1/22 as well. Her father passed away on 1/26; her mother passed on 2/2  * Patient has been intermittently anxious and agitated, sometimes yelling/screaming. She doesn't know why she's here and has repeatedly asked \"what's wrong with me?\" Following admission, she became increasingly withdrawn, refusing medications and meals at times  - PT/OT recommending TCU; placement will be difficult as patient will need guardianship re-established. Guardianship will need to be discussed with the patient's brother; conversation currently on hold while he navigates recent events with his parents passing. Refer to SW note from 2/1.   - Palliative Care consult requested for emotional/decisional support, refer to note from 1/31/22     Poor nutrition and dehydration  Underweight with cachexia  Failure to thrive  During past 1-2 days patient's PO continues to worsen.  Now she is reporting concerns with swallowing but it is unclear if this is due to pain, dysphagia or other cause.  This appears to be a chronic issues for patient.  BMI is ~13 Fortunately patient " has been agreeable to restarting IVF. Discussed dietary needs with patient and what she enjoys.  She has stated she likes turkey and white bread sandwiches  - Continue IVF, Continue to try to encourage PO  - On Ensure Enlive as per Nutrition  - Speech consulted for possible dysphagia, She actually did well with speech.     E coli UTI  Started ceftin PO 2/9, plan 5 days, 2 strains of E coli noted, pansensitive     Borderline hypotension. Resolved   Baseline blood pressures 90s-100s systolic. Decreased to mid-80s systolic in the setting of minimal PO intake.  * Treated with IVF from 1/29-1/31   - patient lost IV multiple times, and has refused.   -encourage PO     Chest pain.  Resolved  Intermittent chest pain noted during this hospitalization. Patient refused serial troponins and echo.  No recent complaints      Leukopenia and thrombocytopenia, improved   WBC ron 3.6k; PLT ron 119k - likely due to infection  - Has been refusing labs. Will follow periodically    Hypokalemia  Suspect due to poor PO intake, replace per protocol.     Thyroid nodule, possible hypothyroidism  Incidentally noted on admission chest CT. TSH was elevated to 15, but T4 was normal  - Consider repeat thyroid studies and possible thyroid US once patient recovers from COVID    Malnutrition:    - Level of malnutrition: Severe   - nutrition supplement per RD    Agitation/restlessness  Hallucinations  - not noted once UTI is being treated  - seroquel low dose PRN  - melatonin for sleep.     Constipation  Per RN, did not have BM for several days  Bowel regimen added, BM (+)  Encourage PO fluid       Diet: Snacks/Supplements Adult: Beneprotein; With Meals  Combination Diet Regular Diet    DVT Prophylaxis: DOAC  Zhu Catheter: Not present  Central Lines: None  Cardiac Monitoring: None  Code Status: Full Code      Disposition Plan   Expected Discharge: 03/30/2022     Anticipated discharge location: inpatient rehabilitation facility    Delays:      Placement - TCU            The patient's care was discussed with the Bedside Nurse and Patient. Discussed with her brother Maxi 2/9    Lynn Mendoza MD  Hospitalist Service  [facility]  Securely message with the [employer] Web Console (learn more here)  Text page via [employer] Paging/Directory         Clinically Significant Risk Factors Present on Admission                    ______________________________________________________________________    Interval History   Patient seen and examined. Minimal PO intake. Did not express any symptoms.    Per RN, patient talked last evening about her parents passing out and appeared sad. She is asking for her mom today though.     Data reviewed today: I reviewed all medications, new labs results over the last 24 hours. I personally reviewed no images or EKG's today.    Physical Exam   Vital Signs: Temp: 98.5  F (36.9  C) Temp src: Oral BP: 106/68 Pulse: 90   Resp: 16 SpO2: 91 % O2 Device: None (Room air)    Weight: 96 lbs 9.6 oz     General Appearance: Resting comfortably.   HEENT: sunken eyes and temporal wasting. Lips are extremely dry  Respiratory: No respiratory distress  Cardiovascular: RRR  GI: Soft.  Non-distended  Skin: Skin is still dry.  No rashes  Other: Alert. Frail appearing      Data   Recent Labs   Lab 02/11/22  0911 02/10/22  1303 02/09/22  1002 02/08/22  0843 02/06/22  1247 02/05/22  1400   WBC  --   --   --  7.3 5.1 6.7   HGB  --   --   --  12.4 11.2* 11.8   MCV  --   --   --  94 94 94   PLT  --   --   --  140* 136* 148*   NA  --   --  141  --   --   --    POTASSIUM 3.4 3.3* 2.8*  --   --   --    CHLORIDE  --   --  104  --   --   --    CO2  --   --  31  --   --   --    BUN  --   --  6*  --   --   --    CR  --   --  0.64  0.58  --   --   --    ANIONGAP  --   --  6  --   --   --    JOSUÉ  --   --  8.9  --   --   --    GLC  --   --  98  --   --   --      No results found for this or any previous visit (from the past 24 hour(s)).

## 2022-02-12 LAB — GLUCOSE BLDC GLUCOMTR-MCNC: 96 MG/DL (ref 70–99)

## 2022-02-12 PROCEDURE — 250N000013 HC RX MED GY IP 250 OP 250 PS 637: Performed by: HOSPITALIST

## 2022-02-12 PROCEDURE — 120N000001 HC R&B MED SURG/OB

## 2022-02-12 PROCEDURE — 99232 SBSQ HOSP IP/OBS MODERATE 35: CPT | Performed by: HOSPITALIST

## 2022-02-12 RX ORDER — CEFUROXIME AXETIL 250 MG/1
250 TABLET ORAL EVERY 12 HOURS SCHEDULED
Status: DISCONTINUED | OUTPATIENT
Start: 2022-02-12 | End: 2022-02-16

## 2022-02-12 RX ADMIN — QUETIAPINE 12.5 MG: 25 TABLET, FILM COATED ORAL at 13:26

## 2022-02-12 RX ADMIN — ALBUTEROL SULFATE 2 PUFF: 90 AEROSOL, METERED RESPIRATORY (INHALATION) at 13:07

## 2022-02-12 RX ADMIN — Medication 1 MG: at 21:32

## 2022-02-12 RX ADMIN — ALBUTEROL SULFATE 2 PUFF: 90 AEROSOL, METERED RESPIRATORY (INHALATION) at 17:49

## 2022-02-12 RX ADMIN — QUETIAPINE 12.5 MG: 25 TABLET, FILM COATED ORAL at 17:36

## 2022-02-12 RX ADMIN — ACETAMINOPHEN 650 MG: 325 TABLET, FILM COATED ORAL at 13:26

## 2022-02-12 RX ADMIN — SENNOSIDES AND DOCUSATE SODIUM 1 TABLET: 8.6; 5 TABLET ORAL at 21:32

## 2022-02-12 RX ADMIN — RIVAROXABAN 10 MG: 10 TABLET, FILM COATED ORAL at 17:48

## 2022-02-12 RX ADMIN — PANTOPRAZOLE SODIUM 40 MG: 40 TABLET, DELAYED RELEASE ORAL at 13:02

## 2022-02-12 RX ADMIN — CEFUROXIME AXETIL 250 MG: 250 TABLET ORAL at 21:32

## 2022-02-12 ASSESSMENT — ACTIVITIES OF DAILY LIVING (ADL)
ADLS_ACUITY_SCORE: 18
ADLS_ACUITY_SCORE: 17
ADLS_ACUITY_SCORE: 18
ADLS_ACUITY_SCORE: 17
ADLS_ACUITY_SCORE: 18
ADLS_ACUITY_SCORE: 18
ADLS_ACUITY_SCORE: 17
ADLS_ACUITY_SCORE: 18
ADLS_ACUITY_SCORE: 17

## 2022-02-12 NOTE — PROGRESS NOTES
"[facility]  Medicine Progress Note - Hospitalist Service    Date of Admission:  1/22/2022    Assessment & Plan          Miranda Dover is a 51 year old female with hx of Down syndrome who was admitted on 1/22/2022 for acute hypoxic respiratory failure due to COVID-19 pneumonia.      Acute hypoxic respiratory failure due to COVID-19 pneumonia. Resolved   COVID recovered  * Unvaccinated  * Symptom onset 1/19/22  * Tested positive for COVID on 1/22/22  * Chest CT on arrival showed bilateral infiltrates  * Initiated on dexamethasone and remdesivir on 1/22  * Completed 5-day course of remdesivir on 1/26  - O2 as needed   - completed 10-day course of dexamethasone on 1/31  - On rivaroxaban for VTE ppx, plan 30-day course (last day: 2/22)     Down syndrome  Developmental delay  * Patient lives with her parents who unfortunately were hospitalized with COVID on 1/22 as well. Her father passed away on 1/26; her mother passed on 2/2  * Patient has been intermittently anxious and agitated, sometimes yelling/screaming. She doesn't know why she's here and has repeatedly asked \"what's wrong with me?\" Following admission, she became increasingly withdrawn, refusing medications and meals at times  - PT/OT recommending TCU; placement will be difficult as patient will need guardianship re-established. Guardianship will need to be discussed with the patient's brother; conversation currently on hold while he navigates recent events with his parents passing. Refer to SW note from 2/1.   - Palliative Care consult requested for emotional/decisional support, refer to note from 1/31/22   -Patient's brother has petition for emergency guardianship, court hearing on 2/15    Poor nutrition and dehydration  Underweight with cachexia  Failure to thrive  During past 1-2 days patient's PO continues to worsen.  Now she is reporting concerns with swallowing but it is unclear if this is due to pain, dysphagia or other " cause.  This appears to be a chronic issues for patient.  BMI is ~13 Fortunately patient has been agreeable to restarting IVF. Discussed dietary needs with patient and what she enjoys.  She has stated she likes turkey and white bread sandwiches  - Continue IVF, Continue to try to encourage PO  - On Ensure Enlive as per Nutrition  - Speech consulted for possible dysphagia, She actually did well with speech.     E coli UTI  Started ceftin PO 2/9, plan 7 days, 2 strains of E coli noted, pansensitive     Borderline hypotension. Resolved   Baseline blood pressures 90s-100s systolic. Decreased to mid-80s systolic in the setting of minimal PO intake.  * Treated with IVF from 1/29-1/31   - patient lost IV multiple times, and has refused.   -encourage PO     Chest pain.  Resolved  Intermittent chest pain noted during this hospitalization. Patient refused serial troponins and echo.  No recent complaints      Leukopenia and thrombocytopenia, improved   WBC ron 3.6k; PLT ron 119k - likely due to infection  - Has been refusing labs. Will follow periodically    Hypokalemia  Suspect due to poor PO intake, replace per protocol.     Thyroid nodule, possible hypothyroidism  Incidentally noted on admission chest CT. TSH was elevated to 15, but T4 was normal  - Consider repeat thyroid studies and possible thyroid US once patient recovers from COVID    Malnutrition:    - Level of malnutrition: Severe   - nutrition supplement per RD    Agitation/restlessness  Hallucinations  - not noted once UTI is being treated  - seroquel low dose PRN  - melatonin for sleep.     Constipation  Per RN, did not have BM for several days  Bowel regimen added, BM (+)  Encourage PO fluid       Diet: Snacks/Supplements Adult: Beneprotein; With Meals  Combination Diet Regular Diet    DVT Prophylaxis: DOAC  Zhu Catheter: Not present  Central Lines: None  Cardiac Monitoring: None  Code Status: Full Code      Disposition Plan   Expected Discharge: 03/30/2022      Anticipated discharge location: inpatient rehabilitation facility    Delays:     Placement - TCU            The patient's care was discussed with the Bedside Nurse and Patient. Discharge pending guardianship, court hearing 2/15, and there after.    Lynn Mendoza MD  Hospitalist Service  [facility]  Securely message with the Vocera Web Console (learn more here)  Text page via [employer] Paging/Directory         Clinically Significant Risk Factors Present on Admission                    ______________________________________________________________________    Interval History     Discussed with nursing staff and evaluated patient this morning.  No acute issues reported.          Data reviewed today: I reviewed all medications, new labs results over the last 24 hours. I personally reviewed no images or EKG's today.    Physical Exam   Vital Signs: Temp: 98.5  F (36.9  C) Temp src: Oral BP: 96/63 Pulse: 70   Resp: 18 SpO2: 90 % O2 Device: None (Room air)    Weight: 96 lbs 9.6 oz     General Appearance: Resting comfortably.   HEENT: sunken eyes and temporal wasting.    Respiratory: No respiratory distress  Cardiovascular: RRR  GI: Soft.  Non-distended  Skin:  No rashes  Other: Alert. Frail appearing      Data   Recent Labs   Lab 02/11/22  0911 02/10/22  1303 02/09/22  1002 02/08/22  0843 02/06/22  1247 02/05/22  1400   WBC  --   --   --  7.3 5.1 6.7   HGB  --   --   --  12.4 11.2* 11.8   MCV  --   --   --  94 94 94   PLT  --   --   --  140* 136* 148*   NA  --   --  141  --   --   --    POTASSIUM 3.4 3.3* 2.8*  --   --   --    CHLORIDE  --   --  104  --   --   --    CO2  --   --  31  --   --   --    BUN  --   --  6*  --   --   --    CR  --   --  0.64  0.58  --   --   --    ANIONGAP  --   --  6  --   --   --    JOSUÉ  --   --  8.9  --   --   --    GLC  --   --  98  --   --   --      No results found for this or any previous visit (from the past 24 hour(s)).

## 2022-02-12 NOTE — PLAN OF CARE
SUMMARY: COVID recovered  DATE & TIME: 02/11/22 8617-6486  Cognitive Concerns/ Orientation : Alert and Oriented to self. confused  BEHAVIOR & AGGRESSION TOOL COLOR: Yellow - anxious at times. Cooperative with cares.  ABNL VS/O2: Stable on room air except soft BP.  MOBILITY: Assist x1 w/ gait belt, up to bathroom; up in chair  PAIN MANAGMENT: Denies pain  DIET: Advanced to regular - poor appetite. Takes pills whole with water.  BOWEL/BLADDER: Incontinent at times; no BM.    ABNL LAB/BG: K+ 3.4 - replaced, recheck in am  DRAIN/DEVICES: None  TELEMETRY RHYTHM: N/A  SKIN: Pale skin, very dry lips. Mepilex on coccyx CDI (WOC following). Left arm +1 edema from iv infiltration. Intact blister noted on left arm. Arm elevated on pillow.  TESTS/PROCEDURES: None  D/C DATE: discharge to TCU on 2/28/22 per  note.   Discharge Barriers: Safe placement.  OTHER IMPORTANT INFO:  SW following for placement and Emergency guardianship.

## 2022-02-12 NOTE — PLAN OF CARE
DATE & TIME: 02/11/22 4380-8885  Cognitive Concerns/ Orientation : Alert, disoriented to situation at times. Mostly calm. Hallucinating during evening, having conversations with people that are not in the room. Reports her heart is missing and flies are in her ears at times. Refused bedtime medications, attempted multiple times. Woke for a couple of hours and sat in the hallway for an hour coloring and listening to music.   BEHAVIOR & AGGRESSION TOOL COLOR: Green to yellow  ABNL VS/O2: Refused, continued to attempt. On RA  MOBILITY: A x1 GB/W. Refuses repositioning. Needs lots of encouragement for mobility.  PAIN MANAGMENT: Denies pain  DIET: Regular diet, very poor appetite   BOWEL/BLADDER: Continent. 1 small BM  ABNL LAB/BG: None  DRAIN/DEVICES: None  SKIN: Pale skin, very dry lips. Bruised, scabbed. Mepilex on coccyx CDI (WOC following). L arm trace edema from previous IV site, elevated  TESTS/PROCEDURES: None  D/C DATE: Pending. Discharge to TCU  Discharge Barriers: Guardianship, MA  OTHER IMPORTANT INFO: Father passed on 1/26 and mother passed on 2/2. Per notes it does not appear as though brother Matt has shared this with the patient. Brother Matt has petitioned for an Emergency Guardianship.     Plan:  Monday- video visit with court appointed  at 11:30AM  Tuesday- guardianship hearing

## 2022-02-13 PROCEDURE — 250N000013 HC RX MED GY IP 250 OP 250 PS 637: Performed by: REGISTERED NURSE

## 2022-02-13 PROCEDURE — 250N000013 HC RX MED GY IP 250 OP 250 PS 637: Performed by: HOSPITALIST

## 2022-02-13 PROCEDURE — 120N000001 HC R&B MED SURG/OB

## 2022-02-13 PROCEDURE — 99232 SBSQ HOSP IP/OBS MODERATE 35: CPT | Performed by: HOSPITALIST

## 2022-02-13 RX ADMIN — ALBUTEROL SULFATE 2 PUFF: 90 AEROSOL, METERED RESPIRATORY (INHALATION) at 14:35

## 2022-02-13 RX ADMIN — SENNOSIDES AND DOCUSATE SODIUM 1 TABLET: 8.6; 5 TABLET ORAL at 21:42

## 2022-02-13 RX ADMIN — ACETAMINOPHEN 650 MG: 325 TABLET, FILM COATED ORAL at 17:45

## 2022-02-13 RX ADMIN — ALBUTEROL SULFATE 2 PUFF: 90 AEROSOL, METERED RESPIRATORY (INHALATION) at 17:45

## 2022-02-13 RX ADMIN — Medication 1 MG: at 21:42

## 2022-02-13 RX ADMIN — ALBUTEROL SULFATE 2 PUFF: 90 AEROSOL, METERED RESPIRATORY (INHALATION) at 09:04

## 2022-02-13 RX ADMIN — QUETIAPINE 12.5 MG: 25 TABLET, FILM COATED ORAL at 08:56

## 2022-02-13 RX ADMIN — SENNOSIDES AND DOCUSATE SODIUM 1 TABLET: 8.6; 5 TABLET ORAL at 08:56

## 2022-02-13 RX ADMIN — CEFUROXIME AXETIL 250 MG: 250 TABLET ORAL at 08:56

## 2022-02-13 RX ADMIN — POLYETHYLENE GLYCOL 3350 17 G: 17 POWDER, FOR SOLUTION ORAL at 08:56

## 2022-02-13 RX ADMIN — RIVAROXABAN 10 MG: 10 TABLET, FILM COATED ORAL at 17:45

## 2022-02-13 RX ADMIN — CEFUROXIME AXETIL 250 MG: 250 TABLET ORAL at 21:42

## 2022-02-13 RX ADMIN — PANTOPRAZOLE SODIUM 40 MG: 40 TABLET, DELAYED RELEASE ORAL at 08:56

## 2022-02-13 ASSESSMENT — ACTIVITIES OF DAILY LIVING (ADL)
ADLS_ACUITY_SCORE: 14
ADLS_ACUITY_SCORE: 17
ADLS_ACUITY_SCORE: 17
ADLS_ACUITY_SCORE: 14
ADLS_ACUITY_SCORE: 17
ADLS_ACUITY_SCORE: 17
ADLS_ACUITY_SCORE: 14
ADLS_ACUITY_SCORE: 17
ADLS_ACUITY_SCORE: 14
ADLS_ACUITY_SCORE: 17
ADLS_ACUITY_SCORE: 14
ADLS_ACUITY_SCORE: 14
ADLS_ACUITY_SCORE: 17
ADLS_ACUITY_SCORE: 14
ADLS_ACUITY_SCORE: 17

## 2022-02-13 NOTE — PLAN OF CARE
Pt here covid recovered. A&O to self and place. VSS on room air. Regular diet, thin liquids. Patients appetite is very poor and needs encouragement to eat. Takes pills crushed in pudding. Up with A1 + GB + walker. Denies pain. Pt scoring yellow on the Aggression Stop Light Tool for disorientation. Plan to discharge to TCU when medically ready.

## 2022-02-13 NOTE — PROGRESS NOTES
Pt allowed writer to assist with feeding dinner. Amb with SBA and GB to BR, washed up while sitting on toilet, finger nails trimmed and filed.

## 2022-02-13 NOTE — PLAN OF CARE
"DATE & TIME: 02/12/22 0072-5523  Cognitive Concerns/ Orientation : Alert, disoriented to time, place and situation at times.  Calm but anxious at times. Hallucinating during day, having conversations with people that are not in the room. Reports her legs are \"broken\" and cannot feel her legs but can feel me touching her legs. Also points to items that are not in the room.  Refused her antibiotic medication, despite being reapproached.  Sat up in wheelchair per her request later on in shift.  Refused coloring book when offered.    BEHAVIOR & AGGRESSION TOOL COLOR: Green to yellow  ABNL VS/O2: VSS, on RA  MOBILITY: A x1 GB/W. Refuses repositioning. Needs lots of encouragement for mobility.  PAIN MANAGMENT: Received prn tylenol for c/o abdominal pain  DIET: Regular diet, very poor appetite   BOWEL/BLADDER: Continent.  No BM noted.  ABNL LAB/BG: None  DRAIN/DEVICES: None  SKIN: Pale skin, very dry lips. Bruised, scabbed. Mepilex on coccyx CDI (WOC following). L arm trace edema from previous IV site, elevated  TESTS/PROCEDURES: None  D/C DATE: Pending. Discharge to TCU  Discharge Barriers: Guardianship, MA  OTHER IMPORTANT INFO: Father passed on 1/26 and mother passed on 2/2. Per notes it does not appear as though brother Matt has shared this with the patient. Brother Matt has petitioned for an Emergency Guardianship.      Plan:  Monday- video visit with court appointed  at 11:30AM  Tuesday- guardianship hearing.    "

## 2022-02-13 NOTE — PROGRESS NOTES
"[facility]  Medicine Progress Note - Hospitalist Service    Date of Admission:  1/22/2022    Assessment & Plan          Miranda Dover is a 51 year old female with hx of Down syndrome who was admitted on 1/22/2022 for acute hypoxic respiratory failure due to COVID-19 pneumonia.      Acute hypoxic respiratory failure due to COVID-19 pneumonia. Resolved   COVID recovered  * Unvaccinated  * Symptom onset 1/19/22  * Tested positive for COVID on 1/22/22  * Chest CT on arrival showed bilateral infiltrates  * Initiated on dexamethasone and remdesivir on 1/22  * Completed 5-day course of remdesivir on 1/26  - O2 as needed, on room air mostly now  - completed 10-day course of dexamethasone on 1/31  - On rivaroxaban for VTE ppx, plan 30-day course (last day: 2/22)     Down syndrome  Developmental delay  * Patient lives with her parents who unfortunately were hospitalized with COVID on 1/22 as well. Her father passed away on 1/26; her mother passed on 2/2  * Patient has been intermittently anxious and agitated, sometimes yelling/screaming. She doesn't know why she's here and has repeatedly asked \"what's wrong with me?\" Following admission, she became increasingly withdrawn, refusing medications and meals at times  - PT/OT recommending TCU; placement will be difficult as patient will need guardianship re-established. Guardianship will need to be discussed with the patient's brother; conversation currently on hold while he navigates recent events with his parents passing. Refer to SW note from 2/1.   - Palliative Care consult requested for emotional/decisional support, refer to note from 1/31/22   - Patient's brother has petition for emergency guardianship, per , video visit with court appointed  2/14 and guardianship hearing on 2/15    Poor nutrition and dehydration  Underweight with cachexia  Failure to thrive  During past 1-2 days patient's PO continues to worsen.  Now she is reporting " concerns with swallowing but it is unclear if this is due to pain, dysphagia or other cause.  This appears to be a chronic issues for patient.  BMI is ~13 Fortunately patient has been agreeable to restarting IVF. Discussed dietary needs with patient and what she enjoys.  She has stated she likes turkey and white bread sandwiches  - Continue IVF, Continue to try to encourage PO  - On Ensure Enlive as per Nutrition  - Speech consulted for possible dysphagia, She actually did well with speech.     E coli UTI  Started ceftin PO 2/9, plan 7 days, 2 strains of E coli noted, pansensitive     Borderline hypotension. Resolved   Baseline blood pressures 90s-100s systolic. Decreased to mid-80s systolic in the setting of minimal PO intake.  * Treated with IVF from 1/29-1/31   - patient lost IV multiple times, and has refused.   -encourage PO     Chest pain.  Resolved  Intermittent chest pain noted during this hospitalization. Patient refused serial troponins and echo.  No recent complaints      Leukopenia and thrombocytopenia, improved   WBC ron 3.6k; PLT ron 119k - likely due to infection  - Has been refusing labs. Will follow periodically    Hypokalemia  Suspect due to poor PO intake, replace per protocol.     Thyroid nodule, possible hypothyroidism  Incidentally noted on admission chest CT. TSH was elevated to 15, but T4 was normal  - Consider repeat thyroid studies and possible thyroid US once patient recovers from COVID    Malnutrition:    - Level of malnutrition: Severe   - nutrition supplement per RD    Agitation/restlessness  Hallucinations  - not noted once UTI is being treated  - seroquel low dose PRN  - melatonin for sleep.     Constipation  Per RN, did not have BM for several days  Bowel regimen added, BM (+)  Encourage PO fluid       Diet: Snacks/Supplements Adult: Beneprotein; With Meals  Combination Diet Regular Diet    DVT Prophylaxis: DOAC  Zhu Catheter: Not present  Central Lines: None  Cardiac  Monitoring: None  Code Status: Full Code      Disposition Plan   Expected Discharge: 03/30/2022     Anticipated discharge location: inpatient rehabilitation facility    Delays:     Placement - TCU            The patient's care was discussed with the Bedside Nurse and Patient. Discharge pending guardianship.    Lynn Mendoza MD  Hospitalist Service  [facility]  Securely message with the Vocera Web Console (learn more here)  Text page via [employer] Paging/Directory         Clinically Significant Risk Factors Present on Admission                    ______________________________________________________________________    Interval History     Discussed with nursing staff and evaluated patient this morning.  No acute issues reported.   Patient reports stomach upset after morning medication.   Reports she had BM this am. No diarrhea        Data reviewed today: I reviewed all medications, new labs results over the last 24 hours. I personally reviewed no images or EKG's today.    Physical Exam   Vital Signs: Temp: 99  F (37.2  C) Temp src: Oral BP: 94/64 Pulse: 70   Resp: 18 SpO2: 93 % O2 Device: None (Room air)    Weight: 96 lbs 9.6 oz     General Appearance: Resting comfortably.   HEENT: sunken eyes and temporal wasting.    Respiratory: No respiratory distress  Cardiovascular: RRR  GI: Soft.  Non-distended  Skin:  No rashes  Other: Alert. Frail appearing      Data   Recent Labs   Lab 02/12/22  2127 02/11/22  0911 02/10/22  1303 02/09/22  1002 02/08/22  0843 02/06/22  1247   WBC  --   --   --   --  7.3 5.1   HGB  --   --   --   --  12.4 11.2*   MCV  --   --   --   --  94 94   PLT  --   --   --   --  140* 136*   NA  --   --   --  141  --   --    POTASSIUM  --  3.4 3.3* 2.8*  --   --    CHLORIDE  --   --   --  104  --   --    CO2  --   --   --  31  --   --    BUN  --   --   --  6*  --   --    CR  --   --   --  0.64  0.58  --   --    ANIONGAP  --   --   --  6  --   --    JOSUÉ  --   --   --  8.9  --    --    GLC 96  --   --  98  --   --      No results found for this or any previous visit (from the past 24 hour(s)).

## 2022-02-13 NOTE — PLAN OF CARE
Shift Summary [id number]    Admitting Diagnosis: Hypoxia [R09.02]  COVID-19 [U07.1]   Vitals :CAM   Pain : denies pain. /10.   A&Ox self, knows she is in the hospital and able to name February as month of year. Intermittent confusion. Restless and occasional agitation.     Voiding: incontinent of bladder. Frequent toileting encouraged.   Mobility : A x1 transfers.   Room air. Denies shortness of breath at rest.   GI : incontinent of bowel; .       Orders Placed This Encounter      Combination Diet Regular Diet

## 2022-02-13 NOTE — PLAN OF CARE
SUMMARY: COVID recovered  DATE & TIME: 02/12/22 1900- 2/13/22 9247  Cognitive Concerns/ Orientation : Alert, disoriented to situation at times. Mostly calm. Anxious at times.  BEHAVIOR & AGGRESSION TOOL COLOR: Green to yellow  ABNL VS/O2: VSS RA  MOBILITY: A x1 GB/W. Refuses repositioning. Needs lots of encouragement for mobility.  PAIN MANAGMENT: Stomach ache this morning, seems to have improved.  DIET: Regular diet, very poor appetite   BOWEL/BLADDER: Continent.   ABNL LAB/BG: None  DRAIN/DEVICES: None  SKIN: Pale skin, very dry lips. Bruised, scabbed. Mepilex on coccyx CDI (WOC following). L arm trace edema from previous IV site, elevated  TESTS/PROCEDURES: None  D/C DATE: Pending. Discharge to TCU  Discharge Barriers: Guardianship, MA  OTHER IMPORTANT INFO: Father passed on 1/26 and mother passed on 2/2. Per notes it does not appear as though brother Matt has shared this with the patient. Brother Matt has petitioned for an Emergency Guardianship.     Plan:  Monday- video visit with court appointed  at 11:30AM  Tuesday- guardianship hearing

## 2022-02-14 PROCEDURE — 250N000013 HC RX MED GY IP 250 OP 250 PS 637: Performed by: HOSPITALIST

## 2022-02-14 PROCEDURE — 99232 SBSQ HOSP IP/OBS MODERATE 35: CPT | Performed by: INTERNAL MEDICINE

## 2022-02-14 PROCEDURE — 120N000001 HC R&B MED SURG/OB

## 2022-02-14 RX ADMIN — PANTOPRAZOLE SODIUM 40 MG: 40 TABLET, DELAYED RELEASE ORAL at 08:32

## 2022-02-14 RX ADMIN — RIVAROXABAN 10 MG: 10 TABLET, FILM COATED ORAL at 16:49

## 2022-02-14 RX ADMIN — QUETIAPINE 12.5 MG: 25 TABLET, FILM COATED ORAL at 08:32

## 2022-02-14 RX ADMIN — CEFUROXIME AXETIL 250 MG: 250 TABLET ORAL at 20:49

## 2022-02-14 RX ADMIN — SENNOSIDES AND DOCUSATE SODIUM 1 TABLET: 8.6; 5 TABLET ORAL at 20:49

## 2022-02-14 RX ADMIN — QUETIAPINE 12.5 MG: 25 TABLET, FILM COATED ORAL at 16:49

## 2022-02-14 RX ADMIN — CEFUROXIME AXETIL 250 MG: 250 TABLET ORAL at 08:32

## 2022-02-14 RX ADMIN — Medication 1 MG: at 20:49

## 2022-02-14 RX ADMIN — SENNOSIDES AND DOCUSATE SODIUM 1 TABLET: 8.6; 5 TABLET ORAL at 08:32

## 2022-02-14 ASSESSMENT — ACTIVITIES OF DAILY LIVING (ADL)
ADLS_ACUITY_SCORE: 15
ADLS_ACUITY_SCORE: 19
ADLS_ACUITY_SCORE: 15
ADLS_ACUITY_SCORE: 19
ADLS_ACUITY_SCORE: 17
ADLS_ACUITY_SCORE: 15
ADLS_ACUITY_SCORE: 19
ADLS_ACUITY_SCORE: 15

## 2022-02-14 ASSESSMENT — MIFFLIN-ST. JEOR: SCORE: 971.52

## 2022-02-14 NOTE — PROGRESS NOTES
"[facility]    Medicine Progress Note - Hospitalist Service       Date of Admission:  2022    Assessment & Plan   Miranda Dover is a 51 year old female with hx of Down syndrome who was admitted on 2022 for acute hypoxic respiratory failure due to COVID-19 pneumonia, which has resolved. Hospitalization has been prolonged due to need to establish guardianship following the deaths of her parents during this hospitalization     Discharge planning issues  Down syndrome  Developmental delay  * Patient's primary caregivers were her parents who were also hospitalized on  for COVID. Patient's father  on  and her mother  on .   * Patient has been intermittently anxious and agitated, sometimes yelling/screaming. She doesn't know why she's here and has repeatedly asked \"what's wrong with me?\" Following admission, she became increasingly withdrawn, refusing medications and meals at times  * Discharge planning has been complicated by need for placement, but placement has been limited by need for guardianship and payor source for TCU.   - Patient's brother Matt has petitioned for emergency guardianship, virtual visit with court-appointed  planned on , guardianship hearing on 2/15  - MA application pending  - Palliative Care following for emotional support  - SW following for discharge planning needs    Severe malnutrition  Underweight with cachexia  Failure to thrive  BMI ~13 with poor PO intake. SLP was consulted for possible dysphagia, and no evidence of dysphagia was noted  - On regular diet with Beneprotein with meals. Dietary preferences were discussed with the patient, and she likes turkey and white bread sandwiches.   - Patient was treated with IV fluids -. She has since lost IV access, and has refused replacement. Encourage PO fluids  - Continues to have poor PO intake; will need to discuss PEG tube for supplemental tube feeds with patient's son " once guardianship is established.     Hypokalemia  Due to poor PO intake  - On potassium replacement protocol    Dyspepsia  Due to potassium supplements  - On PPI    E coli UTI  Pan-sensitive E.coli noted on 2/8 urine culture. She was initiated on cefuroxime on 2/9.  - Will complete 7 day course on 2/15     Thyroid nodule, possible hypothyroidism  Incidentally noted on admission chest CT. TSH was elevated to 15, but T4 was normal  - Repeat TSH/FT4 ordered    Constipation, Improved   She was initiated on a bowel regimen during this hospitalization with good response  - On Miralax daily and senna-docusate #1 BID    Borderline hypotension, Stable  Baseline blood pressures 90s-100s systolic. Decreased to mid-80s systolic in the setting of minimal PO intake. Treated with IVF from 1/29-1/31       Resolved conditions  Acute hypoxic respiratory failure due to COVID-19 pneumonia, Resolved  COVID recovered  * Unvaccinated  * Symptom onset 1/19/22  * Tested positive for COVID on 1/22/22  * Chest CT on arrival showed bilateral infiltrates  * Initiated on dexamethasone and remdesivir on 1/22  * Completed 5-day course of remdesivir on 1/26  * Completed 10-day course of dexamethasone on 1/31  - Now breathing comfortably on room air  - On rivaroxaban for VTE ppx, plan 30-day course (last day: 2/22)    Chest pain, Resolved  Intermittent chest pain noted during this hospitalization. Patient refused serial troponins and echo.  No recent complaints      Leukopenia and thrombocytopenia, Improved  WBC ron 3.6k; PLT ron 119k - likely due to infection. Subsequently improved, and she has since refused labs.        Agitation, restlessness, hallucinations, Resolved  Resolved following treatment of UTI     Diet: Snacks/Supplements Adult: Beneprotein; With Meals  Combination Diet Regular Diet    DVT Prophylaxis: DOAC  Zhu Catheter: Not present  Code Status: Full Code         Disposition: Expected discharge pending placement, 7+  days    The patient's care was discussed with the Patient.    Briana Aaron MD  Hospitalist Service  [facility]  Contact information available via [facility] Paging/Directory    ______________________________________________________________________    Interval History   No events overnight. Offers no complaints. Continues to have poor PO intake. Plan for virtual meeting between patient's brother and court-appointed  today     Data reviewed today: I reviewed all medications, new labs and imaging results over the last 24 hours. I personally reviewed no images or EKG's today.    Physical Exam   Vital Signs: Temp: 99  F (37.2  C) Temp src: Oral BP: 94/64 Pulse: 70   Resp: 18 SpO2: 93 % O2 Device: None (Room air)    Weight: 96 lbs 9.6 oz  Constitutional: Frail female, NAD  HEENT: +temporal wasting  Respiratory: Breathing non-labored. Lungs CTAB - no wheezes, crackles, or rhonchi  Cardiovascular: Heart RRR, no m/r/g. No pedal edema  GI: +BS, abd soft/NT  Skin/Integument: No rash  Musculoskeletal: +cachexia. Mild TTP over bilateral LE - chronic and stable.   Neuro: Alert and appropriate, CARBONE  Psych: Calm and cooperative    Data   Recent Labs   Lab 02/12/22  2127 02/11/22  0911 02/10/22  1303 02/09/22  1002 02/08/22  0843   WBC  --   --   --   --  7.3   HGB  --   --   --   --  12.4   MCV  --   --   --   --  94   PLT  --   --   --   --  140*   NA  --   --   --  141  --    POTASSIUM  --  3.4 3.3* 2.8*  --    CHLORIDE  --   --   --  104  --    CO2  --   --   --  31  --    BUN  --   --   --  6*  --    CR  --   --   --  0.64  0.58  --    ANIONGAP  --   --   --  6  --    JOSUÉ  --   --   --  8.9  --    GLC 96  --   --  98  --          No results found for this or any previous visit (from the past 24 hour(s)).    Medications     - MEDICATION INSTRUCTIONS -         albuterol  2 puff Inhalation 4x Daily     cefuroxime  250 mg Oral Q12H NIMO     melatonin  1 mg Oral At Bedtime     pantoprazole  40 mg  Oral QAM AC     polyethylene glycol  17 g Oral Daily     rivaroxaban ANTICOAGULANT  10 mg Oral Daily with supper     senna-docusate  1 tablet Oral BID

## 2022-02-14 NOTE — PLAN OF CARE
SUMMARY: COVID recovered  DATE & TIME: 02/13/22 1900- 2/14/22 0730  Cognitive Concerns/ Orientation : Alert, disoriented to situation at times. Mostly calm. Anxious at times.  BEHAVIOR & AGGRESSION TOOL COLOR: Green to yellow  ABNL VS/O2: VSS RA  MOBILITY: A x1 GB/W. Refuses repositioning. Needs lots of encouragement for mobility.  PAIN MANAGMENT: Stomach ache at times, declined interventions.  DIET: Regular diet, very poor appetite   BOWEL/BLADDER: Continent.   ABNL LAB/BG: None  DRAIN/DEVICES: None  SKIN: Pale skin, very dry lips. Bruised, scabbed. Mepilex on coccyx CDI (WOC following). L arm trace edema from previous IV site, elevated  TESTS/PROCEDURES: None  D/C DATE: Pending. Discharge to TCU  Discharge Barriers: Guardianship, MA  OTHER IMPORTANT INFO: Father passed on 1/26 and mother passed on 2/2. Per notes it does not appear as though brother Matt has shared this with the patient. Brother Matt has petitioned for an Emergency Guardianship.     Plan:  Monday 2/14/22- video visit with court appointed  at 11:30AM  Tuesday 2/15/22- guardianship hearing

## 2022-02-14 NOTE — PROGRESS NOTES
CLINICAL NUTRITION SERVICES - REASSESSMENT NOTE      RECOMMENDATIONS FOR MD/PROVIDER TO ORDER:   ** Recommend nutrition support **  Poor PO/refusal of meals x23 day admission     Consider adding appetite stimulant such as Remeron    Recommendations Ordered by Registered Dietitian (RD):   Weigh patient   Future/Additional Recommendations:   - If able for PEG - consider TF goal rate Jevity 1.5 Julio @ 40ml/hr (960ml/day) to provide: 1440 kcals, 61 g PRO, 729 ml free H20, 207 g CHO, and 20 g fiber daily (pending updated weight)  - Plan slow TF advancement as patient at high risk for refeeding    Malnutrition:   (2/3)  % Weight Loss: Unable to evaluate   % Intake:  </= 50% for >/= 5 days (severe malnutrition)  Subcutaneous Fat Loss:  Orbital region moderate depletion, Upper arm region moderate depletion and Thoracic region moderate depletion  Muscle Loss:  Temporal region moderate depletion, Clavicle bone region moderate depletion and Dorsal hand region moderate depletion  Fluid Retention:  None noted     Malnutrition Diagnosis: Severe malnutrition  In Context of:  Acute illness or injury  Chronic illness or disease       EVALUATION OF PROGRESS TOWARD GOALS   Diet:  Soft and Bite Sized + Thin Liquids  Vanilla pudding + Beneprotein 1x/day     Intake/Tolerance:    Patient has a video visit w/ court appointed  11:30 am today - unable to visit with today   Ongoing poor appetite with no improvement in oral intakes throughout hospitalization x 23 days   RN charting patient c/o stomach aches yesterday   Flowsheets document 0-25% meal consumptions, mostly of pudding     Stooled x1/day over the weekend (senokot + miralax)     ASSESSED NUTRITION NEEDS:  Dosing Weight 33.6 kg   Estimated Energy Needs: 1302-2343 kcals (35-40 Kcal/Kg)  Justification: repletion and underweight  Estimated Protein Needs: 50-70 grams protein (1.5-2 g pro/Kg)  Justification: repletion and hypercatabolism with acute illness      NEW FINDINGS:   Chart  reviewed  Guardianship hearing tomorrow 2/15   No weight obtained in 9 days   SLP signed off 2/11     Previous Goals:   Intake of 50% meals at least BID vs begin nutrition support in the next 3 days.   Evaluation: Not met    Previous Nutrition Diagnosis:   Inadequate oral intake related to lack of appetite, disinterest in food as evidenced by patient declines most food aside from pudding for the past 19 days of admission.  Evaluation: No change      CURRENT NUTRITION DIAGNOSIS  Inadequate oral intake related to lack of appetite, disinterest in food as evidenced by patient declines most food aside from pudding for the past 23 days of admission.    INTERVENTIONS  Recommendations / Nutrition Prescription  Continue PO encouragement and supplements  Strongly recommend nutrition support when able pending guardianship  ?Remeron     Implementation  None new today     Goals  Nutrition support initiation in <72 hrs       MONITORING AND EVALUATION:  Progress towards goals will be monitored and evaluated per protocol and Practice Guidelines      Sho Vo RD, LD  Clinical Dietitian

## 2022-02-14 NOTE — PROGRESS NOTES
Care Management Follow Up    Length of Stay (days): 23    Expected Discharge Date: 03/30/2022     Concerns to be Addressed: discharge planning     Patient plan of care discussed at interdisciplinary rounds: Yes    Anticipated Discharge Disposition: Transitional Care     Anticipated Discharge Services:    Anticipated Discharge DME:      Patient/family educated on Medicare website which has current facility and service quality ratings: no  Education Provided on the Discharge Plan:    Patient/Family in Agreement with the Plan: yes    Referrals Placed by CM/SW: Post Acute Facilities  Private pay costs discussed: Not applicable    Additional Information:    Pt had a meeting with her  today, Jia Oliveira ph:  [phone]; [email or web address], at 1130AM.  Sw sat in the meeting.  Pt's  asked pt if she was agreeable to brother, Maxi, helping assist her with decision making and pt shook her head yes.  Pt asked questions about living arrangements and expressed fear in living alone.  Pt stated that she wants to live with her brother.  Sw explained that she will not live alone, is safe at the hospital, and explained possible options (group home); pt was tearful over this.  Pt has a hearing tomorrow, 2/15/22, at 1100 with a , her , and brother Maxi.  Sw will assist pt in attending this hearing.  Sw attempted to reach out to paul (left vm) to inquire if he could come to the hospital to have the hearing with his sister; likely would provide her with some emotional support.  Sw waiting on return call back.        Connie Smalls, BARBIESW

## 2022-02-14 NOTE — PLAN OF CARE
DATE & TIME: 02/14 1373-0319  Cognitive Concerns/ Orientation : Alert, disoriented to situation at times. Mostly calm. Anxious at times.  Talks to twin brother she claims is standing in her room  BEHAVIOR & AGGRESSION TOOL COLOR: Green to yellow  ABNL VS/O2: VSS RA  MOBILITY: A x1 GB/W. Up in chair Needs lots of encouragement for mobility.  PAIN MANAGMENT: Stomach ache at times, declined interventions.  DIET: Regular diet, very poor appetite   BOWEL/BLADDER: Incontinent at times   ABNL LAB/BG: None  DRAIN/DEVICES: None  SKIN: Pale skin, very dry lips. Bruised, scabbed. Mepilex on coccyx CDI (WOC following). L arm trace edema from previous IV site, elevated  TESTS/PROCEDURES: None  D/C DATE: Pending. Discharge to TCU  Discharge Barriers: Guardianship, MA  OTHER IMPORTANT INFO: Father passed on 1/26 and mother passed on 2/2. Per notes it does not appear as though brother Matt has shared this with the patient. Brother Matt has petitioned for an Emergency Guardianship.     Plan:  Monday 2/14/22- video visit with court appointed  at 11:30AM ( See SW note)  Tuesday 2/15/22- guardianship hearing

## 2022-02-15 PROCEDURE — 250N000013 HC RX MED GY IP 250 OP 250 PS 637: Performed by: REGISTERED NURSE

## 2022-02-15 PROCEDURE — 250N000013 HC RX MED GY IP 250 OP 250 PS 637: Performed by: HOSPITALIST

## 2022-02-15 PROCEDURE — 99232 SBSQ HOSP IP/OBS MODERATE 35: CPT | Performed by: INTERNAL MEDICINE

## 2022-02-15 PROCEDURE — 120N000001 HC R&B MED SURG/OB

## 2022-02-15 RX ADMIN — QUETIAPINE 12.5 MG: 25 TABLET, FILM COATED ORAL at 16:19

## 2022-02-15 RX ADMIN — CEFUROXIME AXETIL 250 MG: 250 TABLET ORAL at 08:31

## 2022-02-15 RX ADMIN — CEFUROXIME AXETIL 250 MG: 250 TABLET ORAL at 21:02

## 2022-02-15 RX ADMIN — POLYETHYLENE GLYCOL 3350 17 G: 17 POWDER, FOR SOLUTION ORAL at 08:31

## 2022-02-15 RX ADMIN — QUETIAPINE 12.5 MG: 25 TABLET, FILM COATED ORAL at 08:32

## 2022-02-15 RX ADMIN — PANTOPRAZOLE SODIUM 40 MG: 40 TABLET, DELAYED RELEASE ORAL at 08:31

## 2022-02-15 RX ADMIN — QUETIAPINE 12.5 MG: 25 TABLET, FILM COATED ORAL at 02:27

## 2022-02-15 RX ADMIN — SENNOSIDES AND DOCUSATE SODIUM 1 TABLET: 8.6; 5 TABLET ORAL at 08:31

## 2022-02-15 RX ADMIN — SENNOSIDES AND DOCUSATE SODIUM 1 TABLET: 8.6; 5 TABLET ORAL at 21:02

## 2022-02-15 RX ADMIN — RIVAROXABAN 10 MG: 10 TABLET, FILM COATED ORAL at 16:19

## 2022-02-15 ASSESSMENT — ACTIVITIES OF DAILY LIVING (ADL)
ADLS_ACUITY_SCORE: 21
ADLS_ACUITY_SCORE: 19
ADLS_ACUITY_SCORE: 20
ADLS_ACUITY_SCORE: 21
ADLS_ACUITY_SCORE: 19
ADLS_ACUITY_SCORE: 20
ADLS_ACUITY_SCORE: 19
ADLS_ACUITY_SCORE: 19
ADLS_ACUITY_SCORE: 21
ADLS_ACUITY_SCORE: 20
ADLS_ACUITY_SCORE: 19
ADLS_ACUITY_SCORE: 20
ADLS_ACUITY_SCORE: 19
ADLS_ACUITY_SCORE: 20
ADLS_ACUITY_SCORE: 19
ADLS_ACUITY_SCORE: 19
ADLS_ACUITY_SCORE: 21

## 2022-02-15 NOTE — PLAN OF CARE
DATE & TIME: 2/14/22, 0170 - 3331   Cognitive Concerns/ Orientation : A&O x 3. Disoriented to situation   BEHAVIOR & AGGRESSION TOOL COLOR: Green  CIWA SCORE: NA   ABNL VS/O2: BP soft. Other VSS  MOBILITY: Assist x 1 and GB to the bathroom  PAIN MANAGMENT: Complained of stomach discomfort but declined intervention  DIET: Regular  BOWEL/BLADDER: Voided in bathroom with some incontinence at times  ABNL LAB/BG: NA  DRAIN/DEVICES: None  TELEMETRY RHYTHM: NA  SKIN: Bruises. Mepilex in place to coccyx. +1 edema to left arm frm previous IV. Elevated as allowed by patient  TESTS/PROCEDURES: NA  D/C DATE: Pending  Discharge Barriers: Guardianship  OTHER IMPORTANT INFO: Guardianship hearing scheduled at 1100 today, 2/15/22. Brother, Matt will be present for hearing. SW following

## 2022-02-15 NOTE — PROGRESS NOTES
Care Management Follow Up    Length of Stay (days): 24    Expected Discharge Date: 03/30/2022     Concerns to be Addressed: discharge planning     Patient plan of care discussed at interdisciplinary rounds: Yes    Anticipated Discharge Disposition: Transitional Care     Anticipated Discharge Services:    Anticipated Discharge DME:      Patient/family educated on Medicare website which has current facility and service quality ratings: no  Education Provided on the Discharge Plan:    Patient/Family in Agreement with the Plan: yes    Referrals Placed by CM/SW: Post Acute Facilities  Private pay costs discussed: Not applicable    Additional Information:    Elsi spoke with Maxi and confirmed that he will be arriving to [facility] for pt's hearing today at 1100.  Maxi will bring his own device and will be sent the zoom link ahead of time; prior to hearing.  Maxi and Elsi discussed different housing options (LTC vs GH).  Maxi also inquired into PCA services for pt, if she was to return home.  After discussing all options, Maxi believes that a GH setting is likely the best option for pt given her social/emotional/physical needs.  Elsi explained the general process for finding a GH and stated that there could be limited options for GHs given staffing shortages.  Maxi stated that he wants to be apart of this process and would like to be able to visit/see these homes before admission/acceptance; Elsi stated that this could likely be arranged but again mentioned that options are likely limited.  Elsi also confirmed with both Maxi and Shanda ([facility]) that MA application has been submitted and is in a pending status.  Shanda is going to try to get this application expedited as the process for approval can take 30+ days; Shanda to keep Elsi updated.  Elsi will continue to keep Maxi/pt updated on discharge plans and GH options as we move through the process.    Update:  Elsi met with Maxi in person.  Maxi confirmed that he was granted 60 days of  emergency guardianship; next hearing is scheduled for 4/1/22 for permanent guardianship.  Elsi has yet to confirm this info with Nish Alonso and pt's .  Elsi and Maxi also discussed process towards placement again; Maxi is interested in reviewing GHs in the area.  Elsi will work to see if a list exists and if so, will email to Maxi at:  [email or web address].  Maxi would like to be proactive in finding placement for sister/pt, Elsi agreed that this is useful and provides best chance at finding the appropriate/best home for her.  Elsi connected with Shanda in [place] and she stated that state has not yet received maikel despite it being faxed on 2/10/22; [facility] re-faxed application with notice to expedite today.  Elsi to continue to follow for discharge planning needs.      Connie Smalls, LICSW

## 2022-02-15 NOTE — PROGRESS NOTES
Palliative Care Social Work Note    Palliative has been following peripherally for support.  At this time goals are clear and guardianship process has been initiated.  My visits have not seemed to be terribly beneficial to Miranda, and she is getting good care from the nurses on the unit.      Palliative will sign off for now    If new support needs emerge please reconsult our team.       JOSE Massey, [facility]   Palliative Care Clinical   Ph: [phone]

## 2022-02-15 NOTE — PROGRESS NOTES
"[facility]    Medicine Progress Note - Hospitalist Service       Date of Admission:  2022    Assessment & Plan   Miranda Dover is a 51 year old female with hx of Down syndrome who was admitted on 2022 for acute hypoxic respiratory failure due to COVID-19 pneumonia, which has resolved. Hospitalization has been prolonged due to need to establish guardianship following the deaths of her parents during this hospitalization     Discharge planning issues  Down syndrome  Developmental delay  * Patient's primary caregivers were her parents who were also hospitalized on  for COVID. Patient's father  on  and her mother  on .   * Patient has been intermittently anxious and agitated, sometimes yelling/screaming. She doesn't know why she's here and has repeatedly asked \"what's wrong with me?\" Following admission, she became increasingly withdrawn, refusing medications and meals at times  * Discharge planning has been complicated by need for placement, but placement has been limited by need for guardianship and payor source for TCU.   - Patient's brother Matt has petitioned for emergency guardianship; guardianship hearing today, 2/15  - MA application pending  - Palliative Care following for emotional support  - SW following for discharge planning needs    Severe malnutrition  Underweight with cachexia  Failure to thrive  BMI ~13 with poor PO intake. SLP was consulted for possible dysphagia, and no evidence of dysphagia was noted  - On regular diet with Beneprotein with meals. Dietary preferences were discussed with the patient, and she likes turkey and white bread sandwiches.   - Patient was treated with IV fluids -. She has since lost IV access, and has refused replacement. Encourage PO fluids  - Continues to have poor PO intake; will need to discuss PEG tube for supplemental tube feeds with patient's son once guardianship is established.     Hypokalemia  Due to " poor PO intake  - On potassium replacement protocol    Dyspepsia  Due to potassium supplements  - On PPI    E coli UTI  Pan-sensitive E.coli noted on 2/8 urine culture. She was initiated on cefuroxime on 2/9.  - Will complete 7 day course on 2/15     Thyroid nodule, possible hypothyroidism  Incidentally noted on admission chest CT. TSH was elevated to 15, but T4 was normal; likely due to severe malnutrition  - Repeat TSH/FT4 ordered, but patient refused lab draw    Constipation, Improved   She was initiated on a bowel regimen during this hospitalization with good response  - On Miralax daily and senna-docusate #1 BID    Borderline hypotension, Stable  Baseline blood pressures 90s-100s systolic. Decreased to mid-80s systolic in the setting of minimal PO intake. Treated with IVF from 1/29-1/31       Resolved conditions  Acute hypoxic respiratory failure due to COVID-19 pneumonia, Resolved  COVID recovered  * Unvaccinated  * Symptom onset 1/19/22  * Tested positive for COVID on 1/22/22  * Chest CT on arrival showed bilateral infiltrates  * Initiated on dexamethasone and remdesivir on 1/22  * Completed 5-day course of remdesivir on 1/26  * Completed 10-day course of dexamethasone on 1/31  - Now breathing comfortably on room air  - On rivaroxaban for VTE ppx, plan 30-day course (last day: 2/22)    Chest pain, Resolved  Intermittent chest pain noted during this hospitalization. Patient refused serial troponins and echo.  No recent complaints      Leukopenia and thrombocytopenia, Improved  WBC ron 3.6k; PLT ron 119k - likely due to infection. Subsequently improved, and she has since refused labs.        Agitation, restlessness, hallucinations, Resolved  Resolved following treatment of UTI     Diet: Snacks/Supplements Adult: Beneprotein; With Meals  Combination Diet Regular Diet    DVT Prophylaxis: DOAC  Zhu Catheter: Not present  Code Status: Full Code         Disposition: Expected discharge pending placement, 7+  days    The patient's care was discussed with the Patient.    Briana Aaron MD  Hospitalist Service  [facility]  Contact information available via [facility] Paging/Directory    ______________________________________________________________________    Interval History   No events overnight. Offers no complaints. Continues to have poor PO intake. Virtual court hearing today    Data reviewed today: I reviewed all medications, new labs and imaging results over the last 24 hours. I personally reviewed no images or EKG's today.    Physical Exam   Vital Signs: Temp: 98.1  F (36.7  C) Temp src: Oral BP: (!) 88/58 Pulse: 62   Resp: 18 SpO2: 97 % O2 Device: None (Room air)    Weight: 83 lbs 3.2 oz  Constitutional: Frail female, NAD  HEENT: +temporal wasting  Respiratory: Breathing non-labored.   Skin/Integument: No rash  Musculoskeletal: +cachexia. Mild TTP over bilateral LE - chronic and stable.   Neuro: Alert and appropriate, CARBONE  Psych: Calm and cooperative    Data   Recent Labs   Lab 02/12/22  2127 02/11/22  0911 02/10/22  1303 02/09/22  1002   NA  --   --   --  141   POTASSIUM  --  3.4 3.3* 2.8*   CHLORIDE  --   --   --  104   CO2  --   --   --  31   BUN  --   --   --  6*   CR  --   --   --  0.64  0.58   ANIONGAP  --   --   --  6   JOSUÉ  --   --   --  8.9   GLC 96  --   --  98         No results found for this or any previous visit (from the past 24 hour(s)).    Medications     - MEDICATION INSTRUCTIONS -         cefuroxime  250 mg Oral Q12H NIMO     melatonin  1 mg Oral At Bedtime     pantoprazole  40 mg Oral QAM AC     polyethylene glycol  17 g Oral Daily     rivaroxaban ANTICOAGULANT  10 mg Oral Daily with supper     senna-docusate  1 tablet Oral BID

## 2022-02-15 NOTE — PLAN OF CARE
DATE & TIME: 2/15/22, 8886-6772           Cognitive Concerns/ Orientation : A&O x 3. Disoriented to situation   BEHAVIOR & AGGRESSION TOOL COLOR: Green  CIWA SCORE: NA    ABNL VS/O2: BP soft. Other VSS  MOBILITY: Assist x 1 and GB to the bathroom  PAIN MANAGMENT: Complained of stomach discomfort but declined intervention  DIET: Regular  BOWEL/BLADDER: Voided in bathroom with some incontinence at times  ABNL LAB/BG: NA  DRAIN/DEVICES: None  TELEMETRY RHYTHM: NA  SKIN: Bruises. Refused mepilex on coccyx. +1 edema to left arm frm previous IV. Elevated as allowed by patient  TESTS/PROCEDURES: NA  D/C DATE: Pending  Discharge Barriers: Guardianship  OTHER IMPORTANT INFO: Pt did agree to shower today.  Guardianship hearing scheduled at 1100 today, 2/15/22. BrotherMaxi will be present for hearing. SW following

## 2022-02-15 NOTE — PLAN OF CARE
DATE & TIME: 02/14 9491-5510  Cognitive Concerns/ Orientation : Alert, disoriented to situation at times. Mostly calm. Anxious at times.   BEHAVIOR & AGGRESSION TOOL COLOR: Green  ABNL VS/O2: VSS on RA- pt's BP soft this morning, but improved this afternoon.  MOBILITY: A x1 GB/W. Up in wheel chair most of shift, pt refused to get into recliner chair until her brother visited and encouraged her.  PAIN MANAGMENT: Stomach ache at times, declined interventions.  DIET: Regular diet, ate all of her dinner tonight  BOWEL/BLADDER: Incontinent at times, up to BSC, no BM this shift.  ABNL LAB/BG: None  DRAIN/DEVICES: None  SKIN: Pale skin, very dry lips. Bruised, scabbed. Mepilex on coccyx CDI (WOC following). L arm trace edema from previous IV site, elevated when able  TESTS/PROCEDURES: None  D/C DATE: Pending. Discharge to TCU  Discharge Barriers: Guardianship, MA  OTHER IMPORTANT INFO: Father passed on 1/26 and mother passed on 2/2. Brother Matt has petitioned for an Emergency Guardianship. Pt refusing bedtime pills because nurse crushed them in pudding as instructed by previous nurses, pt wants to take pills whole with water. Only ate 1 of two spoonfuls of meds crushed (melatonin, ceftin, and senna).    Plan:  Tuesday 2/15/22- guardianship hearing at 1100, Matt will be present with patient for hearing

## 2022-02-16 ENCOUNTER — APPOINTMENT (OUTPATIENT)
Dept: OCCUPATIONAL THERAPY | Facility: CLINIC | Age: 52
DRG: 177 | End: 2022-02-16
Payer: COMMERCIAL

## 2022-02-16 PROCEDURE — 97535 SELF CARE MNGMENT TRAINING: CPT | Mod: GO | Performed by: OCCUPATIONAL THERAPIST

## 2022-02-16 PROCEDURE — 250N000013 HC RX MED GY IP 250 OP 250 PS 637: Performed by: HOSPITALIST

## 2022-02-16 PROCEDURE — 120N000001 HC R&B MED SURG/OB

## 2022-02-16 PROCEDURE — 99232 SBSQ HOSP IP/OBS MODERATE 35: CPT | Performed by: INTERNAL MEDICINE

## 2022-02-16 PROCEDURE — 250N000011 HC RX IP 250 OP 636: Performed by: HOSPITALIST

## 2022-02-16 RX ADMIN — Medication 1 MG: at 21:20

## 2022-02-16 RX ADMIN — PANTOPRAZOLE SODIUM 40 MG: 40 TABLET, DELAYED RELEASE ORAL at 09:31

## 2022-02-16 RX ADMIN — SENNOSIDES AND DOCUSATE SODIUM 1 TABLET: 8.6; 5 TABLET ORAL at 21:20

## 2022-02-16 RX ADMIN — ONDANSETRON 4 MG: 4 TABLET, ORALLY DISINTEGRATING ORAL at 20:00

## 2022-02-16 RX ADMIN — QUETIAPINE 12.5 MG: 25 TABLET, FILM COATED ORAL at 09:31

## 2022-02-16 RX ADMIN — RIVAROXABAN 10 MG: 10 TABLET, FILM COATED ORAL at 19:10

## 2022-02-16 ASSESSMENT — ACTIVITIES OF DAILY LIVING (ADL)
ADLS_ACUITY_SCORE: 20

## 2022-02-16 NOTE — PHARMACY-ADMISSION MEDICATION HISTORY
Pharmacy Medication History  Admission medication history interview status for the 1/22/2022  admission is complete. See EPIC admission navigator for prior to admission medications     Location of Interview: Phone  Medication history sources: Patient's family/friend (brother Maxi)    Significant changes made to the medication list:  Added: Vit D, and Slippery Elm    In the past week, patient estimated taking medication this percent of the time: unknown    Additional medication history information:   Patients brother states she takes one capsule of each of these meds in the evening. He states she takes a few other supplements but would like her to resume these two medications. The slippery elm helps her stomach issues.     Medication reconciliation completed by provider prior to medication history? No    Time spent in this activity: 15 min    Prior to Admission medications    Medication Sig Last Dose Taking? Auth Provider   cholecalciferol (VITAMIN D3) 125 mcg (5000 units) capsule Take 125 mcg by mouth every evening unknown Yes Unknown, Entered By History   NONFORMULARY Take 1 capsule by mouth every evening Slippery Elm Supplement unknown Yes Unknown, Entered By History       The information provided in this note is only as accurate as the sources available at the time of update(s)

## 2022-02-16 NOTE — PLAN OF CARE
DATE & TIME: 2/15/22 6684-0647         Cognitive Concerns/ Orientation : A&O x 3. Disoriented to situation   BEHAVIOR & AGGRESSION TOOL COLOR: Green  CIWA SCORE: NA    ABNL VS/O2: BP soft 94/66. Other VSS, O2 98% RA   MOBILITY: Assist x 1 and GB to the bathroom  PAIN MANAGMENT: c/o abd pain, declined intervention.   DIET: Regular, poor appetite, need assist with feeding.   BOWEL/BLADDER: continent, small BM x1.   ABNL LAB/BG: NA  DRAIN/DEVICES: None  TELEMETRY RHYTHM: NA  SKIN: dryness, scattered Bruising, trace edema to left arm frm previous IV.   TESTS/PROCEDURES: NA  D/C DATE: possible discharge to LTC vs , pending guardianship process and placement.   Discharge Barriers: Guardianship  OTHER IMPORTANT INFO: SW following

## 2022-02-16 NOTE — PLAN OF CARE
DATE & TIME: 2/15/22 2725-4843         Cognitive Concerns/ Orientation : A&O x 3. Disoriented to situation   BEHAVIOR & AGGRESSION TOOL COLOR: Green  CIWA SCORE: NA    ABNL VS/O2: BP soft 94/66. Other VSS, O2 98% RA   MOBILITY: Assist x 1 and GB to the bathroom  PAIN MANAGMENT: c/o abd pain, declined intervention.   DIET: Regular, poor appetite, need assist with feeding.   BOWEL/BLADDER: continent, small BM x1.   ABNL LAB/BG: NA  DRAIN/DEVICES: None  TELEMETRY RHYTHM: NA  SKIN: dryness, scattered Bruising, trace edema to left arm frm previous IV.   TESTS/PROCEDURES: NA  D/C DATE: possible discharge to LTC vs , pending guardianship process and placement.   Discharge Barriers: Guardianship  OTHER IMPORTANT INFO: SW following, refused some bedtime meds.

## 2022-02-16 NOTE — PLAN OF CARE
Goal Outcome Evaluation:    Plan of Care Reviewed With: patient     Overall Patient Progress: improving             DATE & TIME: 2/15/22, 2300 - 5912    Cognitive Concerns/ Orientation : A&O x 2, disoriented to time and situation   BEHAVIOR & AGGRESSION TOOL COLOR: Green  CIWA SCORE: NA   ABNL VS/O2: BP soft. Other VSS on rooma ir  MOBILITY: Assist x 1, GB  PAIN MANAGMENT: Complains of stomach/abdominal discomfort but declined intervention  DIET: Regular  BOWEL/BLADDER: Incontinent at times  ABNL LAB/BG: NA  DRAIN/DEVICES: None  TELEMETRY RHYTHM: NA  SKIN: Bruises, mepilex to coccyx. +1 edema to left arm from previous IV  TESTS/PROCEDURES: Na  D/C DATE: Possible discharge to LTC vs    Discharge Barriers: Placement  OTHER IMPORTANT INFO: SW following

## 2022-02-16 NOTE — PROGRESS NOTES
"[facility]    Medicine Progress Note - Hospitalist Service       Date of Admission:  2022    Assessment & Plan   Miranda Dover is a 51 year old female with hx of Down syndrome who was admitted on 2022 for acute hypoxic respiratory failure due to COVID-19 pneumonia, which has resolved. Hospitalization has been prolonged due to need to establish guardianship following the deaths of her parents during this hospitalization     Discharge planning issues  Down syndrome  Developmental delay  * Patient's primary caregivers were her parents who were also hospitalized on  for COVID. Patient's father  on  and her mother  on .   * Patient has been intermittently anxious and agitated, sometimes yelling/screaming. She doesn't know why she's here and has repeatedly asked \"what's wrong with me?\" Following admission, she became increasingly withdrawn, refusing medications and meals at times.   * Palliative Care was initially consulted for emotional support, but this was not particularly helpful  * Discharge planning has been complicated by need for guardianship and payor source for placement  - Patient's brother, Maxi, was granted 60 days of emergency guardianship on 2/15. Next hearing scheduled on 22 for permanent guardianship  - Plan to transition patient to group home, patient's brother will be involved in this process  - MA application pending  -  following closely    Severe malnutrition  Underweight with cachexia  Failure to thrive  BMI ~13 with poor PO intake. SLP was consulted for possible dysphagia, and no evidence of dysphagia was noted  - On regular diet with Beneprotein with meals. Dietary preferences were discussed with the patient, and she likes turkey and white bread sandwiches.   - Patient was treated with IV fluids -. She has since lost IV access, and has refused replacement. Encourage PO fluids  - Continues to have poor PO intake; will need to " discuss PEG tube for supplemental tube feeds with patient's brother in the next few days     Hypokalemia  Due to poor PO intake  - On potassium replacement protocol    Dyspepsia  Due to potassium supplements  - On PPI     Thyroid nodule, possible hypothyroidism  Incidentally noted on admission chest CT. TSH was elevated to 15, but T4 was normal; likely due to severe malnutrition  - Repeat TSH/FT4 ordered, but patient refused lab draw    Constipation, Improved   She was initiated on a bowel regimen during this hospitalization with good response  - On Miralax daily and senna-docusate #1 BID    Borderline hypotension, Stable  Baseline blood pressures 90s-100s systolic. Decreased to mid-80s systolic in the setting of minimal PO intake. Treated with IVF from 1/29-1/31       Resolved conditions  Acute hypoxic respiratory failure due to COVID-19 pneumonia, Resolved  COVID recovered  * Unvaccinated  * Symptom onset 1/19/22  * Tested positive for COVID on 1/22/22  * Chest CT on arrival showed bilateral infiltrates  * Initiated on dexamethasone and remdesivir on 1/22  * Completed 5-day course of remdesivir on 1/26  * Completed 10-day course of dexamethasone on 1/31  - Now breathing comfortably on room air  - On rivaroxaban for VTE ppx, plan 30-day course (last day: 2/22)    E coli UTI, Resolved  Pan-sensitive E.coli noted on 2/8 urine culture. She was initiated on cefuroxime on 2/9 and completed a 7 day course on 2/15    Chest pain, Resolved  Intermittent chest pain noted during this hospitalization. Patient refused serial troponins and echo.  No recent complaints      Leukopenia and thrombocytopenia, Improved  WBC ron 3.6k; PLT ron 119k - likely due to infection. Subsequently improved, and she has since refused labs.        Agitation, restlessness, hallucinations, Resolved  Resolved following treatment of UTI     Diet: Snacks/Supplements Adult: Beneprotein; With Meals  Combination Diet Regular Diet    DVT Prophylaxis:  DOAC  Zhu Catheter: Not present  Code Status: Full Code         Disposition: Expected discharge pending placement, 7+ days    The patient's care was discussed with the Patient.    Briana Aaron MD  Hospitalist Service  [facility]  Contact information available via [facility] Paging/Directory    ______________________________________________________________________    Interval History   No events overnight. Offers no complaints. Continues to have poor PO intake. Patient's brother, Maxi, was granted 60 days of emergency guardianship yesterday. I was unable to reach him today; I gather he is preparing for his parents'     Data reviewed today: I reviewed all medications, new labs and imaging results over the last 24 hours. I personally reviewed no images or EKG's today.    Physical Exam   Vital Signs: Temp: 98.6  F (37  C) Temp src: Oral BP: 94/64 Pulse: 51   Resp: 16 SpO2: 97 % O2 Device: None (Room air)    Weight: 83 lbs 3.2 oz  Constitutional: Frail female, NAD  HEENT: +temporal wasting  Respiratory: Breathing non-labored.   Skin/Integument: No rash  Musculoskeletal: +cachexia. Mild TTP over bilateral LE - chronic and stable.   Neuro: Alert and appropriate, CARBONE  Psych: Flat affect; calm and cooperative    Data   Recent Labs   Lab 22  2127 22  0911 02/10/22  1303   POTASSIUM  --  3.4 3.3*   GLC 96  --   --          No results found for this or any previous visit (from the past 24 hour(s)).    Medications     - MEDICATION INSTRUCTIONS -         melatonin  1 mg Oral At Bedtime     pantoprazole  40 mg Oral QAM AC     polyethylene glycol  17 g Oral Daily     rivaroxaban ANTICOAGULANT  10 mg Oral Daily with supper     senna-docusate  1 tablet Oral BID

## 2022-02-16 NOTE — PLAN OF CARE
DATE & TIME: 2/16/2022 2630-5054                   Cognitive Concerns/ Orientation : A&O x 2, disoriented to time and situation   BEHAVIOR & AGGRESSION TOOL COLOR: Green  ABNL VS/O2: BP soft. Other VSS on RA  MOBILITY: Assist x 1, GB/walker, up to chair and wheelchair today  PAIN MANAGMENT: Complains of stomach/abdominal discomfort but declined intervention. Denies nausea  DIET: Regular- needs assistance with feeding or wont eat much, diet improved  BOWEL/BLADDER: Incontinent at times  ABNL LAB/BG: NA  DRAIN/DEVICES: None  TELEMETRY RHYTHM: NA  SKIN: Bruises, mepilex to coccyx. +1 edema to left arm from previous IV  TESTS/PROCEDURES: NA  D/C DATE: Possible discharge to LTC vs GH pending placement  OTHER IMPORTANT INFO: SW following. Patient's brother, Maxi, was granted 60 days of emergency guardianship on 2/15. Next hearing scheduled on 4/1/22 for permanent guardianship

## 2022-02-16 NOTE — PROGRESS NOTES
Care Management Follow Up    Length of Stay (days): 25    Expected Discharge Date: 03/30/2022     Concerns to be Addressed: discharge planning     Patient plan of care discussed at interdisciplinary rounds: Yes    Anticipated Discharge Disposition: Transitional Care     Anticipated Discharge Services:    Anticipated Discharge DME:      Patient/family educated on Medicare website which has current facility and service quality ratings: no  Education Provided on the Discharge Plan:    Patient/Family in Agreement with the Plan: yes    Referrals Placed by CM/SW: Post Acute Facilities  Private pay costs discussed: Not applicable    Additional Information:    Maxi sent Krystin a copy of the guardianship paperwork that has granted him emergency guardianship for the next 60 days; expires 4/15/22.  The next court hearing for permanent guardianship is 4/1/22.      Krystin submitted a referral through [facility] ([facility]) to get pt connected with community resources/disabled services (funding for a ).  Pt should be assigned a worker within the next 1-2 weeks that will then lead into an assessment process for eligibility; Maxi and KRYSTIN will be the primary points of contact throughout this process.  Lindsay Bhatti (ph:  [phone]) is who took today's referral and will be able to provide status updates on the case if needed.  Lindsay was also able to verify that MA maikel has been received and is in a pending status.        BARBIE TiptonSW

## 2022-02-17 PROCEDURE — 99232 SBSQ HOSP IP/OBS MODERATE 35: CPT | Performed by: INTERNAL MEDICINE

## 2022-02-17 PROCEDURE — 250N000011 HC RX IP 250 OP 636: Performed by: HOSPITALIST

## 2022-02-17 PROCEDURE — 250N000013 HC RX MED GY IP 250 OP 250 PS 637: Performed by: HOSPITALIST

## 2022-02-17 PROCEDURE — 120N000001 HC R&B MED SURG/OB

## 2022-02-17 RX ADMIN — Medication 1 MG: at 21:28

## 2022-02-17 RX ADMIN — RIVAROXABAN 10 MG: 10 TABLET, FILM COATED ORAL at 18:29

## 2022-02-17 RX ADMIN — SENNOSIDES AND DOCUSATE SODIUM 1 TABLET: 8.6; 5 TABLET ORAL at 08:23

## 2022-02-17 RX ADMIN — PANTOPRAZOLE SODIUM 40 MG: 40 TABLET, DELAYED RELEASE ORAL at 08:23

## 2022-02-17 RX ADMIN — ONDANSETRON 4 MG: 4 TABLET, ORALLY DISINTEGRATING ORAL at 06:42

## 2022-02-17 ASSESSMENT — ACTIVITIES OF DAILY LIVING (ADL)
ADLS_ACUITY_SCORE: 19
ADLS_ACUITY_SCORE: 20
ADLS_ACUITY_SCORE: 19
ADLS_ACUITY_SCORE: 20
ADLS_ACUITY_SCORE: 20
ADLS_ACUITY_SCORE: 19
ADLS_ACUITY_SCORE: 20
ADLS_ACUITY_SCORE: 19
ADLS_ACUITY_SCORE: 20
ADLS_ACUITY_SCORE: 19
ADLS_ACUITY_SCORE: 20
ADLS_ACUITY_SCORE: 20
ADLS_ACUITY_SCORE: 19
ADLS_ACUITY_SCORE: 20
ADLS_ACUITY_SCORE: 20

## 2022-02-17 NOTE — PLAN OF CARE
DATE & TIME: 2/16/2022 6788-1013                   Cognitive Concerns/ Orientation : A&O x 2, disoriented to time and situation   BEHAVIOR & AGGRESSION TOOL COLOR: Green  ABNL VS/O2: VSS on RA  MOBILITY: Assist x 1, GB/walker, up to chair and wheelchair today.  PAIN MANAGMENT: Complains of stomach/abdominal discomfort administered Zofran (effective).  DIET: Regular- needs assistance with feeding, eats small amounts.  BOWEL/BLADDER: Incontinent at times  ABNL LAB/BG: NA  DRAIN/DEVICES: None  TELEMETRY RHYTHM: NA  SKIN: Bruises, mepilex to coccyx.  TESTS/PROCEDURES: NA  D/C DATE: Possible discharge to LTC vs GH pending placement  OTHER IMPORTANT INFO: SW following. Patient's brother, Maxi, was granted 60 days of emergency guardianship on 2/15. Next hearing scheduled on 4/1/22 for permanent guardianship

## 2022-02-17 NOTE — PROGRESS NOTES
"[facility]    Internal Medicine Hospitalist Progress Note  2022  I evaluated patient on the above date.    Andrew Tafoya Jr., MD  [phone] (p)  Text Page  Vocera        Assessment & Plan New actions/orders today (2022) are underlined.    Miranda Dover is a 51 year old female with hx of Down syndrome who was admitted on 2022 for acute hypoxic respiratory failure due to COVID-19 pneumonia, which has resolved. Hospitalization has been prolonged due to need to establish guardianship following the deaths of her parents during this hospitalization.     Discharge planning issues.  Down syndrome.  Developmental delay.  * Patient's primary caregivers were her parents who were also hospitalized on  for COVID. Patient's father  on  and her mother  on .   * Patient has been intermittently anxious and agitated, sometimes yelling/screaming. She doesn't know why she's here and has repeatedly asked \"what's wrong with me?\" Following admission, she became increasingly withdrawn, refusing medications and meals at times.   * Palliative Care was initially consulted for emotional support, but this was not particularly helpful.  * Discharge planning has been complicated by need for guardianship and payor source for placement.  * Patient's brother, Maxi, was granted 60 days of emergency guardianship on 2/15.   - Next hearing scheduled on 22 for permanent guardianship  - Plan to transition patient to group home, patient's brother will be involved in this process.  - MA application pending.  -  following closely.    Severe malnutrition.  Underweight with cachexia.  Failure to thrive.  * BMI ~13 with poor PO intake. SLP was consulted for possible dysphagia, and no evidence of dysphagia was noted.  * Patient was treated with IV fluids -. She later lost IV access, and refused replacement.  - Continue regular diet with Beneprotein with meals. Dietary preferences " were discussed with the patient, and she likes turkey and white bread sandwiches.   - Encourage PO fluids  - Continues to have poor PO intake; will need to discuss PEG tube for supplemental tube feeds with patient's brother in the next few days.     Hypokalemia due to poor PO intake.  - Continue PRN potassium replacement.    Dyspepsia, due to potassium supplements.  - Continue pantoprazole.     Thyroid nodule, possible hypothyroidism.  * Incidentally noted on admission chest CT. TSH was elevated to 15, but T4 was normal; likely due to severe malnutrition.  * Repeat TSH/FT4 ordered, but patient refused lab draw.  - Follow-up this hospitalization or outpatient.    Constipation, Improved.  * She was initiated on a bowel regimen during this hospitalization with good response.  - Continue Miralax daily and senna-docusate #1 BID.    Borderline hypotension, Stable.  * Baseline blood pressures 90s-100s systolic. Decreased to mid-80s systolic in the setting of minimal PO intake. Treated with IVF from 1/29-1/31       Resolved conditions  Acute hypoxic respiratory failure due to COVID-19 pneumonia, Resolved.  COVID recovered.  * Unvaccinated.  * Symptom onset 1/19/22.  * Tested positive for COVID on 1/22/22.   * Chest CT on arrival showed bilateral infiltrates.  * Initiated on dexamethasone and remdesivir on 1/22  * Completed 5-day course of remdesivir on 1/26.  * Completed 10-day course of dexamethasone on 1/31.  - Now breathing comfortably on room air  - Continue rivaroxaban for VTE ppx, plan 30-day course (last day: 2/22).    E coli UTI, Resolved.  * Pan-sensitive E.coli noted on 2/8 urine culture. She was initiated on cefuroxime on 2/9 and completed a 7 day course on 2/15    Chest pain, Resolved.  * Intermittent chest pain noted during this hospitalization. Patient refused serial troponins and echo. * No recent complaints.  - Monitor clinically.     Leukopenia and thrombocytopenia, suspect due to viral infection,  "Improved.  * WBC ron 3.6k; PLT ron 119k - likely due to infection. Subsequently improved, and she has since refused labs.        Agitation, restlessness, hallucinations (metabolic and infection encephalopathy), Resolved.  * Resolved following treatment of UTI.         Clinically Significant Risk Factors Present on Admission                      COVID-19 testing.  COVID-19 PCR Results    COVID-19 PCR Results 1/22/22   SARS CoV2 PCR Positive (A)   (A) Abnormal value       Comments are available for some flowsheets but are not being displayed.         COVID-19 Antibody Results, Testing for Immunity    COVID-19 Antibody Results, Testing for Immunity   No data to display.             Diet: Snacks/Supplements Adult: Beneprotein; With Meals  Combination Diet Regular Diet    Prophylaxis: PCD's, ambulation. Rivaroxaban.  Zhu Catheter: Not present  Central Lines: None  Code Status: Full Code    Disposition Plan   Expected discharge: 1-2d recommended to group home pending bed availability.  Entered: Andrew Tafoya MD 02/17/2022, 4:01 PM         Interval History   No acute events o/n.  C/o intermittent abdominal discomfort; eating OK and had BM today.    -Data reviewed today: I reviewed all new labs and imaging over the last 24 hours. I personally reviewed no images or EKG's today.    Physical Exam    , Blood pressure 95/67, pulse 73, temperature 97.8  F (36.6  C), temperature source Oral, resp. rate 16, height 1.6 m (5' 3\"), weight 37.7 kg (83 lb 3.2 oz), SpO2 97 %.  Vitals:    01/22/22 1140 02/05/22 0729 02/14/22 1329   Weight: 33.6 kg (74 lb 1.2 oz) 43.8 kg (96 lb 9.6 oz) 37.7 kg (83 lb 3.2 oz)     Vital Signs with Ranges  Temp:  [97.8  F (36.6  C)-98  F (36.7  C)] 97.8  F (36.6  C)  Pulse:  [73-75] 73  Resp:  [16] 16  BP: (95)/(65-67) 95/67  SpO2:  [97 %-98 %] 97 %  Patient Vitals for the past 24 hrs:   BP Temp Temp src Pulse Resp SpO2   02/17/22 0841 95/67 97.8  F (36.6  C) Oral 73 16 97 %   02/16/22 2147 95/65 " 98  F (36.7  C) Oral 75 16 98 %     I/O's Last 24 hours  I/O last 3 completed shifts:  In: 500 [P.O.:500]  Out: -     Constitutional: Awake, alert, pleasant.  Respiratory:  Diminished in bases. No crackles or wheezes.  Cardiovascular: RRR, no m/r/g.  GI: Soft, no r/g tenderness, nd, +BS.  Skin/Integumen:   Other:        Data   Recent Labs   Lab 02/12/22 2127 02/11/22  0911   POTASSIUM  --  3.4   GLC 96  --      Recent Labs   Lab Test 02/12/22 2127 02/09/22  1002 01/23/22  1050 01/22/22  1202 02/23/21  1931   GLC 96 98 134* 101* 117*     No results for input(s): WBC, CRP, DD, LDH, FIBR, SHERRY, IL6B, LACT, LACTS, PCAL in the last 168 hours.      No results found for this or any previous visit (from the past 24 hour(s)).    Medications   All medications were reviewed.    - MEDICATION INSTRUCTIONS -         melatonin  1 mg Oral At Bedtime     pantoprazole  40 mg Oral QAM AC     polyethylene glycol  17 g Oral Daily     rivaroxaban ANTICOAGULANT  10 mg Oral Daily with supper     senna-docusate  1 tablet Oral BID     sodium chloride 0.9%, acetaminophen **OR** acetaminophen, albuterol, bisacodyl, lidocaine 4%, lidocaine (buffered or not buffered), - MEDICATION INSTRUCTIONS -, ondansetron **OR** ondansetron, QUEtiapine

## 2022-02-17 NOTE — PLAN OF CARE
DATE & TIME: 2/16/22 5874-2149    Cognitive Concerns/ Orientation : Pt A/Ox2, disoriented to time and situation   BEHAVIOR & AGGRESSION TOOL COLOR: Green   ABNL VS/O2: VSS on RA  MOBILITY: Assist x1 with gait belt and walker, fall risk  PAIN MANAGMENT: Complained of intermittent abdominal pain overnight  DIET: Regular  BOWEL/BLADDER: Incontinent at times  SKIN: Bruises, scabs  D/C DATE: Discharge pending placement  OTHER IMPORTANT INFO: SW following. Brother granted emergency guardianship for 60 days. Scheduled hearing for permanent guardianship on 4/1.

## 2022-02-17 NOTE — PROGRESS NOTES
CLINICAL NUTRITION SERVICES - REASSESSMENT NOTE    RECOMMENDATIONS FOR MD/PROVIDER TO ORDER:   - Pt is eating a bit better for the past 3 days. Still consider GT, continue to monitor for consistency of oral intakes.     - Pt may benefit from Remeron for appetite stimulation   Recommendations Ordered by Registered Dietitian (RD):   - Continue pudding w/ Beneprotein w/ breakfast   Malnutrition: (2/3)  % Weight Loss: Unable to evaluate   % Intake:  </= 50% for >/= 5 days (severe malnutrition)  Subcutaneous Fat Loss:  Orbital region moderate depletion, Upper arm region moderate depletion and Thoracic region moderate depletion  Muscle Loss:  Temporal region moderate depletion, Clavicle bone region moderate depletion and Dorsal hand region moderate depletion  Fluid Retention:  None noted     Malnutrition Diagnosis: Severe malnutrition  In Context of:  Acute illness or injury  Chronic illness or disease     EVALUATION OF PROGRESS TOWARD GOALS   Diet: Regular diet   Vanilla mixed w/ beneprotein q AM w/ breakfast  Intake/Tolerance:  - Pt is eating a little more the past few days, since 2/14  - Yesterday 2/16 pt tolerated 50% of three meals, providing around 1000 kcal and 40 grams of protein.   - On the 15th pt had 50% of one meal and 75% of another.     - Last BM recorded on 2/15.  - Miralax daily (held today), Senokot BID (given)  - Last wt 37.7 kg recorded 2/14.    ASSESSED NUTRITION NEEDS:  Dosing Weight 33.6 kg   Estimated Energy Needs: 4650-4007 kcals (35-40 Kcal/Kg)  Justification: repletion and underweight  Estimated Protein Needs: 50-70 grams protein (1.5-2 g pro/Kg)  Justification: repletion and hypercatabolism with acute illness    NEW FINDINGS:   - Court hearing 2/15 - brother granted emergency guardianship.    Previous Goals:   Nutrition support initiation in <72 hrs   Evaluation: Not met    Previous Nutrition Diagnosis:   Inadequate oral intake related to lack of appetite, disinterest in food as evidenced by  patient declines most food aside from pudding for the past 23 days of admission  Evaluation: Improving, continues     CURRENT NUTRITION DIAGNOSIS  Inadequate oral intake related to lack of appetite, disinterest in eating as evidenced by patient declining food for most of 26 day admission, intakes improving w/ encouragement the past 3 days.     INTERVENTIONS  Recommendations / Nutrition Prescription  Regular diet  - Continue pudding w/ beneprotein w/ breakfast    - Pt is eating a bit better for the past 3 days. Still consider GT, continue to monitor for consistency of oral intakes.     - Pt may benefit from Remeron for appetite stimulation    Implementation  See recommendations above.     Goals  Intake of at least 50% adequate meals on average.       MONITORING AND EVALUATION:  Progress towards goals will be monitored and evaluated per protocol and Practice Guidelines    Rosalba Adams RD, LD  [facility]  Pager: [phone]  Weekend Pager: [phone]

## 2022-02-18 ENCOUNTER — APPOINTMENT (OUTPATIENT)
Dept: OCCUPATIONAL THERAPY | Facility: CLINIC | Age: 52
DRG: 177 | End: 2022-02-18
Payer: COMMERCIAL

## 2022-02-18 LAB
CREAT SERPL-MCNC: 0.63 MG/DL (ref 0.52–1.04)
GFR SERPL CREATININE-BSD FRML MDRD: >90 ML/MIN/1.73M2

## 2022-02-18 PROCEDURE — 120N000001 HC R&B MED SURG/OB

## 2022-02-18 PROCEDURE — 97535 SELF CARE MNGMENT TRAINING: CPT | Mod: GO | Performed by: OCCUPATIONAL THERAPIST

## 2022-02-18 PROCEDURE — 250N000013 HC RX MED GY IP 250 OP 250 PS 637: Performed by: HOSPITALIST

## 2022-02-18 PROCEDURE — 99232 SBSQ HOSP IP/OBS MODERATE 35: CPT | Performed by: INTERNAL MEDICINE

## 2022-02-18 PROCEDURE — 250N000013 HC RX MED GY IP 250 OP 250 PS 637: Performed by: REGISTERED NURSE

## 2022-02-18 PROCEDURE — 250N000011 HC RX IP 250 OP 636: Performed by: HOSPITALIST

## 2022-02-18 PROCEDURE — 36415 COLL VENOUS BLD VENIPUNCTURE: CPT | Performed by: INTERNAL MEDICINE

## 2022-02-18 PROCEDURE — 82565 ASSAY OF CREATININE: CPT | Performed by: INTERNAL MEDICINE

## 2022-02-18 RX ADMIN — PANTOPRAZOLE SODIUM 40 MG: 40 TABLET, DELAYED RELEASE ORAL at 09:43

## 2022-02-18 RX ADMIN — RIVAROXABAN 10 MG: 10 TABLET, FILM COATED ORAL at 17:37

## 2022-02-18 RX ADMIN — SENNOSIDES AND DOCUSATE SODIUM 1 TABLET: 8.6; 5 TABLET ORAL at 09:43

## 2022-02-18 RX ADMIN — POLYETHYLENE GLYCOL 3350 17 G: 17 POWDER, FOR SOLUTION ORAL at 09:44

## 2022-02-18 RX ADMIN — ONDANSETRON 4 MG: 4 TABLET, ORALLY DISINTEGRATING ORAL at 06:22

## 2022-02-18 ASSESSMENT — ACTIVITIES OF DAILY LIVING (ADL)
ADLS_ACUITY_SCORE: 17
ADLS_ACUITY_SCORE: 17
ADLS_ACUITY_SCORE: 19
ADLS_ACUITY_SCORE: 17
ADLS_ACUITY_SCORE: 19
ADLS_ACUITY_SCORE: 19
ADLS_ACUITY_SCORE: 17
ADLS_ACUITY_SCORE: 14
ADLS_ACUITY_SCORE: 17
ADLS_ACUITY_SCORE: 15
ADLS_ACUITY_SCORE: 17
ADLS_ACUITY_SCORE: 19
ADLS_ACUITY_SCORE: 17
ADLS_ACUITY_SCORE: 17
ADLS_ACUITY_SCORE: 14
ADLS_ACUITY_SCORE: 19

## 2022-02-18 NOTE — PLAN OF CARE
DATE & TIME: 2/17/22 3061-9750    Cognitive Concerns/ Orientation : Pt A/Ox2, disoriented to time and situation. Tearful at times.   BEHAVIOR & AGGRESSION TOOL COLOR: Green   ABNL VS/O2: VSS on RA  MOBILITY: Assist x1 with gait belt and walker, fall risk  PAIN MANAGMENT: Complaints of intermittent abdominal pain at times  DIET: Regular  BOWEL/BLADDER: Incontinent of urine at times  SKIN: Scattered scabs and bruises  D/C DATE: Discharge to group home pending placement  OTHER IMPORTANT INFO: SW following. Brother granted 60 day emergency guardianship. Scheduled hearing for permanent guardianship 4/1.

## 2022-02-18 NOTE — PLAN OF CARE
"Goal Outcome Evaluation:    Plan of Care Reviewed With: patient     Overall Patient Progress: improving    DATE & TIME: 2/17/22 0632-6061    Cognitive Concerns/ Orientation : Pt A/Ox2, disoriented to time and situation   BEHAVIOR & AGGRESSION TOOL COLOR: Green   ABNL VS/O2: VSS on RA  MOBILITY: Assist x1 with gait belt and walker; up in chair  PAIN MANAGMENT: Patient c/o intermittent abdominal discomfort but denies N/V - \"I think I may have swallowed my saliva.\"  DIET: Regular - ate all of the ham and most of the bread for breakfast, declined lunch and had about 75% of dinner (fish, bread and cantaloupe)  BOWEL/BLADDER: Incontinent at times; had a small BM this shift  SKIN: Scattered bruises and scabs  D/C DATE: 1-2d recommended to group home pending bed availability.  OTHER IMPORTANT INFO: SW following. Brother granted emergency guardianship for 60 days. Scheduled hearing for permanent guardianship is on 4/1.                    "

## 2022-02-18 NOTE — PROGRESS NOTES
"[facility]    Internal Medicine Hospitalist Progress Note  2022  I evaluated patient on the above date.    Andrew Tafoya Jr., MD  [phone] (p)  Text Page  Vocera        Assessment & Plan New actions/orders today (2022) are underlined.    Miranda Dover is a 51 year old female with hx of Down syndrome who was admitted on 2022 for acute hypoxic respiratory failure due to COVID-19 pneumonia, which has resolved. Hospitalization has been prolonged due to need to establish guardianship following the deaths of her parents during this hospitalization.     Discharge planning issues.  Down syndrome.  Developmental delay.  * Patient's primary caregivers were her parents who were also hospitalized on  for COVID. Patient's father  on  and her mother  on .   * Patient became intermittently anxious and agitated, sometimes yelling/screaming. She did not know why she was here and had repeatedly asked \"what's wrong with me?\" Following admission, she became increasingly withdrawn, refusing medications and meals at times.   * Palliative Care was initially consulted for emotional support, but this was not particularly helpful for pt.  * Discharge planning has been complicated by need for guardianship and payor source for placement.  * Patient's brother, Maxi, was granted 60 days of emergency guardianship on 2/15.   * Pt calm and cooperative .  - Next hearing scheduled on 22 for permanent guardianship  - Plan to transition patient to group home, patient's brother will be involved in this process.  - MA application pending.  -  following closely.    Severe malnutrition.  Underweight with cachexia.  Failure to thrive.  * BMI ~13 with poor PO intake. SLP was consulted for possible dysphagia, and no evidence of dysphagia was noted.  * Patient was treated with IV fluids -. She later lost IV access, and refused replacement.  - Continue regular diet with " Beneprotein with meals. Dietary preferences were discussed with the patient, and she likes turkey and white bread sandwiches.   - Encourage PO fluids  - Continues to have poor PO intake; will need to discuss PEG tube for supplemental tube feeds with patient's brother in the next few days.     Hypokalemia due to poor PO intake.  - Continue PRN potassium replacement.    Dyspepsia, due to potassium supplements.  - Continue pantoprazole.     Thyroid nodule, possible hypothyroidism.  * Incidentally noted on admission chest CT. TSH was elevated to 15, but T4 was normal; likely due to severe malnutrition.  * Repeat TSH/FT4 ordered, but patient refused lab draw.  - Follow-up this hospitalization or outpatient.    Constipation, Improved.  * She was initiated on a bowel regimen during this hospitalization with good response.  - Continue Miralax daily and senna-docusate #1 BID.    Borderline hypotension, Stable.  * Baseline blood pressures 90s-100s systolic. Decreased to mid-80s systolic in the setting of minimal PO intake. Treated with IVF from 1/29-1/31       Resolved conditions  Acute hypoxic respiratory failure due to COVID-19 pneumonia, Resolved.  COVID recovered.  * Unvaccinated.  * Symptom onset 1/19/22.  * Tested positive for COVID on 1/22/22.   * Chest CT on arrival showed bilateral infiltrates.  * Initiated on dexamethasone and remdesivir on 1/22  * Completed 5-day course of remdesivir on 1/26.  * Completed 10-day course of dexamethasone on 1/31.  - Now breathing comfortably on room air  - Continue rivaroxaban for VTE ppx, plan 30-day course (last day: 2/22).    E coli UTI, Resolved.  * Pan-sensitive E.coli noted on 2/8 urine culture. She was initiated on cefuroxime on 2/9 and completed a 7 day course on 2/15    Chest pain, Resolved.  * Intermittent chest pain noted during this hospitalization. Patient refused serial troponins and echo. * No recent complaints.  - Monitor clinically.     Leukopenia and  "thrombocytopenia, suspect due to viral infection, Improved.  * WBC ron 3.6k; PLT ron 119k - likely due to infection. Subsequently improved, and she has since refused labs.        Agitation, restlessness, hallucinations due to metabolic and infectious encephalopathy, Resolved.  * Resolved following treatment of UTI.         Clinically Significant Risk Factors Present on Admission                      COVID-19 testing.  COVID-19 PCR Results    COVID-19 PCR Results 1/22/22   SARS CoV2 PCR Positive (A)   (A) Abnormal value       Comments are available for some flowsheets but are not being displayed.         COVID-19 Antibody Results, Testing for Immunity    COVID-19 Antibody Results, Testing for Immunity   No data to display.             Diet: Snacks/Supplements Adult: Beneprotein; With Meals  Combination Diet Regular Diet    Prophylaxis: PCD's, ambulation. Rivaroxaban.  Zhu Catheter: Not present  Central Lines: None  Code Status: Full Code    Disposition Plan   Expected discharge: Recommended to group home pending placement.  Entered: Andrew Tafoya MD 02/18/2022, 1:13 PM         Interval History   No acute events o/n.  Doing OK presently.    -Data reviewed today: I reviewed all new labs and imaging over the last 24 hours. I personally reviewed no images or EKG's today.    Physical Exam    , Blood pressure 92/55, pulse 61, temperature 98  F (36.7  C), temperature source Oral, resp. rate 14, height 1.6 m (5' 3\"), weight 37.7 kg (83 lb 3.2 oz), SpO2 98 %.  Vitals:    01/22/22 1140 02/05/22 0729 02/14/22 1329   Weight: 33.6 kg (74 lb 1.2 oz) 43.8 kg (96 lb 9.6 oz) 37.7 kg (83 lb 3.2 oz)     Vital Signs with Ranges  Temp:  [97.8  F (36.6  C)-98  F (36.7  C)] 98  F (36.7  C)  Pulse:  [61-62] 61  Resp:  [14-16] 14  BP: (92-95)/(55-61) 92/55  SpO2:  [96 %-98 %] 98 %  Patient Vitals for the past 24 hrs:   BP Temp Temp src Pulse Resp SpO2   02/18/22 0739 92/55 98  F (36.7  C) Oral 61 14 98 %   02/17/22 2138 95/61 " 97.8  F (36.6  C) Oral 62 16 96 %     I/O's Last 24 hours  No intake/output data recorded.    Constitutional: Awake, alert, pleasant.  Respiratory:  Clear anteriorly, no crackles/wheezes.  Cardiovascular: RRR, no m/r/g.  GI:   Skin/Integumen:   Other:        Data   Recent Labs   Lab 02/18/22  0956 02/12/22 2127   CR 0.63  --    GLC  --  96     Recent Labs   Lab Test 02/12/22 2127 02/09/22  1002 01/23/22  1050 01/22/22  1202 02/23/21  1931   GLC 96 98 134* 101* 117*     No results for input(s): WBC, CRP, DD, LDH, FIBR, SHERRY, IL6B, LACT, LACTS, PCAL in the last 168 hours.      No results found for this or any previous visit (from the past 24 hour(s)).    Medications   All medications were reviewed.    - MEDICATION INSTRUCTIONS -         melatonin  1 mg Oral At Bedtime     pantoprazole  40 mg Oral QAM AC     polyethylene glycol  17 g Oral Daily     rivaroxaban ANTICOAGULANT  10 mg Oral Daily with supper     senna-docusate  1 tablet Oral BID     Vitamin D3  125 mcg Oral QPM     sodium chloride 0.9%, acetaminophen **OR** acetaminophen, albuterol, bisacodyl, lidocaine 4%, lidocaine (buffered or not buffered), - MEDICATION INSTRUCTIONS -, ondansetron **OR** ondansetron, QUEtiapine

## 2022-02-18 NOTE — PLAN OF CARE
Goal Outcome Evaluation:    Plan of Care Reviewed With: patient     Overall Patient Progress: improving     DATE & TIME: 2/18/22 6581-6493   Cognitive Concerns/ Orientation : Pt A/Ox2, disoriented to time and situation. Tearful at times.   BEHAVIOR & AGGRESSION TOOL COLOR: Green   ABNL VS/O2: VSS on RA  MOBILITY: Assist x1 with gait belt and walker, fall risk  PAIN MANAGMENT: Complaints of intermittent abdominal pain at times.   DIET: Regular  BOWEL/BLADDER: Incontinent of urine at times  SKIN: Scattered scabs and bruises  D/C DATE: Discharge to group home pending placement  OTHER IMPORTANT INFO: SW following. Brother granted 60 day emergency guardianship. Scheduled hearing for permanent guardianship 4/1.

## 2022-02-19 PROCEDURE — 250N000013 HC RX MED GY IP 250 OP 250 PS 637: Performed by: HOSPITALIST

## 2022-02-19 PROCEDURE — 99232 SBSQ HOSP IP/OBS MODERATE 35: CPT | Performed by: INTERNAL MEDICINE

## 2022-02-19 PROCEDURE — 120N000001 HC R&B MED SURG/OB

## 2022-02-19 PROCEDURE — 250N000011 HC RX IP 250 OP 636: Performed by: HOSPITALIST

## 2022-02-19 PROCEDURE — 250N000013 HC RX MED GY IP 250 OP 250 PS 637: Performed by: REGISTERED NURSE

## 2022-02-19 PROCEDURE — 250N000013 HC RX MED GY IP 250 OP 250 PS 637: Performed by: INTERNAL MEDICINE

## 2022-02-19 RX ADMIN — PANTOPRAZOLE SODIUM 40 MG: 40 TABLET, DELAYED RELEASE ORAL at 08:49

## 2022-02-19 RX ADMIN — RIVAROXABAN 10 MG: 10 TABLET, FILM COATED ORAL at 18:07

## 2022-02-19 RX ADMIN — ONDANSETRON 4 MG: 4 TABLET, ORALLY DISINTEGRATING ORAL at 00:15

## 2022-02-19 RX ADMIN — SENNOSIDES AND DOCUSATE SODIUM 1 TABLET: 8.6; 5 TABLET ORAL at 08:48

## 2022-02-19 RX ADMIN — POLYETHYLENE GLYCOL 3350 17 G: 17 POWDER, FOR SOLUTION ORAL at 08:48

## 2022-02-19 RX ADMIN — Medication 1 MG: at 22:29

## 2022-02-19 RX ADMIN — Medication 1 MG: at 00:15

## 2022-02-19 ASSESSMENT — ACTIVITIES OF DAILY LIVING (ADL)
ADLS_ACUITY_SCORE: 14

## 2022-02-19 NOTE — PROGRESS NOTES
"[facility]    Internal Medicine Hospitalist Progress Note  2022  I evaluated patient on the above date.    Andrew Tafoya Jr., MD  [phone] (p)  Text Page  Vocera        Assessment & Plan New actions/orders today (2022) are underlined.    Miranda Dover is a 51 year old female with hx of Down syndrome who was admitted on 2022 for acute hypoxic respiratory failure due to COVID-19 pneumonia, which has resolved. Hospitalization has been prolonged due to need to establish guardianship following the deaths of her parents during this hospitalization.     Discharge planning issues.  Down syndrome.  Developmental delay.  * Patient's primary caregivers were her parents who were also hospitalized on  for COVID. Patient's father  on  and her mother  on .   * Patient became intermittently anxious and agitated, sometimes yelling/screaming. She did not know why she was here and had repeatedly asked \"what's wrong with me?\" Following admission, she became increasingly withdrawn, refusing medications and meals at times.   * Palliative Care was initially consulted for emotional support, but this was not particularly helpful for pt.  * Discharge planning has been complicated by need for guardianship and payor source for placement.  * Patient's brother, Maxi, was granted 60 days of emergency guardianship on 2/15.   * Pt calm and cooperative .  - Next hearing scheduled on 22 for permanent guardianship  - Plan to transition patient to group home, patient's brother will be involved in this process.  - MA application pending.  -  following closely.    Severe malnutrition.  Underweight with cachexia.  Failure to thrive.  * BMI ~13 with poor PO intake. SLP was consulted for possible dysphagia, and no evidence of dysphagia was noted.  * Patient was treated with IV fluids -. She later lost IV access, and refused replacement.  * Overall, pt noted to be very " picky with foods.  * 2/18: Noted with erratic oral intake.  - Continue regular diet with Beneprotein with meals. Dietary preferences were discussed with the patient, and she likes turkey and white bread sandwiches.   - Encourage PO fluids  - If continues to have poor PO intake, will need to discuss g-tube for supplemental tube feeds with patient's brother.     Hypokalemia due to poor PO intake.  - Continue PRN potassium replacement.    Dyspepsia, due to potassium supplements.  - Continue pantoprazole.     Thyroid nodule, possible hypothyroidism.  * Incidentally noted on admission chest CT. TSH was elevated to 15, but T4 was normal; likely due to severe malnutrition.  * Repeat TSH/FT4 ordered, but patient refused lab draw.  - Follow-up this hospitalization or outpatient.    Constipation, Improved.  * She was initiated on a bowel regimen during this hospitalization with good response.  - Continue Miralax daily and senna-docusate #1 BID.    Borderline hypotension, Stable.  * Baseline blood pressures 90s-100s systolic. Decreased to mid-80s systolic in the setting of minimal PO intake. Treated with IVF from 1/29-1/31       Resolved conditions  Acute hypoxic respiratory failure due to COVID-19 pneumonia, Resolved.  COVID recovered.  * Unvaccinated.  * Symptom onset 1/19/22.  * Tested positive for COVID on 1/22/22.   * Chest CT on arrival showed bilateral infiltrates.  * Initiated on dexamethasone and remdesivir on 1/22  * Completed 5-day course of remdesivir on 1/26.  * Completed 10-day course of dexamethasone on 1/31.  - Now breathing comfortably on room air  - Continue rivaroxaban for VTE ppx, plan 30-day course (last day: 2/22).    E coli UTI, Resolved.  * Pan-sensitive E.coli noted on 2/8 urine culture. She was initiated on cefuroxime on 2/9 and completed a 7 day course on 2/15    Chest pain, Resolved.  * Intermittent chest pain noted during this hospitalization. Patient refused serial troponins and echo. * No recent  "complaints.  - Monitor clinically.     Leukopenia and thrombocytopenia, suspect due to viral infection, Improved.  * WBC ron 3.6k; PLT ron 119k - likely due to infection. Subsequently improved, and she has since refused labs.        Agitation, restlessness, hallucinations due to metabolic and infectious encephalopathy, Resolved.  * Resolved following treatment of UTI.         Clinically Significant Risk Factors Present on Admission                      COVID-19 testing.  COVID-19 PCR Results    COVID-19 PCR Results 1/22/22   SARS CoV2 PCR Positive (A)   (A) Abnormal value       Comments are available for some flowsheets but are not being displayed.         COVID-19 Antibody Results, Testing for Immunity    COVID-19 Antibody Results, Testing for Immunity   No data to display.             Diet: Snacks/Supplements Adult: Beneprotein; With Meals  Combination Diet Regular Diet    Prophylaxis: PCD's, ambulation. Rivaroxaban.  Zhu Catheter: Not present  Central Lines: None  Code Status: Full Code    Disposition Plan   Expected discharge: Recommended to group home pending placement.  Entered: Andrew Tafoya MD 02/19/2022, 10:18 AM         Interval History   No acute events o/n.  Doing OK.  Wants to eat breakfast now.    -Data reviewed today: I reviewed all new labs and imaging over the last 24 hours. I personally reviewed no images or EKG's today.    Physical Exam    , Blood pressure 93/53, pulse 59, temperature 97.9  F (36.6  C), temperature source Oral, resp. rate 16, height 1.6 m (5' 3\"), weight 37.7 kg (83 lb 3.2 oz), SpO2 100 %.  Vitals:    01/22/22 1140 02/05/22 0729 02/14/22 1329   Weight: 33.6 kg (74 lb 1.2 oz) 43.8 kg (96 lb 9.6 oz) 37.7 kg (83 lb 3.2 oz)     Vital Signs with Ranges  Temp:  [97.9  F (36.6  C)-98.8  F (37.1  C)] 97.9  F (36.6  C)  Pulse:  [58-67] 59  Resp:  [16] 16  BP: (85-93)/(49-58) 93/53  SpO2:  [93 %-100 %] 100 %  Patient Vitals for the past 24 hrs:   BP Temp Temp src Pulse Resp " SpO2   02/19/22 0835 93/53 -- -- 59 -- --   02/19/22 0742 (!) 85/58 97.9  F (36.6  C) Oral 58 16 100 %   02/18/22 2100 90/49 98.8  F (37.1  C) Oral 60 16 100 %   02/18/22 1602 91/54 98.5  F (36.9  C) Oral 67 16 93 %     I/O's Last 24 hours  I/O last 3 completed shifts:  In: 700 [P.O.:700]  Out: -     Constitutional: Awake, alert, pleasant, cooperative.  Respiratory:  Clear anteriorly, no crackles/wheezes.  Cardiovascular: RRR, no m/r/g.  GI:   Skin/Integumen:   Other:        Data   Recent Labs   Lab 02/18/22  0956 02/12/22 2127   CR 0.63  --    GLC  --  96     Recent Labs   Lab Test 02/12/22 2127 02/09/22  1002 01/23/22  1050 01/22/22  1202 02/23/21  1931   GLC 96 98 134* 101* 117*     No results for input(s): WBC, CRP, DD, LDH, FIBR, SHERRY, IL6B, LACT, LACTS, PCAL in the last 168 hours.      No results found for this or any previous visit (from the past 24 hour(s)).    Medications   All medications were reviewed.    - MEDICATION INSTRUCTIONS -         melatonin  1 mg Oral At Bedtime     pantoprazole  40 mg Oral QAM AC     polyethylene glycol  17 g Oral Daily     rivaroxaban ANTICOAGULANT  10 mg Oral Daily with supper     senna-docusate  1 tablet Oral BID     Vitamin D3  125 mcg Oral QPM     sodium chloride 0.9%, acetaminophen **OR** acetaminophen, albuterol, bisacodyl, lidocaine 4%, lidocaine (buffered or not buffered), - MEDICATION INSTRUCTIONS -, ondansetron **OR** ondansetron, QUEtiapine

## 2022-02-19 NOTE — PLAN OF CARE
DATE & TIME: 2/18/22 1900-0730   Cognitive Concerns/ Orientation : Pt A/Ox2, disoriented to time and situation. Tearful at times.   BEHAVIOR & AGGRESSION TOOL COLOR: Green   ABNL VS/O2: VSS on RA, soft BP  MOBILITY: Assist x1 with gait belt and walker  PAIN MANAGMENT: Complaints of intermittent abdominal pain at times, zofran given x1 with some relief  DIET: Regular, very poor appetite  BOWEL/BLADDER: Incontinent of urine at times, no BM during shift, hypoactive BS  SKIN: Scattered scabs and bruises  D/C DATE: Discharge to group home pending placement  OTHER IMPORTANT INFO: SW following. Brother granted 60 day emergency guardianship. Scheduled hearing for permanent guardianship 4/1.

## 2022-02-20 PROCEDURE — 99232 SBSQ HOSP IP/OBS MODERATE 35: CPT | Performed by: INTERNAL MEDICINE

## 2022-02-20 PROCEDURE — 250N000013 HC RX MED GY IP 250 OP 250 PS 637: Performed by: REGISTERED NURSE

## 2022-02-20 PROCEDURE — 999N000040 HC STATISTIC CONSULT NO CHARGE VASC ACCESS

## 2022-02-20 PROCEDURE — 250N000013 HC RX MED GY IP 250 OP 250 PS 637: Performed by: HOSPITALIST

## 2022-02-20 PROCEDURE — 99207 PR CDG-MDM COMPONENT: MEETS MODERATE - UP CODED: CPT | Performed by: INTERNAL MEDICINE

## 2022-02-20 PROCEDURE — 120N000001 HC R&B MED SURG/OB

## 2022-02-20 PROCEDURE — 250N000013 HC RX MED GY IP 250 OP 250 PS 637: Performed by: INTERNAL MEDICINE

## 2022-02-20 RX ADMIN — POLYETHYLENE GLYCOL 3350 17 G: 17 POWDER, FOR SOLUTION ORAL at 09:21

## 2022-02-20 RX ADMIN — PANTOPRAZOLE SODIUM 40 MG: 40 TABLET, DELAYED RELEASE ORAL at 09:21

## 2022-02-20 RX ADMIN — Medication 1 MG: at 21:00

## 2022-02-20 RX ADMIN — SENNOSIDES AND DOCUSATE SODIUM 1 TABLET: 8.6; 5 TABLET ORAL at 09:21

## 2022-02-20 RX ADMIN — Medication 125 MCG: at 20:59

## 2022-02-20 ASSESSMENT — ACTIVITIES OF DAILY LIVING (ADL)
ADLS_ACUITY_SCORE: 14

## 2022-02-20 NOTE — PROGRESS NOTES
"[facility]    Internal Medicine Hospitalist Progress Note  2022  I evaluated patient on the above date.    Andrew Tafoya Jr., MD  [phone] (p)  Text Page  Vocera        Assessment & Plan New actions/orders today (2022) are underlined.    Miranda Dover is a 51 year old female with hx of Down syndrome who was admitted on 2022 for acute hypoxic respiratory failure due to COVID-19 pneumonia, which has resolved. Hospitalization has been prolonged due to need to establish guardianship following the deaths of her parents during this hospitalization.     Discharge planning issues.  Down syndrome.  Developmental delay.  Failure to thrive.  * Patient's primary caregivers were her parents who were also hospitalized on  for COVID. Patient's father  on  and her mother  on .   * Patient became intermittently anxious and agitated, sometimes yelling/screaming. She did not know why she was here and had repeatedly asked \"what's wrong with me?\" Following admission, she became increasingly withdrawn, refusing medications and meals at times.   * Palliative Care was initially consulted for emotional support, but this was not particularly helpful for pt.  * Discharge planning has been complicated by need for guardianship and payor source for placement.  * Patient's brother, Maxi, was granted 60 days of emergency guardianship on 2/15.   * Pt calm and cooperative .  - Next hearing scheduled on 22 for permanent guardianship  - Plan to transition patient to group home, patient's brother will be involved in this process.  - MA application pending.  -  following closely.    Severe malnutrition.  Underweight with cachexia.  * BMI ~13 with poor PO intake. SLP was consulted for possible dysphagia, and no evidence of dysphagia was noted.  * Patient was treated with IV fluids -. She later lost IV access, and refused replacement.  * Overall, pt noted to be very " picky with foods.   * During this hospitalization, has had frequent complaints of abdominal pain (per pt's brother, this is chronic dating back years), see below.  * 2/18: Continues to have erratic oral intake.  - Continue regular diet with Beneprotein with meals. Dietary preferences were previously discussed with the patient, and she likes turkey and white bread sandwiches.   - Encourage PO fluids  - I d/w pt's brother 2/20; overall, oral intake is probably acceptable at this point for discharge and did not feel we needed to pursue enteral feedings/g-tube at this point; desired workup for etiology of abdominal pain which he felt was contributing to her decreased PO intake.  - Workup of abdominal pain as below.    Chronic abdominal pain.  * Pt with chronic abdominal pain for years PTA; felt to limit her oral intake. US from 8/2019 did not show definite significant abnormality. CT 11/2019 showed prominence/thickening of the pylorus of the stomach is nonspecific, differential diagnosis would include inflammatory etiologies as well as underlying lesion, consider GI consultation; small amount of pelvic free fluid and mild diffuse anasarca, nonspecific. Otherwise, unclear of prior workup.  * As above, pt with frequent c/o abdominal pain this hospitalization.   * Started on pantoprazole this hospitalization for dyspepsia related to K supplements.  * Per d/w pt's brother 2/20, he wanted further evaluation for possible treatable/reversible etiologies of pt's chronic abdominal pain.  - CT abdomen/plevis with contrast.  - Ask GI to see, ?would benefit from EGD, appreciate help.  - Keep NPO after midnight.  - Continue pantoprazole daily.     Hypokalemia due to poor PO intake.  - Monitor potassium periodically.  - Continue PRN potassium replacement.     Thyroid nodule, possible hypothyroidism.  * Incidentally noted on admission chest CT. TSH was elevated to 15, but T4 was normal; likely due to severe malnutrition.  * Repeat  TSH/FT4 ordered, but patient refused lab draw.  - Follow-up   - Follow-up this hospitalization or outpatient.    Constipation, Improved.  * She was initiated on a bowel regimen during this hospitalization with good response.  - Continue Miralax daily and senna-docusate #1 BID.    Borderline hypotension, Stable.  * Baseline blood pressures 90s-100s systolic. Decreased to mid-80s systolic in the setting of minimal PO intake. Treated with IVF from 1/29-1/31       Resolved conditions  Acute hypoxic respiratory failure due to COVID-19 pneumonia, Resolved.  COVID recovered.  * Unvaccinated.  * Symptom onset 1/19/22.  * Tested positive for COVID on 1/22/22.   * Chest CT on arrival showed bilateral infiltrates.  * Initiated on dexamethasone and remdesivir on 1/22  * Completed 5-day course of remdesivir on 1/26.  * Completed 10-day course of dexamethasone on 1/31.  - Now breathing comfortably on room air  - Stop rivaroxaban for VTE prophylaxis (close to 30 days, may need EGD).    E coli UTI, Resolved.  * Pan-sensitive E.coli noted on 2/8 urine culture. She was initiated on cefuroxime on 2/9 and completed a 7 day course on 2/15    Chest pain, Resolved.  * Intermittent chest pain noted during this hospitalization. Patient refused serial troponins and echo. * No recent complaints.  - Monitor clinically.     Leukopenia and thrombocytopenia, suspect due to viral infection, Improved.  * WBC ron 3.6k; PLT ron 119k - likely due to infection. Subsequently improved, and she has since refused labs.        Agitation, restlessness, hallucinations due to metabolic and infectious encephalopathy, Resolved.  * Resolved following treatment of UTI.         Clinically Significant Risk Factors Present on Admission                      COVID-19 testing.  COVID-19 PCR Results    COVID-19 PCR Results 1/22/22   SARS CoV2 PCR Positive (A)   (A) Abnormal value       Comments are available for some flowsheets but are not being displayed.        "  COVID-19 Antibody Results, Testing for Immunity    COVID-19 Antibody Results, Testing for Immunity   No data to display.             Diet: Snacks/Supplements Adult: Beneprotein; With Meals  Combination Diet Regular Diet    Prophylaxis: PCD's, ambulation.  Zhu Catheter: Not present  Central Lines: None  Code Status: Full Code    Disposition Plan   Expected discharge: 1-2d recommended to group home pending placement and GI workup.  Entered: Andrew Tafoya MD 02/20/2022, 11:58 AM     Communication.  - I d/w pt's brother, Maxi, 2/20.      Interval History   No acute events o/n.  Ate turkey this am.  Feels that she may have eaten too much and belly feels sore.    -Data reviewed today: I reviewed all new labs and imaging over the last 24 hours. I personally reviewed no images or EKG's today.    Physical Exam    , Blood pressure 102/74, pulse 71, temperature 97.1  F (36.2  C), temperature source Oral, resp. rate 18, height 1.6 m (5' 3\"), weight 37.7 kg (83 lb 3.2 oz), SpO2 92 %.  Vitals:    01/22/22 1140 02/05/22 0729 02/14/22 1329   Weight: 33.6 kg (74 lb 1.2 oz) 43.8 kg (96 lb 9.6 oz) 37.7 kg (83 lb 3.2 oz)     Vital Signs with Ranges  Temp:  [97.1  F (36.2  C)-97.9  F (36.6  C)] 97.1  F (36.2  C)  Pulse:  [59-71] 71  Resp:  [16-18] 18  BP: ()/(55-74) 102/74  SpO2:  [92 %-99 %] 92 %  Patient Vitals for the past 24 hrs:   BP Temp Temp src Pulse Resp SpO2   02/20/22 0831 102/74 97.1  F (36.2  C) Oral 71 18 92 %   02/19/22 2256 92/55 97.3  F (36.3  C) Oral 59 18 99 %   02/19/22 1532 102/62 97.9  F (36.6  C) Oral 69 16 95 %     I/O's Last 24 hours  I/O last 3 completed shifts:  In: 120 [P.O.:120]  Out: -     Constitutional: Awake, alert, pleasant, cooperative.  Respiratory:  Clear anteriorly, no crackles/wheezes.  Cardiovascular: RRR, no m/r/g.  GI: Soft, nd, few BS, tender w/o r/g.  Skin/Integumen:   Other:        Data   Recent Labs   Lab 02/18/22  0956   CR 0.63     Recent Labs   Lab Test 02/12/22  2127 " 02/09/22  1002 01/23/22  1050 01/22/22  1202 02/23/21  1931   GLC 96 98 134* 101* 117*     No results for input(s): WBC, CRP, DD, LDH, FIBR, SHERRY, IL6B, LACT, LACTS, PCAL in the last 168 hours.      No results found for this or any previous visit (from the past 24 hour(s)).    Medications   All medications were reviewed.    - MEDICATION INSTRUCTIONS -         melatonin  1 mg Oral At Bedtime     pantoprazole  40 mg Oral QAM AC     polyethylene glycol  17 g Oral Daily     rivaroxaban ANTICOAGULANT  10 mg Oral Daily with supper     senna-docusate  1 tablet Oral BID     Vitamin D3  125 mcg Oral QPM     sodium chloride 0.9%, acetaminophen **OR** acetaminophen, albuterol, bisacodyl, lidocaine 4%, lidocaine (buffered or not buffered), - MEDICATION INSTRUCTIONS -, ondansetron **OR** ondansetron, QUEtiapine

## 2022-02-20 NOTE — PLAN OF CARE
DATE & TIME: 2/19/22 1900- 5330  Cognitive Concerns/ Orientation : Pt A/Ox2, disoriented to time and situation.   BEHAVIOR & AGGRESSION TOOL COLOR: Green   ABNL VS/O2: VSS on RA, soft BP  MOBILITY: Assist x1 with gait belt and walker  PAIN MANAGMENT: Complaints of intermittent abdominal pain at times. Hot packs used to relieve pain.  DIET: Regular, very poor appetite  BOWEL/BLADDER: Incontinent of urine at times, no BM during shift, normoactive BS  SKIN: Scattered scabs and bruises  D/C DATE: Discharge to group home pending placement  OTHER IMPORTANT INFO: SW following. Brother granted 60 day emergency guardianship. Scheduled hearing for permanent guardianship 4/1.

## 2022-02-20 NOTE — PLAN OF CARE
Goal Outcome Evaluation:    Plan of Care Reviewed With: patient     Overall Patient Progress: improving     DATE & TIME: 2/19/22 5608-2522   Cognitive Concerns/ Orientation : Pt A/Ox2, disoriented to time and situation. Tearful at times.   BEHAVIOR & AGGRESSION TOOL COLOR: Green   ABNL VS/O2: VSS on RA, soft BP  MOBILITY: Assist x1 with gait belt and walker  PAIN MANAGMENT: Complaints of intermittent abdominal pain at times  DIET: Regular, very poor appetite  BOWEL/BLADDER: Incontinent of urine at times, no BM during shift, normoactive BS  SKIN: Scattered scabs and bruises  D/C DATE: Discharge to group home pending placement  OTHER IMPORTANT INFO: SW following. Brother granted 60 day emergency guardianship. Scheduled hearing for permanent guardianship 4/1.

## 2022-02-21 ENCOUNTER — APPOINTMENT (OUTPATIENT)
Dept: PHYSICAL THERAPY | Facility: CLINIC | Age: 52
DRG: 177 | End: 2022-02-21
Payer: COMMERCIAL

## 2022-02-21 ENCOUNTER — ANESTHESIA EVENT (OUTPATIENT)
Dept: GASTROENTEROLOGY | Facility: CLINIC | Age: 52
DRG: 177 | End: 2022-02-21
Payer: COMMERCIAL

## 2022-02-21 ENCOUNTER — APPOINTMENT (OUTPATIENT)
Dept: CT IMAGING | Facility: CLINIC | Age: 52
DRG: 177 | End: 2022-02-21
Attending: INTERNAL MEDICINE
Payer: COMMERCIAL

## 2022-02-21 LAB
T4 FREE SERPL-MCNC: 1 NG/DL (ref 0.76–1.46)
TSH SERPL DL<=0.005 MIU/L-ACNC: 9.95 MU/L (ref 0.4–4)

## 2022-02-21 PROCEDURE — 120N000001 HC R&B MED SURG/OB

## 2022-02-21 PROCEDURE — 999N000127 HC STATISTIC PERIPHERAL IV START W US GUIDANCE

## 2022-02-21 PROCEDURE — 84443 ASSAY THYROID STIM HORMONE: CPT | Performed by: INTERNAL MEDICINE

## 2022-02-21 PROCEDURE — 250N000009 HC RX 250: Performed by: INTERNAL MEDICINE

## 2022-02-21 PROCEDURE — 999N000040 HC STATISTIC CONSULT NO CHARGE VASC ACCESS

## 2022-02-21 PROCEDURE — 99232 SBSQ HOSP IP/OBS MODERATE 35: CPT | Performed by: INTERNAL MEDICINE

## 2022-02-21 PROCEDURE — 84439 ASSAY OF FREE THYROXINE: CPT | Performed by: INTERNAL MEDICINE

## 2022-02-21 PROCEDURE — 97530 THERAPEUTIC ACTIVITIES: CPT | Mod: GP | Performed by: PHYSICAL THERAPIST

## 2022-02-21 PROCEDURE — 87338 HPYLORI STOOL AG IA: CPT | Performed by: NURSE PRACTITIONER

## 2022-02-21 PROCEDURE — 74177 CT ABD & PELVIS W/CONTRAST: CPT

## 2022-02-21 PROCEDURE — 97110 THERAPEUTIC EXERCISES: CPT | Mod: GP | Performed by: PHYSICAL THERAPIST

## 2022-02-21 PROCEDURE — 250N000011 HC RX IP 250 OP 636: Performed by: INTERNAL MEDICINE

## 2022-02-21 PROCEDURE — 250N000013 HC RX MED GY IP 250 OP 250 PS 637: Performed by: INTERNAL MEDICINE

## 2022-02-21 PROCEDURE — 36415 COLL VENOUS BLD VENIPUNCTURE: CPT | Performed by: INTERNAL MEDICINE

## 2022-02-21 PROCEDURE — 250N000013 HC RX MED GY IP 250 OP 250 PS 637: Performed by: HOSPITALIST

## 2022-02-21 RX ORDER — IOPAMIDOL 755 MG/ML
45 INJECTION, SOLUTION INTRAVASCULAR ONCE
Status: COMPLETED | OUTPATIENT
Start: 2022-02-21 | End: 2022-02-21

## 2022-02-21 RX ADMIN — IOPAMIDOL 45 ML: 755 INJECTION, SOLUTION INTRAVENOUS at 14:41

## 2022-02-21 RX ADMIN — PANTOPRAZOLE SODIUM 40 MG: 40 TABLET, DELAYED RELEASE ORAL at 10:38

## 2022-02-21 RX ADMIN — SODIUM CHLORIDE 60 ML: 9 INJECTION, SOLUTION INTRAVENOUS at 14:41

## 2022-02-21 RX ADMIN — Medication 1 MG: at 21:22

## 2022-02-21 RX ADMIN — SENNOSIDES AND DOCUSATE SODIUM 1 TABLET: 8.6; 5 TABLET ORAL at 10:38

## 2022-02-21 ASSESSMENT — ACTIVITIES OF DAILY LIVING (ADL)
ADLS_ACUITY_SCORE: 14
ADLS_ACUITY_SCORE: 16
ADLS_ACUITY_SCORE: 14
ADLS_ACUITY_SCORE: 16
ADLS_ACUITY_SCORE: 14
ADLS_ACUITY_SCORE: 16
ADLS_ACUITY_SCORE: 14
ADLS_ACUITY_SCORE: 16
ADLS_ACUITY_SCORE: 14
ADLS_ACUITY_SCORE: 16
ADLS_ACUITY_SCORE: 16
ADLS_ACUITY_SCORE: 14
ADLS_ACUITY_SCORE: 14
ADLS_ACUITY_SCORE: 16
ADLS_ACUITY_SCORE: 14
ADLS_ACUITY_SCORE: 16
ADLS_ACUITY_SCORE: 14
ADLS_ACUITY_SCORE: 16

## 2022-02-21 NOTE — PLAN OF CARE
DATE & TIME: 2/20/22 1037-1235  Cognitive Concerns/ Orientation : Pt A/Ox3  BEHAVIOR & AGGRESSION TOOL COLOR: Green             ABNL VS/O2: VSS on RA  MOBILITY: Assist x1 with gait belt and walker  PAIN MANAGMENT: Sleeping throughout shift, not reports of pain.   DIET: Npo at midnight  BOWEL/BLADDER: Incontinent of urine at times, no BM during shift, normoactive BS  PROCEDURES: CT ordered, needs IV to be placed prior to completing. GI consult in place needing to be completed. Patient refused IV and imaging.   SKIN: Scattered scabs and bruises  D/C DATE: Discharge to group home pending placement  OTHER IMPORTANT INFO: SW following. Brother granted 60 day emergency guardianship. Scheduled hearing for permanent guardianship 4/1. Patient refused IV placement as well as the CT scan. Plan to try in the AM

## 2022-02-21 NOTE — PLAN OF CARE
"DATE & TIME: 02/21/22 7-3 pm  Cognitive Concerns/ Orientation : A & O x2-3. Appears forgetful. Cooperative.   BEHAVIOR & AGGRESSION TOOL COLOR: Green   ABNL VS/O2: VSS on RA  MOBILITY: SBA/Assist x1 with gait belt and walker  PAIN MANAGMENT: Complaints of intermittent abdominal pain at times. Did not want heat pack when offered.   DIET: Was NPO all day. CT completed. Regular diet tonight. NPO at midnight for EGD tomorrow.   BOWEL/BLADDER: Continent this shift. Can be incontinent. No BM. BS hyperactive.   PROCEDURES: CT completed. EGD tomorrow.  SKIN: Pale. Scattered scabs and bruises to arms. +1-2 BLE edema. Buttock red.   D/C DATE: Per MD note \"1-2d recommended to group home pending placement and GI workup\".  OTHER IMPORTANT INFO: Brother granted 60 day emergency guardianship. Scheduled hearing for permanent guardianship 4/1. No changes. Able to complete all tests.       Goal Outcome Evaluation:    Plan of Care Reviewed With: Patient. Did not speak with brother personally. Held phone for pt while she talked with him x1.     Overall Patient Progress: no change               "

## 2022-02-21 NOTE — PLAN OF CARE
DATE & TIME: 2/20/22 6255-6874  Cognitive Concerns/ Orientation : Pt A/Ox3  BEHAVIOR & AGGRESSION TOOL COLOR: Green   ABNL VS/O2: VSS on RA, soft BP  MOBILITY: Assist x1 with gait belt and walker  PAIN MANAGMENT: Complaints of intermittent abdominal pain at times. Hot packs used to relieve pain.  DIET: Regular, very poor appetite. NPO after midnight.  BOWEL/BLADDER: Incontinent of urine at times, no BM during shift, normoactive BS  PROCEDURES: CT ordered, needs IV to be placed prior to completing. GI consult in place needing to be completed. Patient refused IV and imaging.   SKIN: Scattered scabs and bruises  D/C DATE: Discharge to group home pending placement  OTHER IMPORTANT INFO: SW following. Brother granted 60 day emergency guardianship. Scheduled hearing for permanent guardianship 4/1. Patient refused IV placement as well as the CT scan. Plan to try in the AM

## 2022-02-21 NOTE — PROGRESS NOTES
Patient resistant to PIV, allows attempt while seated in chair, but not successful.  VAT was to return to attempt when patient back in bed, but upon VAT return, patient not cooperative, will not move arm to properly visualize potential IV site.  Brother in room but on call.  Inform charge RN of difficulty, to update primary RN.

## 2022-02-21 NOTE — PLAN OF CARE
Goal Outcome Evaluation:    Plan of Care Reviewed With: and patient/guardian     Overall Patient Progress: met   Problem: Adult Inpatient Plan of Care  Goal: Plan of Care Review  Outcome: Ongoing, Progressing  Flowsheets (Taken 2/21/2022 0616)  Plan of Care Reviewed With: guardian  Overall Patient Progress: no change

## 2022-02-21 NOTE — CONSULTS
"GASTROENTEROLOGY CONSULTATION    Miranda Dover  [address] [address]  51 year old female    Admission Date/Time: 2022  Primary Care Provider: Lissy Vang    We were asked to see the patient in consultation by Dr. Tafoya for evaluation of chronic abdominal pain, malnutrition, question if needs EGD/other workup.       HPI: Miranda Dover is a 51 year old female with PMH of Down Syndrome admitted on 2022 for acute hypoxic respiratory failure due to COVID-19 pneumonia, which has resolved, who has had poor PO intake while hospitalized with complaints of chronic postprandial epigastric pain.     Hospitalization has been prolonged due to need to establish guardianship following the death of her parents during this hospitalization. Patient's father  on  and her mother  on . Her brother Maxi was granted emergency guardianship 2/15.     She has had poor PO intake while here and is picky about what she will eat. Patient and brother report chronic postprandial epigastric pain. She does not have pain with liquids. She is 88 lbs, BMI 13. She was started on daily PPI without much help. No nausea/vomiting, apparent constipation or diarrhea. She has tried different antacids over the years without improvement per brother. No NSAID use.     US 2019 showed trace amount of free fluid lateral to the right hepatic lobe, liver and gallbladder normal.   CT 2019 showed prominence/thickening of the pylorus of the stomach.   She has never had an EGD.    Abdominal CT scan was ordered  but she did not have IV access currently. She is scared to have it done, says she'll do it \"maybe later.\"     No family history of any GI related conditions or malignancies.     ROS: A comprehensive ten point review of systems was negative aside from those in mentioned in the HPI.      MEDICATIONS: No current facility-administered medications on file prior to encounter.  cholecalciferol (VITAMIN " "D3) 125 mcg (5000 units) capsule, Take 125 mcg by mouth every evening  NONFORMULARY, Take 1 capsule by mouth every evening [place] Supplement        ALLERGIES: No Known Allergies    Past Medical History:   Diagnosis Date     Down's syndrome      Hypothyroidism        History reviewed. No pertinent surgical history.    SOCIAL HISTORY:  Social History     Tobacco Use     Smoking status: Never Smoker     Smokeless tobacco: Never Used   Substance Use Topics     Alcohol use: No     Drug use: No       FAMILY HISTORY:  Family History   Problem Relation Age of Onset     Asthma Maternal Grandmother        PHYSICAL EXAM:   /67 (BP Location: Right arm, Patient Position: Supine)   Pulse 72   Temp 98.7  F (37.1  C) (Oral)   Resp 16   Ht 1.6 m (5' 3\")   Wt 37.7 kg (83 lb 3.2 oz)   SpO2 99%   BMI 14.74 kg/m     Constitutional: alert, no acute distress  Cardiovascular: regular rate and rhythm  Respiratory: clear to auscultation bilaterally  ENT: mucous membranes are dry  Abdomen: soft, non-distended, normally active bowel sounds. No rebound tenderness or guarding  Neuro: Alert to self, answers simple questions   Skin: warm, dry, no rashes are noted    LABORATORY WORK  Recent Labs   Lab Test 02/08/22  0843 02/06/22  1247 02/05/22  1400   WBC 7.3 5.1 6.7   HGB 12.4 11.2* 11.8   MCV 94 94 94   * 136* 148*     Recent Labs   Lab Test 02/18/22  0956 02/11/22  0911 02/10/22  1303 02/09/22  1002 02/04/22  0903 01/23/22  1050 01/22/22  1202   NA  --   --   --  141  --  140 141   POTASSIUM  --  3.4 3.3* 2.8*  --  4.4 3.8   CHLORIDE  --   --   --  104  --  105 105   CO2  --   --   --  31  --  32 32   BUN  --   --   --  6*  --  19 21   CR 0.63  --   --  0.64  0.58 0.52 0.75 0.74   ANIONGAP  --   --   --  6  --  3 4   JOSUÉ  --   --   --  8.9  --  8.1* 8.2*     Recent Labs   Lab Test 02/08/22 1924 02/23/21  1931 02/23/21  1922 10/08/19  1452   ALBUMIN  --  3.8  --  3.9   BILITOTAL  --  1.1  --  0.4   ALT  --  38  --  31 "   AST  --  25  --  27   ALKPHOS  --  75  --  65   PROTEIN Negative  --  10*  --    LIPASE  --   --   --  135       IMAGING   CT 11/15/2019  IMPRESSION:     1. Prominence/thickening of the pylorus of the stomach is nonspecific, differential diagnosis would include inflammatory etiologies as well as underlying lesion. Consider GI consultation.   2. Small amount of pelvic free fluid and mild diffuse anasarca, nonspecific.   3. No bowel obstruction.   4. Diffuse thickening of the urinary bladder wall, correlation for cystitis is recommended.    CONSULTATION ASSESSMENT AND PLAN  51 year old female with PMH of Down Syndrome admitted on 1/22/2022 for acute hypoxic respiratory failure due to COVID-19 pneumonia, which has resolved, who has had poor PO intake while hospitalized with complaints of chronic postprandial epigastric pain. CT 2019 showed prominence of the pylorus. Differentials are broad but include gastritis, PUD, H pylori, gastroparesis, GERD, dyspepsia, celiac (celiac serologies negative 2019), biliary abnormality, etc.     Plan  -Continue PPI   -Check for H pylori  -CT scan if/when agreeable today -- will need IV access first  -Consider repeat ultrasound  -Possible EGD tomorrow     I will discuss the patient plan with Dr. Watts. Thank you for asking us to participate in the care of this patient.    Total time: 45 minutes including 15 minute telephone discussion with brother, MaxiBob Alas, [facility]  Cell: [phone] until 12PM  Office: [phone]

## 2022-02-21 NOTE — PLAN OF CARE
Patient currently resting in bed; appears asleep. She is drowsy yet oriented to person, place, and situation. Declines any pain and has no needs or concerns at this time. Will continue to monitor.

## 2022-02-21 NOTE — PLAN OF CARE
Goal Outcome Evaluation:    Plan of Care Reviewed With: patient     Overall Patient Progress: improving     DATE & TIME: 2/20/22 8751-2098  Cognitive Concerns/ Orientation : Pt A/Ox2, disoriented to time and situation.   BEHAVIOR & AGGRESSION TOOL COLOR: Green   ABNL VS/O2: VSS on RA, soft BP  MOBILITY: Assist x1 with gait belt and walker  PAIN MANAGMENT: Complaints of intermittent abdominal pain at times. Hot packs used to relieve pain.  DIET: Regular, very poor appetite. NPO after midnight.  BOWEL/BLADDER: Incontinent of urine at times, no BM during shift, normoactive BS  PROCEDURES: CT ordered, needs IV to be placed prior to completing. IV team/flying squad aware. GI consult in place needing to be completed.   SKIN: Scattered scabs and bruises  D/C DATE: Discharge to group home pending placement  OTHER IMPORTANT INFO: SW following. Brother granted 60 day emergency guardianship. Scheduled hearing for permanent guardianship 4/1.

## 2022-02-21 NOTE — ANESTHESIA PREPROCEDURE EVALUATION
Anesthesia Pre-Procedure Evaluation    Patient: Miranda Dover   MRN: [id number] : 1970        Procedure : Procedure(s):  ESOPHAGOGASTRODUODENOSCOPY (EGD)          Past Medical History:   Diagnosis Date     Down's syndrome      Hypothyroidism       History reviewed. No pertinent surgical history.   No Known Allergies   Social History     Tobacco Use     Smoking status: Never Smoker     Smokeless tobacco: Never Used   Substance Use Topics     Alcohol use: No      Wt Readings from Last 1 Encounters:   22 37.7 kg (83 lb 3.2 oz)        Anesthesia Evaluation   Pt has had prior anesthetic.     No history of anesthetic complications       ROS/MED HX  ENT/Pulmonary:    (-) sleep apnea   Neurologic:       Cardiovascular:       METS/Exercise Tolerance:     Hematologic:       Musculoskeletal:       GI/Hepatic: Comment: Failure to thrive   (-) GERD   Renal/Genitourinary:       Endo:     (+) thyroid problem, hypothyroidism,     Psychiatric/Substance Use:       Infectious Disease: Comment: Admitted with COVID induced respiratory failure 22  Both of patients parents  while she was hospitalized      Malignancy:       Other: Comment: Down syndrome           Physical Exam    Airway        Mallampati: III   TM distance: > 3 FB   Neck ROM: full   Mouth opening: > 3 cm    Respiratory Devices and Support         Dental     Comment: Loose front lower teeth per patient    (+) missing and loose      Cardiovascular   cardiovascular exam normal          Pulmonary   pulmonary exam normal                OUTSIDE LABS:  CBC:   Lab Results   Component Value Date    WBC 7.3 2022    WBC 5.1 2022    HGB 12.4 2022    HGB 11.2 (L) 2022    HCT 39.2 2022    HCT 34.8 (L) 2022     (L) 2022     (L) 2022     BMP:   Lab Results   Component Value Date     2022     2022    POTASSIUM 3.4 2022    POTASSIUM 3.3 (L) 02/10/2022    CHLORIDE 104  02/09/2022    CHLORIDE 105 01/23/2022    CO2 31 02/09/2022    CO2 32 01/23/2022    BUN 6 (L) 02/09/2022    BUN 19 01/23/2022    CR 0.63 02/18/2022    CR 0.58 02/09/2022    CR 0.64 02/09/2022    GLC 96 02/12/2022    GLC 98 02/09/2022     COAGS: No results found for: PTT, INR, FIBR  POC: No results found for: BGM, HCG, HCGS  HEPATIC:   Lab Results   Component Value Date    ALBUMIN 3.8 02/23/2021    PROTTOTAL 7.7 02/23/2021    ALT 38 02/23/2021    AST 25 02/23/2021    ALKPHOS 75 02/23/2021    BILITOTAL 1.1 02/23/2021     OTHER:   Lab Results   Component Value Date    JOSUÉ 8.9 02/09/2022    LIPASE 135 10/08/2019    TSH 9.95 (H) 02/21/2022    T4 1.00 02/21/2022    CRP 41.1 (H) 01/23/2022       Anesthesia Plan    ASA Status:  3   NPO Status:  NPO Appropriate    Anesthesia Type: MAC.     - Reason for MAC: chronic cardiopulmonary disease              Consents    Anesthesia Plan(s) and associated risks, benefits, and realistic alternatives discussed. Questions answered and patient/representative(s) expressed understanding.    - Discussed:     - Discussed with:  Patient         Postoperative Care    Pain management: IV analgesics.        Comments:    Other Comments: H&P reviewed    Patient will likely be sensitive to anesthetics            Ac Monae MD

## 2022-02-21 NOTE — PROGRESS NOTES
"[facility]    Internal Medicine Hospitalist Progress Note  2022  I evaluated patient on the above date.    Andrew Tafoya Jr., MD  [phone] (p)  Text Page  Vocera        Assessment & Plan New actions/orders today (2022) are underlined.    Miranda Dover is a 51 year old female with hx of Down syndrome who was admitted on 2022 for acute hypoxic respiratory failure due to COVID-19 pneumonia, which has resolved. Hospitalization has been prolonged due to need to establish guardianship following the deaths of her parents during this hospitalization.     Discharge planning issues  Down syndrome  Developmental delay  Failure to thrive  * Patient's primary caregivers were her parents who were also hospitalized on  for COVID. Patient's father  on  and her mother  on .   * Patient became intermittently anxious and agitated, sometimes yelling/screaming. She did not know why she was here and had repeatedly asked \"what's wrong with me?\" Following admission, she became increasingly withdrawn, refusing medications and meals at times.   * Palliative Care was initially consulted for emotional support, but this was not particularly helpful for pt.  * Discharge planning has been complicated by need for guardianship and payor source for placement.  * Patient's brother, Maxi, was granted 60 days of emergency guardianship on 2/15.   * Pt calm and cooperative .  - Next hearing scheduled on 22 for permanent guardianship  - Plan to transition patient to group home; patient's brother involved in this process  - MA application pending  -  following closely    Severe malnutrition  Underweight with cachexia  * BMI ~13 with poor PO intake. SLP was consulted for possible dysphagia, and no evidence of dysphagia was noted.  * Patient was treated with IV fluids -. She later lost IV access, and refused replacement.  * Overall, pt noted to be very picky with foods.   * " During this hospitalization, has had frequent complaints of abdominal pain (per pt's brother, this is chronic dating back years), see below.  * 2/18: Continued to have erratic oral intake.  * 2/20:  Discussed with pt's brother; overall, felt that oral intake was probably acceptable at this point for discharge and did not feel we needed to pursue enteral feedings/g-tube at this point; desired workup for etiology of abdominal pain which he felt was contributing to her decreased PO intake. Workup for abdominal pain started as below.  - Continue regular diet with Beneprotein with meals. Dietary preferences were previously discussed with the patient, and she likes turkey and white bread sandwiches.   - Continue to encourage PO fluids  - Workup of abdominal pain as below    Chronic abdominal pain  * Pt with chronic abdominal pain for years PTA; felt to limit her oral intake. US from 8/2019 did not show definite significant abnormality. CT 11/2019 showed prominence/thickening of the pylorus of the stomach is nonspecific, differential diagnosis would include inflammatory etiologies as well as underlying lesion, consider GI consultation; small amount of pelvic free fluid and mild diffuse anasarca, nonspecific. Otherwise, unclear of prior workup.  * As above, pt with frequent c/o abdominal pain this hospitalization.   * Started on pantoprazole this hospitalization for dyspepsia related to K supplements.  * Per d/w pt's brother 2/20, he wanted further evaluation for possible treatable/reversible etiologies of pt's chronic abdominal pain. GI consulted 2/20. CT abdomen ordered 2/20, but pt refused IV placement, so delayed.   - CT abdomen/plevis today 2/21 (IV in place)  - Possible EGD tomorrow 2/22 per GI  - Continue pantoprazole daily  - Appreciate GI help     Hypokalemia due to poor PO intake  - Monitor potassium periodically  - Continue PRN potassium replacement     Thyroid nodule, possible hypothyroidism  * Incidentally  noted on admission chest CT. TSH was elevated to 15, but T4 was normal; likely due to severe malnutrition.  * Repeat TSH/FT4 ordered, but patient refused lab draw  - Add-on TSH and free T4 to labs from 2/18  - Follow-up this hospitalization or outpatient    Constipation, Improved  * She was initiated on a bowel regimen during this hospitalization with good response.  - Continue Miralax daily and senna-docusate #1 BID    Borderline hypotension, Stable  * Baseline blood pressures 90s-100s systolic. Decreased to mid-80s systolic in the setting of minimal PO intake. Treated with IVF from 1/29-1/31       Resolved conditions  Acute hypoxic respiratory failure due to COVID-19 pneumonia  COVID recovered  * Unvaccinated.  * Symptom onset 1/19/22.  * Tested positive for COVID on 1/22/22.   * Chest CT on arrival showed bilateral infiltrates.  * Initiated on dexamethasone and remdesivir on 1/22  * Completed 5-day course of remdesivir on 1/26.  * Completed 10-day course of dexamethasone on 1/31.  * Stopped rivaroxaban 2/20.    E coli UTI  * Pan-sensitive E.coli noted on 2/8 urine culture. She was initiated on cefuroxime on 2/9 and completed a 7 day course on 2/15.    Chest pain  * Intermittent chest pain noted during this hospitalization. Patient refused serial troponins and echo.   * No recent complaints.  - Monitor clinically     Leukopenia and thrombocytopenia, suspect due to viral infection  * WBC ron 3.6k; PLT ron 119k - likely due to infection. Subsequently improved, and she has since refused labs.        Agitation, restlessness, hallucinations due to metabolic and infectious encephalopathy  * Resolved following treatment of UTI.       Clinically Significant Risk Factors Present on Admission                      COVID-19 testing.  COVID-19 PCR Results    COVID-19 PCR Results 1/22/22   SARS CoV2 PCR Positive (A)   (A) Abnormal value       Comments are available for some flowsheets but are not being displayed.        "  COVID-19 Antibody Results, Testing for Immunity    COVID-19 Antibody Results, Testing for Immunity   No data to display.             Diet: Snacks/Supplements Adult: Beneprotein; With Meals  NPO for Medical/Clinical Reasons Except for: Meds    Prophylaxis: PCD's, ambulation.  Zhu Catheter: Not present  Central Lines: None  Code Status: Full Code    Disposition Plan   Expected discharge: 1-2d recommended to group home pending placement and GI workup.  Entered: Andrew Tafoya MD 02/21/2022, 10:48 AM     Communication.  - I d/w pt's brother, Maxi, 2/20.      Interval History   Pt refused IV last evening  Pt agreed to IV and this was placed.    -Data reviewed today: I reviewed all new labs and imaging over the last 24 hours. I personally reviewed no images or EKG's today.    Physical Exam    , Blood pressure 103/64, pulse 77, temperature 98.3  F (36.8  C), temperature source Oral, resp. rate 16, height 1.6 m (5' 3\"), weight 37.7 kg (83 lb 3.2 oz), SpO2 100 %.  Vitals:    01/22/22 1140 02/05/22 0729 02/14/22 1329   Weight: 33.6 kg (74 lb 1.2 oz) 43.8 kg (96 lb 9.6 oz) 37.7 kg (83 lb 3.2 oz)     Vital Signs with Ranges  Temp:  [98.3  F (36.8  C)-98.7  F (37.1  C)] 98.3  F (36.8  C)  Pulse:  [60-77] 77  Resp:  [16] 16  BP: ()/(50-67) 103/64  SpO2:  [99 %-100 %] 100 %  Patient Vitals for the past 24 hrs:   BP Temp Temp src Pulse Resp SpO2   02/21/22 1033 103/64 -- -- 77 -- --   02/21/22 0927 (!) 83/50 98.3  F (36.8  C) Oral 60 16 100 %   02/20/22 2057 101/67 98.7  F (37.1  C) Oral 72 16 99 %     I/O's Last 24 hours  No intake/output data recorded.    Constitutional: Awake, alert, pleasant, conversant.  Respiratory:  Clear anteriorly, no crackles/wheezes.  Cardiovascular: RRR, no m/r/g.  GI: Soft, nd, nt to light touch, +BS.  Skin/Integumen:   Other:        Data   Recent Labs   Lab 02/18/22  0956   CR 0.63     Recent Labs   Lab Test 02/12/22  2127 02/09/22  1002 01/23/22  1050 01/22/22  1202 02/23/21  1931 "   GLC 96 98 134* 101* 117*     No results for input(s): WBC, CRP, DD, LDH, FIBR, SHERRY, IL6B, LACT, LACTS, PCAL in the last 168 hours.      No results found for this or any previous visit (from the past 24 hour(s)).    Medications   All medications were reviewed.    - MEDICATION INSTRUCTIONS -         melatonin  1 mg Oral At Bedtime     pantoprazole  40 mg Oral QAM AC     polyethylene glycol  17 g Oral Daily     senna-docusate  1 tablet Oral BID     Vitamin D3  125 mcg Oral QPM     sodium chloride 0.9%, acetaminophen **OR** acetaminophen, albuterol, bisacodyl, lidocaine 4%, lidocaine (buffered or not buffered), - MEDICATION INSTRUCTIONS -, ondansetron **OR** ondansetron, QUEtiapine

## 2022-02-22 ENCOUNTER — APPOINTMENT (OUTPATIENT)
Dept: ULTRASOUND IMAGING | Facility: CLINIC | Age: 52
DRG: 177 | End: 2022-02-22
Attending: INTERNAL MEDICINE
Payer: COMMERCIAL

## 2022-02-22 ENCOUNTER — ANESTHESIA (OUTPATIENT)
Dept: GASTROENTEROLOGY | Facility: CLINIC | Age: 52
DRG: 177 | End: 2022-02-22
Payer: COMMERCIAL

## 2022-02-22 LAB
H PYLORI AG STL QL IA: NEGATIVE
MAGNESIUM SERPL-MCNC: 2.1 MG/DL (ref 1.6–2.3)
POTASSIUM BLD-SCNC: 4 MMOL/L (ref 3.4–5.3)
UPPER GI ENDOSCOPY: NORMAL

## 2022-02-22 PROCEDURE — 120N000001 HC R&B MED SURG/OB

## 2022-02-22 PROCEDURE — 0DB98ZX EXCISION OF DUODENUM, VIA NATURAL OR ARTIFICIAL OPENING ENDOSCOPIC, DIAGNOSTIC: ICD-10-PCS | Performed by: INTERNAL MEDICINE

## 2022-02-22 PROCEDURE — 43239 EGD BIOPSY SINGLE/MULTIPLE: CPT | Performed by: INTERNAL MEDICINE

## 2022-02-22 PROCEDURE — 250N000013 HC RX MED GY IP 250 OP 250 PS 637: Performed by: INTERNAL MEDICINE

## 2022-02-22 PROCEDURE — 36415 COLL VENOUS BLD VENIPUNCTURE: CPT | Performed by: INTERNAL MEDICINE

## 2022-02-22 PROCEDURE — 250N000011 HC RX IP 250 OP 636

## 2022-02-22 PROCEDURE — 88305 TISSUE EXAM BY PATHOLOGIST: CPT | Mod: TC | Performed by: INTERNAL MEDICINE

## 2022-02-22 PROCEDURE — 250N000009 HC RX 250

## 2022-02-22 PROCEDURE — 0DB68ZX EXCISION OF STOMACH, VIA NATURAL OR ARTIFICIAL OPENING ENDOSCOPIC, DIAGNOSTIC: ICD-10-PCS | Performed by: INTERNAL MEDICINE

## 2022-02-22 PROCEDURE — 258N000003 HC RX IP 258 OP 636: Performed by: HOSPITALIST

## 2022-02-22 PROCEDURE — 83735 ASSAY OF MAGNESIUM: CPT | Performed by: INTERNAL MEDICINE

## 2022-02-22 PROCEDURE — 999N000010 HC STATISTIC ANES STAT CODE-CRNA PER MINUTE: Performed by: INTERNAL MEDICINE

## 2022-02-22 PROCEDURE — 258N000003 HC RX IP 258 OP 636

## 2022-02-22 PROCEDURE — 76705 ECHO EXAM OF ABDOMEN: CPT

## 2022-02-22 PROCEDURE — 370N000017 HC ANESTHESIA TECHNICAL FEE, PER MIN: Performed by: INTERNAL MEDICINE

## 2022-02-22 PROCEDURE — 250N000013 HC RX MED GY IP 250 OP 250 PS 637: Performed by: REGISTERED NURSE

## 2022-02-22 PROCEDURE — 0DB48ZX EXCISION OF ESOPHAGOGASTRIC JUNCTION, VIA NATURAL OR ARTIFICIAL OPENING ENDOSCOPIC, DIAGNOSTIC: ICD-10-PCS | Performed by: INTERNAL MEDICINE

## 2022-02-22 PROCEDURE — 84132 ASSAY OF SERUM POTASSIUM: CPT | Performed by: INTERNAL MEDICINE

## 2022-02-22 PROCEDURE — 99232 SBSQ HOSP IP/OBS MODERATE 35: CPT | Performed by: INTERNAL MEDICINE

## 2022-02-22 PROCEDURE — 250N000013 HC RX MED GY IP 250 OP 250 PS 637: Performed by: HOSPITALIST

## 2022-02-22 RX ORDER — NALOXONE HYDROCHLORIDE 0.4 MG/ML
0.2 INJECTION, SOLUTION INTRAMUSCULAR; INTRAVENOUS; SUBCUTANEOUS
Status: DISCONTINUED | OUTPATIENT
Start: 2022-02-22 | End: 2022-03-27

## 2022-02-22 RX ORDER — LIDOCAINE 40 MG/G
CREAM TOPICAL
Status: DISCONTINUED | OUTPATIENT
Start: 2022-02-22 | End: 2022-02-22 | Stop reason: HOSPADM

## 2022-02-22 RX ORDER — NALOXONE HYDROCHLORIDE 0.4 MG/ML
0.4 INJECTION, SOLUTION INTRAMUSCULAR; INTRAVENOUS; SUBCUTANEOUS
Status: DISCONTINUED | OUTPATIENT
Start: 2022-02-22 | End: 2022-03-27

## 2022-02-22 RX ORDER — ONDANSETRON 2 MG/ML
INJECTION INTRAMUSCULAR; INTRAVENOUS PRN
Status: DISCONTINUED | OUTPATIENT
Start: 2022-02-22 | End: 2022-02-22

## 2022-02-22 RX ORDER — DEXMEDETOMIDINE HYDROCHLORIDE 4 UG/ML
INJECTION, SOLUTION INTRAVENOUS PRN
Status: DISCONTINUED | OUTPATIENT
Start: 2022-02-22 | End: 2022-02-22

## 2022-02-22 RX ORDER — LIDOCAINE HYDROCHLORIDE 20 MG/ML
INJECTION, SOLUTION INFILTRATION; PERINEURAL PRN
Status: DISCONTINUED | OUTPATIENT
Start: 2022-02-22 | End: 2022-02-22

## 2022-02-22 RX ORDER — FLUMAZENIL 0.1 MG/ML
0.2 INJECTION, SOLUTION INTRAVENOUS
Status: ACTIVE | OUTPATIENT
Start: 2022-02-22 | End: 2022-02-23

## 2022-02-22 RX ORDER — PROPOFOL 10 MG/ML
INJECTION, EMULSION INTRAVENOUS CONTINUOUS PRN
Status: DISCONTINUED | OUTPATIENT
Start: 2022-02-22 | End: 2022-02-22

## 2022-02-22 RX ORDER — SODIUM CHLORIDE, SODIUM LACTATE, POTASSIUM CHLORIDE, CALCIUM CHLORIDE 600; 310; 30; 20 MG/100ML; MG/100ML; MG/100ML; MG/100ML
INJECTION, SOLUTION INTRAVENOUS CONTINUOUS PRN
Status: DISCONTINUED | OUTPATIENT
Start: 2022-02-22 | End: 2022-02-22

## 2022-02-22 RX ADMIN — Medication 125 MCG: at 21:03

## 2022-02-22 RX ADMIN — PROPOFOL 50 MCG/KG/MIN: 10 INJECTION, EMULSION INTRAVENOUS at 11:54

## 2022-02-22 RX ADMIN — SENNOSIDES AND DOCUSATE SODIUM 1 TABLET: 8.6; 5 TABLET ORAL at 09:58

## 2022-02-22 RX ADMIN — SODIUM CHLORIDE, POTASSIUM CHLORIDE, SODIUM LACTATE AND CALCIUM CHLORIDE: 600; 310; 30; 20 INJECTION, SOLUTION INTRAVENOUS at 11:49

## 2022-02-22 RX ADMIN — PANTOPRAZOLE SODIUM 40 MG: 40 TABLET, DELAYED RELEASE ORAL at 09:58

## 2022-02-22 RX ADMIN — Medication 1 MG: at 21:03

## 2022-02-22 RX ADMIN — MIDAZOLAM 1 MG: 1 INJECTION INTRAMUSCULAR; INTRAVENOUS at 11:52

## 2022-02-22 RX ADMIN — SODIUM CHLORIDE 250 ML: 9 INJECTION, SOLUTION INTRAVENOUS at 14:02

## 2022-02-22 RX ADMIN — POLYETHYLENE GLYCOL 3350 17 G: 17 POWDER, FOR SOLUTION ORAL at 09:54

## 2022-02-22 RX ADMIN — Medication 4 MCG: at 11:51

## 2022-02-22 RX ADMIN — ONDANSETRON 4 MG: 2 INJECTION INTRAMUSCULAR; INTRAVENOUS at 11:54

## 2022-02-22 RX ADMIN — Medication 8 MCG: at 11:54

## 2022-02-22 RX ADMIN — LIDOCAINE HYDROCHLORIDE 40 MG: 20 INJECTION, SOLUTION INFILTRATION; PERINEURAL at 11:53

## 2022-02-22 RX ADMIN — MIDAZOLAM 1 MG: 1 INJECTION INTRAMUSCULAR; INTRAVENOUS at 11:55

## 2022-02-22 RX ADMIN — SENNOSIDES AND DOCUSATE SODIUM 1 TABLET: 8.6; 5 TABLET ORAL at 21:03

## 2022-02-22 ASSESSMENT — ACTIVITIES OF DAILY LIVING (ADL)
ADLS_ACUITY_SCORE: 16

## 2022-02-22 NOTE — PROGRESS NOTES
"CLINICAL NUTRITION SERVICES - REASSESSMENT NOTE    Recommendations by Registered Dietitian (RD):   Diet advancement post op  Continue pudding + benepro q AM  Order meals BID-TID, encourage intakes   Malnutrition:   (2/3)  % Weight Loss: Unable to evaluate   % Intake:  </= 50% for >/= 5 days (severe malnutrition)  Subcutaneous Fat Loss:  Orbital region moderate depletion, Upper arm region moderate depletion and Thoracic region moderate depletion  Muscle Loss:  Temporal region moderate depletion, Clavicle bone region moderate depletion and Dorsal hand region moderate depletion  Fluid Retention:  None noted     Malnutrition Diagnosis: Severe malnutrition  In Context of:  Acute illness or injury  Chronic illness or disease     EVALUATION OF PROGRESS TOWARD GOALS   Diet: NPO for EGD (usually regular diet ordered)    Intake/Tolerance:   - Tolerating PO, though inconsistent. She c/o abdominal pain often, which according to pt's brother has been an issue for years.   - Pt has been eating between 25-75% of meals since last assessment on 2/17. ~50% of meals on average. Meal review shows pt is receiving ~2 small meals daily.     - Stooling: BM x1 on 2/21, BM x1 on 2/20  - Weight last measured on 2/14, one week ago. - 37.7 kg.   - CT abdomen/pelvis yesterday --> \"large amount of stool throughout colon\"  ->> getting Miralax daily and Senokot BID    ASSESSED NUTRITION NEEDS:  Dosing Weight 33.6 kg   Estimated Energy Needs: 2510-9257 kcals (35-40 Kcal/Kg)  Justification: repletion and underweight  Estimated Protein Needs: 50-70 grams protein (1.5-2 g pro/Kg)  Justification: repletion and hypercatabolism with acute illness    NEW FINDINGS:   2/22 - Plan EGD per GI today    Previous Goals:   Intake of at least 50% adequate meals on average  Evaluation: Not met - meals are suboptimal     Previous Nutrition Diagnosis:   Inadequate oral intake related to lack of appetite, disinterest in eating as evidenced by patient declining food for " most of 26 day admission, intakes improving w/ encouragement the past 3 days  Evaluation: Improving slightly     CURRENT NUTRITION DIAGNOSIS  Inadequate oral intake related to fluctuating appetite/interest in food, reported abdominal pain, and constipation as evidenced by patient is eating between 25-75% of small meals 1-2x daily for the past 7 days or so.     INTERVENTIONS  Recommendations / Nutrition Prescription  Diet advancement post op  Continue pudding + benepro q AM  Order meals BID-TID, encourage intake     Implementation  None new    Goals  Intake of >50% adequate meals BID on average.     MONITORING AND EVALUATION:  Progress towards goals will be monitored and evaluated per protocol and Practice Guidelines    Rosalba Adams RD, LD  [facility]  Pager: [phone]  Weekend Pager: [phone]

## 2022-02-22 NOTE — PLAN OF CARE
"DATE & TIME: 02/22/22 7-3 pm  Cognitive Concerns/ Orientation : A & O x2-3. Cooperative. Particular.   BEHAVIOR & AGGRESSION TOOL COLOR: Green   ABNL VS/O2: VSS on RA. Soft BPs. PRN NS bolus x1 after procedure. BP improved.   MOBILITY: SBA with gait belt and walker. Sometimes doesn't;t use walker. Steady.   PAIN MANAGMENT: Complaints of intermittent abdominal pain at times.   DIET: Advanced back to regular after EGD. Eating now.   BOWEL/BLADDER: Continent this shift. Can be incontinent. Small BM x1  PROCEDURES: EGD and US completed. Pt refused XR Colon Water Soluble.   SKIN: Pale. Dry. Scattered scabs and bruises to arms. +1-2 BLE edema. Buttock pink/brown. Refused mepilex on bottom again.  D/C DATE: Per MD note \"1-2d recommended to group home pending placement and GI workup\".  OTHER IMPORTANT INFO: Brother granted 60 day emergency guardianship. Scheduled hearing for permanent guardianship 4/1. Tolerated procedure well. Spoke with brother x1. Doing well overall, no changes.     Goal Outcome Evaluation:    Plan of Care Reviewed With: guardian     Overall Patient Progress: no change               "

## 2022-02-22 NOTE — ANESTHESIA POSTPROCEDURE EVALUATION
Patient: Miranda Dover    Procedure: Procedure(s):  ESOPHAGOGASTRODUODENOSCOPY, WITH BIOPSY       Anesthesia Type:  MAC    Note:  Disposition: Inpatient   Postop Pain Control: Uneventful            Sign Out: Well controlled pain   PONV: No   Neuro/Psych: Uneventful            Sign Out: Acceptable/Baseline neuro status   Airway/Respiratory: Uneventful            Sign Out: Acceptable/Baseline resp. status   CV/Hemodynamics: Uneventful            Sign Out: Acceptable CV status; No obvious hypovolemia; No obvious fluid overload   Other NRE: NONE   DID A NON-ROUTINE EVENT OCCUR? No           Last vitals:  Vitals Value Taken Time   BP 98/64 02/22/22 1228   Temp     Pulse 66 02/22/22 1228   Resp 14 02/22/22 1229   SpO2 95 % 02/22/22 1229   Vitals shown include unvalidated device data.    Electronically Signed By: Ac Monae MD  February 22, 2022  12:59 PM

## 2022-02-22 NOTE — ANESTHESIA CARE TRANSFER NOTE
Patient: Miranda Dover    Procedure: Procedure(s):  ESOPHAGOGASTRODUODENOSCOPY (EGD)       Diagnosis: Abdominal pain [R10.9]  Diagnosis Additional Information: No value filed.    Anesthesia Type:   MAC     Note:    Oropharynx: spontaneously breathing  Level of Consciousness: awake  Oxygen Supplementation: room air    Independent Airway: airway patency satisfactory and stable  Dentition: dentition unchanged      Patient transferred to: Phase II    Handoff Report: Identifed the Patient, Identified the Reponsible Provider, Reviewed the pertinent medical history, Discussed the surgical course, Reviewed Intra-OP anesthesia mangement and issues during anesthesia, Set expectations for post-procedure period and Allowed opportunity for questions and acknowledgement of understanding      Vitals:  Vitals Value Taken Time   BP     Temp     Pulse     Resp     SpO2         Electronically Signed By: NAN Reyes CRNA  February 22, 2022  12:11 PM

## 2022-02-22 NOTE — PLAN OF CARE
"Goal Outcome Evaluation:    Plan of Care Reviewed With: pt    Overall Patient Progress: no change         DATE & TIME: 02/21/22 1900- 2/22/22 0730  Cognitive Concerns/ Orientation : A & O x2-3. Cooperative.   BEHAVIOR & AGGRESSION TOOL COLOR: Green   ABNL VS/O2: VSS on RA  MOBILITY: SBA/Assist x1 with gait belt and walker  PAIN MANAGMENT: Complaints of intermittent abdominal pain at times. Did not want heat pack when offered.   DIET: NPO since midnight for EGD today; was regular diet with poor intake.  BOWEL/BLADDER: Continent this shift. Can be incontinent. BM x1, stool sample sent. BS hyperactive.   PROCEDURES: EGD today at 1115  SKIN: Pale. Scattered scabs and bruises to arms. +1-2 BLE edema. Buttock red. Refused mepilex on bottom.  D/C DATE: Per MD note \"1-2d recommended to group home pending placement and GI workup\".  OTHER IMPORTANT INFO: Brother granted 60 day emergency guardianship. Scheduled hearing for permanent guardianship 4/1. No changes.       "

## 2022-02-22 NOTE — PROGRESS NOTES
"[facility]  Hospitalist Progress Note  Yosi Gerber MD  2022    Assessment & Plan   Miranda Dover is a 51 year old female with hx of Down syndrome who was admitted on 2022 for acute hypoxic respiratory failure due to COVID-19 pneumonia, which has resolved. Hospitalization has been prolonged due to need to establish guardianship following the deaths of her parents during this hospitalization.     Down syndrome  Developmental delay  Failure to thrive  * Patient's primary caregivers were her parents who were also hospitalized on  for COVID. Patient's father  on  and her mother  on .   * Patient became intermittently anxious and agitated, sometimes yelling/screaming. She did not know why she was here and had repeatedly asked \"what's wrong with me?\" Following admission, she became increasingly withdrawn, refusing medications and meals at times.   * Palliative Care was initially consulted for emotional support, but this was not particularly helpful for pt.  * Discharge planning has been complicated by need for guardianship and payor source for placement.  * Patient's brother, Maxi, was granted 60 days of emergency guardianship on 2/15.   * Pt calm and cooperative .  - Next hearing scheduled on 22 for permanent guardianship  - Plan to transition patient to group home; patient's brother involved in this process  - MA application pending  -  following closely     Severe malnutrition  Underweight with cachexia  * BMI ~13 with poor PO intake. SLP was consulted for possible dysphagia, and no evidence of dysphagia was noted.  * Patient was treated with IV fluids -. She later lost IV access, and refused replacement.  * Overall, pt noted to be very picky with foods.   * During this hospitalization, has had frequent complaints of abdominal pain (per pt's brother, this is chronic dating back years), see below.  * : Continued to have erratic oral " intake.  * 2/20:  Discussed with pt's brother; overall, felt that oral intake was probably acceptable at this point for discharge and did not feel we needed to pursue enteral feedings/g-tube at this point; desired workup for etiology of abdominal pain which he felt was contributing to her decreased PO intake. Workup for abdominal pain started as below.  - Continue regular diet with Beneprotein with meals. Dietary preferences were previously discussed with the patient, and she likes turkey and white bread sandwiches.   - Continue to encourage PO fluids  - Workup of abdominal pain as below     Chronic abdominal pain  * Pt with chronic abdominal pain for years PTA; felt to limit her oral intake. US from 8/2019 did not show definite significant abnormality. CT 11/2019 showed prominence/thickening of the pylorus of the stomach is nonspecific, differential diagnosis would include inflammatory etiologies as well as underlying lesion, consider GI consultation; small amount of pelvic free fluid and mild diffuse anasarca, nonspecific. Otherwise, unclear of prior workup.  * As above, pt with frequent c/o abdominal pain this hospitalization.   * Started on pantoprazole this hospitalization for dyspepsia related to K supplements.  * Per d/w pt's brother 2/20, he wanted further evaluation for possible treatable/reversible etiologies of pt's chronic abdominal pain. GI consulted 2/20. CT abdomen ordered 2/20, but pt refused IV placement, so delayed.   - CT abdomen/plevis today 2/21 (IV in place) shows constipation  -  EGD  2/22 per GI showed no acute pathology, US pending  - Continue pantoprazole daily  - may need to work on constipation with gastrograffin enema     Hypokalemia due to poor PO intake  - Monitor potassium periodically  - Continue PRN potassium replacement  - check Mg levels and replace as needed     Thyroid nodule, possible hypothyroidism  * Incidentally noted on admission chest CT. TSH was elevated to 15, but T4 was  normal; likely due to severe malnutrition.  * Repeat TSH/FT4 ordered, but patient refused lab draw  - Add-on TSH and free T4 to labs from 2/18 shows TSH elevated at 9.95 but FT4 at 1.00  - Follow-up this hospitalization or outpatient     Constipation   * She was initiated on a bowel regimen during this hospitalization with good response.  - Continue Miralax daily and senna-docusate #1 BID  - CT from 2/21 still shows significant constipation  - add water soluble enema     Borderline hypotension, Stable  * Baseline blood pressures 90s-100s systolic. Decreased to mid-80s systolic in the setting of minimal PO intake. Treated with IVF from 1/29-1/31      Resolved conditions  Acute hypoxic respiratory failure due to COVID-19 pneumonia  COVID recovered  * Unvaccinated.  * Symptom onset 1/19/22.  * Tested positive for COVID on 1/22/22.   * Chest CT on arrival showed bilateral infiltrates.  * Initiated on dexamethasone and remdesivir on 1/22  * Completed 5-day course of remdesivir on 1/26.  * Completed 10-day course of dexamethasone on 1/31.  * Stopped rivaroxaban 2/20.     E coli UTI  * Pan-sensitive E.coli noted on 2/8 urine culture. She was initiated on cefuroxime on 2/9 and completed a 7 day course on 2/15.     Chest pain  * Intermittent chest pain noted during this hospitalization. Patient refused serial troponins and echo.   * No recent complaints.  - Monitor clinically     Leukopenia and thrombocytopenia, suspect due to viral infection  * WBC ron 3.6k; PLT ron 119k - likely due to infection. Subsequently improved, and she has since refused labs.        Agitation, restlessness, hallucinations due to metabolic and infectious encephalopathy  * Resolved following treatment of UTI.    COVID-19 testing.       COVID-19 PCR Results         COVID-19 PCR Results 1/22/22    SARS CoV2 PCR Positive (A)    (A) Abnormal value          Comments are available for some flowsheets but are not being displayed.           COVID-19  "Antibody Results, Testing for Immunity    COVID-19 Antibody Results, Testing for Immunity   No data to display.                 Diet: Snacks/Supplements Adult: Beneprotein; With Meals  NPO for Medical/Clinical Reasons Except for: Meds    Prophylaxis: PCD's, ambulation.  Zhu Catheter: Not present  Central Lines: None  Code Status: Full Code     Disposition Plan     Expected discharge: 1-2d recommended to group home pending placement and GI workup.      Communication.  - I d/w GI      Interval History   -- chart reviewed  -- EGD negative and US pending  -- discussed with GI    -Data reviewed today: I reviewed all new labs and imaging over the last 24 hours. I personally reviewed no images or EKG's today.    Physical Exam    , Blood pressure 95/58, pulse 73, temperature 98.2  F (36.8  C), temperature source Oral, resp. rate 25, height 1.6 m (5' 3\"), weight 37.7 kg (83 lb 3.2 oz), SpO2 93 %.  Vitals:    01/22/22 1140 02/05/22 0729 02/14/22 1329   Weight: 33.6 kg (74 lb 1.2 oz) 43.8 kg (96 lb 9.6 oz) 37.7 kg (83 lb 3.2 oz)     Vital Signs with Ranges  Temp:  [97.7  F (36.5  C)-98.2  F (36.8  C)] 98.2  F (36.8  C)  Pulse:  [57-96] 73  Resp:  [10-25] 25  BP: ()/(44-78) 95/58  SpO2:  [90 %-100 %] 93 %  I/O's Last 24 hours  I/O last 3 completed shifts:  In: 60 [P.O.:60]  Out: 600 [Urine:600]    Constitutional: Awake, alert, cooperative, no apparent distress  Respiratory: Clear to auscultation bilaterally, no crackles or wheezing  Cardiovascular: Regular rate and rhythm, normal S1 and S2  GI: Normal bowel sounds, soft, non-distended, non-tender  Skin/Integumen: No rashes, no cyanosis, no edema  Other:      Medications   All medications were reviewed.    - MEDICATION INSTRUCTIONS -         melatonin  1 mg Oral At Bedtime     pantoprazole  40 mg Oral QAM AC     polyethylene glycol  17 g Oral Daily     senna-docusate  1 tablet Oral BID     sodium chloride (PF)  3 mL Intracatheter Q8H     Vitamin D3  125 mcg Oral QPM    "     Data   Recent Labs   Lab 02/18/22  0956   CR 0.63       Recent Results (from the past 24 hour(s))   CT Abdomen Pelvis w Contrast    Narrative    CT ABDOMEN PELVIS WITH CONTRAST 2/21/2022 2:52 PM    CLINICAL HISTORY: Abdominal pain, acute, nonlocalized.    TECHNIQUE: CT scan of the abdomen and pelvis was performed following  injection of IV contrast. Multiplanar reformats were obtained. Dose  reduction techniques were used.  CONTRAST: 45mL Isovue-370    COMPARISON: Chest CT on 1/22/2022.    FINDINGS:   LOWER CHEST: Linear and groundglass opacities in both lung bases,  improved compared to previous, consistent with sequelae of COVID-19  infection.    HEPATOBILIARY: Normal.    PANCREAS: Normal.    SPLEEN: Normal.    ADRENAL GLANDS: Normal.    KIDNEYS/BLADDER: Normal.    BOWEL: Large amount of stool throughout the colon likely indicates  constipation. No obstruction or inflammatory changes.    PELVIC ORGANS: Normal.    ADDITIONAL FINDINGS: None.    MUSCULOSKELETAL: Normal.      Impression    IMPRESSION:   1. Large amount of stool throughout the colon likely indicates  constipation. No other abnormality to explain abdominal pain.  2. Improved opacities in both lung bases, consistent with sequelae of  previous COVID-19 pneumonia.      ALHAJI GARCIA MD         SYSTEM ID:  [id number]       Yosi Gerber MD  Text Page  (7am to 6pm)

## 2022-02-23 ENCOUNTER — DOCUMENTATION ONLY (OUTPATIENT)
Dept: OTHER | Facility: CLINIC | Age: 52
End: 2022-02-23
Payer: COMMERCIAL

## 2022-02-23 LAB
PATH REPORT.COMMENTS IMP SPEC: NORMAL
PATH REPORT.COMMENTS IMP SPEC: NORMAL
PATH REPORT.FINAL DX SPEC: NORMAL
PATH REPORT.GROSS SPEC: NORMAL
PATH REPORT.MICROSCOPIC SPEC OTHER STN: NORMAL
PATH REPORT.RELEVANT HX SPEC: NORMAL
PHOTO IMAGE: NORMAL

## 2022-02-23 PROCEDURE — 120N000001 HC R&B MED SURG/OB

## 2022-02-23 PROCEDURE — 250N000013 HC RX MED GY IP 250 OP 250 PS 637: Performed by: INTERNAL MEDICINE

## 2022-02-23 PROCEDURE — 250N000013 HC RX MED GY IP 250 OP 250 PS 637: Performed by: HOSPITALIST

## 2022-02-23 PROCEDURE — 88305 TISSUE EXAM BY PATHOLOGIST: CPT | Mod: 26 | Performed by: PATHOLOGY

## 2022-02-23 PROCEDURE — 99232 SBSQ HOSP IP/OBS MODERATE 35: CPT | Performed by: INTERNAL MEDICINE

## 2022-02-23 RX ADMIN — SENNOSIDES AND DOCUSATE SODIUM 1 TABLET: 8.6; 5 TABLET ORAL at 21:29

## 2022-02-23 RX ADMIN — PANTOPRAZOLE SODIUM 40 MG: 40 TABLET, DELAYED RELEASE ORAL at 08:30

## 2022-02-23 RX ADMIN — Medication 125 MCG: at 21:29

## 2022-02-23 RX ADMIN — Medication 1 MG: at 21:29

## 2022-02-23 ASSESSMENT — ACTIVITIES OF DAILY LIVING (ADL)
ADLS_ACUITY_SCORE: 16
ADLS_ACUITY_SCORE: 15
ADLS_ACUITY_SCORE: 16
ADLS_ACUITY_SCORE: 15
ADLS_ACUITY_SCORE: 16
ADLS_ACUITY_SCORE: 15
ADLS_ACUITY_SCORE: 16

## 2022-02-23 NOTE — PLAN OF CARE
"       DATE & TIME: 02/23/22 6336-0044  Cognitive Concerns/ Orientation : A & O x2-3. Cooperative. Particular.   BEHAVIOR & AGGRESSION TOOL COLOR: Green   ABNL VS/O2: Refused VS this shift, have been stable with soft BPs on RA.  MOBILITY: SBA with gait belt and walker. Sometimes doesn't use walker. Steady.   PAIN MANAGMENT: Complaints of intermittent abdominal pain at times.   DIET: Regular, poor intake  BOWEL/BLADDER: Continent this shift. Can be incontinent. No BM this shift.  PROCEDURES: None  SKIN: Pale. Dry. Scattered scabs and bruises to arms. +1-2 BLE edema. Refused mepilex on bottom for blanchable redness.  D/C DATE: Per MD note \"1-2d recommended to group home pending placement and GI workup\".  OTHER IMPORTANT INFO: Brother granted 60 day emergency guardianship. Scheduled hearing for permanent guardianship 4/1.         "

## 2022-02-23 NOTE — PROGRESS NOTES
"  GASTROENTEROLOGY PROGRESS NOTE     IMPRESSION:  1. Abdominal pain - EGD 22 negative for a source (Esophageal biopsies with mild chronic inflammation, gastric and duodenal biopsies normal), ultrasound 22 shows gallbladder sludge, otherwise negative, CT shows a large amount of stool in the colon, consistent with constipation. No other source of abd pain.  - Suspect the constipation is a significant component of the chronic pain, could certainly be other associated functional disease, as well.   2. Down's syndrome - currently her bother is her guardian, as her parents recently passed away from COVID infections  3. Underweight - patient is a picky eater per chart. Agree with plan for liquid supplement and try to maximize oral nutrition.  4. Recent COVID infection    RECOMMENDATIONS:  1. Continue bowel regimen  2. Agree with gastrograffin enema, if patient is willing to have this. If not willing, then just manage with bowle regimen.  3. We will sign off, please call with any questions or concerns.     Bao Watts MD  [facility]  [phone]      ________________________________________________________________________      SUBJECTIVE:  Patient reports the pain is not present.        OBJECTIVE:  BP 91/58 (BP Location: Left arm)   Pulse 55   Temp 98.1  F (36.7  C) (Oral)   Resp 20   Ht 1.6 m (5' 3\")   Wt 37.7 kg (83 lb 3.2 oz)   SpO2 92%   BMI 14.74 kg/m    Temp (24hrs), Av.9  F (36.6  C), Min:97.7  F (36.5  C), Max:98.1  F (36.7  C)    No data found.     PHYSICAL EXAM  GEN: Alert, NAD.    HRT: reg  LUNGS: CTA  ABD: +BS, non-tender, non-distended, no rebound or guarding.    Additional Data:  I have reviewed the patient's new clinical lab results:     Recent Labs   Lab Test 22  0843 22  1247 22  1400   WBC 7.3 5.1 6.7   HGB 12.4 11.2* 11.8   MCV 94 94 94   * 136* 148*     Recent Labs   Lab Test 22  1311 22  0956 227 22  0911 " 02/10/22  1303 02/09/22  1002 02/04/22  0903 01/23/22  1050 01/22/22  1202   NA  --   --   --   --   --  141  --  140 141   POTASSIUM 4.0  --   --  3.4 3.3* 2.8*  --  4.4 3.8   CHLORIDE  --   --   --   --   --  104  --  105 105   CO2  --   --   --   --   --  31  --  32 32   BUN  --   --   --   --   --  6*  --  19 21   CR  --  0.63  --   --   --  0.64  0.58 0.52 0.75 0.74   ANIONGAP  --   --   --   --   --  6  --  3 4   JOSUÉ  --   --   --   --   --  8.9  --  8.1* 8.2*   GLC  --   --  96  --   --  98  --  134* 101*     Recent Labs   Lab Test 02/08/22 1924 02/23/21  1931 02/23/21  1922 10/08/19  1452   ALBUMIN  --  3.8  --  3.9   BILITOTAL  --  1.1  --  0.4   ALT  --  38  --  31   AST  --  25  --  27   PROTEIN Negative  --  10*  --    LIPASE  --   --   --  135

## 2022-02-23 NOTE — PLAN OF CARE
"Goal Outcome Evaluation:    Overall Patient Progress: no change    Summary: Recovered COVID, placement needed  DATE & TIME: 02/23/22 6816-8777  Cognitive Concerns/ Orientation : A & O x2-3 disoriented to time and situation. Cooperative. Particular.   BEHAVIOR & AGGRESSION TOOL COLOR: Green   ABNL VS/O2: BP soft 91/58, other VSS on RA  MOBILITY: SBA with GB and walker. Steady. Up to chair today- encouraged repositioning but often refuses to get out of chair   PAIN MANAGMENT: Complaints of intermittent abdominal pain at times- tests have been negative. Declines any interventions   DIET: Regular, fair intake, needs assistance with feeding and will eat more  BOWEL/BLADDER: Continent this shift. Can be incontinent. No BM this shift.  PROCEDURES: None- refused XR colon water soluble and labs today. US abd/EGD negative 2/22  SKIN: Pale. Dry. Scattered scabs and bruises to arms. +1-2 BLE edema. Refused mepilex on bottom for blanchable redness.  D/C DATE: Per MD note \"1-2d recommended to group home pending placement and GI workup\".  OTHER IMPORTANT INFO: Brother granted 60 day emergency guardianship. Scheduled hearing for permanent guardianship 4/1.         "

## 2022-02-23 NOTE — PROGRESS NOTES
Care Management Follow Up    Length of Stay (days): 32    Expected Discharge Date: 03/29/2022     Concerns to be Addressed: discharge planning     Patient plan of care discussed at interdisciplinary rounds: Yes    Anticipated Discharge Disposition: Transitional Care     Anticipated Discharge Services:    Anticipated Discharge DME:      Patient/family educated on Medicare website which has current facility and service quality ratings: no  Education Provided on the Discharge Plan:    Patient/Family in Agreement with the Plan: yes    Referrals Placed by CM/SW: Post Acute Facilities  Private pay costs discussed: Not applicable    Additional Information:  Spoke with Lindsay Bhatti, [phone]. Pt was assigned a  with adult access today. Her name is Kamryn Mcintyre, cell [phone], office [phone]. SW left her a voicemail. Emergency guardian documents emailed to [employer] to be scanned in to the chart.       JOSE Richardson, LGSW  [phone]  [facility]

## 2022-02-23 NOTE — PLAN OF CARE
"DATE & TIME: 02/22/22 7489-8617  Cognitive Concerns/ Orientation : A & O x2-3. Cooperative. Particular.   BEHAVIOR & AGGRESSION TOOL COLOR: Green   ABNL VS/O2: VSS on RA. Soft BPs. MOBILITY: SBA with gait belt and walker. Sometimes doesn't;t use walker. Steady.   PAIN MANAGMENT: Complaints of intermittent abdominal pain at times.   DIET: Advanced back to regular after EGD. Eating now.   BOWEL/BLADDER: Continent this shift. Can be incontinent. BMx2  PROCEDURES: EGD and US completed in a.m. Pt refused XR Colon Water Soluble.   SKIN: Pale. Dry. Scattered scabs and bruises to arms. +1-2 BLE edema. Buttock pink/brown. Refused mepilex on bottom again.  D/C DATE: Per MD note \"1-2d recommended to group home pending placement and GI workup\".  OTHER IMPORTANT INFO: Brother granted 60 day emergency guardianship. Scheduled hearing for permanent guardianship 4/1. Tolerated procedure well. Spoke with brother x1. Doing well overall, no changes.     "

## 2022-02-23 NOTE — PROGRESS NOTES
"[facility]  Hospitalist Progress Note  Yosi Gerber MD  2022    Assessment & Plan   Miranda Dover is a 51 year old female with hx of Down syndrome who was admitted on 2022 for acute hypoxic respiratory failure due to COVID-19 pneumonia, which has resolved. Hospitalization has been prolonged due to need to establish guardianship following the deaths of her parents during this hospitalization.     Down syndrome  Developmental delay  Failure to thrive  * Patient's primary caregivers were her parents who were also hospitalized on  for COVID. Patient's father  on  and her mother  on .   * Patient became intermittently anxious and agitated, sometimes yelling/screaming. She did not know why she was here and had repeatedly asked \"what's wrong with me?\" Following admission, she became increasingly withdrawn, refusing medications and meals at times.   * Palliative Care was initially consulted for emotional support, but this was not particularly helpful for pt.  * Discharge planning has been complicated by need for guardianship and payor source for placement.  * Patient's brother, Maxi, was granted 60 days of emergency guardianship on 2/15.   * Pt calm and cooperative .  - Next hearing scheduled on 22 for permanent guardianship  - Plan to transition patient to group home; patient's brother involved in this process  - MA application pending  -  following closely for placement     Severe malnutrition  Underweight with cachexia  * BMI ~13 with poor PO intake. SLP was consulted for possible dysphagia, and no evidence of dysphagia was noted.  * Patient was treated with IV fluids -. She later lost IV access, and refused replacement.  * Overall, pt noted to be very picky with foods.   * During this hospitalization, has had frequent complaints of abdominal pain (per pt's brother, this is chronic dating back years), see below.  * : Continued to have " erratic oral intake.  * 2/20:  Discussed with pt's brother; overall, felt that oral intake was probably acceptable at this point for discharge and did not feel we needed to pursue enteral feedings/g-tube at this point; desired workup for etiology of abdominal pain which he felt was contributing to her decreased PO intake. Workup for abdominal pain started as below.  - Continue regular diet with Beneprotein with meals. Dietary preferences were previously discussed with the patient, and she likes turkey and white bread sandwiches.   - Continue to encourage PO fluids  - Workup of abdominal pain as below     Chronic abdominal pain - component of constipation and functional  * Pt with chronic abdominal pain for years PTA; felt to limit her oral intake. US from 8/2019 did not show definite significant abnormality. CT 11/2019 showed prominence/thickening of the pylorus of the stomach is nonspecific, differential diagnosis would include inflammatory etiologies as well as underlying lesion, consider GI consultation; small amount of pelvic free fluid and mild diffuse anasarca, nonspecific. Otherwise, unclear of prior workup.  * As above, pt with frequent c/o abdominal pain this hospitalization.   * Started on pantoprazole this hospitalization for dyspepsia related to K supplements.  * Per d/w pt's brother 2/20, he wanted further evaluation for possible treatable/reversible etiologies of pt's chronic abdominal pain. GI consulted 2/20. CT abdomen ordered 2/20, but pt refused IV placement, so delayed.   - CT abdomen/plevis today 2/21 (IV in place) shows constipation  -  EGD  2/22 per GI showed no acute pathology, US shows no acute abnormality  - Continue pantoprazole daily  - having multiple BMs and refused GE     Hypokalemia due to poor PO intake  - Monitor potassium periodically  - Continue PRN potassium replacement  - check Mg and K levels are normal     Thyroid nodule, possible hypothyroidism  * Incidentally noted on  admission chest CT. TSH was elevated to 15, but T4 was normal; likely due to severe malnutrition.  * Repeat TSH/FT4 ordered, but patient refused lab draw  - Add-on TSH and free T4 to labs from 2/18 shows TSH elevated at 9.95 but FT4 at 1.00  - Follow-up this hospitalization or outpatient     Constipation   * She was initiated on a bowel regimen during this hospitalization with good response.  - Continue Miralax daily and senna-docusate #1 BID  - CT from 2/21 still shows significant constipation  - add water soluble enema     Borderline hypotension, Stable  * Baseline blood pressures 90s-100s systolic. Decreased to mid-80s systolic in the setting of minimal PO intake. Treated with IVF from 1/29-1/31      Resolved conditions  Acute hypoxic respiratory failure due to COVID-19 pneumonia  COVID recovered  * Unvaccinated.  * Symptom onset 1/19/22.  * Tested positive for COVID on 1/22/22.   * Chest CT on arrival showed bilateral infiltrates.  * Initiated on dexamethasone and remdesivir on 1/22  * Completed 5-day course of remdesivir on 1/26.  * Completed 10-day course of dexamethasone on 1/31.  * Stopped rivaroxaban 2/20.     E coli UTI  * Pan-sensitive E.coli noted on 2/8 urine culture. She was initiated on cefuroxime on 2/9 and completed a 7 day course on 2/15.     Chest pain  * Intermittent chest pain noted during this hospitalization. Patient refused serial troponins and echo.   * No recent complaints.  - Monitor clinically     Leukopenia and thrombocytopenia, suspect due to viral infection  * WBC ron 3.6k; PLT ron 119k - likely due to infection. Subsequently improved, and she has since refused labs.        Agitation, restlessness, hallucinations due to metabolic and infectious encephalopathy  * Resolved following treatment of UTI.    COVID-19 testing.       COVID-19 PCR Results         COVID-19 PCR Results 1/22/22    SARS CoV2 PCR Positive (A)    (A) Abnormal value          Comments are available for some  "flowsheets but are not being displayed.           COVID-19 Antibody Results, Testing for Immunity    COVID-19 Antibody Results, Testing for Immunity   No data to display.                 Diet: Snacks/Supplements Adult: Beneprotein; With Meals  NPO for Medical/Clinical Reasons Except for: Meds    Prophylaxis: PCD's, ambulation.  Zhu Catheter: Not present  Central Lines: None  Code Status: Full Code     Disposition Plan     Expected discharge: when TCU bed is set up    Interval History   -- eating a diet  -- having multiple BM  -- note some functional component to her abdominal pain.    -Data reviewed today: I reviewed all new labs and imaging over the last 24 hours. I personally reviewed no images or EKG's today.    Physical Exam    , Blood pressure 91/58, pulse 55, temperature 98.1  F (36.7  C), temperature source Oral, resp. rate 20, height 1.6 m (5' 3\"), weight 37.7 kg (83 lb 3.2 oz), SpO2 92 %.  Vitals:    01/22/22 1140 02/05/22 0729 02/14/22 1329   Weight: 33.6 kg (74 lb 1.2 oz) 43.8 kg (96 lb 9.6 oz) 37.7 kg (83 lb 3.2 oz)     Vital Signs with Ranges  Temp:  [98.1  F (36.7  C)] 98.1  F (36.7  C)  Pulse:  [55] 55  Resp:  [20] 20  BP: (91)/(58) 91/58  SpO2:  [92 %] 92 %  I/O's Last 24 hours  I/O last 3 completed shifts:  In: 500 [P.O.:500]  Out: -     Constitutional: Awake, alert, cooperative, no apparent distress  Respiratory: Clear to auscultation bilaterally, no crackles or wheezing  Cardiovascular: Regular rate and rhythm, normal S1 and S2  GI: Normal bowel sounds, soft, non-distended, non-tender  Skin/Integumen: No rashes, no cyanosis, no edema  Other:      Medications   All medications were reviewed.    - MEDICATION INSTRUCTIONS -         melatonin  1 mg Oral At Bedtime     pantoprazole  40 mg Oral QAM AC     polyethylene glycol  17 g Oral Daily     senna-docusate  1 tablet Oral BID     Vitamin D3  125 mcg Oral QPM        Data   Recent Labs   Lab 02/22/22  1311 02/18/22  0956   POTASSIUM 4.0  --    CR  -- "  0.63       No results found for this or any previous visit (from the past 24 hour(s)).    Yosi Gerber MD  Text Page  (7am to 6pm)

## 2022-02-24 PROCEDURE — 250N000013 HC RX MED GY IP 250 OP 250 PS 637: Performed by: REGISTERED NURSE

## 2022-02-24 PROCEDURE — 99207 PR CDG-MDM COMPONENT: MEETS MODERATE - UP CODED: CPT | Performed by: INTERNAL MEDICINE

## 2022-02-24 PROCEDURE — 120N000001 HC R&B MED SURG/OB

## 2022-02-24 PROCEDURE — 250N000013 HC RX MED GY IP 250 OP 250 PS 637: Performed by: INTERNAL MEDICINE

## 2022-02-24 PROCEDURE — 99232 SBSQ HOSP IP/OBS MODERATE 35: CPT | Performed by: INTERNAL MEDICINE

## 2022-02-24 PROCEDURE — 250N000013 HC RX MED GY IP 250 OP 250 PS 637: Performed by: HOSPITALIST

## 2022-02-24 RX ADMIN — POLYETHYLENE GLYCOL 3350 17 G: 17 POWDER, FOR SOLUTION ORAL at 11:04

## 2022-02-24 RX ADMIN — SENNOSIDES AND DOCUSATE SODIUM 1 TABLET: 8.6; 5 TABLET ORAL at 11:04

## 2022-02-24 RX ADMIN — Medication 125 MCG: at 20:23

## 2022-02-24 RX ADMIN — SENNOSIDES AND DOCUSATE SODIUM 1 TABLET: 8.6; 5 TABLET ORAL at 20:23

## 2022-02-24 RX ADMIN — Medication 1 MG: at 21:33

## 2022-02-24 RX ADMIN — PANTOPRAZOLE SODIUM 40 MG: 40 TABLET, DELAYED RELEASE ORAL at 11:04

## 2022-02-24 ASSESSMENT — ACTIVITIES OF DAILY LIVING (ADL)
ADLS_ACUITY_SCORE: 15

## 2022-02-24 NOTE — PROGRESS NOTES
Care Management Follow Up    Length of Stay (days): 33    Expected Discharge Date: 03/29/2022     Concerns to be Addressed: discharge planning     Patient plan of care discussed at interdisciplinary rounds: Yes    Anticipated Discharge Disposition:      Anticipated Discharge Services:    Anticipated Discharge DME:      Patient/family educated on Medicare website which has current facility and service quality ratings: no  Education Provided on the Discharge Plan:    Patient/Family in Agreement with the Plan: yes    Referrals Placed by CM/SW: Post Acute Facilities  Private pay costs discussed: Not applicable    Additional Information:  Spoke with pt's new , Kamryn Mcintyre, [phone]. In order to proceed with getting pt county services, she will need IQ and adaptive testing. Kamryn has spoken with pt's brother, Maxi, about this already. Per Kamryn, he will need to participate in the adaptive testing. For IQ testing, pt will need either a WAIS-IV or SB-5. For adaptive testing, pt will need VABS-3 or ABAS-3. Per Kamryn, these tests are typically preformed by a psychologist. Pt does not need full neuro-psych testing. MD updated and consult entered.      JOSE Richardson, LGSW  [phone]  [facility]

## 2022-02-24 NOTE — PLAN OF CARE
"Summary: Recovered COVID, placement needed  DATE & TIME: 02/24/22    Cognitive Concerns/ Orientation : Alert and Oriented x2, Cooperative. Particular.   BEHAVIOR & AGGRESSION TOOL COLOR: Green   ABNL VS/O2:  Patient appeared to become agitated when approached for vitals, declined.     PAIN MANAGMENT: Intermittent pain, no interventions needed.    DIET: Regular, poor intake, needs assistance with feeding and will eat more  BOWEL/BLADDER: Continent this shift. Can be incontinent. 1 large BM this shift.  PROCEDURES: None- refused XR colon water soluble. US abd/EGD negative 2/22  SKIN: Pale. Dry. Scattered scabs and bruises to arms. +1-2 BLE edema. Refused mepilex on bottom for blanchable redness.  D/C DATE: Per MD note \"1-2d recommended to group home pending placement and GI workup\".  OTHER IMPORTANT INFO: Brother granted 60 day emergency guardianship. Scheduled hearing for permanent guardianship 4/1.   Overall Patient Progress: no change               "

## 2022-02-24 NOTE — PROGRESS NOTES
"[facility]  Hospitalist Progress Note  Yosi Gerber MD  2022    Assessment & Plan   Miranda Dover is a 51 year old female with hx of Down syndrome who was admitted on 2022 for acute hypoxic respiratory failure due to COVID-19 pneumonia, which has resolved. Hospitalization has been prolonged due to need to establish guardianship following the deaths of her parents during this hospitalization.     Down syndrome  Developmental delay  Failure to thrive  * Patient's primary caregivers were her parents who were also hospitalized on  for COVID. Patient's father  on  and her mother  on .   * Patient became intermittently anxious and agitated, sometimes yelling/screaming. She did not know why she was here and had repeatedly asked \"what's wrong with me?\" Following admission, she became increasingly withdrawn, refusing medications and meals at times.   * Palliative Care was initially consulted for emotional support, but this was not particularly helpful for pt.  * Discharge planning has been complicated by need for guardianship and payor source for placement.  * Patient's brother, Maxi, was granted 60 days of emergency guardianship on 2/15.   * Pt calm and cooperative .  - Next hearing scheduled on 22 for permanent guardianship  - Plan to transition patient to group home; patient's brother involved in this process  - MA application pending  -  following closely for placement     Severe malnutrition  Underweight with cachexia  * BMI ~13 with poor PO intake. SLP was consulted for possible dysphagia, and no evidence of dysphagia was noted.  * Patient was treated with IV fluids -. She later lost IV access, and refused replacement.  * Overall, pt noted to be very picky with foods.   * During this hospitalization, has had frequent complaints of abdominal pain (per pt's brother, this is chronic dating back years), see below.  * : Continued to have " erratic oral intake.  * 2/20:  Discussed with pt's brother; overall, felt that oral intake was probably acceptable at this point for discharge and did not feel we needed to pursue enteral feedings/g-tube at this point; desired workup for etiology of abdominal pain which he felt was contributing to her decreased PO intake. Workup for abdominal pain started as below.  - Continue regular diet with Beneprotein with meals. Dietary preferences were previously discussed with the patient, and she likes turkey and white bread sandwiches.   - Continue to encourage PO fluids  - Workup of abdominal pain as below     Chronic abdominal pain - component of constipation and functional  * Pt with chronic abdominal pain for years PTA; felt to limit her oral intake. US from 8/2019 did not show definite significant abnormality. CT 11/2019 showed prominence/thickening of the pylorus of the stomach is nonspecific, differential diagnosis would include inflammatory etiologies as well as underlying lesion, consider GI consultation; small amount of pelvic free fluid and mild diffuse anasarca, nonspecific. Otherwise, unclear of prior workup.  * As above, pt with frequent c/o abdominal pain this hospitalization.   * Started on pantoprazole this hospitalization for dyspepsia related to K supplements.  * Per d/w pt's brother 2/20, he wanted further evaluation for possible treatable/reversible etiologies of pt's chronic abdominal pain. GI consulted 2/20. CT abdomen ordered 2/20, but pt refused IV placement, so delayed.   - CT abdomen/plevis today 2/21 (IV in place) shows constipation  - EGD  2/22 per GI showed no acute pathology, US shows no acute abnormality  - Continue pantoprazole daily  - having multiple BMs now     Hypokalemia due to poor PO intake  - Monitor potassium periodically  - Continue PRN potassium replacement  - Mg and K levels are normal     Thyroid nodule, possible hypothyroidism  * Incidentally noted on admission chest CT. TSH  was elevated to 15, but T4 was normal; likely due to severe malnutrition.  * Repeat TSH/FT4 ordered, but patient refused lab draw  - Add-on TSH and free T4 to labs from 2/18 shows TSH elevated at 9.95 but FT4 at 1.00  - Follow-up this hospitalization or outpatient     Constipation   * She was initiated on a bowel regimen during this hospitalization with good response.  - Continue Miralax daily and senna-docusate #1 BID  - CT from 2/21 still shows significant constipation     Borderline hypotension, Stable  * Baseline blood pressures 90s-100s systolic. Decreased to mid-80s systolic in the setting of minimal PO intake. Treated with IVF from 1/29-1/31      Resolved conditions  Acute hypoxic respiratory failure due to COVID-19 pneumonia  COVID recovered  * Unvaccinated.  * Symptom onset 1/19/22.  * Tested positive for COVID on 1/22/22.   * Chest CT on arrival showed bilateral infiltrates.  * Initiated on dexamethasone and remdesivir on 1/22  * Completed 5-day course of remdesivir on 1/26.  * Completed 10-day course of dexamethasone on 1/31.  * Stopped rivaroxaban 2/20.     E coli UTI  * Pan-sensitive E.coli noted on 2/8 urine culture. She was initiated on cefuroxime on 2/9 and completed a 7 day course on 2/15.     Chest pain  * Intermittent chest pain noted during this hospitalization. Patient refused serial troponins and echo.   * No recent complaints.  - Monitor clinically     Leukopenia and thrombocytopenia, suspect due to viral infection  * WBC ron 3.6k; PLT ron 119k - likely due to infection. Subsequently improved, and she has since refused labs.        Agitation, restlessness, hallucinations due to metabolic and infectious encephalopathy  * Resolved following treatment of UTI.    COVID-19 testing.       COVID-19 PCR Results         COVID-19 PCR Results 1/22/22    SARS CoV2 PCR Positive (A)    (A) Abnormal value          Comments are available for some flowsheets but are not being displayed.           COVID-19  "Antibody Results, Testing for Immunity    COVID-19 Antibody Results, Testing for Immunity   No data to display.                 Diet: Snacks/Supplements Adult: Beneprotein; With Meals  NPO for Medical/Clinical Reasons Except for: Meds    Prophylaxis: PCD's, ambulation.  Zhu Catheter: Not present  Central Lines: None  Code Status: Full Code     Disposition Plan     Expected discharge: when TCU bed is set up    Interval History   -- eating a diet  -- no new issues    -Data reviewed today: I reviewed all new labs and imaging over the last 24 hours. I personally reviewed no images or EKG's today.    Physical Exam    , Blood pressure 96/63, pulse 60, temperature 98.2  F (36.8  C), temperature source Oral, resp. rate 18, height 1.6 m (5' 3\"), weight 37.7 kg (83 lb 3.2 oz), SpO2 99 %.  Vitals:    01/22/22 1140 02/05/22 0729 02/14/22 1329   Weight: 33.6 kg (74 lb 1.2 oz) 43.8 kg (96 lb 9.6 oz) 37.7 kg (83 lb 3.2 oz)     Vital Signs with Ranges  Temp:  [98.2  F (36.8  C)] 98.2  F (36.8  C)  Pulse:  [60] 60  Resp:  [18] 18  BP: (96)/(63) 96/63  SpO2:  [99 %] 99 %  I/O's Last 24 hours  I/O last 3 completed shifts:  In: 500 [P.O.:500]  Out: -     Constitutional: Awake, alert, cooperative, no apparent distress  Respiratory: Clear to auscultation bilaterally, no crackles or wheezing  Cardiovascular: Regular rate and rhythm, normal S1 and S2  GI: Normal bowel sounds, soft, non-distended, non-tender  Skin/Integumen: No rashes, no cyanosis, no edema  Other:      Medications   All medications were reviewed.    - MEDICATION INSTRUCTIONS -         melatonin  1 mg Oral At Bedtime     pantoprazole  40 mg Oral QAM AC     polyethylene glycol  17 g Oral Daily     senna-docusate  1 tablet Oral BID     Vitamin D3  125 mcg Oral QPM        Data   Recent Labs   Lab 02/22/22  1311 02/18/22  0956   POTASSIUM 4.0  --    CR  --  0.63       No results found for this or any previous visit (from the past 24 hour(s)).    Yosi Gerber MD  Text Page  " (7am to 6pm)

## 2022-02-25 PROCEDURE — 250N000011 HC RX IP 250 OP 636: Performed by: HOSPITALIST

## 2022-02-25 PROCEDURE — 120N000001 HC R&B MED SURG/OB

## 2022-02-25 PROCEDURE — 250N000013 HC RX MED GY IP 250 OP 250 PS 637: Performed by: HOSPITALIST

## 2022-02-25 RX ORDER — MULTIPLE VITAMINS W/ MINERALS TAB 9MG-400MCG
1 TAB ORAL DAILY
Status: DISCONTINUED | OUTPATIENT
Start: 2022-02-25 | End: 2022-03-14

## 2022-02-25 RX ADMIN — ONDANSETRON 4 MG: 4 TABLET, ORALLY DISINTEGRATING ORAL at 18:50

## 2022-02-25 RX ADMIN — Medication 1 MG: at 21:51

## 2022-02-25 RX ADMIN — PANTOPRAZOLE SODIUM 40 MG: 40 TABLET, DELAYED RELEASE ORAL at 08:47

## 2022-02-25 ASSESSMENT — ACTIVITIES OF DAILY LIVING (ADL)
ADLS_ACUITY_SCORE: 15

## 2022-02-25 NOTE — PLAN OF CARE
Summary: Recovered COVID, placement needed  DATE & TIME: 22  [id number]  Cognitive Concerns/ Orientation : Alert and Oriented x2, Cooperative. Particular.   BEHAVIOR & AGGRESSION TOOL COLOR: Green   ABNL VS/O2:  WDL on RA  PAIN MANAGMENT: Denies  DIET: Regular, poor intake, needs assistance with feeding and will eat more  BOWEL/BLADDER: Continent this shift. 1 large BM this shift.  PROCEDURES: NA  SKIN: Pale. Dry. Scattered scabs and bruises to arms. +1 BLE edema. Refused mepilex on bottom for blanchable redness; R arm where IV was a little red  D/C DATE: Waiting on group home or guardianship  OTHER IMPORTANT INFO: Brother granted 60 day emergency guardianship. Scheduled hearing for permanent guardianship ; brother visited today to drop off clothes for  tomorrow at 10am.

## 2022-02-25 NOTE — PROGRESS NOTES
CLINICAL NUTRITION SERVICES - REASSESSMENT NOTE      RECOMMENDATIONS FOR MD/PROVIDER TO ORDER:   Ongoing discussions on need for enteral nutrition for supplementation as appropriate, patient continues to have poor PO intakes    Recommendations Ordered by Registered Dietitian (RD):   Updated weight  Add pudding + beneprotein with dinner meal in addition to breakfast   Thera-vit-m for micronutrient needs with prolonged decreased intake    Malnutrition:   (2/3)  % Weight Loss: Unable to evaluate   % Intake:  </= 50% for >/= 5 days (severe malnutrition)  Subcutaneous Fat Loss:  Orbital region moderate depletion, Upper arm region moderate depletion and Thoracic region moderate depletion  Muscle Loss:  Temporal region moderate depletion, Clavicle bone region moderate depletion and Dorsal hand region moderate depletion  Fluid Retention:  None noted     Malnutrition Diagnosis: Severe malnutrition  In Context of:  Acute illness or injury  Chronic illness or disease       EVALUATION OF PROGRESS TOWARD GOALS   Diet:  Regular     Supplements: Vanilla pudding + beneprotein w/ breakfast daily     Intake/Tolerance:  Pt continues to order 2 small meals daily and eating 25-75%. Per RN, patient eats more and does better with meals when feeding assistance provided. Did not visit with patient today as she is attending her parent's  this morning.     Per provider notes, PO intake was discussed with pt's brother on  and felt is was acceptable for discharge and did not feel the need to pursue enteral feedings at this point.       ASSESSED NUTRITION NEEDS:  Dosing Weight 33.6 kg   Estimated Energy Needs: 5647-5790 kcals (35-40 Kcal/Kg)  Justification: repletion and underweight  Estimated Protein Needs: 50-70 grams protein (1.5-2 g pro/Kg)  Justification: repletion and hypercatabolism with acute illness      NEW FINDINGS:   - Labs: Reviewed.   - Meds: miralax, senna BID, vitamin D3 125 mcg/day   - GI: BM x 1-3 daily over the past  5 days. EGD on 2/22 negative. Abd US on 2/22 showed gallbladder sludge otherwise negative.   - Dispo: Pt's brother has been granted emergency guardianship, permanent guardianship hearing on 4/1. Waiting for group home placement.   - Weight: No new weight since 2/14.     Vitals:    01/22/22 1140 02/05/22 0729 02/14/22 1329   Weight: 33.6 kg (74 lb 1.2 oz) 43.8 kg (96 lb 9.6 oz) 37.7 kg (83 lb 3.2 oz)       Previous Goals:   Intake of >50% adequate meals BID on average.   Evaluation: Not met consistently though doing better with feeding assistance     Previous Nutrition Diagnosis:   Inadequate oral intake related to fluctuating appetite/interest in food, reported abdominal pain, and constipation as evidenced by patient is eating between 25-75% of small meals 1-2x daily for the past 7 days or so.   Evaluation: No change      CURRENT NUTRITION DIAGNOSIS  Inadequate oral intake related to fluctuating appetite/interest in food, reported abdominal pain, and constipation as evidenced by patient is eating between 25-75% of small meals 1-2x daily for the past 7 days or so.     INTERVENTIONS  Recommendations / Nutrition Prescription  See above     Implementation  Composition of Meals and Snacks and Multivitamin/Mineral - as above     Goals  Intake of > 50% adequate meals BID on average       MONITORING AND EVALUATION:  Progress towards goals will be monitored and evaluated per protocol and Practice Guidelines      Raina Morgan RD, LD

## 2022-02-25 NOTE — PROGRESS NOTES
SPIRITUAL HEALTH SERVICES  SPIRITUAL ASSESSMENT Progress Note  [facility]    REFERRAL SOURCE: Staff routine consult for emotional support    Spoke with bedside RN Kerrie, who advised that patient will be away for most of today for parents' , and advised that follow up tomorrow would be best.    PLAN: I will follow up on Saturday. Spiritual Health Services remains available for patient, family, and staff support.     Please page the on call  by placing a STAT or ASAP Epic referral for Spiritual Health (which will roll to the on call pager).        JERROD Peña.   Associate   Pager [phone]

## 2022-02-26 PROCEDURE — 250N000013 HC RX MED GY IP 250 OP 250 PS 637: Performed by: INTERNAL MEDICINE

## 2022-02-26 PROCEDURE — 250N000013 HC RX MED GY IP 250 OP 250 PS 637: Performed by: HOSPITALIST

## 2022-02-26 PROCEDURE — 120N000001 HC R&B MED SURG/OB

## 2022-02-26 PROCEDURE — 250N000013 HC RX MED GY IP 250 OP 250 PS 637: Performed by: REGISTERED NURSE

## 2022-02-26 PROCEDURE — 99232 SBSQ HOSP IP/OBS MODERATE 35: CPT | Performed by: INTERNAL MEDICINE

## 2022-02-26 RX ADMIN — POLYETHYLENE GLYCOL 3350 17 G: 17 POWDER, FOR SOLUTION ORAL at 10:12

## 2022-02-26 RX ADMIN — Medication 1 MG: at 21:27

## 2022-02-26 RX ADMIN — MULTIPLE VITAMINS W/ MINERALS TAB 1 TABLET: TAB at 08:13

## 2022-02-26 RX ADMIN — SENNOSIDES AND DOCUSATE SODIUM 1 TABLET: 8.6; 5 TABLET ORAL at 21:26

## 2022-02-26 RX ADMIN — Medication 125 MCG: at 21:26

## 2022-02-26 RX ADMIN — SENNOSIDES AND DOCUSATE SODIUM 1 TABLET: 8.6; 5 TABLET ORAL at 10:12

## 2022-02-26 RX ADMIN — PANTOPRAZOLE SODIUM 40 MG: 40 TABLET, DELAYED RELEASE ORAL at 08:13

## 2022-02-26 ASSESSMENT — ACTIVITIES OF DAILY LIVING (ADL)
ADLS_ACUITY_SCORE: 15

## 2022-02-26 NOTE — PLAN OF CARE
Summary: Recovered COVID, placement needed  DATE & TIME: 22    [id number]  Cognitive Concerns/ Orientation : Alert and Oriented x2, disoriented to time/situation. Cooperative.  BEHAVIOR & AGGRESSION TOOL COLOR: Green   ABNL VS/O2:  VSS on RA  PAIN MANAGMENT: Abdominal pain, protonix helped   DIET: Regular, pt didn't eat breakfast but eating lunch. Tolerating well  BOWEL/BLADDER: Continent this shift. PROCEDURES: NA  SKIN: Pale. Dry. Scattered scabs and bruises to arms. Mepilex on bottom for blanchable redness;   D/C DATE: Waiting on group home or guardianship  OTHER IMPORTANT INFO: Brother granted 60 day emergency guardianship. Scheduled hearing for permanent guardianship ; attended parents  yesterday, . Saw speech today. SW/PT/OT/Neuropsychology following

## 2022-02-26 NOTE — PLAN OF CARE
Summary: Recovered COVID, placement needed  DATE & TIME: 22  [id number]  Cognitive Concerns/ Orientation : Alert and Oriented x2, Cooperative. Particular about cares.  BEHAVIOR & AGGRESSION TOOL COLOR: Green   ABNL VS/O2:  WDL on RA  PAIN MANAGMENT: Denies  DIET: Regular, poor intake, needs assistance with feeding and will eat more  BOWEL/BLADDER: Continent this shift. PROCEDURES: NA  SKIN: Pale. Dry. Scattered scabs and bruises to arms. +1 BLE edema. Refused mepilex on bottom for blanchable redness; R arm where IV was a little red  D/C DATE: Waiting on group home or guardianship  OTHER IMPORTANT INFO: Brother granted 60 day emergency guardianship. Scheduled hearing for permanent guardianship ; attended parents  yesterday,

## 2022-02-26 NOTE — PROGRESS NOTES
"SPIRITUAL HEALTH SERVICES  SPIRITUAL ASSESSMENT Progress Note  [facility]    REFERRAL SOURCE: Follow up on staff referral    Visited with Miranda day after her parents' . Most of my visit Miranda asked for assistance with repositioning, moving food tray or art supplies, etc. She did reflect a bit on \"Mama and Papa\" and their joint  held yesterday, which she described as \"scary\" and \"sad.\" She also shared some drawings she made for her parents with me.    I alerted bedside RN Edwina of Miranda's requests for repositioning.    I provided empathetic listening and bereavement support.    PLAN: Spiritual Health Services remains available for patient, family, and staff support.     Please page the on call  by placing a STAT or ASAP Epic referral for Spiritual Health (which will roll to the on call pager).        JERROD Peña.   Associate   Pager [phone]      "

## 2022-02-26 NOTE — PROGRESS NOTES
"[facility]    Internal Medicine Hospitalist Progress Note  2022  I evaluated patient on the above date.    Andrew Tafoya Jr., MD  [phone] (p)  Text Page  Vocera        Assessment & Plan New actions/orders today (2022) are underlined.    Miranda Dover is a 51 year old female with hx of Down syndrome who was admitted on 2022 for acute hypoxic respiratory failure due to COVID-19 pneumonia, which has resolved. Hospitalization has been prolonged due to need to establish guardianship following the deaths of her parents during this hospitalization.    Down syndrome  Developmental delay  Failure to thrive  * Patient's primary caregivers were her parents who were also hospitalized on  for COVID. Patient's father  on  and her mother  on .   * Patient became intermittently anxious and agitated, sometimes yelling/screaming. She did not know why she was here and had repeatedly asked \"what's wrong with me?\" Following admission, she became increasingly withdrawn, refusing medications and meals at times.   * Palliative Care was initially consulted for emotional support, but this was not particularly helpful for pt.  * Discharge planning has been complicated by need for guardianship and payor source for placement.  * Patient's brother, Maxi, was granted 60 days of emergency guardianship on 2/15.   * Pt calm and cooperative .  - Next hearing scheduled on 22 for permanent guardianship  - Plan to transition patient to group home; patient's brother involved in this process  - MA application pending  -  following closely for placement    Severe malnutrition  Underweight with cachexia  * BMI ~13 with poor PO intake. SLP was consulted for possible dysphagia, and no evidence of dysphagia was noted.  * Patient was treated with IV fluids -. She later lost IV access, and refused replacement.  * Overall, pt noted to be very picky with foods.   * During this " hospitalization, has had frequent complaints of abdominal pain (per pt's brother, this is chronic dating back years), see below.  * 2/18: Continued to have erratic oral intake.  * 2/20:  Discussed with pt's brother; overall, felt that oral intake was probably acceptable at this point for discharge and did not feel we needed to pursue enteral feedings/g-tube at this point; desired workup for etiology of abdominal pain which he felt was contributing to her decreased PO intake. Workup for abdominal pain started as below.  - Continue regular diet with Beneprotein with meals. Dietary preferences were previously discussed with the patient, and she likes turkey and white bread sandwiches.   - Continue to encourage PO fluids  - Workup of abdominal pain as below    Chronic abdominal pain, suspect functional and component of constipation  * Pt with chronic abdominal pain for years PTA; felt to limit her oral intake. US from 8/2019 did not show definite significant abnormality. CT 11/2019 showed prominence/thickening of the pylorus of the stomach is nonspecific, differential diagnosis would include inflammatory etiologies as well as underlying lesion, consider GI consultation; small amount of pelvic free fluid and mild diffuse anasarca, nonspecific. Otherwise, unclear of prior workup.  * As above, pt with frequent c/o abdominal pain this hospitalization.   * She was initiated on a bowel regimen during this hospitalization for constipation with good response.  * Started on pantoprazole this hospitalization for dyspepsia related to K supplements.  * Per d/w pt's brother 2/20, he wanted further evaluation for possible treatable/reversible etiologies of pt's chronic abdominal pain. GI consulted 2/20. CT abdomen ordered 2/20, but pt refused IV placement, so delayed.   * CT abdomen/plevis 2/21 showed large amount of stool throughout the colon likely indicating constipation; no other abnormality to explain abdominal pain.  * EGD 2/22  showed mild appearance/texture changed mucosa in the esophageal at the GE junction; no pathology to explain her abdominal pain. Abdominal US 2/22 did not show acute abnormality.  - Continue pantoprazole daily  - Continue bowel regimen - polyethylene glycol daily; senna-docusate BID; PRN bisacodyl suppository    Hypokalemia due to poor PO intake  Hypokalemia.  * Potassium low at times this hospitalization. Treated with replacement.  Recent Labs   Lab 02/22/22  1311   POTASSIUM 4.0   MAG 2.1   - Continue PRN K replacement.- Monitor potassium periodically  - Continue PRN potassium replacement    Thyroid nodule  Possible subclinical hypothyroidism  * Incidentally noted on admission chest CT. TSH was elevated to 15, but T4 was normal; likely due to severe malnutrition.  * Repeat TSH/FT4 ordered, but patient refused lab draw  * Add-on TSH and free T4 to labs from 2/18 showed TSH elevated at 9.95 but FT4 at 1.00  - Follow-up this outpatient  - Repeat thyroid labs in 4-6 weeks    Periodic hypotension, stable  * Baseline blood pressures 90s-100s systolic.  * Decreased to mid-80s systolic at times this hospitalization in the setting of minimal PO intake. Treated with IVF from 1/29-1/31.  - Monitor BP's.    Resolved conditions  Acute hypoxic respiratory failure due to COVID-19 pneumonia  COVID recovered  * Unvaccinated.  * Symptom onset 1/19/22.  * Tested positive for COVID on 1/22/22.   * Chest CT on arrival showed bilateral infiltrates.  * Initiated on dexamethasone and remdesivir on 1/22  * Completed 5-day course of remdesivir on 1/26.  * Completed 10-day course of dexamethasone on 1/31.  * Stopped rivaroxaban 2/20.    E coli UTI  * Pan-sensitive E.coli noted on 2/8 urine culture. She was initiated on cefuroxime on 2/9 and completed a 7 day course on 2/15.    Chest pain  * Intermittent chest pain noted during this hospitalization. Patient refused serial troponins and echo.  * No recent complaints.  - Monitor  "clinically    Leukopenia and thrombocytopenia, suspect due to viral infection  * WBC ron 3.6k; PLT ron 119k - likely due to infection. Subsequently improved, and she has since refused labs.     Agitation, restlessness, hallucinations due to metabolic and infectious encephalopathy  * Resolved following treatment of UTI.      Clinically Significant Risk Factors Present on Admission                      COVID-19 testing.  COVID-19 PCR Results    COVID-19 PCR Results 1/22/22   SARS CoV2 PCR Positive (A)   (A) Abnormal value       Comments are available for some flowsheets but are not being displayed.         COVID-19 Antibody Results, Testing for Immunity    COVID-19 Antibody Results, Testing for Immunity   No data to display.             Diet: Regular Diet Adult  Snacks/Supplements Adult: Beneprotein; With Meals    Prophylaxis: PCD's, ambulation.   Zhu Catheter: Not present  Central Lines: None  Code Status: Full Code    Disposition Plan   Expected discharge: Recommended to LTC/group home pending bed availability.  Entered: Andrew Tafoya MD 02/26/2022, 5:39 PM         Interval History   Doing OK.  Eating pie now, for snack.    -Data reviewed today: I reviewed all new labs and imaging over the last 24 hours. I personally reviewed no images or EKG's today.    Physical Exam    , Blood pressure (!) 85/57, pulse 71, temperature 97.4  F (36.3  C), temperature source Oral, resp. rate 16, height 1.6 m (5' 3\"), weight 37.7 kg (83 lb 3.2 oz), SpO2 100 %.  Vitals:    01/22/22 1140 02/05/22 0729 02/14/22 1329   Weight: 33.6 kg (74 lb 1.2 oz) 43.8 kg (96 lb 9.6 oz) 37.7 kg (83 lb 3.2 oz)     Vital Signs with Ranges  Temp:  [97.4  F (36.3  C)-97.9  F (36.6  C)] 97.4  F (36.3  C)  Pulse:  [65-73] 71  Resp:  [16-18] 16  BP: ()/(52-60) 85/57  SpO2:  [94 %-100 %] 100 %  Patient Vitals for the past 24 hrs:   BP Temp Temp src Pulse Resp SpO2   02/26/22 1711 (!) 85/57 97.4  F (36.3  C) Oral 71 16 100 %   02/26/22 0925 " 100/60 97.6  F (36.4  C) Oral 65 16 94 %   02/26/22 0437 (!) 87/52 97.9  F (36.6  C) Oral 67 18 95 %   02/25/22 2034 (!) 88/58 97.7  F (36.5  C) Oral 73 18 97 %     I/O's Last 24 hours  No intake/output data recorded.    Constitutional: Awake, alert, pleasant.  Respiratory: Grossly clear anteriorly, no crackles or wheezes.  Cardiovascular: RRR, no m/r/g.  GI: Soft, nd, nt to light touch, +BS.  Skin/Integumen:   Other:        Data   Recent Labs   Lab 02/22/22  1311   POTASSIUM 4.0     Recent Labs   Lab Test 02/12/22  2127 02/09/22  1002 01/23/22  1050 01/22/22  1202 02/23/21  1931   GLC 96 98 134* 101* 117*     No results for input(s): WBC, CRP, DD, LDH, FIBR, SHERRY, IL6B, LACT, LACTS, PCAL in the last 168 hours.      No results found for this or any previous visit (from the past 24 hour(s)).    Medications   All medications were reviewed.    - MEDICATION INSTRUCTIONS -         melatonin  1 mg Oral At Bedtime     multivitamin w/minerals  1 tablet Oral Daily     pantoprazole  40 mg Oral QAM AC     polyethylene glycol  17 g Oral Daily     senna-docusate  1 tablet Oral BID     Vitamin D3  125 mcg Oral QPM     sodium chloride 0.9%, acetaminophen **OR** acetaminophen, albuterol, bisacodyl, lidocaine 4%, lidocaine (buffered or not buffered), - MEDICATION INSTRUCTIONS -, naloxone, naloxone, naloxone, naloxone, ondansetron **OR** ondansetron, QUEtiapine

## 2022-02-26 NOTE — PLAN OF CARE
"DATE & TIME: 2022 8051-9482   Cognitive Concerns/ Orientation : AxOx4  BEHAVIOR & AGGRESSION TOOL COLOR: Green   ABNL VS/O2: VSS on Rm Air. BP soft, fluids encouraged.  MOBILITY: asstx1  PAIN MANAGMENT:  C/o upset stomach, \"ate too much\", PRN zofran w/ good effect  DIET: regular diet, ate 100% of dinner   BOWEL/BLADDER: c/o upset stomach; PRN zofran given w/ good effect. Voided large amount at 2200, also w/ small BM.   SKIN: trace edema to BLE  D/C DAY/GOALS/PLACE: awaiting court hearing for legal guardianship moving forward  OTHER IMPORTANT INFO: pt quiet/withdrawn; attended parents  earlier today w/ brother, observed tear eyed in room, writer provided listening ear, emotional support                  "

## 2022-02-27 PROCEDURE — 99232 SBSQ HOSP IP/OBS MODERATE 35: CPT | Performed by: INTERNAL MEDICINE

## 2022-02-27 PROCEDURE — 250N000013 HC RX MED GY IP 250 OP 250 PS 637: Performed by: HOSPITALIST

## 2022-02-27 PROCEDURE — 120N000001 HC R&B MED SURG/OB

## 2022-02-27 PROCEDURE — 250N000013 HC RX MED GY IP 250 OP 250 PS 637: Performed by: INTERNAL MEDICINE

## 2022-02-27 RX ADMIN — Medication 1 MG: at 21:04

## 2022-02-27 RX ADMIN — MULTIPLE VITAMINS W/ MINERALS TAB 1 TABLET: TAB at 09:40

## 2022-02-27 RX ADMIN — Medication 125 MCG: at 21:04

## 2022-02-27 RX ADMIN — PANTOPRAZOLE SODIUM 40 MG: 40 TABLET, DELAYED RELEASE ORAL at 09:40

## 2022-02-27 RX ADMIN — SENNOSIDES AND DOCUSATE SODIUM 1 TABLET: 8.6; 5 TABLET ORAL at 21:03

## 2022-02-27 RX ADMIN — SENNOSIDES AND DOCUSATE SODIUM 1 TABLET: 8.6; 5 TABLET ORAL at 09:40

## 2022-02-27 ASSESSMENT — ACTIVITIES OF DAILY LIVING (ADL)
ADLS_ACUITY_SCORE: 15

## 2022-02-27 NOTE — PLAN OF CARE
Goal Outcome Evaluation:    Plan of Care Reviewed With: guardian     Overall Patient Progress: no change     Cognitive Concerns/ Orientation : Alert and Oriented to self and place.  Cooperative. Particular about cares.  BEHAVIOR & AGGRESSION TOOL COLOR: Green   ABNL VS/O2:  VSS on RA  PAIN MANAGMENT: Denies  DIET: Regular.  BOWEL/BLADDER: Incontinent at times.   SKIN: Pale. Dry. Scattered scabs and bruises to arms. Mepilex on coccyx/sacrum for blanchable redness  D/C DATE: Waiting on group home or guardianship  OTHER IMPORTANT INFO: Brother granted 60 day emergency guardianship. Scheduled hearing for permanent guardianship 4/1. PT/OT/Neuropsychology following

## 2022-02-27 NOTE — PROGRESS NOTES
"[facility]    Internal Medicine Hospitalist Progress Note  2022  I evaluated patient on the above date.    Andrew Tafoya Jr., MD  [phone] (p)  Text Page  Vocera        Assessment & Plan New actions/orders today (2022) are underlined.    Miranda Dover is a 51 year old female with hx of Down syndrome who was admitted on 2022 for acute hypoxic respiratory failure due to COVID-19 pneumonia, which has resolved. Hospitalization has been prolonged due to need to establish guardianship following the deaths of her parents during this hospitalization.    Down syndrome  Developmental delay  Failure to thrive  * Patient's primary caregivers were her parents who were also hospitalized on  for COVID. Patient's father  on  and her mother  on .   * Patient became intermittently anxious and agitated, sometimes yelling/screaming. She did not know why she was here and had repeatedly asked \"what's wrong with me?\" Following admission, she became increasingly withdrawn, refusing medications and meals at times.   * Palliative Care was initially consulted for emotional support, but this was not particularly helpful for pt.  * Discharge planning has been complicated by need for guardianship and payor source for placement.  * Patient's brother, Maxi, was granted 60 days of emergency guardianship on 2/15.   * Pt calm and cooperative .  - Next hearing scheduled on 22 for permanent guardianship  - Plan to transition patient to group home; patient's brother involved in this process  - MA application pending  -  following closely for placement    Severe malnutrition  Underweight with cachexia  * BMI ~13 on early this hospitalization with poor PO intake. SLP was consulted for possible dysphagia, and no evidence of dysphagia was noted.  * Patient was treated with IV fluids -. She later lost IV access, and refused replacement.  * Overall, pt noted to be very picky " with foods.   * During this hospitalization, has had frequent complaints of abdominal pain (per pt's brother, this is chronic dating back years), see below.  * On 2/20, discussed with pt's brother; overall, felt that oral intake was probably acceptable at this point for discharge and did not feel we needed to pursue enteral feedings/g-tube at this point; desired workup for etiology of abdominal pain which he felt was contributing to her decreased PO intake.   * Workup for abdominal pain started as below was negative (suspected functional and constipation).  * BMI up to 14.7 2/27.  - Continue regular diet with Beneprotein with meals. Dietary preferences were previously discussed with the patient, and she likes turkey and white bread sandwiches.   - Continue to encourage PO fluids  - Workup of abdominal pain as below    Chronic abdominal pain, suspect functional and component of constipation  * Pt with chronic abdominal pain for years PTA; felt to limit her oral intake. US from 8/2019 did not show definite significant abnormality. CT 11/2019 showed prominence/thickening of the pylorus of the stomach is nonspecific, differential diagnosis would include inflammatory etiologies as well as underlying lesion, consider GI consultation; small amount of pelvic free fluid and mild diffuse anasarca, nonspecific. Otherwise, unclear of prior workup.  * As above, pt with frequent c/o abdominal pain this hospitalization.   * She was initiated on a bowel regimen during this hospitalization for constipation with good response.  * Started on pantoprazole this hospitalization for dyspepsia related to K supplements.  * Per d/w pt's brother 2/20, he wanted further evaluation for possible treatable/reversible etiologies of pt's chronic abdominal pain. GI consulted 2/20. CT abdomen ordered 2/20, but pt refused IV placement, so delayed.   * CT abdomen/plevis 2/21 showed large amount of stool throughout the colon likely indicating  constipation; no other abnormality to explain abdominal pain.  * EGD 2/22 showed mild appearance/texture changed mucosa in the esophageal at the GE junction; no pathology to explain her abdominal pain. Abdominal US 2/22 did not show acute abnormality.  - Continue pantoprazole daily  - Continue bowel regimen - polyethylene glycol daily; senna-docusate BID; PRN bisacodyl suppository    Hypokalemia due to poor PO intake  * Potassium low at times this hospitalization. Treated with replacement.  - Monitor potassium periodically  - Continue PRN potassium replacement    Thyroid nodule  Possible subclinical hypothyroidism  * Incidentally noted on admission chest CT. TSH was elevated to 15, but T4 was normal; likely due to severe malnutrition.  * Repeat TSH/FT4 ordered, but patient refused lab draw  * Add-on TSH and free T4 to labs from 2/18 showed TSH elevated at 9.95 but FT4 at 1.00  - Follow-up this outpatient  - Repeat thyroid labs in 4-6 weeks    Periodic hypotension, stable  * Baseline blood pressures 90s-100s systolic.  * Decreased to mid-80s systolic at times this hospitalization in the setting of minimal PO intake. Treated with IVF from 1/29-1/31.  - Monitor BP's.    Resolved conditions  Acute hypoxic respiratory failure due to COVID-19 pneumonia  COVID recovered  * Unvaccinated.  * Symptom onset 1/19/22.  * Tested positive for COVID on 1/22/22.   * Chest CT on arrival showed bilateral infiltrates.  * Initiated on dexamethasone and remdesivir on 1/22  * Completed 5-day course of remdesivir on 1/26.  * Completed 10-day course of dexamethasone on 1/31.  * Stopped rivaroxaban 2/20.    E coli UTI  * Pan-sensitive E.coli noted on 2/8 urine culture. She was initiated on cefuroxime on 2/9 and completed a 7 day course on 2/15.    Chest pain  * Intermittent chest pain noted during this hospitalization. Patient refused serial troponins and echo.  * No recent complaints.  - Monitor clinically    Leukopenia and  "thrombocytopenia, suspect due to viral infection  * WBC ron 3.6k; PLT ron 119k - likely due to infection. Subsequently improved, and she has since refused labs.     Agitation, restlessness, hallucinations due to metabolic and infectious encephalopathy  * Resolved following treatment of UTI.      Clinically Significant Risk Factors Present on Admission                    Obesity.  Body mass index is 14.74 kg/m .  - Needs to pursue aggressive dietary and lifestyle modifications.      COVID-19 testing.  COVID-19 PCR Results    COVID-19 PCR Results 1/22/22   SARS CoV2 PCR Positive (A)   (A) Abnormal value       Comments are available for some flowsheets but are not being displayed.         COVID-19 Antibody Results, Testing for Immunity    COVID-19 Antibody Results, Testing for Immunity   No data to display.             Diet: Regular Diet Adult  Snacks/Supplements Adult: Beneprotein; With Meals    Prophylaxis: PCD's, ambulation.   Zhu Catheter: Not present  Central Lines: None  Code Status: Full Code    Disposition Plan   Expected discharge: Recommended to LTC/group home pending bed availability.  Entered: Andrew Tafoya MD 02/27/2022, 1:05 PM         Interval History   Doing OK.  Eating better today (did not like pasta yesterday)..    -Data reviewed today: I reviewed all new labs and imaging over the last 24 hours. I personally reviewed no images or EKG's today.    Physical Exam    , Blood pressure 105/68, pulse 72, temperature 97.5  F (36.4  C), temperature source Oral, resp. rate 16, height 1.6 m (5' 3\"), weight 37.7 kg (83 lb 3.2 oz), SpO2 97 %.  Vitals:    01/22/22 1140 02/05/22 0729 02/14/22 1329   Weight: 33.6 kg (74 lb 1.2 oz) 43.8 kg (96 lb 9.6 oz) 37.7 kg (83 lb 3.2 oz)     Vital Signs with Ranges  Temp:  [97.4  F (36.3  C)-97.5  F (36.4  C)] 97.5  F (36.4  C)  Pulse:  [71-72] 72  Resp:  [16] 16  BP: ()/(57-68) 105/68  SpO2:  [97 %-100 %] 97 %  Patient Vitals for the past 24 hrs:   BP Temp " Temp src Pulse Resp SpO2   02/27/22 0818 105/68 97.5  F (36.4  C) Oral 72 16 97 %   02/26/22 1819 100/57 -- -- -- -- --   02/26/22 1711 (!) 85/57 97.4  F (36.3  C) Oral 71 16 100 %     I/O's Last 24 hours  I/O last 3 completed shifts:  In: 240 [P.O.:240]  Out: -     Constitutional: Awake, alert, pleasant.  Respiratory:   Cardiovascular:   GI:   Skin/Integumen:   Other:        Data   Recent Labs   Lab 02/22/22  1311   POTASSIUM 4.0     Recent Labs   Lab Test 02/12/22  2127 02/09/22  1002 01/23/22  1050 01/22/22  1202 02/23/21  1931   GLC 96 98 134* 101* 117*     No results for input(s): WBC, CRP, DD, LDH, FIBR, SHERRY, IL6B, LACT, LACTS, PCAL in the last 168 hours.      No results found for this or any previous visit (from the past 24 hour(s)).    Medications   All medications were reviewed.    - MEDICATION INSTRUCTIONS -         melatonin  1 mg Oral At Bedtime     multivitamin w/minerals  1 tablet Oral Daily     pantoprazole  40 mg Oral QAM AC     polyethylene glycol  17 g Oral Daily     senna-docusate  1 tablet Oral BID     Vitamin D3  125 mcg Oral QPM     sodium chloride 0.9%, acetaminophen **OR** acetaminophen, albuterol, bisacodyl, lidocaine 4%, lidocaine (buffered or not buffered), - MEDICATION INSTRUCTIONS -, naloxone, naloxone, naloxone, naloxone, ondansetron **OR** ondansetron, QUEtiapine

## 2022-02-27 NOTE — PLAN OF CARE
Goal Outcome Evaluation:    Plan of Care Reviewed With: guardian     Overall Patient Progress: no change             Summary: Recovered COVID, placement needed  DATE & TIME: 02/26/22, pm shift    Cognitive Concerns/ Orientation : Alert and Oriented to self and place Cooperative. Particular about cares.  BEHAVIOR & AGGRESSION TOOL COLOR: Green   ABNL VS/O2:  VSS on RA  PAIN MANAGMENT: Denies  DIET: Regular.  BOWEL/BLADDER: Incontinent at times. Had smear bm. Gave scheduled bedtime senna.  SKIN: Pale. Dry. Scattered scabs and bruises to arms. Mepilex on bottom for blanchable redness;   D/C DATE: Waiting on group home or guardianship  OTHER IMPORTANT INFO: Brother granted 60 day emergency guardianship. Scheduled hearing for permanent guardianship 4/1. PT/OT/Neuropsychology following

## 2022-02-27 NOTE — PLAN OF CARE
Goal Outcome Evaluation:    Plan of Care Reviewed With: patient      Overall Patient Progress: no change     Summary: Recovered COVID, placement needed  DATE & TIME: 02/27/22 ([id number])  Cognitive Concerns/ Orientation : Alert and Oriented x 3,  Disoriented to situation, cooperative. particular about cares.  BEHAVIOR & AGGRESSION TOOL COLOR: Green   ABNL VS/O2:  VSS on RA  PAIN MANAGMENT:Generalized   DIET: Regular.  BOWEL/BLADDER: Incontinent at times.   SKIN: Pale. Dry. Scattered scabs and bruises to arms. Mepilex on coccyx/sacrum for blanchable redness  D/C DATE: Waiting on group home or guardianship  OTHER IMPORTANT INFO: Brother granted 60 day emergency guardianship. Scheduled hearing for permanent guardianship 4/1. PT/OT/Neuropsychology following

## 2022-02-28 PROCEDURE — 250N000013 HC RX MED GY IP 250 OP 250 PS 637: Performed by: HOSPITALIST

## 2022-02-28 PROCEDURE — 99232 SBSQ HOSP IP/OBS MODERATE 35: CPT | Performed by: INTERNAL MEDICINE

## 2022-02-28 PROCEDURE — 250N000013 HC RX MED GY IP 250 OP 250 PS 637: Performed by: INTERNAL MEDICINE

## 2022-02-28 PROCEDURE — 120N000001 HC R&B MED SURG/OB

## 2022-02-28 RX ADMIN — Medication 1 MG: at 21:03

## 2022-02-28 RX ADMIN — Medication 125 MCG: at 21:03

## 2022-02-28 RX ADMIN — SENNOSIDES AND DOCUSATE SODIUM 1 TABLET: 8.6; 5 TABLET ORAL at 09:43

## 2022-02-28 RX ADMIN — PANTOPRAZOLE SODIUM 40 MG: 40 TABLET, DELAYED RELEASE ORAL at 09:42

## 2022-02-28 RX ADMIN — SENNOSIDES AND DOCUSATE SODIUM 1 TABLET: 8.6; 5 TABLET ORAL at 21:03

## 2022-02-28 ASSESSMENT — ACTIVITIES OF DAILY LIVING (ADL)
ADLS_ACUITY_SCORE: 15
ADLS_ACUITY_SCORE: 17
ADLS_ACUITY_SCORE: 15
ADLS_ACUITY_SCORE: 17
ADLS_ACUITY_SCORE: 15

## 2022-02-28 NOTE — PROGRESS NOTES
"[facility]    Internal Medicine Hospitalist Progress Note  2022  I evaluated patient on the above date.    Andrew Tafoya Jr., MD  [phone] (p)  Text Page  Vocera        Assessment & Plan New actions/orders today (2022) are underlined.    Miranda Dover is a 51 year old female with hx of Down syndrome who was admitted on 2022 for acute hypoxic respiratory failure due to COVID-19 pneumonia, which has resolved. Hospitalization has been prolonged due to need to establish guardianship following the deaths of her parents during this hospitalization.    Down syndrome  Developmental delay  Failure to thrive  * Patient's primary caregivers were her parents who were also hospitalized on  for COVID. Patient's father  on  and her mother  on .   * Patient became intermittently anxious and agitated, sometimes yelling/screaming. She did not know why she was here and had repeatedly asked \"what's wrong with me?\" Following admission, she became increasingly withdrawn, refusing medications and meals at times.   * Palliative Care was initially consulted for emotional support, but this was not particularly helpful for pt.  * Discharge planning has been complicated by need for guardianship and payor source for placement.  * Patient's brother, Maxi, was granted 60 days of emergency guardianship on 2/15.   * Pt calm and cooperative .  - Next hearing scheduled on 2022 for permanent guardianship  - Plan to transition patient to group home; patient's brother involved in this process  - SW following closely for placement    Severe malnutrition  Underweight with cachexia  * BMI ~13 on early this hospitalization with poor PO intake. SLP was consulted for possible dysphagia, and no evidence of dysphagia was noted.  * Patient was treated with IV fluids -. She later lost IV access, and refused replacement.  * Overall, pt noted to be very picky with foods.   * During " this hospitalization, has had frequent complaints of abdominal pain (per pt's brother, this is chronic dating back years), see below.  * On 2/20, discussed with pt's brother; overall, felt that oral intake was probably acceptable at this point for discharge and did not feel we needed to pursue enteral feedings/g-tube at this point; desired workup for etiology of abdominal pain which he felt was contributing to her decreased PO intake.   * Workup for abdominal pain started as below was negative (suspected functional and constipation).  * BMI up to 14.7 2/27.  - Continue regular diet with Beneprotein with meals. Dietary preferences were previously discussed with the patient, and she likes turkey and white bread sandwiches.   - Continue to encourage PO fluids  - Workup of abdominal pain as below    Chronic abdominal pain, suspect functional and component of constipation  * Pt with chronic abdominal pain for years PTA; felt to limit her oral intake. US from 8/2019 did not show definite significant abnormality. CT 11/2019 showed prominence/thickening of the pylorus of the stomach is nonspecific, differential diagnosis would include inflammatory etiologies as well as underlying lesion, consider GI consultation; small amount of pelvic free fluid and mild diffuse anasarca, nonspecific. Otherwise, unclear of prior workup.  * As above, pt with frequent c/o abdominal pain this hospitalization.   * She was initiated on a bowel regimen during this hospitalization for constipation with good response.  * Started on pantoprazole this hospitalization for dyspepsia related to K supplements.  * Per d/w pt's brother 2/20, he wanted further evaluation for possible treatable/reversible etiologies of pt's chronic abdominal pain. GI consulted 2/20. CT abdomen ordered 2/20, but pt refused IV placement, so delayed.   * CT abdomen/plevis 2/21 showed large amount of stool throughout the colon likely indicating constipation; no other  abnormality to explain abdominal pain.  * EGD 2/22 showed mild appearance/texture changed mucosa in the esophageal at the GE junction; no pathology to explain her abdominal pain. Abdominal US 2/22 did not show acute abnormality.  - Continue pantoprazole daily  - Continue bowel regimen - polyethylene glycol daily; senna-docusate BID; PRN bisacodyl suppository    Hypokalemia due to poor PO intake  * Potassium low at times this hospitalization. Treated with replacement.  - Monitor potassium periodically  - Continue PRN potassium replacement    Thyroid nodule  Possible subclinical hypothyroidism  * Incidentally noted on admission chest CT. TSH was elevated to 15, but T4 was normal; likely due to severe malnutrition.  * Repeat TSH/FT4 ordered, but patient refused lab draw  * Add-on TSH and free T4 to labs from 2/18 showed TSH elevated at 9.95 but FT4 at 1.00  - Follow-up this outpatient  - Repeat thyroid labs in 4-6 weeks    Periodic hypotension, stable  * Baseline blood pressures 90s-100s systolic.  * Decreased to mid-80s systolic at times this hospitalization in the setting of minimal PO intake. Treated with IVF from 1/29-1/31.  - Monitor BP's.    Resolved conditions  Acute hypoxic respiratory failure due to COVID-19 pneumonia  COVID recovered  * Unvaccinated.  * Symptom onset 1/19/22.  * Tested positive for COVID on 1/22/22.   * Chest CT on arrival showed bilateral infiltrates.  * Initiated on dexamethasone and remdesivir on 1/22  * Completed 5-day course of remdesivir on 1/26.  * Completed 10-day course of dexamethasone on 1/31.  * Stopped rivaroxaban 2/20.    E coli UTI  * Pan-sensitive E.coli noted on 2/8 urine culture. She was initiated on cefuroxime on 2/9 and completed a 7 day course on 2/15.    Chest pain  * Intermittent chest pain noted during this hospitalization. Patient refused serial troponins and echo.  * No recent complaints.  - Monitor clinically    Leukopenia and thrombocytopenia, suspect due to viral  "infection  * WBC ron 3.6k; PLT ron 119k - likely due to infection. Subsequently improved, and she has since refused labs.     Agitation, restlessness, hallucinations due to metabolic and infectious encephalopathy  * Resolved following treatment of UTI.      Clinically Significant Risk Factors Present on Admission                    Obesity.  Body mass index is 14.74 kg/m .  - Needs to pursue aggressive dietary and lifestyle modifications.      COVID-19 testing.  COVID-19 PCR Results    COVID-19 PCR Results 1/22/22   SARS CoV2 PCR Positive (A)   (A) Abnormal value       Comments are available for some flowsheets but are not being displayed.         COVID-19 Antibody Results, Testing for Immunity    COVID-19 Antibody Results, Testing for Immunity   No data to display.             Diet: Regular Diet Adult  Snacks/Supplements Adult: Beneprotein; With Meals    Prophylaxis: PCD's, ambulation.   Zhu Catheter: Not present  Central Lines: None  Code Status: Full Code    Disposition Plan   Expected discharge: Recommended to LTC/group home pending bed availability.  Entered: Andrew Tafoya MD 02/28/2022, 11:01 AM         Interval History   No acute events overnight.  Tolerated breakfast earlier.    -Data reviewed today: I reviewed all new labs and imaging over the last 24 hours. I personally reviewed no images or EKG's today.    Physical Exam    , Blood pressure 107/64, pulse 63, temperature 97.8  F (36.6  C), temperature source Oral, resp. rate 18, height 1.6 m (5' 3\"), weight 37.7 kg (83 lb 3.2 oz), SpO2 98 %.  Vitals:    01/22/22 1140 02/05/22 0729 02/14/22 1329   Weight: 33.6 kg (74 lb 1.2 oz) 43.8 kg (96 lb 9.6 oz) 37.7 kg (83 lb 3.2 oz)     Vital Signs with Ranges  Temp:  [97.8  F (36.6  C)] 97.8  F (36.6  C)  Pulse:  [63-76] 63  Resp:  [16-18] 18  BP: ()/(61-64) 107/64  SpO2:  [97 %-98 %] 98 %  Patient Vitals for the past 24 hrs:   BP Temp Temp src Pulse Resp SpO2   02/28/22 0721 107/64 97.8  F (36.6 "  C) Oral 63 18 98 %   02/27/22 1642 96/61 97.8  F (36.6  C) Oral 76 16 97 %     I/O's Last 24 hours  I/O last 3 completed shifts:  In: 190 [P.O.:190]  Out: -     Constitutional: Awake, alert, pleasant.  Respiratory: Diminished in bases. No crackles or wheezes.  Cardiovascular: RRR, no m/r/g.  GI: Tender to light touch, no r/g, +BS.  Skin/Integumen:   Other:        Data   Recent Labs   Lab 02/22/22  1311   POTASSIUM 4.0     Recent Labs   Lab Test 02/12/22  2127 02/09/22  1002 01/23/22  1050 01/22/22  1202 02/23/21  1931   GLC 96 98 134* 101* 117*     No results for input(s): WBC, CRP, DD, LDH, FIBR, SHERRY, IL6B, LACT, LACTS, PCAL in the last 168 hours.      No results found for this or any previous visit (from the past 24 hour(s)).    Medications   All medications were reviewed.    - MEDICATION INSTRUCTIONS -         melatonin  1 mg Oral At Bedtime     multivitamin w/minerals  1 tablet Oral Daily     pantoprazole  40 mg Oral QAM AC     polyethylene glycol  17 g Oral Daily     senna-docusate  1 tablet Oral BID     Vitamin D3  125 mcg Oral QPM     sodium chloride 0.9%, acetaminophen **OR** acetaminophen, albuterol, bisacodyl, lidocaine 4%, lidocaine (buffered or not buffered), - MEDICATION INSTRUCTIONS -, naloxone, naloxone, naloxone, naloxone, ondansetron **OR** ondansetron, QUEtiapine

## 2022-02-28 NOTE — PLAN OF CARE
Goal Outcome Evaluation:      Summary: Recovered COVID, placement needed  DATE & TIME: 02/28/22 ([id number])  Cognitive Concerns/ Orientation : Alert and Oriented x 4, forgetful, calm and cooperative  BEHAVIOR & AGGRESSION TOOL COLOR: Green   ABNL VS/O2:  VSS on RA  PAIN MANAGMENT:Generalized, C/o upset stomach/abdominal pain this am, improved after eating breakfast  DIET: Regular, tolerating  BOWEL/BLADDER: Continent this shift, had BM  SKIN: Pale. Dry. Scattered scabs and bruises to arms. Mepilex on coccyx/sacrum for blanchable redness  D/C DATE: Waiting on group home or guardianship  OTHER IMPORTANT INFO: Brother granted 60 day emergency guardianship. PT/OT/Neuropsychology following

## 2022-02-28 NOTE — PROGRESS NOTES
Care Management Follow Up    Length of Stay (days): 37    Additional Information:  Spoke with Abraham Laguerre [phone] from [facility]; reports she doesn't have anyone to come do IQ and adaptive testing until Wednesday 3/2.        Trinidad Brown, RN, BSN, PHN, CMSRN  Care Coordination: Phone [phone]  [facility]

## 2022-02-28 NOTE — PLAN OF CARE
Goal Outcome Evaluation:    Plan of Care Reviewed With: guardian     Overall Patient Progress: no change             Summary: Recovered COVID, placement needed  DATE & TIME: 02/27/22 ([id number])  Cognitive Concerns/ Orientation : Alert and Oriented x 2-3, disoriented time by one day,  Cooperative. Particular about cares.  BEHAVIOR & AGGRESSION TOOL COLOR: Green   ABNL VS/O2:  VSS on RA, refused VS during night.  PAIN MANAGMENT:Generalized   DIET: Regular.  BOWEL/BLADDER: Incontinent at times.   SKIN: Pale. Dry. Scattered scabs and bruises to arms. Mepilex on coccyx/sacrum for blanchable redness  D/C DATE: Waiting on group home or guardianship  OTHER IMPORTANT INFO: Brother granted 60 day emergency guardianship. Scheduled hearing for permanent guardianship 4/1. PT/OT/Neuropsychology following

## 2022-03-01 PROCEDURE — 120N000001 HC R&B MED SURG/OB

## 2022-03-01 PROCEDURE — 250N000013 HC RX MED GY IP 250 OP 250 PS 637: Performed by: INTERNAL MEDICINE

## 2022-03-01 PROCEDURE — 99232 SBSQ HOSP IP/OBS MODERATE 35: CPT | Performed by: INTERNAL MEDICINE

## 2022-03-01 PROCEDURE — 250N000013 HC RX MED GY IP 250 OP 250 PS 637: Performed by: REGISTERED NURSE

## 2022-03-01 PROCEDURE — 250N000013 HC RX MED GY IP 250 OP 250 PS 637: Performed by: HOSPITALIST

## 2022-03-01 RX ADMIN — SENNOSIDES AND DOCUSATE SODIUM 1 TABLET: 8.6; 5 TABLET ORAL at 08:58

## 2022-03-01 RX ADMIN — POLYETHYLENE GLYCOL 3350 17 G: 17 POWDER, FOR SOLUTION ORAL at 08:58

## 2022-03-01 RX ADMIN — Medication 1 MG: at 21:24

## 2022-03-01 RX ADMIN — Medication 125 MCG: at 21:24

## 2022-03-01 RX ADMIN — PANTOPRAZOLE SODIUM 40 MG: 40 TABLET, DELAYED RELEASE ORAL at 08:58

## 2022-03-01 RX ADMIN — MULTIPLE VITAMINS W/ MINERALS TAB 1 TABLET: TAB at 08:59

## 2022-03-01 ASSESSMENT — ACTIVITIES OF DAILY LIVING (ADL)
ADLS_ACUITY_SCORE: 17
ADLS_ACUITY_SCORE: 15
ADLS_ACUITY_SCORE: 17
ADLS_ACUITY_SCORE: 15
ADLS_ACUITY_SCORE: 17
ADLS_ACUITY_SCORE: 15
ADLS_ACUITY_SCORE: 17
ADLS_ACUITY_SCORE: 15
ADLS_ACUITY_SCORE: 17
ADLS_ACUITY_SCORE: 15
ADLS_ACUITY_SCORE: 17

## 2022-03-01 NOTE — PROGRESS NOTES
"[facility]    Internal Medicine Hospitalist Progress Note  2022  I evaluated patient on the above date.    Andrew Tafoya Jr., MD  [phone] (p)  Text Page  Vocera        Assessment & Plan New actions/orders today (2022) are underlined.    Miranda Dover is a 51 year old female with hx of Down syndrome who was admitted on 2022 for acute hypoxic respiratory failure due to COVID-19 pneumonia, which has resolved. Hospitalization has been prolonged due to need to establish guardianship following the deaths of her parents during this hospitalization.    Down syndrome  Developmental delay  Failure to thrive  * Patient's primary caregivers were her parents who were also hospitalized on  for COVID. Patient's father  on  and her mother  on .   * Patient became intermittently anxious and agitated, sometimes yelling/screaming. She did not know why she was here and had repeatedly asked \"what's wrong with me?\" Following admission, she became increasingly withdrawn, refusing medications and meals at times.   * Palliative Care was initially consulted for emotional support, but this was not particularly helpful for pt.  * Discharge planning has been complicated by need for guardianship and payor source for placement.  * Patient's brother, Maxi, was granted 60 days of emergency guardianship on 2/15.   * Pt calm and cooperative .  - Next hearing scheduled on 2022 for permanent guardianship  - Plan to transition patient to group home; patient's brother involved in this process  - SW following closely for placement    Severe malnutrition  Underweight with cachexia  * BMI ~13 on early this hospitalization with poor PO intake. SLP was consulted for possible dysphagia, and no evidence of dysphagia was noted.  * Patient was treated with IV fluids -. She later lost IV access, and refused replacement.  * Overall, pt noted to be very picky with foods.   * During " this hospitalization, has had frequent complaints of abdominal pain (per pt's brother, this is chronic dating back years), see below.  * On 2/20, discussed with pt's brother; overall, felt that oral intake was probably acceptable at this point for discharge and did not feel we needed to pursue enteral feedings/g-tube at this point; desired workup for etiology of abdominal pain which he felt was contributing to her decreased PO intake.   * Workup for abdominal pain started as below was negative (suspected functional and constipation).  * BMI up to 14.7 2/27.  - Continue regular diet with Beneprotein with meals. Dietary preferences were previously discussed with the patient, and she likes turkey and white bread sandwiches.   - Continue to encourage PO fluids  - Workup of abdominal pain as below    Chronic abdominal pain, suspect functional and component of constipation  * Pt with chronic abdominal pain for years PTA; felt to limit her oral intake. US from 8/2019 did not show definite significant abnormality. CT 11/2019 showed prominence/thickening of the pylorus of the stomach is nonspecific, differential diagnosis would include inflammatory etiologies as well as underlying lesion, consider GI consultation; small amount of pelvic free fluid and mild diffuse anasarca, nonspecific. Otherwise, unclear of prior workup.  * As above, pt with frequent c/o abdominal pain this hospitalization.   * She was initiated on a bowel regimen during this hospitalization for constipation with good response.  * Started on pantoprazole this hospitalization for dyspepsia related to K supplements.  * Per d/w pt's brother 2/20, he wanted further evaluation for possible treatable/reversible etiologies of pt's chronic abdominal pain. GI consulted 2/20. CT abdomen ordered 2/20, but pt refused IV placement, so delayed.   * CT abdomen/plevis 2/21 showed large amount of stool throughout the colon likely indicating constipation; no other  abnormality to explain abdominal pain.  * EGD 2/22 showed mild appearance/texture changed mucosa in the esophageal at the GE junction; no pathology to explain her abdominal pain. Abdominal US 2/22 did not show acute abnormality.  - Continue pantoprazole daily  - Continue bowel regimen - polyethylene glycol daily; senna-docusate BID; PRN bisacodyl suppository    Hypokalemia due to poor PO intake  * Potassium low at times this hospitalization. Treated with replacement.  - Monitor potassium periodically  - Continue PRN potassium replacement    Thyroid nodule  Possible subclinical hypothyroidism  * Incidentally noted on admission chest CT. TSH was elevated to 15, but T4 was normal; likely due to severe malnutrition.  * Repeat TSH/FT4 ordered, but patient refused lab draw  * Add-on TSH and free T4 to labs from 2/18 showed TSH elevated at 9.95 but FT4 at 1.00  - Follow-up this outpatient  - Repeat thyroid labs in 4-6 weeks    Periodic hypotension, stable  * Baseline blood pressures 90s-100s systolic.  * Decreased to mid-80s systolic at times this hospitalization in the setting of minimal PO intake. Treated with IVF from 1/29-1/31.  - Monitor BP's.  - Consider IVF's if symptomatic.    Resolved conditions  Acute hypoxic respiratory failure due to COVID-19 pneumonia  COVID recovered  * Unvaccinated.  * Symptom onset 1/19/22.  * Tested positive for COVID on 1/22/22.   * Chest CT on arrival showed bilateral infiltrates.  * Initiated on dexamethasone and remdesivir on 1/22  * Completed 5-day course of remdesivir on 1/26.  * Completed 10-day course of dexamethasone on 1/31.  * Stopped rivaroxaban 2/20.    E coli UTI  * Pan-sensitive E.coli noted on 2/8 urine culture. She was initiated on cefuroxime on 2/9 and completed a 7 day course on 2/15.    Chest pain  * Intermittent chest pain noted during this hospitalization. Patient refused serial troponins and echo.  * No recent complaints.  - Monitor clinically    Leukopenia and  "thrombocytopenia, suspect due to viral infection  * WBC ron 3.6k; PLT ron 119k - likely due to infection. Subsequently improved, and she has since refused labs.     Agitation, restlessness, hallucinations due to metabolic and infectious encephalopathy  * Resolved following treatment of UTI.      Clinically Significant Risk Factors Present on Admission                    Obesity.  Body mass index is 14.74 kg/m .  - Needs to pursue aggressive dietary and lifestyle modifications.      COVID-19 testing.  COVID-19 PCR Results    COVID-19 PCR Results 1/22/22   SARS CoV2 PCR Positive (A)   (A) Abnormal value       Comments are available for some flowsheets but are not being displayed.         COVID-19 Antibody Results, Testing for Immunity    COVID-19 Antibody Results, Testing for Immunity   No data to display.             Diet: Regular Diet Adult  Snacks/Supplements Adult: Beneprotein; With Meals    Prophylaxis: PCD's, ambulation.   Zhu Catheter: Not present  Central Lines: None  Code Status: Full Code    Disposition Plan   Expected discharge: Recommended to LTC/group home pending bed availability.  Entered: Andrew Tafoya MD 03/01/2022, 12:02 PM         Interval History   No acute events overnight.  Doing OK.  Stomach still hurting at times, usually better after 12 pm.    -Data reviewed today: I reviewed all new labs and imaging over the last 24 hours. I personally reviewed no images or EKG's today.    Physical Exam    , Blood pressure (!) 83/62, pulse 70, temperature 97.5  F (36.4  C), temperature source Oral, resp. rate 16, height 1.6 m (5' 3\"), weight 37.7 kg (83 lb 3.2 oz), SpO2 100 %.  Vitals:    01/22/22 1140 02/05/22 0729 02/14/22 1329   Weight: 33.6 kg (74 lb 1.2 oz) 43.8 kg (96 lb 9.6 oz) 37.7 kg (83 lb 3.2 oz)     Vital Signs with Ranges  Temp:  [97.5  F (36.4  C)-98  F (36.7  C)] 97.5  F (36.4  C)  Pulse:  [59-70] 70  Resp:  [16] 16  BP: (83-98)/(61-66) 83/62  SpO2:  [98 %-100 %] 100 %  Patient " Vitals for the past 24 hrs:   BP Temp Temp src Pulse Resp SpO2   03/01/22 0854 (!) 83/62 97.5  F (36.4  C) Oral 70 16 100 %   02/28/22 2107 98/66 97.8  F (36.6  C) Oral 66 16 98 %   02/28/22 1544 96/61 98  F (36.7  C) Oral 59 16 99 %     I/O's Last 24 hours  I/O last 3 completed shifts:  In: 240 [P.O.:240]  Out: -     Constitutional: Awake, alert, pleasant.  Respiratory: Clear, no crackles or wheezes.  Cardiovascular: RRR, no m/r/g.  GI: Nt to light touch, no r/g, +BS.  Skin/Integumen:   Other:        Data   Recent Labs   Lab 02/22/22  1311   POTASSIUM 4.0     Recent Labs   Lab Test 02/12/22  2127 02/09/22  1002 01/23/22  1050 01/22/22  1202 02/23/21  1931   GLC 96 98 134* 101* 117*     No results for input(s): WBC, CRP, DD, LDH, FIBR, SHERRY, IL6B, LACT, LACTS, PCAL in the last 168 hours.      No results found for this or any previous visit (from the past 24 hour(s)).    Medications   All medications were reviewed.    - MEDICATION INSTRUCTIONS -         melatonin  1 mg Oral At Bedtime     multivitamin w/minerals  1 tablet Oral Daily     pantoprazole  40 mg Oral QAM AC     polyethylene glycol  17 g Oral Daily     senna-docusate  1 tablet Oral BID     Vitamin D3  125 mcg Oral QPM     sodium chloride 0.9%, acetaminophen **OR** acetaminophen, albuterol, bisacodyl, lidocaine 4%, lidocaine (buffered or not buffered), - MEDICATION INSTRUCTIONS -, naloxone, naloxone, naloxone, naloxone, ondansetron **OR** ondansetron, QUEtiapine

## 2022-03-01 NOTE — PROGRESS NOTES
CLINICAL NUTRITION SERVICES - REASSESSMENT NOTE    Malnutrition:   (2/3)  % Weight Loss: Unable to evaluate   % Intake:  </= 50% for >/= 5 days (severe malnutrition)  Subcutaneous Fat Loss:  Orbital region moderate depletion, Upper arm region moderate depletion and Thoracic region moderate depletion  Muscle Loss:  Temporal region moderate depletion, Clavicle bone region moderate depletion and Dorsal hand region moderate depletion  Fluid Retention:  None noted     Malnutrition Diagnosis: Severe malnutrition  In Context of:  Acute illness or injury  Chronic illness or disease     EVALUATION OF PROGRESS TOWARD GOALS   Diet: Regular Diet   Pudding + Benepro w/ breakfast and dinner  Intake/Tolerance:   - Pt receives 2 meals most days, did get three yesterday. 100% documented for one meal yesterday and one meal on 2/27, other meals are not listed. Over the past 1-2 weeks pt may consume anywhere from 25-75% of meals.     - Pt seen in room this AM - she was sitting in her chair, just finished her breakfast. She complained that her stomach hurt from eating too many eggs (received scrambled eggs + deli ham) - she had eaten the whole portion.     On her tray were two pudding cups, one standard and one was mixed with beneprotein. She took one bite of the regular pudding but stated she didn't like it right now. She could not remember if she ever eats the pudding mixed with beneprotein or not.     - Meds: Thera vit M, Vitamin D3 (5000 units daily)  Miralax daily + Senokot BID  - Labs reviewed  - BM 1-2x daily   - Last weight measured on 2/14    ASSESSED NUTRITION NEEDS:  Dosing Weight 33.6 kg   Estimated Energy Needs: 4890-1811 kcals (35-40 Kcal/Kg)  Justification: repletion and underweight  Estimated Protein Needs: 50-70 grams protein (1.5-2 g pro/Kg)  Justification: repletion and hypercatabolism with acute illness    Previous Goals:   Intake of > 50% adequate meals BID on average   Evaluation: Not met consistently     Previous  Nutrition Diagnosis:   Inadequate oral intake related to fluctuating appetite/interest in food, reported abdominal pain, and constipation as evidenced by patient is eating between 25-75% of small meals 1-2x daily for the past 7 days or so  Evaluation: No change    CURRENT NUTRITION DIAGNOSIS  Inadequate oral intake related to fluctuating appetite/interest in food, reported abdominal pain as evidenced by pt tolerates anywhere from % of meals ~2x daily for the past 2 weeks.     INTERVENTIONS  Recommendations / Nutrition Prescription  Regular diet  Pudding + Beneprotein BID w/ meals  Thera vit M  Encourage intakes - try pudding + beneprotein     Implementation  None new - general encouragement. Visited w/ patient.     Goals  Intake of >50% meals BID during review period       MONITORING AND EVALUATION:  Progress towards goals will be monitored and evaluated per protocol and Practice Guidelines    Rosalba Adams RD, LD  [facility], [facility]  Pager: [phone]  Weekend Pager: [phone]

## 2022-03-01 NOTE — PROGRESS NOTES
"Care Management Follow Up    Length of Stay (days): 38    Expected Discharge Date: 03/29/2022     Concerns to be Addressed: discharge planning     Patient plan of care discussed at interdisciplinary rounds: Yes    Anticipated Discharge Disposition: Transitional Care     Anticipated Discharge Services:    Anticipated Discharge DME:      Patient/family educated on Medicare website which has current facility and service quality ratings: no  Education Provided on the Discharge Plan:    Patient/Family in Agreement with the Plan: yes    Referrals Placed by CM/SW: Post Acute Facilities  Private pay costs discussed: Not applicable    Additional Information:  SW received voicemail from [employer] (?) that they will have someone at [facility] on 3/1 to do IQ testing with pt.  A caregiver will need to do the \"adaptive testing\".  SW called and left voicemail for [employer] [phone] to find out what time a provider will be at [facility].  SW did not get a call back by end of the day.        Janice Mccarthy, ROGER      "

## 2022-03-01 NOTE — PLAN OF CARE
Goal Outcome Evaluation:        Summary: Recovered COVID, placement needed  DATE & TIME: 03/01/22 (1930-0730)  Cognitive Concerns/ Orientation : Alert and Oriented x 4, forgetful, calm and cooperative  BEHAVIOR & AGGRESSION TOOL COLOR: Green   ABNL VS/O2:  VSS on RA  PAIN MANAGMENT:Generalized, left knee ache  DIET: Regular, tolerating  BOWEL/BLADDER: Continent  SKIN: Pale. Dry. Scattered scabs and bruises to arms. Mepilex on coccyx/sacrum for blanchable redness  D/C DATE: Waiting on group home or guardianship  OTHER IMPORTANT INFO: Brother granted 60 day emergency guardianship. PT/OT/Neuropsychology following

## 2022-03-01 NOTE — PLAN OF CARE
Goal Outcome Evaluation:    DATE & TIME: 03/01/22 9330-54587  Cognitive Concerns/ Orientation : Alert and Oriented x 4, forgetful, calm and cooperative  BEHAVIOR & AGGRESSION TOOL COLOR: Green   ABNL VS/O2:  VSS on RA  PAIN MANAGMENT:Generalized, left knee ache  DIET: Regular, tolerating.  Good appetite  BOWEL/BLADDER: Continent.  BM X2 this shift  SKIN: Pale. Dry. Scattered scabs and bruises to arms. Blanchable redness on coccyx  D/C DATE: Waiting on group home or guardianship  OTHER IMPORTANT INFO: Brother granted 60 day emergency guardianship. PT/OT/Neuropsychology following        Plan of Care Reviewed With: patient

## 2022-03-02 PROCEDURE — 250N000013 HC RX MED GY IP 250 OP 250 PS 637: Performed by: HOSPITALIST

## 2022-03-02 PROCEDURE — 250N000013 HC RX MED GY IP 250 OP 250 PS 637: Performed by: REGISTERED NURSE

## 2022-03-02 PROCEDURE — 99232 SBSQ HOSP IP/OBS MODERATE 35: CPT | Performed by: HOSPITALIST

## 2022-03-02 PROCEDURE — 120N000001 HC R&B MED SURG/OB

## 2022-03-02 PROCEDURE — 250N000013 HC RX MED GY IP 250 OP 250 PS 637: Performed by: INTERNAL MEDICINE

## 2022-03-02 RX ADMIN — SENNOSIDES AND DOCUSATE SODIUM 1 TABLET: 8.6; 5 TABLET ORAL at 21:24

## 2022-03-02 RX ADMIN — SENNOSIDES AND DOCUSATE SODIUM 1 TABLET: 8.6; 5 TABLET ORAL at 09:23

## 2022-03-02 RX ADMIN — Medication 1 MG: at 21:24

## 2022-03-02 RX ADMIN — POLYETHYLENE GLYCOL 3350 17 G: 17 POWDER, FOR SOLUTION ORAL at 09:23

## 2022-03-02 RX ADMIN — PANTOPRAZOLE SODIUM 40 MG: 40 TABLET, DELAYED RELEASE ORAL at 09:23

## 2022-03-02 RX ADMIN — Medication 125 MCG: at 21:25

## 2022-03-02 ASSESSMENT — ACTIVITIES OF DAILY LIVING (ADL)
ADLS_ACUITY_SCORE: 15

## 2022-03-02 NOTE — PLAN OF CARE
DATE & TIME: 3/1/22, 7720 - 1890    Cognitive Concerns/ Orientation : A&O x 4. [place], hearing x 1 in right ear   BEHAVIOR & AGGRESSION TOOL COLOR: Green   ABNL VS/O2: BP soft. Bradycardic at times. Other VSS on roomair  MOBILITY: Up SBA with GB and walker  PAIN MANAGMENT: Denied  DIET: Regular  BOWEL/BLADDER: Voided in bathroom. Can be incontinent at times  ABNL LAB/BG: NA  DRAIN/DEVICES: None  TELEMETRY RHYTHM: NA  SKIN: Bruising to left upper arm. Buttocks pink  TESTS/PROCEDURES: NA  D/C DATE: Pending placement  Discharge Barriers: [place]  OTHER IMPORTANT INFO: Patient possibly for IQ  And adaptive testing today per SW notes. Time not yet confirmed

## 2022-03-02 NOTE — CONSULTS
"Consult Date: 2022    REVISED REPORT    BACKGROUND INFORMATION:  Miranda is a 51-year-old woman from [place], [place].  She was admitted to station 66 at [facility] on 2022 due to concerns of respiratory failure due to COVID-19 pneumonia.  She has a history of Down syndrome disorder and significant developmental delays.  Her primary caregivers were her parents who were hospitalized in 2022 for COVID.  It was noted that the patient's father  on 2022, and her mother  on 2022 due to complications.  Concerns included the patient becoming intermittently anxious and agitated and sometimes yelling or screaming.  She is unaware why she was at the hospital and repeatedly asks \"what's wrong with me?\"  Following her admission to the hospital, she became increasingly withdrawn, refusing medications and meals at times.  Palliative Care was initially consulted for emotional support, but this was not particularly helpful for the patient.  The patient's brother, Matt Dover, was granted 60 days of emergency guardianship on 02/15/2022.  The next hearing is scheduled for 2022 for permanent guardianship.  The plan is to transition the patient to a group home, and the patient's brother is involved in this process.  The assessment question was to assess current cognitive function and adaptive abilities.    MENTAL STATUS AND BEHAVIOR:  Miranda was initially cooperative with the assessment.  She had been prepped by hospital staff for the upcoming assessment.  She, however, initially indicated she would not participate in the assessment until she was given a pair of her gloves that she wished to wear.  She could not explain why she wanted the gloves, but would not be willing to participate until she had them.  Based on observation, her independent functioning level is very low.  She has very limited insight due to her significant developmental delays.    TESTS ADMINISTERED:  Wechsler " Intelligence Scale for Adults (WAIS-IV), [place] adaptive scales (Saint Cloud 3).    TEST RESULTS:  On the WAIS-IV, Miranda obtained a verbal comprehension index score of 50, which is in the less than 0.1 percentile and extremely low range.  She obtained a perceptual reasoning index score of 50, which is in the less than 0.1 percentile and extremely low range.  She obtained a working memory index score of 50, which is in the less than 0.1 percentile and extremely low range.  She obtained a processing speed index of 50, which is in the less than 0.1 percentile and extremely low range.  She obtained a Full Scale IQ score of 42, which is in the less than 0.1 percentile and extremely low range.  Her individual subtest scores were as follows:  Block design 2, similarities 1, digit span 1, matrix reasoning 1, vocabulary 2, arithmetic 1, simple search 1, visual puzzles 1, information 1, and coding 1.  The average subtest score is 10 and the range is from 1-19.  Based on her performance she obtained the lowest full scale IQ score possible on the Wechsler Intelligence test.  This shows profound cognitive deficits and it is likely based on observation that her adaptive functioning is also extremely impaired.    The [place] 3 must be completed by a caregiver or someone close to the identified patient.  Two phone call attempts and a message was left for Matt Billsen, Miranda's brother and current guardian; however, contact was not able to be made.  Therefore, the [place] was not able to be completed. Based on IQ testing results, Miranda definitely meets criteria for severe intellectual disabilities, as well as, carrying a historical diagnosis of Down syndrome and likely needs significant supports within the community due to the significant impairment in her functioning and daily living skills. Based on observation, her daily living skills and overall adaptive functioning appears significantly impaired.     PRIMARY DIAGNOSIS:   Intellectual disability, severe F72.    RELEVANT MEDICAL ISSUES:  Recently hospitalized for COVID-19 pneumonia complications.  Down syndrome.    RELEVANT PSYCHOSOCIAL STRESSORS:  Recent death of parents and primary caregivers and sudden change in her care providers and living situation.    RECOMMENDATIONS:  Please refer to her attending physician's recommendations in the hospital record.    ADDENDUM:  As requested for the psychological evaluation referral, adaptive testing was provided to help confirm a diagnosis of intellectual disabilities.  Please see previous evaluation report for background information.  Miranda's brother, Maxi Dovre (and current legal guardian) was interviewed in order to complete the [place]-3 and assess Miranda's adaptive functioning.     Per the previous evaluation, Miranda's full scale IQ was found to be at 42, which is in the extremely low range and suggests severe intellectual disabilities.  Based on the Maxi's responses, Miranda obtained a receptive communications scaled score of 1, which has an age equivalent of 2.7.  She had an expressive communication scaled score of 1, which has an age equivalent of 3.3.  She had a written communication scaled score of 1, which has an age equivalent of 6.2.  She had a personal daily living scaled score of 1, which has an age equivalent of 2.11.  She had a domestic daily living scaled score of 1, which has an age equivalent of 3.4.  She had a community daily living skills scaled score of 1, which has equivalent of 5.7.   She had an interpersonal relationships socialization scaled score of 2, which has an age equivalent of 1.10.  She had a play and leisure socialization scaled score of 1, which has an age equivalent 0.8.  She had a coping skills socialization scaled score of 5, which has an age equivalent of 4.  Her gross motor skills score is low enough that she did not register, but has an age equivalent of 2.9.  She had a fine motor skills  scaled score, which did not register also but had an age equivalent of 4.4.  Her overall communication domain standard score was 20, which is in the less than 1st percentile.  Her overall daily living skills standard score of 20, which is in the less than 1st percentile.  She had an overall socialization standard score of 20, which has a percentile rank of less than 1.  All of her scores suggested adaptive functioning at the age level of 6.2 years or less and extremely low adaptive functioning.  Her maladaptive behavior results indicated no clinically significant internalizing or externalizing behaviors.  Overall, her adaptive functioning appears significantly impaired and appears consistent with her previous IQ testing, and overall, appears to confirm a diagnosis of intellectual disabilities.    Miguel Angel Gramajo PsyD, LP    Addendum [id number] divina    D: 2022   T: 2022   MT: JED    Name:     JIM ROSA  MRN:      [id number]        Account:      [id number]   :      1970           Consult Date: 2022     Document: [id number]    cc:  Miguel Angel Gramajo PsyD, LP

## 2022-03-02 NOTE — PROGRESS NOTES
"[facility]    Medicine Progress Note - Hospitalist Service    Date of Admission:  2022    Assessment & Plan        Miranda Dover is a 51 year old female with hx of Down syndrome who was admitted on 2022 for acute hypoxic respiratory failure due to COVID-19 pneumonia, which has resolved. Hospitalization has been prolonged due to need to establish guardianship following the deaths of her parents during this hospitalization.    Down syndrome  Developmental delay  Failure to thrive  * Patient's primary caregivers were her parents who were also hospitalized on  for COVID. Patient's father  on  and her mother  on .   * Patient became intermittently anxious and agitated, sometimes yelling/screaming. She did not know why she was here and had repeatedly asked \"what's wrong with me?\" Following admission, she became increasingly withdrawn, refusing medications and meals at times.   * Palliative Care was initially consulted for emotional support, but this was not particularly helpful for patient.  * Discharge planning has been complicated by need for guardianship and payor source for placement.  * Patient's brother, Maxi, was granted 60 days of emergency guardianship on 2/15.   - Next hearing scheduled on 2022 for permanent guardianship.  - Plan to transition patient to group home; patient's brother involved in this process.  - SW following closely for placement.    Severe malnutrition  Underweight with cachexia  * BMI ~13 on early this hospitalization with poor PO intake. SLP was consulted for possible dysphagia, and no evidence of dysphagia was noted.  * Patient was treated with IV fluids -. She later lost IV access, and refused replacement.  * Overall, pt noted to be very picky with foods.   * During this hospitalization, has had frequent complaints of abdominal pain (per pt's brother, this is chronic dating back years), see below.  * On , discussed " with pt's brother; overall, felt that oral intake was probably acceptable at this point for discharge and did not feel we needed to pursue enteral feedings/g-tube at this point; desired workup for etiology of abdominal pain which he felt was contributing to her decreased PO intake.   * Workup for abdominal pain started as below was negative (suspected functional and constipation).  * BMI up to 14.7 2/27.  - Continue regular diet with Beneprotein with meals. Dietary preferences were previously discussed with the patient, and she likes turkey and white bread sandwiches.   - Continue to encourage PO fluids.  - Workup of abdominal pain as below.    Chronic abdominal pain, suspect functional and component of constipation  * Pt with chronic abdominal pain for years PTA; felt to limit her oral intake. US from 8/2019 did not show definite significant abnormality. CT 11/2019 showed prominence/thickening of the pylorus of the stomach is nonspecific, differential diagnosis would include inflammatory etiologies as well as underlying lesion, consider GI consultation; small amount of pelvic free fluid and mild diffuse anasarca, nonspecific. Otherwise, unclear of prior workup.  * As above, pt with frequent c/o abdominal pain this hospitalization.   * She was initiated on a bowel regimen during this hospitalization for constipation with good response.  * Started on pantoprazole this hospitalization for dyspepsia related to K supplements.  * Per d/w pt's brother 2/20, he wanted further evaluation for possible treatable/reversible etiologies of pt's chronic abdominal pain. GI consulted 2/20. CT abdomen ordered 2/20, but pt refused IV placement, so delayed.   * CT abdomen/plevis 2/21 showed large amount of stool throughout the colon likely indicating constipation; no other abnormality to explain abdominal pain.  * EGD 2/22 showed mild appearance/texture changed mucosa in the esophageal at the GE junction; no pathology to explain her  abdominal pain. Abdominal US 2/22 did not show acute abnormality.  - Continue pantoprazole daily.  - Continue bowel regimen - polyethylene glycol daily; senna-docusate BID; PRN bisacodyl suppository.    Hypokalemia due to poor PO intake  * Potassium low at times this hospitalization. Treated with replacement.  - Monitor potassium periodically.  - Continue PRN potassium replacement.    Thyroid nodule  Possible subclinical hypothyroidism  * Incidentally noted on admission chest CT. TSH was elevated to 15, but T4 was normal; likely due to severe malnutrition.  * Repeat TSH/FT4 ordered, but patient refused lab draw.  * Add-on TSH and free T4 to labs from 2/18 showed TSH elevated at 9.95 but FT4 at 1.00.  - Follow-up this outpatient.  - Repeat thyroid labs in 4-6 weeks.    Periodic hypotension, stable  * Baseline blood pressures 90s-100s systolic.  * Decreased to mid-80s systolic at times this hospitalization in the setting of minimal PO intake. Treated with IVF from 1/29-1/31.  - Monitor BP's.  - Consider IVF's if symptomatic.    Resolved conditions  Acute hypoxic respiratory failure due to COVID-19 pneumonia  COVID recovered  * Unvaccinated.  * Symptom onset 1/19/22.  * Tested positive for COVID on 1/22/22.   * Chest CT on arrival showed bilateral infiltrates.  * Initiated on dexamethasone and remdesivir on 1/22.  * Completed 5-day course of remdesivir on 1/26.  * Completed 10-day course of dexamethasone on 1/31.  * Stopped rivaroxaban 2/20.    E coli UTI  * Pan-sensitive E.coli noted on 2/8 urine culture. She was initiated on cefuroxime on 2/9 and completed a 7 day course on 2/15.    Chest pain  * Intermittent chest pain noted during this hospitalization. Patient refused serial troponins and echo.  * No recent complaints.  - Monitor clinically.    Leukopenia and thrombocytopenia, suspect due to viral infection  * WBC ron 3.6k; PLT ron 119k - likely due to infection. Subsequently improved, and she has since refused  labs.     Agitation, restlessness, hallucinations due to metabolic and infectious encephalopathy  * Resolved following treatment of UTI.       Diet: Regular Diet Adult  Snacks/Supplements Adult: Beneprotein; With Meals    DVT Prophylaxis: Pneumatic Compression Devices and Ambulate every shift  Zhu Catheter: Not present  Central Lines: None  Cardiac Monitoring: None  Code Status: Full Code      Disposition Plan   Expected Discharge: 03/29/2022     Anticipated discharge location: inpatient rehabilitation facility    Delays:     Placement - Group Homes            The patient's care was discussed with the Bedside Nurse and Patient.    Kendrick Martinez MD  Hospitalist Service  [facility]  Securely message with the Vocera Web Console (learn more here)  Text page via [employer] Paging/Directory         Clinically Significant Risk Factors Present on Admission                    ______________________________________________________________________    Interval History   Miranda VASU Dover was seen this morning. Had neuropsych testing this morning, reportedly was cooperative with testing. Denies chest pain, shortness of breath, nausea, abdominal pain.    Data reviewed today: I reviewed all medications, new labs and imaging results over the last 24 hours. I personally reviewed no images or EKG's today.    Physical Exam   Vital Signs: Temp: 98.1  F (36.7  C) Temp src: Oral BP: 105/60 Pulse: 67   Resp: 18 SpO2: 97 % O2 Device: None (Room air)    Weight: 83 lbs 3.2 oz  Constitutional: awake, alert, cooperative, no apparent distress, sitting up in a chair  Respiratory: clear to auscultation bilaterally, no crackles or wheezing  Cardiovascular: regular rate and rhythm, normal S1 and S2, no murmur noted  GI: normal bowel sounds, soft, non-distended, non-tender  Skin: warm, dry  Musculoskeletal: no lower extremity pitting edema present    Medications     - MEDICATION INSTRUCTIONS -         melatonin  1 mg Oral  At Bedtime     multivitamin w/minerals  1 tablet Oral Daily     pantoprazole  40 mg Oral QAM AC     polyethylene glycol  17 g Oral Daily     senna-docusate  1 tablet Oral BID     Vitamin D3  125 mcg Oral QPM

## 2022-03-02 NOTE — PLAN OF CARE
Goal Outcome Evaluation:  DATE & TIME: 03/02/22 0678-17201  Cognitive Concerns/ Orientation : Alert and Oriented x 4, forgetful, calm and cooperative  BEHAVIOR & AGGRESSION TOOL COLOR: Green   ABNL VS/O2:  VSS on RA  PAIN MANAGMENT:Generalized, left knee ache  DIET: Regular, tolerating.  Good appetite  BOWEL/BLADDER: Continent.    SKIN: Pale. Dry. Scattered scabs and bruises to arms. Blanchable redness on coccyx.  Refused Mepilex  D/C DATE: Waiting on group home or guardianship  OTHER IMPORTANT INFO: Brother granted 60 day emergency guardianship. PT/OT/Neuropsychology following.  Neuro psych testing completed today        Plan of Care Reviewed With: (P) patient

## 2022-03-02 NOTE — PLAN OF CARE
Date: 03/01/2022 9166-7324   Orientation: A&Ox4   Behavior Color: Green  CIWA: na   VS/O2: VSS on RA, soft BP baselines  Mobility: SBA/G  Pain: Abdominal discomfort before br, held senna due to loose stools.   Diet: regular  B/B: AUOP in br, x3 BM   ABNL LAB: WNL  Drain/Device: No IV access   Tele: na  Skin: removed mepilex per pt request. Coccyx looks fine. Buttocks reddened.   Test/Procedures: na  D/C Goal/Place: Placement

## 2022-03-03 PROCEDURE — 120N000001 HC R&B MED SURG/OB

## 2022-03-03 PROCEDURE — 99232 SBSQ HOSP IP/OBS MODERATE 35: CPT | Performed by: HOSPITALIST

## 2022-03-03 PROCEDURE — 250N000013 HC RX MED GY IP 250 OP 250 PS 637: Performed by: HOSPITALIST

## 2022-03-03 PROCEDURE — 250N000013 HC RX MED GY IP 250 OP 250 PS 637: Performed by: REGISTERED NURSE

## 2022-03-03 PROCEDURE — 250N000013 HC RX MED GY IP 250 OP 250 PS 637: Performed by: INTERNAL MEDICINE

## 2022-03-03 RX ADMIN — Medication 1 MG: at 21:15

## 2022-03-03 RX ADMIN — SENNOSIDES AND DOCUSATE SODIUM 1 TABLET: 8.6; 5 TABLET ORAL at 08:17

## 2022-03-03 RX ADMIN — PANTOPRAZOLE SODIUM 40 MG: 40 TABLET, DELAYED RELEASE ORAL at 08:17

## 2022-03-03 RX ADMIN — SENNOSIDES AND DOCUSATE SODIUM 1 TABLET: 8.6; 5 TABLET ORAL at 21:14

## 2022-03-03 RX ADMIN — Medication 125 MCG: at 21:15

## 2022-03-03 RX ADMIN — POLYETHYLENE GLYCOL 3350 17 G: 17 POWDER, FOR SOLUTION ORAL at 08:17

## 2022-03-03 ASSESSMENT — ACTIVITIES OF DAILY LIVING (ADL)
ADLS_ACUITY_SCORE: 15

## 2022-03-03 NOTE — PLAN OF CARE
Goal Outcome Evaluation:    DATE & TIME: 03/02/22 7157-4143  Cognitive Concerns/ Orientation : Alert and disoriented to situation, forgetful, calm and cooperative  BEHAVIOR & AGGRESSION TOOL COLOR: Green   ABNL VS/O2:  VSS on RA  PAIN MANAGMENT: denies  DIET: Regular, tolerating.  Good appetite  BOWEL/BLADDER: Continent.    SKIN: Pale. Dry. Scattered scabs and bruises to arms.  D/C DATE: Waiting on group home or guardianship  OTHER IMPORTANT INFO: Brother granted 60 day emergency guardianship. PT/OT/Neuropsychology following.  Neuro psych testing completed 3/2        Plan of Care Reviewed With: patient

## 2022-03-03 NOTE — PROGRESS NOTES
"[facility]    Medicine Progress Note - Hospitalist Service    Date of Admission:  2022    Assessment & Plan        Miranda Dover is a 51 year old female with hx of Down syndrome who was admitted on 2022 for acute hypoxic respiratory failure due to COVID-19 pneumonia, which has resolved. Hospitalization has been prolonged due to need to establish guardianship following the deaths of her parents during this hospitalization.    Down syndrome  Developmental delay  Failure to thrive  * Patient's primary caregivers were her parents who were also hospitalized on  for COVID. Patient's father  on  and her mother  on .   * Patient became intermittently anxious and agitated, sometimes yelling/screaming. She did not know why she was here and had repeatedly asked \"what's wrong with me?\" Following admission, she became increasingly withdrawn, refusing medications and meals at times.   * Palliative Care was initially consulted for emotional support, but this was not particularly helpful for patient.  * Discharge planning has been complicated by need for guardianship and payor source for placement.  * Patient's brother, Maxi, was granted 60 days of emergency guardianship on 2/15.   * Completed psychologic testing on 3/2/22 to assess current cognitive function and adaptive abilities.  - Next hearing scheduled on 2022 for permanent guardianship.  - Plan to transition patient to group home; patient's brother involved in this process.  - SW following closely for placement.    Severe malnutrition  Underweight with cachexia  * BMI ~13 on early this hospitalization with poor PO intake. SLP was consulted for possible dysphagia, and no evidence of dysphagia was noted.  * Patient was treated with IV fluids -. She later lost IV access, and refused replacement.  * Overall, pt noted to be very picky with foods.   * During this hospitalization, has had frequent complaints of " abdominal pain (per pt's brother, this is chronic dating back years), see below.  * On 2/20, discussed with pt's brother; overall, felt that oral intake was probably acceptable at this point for discharge and did not feel we needed to pursue enteral feedings/g-tube at this point; desired workup for etiology of abdominal pain which he felt was contributing to her decreased PO intake.   * Workup for abdominal pain started as below was negative (suspected functional and constipation).  * BMI up to 14.7 2/27.  - Continue regular diet with Beneprotein with meals. Dietary preferences were previously discussed with the patient, and she likes turkey and white bread sandwiches.   - Continue to encourage PO fluids.  - Workup of abdominal pain as below.    Chronic abdominal pain, suspect functional and component of constipation  * Pt with chronic abdominal pain for years PTA; felt to limit her oral intake. US from 8/2019 did not show definite significant abnormality. CT 11/2019 showed prominence/thickening of the pylorus of the stomach is nonspecific, differential diagnosis would include inflammatory etiologies as well as underlying lesion, consider GI consultation; small amount of pelvic free fluid and mild diffuse anasarca, nonspecific. Otherwise, unclear of prior workup.  * As above, pt with frequent c/o abdominal pain this hospitalization.   * She was initiated on a bowel regimen during this hospitalization for constipation with good response.  * Started on pantoprazole this hospitalization for dyspepsia related to K supplements.  * Per d/w pt's brother 2/20, he wanted further evaluation for possible treatable/reversible etiologies of pt's chronic abdominal pain. GI consulted 2/20. CT abdomen ordered 2/20, but pt refused IV placement, so delayed.   * CT abdomen/plevis 2/21 showed large amount of stool throughout the colon likely indicating constipation; no other abnormality to explain abdominal pain.  * EGD 2/22 showed  mild appearance/texture changed mucosa in the esophageal at the GE junction; no pathology to explain her abdominal pain. Abdominal US 2/22 did not show acute abnormality.  - Continue pantoprazole daily.  - Continue bowel regimen - polyethylene glycol daily; senna-docusate BID; PRN bisacodyl suppository.    Hypokalemia due to poor PO intake  * Potassium low at times this hospitalization. Treated with replacement.  - Monitor potassium periodically.  - Continue PRN potassium replacement.    Thyroid nodule  Possible subclinical hypothyroidism  * Incidentally noted on admission chest CT. TSH was elevated to 15, but T4 was normal; likely due to severe malnutrition.  * Repeat TSH/FT4 ordered, but patient refused lab draw.  * Add-on TSH and free T4 to labs from 2/18 showed TSH elevated at 9.95 but FT4 at 1.00.  - Follow-up this outpatient.  - Repeat thyroid labs in 4-6 weeks.    Periodic hypotension, stable  * Baseline blood pressures 90s-100s systolic.  * Decreased to mid-80s systolic at times this hospitalization in the setting of minimal PO intake. Treated with IVF from 1/29-1/31.  - Monitor BP's.  - Consider IVF's if symptomatic.    Resolved conditions  Acute hypoxic respiratory failure due to COVID-19 pneumonia  COVID recovered  * Unvaccinated.  * Symptom onset 1/19/22.  * Tested positive for COVID on 1/22/22.   * Chest CT on arrival showed bilateral infiltrates.  * Initiated on dexamethasone and remdesivir on 1/22.  * Completed 5-day course of remdesivir on 1/26.  * Completed 10-day course of dexamethasone on 1/31.  * Stopped rivaroxaban 2/20.    E coli UTI  * Pan-sensitive E.coli noted on 2/8 urine culture. She was initiated on cefuroxime on 2/9 and completed a 7 day course on 2/15.    Chest pain  * Intermittent chest pain noted during this hospitalization. Patient refused serial troponins and echo.  * No recent complaints.  - Monitor clinically.    Leukopenia and thrombocytopenia, suspect due to viral infection  *  WBC ron 3.6k; PLT ron 119k - likely due to infection. Subsequently improved, and she has since refused labs.     Agitation, restlessness, hallucinations due to metabolic and infectious encephalopathy  * Resolved following treatment of UTI.       Diet: Regular Diet Adult  Snacks/Supplements Adult: Beneprotein; With Meals    DVT Prophylaxis: Pneumatic Compression Devices and Ambulate every shift  Zhu Catheter: Not present  Central Lines: None  Cardiac Monitoring: None  Code Status: Full Code      Disposition Plan   Expected Discharge: 03/29/2022     Anticipated discharge location: inpatient rehabilitation facility    Delays:     Placement - Group Homes            The patient's care was discussed with the Bedside Nurse and Patient.    Kendrick Martinez MD  Hospitalist Service  [facility]  Securely message with the Vocera Web Console (learn more here)  Text page via [email or web address] Paging/Directory         Clinically Significant Risk Factors Present on Admission                    ______________________________________________________________________    Interval History   Miranda Dover was seen this morning. She feels OK. Denies chest pain, shortness of breath, nausea, abdominal pain.    Data reviewed today: I reviewed all medications, new labs and imaging results over the last 24 hours. I personally reviewed no images or EKG's today.    Physical Exam   Vital Signs: Temp: 97.4  F (36.3  C) Temp src: Oral BP: 92/57 Pulse: 63   Resp: 18 SpO2: 94 % O2 Device: None (Room air)    Weight: 83 lbs 3.2 oz  Constitutional: awake, alert, cooperative, no apparent distress, sitting up in a chair  Respiratory: clear to auscultation bilaterally, no crackles or wheezing  Cardiovascular: regular rate and rhythm, normal S1 and S2, no murmur noted  GI: normal bowel sounds, soft, non-distended, non-tender  Skin: warm, dry  Musculoskeletal: no lower extremity pitting edema present  Neurologic: awake, alert,  answered my questions, moves all extremities    Data   No lab results found in last 7 days.    Medications     - MEDICATION INSTRUCTIONS -         melatonin  1 mg Oral At Bedtime     multivitamin w/minerals  1 tablet Oral Daily     pantoprazole  40 mg Oral QAM AC     polyethylene glycol  17 g Oral Daily     senna-docusate  1 tablet Oral BID     Vitamin D3  125 mcg Oral QPM

## 2022-03-03 NOTE — PLAN OF CARE
Goal Outcome Evaluation:    Plan of Care Reviewed With: patient     Overall Patient Progress: improving         DATE & TIME: 03/02/22 3-11pm  Cognitive Concerns/ Orientation : Alert and disoriented to situation, forgetful, calm and cooperative  BEHAVIOR & AGGRESSION TOOL COLOR: Green   ABNL VS/O2:  VSS on RA  PAIN MANAGMENT: denies  DIET: Regular, tolerating.  Good appetite  BOWEL/BLADDER: Continent.    SKIN: Pale. Dry. Scattered scabs and bruises to arms.  D/C DATE: Waiting on group home or guardianship  OTHER IMPORTANT INFO: Brother granted 60 day emergency guardianship. PT/OT/Neuropsychology following.  Neuro psych testing completed today

## 2022-03-04 ENCOUNTER — APPOINTMENT (OUTPATIENT)
Dept: PHYSICAL THERAPY | Facility: CLINIC | Age: 52
DRG: 177 | End: 2022-03-04
Payer: COMMERCIAL

## 2022-03-04 PROCEDURE — 120N000001 HC R&B MED SURG/OB

## 2022-03-04 PROCEDURE — 97530 THERAPEUTIC ACTIVITIES: CPT | Mod: GP | Performed by: PHYSICAL THERAPIST

## 2022-03-04 PROCEDURE — 99207 PR CDG-MDM COMPONENT: MEETS MODERATE - UP CODED: CPT | Performed by: HOSPITALIST

## 2022-03-04 PROCEDURE — 250N000013 HC RX MED GY IP 250 OP 250 PS 637: Performed by: HOSPITALIST

## 2022-03-04 PROCEDURE — 250N000013 HC RX MED GY IP 250 OP 250 PS 637: Performed by: INTERNAL MEDICINE

## 2022-03-04 PROCEDURE — 99232 SBSQ HOSP IP/OBS MODERATE 35: CPT | Performed by: HOSPITALIST

## 2022-03-04 PROCEDURE — 250N000013 HC RX MED GY IP 250 OP 250 PS 637: Performed by: REGISTERED NURSE

## 2022-03-04 RX ADMIN — SENNOSIDES AND DOCUSATE SODIUM 1 TABLET: 8.6; 5 TABLET ORAL at 21:38

## 2022-03-04 RX ADMIN — Medication 125 MCG: at 21:38

## 2022-03-04 RX ADMIN — Medication 1 MG: at 21:38

## 2022-03-04 RX ADMIN — POLYETHYLENE GLYCOL 3350 17 G: 17 POWDER, FOR SOLUTION ORAL at 09:32

## 2022-03-04 RX ADMIN — PANTOPRAZOLE SODIUM 40 MG: 40 TABLET, DELAYED RELEASE ORAL at 09:32

## 2022-03-04 RX ADMIN — SENNOSIDES AND DOCUSATE SODIUM 1 TABLET: 8.6; 5 TABLET ORAL at 09:32

## 2022-03-04 ASSESSMENT — ACTIVITIES OF DAILY LIVING (ADL)
ADLS_ACUITY_SCORE: 15

## 2022-03-04 NOTE — PLAN OF CARE
Occupational Therapy Discharge Summary    Reason for therapy discharge:    Pt at baseline with ADl's and functional mobility    Progress towards therapy goal(s). See goals on Care Plan in [facility] electronic health record for goal details.  Pt appears at baseline with ADLs and functional mobility    Therapy recommendation(s):    Pt appears at baseline with ADL's, pt is SBA with functional mobility and requires A with ADL's to insure safety and will require 24 hr care and A with all IADL's. As pt at baseline, OT orders completed. Recommend nursing to ambulation pt 3-4 x/day to maintain strength and activity tolerance.

## 2022-03-04 NOTE — PLAN OF CARE
Goal Outcome Evaluation:        Summary: Recovered COVID, placement needed  DATE & TIME: 3/4/22 2683-6026                        Cognitive Concerns/ Orientation : A&O x 4   BEHAVIOR & AGGRESSION TOOL COLOR: Green     ABNL VS/O2: BP soft.Other VSS on roomair  MOBILITY: Up SBA with GB and walker  PAIN MANAGMENT: Denied  DIET: Regular- good appetite   BOWEL/BLADDER: Continent, can be incontinent at times.   ABNL LAB/BG: NA  DRAIN/DEVICES: None  TELEMETRY RHYTHM: NA  SKIN: Bruising to left upper arm. Blanchable redness on coccyx- refused mepilex   TESTS/PROCEDURES: NA  D/C DATE: Pending placement  Discharge Barriers: Placemet  OTHER IMPORTANT INFO:  Brother granted 60 day emergency guardianship.

## 2022-03-04 NOTE — PLAN OF CARE
Goal Outcome Evaluation:    Plan of Care Reviewed With: patient     Overall Patient Progress: improving         DATE & TIME: 03/3/22 3-11pm  Cognitive Concerns/ Orientation : Alert and disoriented to situation, forgetful, calm and cooperative  BEHAVIOR & AGGRESSION TOOL COLOR: Green   ABNL VS/O2:  VSS on RA  PAIN MANAGMENT: denies  DIET: Regular. Good appetite.  BOWEL/BLADDER: Continent. Had bm.   SKIN: Pale. Scattered scabs and bruises to arms. Redness blanchable on coccyx. Patient refused mepilex on coccyx.  D/C DATE: Waiting on group home or guardianship  OTHER IMPORTANT INFO: Brother granted 60 day emergency guardianship. PT/OT/Neuropsychology following.  Neuro psych testing completed 3/2

## 2022-03-04 NOTE — PROGRESS NOTES
"[facility]    Medicine Progress Note - Hospitalist Service    Date of Admission:  2022    Assessment & Plan                   Miranda Dover is a 51 year old female with hx of Down syndrome who was admitted on 2022 for acute hypoxic respiratory failure due to COVID-19 pneumonia, which has resolved. Hospitalization has been prolonged due to need to establish guardianship following the deaths of her parents during this hospitalization.    Down syndrome  Developmental delay  Failure to thrive  * Patient's primary caregivers were her parents who were also hospitalized on  for COVID. Patient's father  on  and her mother  on .   * Patient became intermittently anxious and agitated, sometimes yelling/screaming. She did not know why she was here and had repeatedly asked \"what's wrong with me?\" Following admission, she became increasingly withdrawn, refusing medications and meals at times.   * Palliative Care was initially consulted for emotional support, but this was not particularly helpful for patient.  * Discharge planning has been complicated by need for guardianship and payor source for placement.  * Patient's brother, Maxi, was granted 60 days of emergency guardianship on 2/15.   * Completed psychologic testing on 3/2/22 to assess current cognitive function and adaptive abilities.  - Next hearing scheduled on 2022 for permanent guardianship.  - Plan to transition patient to group home; patient's brother involved in this process.  - SW following closely for placement.    Severe malnutrition  Underweight with cachexia  * BMI ~13 on early this hospitalization with poor PO intake. SLP was consulted for possible dysphagia, and no evidence of dysphagia was noted.  * Patient was treated with IV fluids -. She later lost IV access, and refused replacement.  * Overall, pt noted to be very picky with foods.   * During this hospitalization, has had frequent " complaints of abdominal pain (per pt's brother, this is chronic dating back years), see below.  * On 2/20, discussed with pt's brother; overall, felt that oral intake was probably acceptable at this point for discharge and did not feel we needed to pursue enteral feedings/g-tube at this point; desired workup for etiology of abdominal pain which he felt was contributing to her decreased PO intake.   * Workup for abdominal pain started as below was negative (suspected functional and constipation).  * BMI up to 14.7 2/27.  - Continue regular diet with Beneprotein with meals. Dietary preferences were previously discussed with the patient, and she likes turkey and white bread sandwiches.   - Continue to encourage PO fluids.  - Workup of abdominal pain as below.    Chronic abdominal pain, suspect functional and component of constipation  * Pt with chronic abdominal pain for years PTA; felt to limit her oral intake. US from 8/2019 did not show definite significant abnormality. CT 11/2019 showed prominence/thickening of the pylorus of the stomach is nonspecific, differential diagnosis would include inflammatory etiologies as well as underlying lesion, consider GI consultation; small amount of pelvic free fluid and mild diffuse anasarca, nonspecific. Otherwise, unclear of prior workup.  * As above, pt with frequent c/o abdominal pain this hospitalization.   * She was initiated on a bowel regimen during this hospitalization for constipation with good response.  * Started on pantoprazole this hospitalization for dyspepsia related to K supplements.  * Per d/w pt's brother 2/20, he wanted further evaluation for possible treatable/reversible etiologies of pt's chronic abdominal pain. GI consulted 2/20. CT abdomen ordered 2/20, but pt refused IV placement, so delayed.   * CT abdomen/plevis 2/21 showed large amount of stool throughout the colon likely indicating constipation; no other abnormality to explain abdominal pain.  * EGD  2/22 showed mild appearance/texture changed mucosa in the esophageal at the GE junction; no pathology to explain her abdominal pain. Abdominal US 2/22 did not show acute abnormality.  - Continue pantoprazole daily.  - Continue bowel regimen - polyethylene glycol daily; senna-docusate BID; PRN bisacodyl suppository.    Hypokalemia due to poor PO intake  * Potassium low at times this hospitalization. Treated with replacement.  - Monitor potassium periodically.  - Continue PRN potassium replacement.    Thyroid nodule  Possible subclinical hypothyroidism  * Incidentally noted on admission chest CT. TSH was elevated to 15, but T4 was normal; likely due to severe malnutrition.  * Repeat TSH/FT4 ordered, but patient refused lab draw.  * Add-on TSH and free T4 to labs from 2/18 showed TSH elevated at 9.95 but FT4 at 1.00.  - Follow-up this outpatient.  - Repeat thyroid labs in 4-6 weeks.    Periodic hypotension, stable  * Baseline blood pressures 90s-100s systolic.  * Decreased to mid-80s systolic at times this hospitalization in the setting of minimal PO intake. Treated with IVF from 1/29-1/31.  - Monitor BP's.  - Consider IVF's if symptomatic.    Resolved conditions  Acute hypoxic respiratory failure due to COVID-19 pneumonia  COVID recovered  * Unvaccinated.  * Symptom onset 1/19/22.  * Tested positive for COVID on 1/22/22.   * Chest CT on arrival showed bilateral infiltrates.  * Initiated on dexamethasone and remdesivir on 1/22.  * Completed 5-day course of remdesivir on 1/26.  * Completed 10-day course of dexamethasone on 1/31.  * Stopped rivaroxaban 2/20.    E coli UTI  * Pan-sensitive E.coli noted on 2/8 urine culture. She was initiated on cefuroxime on 2/9 and completed a 7 day course on 2/15.    Chest pain  * Intermittent chest pain noted during this hospitalization. Patient refused serial troponins and echo.  * No recent complaints.  - Monitor clinically.    Leukopenia and thrombocytopenia, suspect due to viral  infection  * WBC ron 3.6k; PLT ron 119k - likely due to infection. Subsequently improved, and she has since refused labs.     Agitation, restlessness, hallucinations due to metabolic and infectious encephalopathy  * Resolved following treatment of UTI.       Diet: Regular Diet Adult  Snacks/Supplements Adult: Beneprotein; With Meals    DVT Prophylaxis: Pneumatic Compression Devices and Ambulate every shift  Zhu Catheter: Not present  Central Lines: None  Cardiac Monitoring: None  Code Status: Full Code      Disposition Plan   Expected Discharge: 03/29/2022, awaiting placement  Anticipated discharge location: inpatient rehabilitation facility    Delays:     Placement - Group Homes            The patient's care was discussed with the Bedside Nurse and Patient.    Kendrick Martinez MD  Hospitalist Service  [facility]  Securely message with the Vocera Web Console (learn more here)  Text page via [employer] Paging/Directory         Clinically Significant Risk Factors Present on Admission                    ______________________________________________________________________    Interval History   Miranda Dover was seen this morning. No complaints/concerns. Denies chest pain, shortness of breath, nausea, abdominal pain.    Data reviewed today: I reviewed all medications, new labs and imaging results over the last 24 hours. I personally reviewed no images or EKG's today.    Physical Exam   Vital Signs: Temp: 97.6  F (36.4  C) Temp src: Oral BP: 98/61 Pulse: 75   Resp: 18 SpO2: 97 % O2 Device: None (Room air)    Weight: 83 lbs 3.2 oz  Constitutional: awake, alert, cooperative, no apparent distress, laying in the hospital bed  Respiratory: clear to auscultation bilaterally, no crackles or wheezing  Cardiovascular: regular rate and rhythm, normal S1 and S2, no murmur noted  GI: normal bowel sounds, soft, non-distended, non-tender    Data   Medications     - MEDICATION INSTRUCTIONS -          melatonin  1 mg Oral At Bedtime     multivitamin w/minerals  1 tablet Oral Daily     pantoprazole  40 mg Oral QAM AC     polyethylene glycol  17 g Oral Daily     senna-docusate  1 tablet Oral BID     Vitamin D3  125 mcg Oral QPM

## 2022-03-04 NOTE — PLAN OF CARE
Goal Outcome Evaluation:    DATE & TIME: 03/4 6278-8767  Cognitive Concerns/ Orientation : Alert and disoriented to situation, forgetful, calm and cooperative  BEHAVIOR & AGGRESSION TOOL COLOR: Green   ABNL VS/O2:  VSS on RA  PAIN MANAGMENT: denies  DIET: Regular. Good appetite.  BOWEL/BLADDER: Continent. Had bm.   SKIN: Pale. Scattered scabs and bruises to arms. Redness blanchable on coccyx. Patient refused mepilex on coccyx.  D/C DATE: Waiting on group home or guardianship  OTHER IMPORTANT INFO: Brother granted 60 day emergency guardianship. PT/OT/Neuropsychology following.  Neuro psych testing completed 3/2        Plan of Care Reviewed With: patient

## 2022-03-04 NOTE — PROGRESS NOTES
CLINICAL NUTRITION SERVICES - REASSESSMENT NOTE      Recommendations Ordered by Registered Dietitian (RD):   Updated weight as able to assess trending    Future/Additional Recommendations:   Continue Regular Diet  Continue MVI+M  Continue pudding + beneprotein BID    Malnutrition:   (2/3)  % Weight Loss: Unable to evaluate   % Intake:  </= 50% for >/= 5 days (severe malnutrition) - improving   Subcutaneous Fat Loss:  Orbital region moderate depletion, Upper arm region moderate depletion and Thoracic region moderate depletion  Muscle Loss:  Temporal region moderate depletion, Clavicle bone region moderate depletion and Dorsal hand region moderate depletion  Fluid Retention:  None noted     Malnutrition Diagnosis: Severe malnutrition  In Context of:  Acute illness or injury  Chronic illness or disease       EVALUATION OF PROGRESS TOWARD GOALS   Diet:  Regular    Supplements: Vanilla pudding w/ beneprotein at breakfast and dinner meals     Intake/Tolerance: Per flowsheets, pt has been eating % of 2-3 meals daily over the past 3 days. Appears appetite and PO intake are improving - RNs charting good appetite over the past 3 days. Not consistently consuming the pudding and beneprotein mixture.     Spoke with pt at bedside this morning who stated she is feeling hungry and requesting breakfast of scrambled eggs with ham and a bagel which has been ordered and sent by writer.        ASSESSED NUTRITION NEEDS:  Dosing Weight 33.6 kg   Estimated Energy Needs: 9749-1391 kcals (35-40 Kcal/Kg)  Justification: repletion and underweight  Estimated Protein Needs: 50-70 grams protein (1.5-2 g pro/Kg)  Justification: repletion and hypercatabolism with acute illness      NEW FINDINGS:   - Labs: Reviewed. Last draw on 2/22.   - Meds: thera-vit-m (pt not always accepting), protonix, miralax, senna BID, vitamin D3 125 mcg/day   - GI: BM x 1-2 daily over the past 3 days   - Weight: No new weight since 2/14    Vitals:    01/22/22 1140  02/05/22 0729 02/14/22 1329   Weight: 33.6 kg (74 lb 1.2 oz) 43.8 kg (96 lb 9.6 oz) 37.7 kg (83 lb 3.2 oz)       Previous Goals:   Intake of >50% meals BID during review period   Evaluation: Met    Previous Nutrition Diagnosis:   Inadequate oral intake related to fluctuating appetite/interest in food, reported abdominal pain as evidenced by pt tolerates anywhere from % of meals ~2x daily for the past 2 weeks.   Evaluation: Improving      CURRENT NUTRITION DIAGNOSIS  Predicted inadequate nutrient intake (protein-calorie) related to improving appetite and PO intake to % 2-3 meals/day over the past 3 days but previously with poor and fluctuating intakes over admission, potential for decline in intake again pending LOS, medical course, menu fatigue, etc    INTERVENTIONS  Recommendations / Nutrition Prescription  Continue Regular Diet  Continue MVI+M  Continue pudding + beneprotein BID   Updated weight as able to assess trending     Implementation  Nutrition Education - PO encouragement at bedside     Goals  Intake of >/= 75% of meals BID at minimum or >/= 50% meals TID       MONITORING AND EVALUATION:  Progress towards goals will be monitored and evaluated per protocol and Practice Guidelines      Raina Morgan RD, LD

## 2022-03-05 PROCEDURE — 250N000013 HC RX MED GY IP 250 OP 250 PS 637: Performed by: INTERNAL MEDICINE

## 2022-03-05 PROCEDURE — 250N000013 HC RX MED GY IP 250 OP 250 PS 637: Performed by: HOSPITALIST

## 2022-03-05 PROCEDURE — 250N000013 HC RX MED GY IP 250 OP 250 PS 637: Performed by: REGISTERED NURSE

## 2022-03-05 PROCEDURE — 120N000001 HC R&B MED SURG/OB

## 2022-03-05 PROCEDURE — 99232 SBSQ HOSP IP/OBS MODERATE 35: CPT | Performed by: INTERNAL MEDICINE

## 2022-03-05 RX ADMIN — SENNOSIDES AND DOCUSATE SODIUM 1 TABLET: 8.6; 5 TABLET ORAL at 08:49

## 2022-03-05 RX ADMIN — Medication 1 MG: at 21:38

## 2022-03-05 RX ADMIN — Medication 125 MCG: at 21:38

## 2022-03-05 RX ADMIN — SENNOSIDES AND DOCUSATE SODIUM 1 TABLET: 8.6; 5 TABLET ORAL at 21:38

## 2022-03-05 RX ADMIN — PANTOPRAZOLE SODIUM 40 MG: 40 TABLET, DELAYED RELEASE ORAL at 08:49

## 2022-03-05 RX ADMIN — POLYETHYLENE GLYCOL 3350 17 G: 17 POWDER, FOR SOLUTION ORAL at 08:49

## 2022-03-05 RX ADMIN — MULTIPLE VITAMINS W/ MINERALS TAB 1 TABLET: TAB at 08:49

## 2022-03-05 ASSESSMENT — ACTIVITIES OF DAILY LIVING (ADL)
ADLS_ACUITY_SCORE: 15

## 2022-03-05 NOTE — PLAN OF CARE
DATE & TIME: 3/4/22 2061-9651   Cognitive Concerns/ Orientation : Alert, disoriented to situation at times   BEHAVIOR & AGGRESSION TOOL COLOR: Green   ABNL VS/O2: VSS on RA, BP soft  MOBILITY: SBA with GB  PAIN MANAGMENT: Denies ex occasional stomach discomfort   DIET: Regular  BOWEL/BLADDER: Continent, up to BR   SKIN: Scattered bruising. Trace edema to bilateral wrists and legs. Blanchable redness on coccyx, refusing mepliex.  D/C DATE: Discharge pending group home placement/guardianship. SW following  OTHER IMPORTANT INFO: Pt's brother has been granted 60 day emergency guardianship.

## 2022-03-05 NOTE — PLAN OF CARE
DATE & TIME: 3/4/22 1872-1488    Cognitive Concerns/ Orientation : Pt A/Ox3, disoriented to situation   BEHAVIOR & AGGRESSION TOOL COLOR: Green   ABNL VS/O2: VSS on RA, BP soft  MOBILITY: SBA with GB  PAIN MANAGMENT: Denies  DIET: Regular  BOWEL/BLADDER: Continent  SKIN: Scattered bruising. Trace edema to bilateral wrists and legs. Blanchable redness on coccyx, refusing mepliex.  D/C DATE: Discharge pending group home placement/guardianship  OTHER IMPORTANT INFO: Pt's brother has been granted 60 day emergency guardianship. Pt refused to ambulated in hallway with staff this shift but was willing to walk around in room.

## 2022-03-05 NOTE — PROGRESS NOTES
"[facility]    Medicine Progress Note - Hospitalist Service    Date of Admission:  2022    Assessment & Plan                   Miranda Dover is a 51 year old female with hx of Down syndrome who was admitted on 2022 for acute hypoxic respiratory failure due to COVID-19 pneumonia, which has resolved. Hospitalization has been prolonged due to need to establish guardianship following the deaths of her parents during this hospitalization.    Down syndrome  Developmental delay  Failure to thrive  * Patient's primary caregivers were her parents who were also hospitalized on  for COVID. Patient's father  on  and her mother  on .   * Patient became intermittently anxious and agitated, sometimes yelling/screaming. She did not know why she was here and had repeatedly asked \"what's wrong with me?\" Following admission, she became increasingly withdrawn, refusing medications and meals at times.   * Palliative Care was initially consulted for emotional support, but this was not particularly helpful for patient.  * Discharge planning has been complicated by need for guardianship and payor source for placement.  * Patient's brother, Maxi, was granted 60 days of emergency guardianship on 2/15.   * Completed psychologic testing on 3/2/22 to assess current cognitive function and adaptive abilities.  - Next hearing scheduled on 2022 for permanent guardianship.  - Plan to transition patient to group home; patient's brother involved in this process.  - SW following closely for placement.    Severe malnutrition  Underweight with cachexia  * BMI ~13 on early this hospitalization with poor PO intake. SLP was consulted for possible dysphagia, and no evidence of dysphagia was noted.  * Patient was treated with IV fluids -. She later lost IV access, and refused replacement.  * Overall, pt noted to be very picky with foods.   * During this hospitalization, has had frequent " complaints of abdominal pain (per pt's brother, this is chronic dating back years), see below.  * On 2/20, discussed with pt's brother; overall, felt that oral intake was probably acceptable at this point for discharge and did not feel we needed to pursue enteral feedings/g-tube at this point; desired workup for etiology of abdominal pain which he felt was contributing to her decreased PO intake.   * Workup for abdominal pain started as below was negative (suspected functional and constipation).  * BMI up to 14.7 2/27.  - Continue regular diet with Beneprotein with meals. Dietary preferences were previously discussed with the patient, and she likes turkey and white bread sandwiches.   - Continue to encourage PO fluids.  - Workup of abdominal pain as below.    Chronic abdominal pain, suspect functional and component of constipation  * Pt with chronic abdominal pain for years PTA; felt to limit her oral intake. US from 8/2019 did not show definite significant abnormality. CT 11/2019 showed prominence/thickening of the pylorus of the stomach is nonspecific, differential diagnosis would include inflammatory etiologies as well as underlying lesion, consider GI consultation; small amount of pelvic free fluid and mild diffuse anasarca, nonspecific. Otherwise, unclear of prior workup.  * As above, pt with frequent c/o abdominal pain this hospitalization.   * She was initiated on a bowel regimen during this hospitalization for constipation with good response.  * Started on pantoprazole this hospitalization for dyspepsia related to K supplements.  * Per d/w pt's brother 2/20, he wanted further evaluation for possible treatable/reversible etiologies of pt's chronic abdominal pain. GI consulted 2/20. CT abdomen ordered 2/20, but pt refused IV placement, so delayed.   * CT abdomen/plevis 2/21 showed large amount of stool throughout the colon likely indicating constipation; no other abnormality to explain abdominal pain.  * EGD  2/22 showed mild appearance/texture changed mucosa in the esophageal at the GE junction; no pathology to explain her abdominal pain. Abdominal US 2/22 did not show acute abnormality.  - Continue pantoprazole daily.  - Continue bowel regimen - polyethylene glycol daily; senna-docusate BID; PRN bisacodyl suppository.    Hypokalemia due to poor PO intake  * Potassium low at times this hospitalization. Treated with replacement.  - Monitor potassium periodically.  - Continue PRN potassium replacement.    Thyroid nodule  Possible subclinical hypothyroidism  * Incidentally noted on admission chest CT. TSH was elevated to 15, but T4 was normal; likely due to severe malnutrition.  * Repeat TSH/FT4 ordered, but patient refused lab draw.  * Add-on TSH and free T4 to labs from 2/18 showed TSH elevated at 9.95 but FT4 at 1.00.  - Follow-up this outpatient.  - Repeat thyroid labs in 4-6 weeks.    Periodic hypotension, stable  * Baseline blood pressures 90s-100s systolic.  * Decreased to mid-80s systolic at times this hospitalization in the setting of minimal PO intake. Treated with IVF from 1/29-1/31.  - Monitor BP's.  - blood pressure remain stable and at her baseline in , she is asymptomatic and that her baseline normal blood pressure.     Resolved conditions  Acute hypoxic respiratory failure due to COVID-19 pneumonia  COVID recovered  * Unvaccinated.  * Symptom onset 1/19/22.  * Tested positive for COVID on 1/22/22.   * Chest CT on arrival showed bilateral infiltrates.  * Initiated on dexamethasone and remdesivir on 1/22.  * Completed 5-day course of remdesivir on 1/26.  * Completed 10-day course of dexamethasone on 1/31.  * Stopped rivaroxaban 2/20.    E coli UTI  * Pan-sensitive E.coli noted on 2/8 urine culture. She was initiated on cefuroxime on 2/9 and completed a 7 day course on 2/15.    Chest pain  * Intermittent chest pain noted during this hospitalization. Patient refused serial troponins and echo.  * No recent  complaints.  - Monitor clinically.    Leukopenia and thrombocytopenia, suspect due to viral infection  * WBC ron 3.6k; PLT ron 119k - likely due to infection. Subsequently improved, and she has since refused labs.     Agitation, restlessness, hallucinations due to metabolic and infectious encephalopathy  * Resolved following treatment of UTI.       Diet: Regular Diet Adult  Snacks/Supplements Adult: Beneprotein; With Meals    DVT Prophylaxis: Pneumatic Compression Devices and Ambulate every shift  Zhu Catheter: Not present  Central Lines: None  Cardiac Monitoring: None  Code Status: Full Code      Disposition Plan   Expected Discharge: 03/29/2022, awaiting placement  Anticipated discharge location: inpatient rehabilitation facility    Delays:     Placement - Group Homes            The patient's care was discussed with the Bedside Nurse and Patient.    Vic Thomas MD  Hospitalist Service  [facility]  Securely message with the Vocera Web Console (learn more here)  Text page via [employer] Paging/Directory         Clinically Significant Risk Factors Present on Admission                    ______________________________________________________________________    Interval History   Patient seen and evaluated in her room, sitting in chair and coloring, offer no complaints, in good mood.   Having regular BM's      No other significant event overnight.     Data reviewed today: I reviewed all medications, new labs and imaging results over the last 24 hours. I personally reviewed no images or EKG's today.    Physical Exam   Vital Signs: Temp: (!) 96  F (35.6  C) Temp src: Oral BP: 104/70 Pulse: 70   Resp: 18 SpO2: 97 % O2 Device: None (Room air)    Weight: 83 lbs 3.2 oz  Constitutional: awake, alert, cooperative, no apparent distress, laying in the hospital bed  Respiratory: clear to auscultation bilaterally, no crackles or wheezing  Cardiovascular: regular rate and rhythm, normal S1 and S2, no  murmur noted  GI: normal bowel sounds, soft, non-distended, non-tender    Data   Medications     - MEDICATION INSTRUCTIONS -         melatonin  1 mg Oral At Bedtime     multivitamin w/minerals  1 tablet Oral Daily     pantoprazole  40 mg Oral QAM AC     polyethylene glycol  17 g Oral Daily     senna-docusate  1 tablet Oral BID     Vitamin D3  125 mcg Oral QPM

## 2022-03-05 NOTE — PLAN OF CARE
DATE & TIME: 3/4/22 6146-9219                         Cognitive Concerns/ Orientation : A&O x 3, disoriented to situation; calm and cooperative.    BEHAVIOR & AGGRESSION TOOL COLOR: Green     ABNL VS/O2: BP soft (92/57), other VSS.    MOBILITY: Up SBA with GB   PAIN MANAGMENT: Denies pain   DIET: Regular diet, good appetite.   BOWEL/BLADDER: Continent  ABNL LAB/BG: n/a  DRAIN/DEVICES: None   TELEMETRY RHYTHM: n/a  SKIN: Bruising to left upper arm. Blanchable redness on coccyx- refused mepilex   TESTS/PROCEDURES: n/a   D/C DATE: Pending group home placement/guardianship   Discharge Barriers: Placement   OTHER IMPORTANT INFO:  Brother granted 60 day emergency guardianship.    Goal Outcome Evaluation:

## 2022-03-06 PROCEDURE — 120N000001 HC R&B MED SURG/OB

## 2022-03-06 PROCEDURE — 250N000013 HC RX MED GY IP 250 OP 250 PS 637: Performed by: INTERNAL MEDICINE

## 2022-03-06 PROCEDURE — 99232 SBSQ HOSP IP/OBS MODERATE 35: CPT | Performed by: INTERNAL MEDICINE

## 2022-03-06 PROCEDURE — 99207 PR CDG-MDM COMPONENT: MEETS MODERATE - UP CODED: CPT | Performed by: INTERNAL MEDICINE

## 2022-03-06 PROCEDURE — 250N000013 HC RX MED GY IP 250 OP 250 PS 637: Performed by: HOSPITALIST

## 2022-03-06 PROCEDURE — 250N000013 HC RX MED GY IP 250 OP 250 PS 637: Performed by: REGISTERED NURSE

## 2022-03-06 RX ADMIN — POLYETHYLENE GLYCOL 3350 17 G: 17 POWDER, FOR SOLUTION ORAL at 08:44

## 2022-03-06 RX ADMIN — Medication 1 MG: at 21:43

## 2022-03-06 RX ADMIN — Medication 125 MCG: at 21:43

## 2022-03-06 RX ADMIN — SENNOSIDES AND DOCUSATE SODIUM 1 TABLET: 8.6; 5 TABLET ORAL at 21:43

## 2022-03-06 RX ADMIN — PANTOPRAZOLE SODIUM 40 MG: 40 TABLET, DELAYED RELEASE ORAL at 08:44

## 2022-03-06 RX ADMIN — SENNOSIDES AND DOCUSATE SODIUM 1 TABLET: 8.6; 5 TABLET ORAL at 08:44

## 2022-03-06 RX ADMIN — MULTIPLE VITAMINS W/ MINERALS TAB 1 TABLET: TAB at 08:44

## 2022-03-06 ASSESSMENT — ACTIVITIES OF DAILY LIVING (ADL)
ADLS_ACUITY_SCORE: 15

## 2022-03-06 NOTE — PLAN OF CARE
Goal Outcome Evaluation:    DATE & TIME: 3/5/22   Cognitive Concerns/ Orientation: A&O to self and place, able to recall staff names, calm and coop, asks many questions. Mentation appears baseline, pt's brother says she was almost nonverbal at home.   BEHAVIOR & AGGRESSION TOOL COLOR: Green   ABNL VS/O2: VSS on RA  MOBILITY: SBA per pt request. Needs enc/re assurance to amb in [place].   PAIN MANAGMENT: Denies  DIET: Regular, needs enc/gentle reminders to eat/drink. Pt able to verbalize meal choices.   BOWEL/BLADDER: Continent, up to BR. BM x1.  SKIN: Dry. Scattered bruising. Trace edema to bilateral wrists and legs. Blanchable redness on coccyx, refusing mepliex.  D/C DATE: Discharge pending group home placement/guardianship. SW following.  Pt's brother has been granted 60 day emergency guardianship. Court hearing on 4/1.  OTHER IMPORTANT INFO: Sat in [place] interacting with staff, coloring/drawing. Agreed to take a bath, sitting on shower chair, refused hair to be washed. Occ states she is unable to lift her left leg, however was able to step into tub. Pt asked when brother will visit next, he states he may visit tomorrow afternoon, pt aware.

## 2022-03-06 NOTE — PLAN OF CARE
Goal Outcome Evaluation:    Plan of Care Reviewed With: patient     Overall Patient Progress: no change       DATE & TIME: 3/5/22 6553-4636  Cognitive Concerns/ Orientation: Alert, disoriented to situation only at times. Able to recall staff names. Very talkative during evening. Mentation appears baseline, pt's brother says she was almost nonverbal at home. Slept well overnight.  BEHAVIOR & AGGRESSION TOOL COLOR: Green   ABNL VS/O2: VSS on RA  MOBILITY: SBA per pt request. Needs enc/re assurance to amb in [place].   PAIN MANAGMENT: Denies  DIET: Regular, needs enc/gentle reminders to eat/drink. Pt able to verbalize meal choices.   BOWEL/BLADDER: Continent, up to BR   SKIN: Dry. Scattered bruising. Trace edema to bilateral wrists and legs. Blanchable redness on coccyx, refusing mepliex.  D/C DATE: Discharge pending group home placement/guardianship. SW following  OTHER IMPORTANT INFO: Pt's brother has been granted 60 day emergency guardianship. Court hearing on 4/1.

## 2022-03-06 NOTE — PROGRESS NOTES
"[facility]    Medicine Progress Note - Hospitalist Service    Date of Admission:  2022    Assessment & Plan                   Miranda Dover is a 51 year old female with hx of Down syndrome who was admitted on 2022 for acute hypoxic respiratory failure due to COVID-19 pneumonia, which has resolved. Hospitalization has been prolonged due to need to establish guardianship following the deaths of her parents during this hospitalization.    Down syndrome  Developmental delay  Failure to thrive  * Patient's primary caregivers were her parents who were also hospitalized on  for COVID. Patient's father  on  and her mother  on .   * Patient became intermittently anxious and agitated, sometimes yelling/screaming. She did not know why she was here and had repeatedly asked \"what's wrong with me?\" Following admission, she became increasingly withdrawn, refusing medications and meals at times.   * Palliative Care was initially consulted for emotional support, but this was not particularly helpful for patient.  * Discharge planning has been complicated by need for guardianship and payor source for placement.  * Patient's brother, Maxi, was granted 60 days of emergency guardianship on 2/15.   * Completed psychologic testing on 3/2/22 to assess current cognitive function and adaptive abilities.  - Next hearing scheduled on 2022 for permanent guardianship.  - Plan to transition patient to group home; patient's brother involved in this process.  - SW following closely for placement.    Severe malnutrition  Underweight with cachexia  * BMI ~13 on early this hospitalization with poor PO intake. SLP was consulted for possible dysphagia, and no evidence of dysphagia was noted.  * Patient was treated with IV fluids -. She later lost IV access, and refused replacement.  * Overall, pt noted to be very picky with foods.   * During this hospitalization, has had frequent " complaints of abdominal pain (per pt's brother, this is chronic dating back years), see below.  * On 2/20, discussed with pt's brother; overall, felt that oral intake was probably acceptable at this point for discharge and did not feel we needed to pursue enteral feedings/g-tube at this point; desired workup for etiology of abdominal pain which he felt was contributing to her decreased PO intake.   * Workup for abdominal pain started as below was negative (suspected functional and constipation).  * BMI up to 14.7 2/27.  - Continue regular diet with Beneprotein with meals. Dietary preferences were previously discussed with the patient, and she likes turkey and white bread sandwiches.   - Continue to encourage PO fluids.  - Workup of abdominal pain as below.    Chronic abdominal pain, suspect functional and component of constipation  * Pt with chronic abdominal pain for years PTA; felt to limit her oral intake. US from 8/2019 did not show definite significant abnormality. CT 11/2019 showed prominence/thickening of the pylorus of the stomach is nonspecific, differential diagnosis would include inflammatory etiologies as well as underlying lesion, consider GI consultation; small amount of pelvic free fluid and mild diffuse anasarca, nonspecific. Otherwise, unclear of prior workup.  * As above, pt with frequent c/o abdominal pain this hospitalization.   * She was initiated on a bowel regimen during this hospitalization for constipation with good response.  * Started on pantoprazole this hospitalization for dyspepsia related to K supplements.  * Per d/w pt's brother 2/20, he wanted further evaluation for possible treatable/reversible etiologies of pt's chronic abdominal pain. GI consulted 2/20. CT abdomen ordered 2/20, but pt refused IV placement, so delayed.   * CT abdomen/plevis 2/21 showed large amount of stool throughout the colon likely indicating constipation; no other abnormality to explain abdominal pain.  * EGD  2/22 showed mild appearance/texture changed mucosa in the esophageal at the GE junction; no pathology to explain her abdominal pain. Abdominal US 2/22 did not show acute abnormality.  - Continue pantoprazole daily.  - Continue bowel regimen - polyethylene glycol daily; senna-docusate BID; PRN bisacodyl suppository.    Hypokalemia due to poor PO intake  * Potassium low at times this hospitalization. Treated with replacement.  - Monitor potassium periodically.  - Continue PRN potassium replacement.    Thyroid nodule  Possible subclinical hypothyroidism  * Incidentally noted on admission chest CT. TSH was elevated to 15, but T4 was normal; likely due to severe malnutrition.  * Repeat TSH/FT4 ordered, but patient refused lab draw.  * Add-on TSH and free T4 to labs from 2/18 showed TSH elevated at 9.95 but FT4 at 1.00.  - Follow-up this outpatient.  - Repeat thyroid labs in 4-6 weeks.    Periodic hypotension, stable  * Baseline blood pressures 90s-100s systolic.  * Decreased to mid-80s systolic at times this hospitalization in the setting of minimal PO intake. Treated with IVF from 1/29-1/31.  - Monitor BP's.  - blood pressure remain stable and at her baseline in , she is asymptomatic and that her baseline normal blood pressure.     Resolved conditions  Acute hypoxic respiratory failure due to COVID-19 pneumonia  COVID recovered  * Unvaccinated.  * Symptom onset 1/19/22.  * Tested positive for COVID on 1/22/22.   * Chest CT on arrival showed bilateral infiltrates.  * Initiated on dexamethasone and remdesivir on 1/22.  * Completed 5-day course of remdesivir on 1/26.  * Completed 10-day course of dexamethasone on 1/31.  * Stopped rivaroxaban 2/20.    E coli UTI  * Pan-sensitive E.coli noted on 2/8 urine culture. She was initiated on cefuroxime on 2/9 and completed a 7 day course on 2/15.    Chest pain  * Intermittent chest pain noted during this hospitalization. Patient refused serial troponins and echo.  * No recent  complaints.  - Monitor clinically.    Leukopenia and thrombocytopenia, suspect due to viral infection  * WBC ron 3.6k; PLT ron 119k - likely due to infection. Subsequently improved, and she has since refused labs.     Agitation, restlessness, hallucinations due to metabolic and infectious encephalopathy  * Resolved following treatment of UTI.       Diet: Regular Diet Adult  Snacks/Supplements Adult: Beneprotein; With Meals    DVT Prophylaxis: Pneumatic Compression Devices and Ambulate every shift  Zhu Catheter: Not present  Central Lines: None  Cardiac Monitoring: None  Code Status: Full Code      Disposition Plan   Expected Discharge: 03/29/2022, awaiting placement  Anticipated discharge location: inpatient rehabilitation facility    Delays:     Placement - Group Homes            The patient's care was discussed with the Bedside Nurse and Patient.    Vic Thomas MD  Hospitalist Service  [facility]  Securely message with the Vocera Web Console (learn more here)  Text page via "[employer] Paging/Directory         Clinically Significant Risk Factors Present on Admission                    ______________________________________________________________________    Interval History   No active complaints.     No other significant event overnight.     Data reviewed today: I reviewed all medications, new labs and imaging results over the last 24 hours. I personally reviewed no images or EKG's today.    Physical Exam   Vital Signs: Temp: 97.9  F (36.6  C) Temp src: Oral BP: 96/67 Pulse: 59   Resp: 18 SpO2: 99 % O2 Device: None (Room air)    Weight: 83 lbs 3.2 oz  Constitutional: awake, alert, cooperative, no apparent distress, laying in the hospital bed  Respiratory: clear to auscultation bilaterally, no crackles or wheezing  Cardiovascular: regular rate and rhythm, normal S1 and S2, no murmur noted  GI: normal bowel sounds, soft, non-distended, non-tender    Data   Medications     - MEDICATION  INSTRUCTIONS -         melatonin  1 mg Oral At Bedtime     multivitamin w/minerals  1 tablet Oral Daily     pantoprazole  40 mg Oral QAM AC     polyethylene glycol  17 g Oral Daily     senna-docusate  1 tablet Oral BID     Vitamin D3  125 mcg Oral QPM

## 2022-03-07 PROCEDURE — 120N000001 HC R&B MED SURG/OB

## 2022-03-07 PROCEDURE — 250N000013 HC RX MED GY IP 250 OP 250 PS 637: Performed by: INTERNAL MEDICINE

## 2022-03-07 PROCEDURE — 250N000013 HC RX MED GY IP 250 OP 250 PS 637: Performed by: REGISTERED NURSE

## 2022-03-07 PROCEDURE — 99232 SBSQ HOSP IP/OBS MODERATE 35: CPT | Performed by: INTERNAL MEDICINE

## 2022-03-07 PROCEDURE — 250N000013 HC RX MED GY IP 250 OP 250 PS 637: Performed by: HOSPITALIST

## 2022-03-07 RX ADMIN — POLYETHYLENE GLYCOL 3350 17 G: 17 POWDER, FOR SOLUTION ORAL at 09:01

## 2022-03-07 RX ADMIN — SENNOSIDES AND DOCUSATE SODIUM 1 TABLET: 8.6; 5 TABLET ORAL at 21:27

## 2022-03-07 RX ADMIN — Medication 1 MG: at 21:27

## 2022-03-07 RX ADMIN — Medication 125 MCG: at 21:27

## 2022-03-07 RX ADMIN — MULTIPLE VITAMINS W/ MINERALS TAB 1 TABLET: TAB at 09:01

## 2022-03-07 RX ADMIN — PANTOPRAZOLE SODIUM 40 MG: 40 TABLET, DELAYED RELEASE ORAL at 09:00

## 2022-03-07 RX ADMIN — SENNOSIDES AND DOCUSATE SODIUM 1 TABLET: 8.6; 5 TABLET ORAL at 09:01

## 2022-03-07 ASSESSMENT — ACTIVITIES OF DAILY LIVING (ADL)
ADLS_ACUITY_SCORE: 16
ADLS_ACUITY_SCORE: 15
ADLS_ACUITY_SCORE: 16
ADLS_ACUITY_SCORE: 15
ADLS_ACUITY_SCORE: 16
ADLS_ACUITY_SCORE: 16
ADLS_ACUITY_SCORE: 15
ADLS_ACUITY_SCORE: 16
ADLS_ACUITY_SCORE: 15
ADLS_ACUITY_SCORE: 15

## 2022-03-07 NOTE — PLAN OF CARE
"Goal Outcome Evaluation:    Plan of Care Reviewed With: patient     Overall Patient Progress: no change       DATE & TIME: 3/6/22 9729-9608  Cognitive Concerns/ Orientation: Mentation appears baseline, consistent. Able to recall staff names, make food selections, color, draw, write, etc. Occ whispers to her \"tiny voice\" and communicates to her finger.   BEHAVIOR & AGGRESSION TOOL COLOR: Green   ABNL VS/O2: VSS on RA  MOBILITY: SBA per pt request. Steady gait. Needs encouragement to ambulate  PAIN MANAGMENT: Denies  DIET: Regular, needs enc/gentle reminders to eat/drink, does not ask for food/meals. Pt able to verbalize meal choices.   BOWEL/BLADDER: Continent, up to BR  SKIN: Dry. Scattered bruising. Trace edema to bilateral wrists and legs. Blanchable redness and dry on coccyx/buttocks, refusing mepliex.  D/C DATE: Discharge pending group home placement/guardianship. SW following.  DISCHARGE BARRIERS: Pt's brother has been granted 60 day emergency guardianship (until 4/15/22). Court hearing on 4/1.  OTHER IMPORTANT INFO: Pt really wanting to see her brother, stating he \"never visits, and I miss him. I don't understand why he doesn't come here to see me.\" Encouraged regular wake/sleep pattern, slept well tonight   "

## 2022-03-07 NOTE — PLAN OF CARE
"Goal Outcome Evaluation:    DATE & TIME: 3/6/22   Cognitive Concerns/ Orientation: Mentation appears baseline, consistent. Able to recall staff names, make food selections, color, draw, write, etc. Occ whispers to her \"tiny voice\".   BEHAVIOR & AGGRESSION TOOL COLOR: Green   ABNL VS/O2: VSS on RA  MOBILITY: SBA per pt request, occ up ind in room. Occ needs enc/re assurance to amb in [place].   PAIN MANAGMENT: Denies  DIET: Regular, needs enc/gentle reminders to eat/drink, does not ask for food/meals. Pt able to verbalize meal choices.   BOWEL/BLADDER: Continent, up to BR. Voiding adeq. BM x1.   SKIN: Dry. Scattered bruising. Trace edema to bilateral wrists and legs. Blanchable redness on coccyx/buttocks, refusing mepliex.  D/C DATE: Discharge pending group home placement/guardianship. SW following.  DISCHARGE BARRIERS: Pt's brother has been granted 60 day emergency guardianship. Court hearing on 4/1.  Other important info: Attempted to get pt into a routine, enc pt to change into day clothing and out of pjs, pt declined. Pt can be habitual at times. Agrees to take a \"tub\" and wash hair on Tuesday. Tearful, misses her brother, spoke to him on the phone.       "

## 2022-03-07 NOTE — PLAN OF CARE
"DATE & TIME: 3/7/2022 9101-1289  Cognitive Concerns/ Orientation: A&Ox3, disoriented to situation. Able to recall staff names, make food selections, color, draw, write, etc. Occ whispers to her \"tiny voice\" and communicates to her finger.  BEHAVIOR & AGGRESSION TOOL COLOR: Green   ABNL VS/O2: BP soft 90's systolic. Other VSS on RA  MOBILITY: SBA w/GB and walker, ambulated in [place] x1. Needs encouragement for activity, likes to sit in chair most of day. Sat in [place] x1  PAIN MANAGMENT: C/o left wrist pain early this AM, but then went away per patient   DIET: Regular, needs enc/gentle reminders to eat/drink, does not ask for food/meals. Pt able to verbalize meal choices, assisted with feeding and patient will eat more.   BOWEL/BLADDER: Incontinent at times otherwise up to BR, no BM today  SKIN: Dry. Scattered bruising. Trace edema to bilateral wrists and legs. Blanchable redness and dry on coccyx/buttocks, refusing mepliex.  D/C DATE: Discharge pending group home placement/guardianship. SW following.  DISCHARGE BARRIERS: Pt's brother has been granted 60 day emergency guardianship (until 4/15/22). Court hearing on 4/1.  OTHER IMPORTANT INFO: Encouraged regular wake/sleep pattern. Likes to color. Declined shower today, stated \"I plan to do it tomorrow\"      "

## 2022-03-07 NOTE — PROGRESS NOTES
"[facility]    Medicine Progress Note - Hospitalist Service    Date of Admission:  2022    Assessment & Plan                   Miranda Dover is a 51 year old female with hx of Down syndrome who was admitted on 2022 for acute hypoxic respiratory failure due to COVID-19 pneumonia, which has resolved. Hospitalization has been prolonged due to need to establish guardianship following the deaths of her parents during this hospitalization and now awaiting placement.         Down syndrome  Developmental delay  Failure to thrive  * Patient's primary caregivers were her parents who were also hospitalized on  for COVID. Patient's father  on  and her mother  on .   * Patient became intermittently anxious and agitated, sometimes yelling/screaming. She did not know why she was here and had repeatedly asked \"what's wrong with me?\" Following admission, she became increasingly withdrawn, refusing medications and meals at times.   * Palliative Care was initially consulted for emotional support, but this was not particularly helpful for patient.  * Discharge planning has been complicated by need for guardianship and payor source for placement.  * Patient's brother, Maxi, was granted 60 days of emergency guardianship on 2/15.   * Completed psychologic testing on 3/2/22 to assess current cognitive function and adaptive abilities.  - Next hearing scheduled on 2022 for permanent guardianship.  - Plan to transition patient to group home; patient's brother involved in this process.  - SW following closely for placement.    Severe malnutrition  Underweight with cachexia  * BMI ~13 on early this hospitalization with poor PO intake. SLP was consulted for possible dysphagia, and no evidence of dysphagia was noted.  * Patient was treated with IV fluids -. She later lost IV access, and refused replacement.  * Overall, pt noted to be very picky with foods.   * During this " hospitalization, has had frequent complaints of abdominal pain (per pt's brother, this is chronic dating back years), see below.  * On 2/20, discussed with pt's brother; overall, felt that oral intake was probably acceptable at this point for discharge and did not feel we needed to pursue enteral feedings/g-tube at this point; desired workup for etiology of abdominal pain which he felt was contributing to her decreased PO intake.   * Workup for abdominal pain started as below was negative (suspected functional and constipation).  * BMI up to 14.7 2/27.  - Continue regular diet with Beneprotein with meals. Dietary preferences were previously discussed with the patient, and she likes turkey and white bread sandwiches.   - Continue to encourage PO fluids.  - Workup of abdominal pain as below.    Chronic abdominal pain, suspect functional and component of constipation  * Pt with chronic abdominal pain for years PTA; felt to limit her oral intake. US from 8/2019 did not show definite significant abnormality. CT 11/2019 showed prominence/thickening of the pylorus of the stomach is nonspecific, differential diagnosis would include inflammatory etiologies as well as underlying lesion, consider GI consultation; small amount of pelvic free fluid and mild diffuse anasarca, nonspecific. Otherwise, unclear of prior workup.  * As above, pt with frequent c/o abdominal pain this hospitalization.   * She was initiated on a bowel regimen during this hospitalization for constipation with good response.  * Started on pantoprazole this hospitalization for dyspepsia related to K supplements.  * Per d/w pt's brother 2/20, he wanted further evaluation for possible treatable/reversible etiologies of pt's chronic abdominal pain. GI consulted 2/20. CT abdomen ordered 2/20, but pt refused IV placement, so delayed.   * CT abdomen/plevis 2/21 showed large amount of stool throughout the colon likely indicating constipation; no other abnormality  to explain abdominal pain.  * EGD 2/22 showed mild appearance/texture changed mucosa in the esophageal at the GE junction; no pathology to explain her abdominal pain. Abdominal US 2/22 did not show acute abnormality.  - Continue pantoprazole daily.  - Continue bowel regimen - polyethylene glycol daily; senna-docusate BID; PRN bisacodyl suppository.  Having regular BM's, no abdominal pain at this time.     Hypokalemia due to poor PO intake  * Potassium low at times this hospitalization. Treated with replacement.  - Monitor potassium periodically.  - Continue PRN potassium replacement.    Thyroid nodule  Possible subclinical hypothyroidism  * Incidentally noted on admission chest CT. TSH was elevated to 15, but T4 was normal; likely due to severe malnutrition.  * Repeat TSH/FT4 ordered, but patient refused lab draw.  * Add-on TSH and free T4 to labs from 2/18 showed TSH elevated at 9.95 but FT4 at 1.00.  - Follow-up this outpatient.  - Repeat thyroid labs in 4-6 weeks.    Periodic hypotension, stable  * Baseline blood pressures 90s-100s systolic.  * Decreased to mid-80s systolic at times this hospitalization in the setting of minimal PO intake. Treated with IVF from 1/29-1/31.  - Monitor BP's.  - blood pressure remain stable and at her baseline in , she is asymptomatic and that her baseline normal blood pressure.     Resolved conditions  Acute hypoxic respiratory failure due to COVID-19 pneumonia  COVID recovered  * Unvaccinated.  * Symptom onset 1/19/22.  * Tested positive for COVID on 1/22/22.   * Chest CT on arrival showed bilateral infiltrates.  * Initiated on dexamethasone and remdesivir on 1/22.  * Completed 5-day course of remdesivir on 1/26.  * Completed 10-day course of dexamethasone on 1/31.  * Stopped rivaroxaban 2/20.    E coli UTI  * Pan-sensitive E.coli noted on 2/8 urine culture. She was initiated on cefuroxime on 2/9 and completed a 7 day course on 2/15.    Chest pain  * Intermittent chest pain  noted during this hospitalization. Patient refused serial troponins and echo.  * No recent complaints.  - Monitor clinically.    Leukopenia and thrombocytopenia, suspect due to viral infection  * WBC ron 3.6k; PLT ron 119k - likely due to infection. Subsequently improved, and she has since refused labs.     Agitation, restlessness, hallucinations due to metabolic and infectious encephalopathy  * Resolved following treatment of UTI.    Discussed with  today, awaiting placement, patient does have a Guardian now.        Diet: Regular Diet Adult  Snacks/Supplements Adult: Beneprotein; With Meals    DVT Prophylaxis: Pneumatic Compression Devices and Ambulate every shift  Zhu Catheter: Not present  Central Lines: None  Cardiac Monitoring: None  Code Status: Full Code      Disposition Plan   Expected Discharge: 03/29/2022, awaiting placement  Anticipated discharge location: inpatient rehabilitation facility    Delays:     Placement - Group Homes            The patient's care was discussed with the Bedside Nurse and Patient.    Vic Thomas MD  Hospitalist Service  [facility]  Securely message with the Vocera Web Console (learn more here)  Text page via [employer] Paging/Directory         Clinically Significant Risk Factors Present on Admission                    ______________________________________________________________________    Interval History   Patient remain in her usual state, sitting and coloring, offer no complaints.         No other significant event overnight.     Data reviewed today: I reviewed all medications, new labs and imaging results over the last 24 hours. I personally reviewed no images or EKG's today.    Physical Exam   Vital Signs: Temp: 98.2  F (36.8  C) Temp src: Oral BP: 91/62 Pulse: 75   Resp: 18 SpO2: 97 % O2 Device: None (Room air)    Weight: 83 lbs 3.2 oz  Constitutional: awake, alert, cooperative, no apparent distress, laying in the hospital  bed  Respiratory: clear to auscultation bilaterally, no crackles or wheezing  Cardiovascular: regular rate and rhythm, normal S1 and S2, no murmur noted  GI: normal bowel sounds, soft, non-distended, non-tender    Data   Medications     - MEDICATION INSTRUCTIONS -         melatonin  1 mg Oral At Bedtime     multivitamin w/minerals  1 tablet Oral Daily     pantoprazole  40 mg Oral QAM AC     polyethylene glycol  17 g Oral Daily     senna-docusate  1 tablet Oral BID     Vitamin D3  125 mcg Oral QPM

## 2022-03-08 PROCEDURE — 99232 SBSQ HOSP IP/OBS MODERATE 35: CPT | Performed by: INTERNAL MEDICINE

## 2022-03-08 PROCEDURE — 250N000013 HC RX MED GY IP 250 OP 250 PS 637: Performed by: INTERNAL MEDICINE

## 2022-03-08 PROCEDURE — 250N000013 HC RX MED GY IP 250 OP 250 PS 637: Performed by: HOSPITALIST

## 2022-03-08 PROCEDURE — 250N000013 HC RX MED GY IP 250 OP 250 PS 637: Performed by: REGISTERED NURSE

## 2022-03-08 PROCEDURE — 120N000001 HC R&B MED SURG/OB

## 2022-03-08 RX ADMIN — SENNOSIDES AND DOCUSATE SODIUM 1 TABLET: 8.6; 5 TABLET ORAL at 21:11

## 2022-03-08 RX ADMIN — QUETIAPINE 12.5 MG: 25 TABLET, FILM COATED ORAL at 00:25

## 2022-03-08 RX ADMIN — Medication 1 MG: at 21:11

## 2022-03-08 RX ADMIN — MULTIPLE VITAMINS W/ MINERALS TAB 1 TABLET: TAB at 09:42

## 2022-03-08 RX ADMIN — PANTOPRAZOLE SODIUM 40 MG: 40 TABLET, DELAYED RELEASE ORAL at 09:42

## 2022-03-08 RX ADMIN — Medication 125 MCG: at 21:11

## 2022-03-08 RX ADMIN — POLYETHYLENE GLYCOL 3350 17 G: 17 POWDER, FOR SOLUTION ORAL at 09:42

## 2022-03-08 RX ADMIN — SENNOSIDES AND DOCUSATE SODIUM 1 TABLET: 8.6; 5 TABLET ORAL at 09:43

## 2022-03-08 ASSESSMENT — ACTIVITIES OF DAILY LIVING (ADL)
ADLS_ACUITY_SCORE: 16
ADLS_ACUITY_SCORE: 15
ADLS_ACUITY_SCORE: 15
ADLS_ACUITY_SCORE: 16
ADLS_ACUITY_SCORE: 15
ADLS_ACUITY_SCORE: 16
ADLS_ACUITY_SCORE: 16
ADLS_ACUITY_SCORE: 15
ADLS_ACUITY_SCORE: 16
ADLS_ACUITY_SCORE: 16
ADLS_ACUITY_SCORE: 15
ADLS_ACUITY_SCORE: 16
ADLS_ACUITY_SCORE: 15
ADLS_ACUITY_SCORE: 15
ADLS_ACUITY_SCORE: 16

## 2022-03-08 NOTE — PLAN OF CARE
Goal Outcome Evaluation:    Plan of Care Reviewed With: patient     DATE & TIME: 3/8/22, 8670-7252             Cognitive Concerns/ Orientation : A&O x 3. Disoriented to situation   BEHAVIOR & AGGRESSION TOOL COLOR: Green     ABNL VS/O2: BP soft. Other VSS on roomair  MOBILITY: SBA with GB and walker  PAIN MANAGMENT: Denied  DIET: Regular  BOWEL/BLADDER: Up to void in bathroom.   ABNL LAB/BG: NA  DRAIN/DEVICES: None  TELEMETRY RHYTHM: NA  SKIN: Bruising. +1 edema to BLE.  Blanchable redness to coccyx  TESTS/PROCEDURES: NA  D/C DATE: Discharge pending group home placement/guardianship  Discharge Barriers: Placement  OTHER IMPORTANT INFO: Patient,s brother has been granted 60 day emergency guardianship(until 4/15/22) Court hearing 4/1/22. Encourage regular sleep/wake pattern

## 2022-03-08 NOTE — PLAN OF CARE
DATE & TIME: 3/7/2022 0722-2541  Cognitive Concerns/ Orientation: Alert, disoriented to situation. Able to recall staff names, can make food selections, likes to color, draw, write, etc.   BEHAVIOR & AGGRESSION TOOL COLOR: Green   ABNL VS/O2: BP soft 90's systolic. Other VSS on RA  MOBILITY: SBA w/GB and walker, ambulated in leyva x1. Needs encouragement for activity, Sat in leyva x1.  PAIN MANAGMENT: Denies  DIET: Regular diet, needs encouragement and reminders to eat/drink (will usually not ask to eat). Can verbalize meal choices. If assisted with eating, pt usually eats more.   BOWEL/BLADDER: Continent, up to BR  SKIN: Dry. Scattered bruising. Trace edema to bilateral wrists and legs. Blanchable redness on coccyx/buttocks, refusing mepliex.  D/C DATE: Discharge pending group home placement/guardianship. SW following.  DISCHARGE BARRIERS: Pt's brother has been granted 60 day emergency guardianship (until 4/15/22). Court hearing on 4/1.  OTHER IMPORTANT INFO: Encouraged regular wake/sleep pattern. Likes to color.

## 2022-03-08 NOTE — PLAN OF CARE
DATE & TIME: 3/7/22, 1353 - 4793   Cognitive Concerns/ Orientation : A&O x 3. Disoriented to situation   BEHAVIOR & AGGRESSION TOOL COLOR: Green   ABNL VS/O2: BP soft. Other VSS on roomair  MOBILITY: SBA with GB and walker  PAIN MANAGMENT: Denied  DIET: Regular  BOWEL/BLADDER: Up to void in bathroom. Incontinent at times  ABNL LAB/BG: NA  DRAIN/DEVICES: None  TELEMETRY RHYTHM: NA  SKIN: Bruising. +1 edema to BLE  TESTS/PROCEDURES: NA  D/C DATE: Discharge pending group home placement/guardianship  Discharge Barriers: Placement  OTHER IMPORTANT INFO: Patient,s brother has been granted 60 day emergency guardianship(until 4/15/22) Court hearing 4/1/22. Encourage regular sleep/wake pattern

## 2022-03-08 NOTE — PROGRESS NOTES
Care Management Follow Up    Length of Stay (days): 45    Expected Discharge Date: 03/29/2022     Concerns to be Addressed: discharge planning     Patient plan of care discussed at interdisciplinary rounds: Yes    Anticipated Discharge Disposition: Transitional Care     Anticipated Discharge Services:    Anticipated Discharge DME:      Patient/family educated on Medicare website which has current facility and service quality ratings: no  Education Provided on the Discharge Plan:    Patient/Family in Agreement with the Plan: yes    Referrals Placed by CM/SW: Post Acute Facilities  Private pay costs discussed: Not applicable    Additional Information:    Sw called Kamryn Mcintyre adult access CM (ph:  [phone]), and Abraham Laguerre with [facility] Counseling service (ph:  [phone]) to check on the status of IQ testing.  Per chart review, IQ testing was set to occur 3/2/22 but adaptive portion of testing with guardian (brother) was not completed as of 3/4/22.  Sw waiting on return calls to determine status.    Connie Smalls, BARBIESW

## 2022-03-08 NOTE — PROGRESS NOTES
"[facility]    Medicine Progress Note - Hospitalist Service    Date of Admission:  2022    Assessment & Plan                   Miranda Dover is a 51 year old female with hx of Down syndrome who was admitted on 2022 for acute hypoxic respiratory failure due to COVID-19 pneumonia, which has resolved. Hospitalization has been prolonged due to need to establish guardianship following the deaths of her parents during this hospitalization and now awaiting placement.       Down syndrome  Developmental delay  Failure to thrive  * Patient's primary caregivers were her parents who were also hospitalized on  for COVID. Patient's father  on  and her mother  on .   * Patient became intermittently anxious and agitated, sometimes yelling/screaming. She did not know why she was here and had repeatedly asked \"what's wrong with me?\" Following admission, she became increasingly withdrawn, refusing medications and meals at times.   * Palliative Care was initially consulted for emotional support, but this was not particularly helpful for patient.  * Discharge planning has been complicated by need for guardianship and payor source for placement.  * Patient's brother, Maxi, was granted 60 days of emergency guardianship on 2/15.   * Completed psychologic testing on 3/2/22 to assess current cognitive function and adaptive abilities.  - Next hearing scheduled on 2022 for permanent guardianship.  - Plan to transition patient to group home; patient's brother involved in this process.  - SW on case and following, awaiting placement at this time.     Severe malnutrition  Underweight with cachexia  * BMI ~13 on early this hospitalization with poor PO intake. SLP was consulted for possible dysphagia, and no evidence of dysphagia was noted.  * Patient was treated with IV fluids -. She later lost IV access, and refused replacement.  * Overall, pt noted to be very picky with foods. "   * During this hospitalization, has had frequent complaints of abdominal pain (per pt's brother, this is chronic dating back years), see below.  * On 2/20, discussed with pt's brother; overall, felt that oral intake was probably acceptable at this point for discharge and did not feel we needed to pursue enteral feedings/g-tube at this point; desired workup for etiology of abdominal pain which he felt was contributing to her decreased PO intake.   * Workup for abdominal pain started as below was negative (suspected functional and constipation).  * BMI up to 14.7 2/27.  - Continue regular diet with Beneprotein with meals. Dietary preferences were previously discussed with the patient, and she likes turkey and white bread sandwiches.   - Continue to encourage PO fluids.  - Workup of abdominal pain as below.    Chronic abdominal pain, suspect functional and component of constipation  * Pt with chronic abdominal pain for years PTA; felt to limit her oral intake. US from 8/2019 did not show definite significant abnormality. CT 11/2019 showed prominence/thickening of the pylorus of the stomach is nonspecific, differential diagnosis would include inflammatory etiologies as well as underlying lesion, consider GI consultation; small amount of pelvic free fluid and mild diffuse anasarca, nonspecific. Otherwise, unclear of prior workup.  * As above, pt with frequent c/o abdominal pain this hospitalization.   * She was initiated on a bowel regimen during this hospitalization for constipation with good response.  * Started on pantoprazole this hospitalization for dyspepsia related to K supplements.  * Per d/w pt's brother 2/20, he wanted further evaluation for possible treatable/reversible etiologies of pt's chronic abdominal pain. GI consulted 2/20. CT abdomen ordered 2/20, but pt refused IV placement, so delayed.   * CT abdomen/plevis 2/21 showed large amount of stool throughout the colon likely indicating constipation; no  other abnormality to explain abdominal pain.  * EGD 2/22 showed mild appearance/texture changed mucosa in the esophageal at the GE junction; no pathology to explain her abdominal pain. Abdominal US 2/22 did not show acute abnormality.  - Continue pantoprazole daily.  - Continue bowel regimen - polyethylene glycol daily; senna-docusate BID; PRN bisacodyl suppository.  Having regular BM's and currently have no abdominal pain    Hypokalemia due to poor PO intake  * Potassium low at times this hospitalization. Treated with replacement.  - Monitor potassium periodically.  - Continue PRN potassium replacement.  Recheck labs tomorrow     Thyroid nodule  Possible subclinical hypothyroidism  * Incidentally noted on admission chest CT. TSH was elevated to 15, but T4 was normal; likely due to severe malnutrition.  * Repeat TSH/FT4 ordered, but patient refused lab draw.  * Add-on TSH and free T4 to labs from 2/18 showed TSH elevated at 9.95 but FT4 at 1.00.  - Follow-up this outpatient.  - Repeat thyroid labs in 4-6 weeks.    Periodic hypotension, stable  * Baseline blood pressures 90s-100s systolic.  * Decreased to mid-80s systolic at times this hospitalization in the setting of minimal PO intake. Treated with IVF from 1/29-1/31.  - Monitor BP's.  - blood pressure remain stable and at her baseline in , she is asymptomatic and that her baseline normal blood pressure.     Resolved conditions  Acute hypoxic respiratory failure due to COVID-19 pneumonia  COVID recovered  * Unvaccinated.  * Symptom onset 1/19/22.  * Tested positive for COVID on 1/22/22.   * Chest CT on arrival showed bilateral infiltrates.  * Initiated on dexamethasone and remdesivir on 1/22.  * Completed 5-day course of remdesivir on 1/26.  * Completed 10-day course of dexamethasone on 1/31.  * Stopped rivaroxaban 2/20.    E coli UTI  * Pan-sensitive E.coli noted on 2/8 urine culture. She was initiated on cefuroxime on 2/9 and completed a 7 day course on  2/15.    Chest pain  * Intermittent chest pain noted during this hospitalization. Patient refused serial troponins and echo.  * No recent complaints.  - Monitor clinically. No more chest pain at this time.     Leukopenia and thrombocytopenia, suspect due to viral infection  * WBC ron 3.6k; PLT ron 119k - likely due to infection. Subsequently improved, and she has since refused labs.   Will recheck labs tomorrow     Agitation, restlessness, hallucinations due to metabolic and infectious encephalopathy  * Resolved following treatment of UTI.    Discussed with  today, awaiting placement, patient does have a Guardian now.        Diet: Regular Diet Adult  Snacks/Supplements Adult: Beneprotein; With Meals    DVT Prophylaxis: Pneumatic Compression Devices and Ambulate every shift  Zhu Catheter: Not present  Central Lines: None  Cardiac Monitoring: None  Code Status: Full Code      Disposition Plan   Expected Discharge: 03/29/2022, Patient is ready to discharge at this time, awaiting placement.   Anticipated discharge location: inpatient rehabilitation facility    Delays:     Placement - Group Homes            The patient's care was discussed with the Bedside Nurse and Patient.    Vic Thomas MD  Hospitalist Service  [facility]  Securely message with the Vocera Web Console (learn more here)  Text page via [employer] Paging/Directory         Clinically Significant Risk Factors Present on Admission                    ______________________________________________________________________    Interval History   Patient was sleeping today during my visit and wanted to sleep more, offer no complaints.     No other significant event overnight.     Data reviewed today: I reviewed all medications, new labs and imaging results over the last 24 hours. I personally reviewed no images or EKG's today.    Physical Exam   Vital Signs: Temp: 97.7  F (36.5  C) Temp src: Oral BP: 99/62 Pulse: 60    Resp: 18 SpO2: 99 % O2 Device: None (Room air)    Weight: 83 lbs 3.2 oz  Constitutional: awake, alert, cooperative, no apparent distress, laying in the hospital bed  Respiratory: clear to auscultation bilaterally, no crackles or wheezing  Cardiovascular: regular rate and rhythm, normal S1 and S2, no murmur noted  GI: normal bowel sounds, soft, non-distended, non-tender    Data   Medications     - MEDICATION INSTRUCTIONS -         melatonin  1 mg Oral At Bedtime     multivitamin w/minerals  1 tablet Oral Daily     pantoprazole  40 mg Oral QAM AC     polyethylene glycol  17 g Oral Daily     senna-docusate  1 tablet Oral BID     Vitamin D3  125 mcg Oral QPM

## 2022-03-09 ENCOUNTER — APPOINTMENT (OUTPATIENT)
Dept: PHYSICAL THERAPY | Facility: CLINIC | Age: 52
DRG: 177 | End: 2022-03-09
Payer: COMMERCIAL

## 2022-03-09 LAB
ALBUMIN SERPL-MCNC: 3.6 G/DL (ref 3.4–5)
ANION GAP SERPL CALCULATED.3IONS-SCNC: 4 MMOL/L (ref 3–14)
BASOPHILS # BLD AUTO: 0 10E3/UL (ref 0–0.2)
BASOPHILS NFR BLD AUTO: 1 %
BUN SERPL-MCNC: 16 MG/DL (ref 7–30)
CALCIUM SERPL-MCNC: 9 MG/DL (ref 8.5–10.1)
CHLORIDE BLD-SCNC: 106 MMOL/L (ref 94–109)
CO2 SERPL-SCNC: 28 MMOL/L (ref 20–32)
CREAT SERPL-MCNC: 0.99 MG/DL (ref 0.52–1.04)
EOSINOPHIL # BLD AUTO: 0.1 10E3/UL (ref 0–0.7)
EOSINOPHIL NFR BLD AUTO: 3 %
ERYTHROCYTE [DISTWIDTH] IN BLOOD BY AUTOMATED COUNT: 15.9 % (ref 10–15)
GFR SERPL CREATININE-BSD FRML MDRD: 70 ML/MIN/1.73M2
GLUCOSE BLD-MCNC: 66 MG/DL (ref 70–99)
HCT VFR BLD AUTO: 40.7 % (ref 35–47)
HGB BLD-MCNC: 12.9 G/DL (ref 11.7–15.7)
IMM GRANULOCYTES # BLD: 0 10E3/UL
IMM GRANULOCYTES NFR BLD: 0 %
LYMPHOCYTES # BLD AUTO: 1.1 10E3/UL (ref 0.8–5.3)
LYMPHOCYTES NFR BLD AUTO: 27 %
MCH RBC QN AUTO: 30.5 PG (ref 26.5–33)
MCHC RBC AUTO-ENTMCNC: 31.7 G/DL (ref 31.5–36.5)
MCV RBC AUTO: 96 FL (ref 78–100)
MONOCYTES # BLD AUTO: 0.3 10E3/UL (ref 0–1.3)
MONOCYTES NFR BLD AUTO: 8 %
NEUTROPHILS # BLD AUTO: 2.3 10E3/UL (ref 1.6–8.3)
NEUTROPHILS NFR BLD AUTO: 61 %
NRBC # BLD AUTO: 0 10E3/UL
NRBC BLD AUTO-RTO: 0 /100
PHOSPHATE SERPL-MCNC: 3.7 MG/DL (ref 2.5–4.5)
PLATELET # BLD AUTO: 189 10E3/UL (ref 150–450)
POTASSIUM BLD-SCNC: 4.2 MMOL/L (ref 3.4–5.3)
RBC # BLD AUTO: 4.23 10E6/UL (ref 3.8–5.2)
SODIUM SERPL-SCNC: 138 MMOL/L (ref 133–144)
WBC # BLD AUTO: 3.8 10E3/UL (ref 4–11)

## 2022-03-09 PROCEDURE — 250N000013 HC RX MED GY IP 250 OP 250 PS 637: Performed by: HOSPITALIST

## 2022-03-09 PROCEDURE — 80069 RENAL FUNCTION PANEL: CPT | Performed by: INTERNAL MEDICINE

## 2022-03-09 PROCEDURE — 85025 COMPLETE CBC W/AUTO DIFF WBC: CPT | Performed by: INTERNAL MEDICINE

## 2022-03-09 PROCEDURE — 250N000013 HC RX MED GY IP 250 OP 250 PS 637: Performed by: REGISTERED NURSE

## 2022-03-09 PROCEDURE — 97116 GAIT TRAINING THERAPY: CPT | Mod: GP

## 2022-03-09 PROCEDURE — 36415 COLL VENOUS BLD VENIPUNCTURE: CPT | Performed by: INTERNAL MEDICINE

## 2022-03-09 PROCEDURE — 250N000013 HC RX MED GY IP 250 OP 250 PS 637: Performed by: INTERNAL MEDICINE

## 2022-03-09 PROCEDURE — 120N000001 HC R&B MED SURG/OB

## 2022-03-09 PROCEDURE — 99232 SBSQ HOSP IP/OBS MODERATE 35: CPT | Performed by: HOSPITALIST

## 2022-03-09 RX ADMIN — SENNOSIDES AND DOCUSATE SODIUM 1 TABLET: 8.6; 5 TABLET ORAL at 10:02

## 2022-03-09 RX ADMIN — Medication 125 MCG: at 21:41

## 2022-03-09 RX ADMIN — Medication 1 MG: at 21:42

## 2022-03-09 RX ADMIN — SENNOSIDES AND DOCUSATE SODIUM 1 TABLET: 8.6; 5 TABLET ORAL at 21:42

## 2022-03-09 RX ADMIN — PANTOPRAZOLE SODIUM 40 MG: 40 TABLET, DELAYED RELEASE ORAL at 10:01

## 2022-03-09 RX ADMIN — POLYETHYLENE GLYCOL 3350 17 G: 17 POWDER, FOR SOLUTION ORAL at 10:01

## 2022-03-09 ASSESSMENT — ACTIVITIES OF DAILY LIVING (ADL)
ADLS_ACUITY_SCORE: 13
ADLS_ACUITY_SCORE: 13
ADLS_ACUITY_SCORE: 15
ADLS_ACUITY_SCORE: 13
ADLS_ACUITY_SCORE: 13
ADLS_ACUITY_SCORE: 15
ADLS_ACUITY_SCORE: 15
ADLS_ACUITY_SCORE: 13
ADLS_ACUITY_SCORE: 13
ADLS_ACUITY_SCORE: 15
ADLS_ACUITY_SCORE: 13
ADLS_ACUITY_SCORE: 15
ADLS_ACUITY_SCORE: 15
ADLS_ACUITY_SCORE: 13
ADLS_ACUITY_SCORE: 13
ADLS_ACUITY_SCORE: 15
ADLS_ACUITY_SCORE: 13
ADLS_ACUITY_SCORE: 15

## 2022-03-09 NOTE — PROGRESS NOTES
"CLINICAL NUTRITION SERVICES - REASSESSMENT NOTE    Recommendations Ordered by Registered Dietitian (RD):   Regular diet   Stop Pudding + Benepro for now   Continue encouraging intake    Malnutrition:  (2/3)  % Weight Loss: Unable to evaluate   % Intake:  </= 50% for >/= 5 days (severe malnutrition) - improving   Subcutaneous Fat Loss:  Orbital region moderate depletion, Upper arm region moderate depletion and Thoracic region moderate depletion  Muscle Loss:  Temporal region moderate depletion, Clavicle bone region moderate depletion and Dorsal hand region moderate depletion  Fluid Retention:  None noted     Malnutrition Diagnosis: Severe malnutrition  In Context of:  Acute illness or injury  Chronic illness or disease     EVALUATION OF PROGRESS TOWARD GOALS   Diet:  Regular diet + Vanilla Pudding + Beneprotein @ breakfast    Intake/Tolerance:   - Pt continues to tolerate PO, this morning she is sitting at her table eating the crust of her bread and some turkey. She tells me that she can only tolerate the crust - the middle part makes her stomach hurt.   - Today is tells me that the vanilla pudding also makes her stomach hurt, and she doesn't like it \"tastes weird\". (Note that earlier this admission she was basically only eating vanilla pudding)  - tolerating % of meals.   - Last BM recorded 3/8.     ASSESSED NUTRITION NEEDS:  Dosing Weight 33.6 kg   Estimated Energy Needs: 7760-3232 kcals (35-40 Kcal/Kg)  Justification: repletion and underweight  Estimated Protein Needs: 50-70 grams protein (1.5-2 g pro/Kg)  Justification: repletion and hypercatabolism with acute illness     NEW FINDINGS:   - Stable for discharge per providers. Awaiting placement.     Previous Goals:   Intake of >/= 75% of meals BID at minimum or >/= 50% meals TID   Evaluation: Met on average    Previous Nutrition Diagnosis:   Predicted inadequate nutrient intake (protein-calorie) related to improving appetite and PO intake to % 2-3 " meals/day over the past 3 days but previously with poor and fluctuating intakes over admission, potential for decline in intake again pending LOS, medical course, menu fatigue, etc  Evaluation: No change    CURRENT NUTRITION DIAGNOSIS  Predicted inadequate nutrient intake (protein-calorie) related to improving appetite and PO intake to % 2-3 meals/day over the past 3 days but previously with poor and fluctuating intakes over admission, potential for decline in intake again pending LOS, medical course, menu fatigue, etc    INTERVENTIONS  Recommendations / Nutrition Prescription  Regular diet   Stop Pudding + Benepro for now   Continue encouraging intake     Implementation  Medical Food Supplement - discontinued     Goals  Intake of >75% meals BID or 50% TID.     MONITORING AND EVALUATION:  Progress towards goals will be monitored and evaluated per protocol and Practice Guidelines    Rosalba dAams RD, LD  [facility] [place], [facility], [facility]  Pager: [phone]  Weekend Pager: [phone]

## 2022-03-09 NOTE — PROGRESS NOTES
"[facility]  Hospitalist Progress Note        Loc Nuñez MD   2022        Interval History:        - No acute issues overnight; denies any active complaints  - was watching a movie on laptop and drawing  -  Patient,s brother has been granted 60 day emergency guardianship(until 4/15/22) Court hearing 22  - SW working on placement         Assessment and Plan:        Miranda Dover is a 51 year old female with hx of Down syndrome who was admitted on 2022 for acute hypoxic respiratory failure due to COVID-19 pneumonia, which has resolved.     Hospitalization has been prolonged due to need to establish guardianship following the deaths of her parents during this hospitalization and now awaiting placement.         Down syndrome  Developmental delay  Failure to thrive  * Patient's primary caregivers were her parents who were also hospitalized on  for COVID. Patient's father  on  and her mother  on .   * Patient became intermittently anxious and agitated, sometimes yelling/screaming. She did not know why she was here and had repeatedly asked \"what's wrong with me?\" Following admission, she became increasingly withdrawn, refusing medications and meals at times.   * Palliative Care was initially consulted for emotional support, but this was not particularly helpful for patient.  * Discharge planning has been complicated by need for guardianship and payor source for placement.  * Patient's brother, Maxi, was granted 60 days of emergency guardianship on 2/15.   * Completed psychologic testing on 3/2/22 to assess current cognitive function and adaptive abilities.  - Next hearing scheduled on 2022 for permanent guardianship.  - Plan to transition patient to group home; patient's brother involved in this process.  - SW on case and following, awaiting placement at this time.      Severe malnutrition  Underweight with cachexia  * BMI ~13 on early this hospitalization " with poor PO intake. SLP was consulted for possible dysphagia, and no evidence of dysphagia was noted.  * Patient was treated with IV fluids 1/29-1/31. She later lost IV access, and refused replacement.  * Overall, pt noted to be very picky with foods.   * During this hospitalization, has had frequent complaints of abdominal pain (per pt's brother, this is chronic dating back years), see below.  * On 2/20, discussed with pt's brother; overall, felt that oral intake was probably acceptable at this point for discharge and did not feel we needed to pursue enteral feedings/g-tube at this point; desired workup for etiology of abdominal pain which he felt was contributing to her decreased PO intake.   * Workup for abdominal pain started as below was negative (suspected functional and constipation).  * BMI up to 14.7 2/27.  - Continue regular diet with Beneprotein with meals. Dietary preferences were previously discussed with the patient, and she likes turkey and white bread sandwiches.   - Continue to encourage PO fluids.  - Workup of abdominal pain as below.     Chronic abdominal pain, suspect functional and component of constipation  * Pt with chronic abdominal pain for years PTA; felt to limit her oral intake. US from 8/2019 did not show definite significant abnormality. CT 11/2019 showed prominence/thickening of the pylorus of the stomach is nonspecific, differential diagnosis would include inflammatory etiologies as well as underlying lesion, consider GI consultation; small amount of pelvic free fluid and mild diffuse anasarca, nonspecific. Otherwise, unclear of prior workup.  * As above, pt with frequent c/o abdominal pain this hospitalization.   * She was initiated on a bowel regimen during this hospitalization for constipation with good response.  * Started on pantoprazole this hospitalization for dyspepsia related to K supplements.  * GI consulted 2/20; CT abdomen/plevis 2/21 showed large amount of stool  throughout the colon likely indicating constipation; no other abnormality to explain abdominal pain.  * EGD 2/22 showed mild appearance/texture changed mucosa in the esophageal at the GE junction; no pathology to explain her abdominal pain. Abdominal US 2/22 did not show acute abnormality.  - Continue pantoprazole daily.  - Continue bowel regimen - polyethylene glycol daily; senna-docusate BID; PRN bisacodyl suppository.  Having regular BM's and currently have no abdominal pain     Hypokalemia due to poor PO intake  * Potassium low at times this hospitalization. Treated with replacement.  - Monitor potassium periodically; last BMP 3/9 UR.  - Continue PRN potassium replacement.     Thyroid nodule  Possible subclinical hypothyroidism  * Incidentally noted on admission chest CT. TSH was elevated to 15, but T4 was normal; likely due to severe malnutrition.  * Add-on TSH and free T4 to labs from 2/18 showed TSH elevated at 9.95 but FT4 at 1.00.  - Follow-up this outpatient.  - Repeat thyroid labs in 4-6 weeks.     Periodic hypotension, stable  * Baseline blood pressures 90s-100s systolic.  * Decreased to mid-80s systolic at times this hospitalization in the setting of minimal PO intake. Treated with IVF from 1/29-1/31.  - Monitor BP's.  - blood pressure remain stable and at her baseline in , she is asymptomatic and that her baseline normal blood pressure.      Resolved conditions  Acute hypoxic respiratory failure due to COVID-19 pneumonia  COVID recovered  * Unvaccinated.  * Symptom onset 1/19/22.  * Tested positive for COVID on 1/22/22.   * Chest CT on arrival showed bilateral infiltrates.  * Initiated on dexamethasone and remdesivir on 1/22.  * Completed 5-day course of remdesivir on 1/26.  * Completed 10-day course of dexamethasone on 1/31.  * Stopped rivaroxaban 2/20.     E coli UTI  * Pan-sensitive E.coli noted on 2/8 urine culture. She was initiated on cefuroxime on 2/9 and completed a 7 day course on  "2/15.     Chest pain  * Intermittent chest pain noted during this hospitalization. Patient refused serial troponins and echo.  * No recent complaints.  - Monitor clinically. No more chest pain at this time.      Leukopenia and thrombocytopenia, suspect due to viral infection  * WBC ron 3.6k; PLT ron 119k - likely due to infection. Subsequently improved, and she has since refused labs  - last CBC from 3/9 with wbc 3.8; platelets 189      Agitation, restlessness, hallucinations due to metabolic and infectious encephalopathy  * Resolved following treatment of UTI.    Orders Placed This Encounter      Regular Diet Adult  Snacks/Supplements Adult: Beneprotein; With Meals      DVT Prophylaxis: Pneumatic Compression Devices and Ambulate every shift    Code Status: Full Code          Disposition Plan     Expected Discharge: medically stable for discharge pending placement; SW following for disposition    Clinically Significant Risk Factors Present on Admission                    Page Me (7 am to 6 pm)              Physical Exam:      Blood pressure 102/63, pulse 55, temperature 97.8  F (36.6  C), temperature source Axillary, resp. rate 14, height 1.6 m (5' 3\"), weight 37.7 kg (83 lb 3.2 oz), SpO2 98 %.  Vitals:    01/22/22 1140 02/05/22 0729 02/14/22 1329   Weight: 33.6 kg (74 lb 1.2 oz) 43.8 kg (96 lb 9.6 oz) 37.7 kg (83 lb 3.2 oz)     Vital Signs with Ranges  Temp:  [97.5  F (36.4  C)-97.8  F (36.6  C)] 97.8  F (36.6  C)  Pulse:  [55-69] 55  Resp:  [14-18] 14  BP: ()/(59-63) 102/63  SpO2:  [98 %-99 %] 98 %  I/O's Last 24 hours  I/O last 3 completed shifts:  In: 1320 [P.O.:1320]  Out: -     Constitutional: Alert, awake and oriented ; resting comfortably in no apparent distress   HEENT: Pupils equal and reactive to light and accomodation, neck supple    Oral cavity: Moist mucosa   Cardiovascular: Normal s1 s2, regular rate and rhythm, no murmur   Lungs: B/l clear to auscultation, no wheezes or crepitations "   Abdomen: Soft, nt, nd, no guarding, rigidity or rebound; BS +   LE : No edema   Musculoskeletal: Power 5/5 in all extremities   Neuro: No focal neurological deficits noted   Psychiatry: normal mood and affect                Medications:          melatonin  1 mg Oral At Bedtime     multivitamin w/minerals  1 tablet Oral Daily     pantoprazole  40 mg Oral QAM AC     polyethylene glycol  17 g Oral Daily     senna-docusate  1 tablet Oral BID     Vitamin D3  125 mcg Oral QPM     PRN Meds: acetaminophen **OR** acetaminophen, albuterol, bisacodyl, lidocaine 4%, lidocaine (buffered or not buffered), - MEDICATION INSTRUCTIONS -, naloxone, naloxone, naloxone, naloxone, ondansetron **OR** ondansetron, QUEtiapine         Data:      All new lab and imaging data was reviewed.   Recent Labs   Lab Test 02/08/22  0843 02/06/22  1247 02/05/22  1400   WBC 7.3 5.1 6.7   HGB 12.4 11.2* 11.8   MCV 94 94 94   * 136* 148*      Recent Labs   Lab Test 02/22/22  1311 02/18/22  0956 02/12/22  2127 02/11/22  0911 02/10/22  1303 02/09/22  1002 02/04/22  0903 01/23/22  1050 01/22/22  1202   NA  --   --   --   --   --  141  --  140 141   POTASSIUM 4.0  --   --  3.4 3.3* 2.8*  --  4.4 3.8   CHLORIDE  --   --   --   --   --  104  --  105 105   CO2  --   --   --   --   --  31  --  32 32   BUN  --   --   --   --   --  6*  --  19 21   CR  --  0.63  --   --   --  0.64  0.58 0.52 0.75 0.74   ANIONGAP  --   --   --   --   --  6  --  3 4   JOSUÉ  --   --   --   --   --  8.9  --  8.1* 8.2*   GLC  --   --  96  --   --  98  --  134* 101*     Recent Labs   Lab Test 02/23/21  1931 10/08/19  1452   TROPI <0.015 <0.015

## 2022-03-09 NOTE — PROGRESS NOTES
SPIRITUAL HEALTH SERVICES  SPIRITUAL ASSESSMENT Progress Note  [facility]    REFERRAL SOURCE: -initiated visited to assess emotional and spiritual needs in light of length of hospital stay.    Brief visit with Miranda while she was working on [employer] coloring pages. She shared that she liked these because she appreciates the colors blue and red. She was quite focused on coloring during our visit, so I kept our visit brief and wished her well.        PLAN: Spiritual Health Services remains available for patient, family, and staff support.     Please page the on call  by placing a STAT or ASAP Epic referral for Spiritual Health (which will roll to the on call pager).        JERROD Peña.   Associate   Pager [phone]

## 2022-03-09 NOTE — PLAN OF CARE
Goal Outcome Evaluation:      SUMMARY: COVID recovered, Needs placement/unable to return home.  DATE & TIME: 3/9/22   Cognitive Concerns/ Orientation : A&O x4, pleasant, calm, habitual.   BEHAVIOR & AGGRESSION TOOL COLOR: Green     ABNL VS/O2: VSS on RA. Check qday.   MOBILITY: Ind, occ asks for SBA.   PAIN MANAGMENT: Denies.  DIET: Regular, good appetite.   BOWEL/BLADDER: Continent, uses BR after meals. BM x2, constipation resolved, receiving daily senna and miralax.  ABNL LAB/BG: NA  DRAIN/DEVICES: None  TELEMETRY RHYTHM: NA  SKIN: Scattered bruising/scabs. Dry hands and buttocks, using lotion.   TESTS/PROCEDURES: NA  D/C DATE: Discharge pending group home placement/guardianship  Discharge Barriers: Placement.  Patient's brother has been granted 60 day emergency guardianship(until 4/15/22) Court hearing 4/1/22.  OTHER IMPORTANT INFO: Content with POC. Amb in room ind with door closed, prefers assist for bathroom needs, and amb in leyva.

## 2022-03-09 NOTE — PLAN OF CARE
Goal Outcome Evaluation:    DATE & TIME: 3/9/22 0829-2608     Cognitive Concerns/ Orientation : A&O x 3. Disoriented to situation   BEHAVIOR & AGGRESSION TOOL COLOR: Green     ABNL VS/O2: VSS, on room air  MOBILITY: SBA with GB and walker   PAIN MANAGMENT: Denied  DIET: Regular, pt had good intake    BOWEL/BLADDER: continent  ABNL LAB/BG: NA  DRAIN/DEVICES: None  TELEMETRY RHYTHM: NA  SKIN: Scattered bruising.   TESTS/PROCEDURES: NA  D/C DATE: Discharge pending group home placement/guardianship  Discharge Barriers: Placement  Patient's brother has been granted 60 day emergency guardianship(until 4/15/22) Court hearing 4/1/22.  OTHER IMPORTANT INFO: Pt ambulated in leyva x4, got dressed into day cloths, was out of room coloring, seemed content today

## 2022-03-09 NOTE — PLAN OF CARE
DATE & TIME: 3/8/22, 1500 - 2330              Cognitive Concerns/ Orientation : A&O x 3. Disoriented to situation   BEHAVIOR & AGGRESSION TOOL COLOR: Green     ABNL VS/O2: VSS, on room air. Hr Jose in 50s-60s at times  MOBILITY: SBA with GB and walker, ambulated in leyva x1, ambulated to bathroom x2.  PAIN MANAGMENT: Denied  DIET: Regular   BOWEL/BLADDER: Up to void in bathroom x2, 3 BM's (1 small, 2 medium size). Incontinent at times  ABNL LAB/BG: NA  DRAIN/DEVICES: None  TELEMETRY RHYTHM: NA  SKIN: Scattered bruising.   TESTS/PROCEDURES: NA  D/C DATE: Discharge pending group home placement/guardianship  Discharge Barriers: Placement  OTHER IMPORTANT INFO: Patient,s brother has been granted 60 day emergency guardianship(until 4/15/22) Court hearing 4/1/22. Encourage regular sleep/wake pattern

## 2022-03-10 PROCEDURE — 250N000013 HC RX MED GY IP 250 OP 250 PS 637: Performed by: HOSPITALIST

## 2022-03-10 PROCEDURE — 250N000013 HC RX MED GY IP 250 OP 250 PS 637: Performed by: INTERNAL MEDICINE

## 2022-03-10 PROCEDURE — 120N000001 HC R&B MED SURG/OB

## 2022-03-10 PROCEDURE — 99207 PR CDG-MDM COMPONENT: MEETS MODERATE - UP CODED: CPT | Performed by: HOSPITALIST

## 2022-03-10 PROCEDURE — 250N000013 HC RX MED GY IP 250 OP 250 PS 637: Performed by: REGISTERED NURSE

## 2022-03-10 PROCEDURE — 99232 SBSQ HOSP IP/OBS MODERATE 35: CPT | Performed by: HOSPITALIST

## 2022-03-10 RX ADMIN — MULTIPLE VITAMINS W/ MINERALS TAB 1 TABLET: TAB at 10:19

## 2022-03-10 RX ADMIN — Medication 1 MG: at 22:01

## 2022-03-10 RX ADMIN — POLYETHYLENE GLYCOL 3350 17 G: 17 POWDER, FOR SOLUTION ORAL at 10:19

## 2022-03-10 RX ADMIN — PANTOPRAZOLE SODIUM 40 MG: 40 TABLET, DELAYED RELEASE ORAL at 10:19

## 2022-03-10 RX ADMIN — SENNOSIDES AND DOCUSATE SODIUM 1 TABLET: 8.6; 5 TABLET ORAL at 22:01

## 2022-03-10 RX ADMIN — Medication 125 MCG: at 22:00

## 2022-03-10 RX ADMIN — SENNOSIDES AND DOCUSATE SODIUM 1 TABLET: 8.6; 5 TABLET ORAL at 10:19

## 2022-03-10 ASSESSMENT — ACTIVITIES OF DAILY LIVING (ADL)
ADLS_ACUITY_SCORE: 15
ADLS_ACUITY_SCORE: 14
ADLS_ACUITY_SCORE: 15

## 2022-03-10 NOTE — PROGRESS NOTES
Care Management Follow Up    Length of Stay (days): 47    Expected Discharge Date: 03/30/2022     Concerns to be Addressed: discharge planning     Patient plan of care discussed at interdisciplinary rounds: Yes    Anticipated Discharge Disposition: Transitional Care     Anticipated Discharge Services:    Anticipated Discharge DME:      Patient/family educated on Medicare website which has current facility and service quality ratings: no  Education Provided on the Discharge Plan:    Patient/Family in Agreement with the Plan: yes    Referrals Placed by CM/SW: Post Acute Facilities  Private pay costs discussed: Not applicable    Additional Information:  Pt's brother, Maxi, called yesterday about scheduling the adaptive testing. Gave him the contact information for [facility] and SW also left them a voicemail. Updated [facility].       JOSE Richardson, LGSW  [phone]  [facility]

## 2022-03-10 NOTE — PLAN OF CARE
DATE & TIME: 3/9/22 9710-4681    Cognitive Concerns/ Orientation : Pt A/Ox4   BEHAVIOR & AGGRESSION TOOL COLOR: Green   ABNL VS/O2: VSS on RA, daily  MOBILITY: Independent, asks for help when needed  PAIN MANAGMENT: Denies  DIET: Regular  BOWEL/BLADDER: Continent  DRAIN/DEVICES: No IV access  SKIN: Scattered bruises and scabs.  D/C DATE: Discharge pending placement/guardianship  OTHER IMPORTANT INFO: Patient's brother granted 60 day emergency guardianship (until 4/15/22) court hearing on 4/1/22.

## 2022-03-10 NOTE — PROGRESS NOTES
"[facility]  Hospitalist Progress Note        Loc Nuñez MD   03/10/2022        Interval History:        - No acute issues overnight; awaiting placement         Assessment and Plan:        Miranda Dover is a 51 year old female with hx of Down syndrome who was admitted on 2022 for acute hypoxic respiratory failure due to COVID-19 pneumonia, which has resolved.     Hospitalization has been prolonged due to need to establish guardianship following the deaths of her parents during this hospitalization and now awaiting placement.         Down syndrome  Developmental delay  Failure to thrive  * Patient's primary caregivers were her parents who were also hospitalized on  for COVID. Patient's father  on  and her mother  on .   * Patient became intermittently anxious and agitated, sometimes yelling/screaming. She did not know why she was here and had repeatedly asked \"what's wrong with me?\" Following admission, she became increasingly withdrawn, refusing medications and meals at times.   * Palliative Care was initially consulted for emotional support, but this was not particularly helpful for patient.  * Discharge planning has been complicated by need for guardianship and payor source for placement.  * Patient's brother, Maxi, was granted 60 days of emergency guardianship on 2/15.   * Completed psychologic testing on 3/2/22 to assess current cognitive function and adaptive abilities.  - Next hearing scheduled on 2022 for permanent guardianship.  - Plan to transition patient to group home; patient's brother involved in this process.  - SW on case and following, awaiting placement at this time.      Severe malnutrition  Underweight with cachexia  * BMI ~13 on early this hospitalization with poor PO intake. SLP was consulted for possible dysphagia, and no evidence of dysphagia was noted.  * Patient was treated with IV fluids -. She later lost IV access, and " refused replacement.  * Overall, pt noted to be very picky with foods.   * During this hospitalization, has had frequent complaints of abdominal pain (per pt's brother, this is chronic dating back years), see below.  * On 2/20, discussed with pt's brother; overall, felt that oral intake was probably acceptable at this point for discharge and did not feel we needed to pursue enteral feedings/g-tube at this point; desired workup for etiology of abdominal pain which he felt was contributing to her decreased PO intake.   * Workup for abdominal pain started as below was negative (suspected functional and constipation).  * BMI up to 14.7 2/27.  - Continue regular diet with Beneprotein with meals. Dietary preferences were previously discussed with the patient, and she likes turkey and white bread sandwiches.   - Continue to encourage PO fluids.  - Workup of abdominal pain as below.     Chronic abdominal pain, suspect functional and component of constipation  * Pt with chronic abdominal pain for years PTA; felt to limit her oral intake. US from 8/2019 did not show definite significant abnormality. CT 11/2019 showed prominence/thickening of the pylorus of the stomach is nonspecific, differential diagnosis would include inflammatory etiologies as well as underlying lesion, consider GI consultation; small amount of pelvic free fluid and mild diffuse anasarca, nonspecific. Otherwise, unclear of prior workup.  * As above, pt with frequent c/o abdominal pain this hospitalization.   * She was initiated on a bowel regimen during this hospitalization for constipation with good response.  * Started on pantoprazole this hospitalization for dyspepsia related to K supplements.  * GI consulted 2/20; CT abdomen/plevis 2/21 showed large amount of stool throughout the colon likely indicating constipation; no other abnormality to explain abdominal pain.  * EGD 2/22 showed mild appearance/texture changed mucosa in the esophageal at the GE  junction; no pathology to explain her abdominal pain. Abdominal US 2/22 did not show acute abnormality.  - Continue pantoprazole daily.  - Continue bowel regimen - polyethylene glycol daily; senna-docusate BID; PRN bisacodyl suppository.  Having regular BM's and currently have no abdominal pain     Hypokalemia due to poor PO intake  * Potassium low at times this hospitalization. Treated with replacement.  - Monitor potassium periodically; last BMP 3/9 UR.  - Continue PRN potassium replacement.     Thyroid nodule  Possible subclinical hypothyroidism  * Incidentally noted on admission chest CT. TSH was elevated to 15, but T4 was normal; likely due to severe malnutrition.  * Add-on TSH and free T4 to labs from 2/18 showed TSH elevated at 9.95 but FT4 at 1.00.  - Follow-up this outpatient.  - Repeat thyroid labs in 4-6 weeks.     Periodic hypotension, stable  * Baseline blood pressures 90s-100s systolic.  * Decreased to mid-80s systolic at times this hospitalization in the setting of minimal PO intake. Treated with IVF from 1/29-1/31.  - Monitor BP's.  - blood pressure remain stable and at her baseline in , she is asymptomatic and that seems to be her baseline normal blood pressure.      Resolved conditions  Acute hypoxic respiratory failure due to COVID-19 pneumonia  COVID recovered  * Unvaccinated.  * Symptom onset 1/19/22.  * Tested positive for COVID on 1/22/22.   * Chest CT on arrival showed bilateral infiltrates.  * Initiated on dexamethasone and remdesivir on 1/22.  * Completed 5-day course of remdesivir on 1/26.  * Completed 10-day course of dexamethasone on 1/31.  * Stopped rivaroxaban 2/20.     E coli UTI  * Pan-sensitive E.coli noted on 2/8 urine culture. She was initiated on cefuroxime on 2/9 and completed a 7 day course on 2/15.     Chest pain  * Intermittent chest pain noted during this hospitalization. Patient refused serial troponins and echo.  * No recent complaints.  - Monitor clinically. No more  "chest pain at this time.      Leukopenia and thrombocytopenia, suspect due to viral infection  * WBC ron 3.6k; PLT ron 119k - likely due to infection. Subsequently improved, and she has since refused labs  - last CBC from 3/9 with wbc 3.8; platelets 189      Agitation, restlessness, hallucinations due to metabolic and infectious encephalopathy  * Resolved following treatment of UTI.    Orders Placed This Encounter      Regular Diet Adult  Snacks/Supplements Adult: Beneprotein; With Meals      DVT Prophylaxis: Pneumatic Compression Devices and Ambulate every shift    Code Status: Full Code          Disposition Plan     Expected Discharge: medically stable for discharge pending placement;  following for disposition    Clinically Significant Risk Factors Present on Admission                    Page Me (7 am to 6 pm)              Physical Exam:      Blood pressure 90/60, pulse 65, temperature 98.7  F (37.1  C), temperature source Oral, resp. rate 20, height 1.6 m (5' 3\"), weight 37.7 kg (83 lb 3.2 oz), SpO2 97 %.  Vitals:    01/22/22 1140 02/05/22 0729 02/14/22 1329   Weight: 33.6 kg (74 lb 1.2 oz) 43.8 kg (96 lb 9.6 oz) 37.7 kg (83 lb 3.2 oz)     Vital Signs with Ranges  Temp:  [97.3  F (36.3  C)-98.7  F (37.1  C)] 98.7  F (37.1  C)  Pulse:  [58-88] 65  Resp:  [16-20] 20  BP: ()/(60-65) 90/60  SpO2:  [97 %] 97 %  I/O's Last 24 hours  I/O last 3 completed shifts:  In: 720 [P.O.:720]  Out: -     Constitutional: Alert, awake and oriented ; resting comfortably in no apparent distress; thin built   HEENT: Pupils equal and reactive to light and accomodation, neck supple    Oral cavity: Moist mucosa   Cardiovascular: Normal s1 s2, regular rate and rhythm, no murmur   Lungs: B/l clear to auscultation, no wheezes or crepitations   Abdomen: Soft, nt, nd, no guarding, rigidity or rebound; BS +   LE : No edema   Musculoskeletal: Power 5/5 in all extremities   Neuro: No focal neurological deficits noted   Psychiatry: " normal mood and affect                Medications:          melatonin  1 mg Oral At Bedtime     multivitamin w/minerals  1 tablet Oral Daily     pantoprazole  40 mg Oral QAM AC     polyethylene glycol  17 g Oral Daily     senna-docusate  1 tablet Oral BID     Vitamin D3  125 mcg Oral QPM     PRN Meds: acetaminophen **OR** acetaminophen, albuterol, bisacodyl, lidocaine 4%, lidocaine (buffered or not buffered), - MEDICATION INSTRUCTIONS -, naloxone, naloxone, naloxone, naloxone, ondansetron **OR** ondansetron, QUEtiapine         Data:      All new lab and imaging data was reviewed.   Recent Labs   Lab Test 03/09/22  1221 02/08/22  0843 02/06/22  1247   WBC 3.8* 7.3 5.1   HGB 12.9 12.4 11.2*   MCV 96 94 94    140* 136*      Recent Labs   Lab Test 03/09/22  1221 02/22/22  1311 02/18/22  0956 02/12/22  2127 02/11/22  0911 02/10/22  1303 02/09/22  1002 02/04/22  0903 01/23/22  1050     --   --   --   --   --  141  --  140   POTASSIUM 4.2 4.0  --   --  3.4   < > 2.8*  --  4.4   CHLORIDE 106  --   --   --   --   --  104  --  105   CO2 28  --   --   --   --   --  31  --  32   BUN 16  --   --   --   --   --  6*  --  19   CR 0.99  --  0.63  --   --   --  0.64  0.58   < > 0.75   ANIONGAP 4  --   --   --   --   --  6  --  3   JOSUÉ 9.0  --   --   --   --   --  8.9  --  8.1*   GLC 66*  --   --  96  --   --  98  --  134*    < > = values in this interval not displayed.     Recent Labs   Lab Test 02/23/21  1931 10/08/19  1452   TROPI <0.015 <0.015

## 2022-03-10 NOTE — PROGRESS NOTES
SUMMARY: COVID recovered, Needs placement/unable to return home.  DATE & TIME: 3/9/22 9997-5310  Cognitive Concerns/ Orientation : A&O x4, pleasant, calm, habitual.   BEHAVIOR & AGGRESSION TOOL COLOR: Green     ABNL VS/O2: VSS on RA. Check qday.   MOBILITY: Ind, occ asks for SBA.   PAIN MANAGMENT: Denies.  DIET: Regular,   BOWEL/BLADDER: Continent, uses BR after meals. BM x1, constipation resolved, receiving daily senna and miralax.   ABNL LAB/BG: NA  DRAIN/DEVICES: None  TELEMETRY RHYTHM: NA  SKIN: Scattered bruising/scabs. Dry hands and buttocks, using lotion.   TESTS/PROCEDURES: NA  D/C DATE: Discharge pending group home placement/guardianship  Discharge Barriers: Placement.  Patient's brother has been granted 60 day emergency guardianship(until 4/15/22) Court hearing 4/1/22.  OTHER IMPORTANT INFO: Content with POC. Amb in room ind with door closed, prefers assist for bathroom needs, and amb in [place].

## 2022-03-11 ENCOUNTER — APPOINTMENT (OUTPATIENT)
Dept: PHYSICAL THERAPY | Facility: CLINIC | Age: 52
DRG: 177 | End: 2022-03-11
Payer: COMMERCIAL

## 2022-03-11 PROCEDURE — 99232 SBSQ HOSP IP/OBS MODERATE 35: CPT | Performed by: HOSPITALIST

## 2022-03-11 PROCEDURE — 250N000013 HC RX MED GY IP 250 OP 250 PS 637: Performed by: INTERNAL MEDICINE

## 2022-03-11 PROCEDURE — 97530 THERAPEUTIC ACTIVITIES: CPT | Mod: GP | Performed by: PHYSICAL THERAPY ASSISTANT

## 2022-03-11 PROCEDURE — 120N000001 HC R&B MED SURG/OB

## 2022-03-11 PROCEDURE — 250N000013 HC RX MED GY IP 250 OP 250 PS 637: Performed by: HOSPITALIST

## 2022-03-11 RX ADMIN — PANTOPRAZOLE SODIUM 40 MG: 40 TABLET, DELAYED RELEASE ORAL at 08:06

## 2022-03-11 RX ADMIN — SENNOSIDES AND DOCUSATE SODIUM 1 TABLET: 8.6; 5 TABLET ORAL at 20:25

## 2022-03-11 RX ADMIN — Medication 1 MG: at 21:40

## 2022-03-11 RX ADMIN — Medication 125 MCG: at 20:25

## 2022-03-11 ASSESSMENT — ACTIVITIES OF DAILY LIVING (ADL)
ADLS_ACUITY_SCORE: 16
ADLS_ACUITY_SCORE: 15
ADLS_ACUITY_SCORE: 16
ADLS_ACUITY_SCORE: 15
ADLS_ACUITY_SCORE: 16
ADLS_ACUITY_SCORE: 14
ADLS_ACUITY_SCORE: 16
ADLS_ACUITY_SCORE: 14
ADLS_ACUITY_SCORE: 15
ADLS_ACUITY_SCORE: 15
ADLS_ACUITY_SCORE: 16
ADLS_ACUITY_SCORE: 15
ADLS_ACUITY_SCORE: 15
ADLS_ACUITY_SCORE: 16
ADLS_ACUITY_SCORE: 15
ADLS_ACUITY_SCORE: 16

## 2022-03-11 NOTE — PROGRESS NOTES
"[facility]  Hospitalist Progress Note        Loc Nuñez MD   2022        Interval History:        - No acute issues overnight; awaiting placement         Assessment and Plan:        Miranda Dover is a 51 year old female with hx of Down syndrome who was admitted on 2022 for acute hypoxic respiratory failure due to COVID-19 pneumonia, which has resolved.     Hospitalization has been prolonged due to need to establish guardianship following the deaths of her parents during this hospitalization and now awaiting placement.         Down syndrome  Developmental delay  Failure to thrive  * Patient's primary caregivers were her parents who were also hospitalized on  for COVID. Patient's father  on  and her mother  on .   * Patient became intermittently anxious and agitated, sometimes yelling/screaming. She did not know why she was here and had repeatedly asked \"what's wrong with me?\" Following admission, she became increasingly withdrawn, refusing medications and meals at times.   * Palliative Care was initially consulted for emotional support, but this was not particularly helpful for patient.  * Discharge planning has been complicated by need for guardianship and payor source for placement.  * Patient's brother, Maxi, was granted 60 days of emergency guardianship on 2/15.   * Completed psychologic testing on 3/2/22 to assess current cognitive function and adaptive abilities.  - Next hearing scheduled on 2022 for permanent guardianship.  - Plan to transition patient to group home; patient's brother involved in this process.  - SW on case and following, awaiting placement at this time.      Severe malnutrition  Underweight with cachexia  * BMI ~13 on early this hospitalization with poor PO intake. SLP was consulted for possible dysphagia, and no evidence of dysphagia was noted.  * Patient was treated with IV fluids -. She later lost IV access, and " refused replacement.  * Overall, pt noted to be very picky with foods.   * During this hospitalization, has had frequent complaints of abdominal pain (per pt's brother, this is chronic dating back years), see below.  * On 2/20, discussed with pt's brother; overall, felt that oral intake was probably acceptable at this point for discharge and did not feel we needed to pursue enteral feedings/g-tube at this point; desired workup for etiology of abdominal pain which he felt was contributing to her decreased PO intake.   * Workup for abdominal pain started as below was negative (suspected functional and constipation).  * BMI up to 14.7 2/27.  - Continue regular diet with Beneprotein with meals. Dietary preferences were previously discussed with the patient, and she likes turkey and white bread sandwiches.   - Continue to encourage PO fluids.  - Workup of abdominal pain as below.     Chronic abdominal pain, suspect functional and component of constipation  * Pt with chronic abdominal pain for years PTA; felt to limit her oral intake. US from 8/2019 did not show definite significant abnormality. CT 11/2019 showed prominence/thickening of the pylorus of the stomach is nonspecific, differential diagnosis would include inflammatory etiologies as well as underlying lesion, consider GI consultation; small amount of pelvic free fluid and mild diffuse anasarca, nonspecific. Otherwise, unclear of prior workup.  * As above, pt with frequent c/o abdominal pain this hospitalization.   * She was initiated on a bowel regimen during this hospitalization for constipation with good response.  * Started on pantoprazole this hospitalization for dyspepsia related to K supplements.  * GI consulted 2/20; CT abdomen/plevis 2/21 showed large amount of stool throughout the colon likely indicating constipation; no other abnormality to explain abdominal pain.  * EGD 2/22 showed mild appearance/texture changed mucosa in the esophageal at the GE  junction; no pathology to explain her abdominal pain. Abdominal US 2/22 did not show acute abnormality.  - Continue pantoprazole daily.  - Continue bowel regimen - polyethylene glycol daily; senna-docusate BID; PRN bisacodyl suppository.  Having regular BM's and currently have no abdominal pain     Hypokalemia due to poor PO intake  * Potassium low at times this hospitalization. Treated with replacement.  - Monitor potassium periodically; last BMP 3/9 UR.  - Continue PRN potassium replacement.     Thyroid nodule  Possible subclinical hypothyroidism  * Incidentally noted on admission chest CT. TSH was elevated to 15, but T4 was normal; likely due to severe malnutrition.  * Add-on TSH and free T4 to labs from 2/18 showed TSH elevated at 9.95 but FT4 at 1.00.  - Follow-up this outpatient.  - Repeat thyroid labs in 4-6 weeks.     Periodic hypotension, stable  * Baseline blood pressures 90s-100s systolic.  * Decreased to mid-80s systolic at times this hospitalization in the setting of minimal PO intake. Treated with IVF from 1/29-1/31.  - Monitor BP's.  - blood pressure remain stable and at her baseline in , she is asymptomatic and that seems to be her baseline normal blood pressure.      Resolved conditions  Acute hypoxic respiratory failure due to COVID-19 pneumonia  COVID recovered  * Unvaccinated.  * Symptom onset 1/19/22.  * Tested positive for COVID on 1/22/22.   * Chest CT on arrival showed bilateral infiltrates.  * Initiated on dexamethasone and remdesivir on 1/22.  * Completed 5-day course of remdesivir on 1/26.  * Completed 10-day course of dexamethasone on 1/31.  * Stopped rivaroxaban 2/20.     E coli UTI  * Pan-sensitive E.coli noted on 2/8 urine culture. She was initiated on cefuroxime on 2/9 and completed a 7 day course on 2/15.     Chest pain  * Intermittent chest pain noted during this hospitalization. Patient refused serial troponins and echo.  * No recent complaints.  - Monitor clinically. No more  "chest pain at this time.      Leukopenia and thrombocytopenia, suspect due to viral infection  * WBC ron 3.6k; PLT ron 119k - likely due to infection. Subsequently improved, and she has since refused labs  - last CBC from 3/9 with wbc 3.8; platelets 189      Agitation, restlessness, hallucinations due to metabolic and infectious encephalopathy  * Resolved following treatment of UTI.    Orders Placed This Encounter      Regular Diet Adult  Snacks/Supplements Adult: Beneprotein; With Meals      DVT Prophylaxis: Pneumatic Compression Devices and Ambulate every shift    Code Status: Full Code          Disposition Plan     Expected Discharge: medically stable for discharge pending placement;  following for disposition    Clinically Significant Risk Factors Present on Admission                    Page Me (7 am to 6 pm)              Physical Exam:      Blood pressure 92/68, pulse 77, temperature 98.3  F (36.8  C), temperature source Oral, resp. rate 18, height 1.6 m (5' 3\"), weight 37.7 kg (83 lb 3.2 oz), SpO2 99 %.  Vitals:    01/22/22 1140 02/05/22 0729 02/14/22 1329   Weight: 33.6 kg (74 lb 1.2 oz) 43.8 kg (96 lb 9.6 oz) 37.7 kg (83 lb 3.2 oz)     Vital Signs with Ranges  Temp:  [98  F (36.7  C)-98.3  F (36.8  C)] 98.3  F (36.8  C)  Pulse:  [73-77] 77  Resp:  [18] 18  BP: (92)/(62-68) 92/68  SpO2:  [99 %-100 %] 99 %  I/O's Last 24 hours  I/O last 3 completed shifts:  In: 240 [P.O.:240]  Out: -     Constitutional: Alert, awake and oriented ; resting comfortably in no apparent distress; thin built   HEENT: Pupils equal and reactive to light and accomodation, neck supple    Oral cavity: Moist mucosa   Cardiovascular: Normal s1 s2, regular rate and rhythm, no murmur   Lungs: B/l clear to auscultation, no wheezes or crepitations   Abdomen: Soft, nt, nd, no guarding, rigidity or rebound; BS +   LE : No edema   Musculoskeletal: Power 5/5 in all extremities   Neuro: No focal neurological deficits noted   Psychiatry: " normal mood and affect                Medications:          melatonin  1 mg Oral At Bedtime     multivitamin w/minerals  1 tablet Oral Daily     pantoprazole  40 mg Oral QAM AC     polyethylene glycol  17 g Oral Daily     senna-docusate  1 tablet Oral BID     Vitamin D3  125 mcg Oral QPM     PRN Meds: acetaminophen **OR** acetaminophen, albuterol, bisacodyl, lidocaine 4%, lidocaine (buffered or not buffered), - MEDICATION INSTRUCTIONS -, naloxone, naloxone, naloxone, naloxone, ondansetron **OR** ondansetron, QUEtiapine         Data:      All new lab and imaging data was reviewed.   Recent Labs   Lab Test 03/09/22  1221 02/08/22  0843 02/06/22  1247   WBC 3.8* 7.3 5.1   HGB 12.9 12.4 11.2*   MCV 96 94 94    140* 136*      Recent Labs   Lab Test 03/09/22  1221 02/22/22  1311 02/18/22  0956 02/12/22  2127 02/11/22  0911 02/10/22  1303 02/09/22  1002 02/04/22  0903 01/23/22  1050     --   --   --   --   --  141  --  140   POTASSIUM 4.2 4.0  --   --  3.4   < > 2.8*  --  4.4   CHLORIDE 106  --   --   --   --   --  104  --  105   CO2 28  --   --   --   --   --  31  --  32   BUN 16  --   --   --   --   --  6*  --  19   CR 0.99  --  0.63  --   --   --  0.64  0.58   < > 0.75   ANIONGAP 4  --   --   --   --   --  6  --  3   JOSUÉ 9.0  --   --   --   --   --  8.9  --  8.1*   GLC 66*  --   --  96  --   --  98  --  134*    < > = values in this interval not displayed.     Recent Labs   Lab Test 02/23/21  1931 10/08/19  1452   TROPI <0.015 <0.015

## 2022-03-11 NOTE — PLAN OF CARE
Goal Outcome Evaluation:  Cognitive Concerns/ Orientation: A/Ox4. Pleasant  BEHAVIOR & AGGRESSION TOOL COLOR: Green  ABNL VS/O2: VSS on room air, daily  MOBILITY: Independent, asks for help when needed  PAIN MANAGMENT: Denies  DIET: Regular-needs assistance to order.  BOWEL/BLADDER: Continent. Voiding adequately. BM x1  DRAIN/DEVICES: No IV access  SKIN: Scattered bruises and scabs.  D/C DATE: Discharge pending placement/guardianship  OTHER IMPORTANT INFO: Patient's brother granted 60 day emergency guardianship (until 4/15/22) court hearing on 4/1/22. Up in the chair for meals, in the hallway coloring, ambulated in halls several times. Declined to bath and wash her hair.

## 2022-03-11 NOTE — PLAN OF CARE
DATE & TIME: 3/10/22 9654-9347   Cognitive Concerns/ Orientation: A/Ox4. Pleasant  BEHAVIOR & AGGRESSION TOOL COLOR: Green  ABNL VS/O2: VSS on room air  MOBILITY: Independent, asks for help when needed  PAIN MANAGMENT: Denies  DIET: Regular-needs assistance to order and encouragement to eat  BOWEL/BLADDER: Continent. Voiding adequately. No BM.  DRAIN/DEVICES: No IV access  SKIN: Scattered bruises and scabs.  D/C DATE: Discharge pending placement and guardianship  OTHER IMPORTANT INFO: Patient's brother granted 60 day emergency guardianship (until 4/15/22) court hearing on 4/1/22. Up in the chair watching TV.

## 2022-03-11 NOTE — PLAN OF CARE
DATE & TIME: 3/10/22 6136-8609      Cognitive Concerns/ Orientation: A&Ox4  BEHAVIOR & AGGRESSION TOOL COLOR: Green  ABNL VS/O2: VSS RA  MOBILITY: Independent, asks for help when needed  PAIN MANAGMENT: Denies  DIET: Regular, needs assistance   BOWEL/BLADDER: Continent. Voiding adequately. No BM.  DRAIN/DEVICES: No IV access  SKIN: Scattered bruises and scabs.  D/C DATE: Discharge pending placement and guardianship  OTHER IMPORTANT INFO: Patient's brother granted 60 day emergency guardianship (until 4/15/22) court hearing on 4/1/22.

## 2022-03-12 PROCEDURE — 250N000013 HC RX MED GY IP 250 OP 250 PS 637: Performed by: HOSPITALIST

## 2022-03-12 PROCEDURE — 99231 SBSQ HOSP IP/OBS SF/LOW 25: CPT | Performed by: HOSPITALIST

## 2022-03-12 PROCEDURE — 120N000001 HC R&B MED SURG/OB

## 2022-03-12 PROCEDURE — 250N000013 HC RX MED GY IP 250 OP 250 PS 637: Performed by: INTERNAL MEDICINE

## 2022-03-12 PROCEDURE — 250N000013 HC RX MED GY IP 250 OP 250 PS 637: Performed by: REGISTERED NURSE

## 2022-03-12 RX ADMIN — SENNOSIDES AND DOCUSATE SODIUM 1 TABLET: 8.6; 5 TABLET ORAL at 09:41

## 2022-03-12 RX ADMIN — PANTOPRAZOLE SODIUM 40 MG: 40 TABLET, DELAYED RELEASE ORAL at 09:41

## 2022-03-12 RX ADMIN — Medication 1 MG: at 21:49

## 2022-03-12 RX ADMIN — SENNOSIDES AND DOCUSATE SODIUM 1 TABLET: 8.6; 5 TABLET ORAL at 21:49

## 2022-03-12 RX ADMIN — Medication 125 MCG: at 21:49

## 2022-03-12 ASSESSMENT — ACTIVITIES OF DAILY LIVING (ADL)
ADLS_ACUITY_SCORE: 15

## 2022-03-12 NOTE — PROGRESS NOTES
"   [facility]  Hospitalist Progress Note        Date of admission: 22  When I evaluated patient: 2022          Interval History:        - Chart reviewed, no acute issues overnight; patient did not express any concern, awaiting placement         Assessment and Plan:        Miranda Dover is a 51 year old female with hx of Down syndrome who was admitted on 2022 for acute hypoxic respiratory failure due to COVID-19 pneumonia, which has resolved.     Hospitalization has been prolonged due to need to establish guardianship following the deaths of her parents during this hospitalization and now awaiting placement.         Down syndrome  Developmental delay  Failure to thrive  * Patient's primary caregivers were her parents who were also hospitalized on  for COVID. Patient's father  on  and her mother  on .   * Patient became intermittently anxious and agitated, sometimes yelling/screaming. She did not know why she was here and had repeatedly asked \"what's wrong with me?\" Following admission, she became increasingly withdrawn, refusing medications and meals at times.   * Palliative Care was initially consulted for emotional support, but this was not particularly helpful for patient.  * Discharge planning has been complicated by need for guardianship and payor source for placement.  * Patient's brother, Maxi, was granted 60 days of emergency guardianship on 2/15.   * Completed psychologic testing on 3/2/22 to assess current cognitive function and adaptive abilities.  - Next hearing scheduled on 2022 for permanent guardianship.  - Plan to transition patient to group home; patient's brother involved in this process.  - SW on case and following, awaiting placement at this time.      Severe malnutrition  Underweight with cachexia  * BMI ~13 on early this hospitalization with poor PO intake. SLP was consulted for possible dysphagia, and no evidence of dysphagia was " noted.  * Patient was treated with IV fluids 1/29-1/31. She later lost IV access, and refused replacement.  * Overall, pt noted to be very picky with foods.   * During this hospitalization, has had frequent complaints of abdominal pain (per pt's brother, this is chronic dating back years), see below.  * On 2/20, discussed with pt's brother; overall, felt that oral intake was probably acceptable at this point for discharge and did not feel we needed to pursue enteral feedings/g-tube at this point; desired workup for etiology of abdominal pain which he felt was contributing to her decreased PO intake.   * Workup for abdominal pain started as below was negative (suspected functional and constipation).  * BMI up to 14.7 2/27.  - Continue regular diet with Beneprotein with meals. Dietary preferences were previously discussed with the patient, and she likes turkey and white bread sandwiches.   - Continue to encourage PO fluids.  - Workup of abdominal pain as below.     Chronic abdominal pain, suspect functional and component of constipation  * Pt with chronic abdominal pain for years PTA; felt to limit her oral intake. US from 8/2019 did not show definite significant abnormality. CT 11/2019 showed prominence/thickening of the pylorus of the stomach is nonspecific, differential diagnosis would include inflammatory etiologies as well as underlying lesion, consider GI consultation; small amount of pelvic free fluid and mild diffuse anasarca, nonspecific. Otherwise, unclear of prior workup.  * As above, pt with frequent c/o abdominal pain this hospitalization.   * She was initiated on a bowel regimen during this hospitalization for constipation with good response.  * Started on pantoprazole this hospitalization for dyspepsia related to K supplements.  * GI consulted 2/20; CT abdomen/plevis 2/21 showed large amount of stool throughout the colon likely indicating constipation; no other abnormality to explain abdominal pain.  *  EGD 2/22 showed mild appearance/texture changed mucosa in the esophageal at the GE junction; no pathology to explain her abdominal pain. Abdominal US 2/22 did not show acute abnormality.  - Continue pantoprazole daily.  - Continue bowel regimen - polyethylene glycol daily; senna-docusate BID; PRN bisacodyl suppository.  Having regular BM's and currently have no abdominal pain     Hypokalemia due to poor PO intake  * Potassium low at times this hospitalization. Treated with replacement.  - Monitor potassium periodically; last BMP 3/9 UR.  - Continue PRN potassium replacement.     Thyroid nodule  Possible subclinical hypothyroidism  * Incidentally noted on admission chest CT. TSH was elevated to 15, but T4 was normal; likely due to severe malnutrition.  * Add-on TSH and free T4 to labs from 2/18 showed TSH elevated at 9.95 but FT4 at 1.00.  - Follow-up this outpatient.  - Repeat thyroid labs in 4-6 weeks.     Periodic hypotension, stable  * Baseline blood pressures 90s-100s systolic.  * Decreased to mid-80s systolic at times this hospitalization in the setting of minimal PO intake. Treated with IVF from 1/29-1/31.  - Monitor BP's.  - blood pressure remain stable and at her baseline in , she is asymptomatic and that seems to be her baseline normal blood pressure.      Resolved conditions  Acute hypoxic respiratory failure due to COVID-19 pneumonia  COVID recovered  * Unvaccinated.  * Symptom onset 1/19/22.  * Tested positive for COVID on 1/22/22.   * Chest CT on arrival showed bilateral infiltrates.  * Initiated on dexamethasone and remdesivir on 1/22.  * Completed 5-day course of remdesivir on 1/26.  * Completed 10-day course of dexamethasone on 1/31.  * Stopped rivaroxaban 2/20.     E coli UTI  * Pan-sensitive E.coli noted on 2/8 urine culture. She was initiated on cefuroxime on 2/9 and completed a 7 day course on 2/15.     Chest pain  * Intermittent chest pain noted during this hospitalization. Patient refused  "serial troponins and echo.  * No recent complaints.  - Monitor clinically. No more chest pain at this time.      Leukopenia and thrombocytopenia, suspect due to viral infection  * WBC ron 3.6k; PLT ron 119k - likely due to infection. Subsequently improved, and she has since refused labs  - last CBC from 3/9 with wbc 3.8; platelets 189      Agitation, restlessness, hallucinations due to metabolic and infectious encephalopathy  * Resolved following treatment of UTI.    Orders Placed This Encounter      Regular Diet Adult  Snacks/Supplements Adult: Beneprotein; With Meals      DVT Prophylaxis: Pneumatic Compression Devices and Ambulate every shift    Code Status: Full Code          Disposition Plan     Expected Discharge: medically stable for discharge pending placement; SW following for disposition    Clinically Significant Risk Factors Present on Admission                     Lynn Mendoza MD  Hospitalist              Physical Exam:      Blood pressure 93/62, pulse 63, temperature 98.4  F (36.9  C), temperature source Axillary, resp. rate 18, height 1.6 m (5' 3\"), weight 37.7 kg (83 lb 3.2 oz), SpO2 99 %.  Vitals:    01/22/22 1140 02/05/22 0729 02/14/22 1329   Weight: 33.6 kg (74 lb 1.2 oz) 43.8 kg (96 lb 9.6 oz) 37.7 kg (83 lb 3.2 oz)     Vital Signs with Ranges  Temp:  [98.4  F (36.9  C)] 98.4  F (36.9  C)  Pulse:  [63] 63  Resp:  [18] 18  BP: (93)/(62) 93/62  SpO2:  [99 %] 99 %  I/O's Last 24 hours  No intake/output data recorded.    General: AAO, appears comfortable. Thin/cachectic.   HEENT: PERRLA EOMI. Mucosa moist.   Lungs: Bilateral equal air entry. Clear to auscultation, normal work of breathing.   CVS: S1S2 regular, no tachycardia or murmur.   Abdomen: Soft, NT, ND. BS heard.  MSK: No edema   Neuro: AAOX3. CN 2-12 normal. Strength symmetrical.  Skin: No rash.                   Medications:          melatonin  1 mg Oral At Bedtime     multivitamin w/minerals  1 tablet Oral Daily     pantoprazole  40 mg " Oral QAM AC     polyethylene glycol  17 g Oral Daily     senna-docusate  1 tablet Oral BID     Vitamin D3  125 mcg Oral QPM     PRN Meds: acetaminophen **OR** acetaminophen, albuterol, bisacodyl, lidocaine 4%, lidocaine (buffered or not buffered), - MEDICATION INSTRUCTIONS -, naloxone, naloxone, naloxone, naloxone, ondansetron **OR** ondansetron, QUEtiapine         Data:      All new lab and imaging data was reviewed.   Recent Labs   Lab Test 03/09/22  1221 02/08/22  0843 02/06/22  1247   WBC 3.8* 7.3 5.1   HGB 12.9 12.4 11.2*   MCV 96 94 94    140* 136*      Recent Labs   Lab Test 03/09/22  1221 02/22/22  1311 02/18/22  0956 02/12/22 2127 02/11/22  0911 02/10/22  1303 02/09/22  1002 02/04/22  0903 01/23/22  1050     --   --   --   --   --  141  --  140   POTASSIUM 4.2 4.0  --   --  3.4   < > 2.8*  --  4.4   CHLORIDE 106  --   --   --   --   --  104  --  105   CO2 28  --   --   --   --   --  31  --  32   BUN 16  --   --   --   --   --  6*  --  19   CR 0.99  --  0.63  --   --   --  0.64  0.58   < > 0.75   ANIONGAP 4  --   --   --   --   --  6  --  3   JOSUÉ 9.0  --   --   --   --   --  8.9  --  8.1*   GLC 66*  --   --  96  --   --  98  --  134*    < > = values in this interval not displayed.     Recent Labs   Lab Test 02/23/21  1931 10/08/19  1452   TROPI <0.015 <0.015

## 2022-03-12 NOTE — PLAN OF CARE
Goal Outcome Evaluation:                    DATE & TIME: 3/11/22 07-19  Cognitive Concerns/ Orientation: A/Ox4. Pleasant  BEHAVIOR & AGGRESSION TOOL COLOR: Green  ABNL VS/O2: VSS on room air  MOBILITY: Independent, asks for help when needed. Ambulates in [place] with assist of 1,walker  PAIN MANAGMENT: Denies  DIET: Regular-needs assistance to order and encouragement to eat  BOWEL/BLADDER: Continent. Voiding adequately. No BM.Refused miralax and senna.  DRAIN/DEVICES: No IV access  SKIN: Scattered bruises and scabs.  D/C DATE: Discharge pending placement and guardianship

## 2022-03-12 NOTE — PLAN OF CARE
Goal Outcome Evaluation:    DATE & TIME: 3/12/22 5795-9378  Cognitive Concerns/ Orientation: A/Ox4. Pleasant  BEHAVIOR & AGGRESSION TOOL COLOR: Green  ABNL VS/O2: VSS on room air  MOBILITY: Independent, calls appropriately   PAIN MANAGMENT: Denies  DIET: Regular- ate 100% of supper   BOWEL/BLADDER: Continent. Voiding adequately. No BM. Took scheduled senna   DRAIN/DEVICES: No IV access  SKIN: Scattered bruises and scabs.  D/C DATE: Discharge pending placement and guardianship  OTHER IMPORTANT INFO: Patient's brother granted 60 day emergency guardianship (until 4/15/22) court hearing on 4/1/22.

## 2022-03-13 PROCEDURE — 99231 SBSQ HOSP IP/OBS SF/LOW 25: CPT | Performed by: HOSPITALIST

## 2022-03-13 PROCEDURE — 250N000013 HC RX MED GY IP 250 OP 250 PS 637: Performed by: HOSPITALIST

## 2022-03-13 PROCEDURE — 120N000001 HC R&B MED SURG/OB

## 2022-03-13 PROCEDURE — 250N000013 HC RX MED GY IP 250 OP 250 PS 637: Performed by: INTERNAL MEDICINE

## 2022-03-13 RX ADMIN — Medication 1 MG: at 22:02

## 2022-03-13 RX ADMIN — ACETAMINOPHEN 650 MG: 325 TABLET, FILM COATED ORAL at 04:14

## 2022-03-13 RX ADMIN — SENNOSIDES AND DOCUSATE SODIUM 1 TABLET: 8.6; 5 TABLET ORAL at 11:14

## 2022-03-13 RX ADMIN — Medication 125 MCG: at 22:02

## 2022-03-13 RX ADMIN — SENNOSIDES AND DOCUSATE SODIUM 1 TABLET: 8.6; 5 TABLET ORAL at 22:02

## 2022-03-13 RX ADMIN — PANTOPRAZOLE SODIUM 40 MG: 40 TABLET, DELAYED RELEASE ORAL at 11:14

## 2022-03-13 NOTE — PROGRESS NOTES
"   [facility]  Hospitalist Progress Note        Date of admission: 22  When I evaluated patient: 2022          Interval History:        - Chart reviewed, no acute issues overnight; patient was sleeping and covered her face when I walked in to her room,  did not express any concern, wanted to take nap, awaiting placement         Assessment and Plan:        Miranda Dover is a 51 year old female with hx of Down syndrome who was admitted on 2022 for acute hypoxic respiratory failure due to COVID-19 pneumonia, which has resolved.     Hospitalization has been prolonged due to need to establish guardianship following the deaths of her parents during this hospitalization and now awaiting placement.         Down syndrome  Developmental delay  Failure to thrive  * Patient's primary caregivers were her parents who were also hospitalized on  for COVID. Patient's father  on  and her mother  on .   * Patient became intermittently anxious and agitated, sometimes yelling/screaming. She did not know why she was here and had repeatedly asked \"what's wrong with me?\" Following admission, she became increasingly withdrawn, refusing medications and meals at times.   * Palliative Care was initially consulted for emotional support, but this was not particularly helpful for patient.  * Discharge planning has been complicated by need for guardianship and payor source for placement.  * Patient's brother, Maxi, was granted 60 days of emergency guardianship on 2/15.   * Completed psychologic testing on 3/2/22 to assess current cognitive function and adaptive abilities.  - Next hearing scheduled on 2022 for permanent guardianship.  - Plan to transition patient to group home; patient's brother involved in this process.  - SW on case and following, awaiting placement at this time.      Severe malnutrition  Underweight with cachexia  * BMI ~13 on early this hospitalization with poor PO " intake. SLP was consulted for possible dysphagia, and no evidence of dysphagia was noted.  * Patient was treated with IV fluids 1/29-1/31. She later lost IV access, and refused replacement.  * Overall, pt noted to be very picky with foods.   * During this hospitalization, has had frequent complaints of abdominal pain (per pt's brother, this is chronic dating back years), see below.  * On 2/20, discussed with pt's brother; overall, felt that oral intake was probably acceptable at this point for discharge and did not feel we needed to pursue enteral feedings/g-tube at this point; desired workup for etiology of abdominal pain which he felt was contributing to her decreased PO intake.   * Workup for abdominal pain started as below was negative (suspected functional and constipation).  * BMI up to 14.7 2/27.  - Continue regular diet with Beneprotein with meals. Dietary preferences were previously discussed with the patient, and she likes turkey and white bread sandwiches.   - Continue to encourage PO fluids.  - Workup of abdominal pain as below.     Chronic abdominal pain, suspect functional and component of constipation  * Pt with chronic abdominal pain for years PTA; felt to limit her oral intake. US from 8/2019 did not show definite significant abnormality. CT 11/2019 showed prominence/thickening of the pylorus of the stomach is nonspecific, differential diagnosis would include inflammatory etiologies as well as underlying lesion, consider GI consultation; small amount of pelvic free fluid and mild diffuse anasarca, nonspecific. Otherwise, unclear of prior workup.  * As above, pt with frequent c/o abdominal pain this hospitalization.   * She was initiated on a bowel regimen during this hospitalization for constipation with good response.  * Started on pantoprazole this hospitalization for dyspepsia related to K supplements.  * GI consulted 2/20; CT abdomen/plevis 2/21 showed large amount of stool throughout the colon  likely indicating constipation; no other abnormality to explain abdominal pain.  * EGD 2/22 showed mild appearance/texture changed mucosa in the esophageal at the GE junction; no pathology to explain her abdominal pain. Abdominal US 2/22 did not show acute abnormality.  - Continue pantoprazole daily.  - Continue bowel regimen - polyethylene glycol daily; senna-docusate BID; PRN bisacodyl suppository.  Having regular BM's and currently have no abdominal pain     Hypokalemia due to poor PO intake  * Potassium low at times this hospitalization. Treated with replacement.  - Monitor potassium periodically; last BMP 3/9 UR.  - Continue PRN potassium replacement.     Thyroid nodule  Possible subclinical hypothyroidism  * Incidentally noted on admission chest CT. TSH was elevated to 15, but T4 was normal; likely due to severe malnutrition.  * Add-on TSH and free T4 to labs from 2/18 showed TSH elevated at 9.95 but FT4 at 1.00.  - Follow-up this outpatient.  - Repeat thyroid labs in 4-6 weeks.     Periodic hypotension, stable  * Baseline blood pressures 90s-100s systolic.  * Decreased to mid-80s systolic at times this hospitalization in the setting of minimal PO intake. Treated with IVF from 1/29-1/31.  - Monitor BP's.  - blood pressure remain stable and at her baseline in , she is asymptomatic and that seems to be her baseline normal blood pressure.      Resolved conditions  Acute hypoxic respiratory failure due to COVID-19 pneumonia  COVID recovered  * Unvaccinated.  * Symptom onset 1/19/22.  * Tested positive for COVID on 1/22/22.   * Chest CT on arrival showed bilateral infiltrates.  * Initiated on dexamethasone and remdesivir on 1/22.  * Completed 5-day course of remdesivir on 1/26.  * Completed 10-day course of dexamethasone on 1/31.  * Stopped rivaroxaban 2/20.     E coli UTI  * Pan-sensitive E.coli noted on 2/8 urine culture. She was initiated on cefuroxime on 2/9 and completed a 7 day course on 2/15.     Chest  "pain  * Intermittent chest pain noted during this hospitalization. Patient refused serial troponins and echo.  * No recent complaints.  - Monitor clinically. No more chest pain at this time.      Leukopenia and thrombocytopenia, suspect due to viral infection  * WBC ron 3.6k; PLT ron 119k - likely due to infection. Subsequently improved, and she has since refused labs  - last CBC from 3/9 with wbc 3.8; platelets 189      Agitation, restlessness, hallucinations due to metabolic and infectious encephalopathy  * Resolved following treatment of UTI.    Orders Placed This Encounter      Regular Diet Adult  Snacks/Supplements Adult: Beneprotein; With Meals      DVT Prophylaxis: Pneumatic Compression Devices and Ambulate every shift    Code Status: Full Code          Disposition Plan     Expected Discharge: medically stable for discharge pending placement; SW following for disposition    Clinically Significant Risk Factors Present on Admission                     Lynn Mendoza MD  Hospitalist              Physical Exam:      Blood pressure 94/50, pulse 78, temperature 97.6  F (36.4  C), temperature source Axillary, resp. rate 18, height 1.6 m (5' 3\"), weight 37.7 kg (83 lb 3.2 oz), SpO2 97 %.  Vitals:    01/22/22 1140 02/05/22 0729 02/14/22 1329   Weight: 33.6 kg (74 lb 1.2 oz) 43.8 kg (96 lb 9.6 oz) 37.7 kg (83 lb 3.2 oz)     Vital Signs with Ranges  Temp:  [97.6  F (36.4  C)] 97.6  F (36.4  C)  Pulse:  [78] 78  Resp:  [16-18] 18  BP: (91-94)/(50-57) 94/50  SpO2:  [97 %-100 %] 97 %  I/O's Last 24 hours  No intake/output data recorded.    General: AAO, appears comfortable. Thin/cachectic.   HEENT: PERRLA EOMI. Mucosa moist.   Lungs: Bilateral equal air entry. Clear to auscultation, normal work of breathing.   CVS: S1S2 regular, no tachycardia or murmur.   Abdomen: Soft, NT, ND. BS heard.  MSK: No edema   Neuro: AAOX3. CN 2-12 normal. Strength symmetrical.  Skin: No rash.                   Medications:          " melatonin  1 mg Oral At Bedtime     multivitamin w/minerals  1 tablet Oral Daily     pantoprazole  40 mg Oral QAM AC     polyethylene glycol  17 g Oral Daily     senna-docusate  1 tablet Oral BID     Vitamin D3  125 mcg Oral QPM     PRN Meds: acetaminophen **OR** acetaminophen, albuterol, bisacodyl, lidocaine 4%, lidocaine (buffered or not buffered), - MEDICATION INSTRUCTIONS -, naloxone, naloxone, naloxone, naloxone, ondansetron **OR** ondansetron, QUEtiapine         Data:      All new lab and imaging data was reviewed.   Recent Labs   Lab Test 03/09/22  1221 02/08/22  0843 02/06/22  1247   WBC 3.8* 7.3 5.1   HGB 12.9 12.4 11.2*   MCV 96 94 94    140* 136*      Recent Labs   Lab Test 03/09/22  1221 02/22/22  1311 02/18/22  0956 02/12/22  2127 02/11/22  0911 02/10/22  1303 02/09/22  1002 02/04/22  0903 01/23/22  1050     --   --   --   --   --  141  --  140   POTASSIUM 4.2 4.0  --   --  3.4   < > 2.8*  --  4.4   CHLORIDE 106  --   --   --   --   --  104  --  105   CO2 28  --   --   --   --   --  31  --  32   BUN 16  --   --   --   --   --  6*  --  19   CR 0.99  --  0.63  --   --   --  0.64  0.58   < > 0.75   ANIONGAP 4  --   --   --   --   --  6  --  3   JOSUÉ 9.0  --   --   --   --   --  8.9  --  8.1*   GLC 66*  --   --  96  --   --  98  --  134*    < > = values in this interval not displayed.     Recent Labs   Lab Test 02/23/21  1931 10/08/19  1452   TROPI <0.015 <0.015

## 2022-03-13 NOTE — PLAN OF CARE
Goal Outcome Evaluation:    DATE & TIME: 3/13/22 3651-3477  Cognitive Concerns/ Orientation: A/Ox4. Pleasant  BEHAVIOR & AGGRESSION TOOL COLOR: Green  ABNL VS/O2: VSS on room air  MOBILITY: SBA, calls appropriately   PAIN MANAGMENT: C/O of abdominal pain- PRN tylenol given  DIET: Regular- ate 100% of supper   BOWEL/BLADDER: Continent. Voiding adequately. No BM.   DRAIN/DEVICES: No IV access  SKIN: Scattered scabs.  D/C DATE: Discharge pending placement and guardianship  OTHER IMPORTANT INFO: Patient's brother granted 60 day emergency guardianship (until 4/15/22) court hearing on 4/1/22.

## 2022-03-13 NOTE — PLAN OF CARE
Goal Outcome Evaluation:     DATE & TIME: 3/13/22 7803-8249  Cognitive Concerns/ Orientation: A/Ox4. Forgetful; Pleasant  BEHAVIOR & AGGRESSION TOOL COLOR: Green  ABNL VS/O2: VSS on room air  MOBILITY: SBA, calls appropriately   PAIN MANAGMENT: C/O abdominal pain with eating but declines intervention. Tolerated PO; no n/v.   DIET: Regular- ate breakfast but declined lunch.    BOWEL/BLADDER: Continent. Voiding adequately. had BM.   DRAIN/DEVICES: No IV access  SKIN: Scattered scabs.  D/C DATE: Discharge pending placement and guardianship  OTHER IMPORTANT INFO: Patient's brother granted 60 day emergency guardianship (until 4/15/22) court hearing on 4/1/22.

## 2022-03-14 PROCEDURE — 99231 SBSQ HOSP IP/OBS SF/LOW 25: CPT | Performed by: INTERNAL MEDICINE

## 2022-03-14 PROCEDURE — 250N000013 HC RX MED GY IP 250 OP 250 PS 637: Performed by: REGISTERED NURSE

## 2022-03-14 PROCEDURE — 120N000001 HC R&B MED SURG/OB

## 2022-03-14 PROCEDURE — 250N000013 HC RX MED GY IP 250 OP 250 PS 637: Performed by: HOSPITALIST

## 2022-03-14 RX ORDER — CHOLECALCIFEROL (VITAMIN D3) 50 MCG
50 TABLET ORAL DAILY
Status: DISCONTINUED | OUTPATIENT
Start: 2022-03-15 | End: 2022-06-28 | Stop reason: HOSPADM

## 2022-03-14 RX ADMIN — Medication 1 MG: at 21:05

## 2022-03-14 RX ADMIN — POLYETHYLENE GLYCOL 3350 17 G: 17 POWDER, FOR SOLUTION ORAL at 09:23

## 2022-03-14 RX ADMIN — SENNOSIDES AND DOCUSATE SODIUM 1 TABLET: 8.6; 5 TABLET ORAL at 21:05

## 2022-03-14 RX ADMIN — SENNOSIDES AND DOCUSATE SODIUM 1 TABLET: 8.6; 5 TABLET ORAL at 09:22

## 2022-03-14 RX ADMIN — PANTOPRAZOLE SODIUM 40 MG: 40 TABLET, DELAYED RELEASE ORAL at 09:22

## 2022-03-14 ASSESSMENT — ACTIVITIES OF DAILY LIVING (ADL)
ADLS_ACUITY_SCORE: 15
ADLS_ACUITY_SCORE: 17
ADLS_ACUITY_SCORE: 15

## 2022-03-14 NOTE — PROGRESS NOTES
"   [facility]  Hospitalist Progress Note        Date of admission: 22  When I evaluated patient: 2022          Interval History:        - Chart reviewed, no acute issues overnight; patient was sleeping and covered her face when I walked in to her room,  did not express any concern, wanted to take nap, awaiting placement         Assessment and Plan:        51 year old female with hx of Down syndrome who was admitted on 2022 for acute hypoxic respiratory failure due to COVID-19 pneumonia, which has resolved.     Hospitalization has been prolonged due to need to establish guardianship following the deaths of her parents during this hospitalization and now awaiting placement.         Down syndrome  Developmental delay  Failure to thrive  * Patient's primary caregivers were her parents who were also hospitalized on  for COVID. Patient's father  on  and her mother  on .   * Patient became intermittently anxious and agitated, sometimes yelling/screaming. She did not know why she was here and had repeatedly asked \"what's wrong with me?\" Following admission, she became increasingly withdrawn, refusing medications and meals at times.   * Palliative Care was initially consulted for emotional support, but this was not particularly helpful for patient.  * Discharge planning has been complicated by need for guardianship and payor source for placement.  * Patient's brother, Maxi, was granted 60 days of emergency guardianship on 2/15.   * Completed psychologic testing on 3/2/22 to assess current cognitive function and adaptive abilities.  - Next hearing scheduled on 2022 for permanent guardianship.  - Plan to transition patient to group home; patient's brother involved in this process.  - SW on case and following, awaiting placement at this time.      Severe malnutrition  Underweight with cachexia  * BMI ~13 on early this hospitalization with poor PO intake. SLP was consulted " for possible dysphagia, and no evidence of dysphagia was noted.  * Patient was treated with IV fluids 1/29-1/31. She later lost IV access, and refused replacement.  * Overall, pt noted to be very picky with foods.   * During this hospitalization, has had frequent complaints of abdominal pain (per pt's brother, this is chronic dating back years), see below.  * On 2/20, discussed with pt's brother; overall, felt that oral intake was probably acceptable at this point for discharge and did not feel we needed to pursue enteral feedings/g-tube at this point; desired workup for etiology of abdominal pain which he felt was contributing to her decreased PO intake.   * Workup for abdominal pain started as below was negative (suspected functional and constipation).  * BMI up to 14.7 2/27.  - Continue regular diet with Beneprotein with meals. Dietary preferences were previously discussed with the patient, and she likes turkey and white bread sandwiches.   - Continue to encourage PO fluids.  - Workup of abdominal pain as below.     Chronic abdominal pain, suspect functional and component of constipation  * Pt with chronic abdominal pain for years PTA; felt to limit her oral intake. US from 8/2019 did not show definite significant abnormality. CT 11/2019 showed prominence/thickening of the pylorus of the stomach is nonspecific, differential diagnosis would include inflammatory etiologies as well as underlying lesion, consider GI consultation; small amount of pelvic free fluid and mild diffuse anasarca, nonspecific. Otherwise, unclear of prior workup.  * As above, pt with frequent c/o abdominal pain this hospitalization.   * She was initiated on a bowel regimen during this hospitalization for constipation with good response.  * Started on pantoprazole this hospitalization for dyspepsia related to K supplements.  * GI consulted 2/20; CT abdomen/plevis 2/21 showed large amount of stool throughout the colon likely indicating  constipation; no other abnormality to explain abdominal pain.  * EGD 2/22 showed mild appearance/texture changed mucosa in the esophageal at the GE junction; no pathology to explain her abdominal pain. Abdominal US 2/22 did not show acute abnormality.  - Continue pantoprazole daily.  - Continue bowel regimen - polyethylene glycol daily; senna-docusate BID; PRN bisacodyl suppository.  Having regular BM's and currently have no abdominal pain     Hypokalemia due to poor PO intake  * Potassium low at times this hospitalization. Treated with replacement.  - Monitor potassium periodically; last BMP 3/9 UR.  - Continue PRN potassium replacement.     Thyroid nodule  Possible subclinical hypothyroidism  * Incidentally noted on admission chest CT. TSH was elevated to 15, but T4 was normal; likely due to severe malnutrition.  * Add-on TSH and free T4 to labs from 2/18 showed TSH elevated at 9.95 but FT4 at 1.00.  - Follow-up this outpatient.  - Repeat thyroid labs in 4-6 weeks.     Periodic hypotension, stable  * Baseline blood pressures 90s-100s systolic.  * Decreased to mid-80s systolic at times this hospitalization in the setting of minimal PO intake. Treated with IVF from 1/29-1/31.  - Monitor BP's.  - blood pressure remain stable and at her baseline in , she is asymptomatic and that seems to be her baseline normal blood pressure.      Resolved conditions  Acute hypoxic respiratory failure due to COVID-19 pneumonia  COVID recovered  * Unvaccinated.  * Symptom onset 1/19/22.  * Tested positive for COVID on 1/22/22.   * Chest CT on arrival showed bilateral infiltrates.  * Initiated on dexamethasone and remdesivir on 1/22.  * Completed 5-day course of remdesivir on 1/26.  * Completed 10-day course of dexamethasone on 1/31.  * Stopped rivaroxaban 2/20.     E coli UTI  * Pan-sensitive E.coli noted on 2/8 urine culture. She was initiated on cefuroxime on 2/9 and completed a 7 day course on 2/15.     Chest pain  *  "Intermittent chest pain noted during this hospitalization. Patient refused serial troponins and echo.  * No recent complaints.  - Monitor clinically. No more chest pain at this time.      Leukopenia and thrombocytopenia, suspect due to viral infection  * WBC ron 3.6k; PLT ron 119k - likely due to infection. Subsequently improved, and she has since refused labs  - last CBC from 3/9 with wbc 3.8; platelets 189      Agitation, restlessness, hallucinations due to metabolic and infectious encephalopathy  * Resolved following treatment of UTI.    Orders Placed This Encounter      Regular Diet Adult  Snacks/Supplements Adult: Beneprotein; With Meals      DVT Prophylaxis: Pneumatic Compression Devices and Ambulate every shift    Code Status: Full Code          Disposition Plan:  LTC when place available (possible group home)     Beni Burns MD   Pager: [phone]  Cell Phone:  [phone]   Hospitalist        Subjective  No Complaints.                Physical Exam:      Blood pressure (!) 88/55, pulse 65, temperature 97.1  F (36.2  C), temperature source Axillary, resp. rate 18, height 1.6 m (5' 3\"), weight 37.7 kg (83 lb 3.2 oz), SpO2 99 %.  Vitals:    01/22/22 1140 02/05/22 0729 02/14/22 1329   Weight: 33.6 kg (74 lb 1.2 oz) 43.8 kg (96 lb 9.6 oz) 37.7 kg (83 lb 3.2 oz)     Vital Signs with Ranges  Temp:  [97.1  F (36.2  C)-98.1  F (36.7  C)] 97.1  F (36.2  C)  Pulse:  [65-70] 65  Resp:  [18] 18  BP: ()/(55-58) 88/55  SpO2:  [92 %-99 %] 99 %  I/O's Last 24 hours  I/O last 3 completed shifts:  In: 240 [P.O.:240]  Out: -     General: AAO, appears comfortable. Thin/cachectic.   Lungs: Bilateral equal air entry. Clear to auscultation, normal work of breathing.   CVS: S1S2 regular, no tachycardia or murmur.   Abdomen: Soft, NT, ND. BS heard.  MSK: No edema   Neuro: Alert,. CN 2-12 normal. No focal weakness.                     Medications:          melatonin  1 mg Oral At Bedtime     multivitamin " w/minerals  1 tablet Oral Daily     pantoprazole  40 mg Oral QAM AC     polyethylene glycol  17 g Oral Daily     senna-docusate  1 tablet Oral BID     Vitamin D3  125 mcg Oral QPM     PRN Meds: acetaminophen **OR** acetaminophen, albuterol, bisacodyl, lidocaine 4%, lidocaine (buffered or not buffered), - MEDICATION INSTRUCTIONS -, naloxone, naloxone, naloxone, naloxone, ondansetron **OR** ondansetron, QUEtiapine         Data:      All new lab and imaging data was reviewed.   Recent Labs   Lab Test 03/09/22  1221 02/08/22  0843 02/06/22  1247   WBC 3.8* 7.3 5.1   HGB 12.9 12.4 11.2*   MCV 96 94 94    140* 136*      Recent Labs   Lab Test 03/09/22  1221 02/22/22  1311 02/18/22  0956 02/12/22  2127 02/11/22  0911 02/10/22  1303 02/09/22  1002 02/04/22  0903 01/23/22  1050     --   --   --   --   --  141  --  140   POTASSIUM 4.2 4.0  --   --  3.4   < > 2.8*  --  4.4   CHLORIDE 106  --   --   --   --   --  104  --  105   CO2 28  --   --   --   --   --  31  --  32   BUN 16  --   --   --   --   --  6*  --  19   CR 0.99  --  0.63  --   --   --  0.64  0.58   < > 0.75   ANIONGAP 4  --   --   --   --   --  6  --  3   JOSUÉ 9.0  --   --   --   --   --  8.9  --  8.1*   GLC 66*  --   --  96  --   --  98  --  134*    < > = values in this interval not displayed.     Recent Labs   Lab Test 02/23/21  1931 10/08/19  1452   TROPI <0.015 <0.015

## 2022-03-14 NOTE — PLAN OF CARE
Aox4. Pleasant. VSS on RA. Ind. Calls appropriately. RDA. Cont. +flatus +BS +BM. No PIV access. Denies pain. Declines skin assessment. Brother, Maxi, is pt's legal guardian. discharge pending placement.

## 2022-03-14 NOTE — PLAN OF CARE
Physical Therapy Discharge Summary    Reason for therapy discharge:    All goals and outcomes met, no further needs identified.    Progress towards therapy goal(s). See goals on Care Plan in Epic electronic health record for goal details.  Goals met    Therapy recommendation(s):    Continue home exercise program. Patient able to ambulate long hallway distances w/ FWW. Anticipated to be able to discharge to location with occasional assist of 1 as needed, likely a group home. No further skilled PT needed while in inpatient.     Adeline Rodriguez, PT

## 2022-03-14 NOTE — PLAN OF CARE
Goal Outcome Evaluation:    Plan of Care Reviewed With: patient      DATE & TIME: 3/14/22 2989-11145  Cognitive Concerns/ Orientation: A/Ox4. Forgetful; Pleasant  BEHAVIOR & AGGRESSION TOOL COLOR: Green  ABNL VS/O2: VSS on room air  MOBILITY: SBA, calls appropriately   PAIN MANAGMENT: Chronic abdominal pain- baseline    DIET: Regular  BOWEL/BLADDER: Continent. DRAIN/DEVICES: No IV access  SKIN: Scattered scabs.  D/C DATE: Discharge pending placement and guardianship  OTHER IMPORTANT INFO: Patient's brother granted 60 day emergency guardianship (until 4/15/22) court hearing on 4/1/22.

## 2022-03-14 NOTE — PLAN OF CARE
Goal Outcome Evaluation:      DATE & TIME: 3/14/22 2923-3726  Cognitive Concerns/ Orientation: A/Ox4. Forgetful; Pleasant  BEHAVIOR & AGGRESSION TOOL COLOR: Green  ABNL VS/O2: VSS on room air  MOBILITY: SBA, calls appropriately   PAIN MANAGMENT: Chronic abdominal pain- baseline    DIET: Regular  BOWEL/BLADDER: Continent. DRAIN/DEVICES: No IV access  SKIN: Scattered scabs.  D/C DATE: Discharge pending placement and guardianship  OTHER IMPORTANT INFO: Patient's brother granted 60 day emergency guardianship (until 4/15/22) court hearing on 4/1/22.

## 2022-03-15 PROBLEM — J96.01 ACUTE RESPIRATORY FAILURE WITH HYPOXIA (H): Status: ACTIVE | Noted: 2022-03-15

## 2022-03-15 PROBLEM — R09.02 HYPOXIA: Status: RESOLVED | Noted: 2022-01-22 | Resolved: 2022-03-15

## 2022-03-15 PROBLEM — N18.30 CKD (CHRONIC KIDNEY DISEASE) STAGE 3, GFR 30-59 ML/MIN (H): Status: ACTIVE | Noted: 2018-05-18

## 2022-03-15 PROCEDURE — 99231 SBSQ HOSP IP/OBS SF/LOW 25: CPT | Performed by: INTERNAL MEDICINE

## 2022-03-15 PROCEDURE — 250N000013 HC RX MED GY IP 250 OP 250 PS 637: Performed by: INTERNAL MEDICINE

## 2022-03-15 PROCEDURE — 250N000013 HC RX MED GY IP 250 OP 250 PS 637: Performed by: HOSPITALIST

## 2022-03-15 PROCEDURE — 120N000001 HC R&B MED SURG/OB

## 2022-03-15 RX ADMIN — SENNOSIDES AND DOCUSATE SODIUM 1 TABLET: 8.6; 5 TABLET ORAL at 10:05

## 2022-03-15 RX ADMIN — ACETAMINOPHEN 325 MG: 325 TABLET, FILM COATED ORAL at 00:31

## 2022-03-15 RX ADMIN — Medication 1 MG: at 21:28

## 2022-03-15 RX ADMIN — Medication 50 MCG: at 10:05

## 2022-03-15 RX ADMIN — PANTOPRAZOLE SODIUM 40 MG: 40 TABLET, DELAYED RELEASE ORAL at 10:05

## 2022-03-15 RX ADMIN — SENNOSIDES AND DOCUSATE SODIUM 1 TABLET: 8.6; 5 TABLET ORAL at 21:28

## 2022-03-15 ASSESSMENT — ACTIVITIES OF DAILY LIVING (ADL)
ADLS_ACUITY_SCORE: 15

## 2022-03-15 NOTE — PLAN OF CARE
DATE & TIME: 3/14/22 3871-6528    Cognitive Concerns/ Orientation : Pt A/Ox4, forgetful   BEHAVIOR & AGGRESSION TOOL COLOR: Green   ABNL VS/O2: VSS on RA, daily vitals  MOBILITY: SBA/Independent, calls appropriately  PAIN MANAGMENT: Complaints of abdominal pain, managed with PRN Tylenol  DIET: Regular  BOWEL/BLADDER: Continent  DRAIN/DEVICES: No IV access  SKIN: Scattered scabs  D/C DATE: Discharge pending placement and guardianship  OTHER IMPORTANT INFO: Brother granted 60 day emergency guardianship (until 4/15/22) court hearing on 4/1/22

## 2022-03-15 NOTE — PLAN OF CARE
DATE & TIME: 3/15/22 2620-6899  Cognitive Concerns/ Orientation : A&O x4  BEHAVIOR & AGGRESSION TOOL COLOR: Green   ABNL VS/O2: VSS on RA, daily vitals  MOBILITY: SBA/Independent, calls appropriately  PAIN MANAGMENT: Complains of abdominal pain/discomfort at times, declined intervention.   DIET: Regular diet, tolerating  BOWEL/BLADDER: Continent, up to bathroom. BM today.   DRAIN/DEVICES: No IV access  SKIN: Scattered scabs/bruises.   D/C DATE: Discharge pending placement and guardianship  OTHER IMPORTANT INFO: Brother granted 60 day emergency guardianship (until 4/15/22) court hearing on 4/1/22    Goal Outcome Evaluation: medically stable for discharge, pending placement/guardianship

## 2022-03-15 NOTE — PLAN OF CARE
Goal Outcome Evaluation:     DATE & TIME: 3/14/22 9403-6117  Cognitive Concerns/ Orientation: A/Ox4. Forgetful; Pleasant  BEHAVIOR & AGGRESSION TOOL COLOR: Green  ABNL VS/O2: VSS on room air  MOBILITY: SBA, calls appropriately   PAIN MANAGMENT: denied any pain - denied abdominal pain  DIET: Regular  BOWEL/BLADDER: Continent. DRAIN/DEVICES: No IV access  SKIN: Scattered scabs.  D/C DATE: Discharge pending placement and guardianship  OTHER IMPORTANT INFO: Patient's brother granted 60 day emergency guardianship (until 4/15/22) court hearing on 4/1/22.

## 2022-03-15 NOTE — PROGRESS NOTES
[facility]    Hospitalist Progress Note    Assessment & Plan   51 year old female who was admitted on 1/22/2022 with SOB:    Impression:   Principal Problem:    COVID-19 Pneumonia, Pos 1/22/22, Unvacinated -- Recovered  Active Problems:    Down's syndrome    CKD (chronic kidney disease) stage 3, GFR 30-59 ml/min (H)    Acute respiratory failure with hypoxia -- resolved      Plan:  Continue present care    DVT Prophylaxis: Pneumatic Compression Devices  Code Status: Full Code    Disposition: Expected discharge once placement found (?possible group home)    Beni Burns  Pager [phone]  Cell Phone [phone]  Text Page (7am to 6pm)    Interval History   No complaints, continues to color non-stop.     Physical Exam   Temp: 97.3  F (36.3  C) Temp src: Oral BP: 95/56 Pulse: 67   Resp: 18 SpO2: 99 % O2 Device: Nasal cannula    Vitals:    01/22/22 1140 02/05/22 0729 02/14/22 1329   Weight: 33.6 kg (74 lb 1.2 oz) 43.8 kg (96 lb 9.6 oz) 37.7 kg (83 lb 3.2 oz)     Vital Signs with Ranges  Temp:  [97.3  F (36.3  C)] 97.3  F (36.3  C)  Pulse:  [67] 67  Resp:  [18] 18  BP: (95)/(56) 95/56  SpO2:  [99 %] 99 %  I/O last 3 completed shifts:  In: 240 [P.O.:240]  Out: -     # Pain Assessment:  Current Pain Score 3/14/2022   Patient currently in pain? denies   Pain score (0-10) -   [facility]   Miranda castrejon pain level was assessed and she currently denies pain.        Constitutional: Awake, alert, cooperative, no apparent distress  Respiratory: Clear to auscultation bilaterally, no crackles or wheezing  Cardiovascular: Regular rate and rhythm, normal S1 and S2, and no murmur noted  GI: Normal bowel sounds, soft, non-distended, non-tender  Extrem: No calf tenderness, no ankle edema  Neuro: Alert, no focal motor or sensory deficits    Medications       melatonin  1 mg Oral At Bedtime     pantoprazole  40 mg Oral QAM AC     polyethylene glycol  17 g Oral Daily     senna-docusate  1 tablet  Oral BID     cholecalciferol  50 mcg Oral Daily       Data   Recent Labs   Lab 03/09/22  1221   WBC 3.8*   HGB 12.9   MCV 96         POTASSIUM 4.2   CHLORIDE 106   CO2 28   BUN 16   CR 0.99   ANIONGAP 4   JOSUÉ 9.0   GLC 66*   ALBUMIN 3.6       Imaging:   No results found for this or any previous visit (from the past 24 hour(s)).

## 2022-03-16 PROCEDURE — 250N000013 HC RX MED GY IP 250 OP 250 PS 637: Performed by: HOSPITALIST

## 2022-03-16 PROCEDURE — 250N000013 HC RX MED GY IP 250 OP 250 PS 637: Performed by: INTERNAL MEDICINE

## 2022-03-16 PROCEDURE — 99231 SBSQ HOSP IP/OBS SF/LOW 25: CPT | Performed by: INTERNAL MEDICINE

## 2022-03-16 PROCEDURE — 120N000001 HC R&B MED SURG/OB

## 2022-03-16 RX ADMIN — SENNOSIDES AND DOCUSATE SODIUM 1 TABLET: 8.6; 5 TABLET ORAL at 21:28

## 2022-03-16 RX ADMIN — ACETAMINOPHEN 650 MG: 325 TABLET, FILM COATED ORAL at 00:24

## 2022-03-16 RX ADMIN — Medication 1 MG: at 21:28

## 2022-03-16 RX ADMIN — Medication 50 MCG: at 10:41

## 2022-03-16 RX ADMIN — SENNOSIDES AND DOCUSATE SODIUM 1 TABLET: 8.6; 5 TABLET ORAL at 10:41

## 2022-03-16 RX ADMIN — PANTOPRAZOLE SODIUM 40 MG: 40 TABLET, DELAYED RELEASE ORAL at 10:41

## 2022-03-16 ASSESSMENT — ACTIVITIES OF DAILY LIVING (ADL)
ADLS_ACUITY_SCORE: 15
ADLS_ACUITY_SCORE: 13
ADLS_ACUITY_SCORE: 15
ADLS_ACUITY_SCORE: 13
ADLS_ACUITY_SCORE: 13
ADLS_ACUITY_SCORE: 15
ADLS_ACUITY_SCORE: 15
ADLS_ACUITY_SCORE: 13
ADLS_ACUITY_SCORE: 15
ADLS_ACUITY_SCORE: 13
ADLS_ACUITY_SCORE: 13
ADLS_ACUITY_SCORE: 15
ADLS_ACUITY_SCORE: 13
ADLS_ACUITY_SCORE: 13
ADLS_ACUITY_SCORE: 15
ADLS_ACUITY_SCORE: 13
ADLS_ACUITY_SCORE: 15
ADLS_ACUITY_SCORE: 13
ADLS_ACUITY_SCORE: 15

## 2022-03-16 NOTE — PROGRESS NOTES
CLINICAL NUTRITION SERVICES - REASSESSMENT NOTE    Malnutrition: (3/16)   % Weight Loss: Unable to evaluate   % Intake:  Decreased intake does not meet criteria for malnutrition - improving   Subcutaneous Fat Loss:  Orbital region moderate depletion, Upper arm region moderate depletion and Thoracic region moderate depletion  Muscle Loss:  Temporal region moderate depletion, Clavicle bone region moderate depletion and Dorsal hand region moderate depletion  Fluid Retention:  None noted    Malnutrition Diagnosis: Moderate malnutrition  In Context of:  Chronic illness or disease     EVALUATION OF PROGRESS TOWARD GOALS   Diet: Regular  Intake/Tolerance:   - Eating % of most meals over the past week. Per meal review she usually receives 3 smaller meals each day. These often consist of a deli sandwich and lemon lime soda, or penne noodles with meat sauce. She sometimes receives a vanilla pudding or some fruit.   - Last weight measured on 2/14 - 37.7 kg.   - Stooling: BM x2 yesterday.     - Pt seen in room. She had breakfast delivered during visit - turkey sandwich on white bread. She states she is sick of vanilla pudding and eggs right now. Otherwise states she is eating fine.     ASSESSED NUTRITION NEEDS:  Dosing Weight 33.6 kg   Estimated Energy Needs: 1069-2745 kcals (35-40 Kcal/Kg)  Justification: repletion and underweight  Estimated Protein Needs: 50-70 grams protein (1.5-2 g pro/Kg)  Justification: repletion and hypercatabolism with acute illness    Previous Goals:   Intake of >75% meals BID or 50% TID  Evaluation: Met    Previous Nutrition Diagnosis:   Predicted inadequate nutrient intake (protein-calorie) related to improving appetite and PO intake to % 2-3 meals/day over the past 3 days but previously with poor and fluctuating intakes over admission, potential for decline in intake again pending LOS, medical course, menu fatigue, etc  Evaluation: No change    MALNUTRITION  % Weight Loss: Unable to  evaluate   % Intake:  Decreased intake does not meet criteria for malnutrition - improving   Subcutaneous Fat Loss:  Orbital region moderate depletion, Upper arm region moderate depletion and Thoracic region moderate depletion  Muscle Loss:  Temporal region moderate depletion, Clavicle bone region moderate depletion and Dorsal hand region moderate depletion  Fluid Retention:  None noted    Malnutrition Diagnosis: Moderate malnutrition  In Context of:  Chronic illness or disease    CURRENT NUTRITION DIAGNOSIS  Predicted inadequate nutrient intake (protein-calorie) related to improving appetite and PO intake to % 2-3 meals/day over the past 3 days but previously with poor and fluctuating intakes over admission, potential for decline in intake again pending LOS, medical course, menu fatigue, etc*    INTERVENTIONS  Recommendations / Nutrition Prescription  Continue regular diet + encourage intakes     Implementation  None new     Goals  Intake of at least 75% for meals BID-TID.       MONITORING AND EVALUATION:  Progress towards goals will be monitored and evaluated per protocol and Practice Guidelines    Rosalba Adams RD, LD  [facility] [place], 68, [facility], [facility]  Pager: [phone]  Weekend Pager: [phone]

## 2022-03-16 NOTE — PLAN OF CARE
DATE & TIME: 3/15/22 7883-5754    Cognitive Concerns/ Orientation : Pt A/Ox4   BEHAVIOR & AGGRESSION TOOL COLOR: Green   ABNL VS/O2: VSS on RA, daily vitals  MOBILITY: SBA/Independent, calls appropriately  PAIN MANAGMENT: Denies  DIET: Regular  BOWEL/BLADDER: Continent  DRAIN/DEVICES: No IV access  SKIN: Scattered scabs/bruises  D/C DATE: Discharge pending placement and guardianship  OTHER IMPORTANT INFO: Brother granted 60 day guardianship (until 4/15/22) court hearing on 4/1/22

## 2022-03-16 NOTE — PROGRESS NOTES
Care Management Follow Up    Length of Stay (days): 53    Expected Discharge Date: 03/30/2022     Concerns to be Addressed: discharge planning     Patient plan of care discussed at interdisciplinary rounds: Yes    Anticipated Discharge Disposition: Transitional Care     Anticipated Discharge Services:    Anticipated Discharge DME:      Patient/family educated on Medicare website which has current facility and service quality ratings: no  Education Provided on the Discharge Plan:    Patient/Family in Agreement with the Plan: yes    Referrals Placed by CM/SW: Post Acute Facilities  Private pay costs discussed: Not applicable    Additional Information:    Elsi confirmed with Abraham Laguerre (ph:  [phone]) from [facility] that they have yet to connect with pt's brother (emergency guardian) to complete his portion of the adaptive testing.  Abraham states that Dr. Gramajo attempted to reach out to Maxi but was unable to connect/leave  to schedule this appt.  Once Maxi completes his portion of adaptive testing, [facility] can then complete pt's portion.  Elsi called Maxi, no answer and vm box was full.  Elsi will attempt to call back again today.    Update:  Elsi connected with Maxi.  Maxi states that he tried calling [facility] and was unable to connect with Dr. Gramajo.  Maxi has Abraham Laguerre's phone number and stated that he will call her today to try to schedule his portion of the adaptive testing.  Elsi explained that his portion needs to be completed before they will assess Miranda.  Maxi stated that he will call Sw back with an update.      Connie Smalls, BARBIESW

## 2022-03-16 NOTE — PLAN OF CARE
Goal Outcome Evaluation:      DATE & TIME: 3/15/22 5005-5890    Cognitive Concerns/ Orientation : Pt A/Ox4   BEHAVIOR & AGGRESSION TOOL COLOR: Green   ABNL VS/O2: VSS on RA, daily vitals  MOBILITY: SBA/Independent, calls appropriately  PAIN MANAGMENT: Tylenol x1 for abdominal discomfort  DIET: Regular  BOWEL/BLADDER: Continent  DRAIN/DEVICES: No IV access  SKIN: Scattered scabs/bruises  D/C DATE: Discharge pending placement and guardianship  OTHER IMPORTANT INFO: Brother granted 60 day guardianship (until 4/15/22) court hearing on 4/1/22

## 2022-03-16 NOTE — PROGRESS NOTES
[facility]    Hospitalist Progress Note    Assessment & Plan   51 year old female who was admitted on 1/22/2022 with SOB:    Impression:   Principal Problem:    COVID-19 Pneumonia, Pos 1/22/22, Unvacinated -- Recovered  Active Problems:    Down's syndrome    CKD (chronic kidney disease) stage 3, GFR 30-59 ml/min (H)    Acute respiratory failure with hypoxia -- resolved      Plan:  Continue present care    DVT Prophylaxis: Pneumatic Compression Devices  Code Status: Full Code    Disposition: Expected discharge once placement found (?possible group home)    Beni Burns  Pager [phone]  Cell Phone [phone]  Text Page (7am to 6pm)    Interval History   No complaints, continues to color non-stop.     Physical Exam   Temp: 97.8  F (36.6  C) Temp src: Oral BP: 103/60 Pulse: 64   Resp: 16 SpO2: 100 % O2 Device: None (Room air)    Vitals:    01/22/22 1140 02/05/22 0729 02/14/22 1329   Weight: 33.6 kg (74 lb 1.2 oz) 43.8 kg (96 lb 9.6 oz) 37.7 kg (83 lb 3.2 oz)     Vital Signs with Ranges  Temp:  [97.8  F (36.6  C)] 97.8  F (36.6  C)  Pulse:  [63-64] 64  Resp:  [16] 16  BP: (103-105)/(60-72) 103/60  SpO2:  [100 %] 100 %  No intake/output data recorded.    # Pain Assessment:  Current Pain Score 3/16/2022   Patient currently in pain? denies   Pain score (0-10) -   [facility]   Miranda castrejon pain level was assessed and she currently denies pain.        Constitutional: Awake, alert, cooperative, no apparent distress  Respiratory: Clear to auscultation bilaterally, no crackles or wheezing  Cardiovascular: Regular rate and rhythm, normal S1 and S2, and no murmur noted  GI: Normal bowel sounds, soft, non-distended, non-tender  Extrem: No calf tenderness, no ankle edema  Neuro: Alert, no focal motor or sensory deficits    Medications       melatonin  1 mg Oral At Bedtime     pantoprazole  40 mg Oral QAM AC     polyethylene glycol  17 g Oral Daily     senna-docusate  1 tablet Oral BID      cholecalciferol  50 mcg Oral Daily       Data   No lab results found in last 7 days.    Imaging:   No results found for this or any previous visit (from the past 24 hour(s)).

## 2022-03-17 PROCEDURE — 250N000013 HC RX MED GY IP 250 OP 250 PS 637: Performed by: HOSPITALIST

## 2022-03-17 PROCEDURE — 99231 SBSQ HOSP IP/OBS SF/LOW 25: CPT | Performed by: INTERNAL MEDICINE

## 2022-03-17 PROCEDURE — 120N000001 HC R&B MED SURG/OB

## 2022-03-17 RX ADMIN — PANTOPRAZOLE SODIUM 40 MG: 40 TABLET, DELAYED RELEASE ORAL at 10:15

## 2022-03-17 RX ADMIN — ACETAMINOPHEN 650 MG: 325 TABLET, FILM COATED ORAL at 21:57

## 2022-03-17 RX ADMIN — Medication 1 MG: at 23:23

## 2022-03-17 RX ADMIN — SENNOSIDES AND DOCUSATE SODIUM 1 TABLET: 8.6; 5 TABLET ORAL at 21:57

## 2022-03-17 RX ADMIN — SENNOSIDES AND DOCUSATE SODIUM 1 TABLET: 8.6; 5 TABLET ORAL at 10:15

## 2022-03-17 ASSESSMENT — ACTIVITIES OF DAILY LIVING (ADL)
ADLS_ACUITY_SCORE: 13

## 2022-03-17 NOTE — PLAN OF CARE
DATE & TIME: 3/16/2022 0807-4708  Cognitive Concerns/ Orientation : Pt A&Ox4   BEHAVIOR & AGGRESSION TOOL COLOR: Green   ABNL VS/O2: VSS on RA, daily vitals  MOBILITY: SBA/Independent, calls appropriately  PAIN MANAGMENT: Intermittent abd pain, declined intervention.   DIET: Regular diet, tolerating   BOWEL/BLADDER: Continent, up to bathroom. BM x2 today.   DRAIN/DEVICES: No IV access  SKIN: Scattered scabs/bruises. Blanchable redness to coccyx   D/C DATE: Discharge pending placement and guardianship  OTHER IMPORTANT INFO: Brother granted 60 day guardianship (until 4/15/22) court hearing on 4/1/22    Goal Outcome Evaluation: plan reviewed with patient

## 2022-03-17 NOTE — PLAN OF CARE
DATE & TIME: 3/16/22 8650-9034    Cognitive Concerns/ Orientation : Pt A/Ox4   BEHAVIOR & AGGRESSION TOOL COLOR: Green   ABNL VS/O2: VSS on RA, daily vitals  MOBILITY: SBA/Independent, steady, calls appropriately  PAIN MANAGMENT: Denies  DIET: Regular  BOWEL/BLADDER: Continent  DRAIN/DEVICES: No IV access  SKIN: Scattered scabs/bruises. Blanchable redness to coccyx  D/C DATE: Discharge pending placement and guardianship  OTHER IMPORTANT INFO: Brother granted 60 day guardianship (until 4/15/22) court hearing on 4/1/22

## 2022-03-18 PROCEDURE — 250N000013 HC RX MED GY IP 250 OP 250 PS 637: Performed by: INTERNAL MEDICINE

## 2022-03-18 PROCEDURE — 99231 SBSQ HOSP IP/OBS SF/LOW 25: CPT | Performed by: INTERNAL MEDICINE

## 2022-03-18 PROCEDURE — 120N000001 HC R&B MED SURG/OB

## 2022-03-18 PROCEDURE — 250N000013 HC RX MED GY IP 250 OP 250 PS 637: Performed by: REGISTERED NURSE

## 2022-03-18 PROCEDURE — 250N000013 HC RX MED GY IP 250 OP 250 PS 637: Performed by: HOSPITALIST

## 2022-03-18 RX ADMIN — Medication 50 MCG: at 10:03

## 2022-03-18 RX ADMIN — POLYETHYLENE GLYCOL 3350 17 G: 17 POWDER, FOR SOLUTION ORAL at 10:03

## 2022-03-18 RX ADMIN — SENNOSIDES AND DOCUSATE SODIUM 1 TABLET: 8.6; 5 TABLET ORAL at 10:03

## 2022-03-18 RX ADMIN — PANTOPRAZOLE SODIUM 40 MG: 40 TABLET, DELAYED RELEASE ORAL at 10:03

## 2022-03-18 RX ADMIN — Medication 1 MG: at 21:00

## 2022-03-18 RX ADMIN — SENNOSIDES AND DOCUSATE SODIUM 1 TABLET: 8.6; 5 TABLET ORAL at 20:57

## 2022-03-18 ASSESSMENT — ACTIVITIES OF DAILY LIVING (ADL)
ADLS_ACUITY_SCORE: 13
ADLS_ACUITY_SCORE: 13
ADLS_ACUITY_SCORE: 14
ADLS_ACUITY_SCORE: 13
ADLS_ACUITY_SCORE: 13
ADLS_ACUITY_SCORE: 12
ADLS_ACUITY_SCORE: 12
ADLS_ACUITY_SCORE: 13
ADLS_ACUITY_SCORE: 12
ADLS_ACUITY_SCORE: 14
ADLS_ACUITY_SCORE: 12
ADLS_ACUITY_SCORE: 14
ADLS_ACUITY_SCORE: 13
ADLS_ACUITY_SCORE: 14
ADLS_ACUITY_SCORE: 13
ADLS_ACUITY_SCORE: 14
ADLS_ACUITY_SCORE: 12
ADLS_ACUITY_SCORE: 14
ADLS_ACUITY_SCORE: 13
ADLS_ACUITY_SCORE: 13
ADLS_ACUITY_SCORE: 12
ADLS_ACUITY_SCORE: 13
ADLS_ACUITY_SCORE: 14
ADLS_ACUITY_SCORE: 13

## 2022-03-18 NOTE — PLAN OF CARE
"Goal Outcome Evaluation: with pt    DATE & TIME: 3/18/22   Cognitive Concerns/ Orientation : A&Ox4.  Afraid to take a \"tub\", tearful, needs time to cope.  BEHAVIOR & AGGRESSION TOOL COLOR: Green   ABNL VS/O2: VSS on RA, daily vitals  MOBILITY: Ind in room, prefers SBA in [place].   PAIN MANAGMENT: Denies  DIET: Regular  BOWEL/BLADDER: Continent. BM x1. (receiving sched Miralax and senna)   DRAIN/DEVICES: No IV access  SKIN: Scattered scabs/bruises. Blanchable redness to coccyx  D/C DATE: Pend placement and guardianship. Brother granted 60 day guardianship (until 4/15/22) court hearing on 4/1/22.  OTHER  IMPORTANT INFO: Content coloring, walking in [place] with staff, trying to surprise staff with tickles. Agreed to bathe in the tub, refused hair washing.       "

## 2022-03-18 NOTE — PROGRESS NOTES
[facility]    Hospitalist Progress Note    Assessment & Plan   51 year old female who was admitted on 1/22/2022 with SOB:    Impression:   Principal Problem:    COVID-19 Pneumonia, Pos 1/22/22, Unvacinated -- Recovered  Active Problems:    Down's syndrome    CKD (chronic kidney disease) stage 3, GFR 30-59 ml/min (H)    Acute respiratory failure with hypoxia -- resolved      Plan:  Continue present care    DVT Prophylaxis: Pneumatic Compression Devices  Code Status: Full Code    Disposition: Expected discharge once placement found (?possible group home)    Beni Burns  Pager [phone]  Cell Phone [phone]  Text Page (7am to 6pm)    Interval History   No complaints, continues to color non-stop.     Physical Exam   Temp: 98  F (36.7  C) Temp src: Oral BP: 103/61 Pulse: 66   Resp: 16 SpO2: 98 % O2 Device: None (Room air)    Vitals:    01/22/22 1140 02/05/22 0729 02/14/22 1329   Weight: 33.6 kg (74 lb 1.2 oz) 43.8 kg (96 lb 9.6 oz) 37.7 kg (83 lb 3.2 oz)     Vital Signs with Ranges  Temp:  [98  F (36.7  C)] 98  F (36.7  C)  Pulse:  [66] 66  Resp:  [16] 16  BP: (103)/(61) 103/61  SpO2:  [98 %] 98 %  I/O last 3 completed shifts:  In: 240 [P.O.:240]  Out: -     # Pain Assessment:  Current Pain Score 3/17/2022   Patient currently in pain? denies   Pain score (0-10) -   [facility]   Miranda castrejon pain level was assessed and she currently denies pain.        Constitutional: Awake, alert, cooperative, no apparent distress  Respiratory: Clear to auscultation bilaterally, no crackles or wheezing  Cardiovascular: Regular rate and rhythm, normal S1 and S2, and no murmur noted  GI: Normal bowel sounds, soft, non-distended, non-tender  Extrem: No calf tenderness, no ankle edema  Neuro: Alert, no focal motor or sensory deficits    Medications       melatonin  1 mg Oral At Bedtime     pantoprazole  40 mg Oral QAM AC     polyethylene glycol  17 g Oral Daily     senna-docusate  1 tablet  Oral BID     cholecalciferol  50 mcg Oral Daily       Data   No lab results found in last 7 days.    Imaging:   No results found for this or any previous visit (from the past 24 hour(s)).

## 2022-03-18 NOTE — PROGRESS NOTES
[facility]    Hospitalist Progress Note    Assessment & Plan   51 year old female who was admitted on 1/22/2022 with SOB:    Impression:   Principal Problem:    COVID-19 Pneumonia, Pos 1/22/22, Unvacinated -- Recovered  Active Problems:    Down's syndrome    CKD (chronic kidney disease) stage 3, GFR 30-59 ml/min (H)    Acute respiratory failure with hypoxia -- resolved      Plan:  Continue present care    DVT Prophylaxis: Pneumatic Compression Devices  Code Status: Full Code    Disposition: Expected discharge once placement found (?possible group home)    Beni Pereyraconstantinejimmie  Pager [phone]  Cell Phone [phone]  Text Page (7am to 6pm)    Interval History   No complaints, continues to color non-stop.     Physical Exam   Temp: 97.5  F (36.4  C) Temp src: Axillary BP: 100/64 Pulse: 62   Resp: 18 SpO2: 96 % O2 Device: None (Room air)    Vitals:    01/22/22 1140 02/05/22 0729 02/14/22 1329   Weight: 33.6 kg (74 lb 1.2 oz) 43.8 kg (96 lb 9.6 oz) 37.7 kg (83 lb 3.2 oz)     Vital Signs with Ranges  Temp:  [97.5  F (36.4  C)] 97.5  F (36.4  C)  Pulse:  [62-74] 62  Resp:  [16-18] 18  BP: ()/(56-64) 100/64  SpO2:  [96 %-97 %] 96 %  I/O last 3 completed shifts:  In: 240 [P.O.:240]  Out: -     # Pain Assessment:  Current Pain Score 3/18/2022   Patient currently in pain? denies   Pain score (0-10) -   [facility] pain score -   Miranda castrejon pain level was assessed and she currently denies pain.        Constitutional: Awake, alert, cooperative, no apparent distress  Respiratory: Clear to auscultation bilaterally, no crackles or wheezing  Cardiovascular: Regular rate and rhythm, normal S1 and S2, and no murmur noted  GI: Normal bowel sounds, soft, non-distended, non-tender  Extrem: No calf tenderness, no ankle edema  Neuro: Alert, no focal motor or sensory deficits    Medications       melatonin  1 mg Oral At Bedtime     pantoprazole  40 mg Oral QAM AC     polyethylene glycol  17 g Oral Daily      senna-docusate  1 tablet Oral BID     cholecalciferol  50 mcg Oral Daily       Data   No lab results found in last 7 days.    Imaging:   No results found for this or any previous visit (from the past 24 hour(s)).

## 2022-03-19 PROCEDURE — 99231 SBSQ HOSP IP/OBS SF/LOW 25: CPT | Performed by: INTERNAL MEDICINE

## 2022-03-19 PROCEDURE — 120N000001 HC R&B MED SURG/OB

## 2022-03-19 PROCEDURE — 250N000013 HC RX MED GY IP 250 OP 250 PS 637: Performed by: HOSPITALIST

## 2022-03-19 PROCEDURE — 250N000013 HC RX MED GY IP 250 OP 250 PS 637: Performed by: INTERNAL MEDICINE

## 2022-03-19 RX ADMIN — SENNOSIDES AND DOCUSATE SODIUM 1 TABLET: 8.6; 5 TABLET ORAL at 21:25

## 2022-03-19 RX ADMIN — Medication 1 MG: at 21:25

## 2022-03-19 RX ADMIN — SENNOSIDES AND DOCUSATE SODIUM 1 TABLET: 8.6; 5 TABLET ORAL at 09:29

## 2022-03-19 RX ADMIN — Medication 50 MCG: at 09:29

## 2022-03-19 RX ADMIN — PANTOPRAZOLE SODIUM 40 MG: 40 TABLET, DELAYED RELEASE ORAL at 09:29

## 2022-03-19 ASSESSMENT — ACTIVITIES OF DAILY LIVING (ADL)
ADLS_ACUITY_SCORE: 14

## 2022-03-19 NOTE — PROGRESS NOTES
[facility]    Hospitalist Progress Note    Assessment & Plan   51 year old female who was admitted on 1/22/2022 with SOB:    Impression:   Principal Problem:    COVID-19 Pneumonia, Pos 1/22/22, Unvacinated -- Recovered  Active Problems:    Down's syndrome    CKD (chronic kidney disease) stage 3, GFR 30-59 ml/min (H)    Acute respiratory failure with hypoxia -- resolved      Plan:  Continue present care    DVT Prophylaxis: Pneumatic Compression Devices  Code Status: Full Code    Disposition: Expected discharge once placement found (?possible group home)    Beni Pereyraconstantinejimmie  Pager [phone]  Cell Phone [phone]  Text Page (7am to 6pm)    Interval History   No complaints, continues to color non-stop, always staying within the lines.     Physical Exam   Temp: 97.8  F (36.6  C) Temp src: Oral BP: 104/62 Pulse: 62   Resp: 18 SpO2: 98 % O2 Device: None (Room air)    Vitals:    01/22/22 1140 02/05/22 0729 02/14/22 1329   Weight: 33.6 kg (74 lb 1.2 oz) 43.8 kg (96 lb 9.6 oz) 37.7 kg (83 lb 3.2 oz)     Vital Signs with Ranges  Temp:  [97.3  F (36.3  C)-97.8  F (36.6  C)] 97.8  F (36.6  C)  Pulse:  [62-73] 62  Resp:  [18] 18  BP: ()/(62-74) 104/62  SpO2:  [97 %-100 %] 98 %  I/O last 3 completed shifts:  In: 640 [P.O.:640]  Out: -     # Pain Assessment:  Current Pain Score 3/19/2022   Patient currently in pain? denies   Pain score (0-10) -   [facility] pain score -   Miranda castrejon pain level was assessed and she currently denies pain.        Constitutional: Awake, alert, cooperative, no apparent distress  Respiratory: Clear to auscultation bilaterally, no crackles or wheezing  Cardiovascular: Regular rate and rhythm, normal S1 and S2, and no murmur noted  GI: Normal bowel sounds, soft, non-distended, non-tender  Extrem: No calf tenderness, no ankle edema  Neuro: Alert, no focal motor or sensory deficits    Medications       melatonin  1 mg Oral At Bedtime     pantoprazole  40 mg Oral QAM AC      polyethylene glycol  17 g Oral Daily     senna-docusate  1 tablet Oral BID     cholecalciferol  50 mcg Oral Daily       Data   No lab results found in last 7 days.    Imaging:   No results found for this or any previous visit (from the past 24 hour(s)).

## 2022-03-19 NOTE — PLAN OF CARE
DATE & TIME: 3/19/22 2594-5034  Cognitive Concerns/ Orientation : A&Ox4.  BEHAVIOR & AGGRESSION TOOL COLOR: Green   ABNL VS/O2: VSS on RA  MOBILITY: Independent in room; SBA in halls.   PAIN MANAGMENT: Denies  DIET: Regular  BOWEL/BLADDER: Continent; last BM 3/18  DRAIN/DEVICES: No IV access  SKIN: Scattered scabs/bruises. Blanchable redness to coccyx  D/C DATE: Pending placement and guardianship (possibly to a group home). Brother granted 60 day guardianship (until 4/15/22) - court hearing on 4/1/22.  OTHER  IMPORTANT INFO: Patient is content coloring, walking in [place] with staff, and surprising staff with tickles.

## 2022-03-19 NOTE — PLAN OF CARE
Goal Outcome Evaluation:      DATE & TIME: 3/18/22-3/19/22 9356-5716  Cognitive Concerns/ Orientation : A&Ox4.  BEHAVIOR & AGGRESSION TOOL COLOR: Green   ABNL VS/O2: VSS on RA, daily vitals  MOBILITY: Ind in room, prefers SBA in [place].   PAIN MANAGMENT: Denies  DIET: Regular  BOWEL/BLADDER: Continent. (receiving sched Miralax and senna)   DRAIN/DEVICES: No IV access  SKIN: Scattered scabs/bruises. Blanchable redness to coccyx  D/C DATE: Pend placement and guardianship. Brother granted 60 day guardianship (until 4/15/22) court hearing on 4/1/22.  OTHER  IMPORTANT INFO: Content coloring, walking in [place] with staff, trying to surprise staff with tickles.

## 2022-03-20 PROCEDURE — 99231 SBSQ HOSP IP/OBS SF/LOW 25: CPT | Performed by: INTERNAL MEDICINE

## 2022-03-20 PROCEDURE — 120N000001 HC R&B MED SURG/OB

## 2022-03-20 PROCEDURE — 250N000013 HC RX MED GY IP 250 OP 250 PS 637: Performed by: INTERNAL MEDICINE

## 2022-03-20 PROCEDURE — 250N000013 HC RX MED GY IP 250 OP 250 PS 637: Performed by: HOSPITALIST

## 2022-03-20 RX ADMIN — PANTOPRAZOLE SODIUM 40 MG: 40 TABLET, DELAYED RELEASE ORAL at 10:57

## 2022-03-20 RX ADMIN — Medication 50 MCG: at 10:57

## 2022-03-20 RX ADMIN — Medication 1 MG: at 21:05

## 2022-03-20 RX ADMIN — SENNOSIDES AND DOCUSATE SODIUM 1 TABLET: 8.6; 5 TABLET ORAL at 10:57

## 2022-03-20 RX ADMIN — SENNOSIDES AND DOCUSATE SODIUM 1 TABLET: 8.6; 5 TABLET ORAL at 21:05

## 2022-03-20 ASSESSMENT — ACTIVITIES OF DAILY LIVING (ADL)
ADLS_ACUITY_SCORE: 15
ADLS_ACUITY_SCORE: 14
ADLS_ACUITY_SCORE: 15
ADLS_ACUITY_SCORE: 14
ADLS_ACUITY_SCORE: 15
ADLS_ACUITY_SCORE: 14
ADLS_ACUITY_SCORE: 15
ADLS_ACUITY_SCORE: 14
ADLS_ACUITY_SCORE: 15
ADLS_ACUITY_SCORE: 14
ADLS_ACUITY_SCORE: 15
ADLS_ACUITY_SCORE: 14
ADLS_ACUITY_SCORE: 15
ADLS_ACUITY_SCORE: 14
ADLS_ACUITY_SCORE: 15
ADLS_ACUITY_SCORE: 15

## 2022-03-20 NOTE — PROGRESS NOTES
[facility]    Hospitalist Progress Note    Assessment & Plan   51 year old female who was admitted on 1/22/2022 with SOB:    Impression:   Principal Problem:    COVID-19 Pneumonia, Pos 1/22/22, Unvacinated -- Recovered  Active Problems:    Down's syndrome    CKD (chronic kidney disease) stage 3, GFR 30-59 ml/min (H)    Acute respiratory failure with hypoxia -- resolved      Plan:  Continue present care    DVT Prophylaxis: Pneumatic Compression Devices  Code Status: Full Code    Disposition: Expected discharge once placement found (?possible group home)    Beni Burns  Pager [phone]  Cell Phone [phone]  Text Page (7am to 6pm)    Interval History   No complaints.      Physical Exam   Temp: 97.7  F (36.5  C) Temp src: Oral BP: 93/51 Pulse: 63   Resp: 18 SpO2: 97 % O2 Device: None (Room air)    Vitals:    01/22/22 1140 02/05/22 0729 02/14/22 1329   Weight: 33.6 kg (74 lb 1.2 oz) 43.8 kg (96 lb 9.6 oz) 37.7 kg (83 lb 3.2 oz)     Vital Signs with Ranges  Temp:  [97.4  F (36.3  C)-97.7  F (36.5  C)] 97.7  F (36.5  C)  Pulse:  [63-77] 63  Resp:  [18] 18  BP: ()/(51-59) 93/51  SpO2:  [94 %-100 %] 97 %  No intake/output data recorded.    # Pain Assessment:  Current Pain Score 3/20/2022   Patient currently in pain? denies   Pain score (0-10) -   [facility]   Miranda castrejon pain level was assessed and she currently denies pain.        Constitutional: Awake, alert, cooperative, no apparent distress  Respiratory: Clear to auscultation bilaterally, no crackles or wheezing  Cardiovascular: Regular rate and rhythm, normal S1 and S2, and no murmur noted  GI: Normal bowel sounds, soft, non-distended, non-tender  Extrem: No calf tenderness, no ankle edema  Neuro: Alert, no focal motor or sensory deficits    Medications       melatonin  1 mg Oral At Bedtime     pantoprazole  40 mg Oral QAM AC     polyethylene glycol  17 g Oral Daily     senna-docusate  1 tablet Oral BID      cholecalciferol  50 mcg Oral Daily       Data   No lab results found in last 7 days.    Imaging:   No results found for this or any previous visit (from the past 24 hour(s)).

## 2022-03-20 NOTE — PLAN OF CARE
Goal Outcome Evaluation:           DATE & TIME: 3/19/22-3/20/22 3789-0326  Cognitive Concerns/ Orientation : A&Ox4.  BEHAVIOR & AGGRESSION TOOL COLOR: Green   ABNL VS/O2: VSS on RA  MOBILITY: Independent in room; SBA in halls.   PAIN MANAGMENT: Denies  DIET: Regular  BOWEL/BLADDER: Continent; last BM 3/18  DRAIN/DEVICES: No IV access  SKIN: Scattered scabs/bruises. Blanchable redness to coccyx  D/C DATE: Pending placement and guardianship (possibly to a group home). Brother granted 60 day guardianship (until 4/15/22) - court hearing on 4/1/22.  OTHER  IMPORTANT INFO: Patient is content coloring,and walking in [place] with staff.

## 2022-03-21 PROCEDURE — 250N000013 HC RX MED GY IP 250 OP 250 PS 637: Performed by: HOSPITALIST

## 2022-03-21 PROCEDURE — 120N000001 HC R&B MED SURG/OB

## 2022-03-21 PROCEDURE — 99231 SBSQ HOSP IP/OBS SF/LOW 25: CPT | Performed by: INTERNAL MEDICINE

## 2022-03-21 PROCEDURE — 250N000013 HC RX MED GY IP 250 OP 250 PS 637: Performed by: INTERNAL MEDICINE

## 2022-03-21 PROCEDURE — 250N000013 HC RX MED GY IP 250 OP 250 PS 637: Performed by: REGISTERED NURSE

## 2022-03-21 RX ADMIN — PANTOPRAZOLE SODIUM 40 MG: 40 TABLET, DELAYED RELEASE ORAL at 09:04

## 2022-03-21 RX ADMIN — Medication 50 MCG: at 09:04

## 2022-03-21 RX ADMIN — SENNOSIDES AND DOCUSATE SODIUM 1 TABLET: 8.6; 5 TABLET ORAL at 09:04

## 2022-03-21 RX ADMIN — SENNOSIDES AND DOCUSATE SODIUM 1 TABLET: 8.6; 5 TABLET ORAL at 21:52

## 2022-03-21 RX ADMIN — POLYETHYLENE GLYCOL 3350 17 G: 17 POWDER, FOR SOLUTION ORAL at 09:04

## 2022-03-21 RX ADMIN — Medication 1 MG: at 21:52

## 2022-03-21 ASSESSMENT — ACTIVITIES OF DAILY LIVING (ADL)
ADLS_ACUITY_SCORE: 15

## 2022-03-21 NOTE — PLAN OF CARE
0709-3436    Pt here COVID recovered. A/Ox4. Intact. Independent in room, SBA in halls. Some help with ADLs. Right eye red. Plan to discharge back to group home when ready.

## 2022-03-21 NOTE — PROGRESS NOTES
[facility]    Hospitalist Progress Note    Assessment & Plan   51 year old female who was admitted on 1/22/2022 with SOB:    Impression:   Principal Problem:    COVID-19 Pneumonia, Pos 1/22/22, Unvacinated -- Recovered  Active Problems:    Down's syndrome    CKD (chronic kidney disease) stage 3, GFR 30-59 ml/min (H)    Acute respiratory failure with hypoxia -- resolved      Plan:  Continue present care    DVT Prophylaxis: Pneumatic Compression Devices  Code Status: Full Code    Disposition: Expected discharge once placement found (?possible group home)    Beni Burns  Pager [phone]  Cell Phone [phone]  Text Page (7am to 6pm)    Interval History   No complaints.  She is looking forward to coloring today.     Physical Exam   Temp: 98.2  F (36.8  C) Temp src: Oral BP: 95/58 Pulse: 65   Resp: 18 SpO2: 100 % O2 Device: None (Room air)    Vitals:    01/22/22 1140 02/05/22 0729 02/14/22 1329   Weight: 33.6 kg (74 lb 1.2 oz) 43.8 kg (96 lb 9.6 oz) 37.7 kg (83 lb 3.2 oz)     Vital Signs with Ranges  Temp:  [97.6  F (36.4  C)-98.2  F (36.8  C)] 98.2  F (36.8  C)  Pulse:  [65-70] 65  Resp:  [17-18] 18  BP: (93-96)/(56-60) 95/58  SpO2:  [99 %-100 %] 100 %  No intake/output data recorded.    # Pain Assessment:  Current Pain Score 3/21/2022   Patient currently in pain? denies   Pain score (0-10) -   rFLACC pain score -   Miranda castrejon pain level was assessed and she currently denies pain.        Constitutional: Awake, alert, cooperative, no apparent distress  Respiratory: Clear to auscultation bilaterally, no crackles or wheezing  Cardiovascular: Regular rate and rhythm, normal S1 and S2, and no murmur noted  GI: Normal bowel sounds, soft, non-distended, non-tender  Extrem: No calf tenderness, no ankle edema  Neuro: Alert, no focal motor or sensory deficits    Medications       melatonin  1 mg Oral At Bedtime     pantoprazole  40 mg Oral QAM AC     polyethylene glycol  17 g Oral Daily      senna-docusate  1 tablet Oral BID     cholecalciferol  50 mcg Oral Daily       Data   No lab results found in last 7 days.    Imaging:   No results found for this or any previous visit (from the past 24 hour(s)).

## 2022-03-21 NOTE — PLAN OF CARE
DATE & TIME: 3/20/22 5905-5305    Cognitive Concerns/ Orientation : A&Ox4.  BEHAVIOR & AGGRESSION TOOL COLOR: Green   ABNL VS/O2: VSS on RA  MOBILITY: Independent in room; SBA in halls.   PAIN MANAGMENT: Denies  DIET: Regular - eating 100% of her meals  BOWEL/BLADDER: Continent; last BM 3/18  DRAIN/DEVICES: No IV access  SKIN: Scattered scabs/bruises. Blanchable redness to coccyx  D/C DATE: Expected discharge once placement found (?possible group home). Brother granted 60 day guardianship (until 4/15/22) - court hearing on 4/1/22.  OTHER  IMPORTANT INFO: Patient is content coloring,and walking in leyva with staff.

## 2022-03-21 NOTE — PLAN OF CARE
Goal Outcome Evaluation:  Cognitive Concerns/ Orientation: A&Ox4  BEHAVIOR & AGGRESSION TOOL COLOR: Green   ABNL VS/O2: vss, on room air  MOBILITY: Independent in room; SBA in halls.   PAIN MANAGMENT: Denies  DIET: Regular - eating 100% of her meals  BOWEL/BLADDER: Continent  DRAIN/DEVICES: No IV access  SKIN: Scattered scabs/bruises.   D/C DATE: Expected discharge once placement found (possible group home). Brother granted 60 day guardianship (until 4/15/22) - court hearing on 4/1/22.  OTHER  IMPORTANT INFO: Patient is content coloring and visiting with staff. Ambulates frequently. Very steady on feet. Went outside for fresh air with staff. Right eye redness, no drainage

## 2022-03-21 NOTE — PLAN OF CARE
Goal Outcome Evaluation:        DATE & TIME: 3/20/22-3/21/22 7306-9808  Cognitive Concerns/ Orientation : A&Ox4.  BEHAVIOR & AGGRESSION TOOL COLOR: Green   ABNL VS/O2: VSS on RA  MOBILITY: Independent in room; SBA in halls.   PAIN MANAGMENT: Denies  DIET: Regular - eating 100% of her meals  BOWEL/BLADDER: Continent; last BM 3/18  DRAIN/DEVICES: No IV access  SKIN: Scattered scabs/bruises. Blanchable redness to coccyx  D/C DATE: Expected discharge once placement found (?possible group home). Brother granted 60 day guardianship (until 4/15/22) - court hearing on 4/1/22.  OTHER  IMPORTANT INFO: Patient is content coloring,and walking in leyva with staff.

## 2022-03-21 NOTE — PROGRESS NOTES
Care Management Follow Up    Length of Stay (days): 58    Expected Discharge Date: 03/30/2022     Concerns to be Addressed: discharge planning     Patient plan of care discussed at interdisciplinary rounds: Yes    Anticipated Discharge Disposition: Transitional Care     Anticipated Discharge Services:    Anticipated Discharge DME:      Patient/family educated on Medicare website which has current facility and service quality ratings: no  Education Provided on the Discharge Plan:    Patient/Family in Agreement with the Plan: yes    Referrals Placed by CM/SW: Post Acute Facilities  Private pay costs discussed: Not applicable    Additional Information:    Sw spoke with pt's brother, Maxi, and he states that he has attempted to connect with Dr. Gramajo, calls and leaves messages, with no return calls back.  Sw called Abraham Laguerre from [facility] and left a vm stating that connecting Dr. Gramajo with brother, Maxi, needs to happen this week as the case is stalled until brother Maxi is able to complete the adaptive testing; Sw waiting on return call back.      Connie Smalls, BARBIESW

## 2022-03-22 PROCEDURE — 250N000013 HC RX MED GY IP 250 OP 250 PS 637: Performed by: INTERNAL MEDICINE

## 2022-03-22 PROCEDURE — 120N000001 HC R&B MED SURG/OB

## 2022-03-22 PROCEDURE — 250N000013 HC RX MED GY IP 250 OP 250 PS 637: Performed by: HOSPITALIST

## 2022-03-22 PROCEDURE — 99231 SBSQ HOSP IP/OBS SF/LOW 25: CPT | Performed by: INTERNAL MEDICINE

## 2022-03-22 RX ADMIN — SENNOSIDES AND DOCUSATE SODIUM 1 TABLET: 8.6; 5 TABLET ORAL at 10:18

## 2022-03-22 RX ADMIN — ACETAMINOPHEN 325 MG: 325 TABLET, FILM COATED ORAL at 03:58

## 2022-03-22 RX ADMIN — PANTOPRAZOLE SODIUM 40 MG: 40 TABLET, DELAYED RELEASE ORAL at 10:18

## 2022-03-22 RX ADMIN — Medication 1 MG: at 21:53

## 2022-03-22 RX ADMIN — SENNOSIDES AND DOCUSATE SODIUM 1 TABLET: 8.6; 5 TABLET ORAL at 21:53

## 2022-03-22 RX ADMIN — Medication 50 MCG: at 10:19

## 2022-03-22 ASSESSMENT — ACTIVITIES OF DAILY LIVING (ADL)
ADLS_ACUITY_SCORE: 13
ADLS_ACUITY_SCORE: 15
ADLS_ACUITY_SCORE: 15
ADLS_ACUITY_SCORE: 13
ADLS_ACUITY_SCORE: 13
ADLS_ACUITY_SCORE: 15
ADLS_ACUITY_SCORE: 13
ADLS_ACUITY_SCORE: 15
ADLS_ACUITY_SCORE: 13
ADLS_ACUITY_SCORE: 15
ADLS_ACUITY_SCORE: 13
ADLS_ACUITY_SCORE: 15

## 2022-03-22 NOTE — PLAN OF CARE
Goal Outcome Evaluation:     DATE & TIME: 3/21/22-3/22/22 5238-0063  Cognitive Concerns/ Orientation: A&Ox4  BEHAVIOR & AGGRESSION TOOL COLOR: Green           ABNL VS/O2: VSS in morning on RA  MOBILITY: Independent in room; SBA in halls.   PAIN MANAGMENT: denies pain.   DIET: Regular  BOWEL/BLADDER: Continent  DRAIN/DEVICES: No IV access  SKIN: Scattered scabs/bruises.   D/C DATE: Expected discharge once placement found (possible group home). Brother granted 60 day guardianship (until 4/15/22) - court hearing on 4/1/22.  OTHER  IMPORTANT INFO: Ambulates frequently in room. Walked in hallway x2.

## 2022-03-22 NOTE — PROGRESS NOTES
Care Management Follow Up    Length of Stay (days): 59    Expected Discharge Date: 03/30/2022     Concerns to be Addressed: discharge planning     Patient plan of care discussed at interdisciplinary rounds: Yes    Anticipated Discharge Disposition: Transitional Care     Anticipated Discharge Services:    Anticipated Discharge DME:      Patient/family educated on Medicare website which has current facility and service quality ratings: no  Education Provided on the Discharge Plan:    Patient/Family in Agreement with the Plan: yes    Referrals Placed by CM/SW: Post Acute Facilities  Private pay costs discussed: Not applicable    Additional Information:    Elsi received vm from Maxi rosen, providing update that he completed his portion of adaptive testing with Dr. Gramajo.  Elsi called Abraham Laguerre (ph:  [phone]) and left vm with the above info, asking for return call back on next steps.        BARBIE TiptonSW

## 2022-03-22 NOTE — PLAN OF CARE
Goal Outcome Evaluation:  DATE & TIME: 3/21/22-3/22/22 8365-5733  Cognitive Concerns/ Orientation: A&Ox4  BEHAVIOR & AGGRESSION TOOL COLOR: Green           ABNL VS/O2: VS daily, none taken this shift.   MOBILITY: Independent in room; SBA in halls.   PAIN MANAGMENT: c/o abdominal discomfort, PRN Tylenol given x1.   DIET: Regular  BOWEL/BLADDER: Continent  DRAIN/DEVICES: No IV access  SKIN: Scattered scabs/bruises.   D/C DATE: Expected discharge once placement found (possible group home). Brother granted 60 day guardianship (until 4/15/22) - court hearing on 4/1/22.  OTHER  IMPORTANT INFO: Ambulates frequently in room. Very steady on feet. Right eye redness, no drainage

## 2022-03-22 NOTE — PROGRESS NOTES
[facility]    Hospitalist Progress Note    Assessment & Plan   51 year old female who was admitted on 1/22/2022 with SOB:    Impression:   Principal Problem:    COVID-19 Pneumonia, Pos 1/22/22, Unvacinated -- Recovered  Active Problems:    Down's syndrome    CKD (chronic kidney disease) stage 3, GFR 30-59 ml/min (H)    Acute respiratory failure with hypoxia -- resolved      Plan:  Continue present care    DVT Prophylaxis: Pneumatic Compression Devices  Code Status: Full Code    Disposition: Expected discharge once placement found (?possible group home)    Beni Burns  Pager [phone]  Cell Phone [phone]  Text Page (7am to 6pm)    Interval History   No complaints.      Physical Exam   Temp: 97.8  F (36.6  C) Temp src: Oral BP: 106/41 Pulse: 67   Resp: 18 SpO2: 100 % O2 Device: None (Room air)    Vitals:    01/22/22 1140 02/05/22 0729 02/14/22 1329   Weight: 33.6 kg (74 lb 1.2 oz) 43.8 kg (96 lb 9.6 oz) 37.7 kg (83 lb 3.2 oz)     Vital Signs with Ranges  Temp:  [97.8  F (36.6  C)] 97.8  F (36.6  C)  Pulse:  [67] 67  Resp:  [16-18] 18  BP: (106)/(41) 106/41  SpO2:  [100 %] 100 %  I/O last 3 completed shifts:  In: 240 [P.O.:240]  Out: -     # Pain Assessment:  Current Pain Score 3/22/2022   Patient currently in pain? denies   Pain score (0-10) -   [facility] pain score 0   Miranda s pain level was assessed and she currently denies pain.        Constitutional: Awake, alert, cooperative, no apparent distress  Respiratory: Clear to auscultation bilaterally, no crackles or wheezing  Cardiovascular: Regular rate and rhythm, normal S1 and S2, and no murmur noted  GI: Normal bowel sounds, soft, non-distended, non-tender  Extrem: No calf tenderness, no ankle edema  Neuro: Alert, no focal motor or sensory deficits    Medications       melatonin  1 mg Oral At Bedtime     pantoprazole  40 mg Oral QAM AC     polyethylene glycol  17 g Oral Daily     senna-docusate  1 tablet Oral BID      cholecalciferol  50 mcg Oral Daily       Data   No lab results found in last 7 days.    Imaging:   No results found for this or any previous visit (from the past 24 hour(s)).

## 2022-03-22 NOTE — PLAN OF CARE
DATE & TIME: 3/21 8983-5830  Cognitive Concerns/ Orientation: A&Ox4  BEHAVIOR & AGGRESSION TOOL COLOR: Green           ABNL VS/O2: VS only once a day  MOBILITY: Independent in room; SBA in halls.   PAIN MANAGMENT: c/o abdominal discomfort, refused tylenol.  DIET: Regular - eating 100% of her meals  BOWEL/BLADDER: Continent  DRAIN/DEVICES: No IV access  SKIN: Scattered scabs/bruises.   D/C DATE: Expected discharge once placement found (possible group home). Brother granted 60 day guardianship (until 4/15/22) - court hearing on 4/1/22.  OTHER  IMPORTANT INFO: Ambulates frequently in room. Very steady on feet. Right eye redness, no drainage

## 2022-03-23 PROCEDURE — 250N000013 HC RX MED GY IP 250 OP 250 PS 637: Performed by: HOSPITALIST

## 2022-03-23 PROCEDURE — 99231 SBSQ HOSP IP/OBS SF/LOW 25: CPT | Performed by: INTERNAL MEDICINE

## 2022-03-23 PROCEDURE — 120N000001 HC R&B MED SURG/OB

## 2022-03-23 PROCEDURE — 250N000013 HC RX MED GY IP 250 OP 250 PS 637: Performed by: INTERNAL MEDICINE

## 2022-03-23 RX ADMIN — SENNOSIDES AND DOCUSATE SODIUM 1 TABLET: 8.6; 5 TABLET ORAL at 21:12

## 2022-03-23 RX ADMIN — Medication 1 MG: at 21:10

## 2022-03-23 RX ADMIN — PANTOPRAZOLE SODIUM 40 MG: 40 TABLET, DELAYED RELEASE ORAL at 09:36

## 2022-03-23 RX ADMIN — Medication 50 MCG: at 09:36

## 2022-03-23 RX ADMIN — SENNOSIDES AND DOCUSATE SODIUM 1 TABLET: 8.6; 5 TABLET ORAL at 09:36

## 2022-03-23 ASSESSMENT — ACTIVITIES OF DAILY LIVING (ADL)
ADLS_ACUITY_SCORE: 13

## 2022-03-23 NOTE — PROGRESS NOTES
(Elsi received vm from brother, Maxi, providing update that he completed his portion of adaptive testing with Dr. Gramajo.  Elsi called Abraham Laguerre (ph:  [phone]) and left  with the above info, asking for return call back on next steps). Recalled above # no answer left another message today at 1200

## 2022-03-23 NOTE — PLAN OF CARE
DATE & TIME: 3/22/22 6657-3241    Cognitive Concerns/ Orientation : Pt A/Ox4   BEHAVIOR & AGGRESSION TOOL COLOR: Green   ABNL VS/O2: VSS on RA, vitals daily  MOBILITY: Independent in room, SBA in hallway  PAIN MANAGMENT: Complaints of intermittent abdominal pain, redirection and Sprite help  DIET: Regular  BOWEL/BLADDER: Continent  DRAIN/DEVICES: No IV access  SKIN: Scattered scabs/bruises  D/C DATE: Discharge once placement found. Brother granted 60 day guardianship (4/15/22), court hearing on 4/1/22  OTHER IMPORTANT INFO: Right eye redness, no drainage.

## 2022-03-23 NOTE — PROGRESS NOTES
CLINICAL NUTRITION SERVICES - REASSESSMENT NOTE    Malnutrition:  (3/16)   % Weight Loss: Unable to evaluate   % Intake:  Decreased intake does not meet criteria for malnutrition - improving   Subcutaneous Fat Loss:  Orbital region moderate depletion, Upper arm region moderate depletion and Thoracic region moderate depletion  Muscle Loss:  Temporal region moderate depletion, Clavicle bone region moderate depletion and Dorsal hand region moderate depletion  Fluid Retention:  None noted     Malnutrition Diagnosis: Moderate malnutrition  In Context of:  Chronic illness or disease    - Improving with more consistent PO intakes for several weeks now     EVALUATION OF PROGRESS TOWARD GOALS   Diet: Regular   Intake/Tolerance:   - Madalyn is tolerating 100% of her meals consistently since last assessment. She continues to order similar foods - turkey sandwich, vanilla pudding, penne w/ meat sauce. Yesterday for dinner she received a hamburger with home fried potatoes.   - Had just received breakfast during visit and was getting ready to eat.   - Last BM x1 on 3/22    ASSESSED NUTRITION NEEDS:  Dosing Weight 33.6 kg   Estimated Energy Needs: 8237-4320 kcals (35-40 Kcal/Kg)  Justification: repletion and underweight  Estimated Protein Needs: 50-70 grams protein (1.5-2 g pro/Kg)  Justification: repletion and hypercatabolism with acute illness    Previous Goals:   Intake of at least 75% for meals BID-TID  Evaluation: Met    Previous Nutrition Diagnosis:   Predicted inadequate nutrient intake (protein-calorie) related to improving appetite and PO intake to % 2-3 meals/day over the past 3 days but previously with poor and fluctuating intakes over admission, potential for decline in intake again pending LOS, medical course, menu fatigue, etc*  Evaluation: Completed     CURRENT NUTRITION DIAGNOSIS  No nutrition diagnosis identified at this time     INTERVENTIONS  Recommendations / Nutrition Prescription  Continue regular +  encourage intakes    Implementation  None new    Goals  Intake of at least 75% for meals BID-TID.     MONITORING AND EVALUATION:  Progress towards goals will be monitored and evaluated per protocol and Practice Guidelines    Rosalba Adams RD, LD  [facility] [place], [facility], [facility], [facility]  Pager: [phone]  Weekend Pager: [phone]

## 2022-03-23 NOTE — PLAN OF CARE
Goal Outcome Evaluation:  DATE & TIME:3/22/22 6001-2573  Cognitive Concerns/ Orientation: A&Ox4  BEHAVIOR & AGGRESSION TOOL COLOR: Green           ABNL VS/O2: VS daily, none taken this shift.   MOBILITY: Independent in room; SBA in halls.   PAIN MANAGMENT:Denied pain  DIET: Regular  BOWEL/BLADDER: Continent  DRAIN/DEVICES: No IV access  SKIN: Scattered scabs/bruises.   D/C DATE: Expected discharge once placement found (possible group home). Brother granted 60 day guardianship (until 4/15/22) - court hearing on 4/1/22.  OTHER  IMPORTANT INFO: Ambulates frequently in room. Very steady on feet. Right eye redness, no drainage

## 2022-03-23 NOTE — PLAN OF CARE
Goal Outcome Evaluation:  DATE & TIME: 3/23/22                 Cognitive Concerns/ Orientation : Pt A/Ox4   BEHAVIOR & AGGRESSION TOOL COLOR: Green             ABNL VS/O2: VSS on RA, vitals daily  MOBILITY: Independent in room, SBA in hallway  PAIN MANAGMENT: Denies  DIET: Regular  BOWEL/BLADDER: Continent  DRAIN/DEVICES: No IV access  SKIN: Scattered scabs/bruises  D/C DATE: Discharge once placement found. Brother granted 60 day guardianship (4/15/22), court hearing on 4/1/22  OTHER IMPORTANT INFO: Right eye redness, no drainage. Pt. Denied shower but agreed to one tomorrow.

## 2022-03-23 NOTE — PROGRESS NOTES
[facility]    Hospitalist Progress Note    Assessment & Plan   51 year old female who was admitted on 1/22/2022 with SOB:    Impression:   Principal Problem:    COVID-19 Pneumonia, Pos 1/22/22, Unvacinated -- Recovered  Active Problems:    Down's syndrome    CKD (chronic kidney disease) stage 3, GFR 30-59 ml/min (H)    Acute respiratory failure with hypoxia -- resolved      Plan:  Continue present care    DVT Prophylaxis: Pneumatic Compression Devices  Code Status: Full Code    Disposition: Expected discharge once placement found (?possible group home)    Beni Burns  Pager [phone]  Cell Phone [phone]  Text Page (7am to 6pm)    Interval History   No complaints.      Physical Exam   Temp: 97.8  F (36.6  C) Temp src: Axillary BP: 102/73 Pulse: 63   Resp: 18 SpO2: 100 % O2 Device: None (Room air)    Vitals:    01/22/22 1140 02/05/22 0729 02/14/22 1329   Weight: 33.6 kg (74 lb 1.2 oz) 43.8 kg (96 lb 9.6 oz) 37.7 kg (83 lb 3.2 oz)     Vital Signs with Ranges  Temp:  [97.8  F (36.6  C)] 97.8  F (36.6  C)  Pulse:  [63] 63  Resp:  [18] 18  BP: (102)/(73) 102/73  SpO2:  [100 %] 100 %  I/O last 3 completed shifts:  In: 240 [P.O.:240]  Out: -     # Pain Assessment:  Current Pain Score 3/23/2022   Patient currently in pain? denies   Pain score (0-10) -   [facility]   Miranda castrejon pain level was assessed and she currently denies pain.        Constitutional: Awake, alert, cooperative, no apparent distress  Respiratory: Clear to auscultation bilaterally, no crackles or wheezing  Cardiovascular: Regular rate and rhythm, normal S1 and S2, and no murmur noted  GI: Normal bowel sounds, soft, non-distended, non-tender  Extrem: No calf tenderness, no ankle edema  Neuro: Alert, no focal motor or sensory deficits    Medications       melatonin  1 mg Oral At Bedtime     pantoprazole  40 mg Oral QAM AC     polyethylene glycol  17 g Oral Daily     senna-docusate  1 tablet Oral BID      cholecalciferol  50 mcg Oral Daily       Data   No lab results found in last 7 days.    Imaging:   No results found for this or any previous visit (from the past 24 hour(s)).

## 2022-03-24 PROCEDURE — 250N000013 HC RX MED GY IP 250 OP 250 PS 637: Performed by: HOSPITALIST

## 2022-03-24 PROCEDURE — 99231 SBSQ HOSP IP/OBS SF/LOW 25: CPT | Performed by: INTERNAL MEDICINE

## 2022-03-24 PROCEDURE — 120N000001 HC R&B MED SURG/OB

## 2022-03-24 PROCEDURE — 250N000013 HC RX MED GY IP 250 OP 250 PS 637: Performed by: INTERNAL MEDICINE

## 2022-03-24 RX ADMIN — PANTOPRAZOLE SODIUM 40 MG: 40 TABLET, DELAYED RELEASE ORAL at 10:34

## 2022-03-24 RX ADMIN — Medication 50 MCG: at 10:34

## 2022-03-24 RX ADMIN — Medication 1 MG: at 22:10

## 2022-03-24 RX ADMIN — SENNOSIDES AND DOCUSATE SODIUM 1 TABLET: 8.6; 5 TABLET ORAL at 10:34

## 2022-03-24 RX ADMIN — SENNOSIDES AND DOCUSATE SODIUM 1 TABLET: 8.6; 5 TABLET ORAL at 22:10

## 2022-03-24 ASSESSMENT — ACTIVITIES OF DAILY LIVING (ADL)
ADLS_ACUITY_SCORE: 13

## 2022-03-24 NOTE — PROGRESS NOTES
Care Management Follow Up    Length of Stay (days): 61    Expected Discharge Date: 03/31/2022     Concerns to be Addressed: discharge planning     Patient plan of care discussed at interdisciplinary rounds: Yes    Anticipated Discharge Disposition: Transitional Care     Anticipated Discharge Services:    Anticipated Discharge DME:      Patient/family educated on Medicare website which has current facility and service quality ratings: no  Education Provided on the Discharge Plan:    Patient/Family in Agreement with the Plan: yes    Referrals Placed by CM/SW: Post Acute Facilities  Private pay costs discussed: Not applicable    Additional Information:  Another voicemail left at [facility] inquiring about Dr Gramajo's adaptive testing results with pt's brother. No return call.       JOSE Richardson, LGSW  [phone]  [facility]

## 2022-03-24 NOTE — PLAN OF CARE
Goal Outcome Evaluation:    DATE & TIME: 3/23/22    Cognitive Concerns/ Orientation : Pt A/Ox4   BEHAVIOR & AGGRESSION TOOL COLOR: Green   ABNL VS/O2: VSS on RA, vitals daily  MOBILITY: Independent in room, SBA in hallway  PAIN MANAGMENT: Complaints of intermittent abdominal pain, redirection and Sprite help  DIET: Regular  BOWEL/BLADDER: Continent  DRAIN/DEVICES: No IV access  SKIN: Scattered scabs/bruises - RLE LLE edema  D/C DATE: Discharge once placement found. Brother granted 60 day guardianship (4/15/22), court hearing on 4/1/22  OTHER IMPORTANT INFO: Right eye redness, no drainage.

## 2022-03-24 NOTE — PLAN OF CARE
Goal Outcome Evaluation:           DATE & TIME: 3/23/22 8714-1412                        Cognitive Concerns/ Orientation : Pt A/Ox4   BEHAVIOR & AGGRESSION TOOL COLOR: Green             ABNL VS/O2: VSS on RA, vitals daily  MOBILITY: Independent in room, SBA in hallway  PAIN MANAGMENT: Complaints of intermittent abdominal pain, redirection and Sprite help  DIET: Regular  BOWEL/BLADDER: Continent  DRAIN/DEVICES: No IV access  SKIN: Scattered scabs/bruises - RLE LLE edema  D/C DATE: Discharge once placement found. Brother granted 60 day guardianship (4/15/22), court hearing on 4/1/22  OTHER IMPORTANT INFO: Right eye redness, no drainage.      Patient in bed no sign or symptom of distress noted. C/O of abdominal pain, redirected and is ok. Bed in lowest position, call light within reach will continue to monitor.

## 2022-03-24 NOTE — PROGRESS NOTES
[facility]    Medicine Progress Note - Hospitalist Service    Date of Admission:  1/22/2022    Assessment & Plan        51 year old female who was admitted on 1/22/2022 with SOB:     Impression:   Principal Problem:    COVID-19 Pneumonia, Pos 1/22/22, Unvacinated -- Recovered  Active Problems:    Down's syndrome    CKD (chronic kidney disease) stage 3, GFR 30-59 ml/min (H)    Acute respiratory failure with hypoxia -- resolved        Plan:  Continue present care.  Awaiting disposition planning       Diet: Regular Diet Adult    DVT Prophylaxis: Low Risk/Ambulatory with no VTE prophylaxis indicated  Zhu Catheter: Not present  Central Lines: None  Cardiac Monitoring: None  Code Status: Full Code      Disposition Plan   Expected Discharge: 03/31/2022     Anticipated discharge location: inpatient rehabilitation facility    Delays:     Placement - Group Homes            The patient's care was discussed with the Bedside Nurse, Care Coordinator/ and Patient.    Tariq Loaiza, DO  Hospitalist Service  [facility]  Securely message with the Vocera Web Console (learn more here)  Text page via [employer] Paging/Directory         Clinically Significant Risk Factors Present on Admission                    ______________________________________________________________________    Interval History   Patient seen and evaluated.  Resting comfortably in the leyva.  No fevers. No hypoxia.  Tolerating diet/    Data reviewed today: I reviewed all medications, new labs and imaging results over the last 24 hours. I personally reviewed no images or EKG's today.    Physical Exam   Vital Signs: Temp: 97.7  F (36.5  C) Temp src: Oral BP: 100/66 Pulse: 66   Resp: 18 SpO2: 96 %      Weight: 83 lbs 3.2 oz  General Appearance: Resting comfortably in chair in leyva  Respiratory: No respiratory distress  Cardiovascular: RRR.  Appears well perfused  GI: Soft.  Non-distended  Skin: No  obvious rashes or cyanosis  Other: Alert.  Moving all extremities grossly      Data   No lab results found in last 7 days.    No results found for this or any previous visit (from the past 24 hour(s)).

## 2022-03-25 PROCEDURE — 99231 SBSQ HOSP IP/OBS SF/LOW 25: CPT | Performed by: INTERNAL MEDICINE

## 2022-03-25 PROCEDURE — 120N000001 HC R&B MED SURG/OB

## 2022-03-25 PROCEDURE — 250N000013 HC RX MED GY IP 250 OP 250 PS 637: Performed by: HOSPITALIST

## 2022-03-25 PROCEDURE — 250N000013 HC RX MED GY IP 250 OP 250 PS 637: Performed by: REGISTERED NURSE

## 2022-03-25 PROCEDURE — 250N000013 HC RX MED GY IP 250 OP 250 PS 637: Performed by: INTERNAL MEDICINE

## 2022-03-25 RX ADMIN — SENNOSIDES AND DOCUSATE SODIUM 1 TABLET: 8.6; 5 TABLET ORAL at 09:45

## 2022-03-25 RX ADMIN — PANTOPRAZOLE SODIUM 40 MG: 40 TABLET, DELAYED RELEASE ORAL at 09:45

## 2022-03-25 RX ADMIN — SENNOSIDES AND DOCUSATE SODIUM 1 TABLET: 8.6; 5 TABLET ORAL at 21:52

## 2022-03-25 RX ADMIN — POLYETHYLENE GLYCOL 3350 17 G: 17 POWDER, FOR SOLUTION ORAL at 09:45

## 2022-03-25 RX ADMIN — Medication 50 MCG: at 09:45

## 2022-03-25 RX ADMIN — Medication 1 MG: at 21:52

## 2022-03-25 ASSESSMENT — ACTIVITIES OF DAILY LIVING (ADL)
ADLS_ACUITY_SCORE: 12
ADLS_ACUITY_SCORE: 13
ADLS_ACUITY_SCORE: 12
ADLS_ACUITY_SCORE: 13
ADLS_ACUITY_SCORE: 12
ADLS_ACUITY_SCORE: 13
ADLS_ACUITY_SCORE: 12
ADLS_ACUITY_SCORE: 13
ADLS_ACUITY_SCORE: 12
ADLS_ACUITY_SCORE: 13
ADLS_ACUITY_SCORE: 13
ADLS_ACUITY_SCORE: 12
ADLS_ACUITY_SCORE: 13
ADLS_ACUITY_SCORE: 12
ADLS_ACUITY_SCORE: 12
ADLS_ACUITY_SCORE: 13
ADLS_ACUITY_SCORE: 13
ADLS_ACUITY_SCORE: 12
ADLS_ACUITY_SCORE: 12
ADLS_ACUITY_SCORE: 13

## 2022-03-25 NOTE — PLAN OF CARE
"Goal Outcome Evaluation:      DATE & TIME: 3/24-3/25 7911-2946    Cognitive Concerns/ Orientation : A/Ox4; calm for the night shift.  BEHAVIOR & AGGRESSION TOOL COLOR: Green   ABNL VS/O2: VSS on RA, vitals daily  MOBILITY: Independent in room, SBA in hallway  PAIN MANAGMENT: Complaints of intermittent abdominal discomfort (\"My tummy aches.\") -  redirection and water help. Patient slept well throughout night.  DIET: Regular  BOWEL/BLADDER: Continent  DRAIN/DEVICES: No IV access  SKIN: Scattered scabs/bruises   D/C DATE: Discharge once placement found. Brother granted 60 day guardianship (4/15/22), court hearing on 4/1/22                "

## 2022-03-25 NOTE — PROGRESS NOTES
Care Management Follow Up    Length of Stay (days): 62    Expected Discharge Date: 04/06/2022     Concerns to be Addressed: discharge planning     Patient plan of care discussed at interdisciplinary rounds: Yes    Anticipated Discharge Disposition: Transitional Care     Anticipated Discharge Services:    Anticipated Discharge DME:      Patient/family educated on Medicare website which has current facility and service quality ratings: no  Education Provided on the Discharge Plan:    Patient/Family in Agreement with the Plan: yes    Referrals Placed by CM/SW: Post Acute Facilities  Private pay costs discussed: Not applicable    Additional Information:  Voicemail left for Kamryn THORNE, with an update about adaptive testing and not being able to get a hold of [facility].       JOSE Richardson, LGSW  [phone]  [facility]

## 2022-03-25 NOTE — PLAN OF CARE
"Goal Outcome Evaluation:    Plan of Care Reviewed With: patient     DATE & TIME: 3/25/2022    Cognitive Concerns/ Orientation : A/Ox4, baseline mood, content with coloring/writing activities, expresses missing her brother.  BEHAVIOR & AGGRESSION TOOL COLOR: Green   ABNL VS/O2: Monitor qday, VSS on RA.  MOBILITY: Ind in room, SBA per request in hallway.  PAIN MANAGMENT: None needed.  DIET: Regular  BOWEL/BLADDER: Continent. Refused miralax this AM, BM x1 in afternoon.  DRAIN/DEVICES: No IV access  SKIN: Scattered scabs/bruises   D/C DATE: Discharge once placement found. Brother granted 60 day guardianship (4/15/22), court hearing on 4/1/22.   OTHER: Pt speaks about foods, ingredients, after eating, when scared, etc as \"it upsets my stomach\", appears to be her way of communicating feelings of nervousness, cramping/intestinal motility, etc. Agrees to bathe tomorrow.      "

## 2022-03-25 NOTE — PROGRESS NOTES
[facility]    Hospitalist Progress Note    Assessment & Plan   51 year old female who was admitted on 1/22/2022 with SOB:    Impression:   Principal Problem:    COVID-19 Pneumonia, Pos 1/22/22, Unvacinated -- Recovered  Active Problems:    Down's syndrome    CKD (chronic kidney disease) stage 3, GFR 30-59 ml/min (H)    Acute respiratory failure with hypoxia -- resolved      Plan:  Continue present care    DVT Prophylaxis: Pneumatic Compression Devices  Code Status: Full Code    Disposition: Expected discharge once placement found (?possible group home)    Beni Pereyraconstantinejimmie  Pager [phone]  Cell Phone [phone]  Text Page (7am to 6pm)    Interval History   No complaints.      Physical Exam   Temp: 97.6  F (36.4  C) Temp src: Oral BP: 95/57 Pulse: 67   Resp: 18 SpO2: 100 % O2 Device: None (Room air)    Vitals:    01/22/22 1140 02/05/22 0729 02/14/22 1329   Weight: 33.6 kg (74 lb 1.2 oz) 43.8 kg (96 lb 9.6 oz) 37.7 kg (83 lb 3.2 oz)     Vital Signs with Ranges  Temp:  [97.5  F (36.4  C)-97.6  F (36.4  C)] 97.6  F (36.4  C)  Pulse:  [67-76] 67  Resp:  [17-18] 18  BP: (95-96)/(57-59) 95/57  SpO2:  [98 %-100 %] 100 %  I/O last 3 completed shifts:  In: 900 [P.O.:900]  Out: -     # Pain Assessment:  Current Pain Score 3/24/2022   Patient currently in pain? denies   Pain score (0-10) -   rFLACC pain score -   Miranda castrejon pain level was assessed and she currently denies pain.        Constitutional: Awake, alert, cooperative, no apparent distress  Respiratory: Clear to auscultation bilaterally, no crackles or wheezing  Cardiovascular: Regular rate and rhythm, normal S1 and S2, and no murmur noted  GI: Normal bowel sounds, soft, non-distended, non-tender  Extrem: No calf tenderness, no ankle edema  Neuro: Alert, no focal motor or sensory deficits    Medications       melatonin  1 mg Oral At Bedtime     pantoprazole  40 mg Oral QAM AC     polyethylene glycol  17 g Oral Daily      senna-docusate  1 tablet Oral BID     cholecalciferol  50 mcg Oral Daily       Data   No lab results found in last 7 days.    Imaging:   No results found for this or any previous visit (from the past 24 hour(s)).

## 2022-03-25 NOTE — PLAN OF CARE
"DATE & TIME: 3/24/22 5463-8894      Cognitive Concerns/ Orientation : A/Ox4; tearful and sad today - missing her brother - emotional support provided   BEHAVIOR & AGGRESSION TOOL COLOR: Green   ABNL VS/O2: VSS on RA, vitals daily  MOBILITY: Independent in room, SBA in hallway  PAIN MANAGMENT: Complaints of intermittent abdominal discomfort (\"My tummy aches.\") -  redirection and water help  DIET: Regular  BOWEL/BLADDER: Continent  DRAIN/DEVICES: No IV access  SKIN: Scattered scabs/bruises - BLE trace  edema  D/C DATE: Discharge once placement found. Brother granted 60 day guardianship (4/15/22), court hearing on 4/1/22                        "

## 2022-03-26 PROCEDURE — 99231 SBSQ HOSP IP/OBS SF/LOW 25: CPT | Performed by: INTERNAL MEDICINE

## 2022-03-26 PROCEDURE — 250N000013 HC RX MED GY IP 250 OP 250 PS 637: Performed by: INTERNAL MEDICINE

## 2022-03-26 PROCEDURE — 120N000001 HC R&B MED SURG/OB

## 2022-03-26 PROCEDURE — 250N000013 HC RX MED GY IP 250 OP 250 PS 637: Performed by: HOSPITALIST

## 2022-03-26 PROCEDURE — 250N000013 HC RX MED GY IP 250 OP 250 PS 637: Performed by: REGISTERED NURSE

## 2022-03-26 RX ADMIN — SENNOSIDES AND DOCUSATE SODIUM 1 TABLET: 8.6; 5 TABLET ORAL at 10:14

## 2022-03-26 RX ADMIN — Medication 50 MCG: at 10:14

## 2022-03-26 RX ADMIN — POLYETHYLENE GLYCOL 3350 17 G: 17 POWDER, FOR SOLUTION ORAL at 10:14

## 2022-03-26 RX ADMIN — PANTOPRAZOLE SODIUM 40 MG: 40 TABLET, DELAYED RELEASE ORAL at 10:14

## 2022-03-26 RX ADMIN — Medication 1 MG: at 21:40

## 2022-03-26 RX ADMIN — SENNOSIDES AND DOCUSATE SODIUM 1 TABLET: 8.6; 5 TABLET ORAL at 21:40

## 2022-03-26 ASSESSMENT — ACTIVITIES OF DAILY LIVING (ADL)
ADLS_ACUITY_SCORE: 12
ADLS_ACUITY_SCORE: 14
ADLS_ACUITY_SCORE: 14
ADLS_ACUITY_SCORE: 12
ADLS_ACUITY_SCORE: 14
ADLS_ACUITY_SCORE: 12
ADLS_ACUITY_SCORE: 14
ADLS_ACUITY_SCORE: 12
ADLS_ACUITY_SCORE: 14

## 2022-03-26 NOTE — PLAN OF CARE
Goal Outcome Evaluation:    Plan of Care Reviewed With: patient     Overall Patient Progress: no change    DATE & TIME: 3/25/2022 5234-1189   Cognitive Concerns/ Orientation : A&O x4, baseline mood. Talking with staff during evening, walking around halls, making jokes. Pt reports being excited for easter but sad that her brother doesn't visit her. Slept well overnight  BEHAVIOR & AGGRESSION TOOL COLOR: Green   ABNL VS/O2: Daily vitals completed in afternoon  MOBILITY: Ind in room, SBA per request in hallway. Ambulated in halls x1 throughout the whole unit, x1 around desk  PAIN MANAGMENT: None needed.  DIET: Regular. Ate 100% of dinner  BOWEL/BLADDER: Continent. No BM overnight  DRAIN/DEVICES: No IV access  SKIN: Scattered scabs/bruises  D/C DATE: Discharge once placement found. Brother granted 60 day guardianship (4/15/22), court hearing on 4/1/22.   OTHER: Bread given with pills per pt request. Coloring before bedtime, picked out new colorful paper to use. Pt agreed to taking bath in the morning with daytime RN

## 2022-03-26 NOTE — PROGRESS NOTES
[facility]    Hospitalist Progress Note    Assessment & Plan   51 year old female who was admitted on 1/22/2022 with SOB:    Impression:   Principal Problem:    COVID-19 Pneumonia, Pos 1/22/22, Unvacinated -- Recovered  Active Problems:    Down's syndrome    CKD (chronic kidney disease) stage 3, GFR 30-59 ml/min (H)    Acute respiratory failure with hypoxia -- resolved      Plan:  Continue present care    DVT Prophylaxis: Pneumatic Compression Devices  Code Status: Full Code    Disposition: Expected discharge once placement found (?possible group home)    Beni Pereyraconstantinejimmie  Pager [phone]  Cell Phone [phone]  Text Page (7am to 6pm)    Interval History   No complaints.      Physical Exam   Temp: 97.5  F (36.4  C) Temp src: Axillary BP: 125/65 Pulse: 70   Resp: 18 SpO2: 100 % O2 Device: None (Room air)    Vitals:    01/22/22 1140 02/05/22 0729 02/14/22 1329   Weight: 33.6 kg (74 lb 1.2 oz) 43.8 kg (96 lb 9.6 oz) 37.7 kg (83 lb 3.2 oz)     Vital Signs with Ranges  Temp:  [97.5  F (36.4  C)-97.6  F (36.4  C)] 97.5  F (36.4  C)  Pulse:  [67-70] 70  Resp:  [18] 18  BP: ()/(57-65) 125/65  SpO2:  [100 %] 100 %  I/O last 3 completed shifts:  In: 900 [P.O.:900]  Out: -     # Pain Assessment:  Current Pain Score 3/26/2022   Patient currently in pain? denies   Pain score (0-10) -   rFLACC pain score -   Miranda castrejon pain level was assessed and she currently denies pain.        Constitutional: Awake, alert, cooperative, no apparent distress  Respiratory: Clear to auscultation bilaterally, no crackles or wheezing  Cardiovascular: Regular rate and rhythm, normal S1 and S2, and no murmur noted  GI: Normal bowel sounds, soft, non-distended, non-tender  Extrem: No calf tenderness, no ankle edema  Neuro: Alert, no focal motor or sensory deficits    Medications       melatonin  1 mg Oral At Bedtime     pantoprazole  40 mg Oral QAM AC     polyethylene glycol  17 g Oral Daily     senna-docusate   1 tablet Oral BID     cholecalciferol  50 mcg Oral Daily       Data   No lab results found in last 7 days.    Imaging:   No results found for this or any previous visit (from the past 24 hour(s)).

## 2022-03-26 NOTE — PLAN OF CARE
"Goal Outcome Evaluation:      DATE & TIME: 3/26/2022   Cognitive Concerns/ Orientation : A&O x4, baseline mood.   BEHAVIOR & AGGRESSION TOOL COLOR: Green   ABNL VS/O2: Daily VSS in AM.   MOBILITY: Ind in room, SBA per request in halls.  PAIN MANAGMENT: None needed.  DIET: Regular.  BOWEL/BLADDER: Continent. No BM. Miralax mixed in water pitcher, pt refuses if she can see it in her cup of water.  DRAIN/DEVICES: No IV access  SKIN: Scattered scabs/bruises. [place] LE edema, not allowing pt to wear her [employer] themed socks as they are too tight. Blanchable redness on R buttock, refuses drsg and PIP chair cushion, despite many encouragement attempts.  D/C DATE: Discharge once placement found. Brother granted 60 day guardianship (4/15/22), court hearing on 4/1/22.   OTHER: Content coloring/sitting in the leyva. Agreed to take a \"tub\", refuses to have hair washed. Habitual and directs cares/room organization, etc.     "

## 2022-03-27 PROCEDURE — 99231 SBSQ HOSP IP/OBS SF/LOW 25: CPT | Performed by: INTERNAL MEDICINE

## 2022-03-27 PROCEDURE — 250N000013 HC RX MED GY IP 250 OP 250 PS 637: Performed by: INTERNAL MEDICINE

## 2022-03-27 PROCEDURE — 250N000013 HC RX MED GY IP 250 OP 250 PS 637: Performed by: HOSPITALIST

## 2022-03-27 PROCEDURE — 250N000013 HC RX MED GY IP 250 OP 250 PS 637: Performed by: REGISTERED NURSE

## 2022-03-27 PROCEDURE — 120N000001 HC R&B MED SURG/OB

## 2022-03-27 RX ADMIN — SENNOSIDES AND DOCUSATE SODIUM 1 TABLET: 8.6; 5 TABLET ORAL at 09:39

## 2022-03-27 RX ADMIN — SENNOSIDES AND DOCUSATE SODIUM 1 TABLET: 8.6; 5 TABLET ORAL at 22:11

## 2022-03-27 RX ADMIN — Medication 1 MG: at 22:11

## 2022-03-27 RX ADMIN — Medication 50 MCG: at 09:39

## 2022-03-27 RX ADMIN — PANTOPRAZOLE SODIUM 40 MG: 40 TABLET, DELAYED RELEASE ORAL at 09:39

## 2022-03-27 ASSESSMENT — ACTIVITIES OF DAILY LIVING (ADL)
ADLS_ACUITY_SCORE: 14
ADLS_ACUITY_SCORE: 12
ADLS_ACUITY_SCORE: 14
ADLS_ACUITY_SCORE: 12
ADLS_ACUITY_SCORE: 14
ADLS_ACUITY_SCORE: 12
ADLS_ACUITY_SCORE: 12
ADLS_ACUITY_SCORE: 14

## 2022-03-27 NOTE — PLAN OF CARE
"Goal Outcome Evaluation: brother German      DATE & TIME: 3/27/2022   Cognitive Concerns/ Orientation : A&O x4, baseline mood.   BEHAVIOR & AGGRESSION TOOL COLOR: Green   QDAY VS/O2: VSS on RA.  MOBILITY: Ind in room, SBA per request in halls. Amb in leyva freq.   PAIN MANAGMENT: None needed  DIET: Regular. Needs enc to eat more variety of foods, send up foods in addition to what she asks for, in the past she has eaten the extra food choices.   BOWEL/BLADDER: Continent. BM x1,  refused to drink miralax despite attempts to disguise.    DRAIN/DEVICES: No IV access  SKIN: Scattered scabs/bruises. BLE +1 edema, stop wearing tall socks that are tight on her legs. Blanchable redness on buttocks, refuses PIP interventions.   D/C DATE: Discharge once placement found. Brother granted 60 day guardianship (4/15/22), court hearing on 4/1/22.  OTHER: Content coloring/sitting in the leyva. Habitual and directs cares/room organization, etc.     Ok'd by RN Manager to let pt leave hospital for a few hours today. Also, will begin to launder her clothing.   4pm: Pt's brother, Maxi and pt left [address].  6:30PM: Pt and Maxi returned. Ate a \"late lunch\" and returned with apple pie and 7UP to keep in her room.          "

## 2022-03-27 NOTE — PROGRESS NOTES
[facility]    Hospitalist Progress Note    Assessment & Plan   51 year old female who was admitted on 1/22/2022 with SOB:    Impression:   Principal Problem:    COVID-19 Pneumonia, Pos 1/22/22, Unvacinated -- Recovered  Active Problems:    Down's syndrome    CKD (chronic kidney disease) stage 3, GFR 30-59 ml/min (H)    Acute respiratory failure with hypoxia -- resolved      Plan:  Continue present care    DVT Prophylaxis: Pneumatic Compression Devices  Code Status: Full Code    Disposition: Expected discharge once placement found (?possible group home)    Beni Burns  Pager [phone]  Cell Phone [phone]  Text Page (7am to 6pm)    Interval History   No complaints.      Physical Exam   Temp: 97.8  F (36.6  C) Temp src: Oral BP: 105/64 Pulse: 77   Resp: 16 SpO2: 99 % O2 Device: None (Room air)    Vitals:    01/22/22 1140 02/05/22 0729 02/14/22 1329   Weight: 33.6 kg (74 lb 1.2 oz) 43.8 kg (96 lb 9.6 oz) 37.7 kg (83 lb 3.2 oz)     Vital Signs with Ranges  Temp:  [97.8  F (36.6  C)] 97.8  F (36.6  C)  Pulse:  [77] 77  Resp:  [16-18] 16  BP: (105)/(64) 105/64  SpO2:  [99 %] 99 %  I/O last 3 completed shifts:  In: 200 [P.O.:200]  Out: -     # Pain Assessment:  Current Pain Score 3/26/2022   Patient currently in pain? denies   Pain score (0-10) -   rFLACC pain score -   Miranda castrejon pain level was assessed and she currently denies pain.        Constitutional: Awake, alert, cooperative, no apparent distress  Respiratory: Clear to auscultation bilaterally, no crackles or wheezing  Cardiovascular: Regular rate and rhythm, normal S1 and S2, and no murmur noted  GI: Normal bowel sounds, soft, non-distended, non-tender  Extrem: No calf tenderness, no ankle edema  Neuro: Alert, no focal motor or sensory deficits    Medications       melatonin  1 mg Oral At Bedtime     pantoprazole  40 mg Oral QAM AC     polyethylene glycol  17 g Oral Daily     senna-docusate  1 tablet Oral BID      cholecalciferol  50 mcg Oral Daily       Data   No lab results found in last 7 days.    Imaging:   No results found for this or any previous visit (from the past 24 hour(s)).

## 2022-03-27 NOTE — PLAN OF CARE
Goal Outcome Evaluation:    Plan of Care Reviewed With: patient     Overall Patient Progress: no change    DATE & TIME: 3/26/2022 7442-4107  Cognitive Concerns/ Orientation : A&O x4, baseline mood.   BEHAVIOR & AGGRESSION TOOL COLOR: Green   ABNL VS/O2: Daily VS completed earlier in day  MOBILITY: Ind in room, SBA per request in halls. Ambulated throughout entire unit x1  PAIN MANAGMENT: None needed  DIET: Regular.  BOWEL/BLADDER: Continent. No BM   DRAIN/DEVICES: No IV access  SKIN: Scattered scabs/bruises. BLE +1 edema. Blanchable redness on R buttock, attempted to place foam dressings and PIP cushion however pt refused.  D/C DATE: Discharge once placement found. Brother granted 60 day guardianship (4/15/22), court hearing on 4/1/22.   OTHER: Content coloring/sitting in the leyva. Habitual and directs cares/room organization, etc.

## 2022-03-28 PROCEDURE — 250N000013 HC RX MED GY IP 250 OP 250 PS 637: Performed by: HOSPITALIST

## 2022-03-28 PROCEDURE — 99231 SBSQ HOSP IP/OBS SF/LOW 25: CPT | Performed by: INTERNAL MEDICINE

## 2022-03-28 PROCEDURE — 120N000001 HC R&B MED SURG/OB

## 2022-03-28 PROCEDURE — 250N000013 HC RX MED GY IP 250 OP 250 PS 637: Performed by: INTERNAL MEDICINE

## 2022-03-28 RX ADMIN — PANTOPRAZOLE SODIUM 40 MG: 40 TABLET, DELAYED RELEASE ORAL at 09:59

## 2022-03-28 RX ADMIN — SENNOSIDES AND DOCUSATE SODIUM 1 TABLET: 8.6; 5 TABLET ORAL at 21:55

## 2022-03-28 RX ADMIN — Medication 1 MG: at 21:55

## 2022-03-28 RX ADMIN — Medication 50 MCG: at 10:00

## 2022-03-28 RX ADMIN — SENNOSIDES AND DOCUSATE SODIUM 1 TABLET: 8.6; 5 TABLET ORAL at 09:59

## 2022-03-28 ASSESSMENT — ACTIVITIES OF DAILY LIVING (ADL)
ADLS_ACUITY_SCORE: 15
ADLS_ACUITY_SCORE: 15
ADLS_ACUITY_SCORE: 14
ADLS_ACUITY_SCORE: 15
ADLS_ACUITY_SCORE: 14
ADLS_ACUITY_SCORE: 15
ADLS_ACUITY_SCORE: 14
ADLS_ACUITY_SCORE: 15
ADLS_ACUITY_SCORE: 14
ADLS_ACUITY_SCORE: 15
ADLS_ACUITY_SCORE: 14
ADLS_ACUITY_SCORE: 15
ADLS_ACUITY_SCORE: 14
ADLS_ACUITY_SCORE: 15

## 2022-03-28 NOTE — PROGRESS NOTES
[facility]    Hospitalist Progress Note    Assessment & Plan   51 year old female who was admitted on 1/22/2022 with SOB:    Impression:   Principal Problem:    COVID-19 Pneumonia, Pos 1/22/22, Unvacinated -- Recovered  Active Problems:    Down's syndrome    CKD (chronic kidney disease) stage 3, GFR 30-59 ml/min (H)    Acute respiratory failure with hypoxia -- resolved      Plan:  Continue present care    DVT Prophylaxis: Pneumatic Compression Devices  Code Status: Full Code    Disposition: Expected discharge once placement found (?possible group home)    Beni Burns  Pager [phone]  Cell Phone [phone]  Text Page (7am to 6pm)    Interval History   No complaints.      Physical Exam   Temp: 97.7  F (36.5  C) Temp src: Oral BP: 106/69 Pulse: 63   Resp: 16 SpO2: 99 % O2 Device: None (Room air)    Vitals:    01/22/22 1140 02/05/22 0729 02/14/22 1329   Weight: 33.6 kg (74 lb 1.2 oz) 43.8 kg (96 lb 9.6 oz) 37.7 kg (83 lb 3.2 oz)     Vital Signs with Ranges  Temp:  [97.7  F (36.5  C)] 97.7  F (36.5  C)  Pulse:  [63] 63  Resp:  [16] 16  BP: (106)/(69) 106/69  SpO2:  [99 %] 99 %  I/O last 3 completed shifts:  In: 600 [P.O.:600]  Out: -     # Pain Assessment:  Current Pain Score 3/28/2022   Patient currently in pain? denies   Pain score (0-10) -   [facility]   Miranda castrejon pain level was assessed and she currently denies pain.        Constitutional: Awake, alert, cooperative, no apparent distress  Respiratory: Clear to auscultation bilaterally, no crackles or wheezing  Cardiovascular: Regular rate and rhythm, normal S1 and S2, and no murmur noted  GI: Normal bowel sounds, soft, non-distended, non-tender  Extrem: No calf tenderness, no ankle edema  Neuro: Alert, no focal motor or sensory deficits    Medications       melatonin  1 mg Oral At Bedtime     pantoprazole  40 mg Oral QAM AC     polyethylene glycol  17 g Oral Daily     senna-docusate  1 tablet Oral BID      cholecalciferol  50 mcg Oral Daily       Data   No lab results found in last 7 days.    Imaging:   No results found for this or any previous visit (from the past 24 hour(s)).

## 2022-03-28 NOTE — PLAN OF CARE
Goal Outcome Evaluation:    Plan of Care Reviewed With: patient     Overall Patient Progress: improving    DATE & TIME: 3/27/2022 9716-8907  Cognitive Concerns/ Orientation : A&O x4, baseline mood.   BEHAVIOR & AGGRESSION TOOL COLOR: Green   QDAY VS/O2: VSS on RA.  MOBILITY: Ind in room, SBA per request in halls. Ambulated in leyva x1  PAIN MANAGMENT: None needed  DIET: Regular. Pt typically eats all food ordered/given, so please order additional foods. Appetite improving.   BOWEL/BLADDER: Continent. 2 BM's overnight  DRAIN/DEVICES: No IV access  SKIN: Scattered scabs/bruises. BLE +1 edema. Blanchable redness on buttocks, refuses PIP interventions.   D/C DATE: Discharge once placement found. Brother granted 60 day guardianship (4/15/22), court hearing on 4/1/22.  OTHER: Content coloring/sitting in the leyva when awake. Habitual and directs cares/room organization, etc.

## 2022-03-28 NOTE — PLAN OF CARE
"DATE & TIME: 3/28/2022 6801-9721  Cognitive Concerns/ Orientation : A&O x4  BEHAVIOR & AGGRESSION TOOL COLOR: Green   QDAY VS/O2: VSS on RA.  MOBILITY: Ind in room, SBA per request in halls. Ambulated in leyva x2. Sat in leyva and colored today  PAIN MANAGMENT: Occasional abdominal discomfort which is not new, declines intervention  DIET: Regular- good appetite; declined lunch  BOWEL/BLADDER: Continent. Soft BM today  DRAIN/DEVICES: No IV access  SKIN: Scattered scabs/bruises. BLE +1 edema. Blanchable redness on buttocks, refuses PIP interventions.   D/C DATE: Discharge once placement found. Brother granted 60 day guardianship (4/15/22), court hearing on 4/1/22.  OTHER: Content coloring/sitting in the leyva. Habitual and directs cares/room organization, etc. Patient did not want to take a \"tub\" today, encouraged patient to do so tomorrow           "

## 2022-03-29 PROCEDURE — 250N000013 HC RX MED GY IP 250 OP 250 PS 637: Performed by: INTERNAL MEDICINE

## 2022-03-29 PROCEDURE — 250N000013 HC RX MED GY IP 250 OP 250 PS 637: Performed by: HOSPITALIST

## 2022-03-29 PROCEDURE — 120N000001 HC R&B MED SURG/OB

## 2022-03-29 PROCEDURE — 99231 SBSQ HOSP IP/OBS SF/LOW 25: CPT | Performed by: INTERNAL MEDICINE

## 2022-03-29 RX ADMIN — Medication 50 MCG: at 10:39

## 2022-03-29 RX ADMIN — SENNOSIDES AND DOCUSATE SODIUM 1 TABLET: 8.6; 5 TABLET ORAL at 10:38

## 2022-03-29 RX ADMIN — PANTOPRAZOLE SODIUM 40 MG: 40 TABLET, DELAYED RELEASE ORAL at 10:38

## 2022-03-29 RX ADMIN — Medication 1 MG: at 21:44

## 2022-03-29 RX ADMIN — SENNOSIDES AND DOCUSATE SODIUM 1 TABLET: 8.6; 5 TABLET ORAL at 21:44

## 2022-03-29 ASSESSMENT — ACTIVITIES OF DAILY LIVING (ADL)
ADLS_ACUITY_SCORE: 15

## 2022-03-29 NOTE — PROGRESS NOTES
[facility]    Hospitalist Progress Note    Assessment & Plan   51 year old female who was admitted on 1/22/2022 with SOB:    Impression:   Principal Problem:    COVID-19 Pneumonia, Pos 1/22/22, Unvacinated -- Recovered  Active Problems:    Down's syndrome    CKD (chronic kidney disease) stage 3, GFR 30-59 ml/min (H)    Acute respiratory failure with hypoxia -- resolved      Plan:  Continue present care    DVT Prophylaxis: Pneumatic Compression Devices  Code Status: Full Code    Disposition: Expected discharge once placement found (?possible group home)    Beni Burns  Pager [phone]  Cell Phone [phone]  Text Page (7am to 6pm)    Interval History   No complaints.      Physical Exam   Temp: 98  F (36.7  C) Temp src: Oral BP: 112/74 Pulse: 68   Resp: 16 SpO2: 98 % O2 Device: None (Room air)    Vitals:    01/22/22 1140 02/05/22 0729 02/14/22 1329   Weight: 33.6 kg (74 lb 1.2 oz) 43.8 kg (96 lb 9.6 oz) 37.7 kg (83 lb 3.2 oz)     Vital Signs with Ranges  Temp:  [98  F (36.7  C)] 98  F (36.7  C)  Pulse:  [68] 68  Resp:  [16] 16  BP: (112)/(74) 112/74  SpO2:  [98 %] 98 %  I/O last 3 completed shifts:  In: 440 [P.O.:440]  Out: -     # Pain Assessment:  Current Pain Score 3/29/2022   Patient currently in pain? denies   Pain score (0-10) -   [facility]   Miranda castrejon pain level was assessed and she currently denies pain.        Constitutional: Awake, alert, cooperative, no apparent distress  Respiratory: Clear to auscultation bilaterally, no crackles or wheezing  Cardiovascular: Regular rate and rhythm, normal S1 and S2, and no murmur noted  GI: Normal bowel sounds, soft, non-distended, non-tender  Extrem: No calf tenderness, no ankle edema  Neuro: Alert, no focal motor or sensory deficits    Medications       melatonin  1 mg Oral At Bedtime     pantoprazole  40 mg Oral QAM AC     polyethylene glycol  17 g Oral Daily     senna-docusate  1 tablet Oral BID     cholecalciferol   50 mcg Oral Daily       Data   No lab results found in last 7 days.    Imaging:   No results found for this or any previous visit (from the past 24 hour(s)).

## 2022-03-29 NOTE — PLAN OF CARE
DATE & TIME: 3/28-3/29/22 Night shift  Cognitive Concerns/ Orientation : A&O x4- at baseline  BEHAVIOR & AGGRESSION TOOL COLOR: Green   QDAY VS/O2: VSS on RA.  MOBILITY: Ind in room, SBA per request in halls. Ambulated in leyva with writer x1; got patient ready for bed  PAIN MANAGMENT: Occasional abdominal discomfort which is not new, declines intervention- feels better after drinking Sprite  DIET: Regular- good appetite  BOWEL/BLADDER: Continent.   DRAIN/DEVICES: No IV access  SKIN: Scattered scabs/bruises. BLE +1 edema. Blanchable redness on buttocks, refuses PIP interventions.   D/C DATE: Discharge once placement found. Brother granted 60 day guardianship (4/15/22)  OTHER: Content coloring/sitting in the leyva. Habitual and directs cares/room organization, etc. Slept well all shift.    Goal Outcome Evaluation: on going

## 2022-03-29 NOTE — PLAN OF CARE
Goal Outcome Evaluation:        DATE & TIME: 3/28/2022 7178-8400  Cognitive Concerns/ Orientation : A&O x4  BEHAVIOR & AGGRESSION TOOL COLOR: Green   QDAY VS/O2: VSS on RA.  MOBILITY: Ind in room, SBA per request in halls. Ambulated in leyva with writerx1, ambulated with staff in halls several times. Sat in leyva and colored today  PAIN MANAGMENT: Occasional abdominal discomfort which is not new, declines intervention  DIET: Regular- good appetite  BOWEL/BLADDER: Continent. DRAIN/DEVICES: No IV access  SKIN: Scattered scabs/bruises. BLE +1 edema. CAM documented blanchable redness on buttocks, refuses PIP interventions.   D/C DATE: Discharge once placement found. Brother granted 60 day guardianship (4/15/22), court hearing on 4/1/22.  OTHER: Content coloring/sitting in the leyva. Habitual and directs cares/room organization, etc.

## 2022-03-29 NOTE — PLAN OF CARE
Goal Outcome Evaluation:      DATE & TIME: 3/29/22 (7157-8480)  Cognitive Concerns/ Orientation : A&O x4- at baseline  BEHAVIOR & AGGRESSION TOOL COLOR: Green   QDAY VS/O2: VSS on RA.  MOBILITY: Ind in room, SBA per request in halls. Ambulated in halls x3 this shift; up in chair in hallway  PAIN MANAGMENT: Occasional abdominal discomfort, denies this shift  DIET: Regular- good appetite  BOWEL/BLADDER: Continent.   DRAIN/DEVICES: No IV access  SKIN: Scattered scabs/bruises. BLE +1 edema. Blanchable redness on buttocks, refuses PIP interventions or full skin assessment.   D/C DATE: Discharge once placement found. Brother granted 60 day guardianship (4/15/22)  OTHER: Content coloring/sitting in the leyva. Habitual and directs cares/room organization, etc

## 2022-03-30 PROCEDURE — 99231 SBSQ HOSP IP/OBS SF/LOW 25: CPT | Performed by: INTERNAL MEDICINE

## 2022-03-30 PROCEDURE — 250N000013 HC RX MED GY IP 250 OP 250 PS 637: Performed by: INTERNAL MEDICINE

## 2022-03-30 PROCEDURE — 120N000001 HC R&B MED SURG/OB

## 2022-03-30 PROCEDURE — 250N000013 HC RX MED GY IP 250 OP 250 PS 637: Performed by: HOSPITALIST

## 2022-03-30 RX ADMIN — Medication 1 MG: at 21:53

## 2022-03-30 RX ADMIN — Medication 50 MCG: at 10:59

## 2022-03-30 RX ADMIN — SENNOSIDES AND DOCUSATE SODIUM 1 TABLET: 8.6; 5 TABLET ORAL at 21:53

## 2022-03-30 RX ADMIN — PANTOPRAZOLE SODIUM 40 MG: 40 TABLET, DELAYED RELEASE ORAL at 10:59

## 2022-03-30 RX ADMIN — SENNOSIDES AND DOCUSATE SODIUM 1 TABLET: 8.6; 5 TABLET ORAL at 10:59

## 2022-03-30 ASSESSMENT — ACTIVITIES OF DAILY LIVING (ADL)
ADLS_ACUITY_SCORE: 15

## 2022-03-30 NOTE — PLAN OF CARE
DATE & TIME: 3/29/22 3129-1514    Cognitive Concerns/ Orientation : Pt A/Ox4   BEHAVIOR & AGGRESSION TOOL COLOR: Green   ABNL VS/O2: VSS on RA, daily vitals  MOBILITY: Independent in room, SBA in hallway.  PAIN MANAGMENT: Denies  DIET: Regular  BOWEL/BLADDER: Continent  DRAIN/DEVICES: No IV access  SKIN: Scattered scabs/bruises. Blanchable redness/buttocks.  D/C DATE: Discharge once placement found. Brother granted emergency guardianship (4/15/22).

## 2022-03-30 NOTE — PLAN OF CARE
DATE & TIME: 3/29/22 2134-5293  Cognitive Concerns/ Orientation : A&O x4- at baseline  BEHAVIOR & AGGRESSION TOOL COLOR: Green   QDAY VS/O2: VSS on RA.  MOBILITY: Ind in room, SBA per request in halls. Ambulated in leyva x1 this; up in chair in hallway  PAIN MANAGMENT: abdominal discomfort/heartburn from red sauce from dinner, bread and water helped  DIET: Regular- good appetite  BOWEL/BLADDER: Continent. No BM this shift   DRAIN/DEVICES: No IV access  SKIN: Scattered scabs/bruises. Blanchable redness on buttocks.   D/C DATE: Discharge once placement found. Brother granted 60 day guardianship (4/15/22)  OTHER: Content coloring/sitting in the leyva. Habitual and directs cares/room organization, etc

## 2022-03-30 NOTE — PROGRESS NOTES
CLINICAL NUTRITION SERVICES - REASSESSMENT NOTE    Malnutrition:   (3/16)   % Weight Loss: Unable to evaluate   % Intake:  Decreased intake does not meet criteria for malnutrition - improving   Subcutaneous Fat Loss:  Orbital region moderate depletion, Upper arm region moderate depletion and Thoracic region moderate depletion  Muscle Loss:  Temporal region moderate depletion, Clavicle bone region moderate depletion and Dorsal hand region moderate depletion  Fluid Retention:  None noted     Malnutrition Diagnosis: Moderate malnutrition  In Context of:  Chronic illness or disease     - Improving with more consistent PO intakes for several weeks now     EVALUATION OF PROGRESS TOWARD GOALS   Diet: Regular diet   Intake/Tolerance:   - patient sitting in room, eating plain toast for breakfast. She tells me she only likes the crust, and really prefers un-toasted bread. She plans to skip lunch and isn't sure what she'd like for dinner. Said she cant remember what her favorite foods are.     ASSESSED NUTRITION NEEDS:  Dosing Weight 33.6 kg   Estimated Energy Needs: 6965-8944 kcals (35-40 Kcal/Kg)  Justification: repletion and underweight  Estimated Protein Needs: 50-70 grams protein (1.5-2 g pro/Kg)  Justification: repletion and hypercatabolism with acute illness    Previous Goals:   Intake of at least 75% for meals BID-TID.   Evaluation: Met    Previous Nutrition Diagnosis:   No nutrition diagnosis identified at this time   Evaluation: No change    CURRENT NUTRITION DIAGNOSIS  No nutrition diagnosis identified at this time     INTERVENTIONS  Recommendations / Nutrition Prescription  Regular diet     Implementation  None new    Goals  Intake of at least 75% meals BID-TID.       MONITORING AND EVALUATION:  Progress towards goals will be monitored and evaluated per protocol and Practice Guidelines    Rosalba Adams RD, LD  [facility], [facility], [facility]  Pager: [phone]  Weekend Pager: [phone]

## 2022-03-30 NOTE — PROGRESS NOTES
[facility]    Hospitalist Progress Note    Assessment & Plan   51 year old female who was admitted on 1/22/2022 with SOB:    Impression:   Principal Problem:    COVID-19 Pneumonia, Pos 1/22/22, Unvacinated -- Recovered  Active Problems:    Down's syndrome    CKD (chronic kidney disease) stage 3, GFR 30-59 ml/min (H)    Acute respiratory failure with hypoxia -- resolved      Plan:  Continue present care    DVT Prophylaxis: Pneumatic Compression Devices  Code Status: Full Code    Disposition: Expected discharge once placement found (?possible group home)    Beni Burns  Pager [phone]  Cell Phone [phone]  Text Page (7am to 6pm)    Interval History   No complaints.      Physical Exam   Temp: 97.4  F (36.3  C) Temp src: Axillary BP: 105/68 Pulse: 71   Resp: 18 SpO2: 99 % O2 Device: None (Room air)    Vitals:    01/22/22 1140 02/05/22 0729 02/14/22 1329   Weight: 33.6 kg (74 lb 1.2 oz) 43.8 kg (96 lb 9.6 oz) 37.7 kg (83 lb 3.2 oz)     Vital Signs with Ranges  Temp:  [97.4  F (36.3  C)] 97.4  F (36.3  C)  Pulse:  [71] 71  Resp:  [18] 18  BP: (105)/(68) 105/68  SpO2:  [99 %] 99 %  I/O last 3 completed shifts:  In: 330 [P.O.:330]  Out: -     # Pain Assessment:  Current Pain Score 3/29/2022   Patient currently in pain? denies   Pain score (0-10) -   [facility]   Miranda castrejon pain level was assessed and she currently denies pain.        Constitutional: Awake, alert, cooperative, no apparent distress  Respiratory: Clear to auscultation bilaterally, no crackles or wheezing  Cardiovascular: Regular rate and rhythm, normal S1 and S2, and no murmur noted  GI: Normal bowel sounds, soft, non-distended, non-tender  Extrem: No calf tenderness, no ankle edema  Neuro: Alert, no focal motor or sensory deficits    Medications       melatonin  1 mg Oral At Bedtime     pantoprazole  40 mg Oral QAM AC     polyethylene glycol  17 g Oral Daily     senna-docusate  1 tablet Oral BID      cholecalciferol  50 mcg Oral Daily       Data   No lab results found in last 7 days.    Imaging:   No results found for this or any previous visit (from the past 24 hour(s)).

## 2022-03-30 NOTE — PLAN OF CARE
Goal Outcome Evaluation:      DATE & TIME: 3/30/22 (1256-1539)    Cognitive Concerns/ Orientation : Pt A/Ox4   BEHAVIOR & AGGRESSION TOOL COLOR: Green   ABNL VS/O2: VSS on RA, daily vitals  MOBILITY: Independent in room, SBA in hallway.  Ambulated multiple times throughout shift in hallways.  Up in chair in hallways.  PAIN MANAGMENT: Denies  DIET: Regular, tolerating, good appetite  BOWEL/BLADDER: Continent  DRAIN/DEVICES: No IV access  SKIN: Scattered scabs/bruises. Blanchable redness/buttocks  D/C DATE: Discharge once placement found. Brother granted emergency guardianship (4/15/22).

## 2022-03-31 PROCEDURE — 250N000013 HC RX MED GY IP 250 OP 250 PS 637: Performed by: HOSPITALIST

## 2022-03-31 PROCEDURE — 99231 SBSQ HOSP IP/OBS SF/LOW 25: CPT | Performed by: INTERNAL MEDICINE

## 2022-03-31 PROCEDURE — 120N000001 HC R&B MED SURG/OB

## 2022-03-31 PROCEDURE — 250N000013 HC RX MED GY IP 250 OP 250 PS 637: Performed by: INTERNAL MEDICINE

## 2022-03-31 RX ADMIN — SENNOSIDES AND DOCUSATE SODIUM 1 TABLET: 8.6; 5 TABLET ORAL at 21:20

## 2022-03-31 RX ADMIN — Medication 50 MCG: at 10:51

## 2022-03-31 RX ADMIN — SENNOSIDES AND DOCUSATE SODIUM 1 TABLET: 8.6; 5 TABLET ORAL at 10:52

## 2022-03-31 RX ADMIN — PANTOPRAZOLE SODIUM 40 MG: 40 TABLET, DELAYED RELEASE ORAL at 10:52

## 2022-03-31 RX ADMIN — Medication 1 MG: at 21:20

## 2022-03-31 ASSESSMENT — ACTIVITIES OF DAILY LIVING (ADL)
ADLS_ACUITY_SCORE: 15

## 2022-03-31 NOTE — PROGRESS NOTES
[facility]    Hospitalist Progress Note    Assessment & Plan   51 year old female who was admitted on 1/22/2022 with SOB:    Impression:   Principal Problem:    COVID-19 Pneumonia, Pos 1/22/22, Unvacinated -- Recovered  Active Problems:    Down's syndrome    CKD (chronic kidney disease) stage 3, GFR 30-59 ml/min (H)    Acute respiratory failure with hypoxia -- resolved      Plan:  Continue present care    DVT Prophylaxis: Pneumatic Compression Devices  Code Status: Full Code    Disposition: Expected discharge once placement found (?possible group home)    Beni Burns  Pager [phone]  Cell Phone [phone]  Text Page (7am to 6pm)    Interval History   No complaints.      Physical Exam                      Vitals:    01/22/22 1140 02/05/22 0729 02/14/22 1329   Weight: 33.6 kg (74 lb 1.2 oz) 43.8 kg (96 lb 9.6 oz) 37.7 kg (83 lb 3.2 oz)     Vital Signs with Ranges     I/O last 3 completed shifts:  In: 400 [P.O.:400]  Out: -     # Pain Assessment:  Current Pain Score 3/31/2022   Patient currently in pain? denies   Pain score (0-10) -   [facility] pain score 0   Miranda castrejon pain level was assessed and she currently denies pain.        Constitutional: Awake, alert, cooperative, no apparent distress  Respiratory: Clear to auscultation bilaterally, no crackles or wheezing  Cardiovascular: Regular rate and rhythm, normal S1 and S2, and no murmur noted  GI: Normal bowel sounds, soft, non-distended, non-tender  Extrem: No calf tenderness, no ankle edema  Neuro: Alert, no focal motor or sensory deficits    Medications       melatonin  1 mg Oral At Bedtime     pantoprazole  40 mg Oral QAM AC     polyethylene glycol  17 g Oral Daily     senna-docusate  1 tablet Oral BID     cholecalciferol  50 mcg Oral Daily       Data   No lab results found in last 7 days.    Imaging:   No results found for this or any previous visit (from the past 24 hour(s)).

## 2022-03-31 NOTE — PLAN OF CARE
Goal Outcome Evaluation:      DATE & TIME: 3/31/22 (3170-6222)    Cognitive Concerns/ Orientation : Pt A/Ox4   BEHAVIOR & AGGRESSION TOOL COLOR: Green   ABNL VS/O2: VSS on RA, daily vitals  MOBILITY: Independent in room, SBA in hallway. Up in chair in hallways.  PAIN MANAGMENT: Denies  DIET: Regular, tolerating   BOWEL/BLADDER: Continent  DRAIN/DEVICES: No IV access  SKIN: Scattered scabs/bruises. Blanchable redness/buttocks.  D/C DATE: Discharge once placement found. Brother granted emergency guardianship until 4/15/22.  Court hearing via ipad per brother scheduled for tomorrow morning 4/1 at 9 am, brother will be here  OTHER IMPORTANT INFO: Pt ambulated with staff in hallway multiple times.  Bathed today.

## 2022-03-31 NOTE — PROGRESS NOTES
Care Management Follow Up    Length of Stay (days): 68    Expected Discharge Date: 2022     Concerns to be Addressed: discharge planning     Patient plan of care discussed at interdisciplinary rounds: Yes    Anticipated Discharge Disposition: Group Home setting vs SNF       Anticipated Discharge Services:    Anticipated Discharge DME:      Patient/family educated on Medicare website which has current facility and service quality ratings: no  Education Provided on the Discharge Plan:    Patient/Family in Agreement with the Plan: yes    Referrals Placed by CM/SW: Post Acute Facilities  Private pay costs discussed: Not applicable    Additional Information:  Summary of discharge planning  Patient has been in the hospital since 22.  While here both of her parents . Patient lived with her parents and thus disposition became a need. Patient has Downs Syndrome and not decisional per providers.   Her brother Maxi filed for temporary guardianship which was granted and expires on 4/15.  Tomorrow () another hearing is held to determine if the guardianship will be extended to a permanent guardianship.  Writer has sent an email to patient's  asking if patient needs to be in attendance, virtually, for the hearing. Waiting to hear back.  MA application has  Been submitted with assistance of our financial counselor as patient will need funding to pay for long term care. Application is still pending. Emailed our financial counselor Pito Dasilva asking if she can check with the county on the progress of the application.   Initially patient struggled in the hospital calling out frequently.   She has adjusted well to being here as staff provide her with attention, taking her on walks around the nursing unit and supplying her with coloring books which occupies much of her time.   Staff feel a group home setting will provide patient with more attention than a long term care skilled nursing facility and will  ease the adjustment of a new living situation.  In order to access funding for a group home setting, patient needs to be on Medical Assistance and be open to CADI wavier services.   Patient does have an Adult Access CM who will qualify patient for CADI. CM is Kamryn Mcintyre [phone].  Before this process can be completed IQ testing was completed to qualify patient as having a disability.  This was completed however we and CM  are still waiting for  [facility] ([phone]) to complete required adaptive testing with patient's brother Maxi.    Multiple calls have occurred by [facility] SW staff and brother to schedule this last step with [facility].  On 3/28, Eleanor Smalls spoke with Ms Mcintyre who stated she would call [facility] also and try to expedite the process. Writer call Ms Mcintyre this afternoon and asked for an update regarding adaptive testing and to ask if she is exploring group home options.           Connie Masterson, [facility] HospitalSW

## 2022-03-31 NOTE — PLAN OF CARE
DATE & TIME: 3/30/22 4789-1209    Cognitive Concerns/ Orientation : Pt A/Ox4   BEHAVIOR & AGGRESSION TOOL COLOR: Green   ABNL VS/O2: VSS on RA, daily vitals  MOBILITY: Independent in room, SBA in hallway.  PAIN MANAGMENT: Denies  DIET: Regular  BOWEL/BLADDER: Continent  DRAIN/DEVICES: No IV access  SKIN: Scattered scabs/bruises. Blanchable redness/buttocks.  D/C DATE: Discharge once placement found. Brother granted emergency guardianship until 4/15/22.  OTHER IMPORTANT INFO: Pt ambulated with staff in hallway this evening.

## 2022-04-01 PROCEDURE — 250N000013 HC RX MED GY IP 250 OP 250 PS 637: Performed by: HOSPITALIST

## 2022-04-01 PROCEDURE — 99231 SBSQ HOSP IP/OBS SF/LOW 25: CPT | Performed by: INTERNAL MEDICINE

## 2022-04-01 PROCEDURE — 250N000013 HC RX MED GY IP 250 OP 250 PS 637: Performed by: INTERNAL MEDICINE

## 2022-04-01 PROCEDURE — 120N000001 HC R&B MED SURG/OB

## 2022-04-01 RX ADMIN — PANTOPRAZOLE SODIUM 40 MG: 40 TABLET, DELAYED RELEASE ORAL at 11:20

## 2022-04-01 RX ADMIN — SENNOSIDES AND DOCUSATE SODIUM 1 TABLET: 8.6; 5 TABLET ORAL at 11:20

## 2022-04-01 RX ADMIN — Medication 50 MCG: at 11:20

## 2022-04-01 RX ADMIN — SENNOSIDES AND DOCUSATE SODIUM 1 TABLET: 8.6; 5 TABLET ORAL at 22:19

## 2022-04-01 RX ADMIN — Medication 1 MG: at 22:20

## 2022-04-01 ASSESSMENT — ACTIVITIES OF DAILY LIVING (ADL)
ADLS_ACUITY_SCORE: 15
ADLS_ACUITY_SCORE: 16
ADLS_ACUITY_SCORE: 15
ADLS_ACUITY_SCORE: 16
ADLS_ACUITY_SCORE: 15
ADLS_ACUITY_SCORE: 16
ADLS_ACUITY_SCORE: 15
ADLS_ACUITY_SCORE: 16
ADLS_ACUITY_SCORE: 15
ADLS_ACUITY_SCORE: 16
ADLS_ACUITY_SCORE: 15

## 2022-04-01 NOTE — PLAN OF CARE
DATE & TIME: 3/31/22 1900-4/01/22 0730  Cognitive Concerns/ Orientation : Pt A/Ox4   BEHAVIOR & AGGRESSION TOOL COLOR: Green   ABNL VS/O2: VSS on RA, daily vitals  MOBILITY: Independent in room, SBA in hallway. Up in chair in hallways.  PAIN MANAGMENT: Denies  DIET: Regular  BOWEL/BLADDER: Continent  DRAIN/DEVICES: No IV access  SKIN: Scattered scabs/bruises. Blanchable redness/buttocks.  D/C DATE: Discharge once placement found. Brother granted emergency guardianship until 4/15/22.  OTHER IMPORTANT INFO: Pt likes to ambulate in the halls with staff. Court hearing at 0900 on ipad.

## 2022-04-01 NOTE — PLAN OF CARE
Goal Outcome Evaluation:      DATE & TIME: 4/1 (0700-63000  Cognitive Concerns/ Orientation : Pt A/Ox4   BEHAVIOR & AGGRESSION TOOL COLOR: Green   ABNL VS/O2: Daily vitals   MOBILITY: Independent in room, SBA in hallway. Up in chair in hallways.  Ambulated multiple times during shift.   PAIN MANAGMENT: Denies  DIET: Regular, tolerating   BOWEL/BLADDER: Continent  DRAIN/DEVICES: No IV access  SKIN: Scattered scabs/bruises. Blanchable redness/buttocks.  D/C DATE: Discharge once placement found. Brother granted emergency guardianship until 4/15/22.  Repeat court hearing was done today on ipad in room with patient & brother.  OTHER IMPORTANT INFO: Pt likes to ambulate in the halls with staff.

## 2022-04-02 PROCEDURE — 250N000013 HC RX MED GY IP 250 OP 250 PS 637: Performed by: INTERNAL MEDICINE

## 2022-04-02 PROCEDURE — 99231 SBSQ HOSP IP/OBS SF/LOW 25: CPT | Performed by: INTERNAL MEDICINE

## 2022-04-02 PROCEDURE — 250N000013 HC RX MED GY IP 250 OP 250 PS 637: Performed by: HOSPITALIST

## 2022-04-02 PROCEDURE — 120N000001 HC R&B MED SURG/OB

## 2022-04-02 RX ADMIN — Medication 1 MG: at 21:54

## 2022-04-02 RX ADMIN — SENNOSIDES AND DOCUSATE SODIUM 1 TABLET: 8.6; 5 TABLET ORAL at 09:52

## 2022-04-02 RX ADMIN — SENNOSIDES AND DOCUSATE SODIUM 1 TABLET: 8.6; 5 TABLET ORAL at 21:54

## 2022-04-02 RX ADMIN — Medication 50 MCG: at 09:52

## 2022-04-02 RX ADMIN — PANTOPRAZOLE SODIUM 40 MG: 40 TABLET, DELAYED RELEASE ORAL at 09:52

## 2022-04-02 ASSESSMENT — ACTIVITIES OF DAILY LIVING (ADL)
ADLS_ACUITY_SCORE: 13
ADLS_ACUITY_SCORE: 15
ADLS_ACUITY_SCORE: 16
ADLS_ACUITY_SCORE: 15
ADLS_ACUITY_SCORE: 16
ADLS_ACUITY_SCORE: 15
ADLS_ACUITY_SCORE: 13
ADLS_ACUITY_SCORE: 16
ADLS_ACUITY_SCORE: 13
ADLS_ACUITY_SCORE: 13
ADLS_ACUITY_SCORE: 15
ADLS_ACUITY_SCORE: 13
ADLS_ACUITY_SCORE: 15
ADLS_ACUITY_SCORE: 13
ADLS_ACUITY_SCORE: 15
ADLS_ACUITY_SCORE: 16
ADLS_ACUITY_SCORE: 15

## 2022-04-02 NOTE — PLAN OF CARE
DATE & TIME: 4/1 [id number]  Cognitive Concerns/ Orientation : Pt A/Ox4   BEHAVIOR & AGGRESSION TOOL COLOR: Green   ABNL VS/O2: Daily vitals   MOBILITY: Independent in room, SBA in hallway. Up in chair in hallway.  Ambulated multiple times during shift.   PAIN MANAGMENT: Denies  DIET: Regular, tolerating   BOWEL/BLADDER: Continent  DRAIN/DEVICES: No IV access  SKIN: Scattered scabs/bruises. Blanchable redness/buttocks.  D/C DATE: discharge pending placement.   OTHER IMPORTANT INFO: Pt likes to ambulate in the halls/interact with staff.

## 2022-04-02 NOTE — PLAN OF CARE
Goal Outcome Evaluation:      DATE & TIME: 4/1-4/2/22 5611-2218  Cognitive Concerns/ Orientation : Pt A/Ox4   BEHAVIOR & AGGRESSION TOOL COLOR: Green   ABNL VS/O2: Daily vitals   MOBILITY: Independent in room, SBA in hallway.   PAIN MANAGMENT: Denies pain  DIET: Regular  BOWEL/BLADDER: Continent, calls appropriately  DRAIN/DEVICES: No IV access  SKIN: Blanchable redness/buttocks. Scattered bruises  D/C DATE: discharge pending placement.   OTHER IMPORTANT INFO: Pt likes to ambulate in the halls/interact with staff.

## 2022-04-02 NOTE — PROGRESS NOTES
"[facility]    Hospitalist Progress Note    Assessment & Plan   51 year old female who was admitted on 1/22/2022 with SOB:    Impression:   Principal Problem:    COVID-19 Pneumonia, Pos 1/22/22, Unvacinated -- Recovered  Active Problems:    Down's syndrome    CKD (chronic kidney disease) stage 3, GFR 30-59 ml/min (H)    Acute respiratory failure with hypoxia -- resolved      Plan:  Continue present care    DVT Prophylaxis: Pneumatic Compression Devices  Code Status: Full Code    Disposition: Expected discharge once placement found (?possible group home)    Beni Burns  Pager [phone]  Cell Phone [phone]  Text Page (7am to 6pm)    Interval History   No complaints.  Won coloring contest today -- said \"I won? I can't believe it.\"     Physical Exam   Temp: 97.6  F (36.4  C) Temp src: Oral BP: 106/68 Pulse: 59   Resp: 18 SpO2: 96 % O2 Device: None (Room air)    Vitals:    01/22/22 1140 02/05/22 0729 02/14/22 1329   Weight: 33.6 kg (74 lb 1.2 oz) 43.8 kg (96 lb 9.6 oz) 37.7 kg (83 lb 3.2 oz)     Vital Signs with Ranges  Temp:  [97.6  F (36.4  C)] 97.6  F (36.4  C)  Pulse:  [59] 59  Resp:  [18] 18  BP: (106)/(68) 106/68  SpO2:  [96 %] 96 %  No intake/output data recorded.    # Pain Assessment:  Current Pain Score 4/1/2022   Patient currently in pain? -   Pain score (0-10) -   [facility] pain score 0   Miranda s pain level was assessed and she currently denies pain.        Constitutional: Awake, alert, cooperative, no apparent distress  Respiratory: Clear to auscultation bilaterally, no crackles or wheezing  Cardiovascular: Regular rate and rhythm, normal S1 and S2, and no murmur noted  GI: Normal bowel sounds, soft, non-distended, non-tender  Extrem: No calf tenderness, no ankle edema  Neuro: Alert, no focal motor or sensory deficits    Medications       melatonin  1 mg Oral At Bedtime     pantoprazole  40 mg Oral QAM AC     polyethylene glycol  17 g Oral Daily     senna-docusate  " 1 tablet Oral BID     cholecalciferol  50 mcg Oral Daily       Data   No lab results found in last 7 days.    Imaging:   No results found for this or any previous visit (from the past 24 hour(s)).

## 2022-04-02 NOTE — PLAN OF CARE
Goal Outcome Evaluation:    Plan of Care Reviewed With: patient     DATE & TIME: 4/2/2/22 3050-6684  Cognitive Concerns/ Orientation : Pt A/Ox4, calm and cooperative.   BEHAVIOR & AGGRESSION TOOL COLOR: Green   ABNL VS/O2: Daily vitals wnl   MOBILITY: Independent in room, SBA in hallway.   PAIN MANAGMENT: c/o mild abd discomfort when pt woke up, declined intervention, resolved.  DIET: Regular  BOWEL/BLADDER: Continent, calls appropriately  DRAIN/DEVICES: No IV access  SKIN: Blanchable redness/buttocks. Scattered bruises  D/C DATE: discharge pending placement.   OTHER IMPORTANT INFO: Pt likes to ambulate in the halls/interact with staff.

## 2022-04-02 NOTE — PROGRESS NOTES
"[facility]    Hospitalist Progress Note    Assessment & Plan   51 year old female who was admitted on 1/22/2022 with SOB:    Impression:   Principal Problem:    COVID-19 Pneumonia, Pos 1/22/22, Unvacinated -- Recovered  Active Problems:    Down's syndrome    CKD (chronic kidney disease) stage 3, GFR 30-59 ml/min (H)    Acute respiratory failure with hypoxia -- resolved      Plan:  Continue present care    DVT Prophylaxis: Pneumatic Compression Devices  Code Status: Full Code    Disposition: Expected discharge once placement found (?possible group home)    Beni Pereyraconstantinejimmie  Pager [phone]  Cell Phone [phone]  Text Page (7am to 6pm)    Interval History   No complaints.  Won coloring contest today -- said \"I won? I can't believe it.\"     Physical Exam   Temp: 97.4  F (36.3  C) Temp src: Oral BP: 114/61 Pulse: 76   Resp: 18 SpO2: 98 % O2 Device: None (Room air)    Vitals:    01/22/22 1140 02/05/22 0729 02/14/22 1329   Weight: 33.6 kg (74 lb 1.2 oz) 43.8 kg (96 lb 9.6 oz) 37.7 kg (83 lb 3.2 oz)     Vital Signs with Ranges  Temp:  [97.4  F (36.3  C)] 97.4  F (36.3  C)  Pulse:  [76] 76  Resp:  [18] 18  BP: (114)/(61) 114/61  SpO2:  [98 %] 98 %  I/O last 3 completed shifts:  In: 240 [P.O.:240]  Out: 600 [Urine:600]    # Pain Assessment:  Current Pain Score 4/2/2022   Patient currently in pain? denies   Pain score (0-10) -   [facility]   Miranda s pain level was assessed and she currently denies pain.        Constitutional: Awake, alert, cooperative, no apparent distress  Respiratory: Clear to auscultation bilaterally, no crackles or wheezing  Cardiovascular: Regular rate and rhythm, normal S1 and S2, and no murmur noted  GI: Normal bowel sounds, soft, non-distended, non-tender  Extrem: No calf tenderness, no ankle edema  Neuro: Alert, no focal motor or sensory deficits    Medications       melatonin  1 mg Oral At Bedtime     pantoprazole  40 mg Oral QAM AC     polyethylene " glycol  17 g Oral Daily     senna-docusate  1 tablet Oral BID     cholecalciferol  50 mcg Oral Daily       Data   No lab results found in last 7 days.    Imaging:   No results found for this or any previous visit (from the past 24 hour(s)).

## 2022-04-03 PROCEDURE — 99231 SBSQ HOSP IP/OBS SF/LOW 25: CPT | Performed by: INTERNAL MEDICINE

## 2022-04-03 PROCEDURE — 250N000013 HC RX MED GY IP 250 OP 250 PS 637: Performed by: INTERNAL MEDICINE

## 2022-04-03 PROCEDURE — 250N000013 HC RX MED GY IP 250 OP 250 PS 637: Performed by: HOSPITALIST

## 2022-04-03 PROCEDURE — 120N000001 HC R&B MED SURG/OB

## 2022-04-03 PROCEDURE — 250N000013 HC RX MED GY IP 250 OP 250 PS 637: Performed by: REGISTERED NURSE

## 2022-04-03 RX ADMIN — Medication 1 MG: at 21:29

## 2022-04-03 RX ADMIN — SENNOSIDES AND DOCUSATE SODIUM 1 TABLET: 8.6; 5 TABLET ORAL at 21:29

## 2022-04-03 RX ADMIN — POLYETHYLENE GLYCOL 3350 17 G: 17 POWDER, FOR SOLUTION ORAL at 10:58

## 2022-04-03 RX ADMIN — PANTOPRAZOLE SODIUM 40 MG: 40 TABLET, DELAYED RELEASE ORAL at 10:58

## 2022-04-03 RX ADMIN — SENNOSIDES AND DOCUSATE SODIUM 1 TABLET: 8.6; 5 TABLET ORAL at 10:58

## 2022-04-03 RX ADMIN — Medication 50 MCG: at 10:58

## 2022-04-03 ASSESSMENT — ACTIVITIES OF DAILY LIVING (ADL)
ADLS_ACUITY_SCORE: 15

## 2022-04-03 NOTE — PLAN OF CARE
Goal Outcome Evaluation:     DATE & TIME: 4/2/2/22 6199-8163  Cognitive Concerns/ Orientation : Pt A/Ox4, calm and cooperative.   BEHAVIOR & AGGRESSION TOOL COLOR: Green   ABNL VS/O2: Daily vitals wnl - VSS on RA  MOBILITY: Independent in room, SBA in hallway.   PAIN MANAGMENT: c/o mild abd discomfort after dinner, declined intervention, resolved.  DIET: Regular  BOWEL/BLADDER: Continent, calls appropriately  DRAIN/DEVICES: No IV access  SKIN: Blanchable redness/buttocks. Scattered bruises - CAM, BLE edema  D/C DATE: discharge pending placement.   OTHER IMPORTANT INFO: Pt likes to ambulate in the halls/interact with staff.

## 2022-04-03 NOTE — PLAN OF CARE
Goal Outcome Evaluation:                           Cognitive Concerns/ Orientation : A&O x 3   BEHAVIOR & AGGRESSION TOOL COLOR: Green     ABNL VS/O2: Vital signs done daily  MOBILITY: Independent in room, SBA in hallway  PAIN MANAGMENT: Denied pain. Mild abdominal discomfort.  Much better after sips of sprite  DIET: Regular  BOWEL/BLADDER: Continent  ABNL LAB/BG: AM labs pending  DRAIN/DEVICES: None  TELEMETRY RHYTHM: NA  SKIN: Scattered bruises.   TESTS/PROCEDURES: NA  D/C DATE: Pending placement  Discharge Barriers: placement  OTHER IMPORTANT INFO: Patient likes to ambulate and interact with staff. Pt left to go out with brother Maxi for shopping at 1530.

## 2022-04-03 NOTE — PLAN OF CARE
DATE & TIME: 4/2/22, 7859 - 9448   Cognitive Concerns/ Orientation : A&O x 3   BEHAVIOR & AGGRESSION TOOL COLOR: Green   ABNL VS/O2: Vital signs done daily  MOBILITY: Independent in room, SBA in hallway  PAIN MANAGMENT: Denied pain. Mild abdominal discomfort.  Much better after sips of sprite  DIET: Regular  BOWEL/BLADDER: Continent  ABNL LAB/BG: AM labs pending  DRAIN/DEVICES: None  TELEMETRY RHYTHM: NA  SKIN: Scattered bruises. Patient refused full skin assessment  TESTS/PROCEDURES: NA  D/C DATE: Pending placement  Discharge Barriers: Olacement  OTHER IMPORTANT INFO: Patient likes to ambulate and interact with staff      Goal Outcome Evaluation:    Plan of Care Reviewed With: patient     Overall Patient Progress: improving

## 2022-04-04 PROCEDURE — 250N000013 HC RX MED GY IP 250 OP 250 PS 637: Performed by: REGISTERED NURSE

## 2022-04-04 PROCEDURE — 250N000013 HC RX MED GY IP 250 OP 250 PS 637: Performed by: INTERNAL MEDICINE

## 2022-04-04 PROCEDURE — 99232 SBSQ HOSP IP/OBS MODERATE 35: CPT | Performed by: INTERNAL MEDICINE

## 2022-04-04 PROCEDURE — 120N000001 HC R&B MED SURG/OB

## 2022-04-04 PROCEDURE — 250N000013 HC RX MED GY IP 250 OP 250 PS 637: Performed by: HOSPITALIST

## 2022-04-04 RX ADMIN — POLYETHYLENE GLYCOL 3350 17 G: 17 POWDER, FOR SOLUTION ORAL at 09:46

## 2022-04-04 RX ADMIN — SENNOSIDES AND DOCUSATE SODIUM 1 TABLET: 8.6; 5 TABLET ORAL at 21:26

## 2022-04-04 RX ADMIN — Medication 1 MG: at 21:26

## 2022-04-04 RX ADMIN — SENNOSIDES AND DOCUSATE SODIUM 1 TABLET: 8.6; 5 TABLET ORAL at 09:46

## 2022-04-04 RX ADMIN — PANTOPRAZOLE SODIUM 40 MG: 40 TABLET, DELAYED RELEASE ORAL at 09:46

## 2022-04-04 RX ADMIN — Medication 50 MCG: at 09:45

## 2022-04-04 ASSESSMENT — ACTIVITIES OF DAILY LIVING (ADL)
ADLS_ACUITY_SCORE: 15

## 2022-04-04 NOTE — PROGRESS NOTES
[facility]    Hospitalist Progress Note    Interval History   - Doing well today, no acute concerns    Assessment & Plan   Summary: Miranda Dover is a 51 year old female with PMH Down Syndrome who was admitted on 1/22/2022 with COVID-19 pneumonia and acute hypoxic respiratory failure. Hospitalization has been prolonged due to need to establish guardianship following deaths of her parents. Awaiting placement.    Down syndrome  Developmental Delay  Failure to thrive  Had significant emotional, psychiatric issues during this stay due to the death of her parents and prolonged hospitalization. Discharge planning has been complicated by need for guardianship and payor source for placement. Patient's brother, Maxi, was granted 60 days of emergency guardianship on 2/15. Completed psychologic testing on 3/2/22 to assess current cognitive function and adaptive abilities. Permanent guardianship on 4/1/2022.  - Continue social work and clinical coordinator for transition    Severe malnutrition  Anorexia  Chronic abdominal pain  Chronic constipation  BMI ~13 on early this hospitalization with poor PO intake. SLP was consulted for possible dysphagia, and no evidence of dysphagia was noted. On 2/20, discussed with pt's brother; overall, felt that oral intake was probably acceptable at this point for discharge and did not feel we needed to pursue enteral feedings/g-tube at this point; desired workup for etiology of abdominal pain which he felt was contributing to her decreased PO intake. CT abd/pelvis 2/21 showed large amount of stool. EGD 2/22 relatively unremarkable. US abd no acute pathology. BMI up to 14.7 2/27.  - Encourage PO intake  - PPI  - Bowel regimen    Thyroid nodule:  Recheck TSH    COVID-19 recovered: Initially diagnosed 1/19/2022    E coli UTI, resolved    DVT Prophylaxis: Ambulatory  Code Status: Full Code  PT/OT: Not needed  Diet: Regular Diet Adult      Disposition: Expected  discharge per /CC    Maximino Deng MD  Text Page  (7am to 6pm)  -Data reviewed today: I reviewed all new labs and imaging results over the last 24 hours.    Physical Exam                      Vitals:    01/22/22 1140 02/05/22 0729 02/14/22 1329   Weight: 33.6 kg (74 lb 1.2 oz) 43.8 kg (96 lb 9.6 oz) 37.7 kg (83 lb 3.2 oz)     Vital Signs with Ranges     I/O last 3 completed shifts:  In: 150 [P.O.:150]  Out: -   O2 requirements: none    Constitutional: Thin female in NAD  Cardiovascular: RRR, normal S1/2, no m/r/g  Respiratory: CTAB, no wheezing or crackles  Vascular: No LE pitting edema  Skin/Integumen: No rashes noted grossly  Neuro/Psych: Quiet, reserved, moves all extremities    Medications       melatonin  1 mg Oral At Bedtime     pantoprazole  40 mg Oral QAM AC     polyethylene glycol  17 g Oral Daily     senna-docusate  1 tablet Oral BID     cholecalciferol  50 mcg Oral Daily       Data   No lab results found in last 7 days.    Imaging:   No results found for this or any previous visit (from the past 24 hour(s)).

## 2022-04-04 NOTE — PROGRESS NOTES
[facility]    Hospitalist Progress Note    Assessment & Plan   51 year old female who was admitted on 1/22/2022 with SOB:    Impression:   Principal Problem:    COVID-19 Pneumonia, Pos 1/22/22, Unvacinated -- Recovered  Active Problems:    Down's syndrome    CKD (chronic kidney disease) stage 3, GFR 30-59 ml/min (H)    Acute respiratory failure with hypoxia -- resolved      Plan:  Continue present care    DVT Prophylaxis: Pneumatic Compression Devices  Code Status: Full Code    Disposition: Expected discharge once placement found (?possible group home).  Discussed with  --anticipating the Legal guardian process will take until May 2022 to resolve.     Beni Miller Grant  Pager [phone]  Cell Phone [phone]  Text Page (7am to 6pm)    Interval History   No complaints.  Won coloring contest on Friday, still happy about it.      Physical Exam                      Vitals:    01/22/22 1140 02/05/22 0729 02/14/22 1329   Weight: 33.6 kg (74 lb 1.2 oz) 43.8 kg (96 lb 9.6 oz) 37.7 kg (83 lb 3.2 oz)     Vital Signs with Ranges     No intake/output data recorded.    # Pain Assessment:  Current Pain Score 4/3/2022   Patient currently in pain? denies   Pain score (0-10) -   [facility] pain score -   Miranda castrejon pain level was assessed and she currently denies pain.        Constitutional: Awake, alert, cooperative, no apparent distress  Respiratory: Clear to auscultation bilaterally, no crackles or wheezing  Cardiovascular: Regular rate and rhythm, normal S1 and S2, and no murmur noted  GI: Normal bowel sounds, soft, non-distended, non-tender  Extrem: No calf tenderness, no ankle edema  Neuro: Alert, no focal motor or sensory deficits    Medications       melatonin  1 mg Oral At Bedtime     pantoprazole  40 mg Oral QAM AC     polyethylene glycol  17 g Oral Daily     senna-docusate  1 tablet Oral BID     cholecalciferol  50 mcg Oral Daily       Data   No lab results found in last 7  days.    Imaging:   No results found for this or any previous visit (from the past 24 hour(s)).

## 2022-04-04 NOTE — PLAN OF CARE
Goal Outcome Evaluation:  Cognitive Concerns/ Orientation : A&O x 3   BEHAVIOR & AGGRESSION TOOL COLOR: Green     ABNL VS/O2: Vital signs done daily  MOBILITY: Independent in room, SBA in hallway  PAIN MANAGMENT: Denied pain. Mild abdominal discomfort.  Much better after sips of sprite  DIET: Regular  BOWEL/BLADDER: Continent  ABNL LAB/BG: AM labs pending  DRAIN/DEVICES: None  TELEMETRY RHYTHM: NA  SKIN: Scattered bruises.   TESTS/PROCEDURES: NA  D/C DATE: Pending placement  Discharge Barriers: placement  OTHER IMPORTANT INFO: Patient likes to ambulate and interact with staff

## 2022-04-04 NOTE — PROGRESS NOTES
Care Management Follow Up    Length of Stay (days): 72    Expected Discharge Date: 04/13/2022     Concerns to be Addressed: discharge planning     Patient plan of care discussed at interdisciplinary rounds: Yes    Anticipated Discharge Disposition: Transitional Care     Anticipated Discharge Services:    Anticipated Discharge DME:      Patient/family educated on Medicare website which has current facility and service quality ratings: no  Education Provided on the Discharge Plan:    Patient/Family in Agreement with the Plan: yes    Referrals Placed by CM/SW: Post Acute Facilities  Private pay costs discussed: Not applicable    Additional Information:  Placed another call today to  Kamryn Mcintyre at [phone] requesting to discuss status of adaptive testing and to coordinate referrals to group homes. Ms Mcintyre is the CM for processing patient's [place] Assessment and placement in the community.      BARBIE MullinsSW

## 2022-04-04 NOTE — PLAN OF CARE
Goal Outcome Evaluation:  Cognitive Concerns/ Orientation: A&Ox4   BEHAVIOR & AGGRESSION TOOL COLOR: Green     ABNL VS/O2: VSS.  MOBILITY: Independent in room, SBA in hallway. Steady on feet, wants to shuffle her feet but can walk without a shuffle  PAIN MANAGMENT: Denied pain.   DIET: Regular-fair. Particular about her food choices  BOWEL/BLADDER: Continent. Had BM today  ABNL LAB/BG: NA  DRAIN/DEVICES: None  TELEMETRY RHYTHM: NA  SKIN: Scattered bruises. Dry skin.   TESTS/PROCEDURES: NA  D/C DATE: Pending placement  Discharge Barriers: placement  OTHER IMPORTANT INFO: ambulated in the halls frequently with staff. Coloring outside her room. Declined lunch but had apple pie instead.

## 2022-04-05 PROCEDURE — 250N000013 HC RX MED GY IP 250 OP 250 PS 637: Performed by: HOSPITALIST

## 2022-04-05 PROCEDURE — 250N000013 HC RX MED GY IP 250 OP 250 PS 637: Performed by: INTERNAL MEDICINE

## 2022-04-05 PROCEDURE — 120N000001 HC R&B MED SURG/OB

## 2022-04-05 PROCEDURE — 250N000013 HC RX MED GY IP 250 OP 250 PS 637: Performed by: REGISTERED NURSE

## 2022-04-05 PROCEDURE — 99231 SBSQ HOSP IP/OBS SF/LOW 25: CPT | Performed by: INTERNAL MEDICINE

## 2022-04-05 RX ADMIN — PANTOPRAZOLE SODIUM 40 MG: 40 TABLET, DELAYED RELEASE ORAL at 10:19

## 2022-04-05 RX ADMIN — POLYETHYLENE GLYCOL 3350 17 G: 17 POWDER, FOR SOLUTION ORAL at 10:19

## 2022-04-05 RX ADMIN — Medication 1 MG: at 21:59

## 2022-04-05 RX ADMIN — Medication 50 MCG: at 10:19

## 2022-04-05 RX ADMIN — SENNOSIDES AND DOCUSATE SODIUM 1 TABLET: 8.6; 5 TABLET ORAL at 21:59

## 2022-04-05 RX ADMIN — SENNOSIDES AND DOCUSATE SODIUM 1 TABLET: 8.6; 5 TABLET ORAL at 10:19

## 2022-04-05 ASSESSMENT — ACTIVITIES OF DAILY LIVING (ADL)
ADLS_ACUITY_SCORE: 15

## 2022-04-05 NOTE — PROGRESS NOTES
[facility]    Hospitalist Progress Note    Interval History   - Having abdominal discomfort after breakfast today, otherwise walking with nurses, quiet but generally euthymic appearing    Assessment & Plan   Summary: Miranda Dover is a 51 year old female with PMH Down Syndrome who was admitted on 1/22/2022 with COVID-19 pneumonia and acute hypoxic respiratory failure. Hospitalization has been prolonged due to need to establish guardianship following deaths of her parents. Awaiting placement.    Down syndrome  Developmental Delay  Failure to thrive  Had significant emotional, psychiatric issues during this stay due to the death of her parents and prolonged hospitalization. Discharge planning has been complicated by need for guardianship and payor source for placement. Patient's brother, Maxi, was granted 60 days of emergency guardianship on 2/15. Completed psychologic testing on 3/2/22 to assess current cognitive function and adaptive abilities. Permanent guardianship on 4/1/2022.  - Continue social work and clinical coordinator for transition    Severe malnutrition  Anorexia  Chronic abdominal pain  Chronic constipation  BMI ~13 on early this hospitalization with poor PO intake. SLP was consulted for possible dysphagia, and no evidence of dysphagia was noted. On 2/20, discussed with pt's brother; overall, felt that oral intake was probably acceptable at this point for discharge and did not feel we needed to pursue enteral feedings/g-tube at this point; desired workup for etiology of abdominal pain which he felt was contributing to her decreased PO intake. CT abd/pelvis 2/21 showed large amount of stool. EGD 2/22 relatively unremarkable. US abd no acute pathology. BMI up to 14.7 2/27.  - Encourage PO intake  - PPI  - Bowel regimen    Thyroid nodule:  Recheck TSH    COVID-19 recovered: Initially diagnosed 1/19/2022    E coli UTI, resolved    DVT Prophylaxis: Ambulatory  Code Status: Full  Code  PT/OT: Not needed  Diet: Regular Diet Adult      Disposition: Expected discharge per SW/CC    Maximino Deng MD  Text Page  (7am to 6pm)  -Data reviewed today: I reviewed all new labs and imaging results over the last 24 hours.    Physical Exam   Temp: 97.9  F (36.6  C) Temp src: Oral BP: 99/57 Pulse: 70   Resp: 18 SpO2: 100 % O2 Device: None (Room air)    Vitals:    01/22/22 1140 02/05/22 0729 02/14/22 1329   Weight: 33.6 kg (74 lb 1.2 oz) 43.8 kg (96 lb 9.6 oz) 37.7 kg (83 lb 3.2 oz)     Vital Signs with Ranges  Temp:  [97.9  F (36.6  C)-98  F (36.7  C)] 97.9  F (36.6  C)  Pulse:  [70-72] 70  Resp:  [18] 18  BP: (92-99)/(57-60) 99/57  SpO2:  [100 %] 100 %  No intake/output data recorded.  O2 requirements: none    Constitutional: Thin female in NAD  Cardiovascular: RRR, normal S1/2, no m/r/g  Respiratory: CTAB, no wheezing or crackles  Vascular: No LE pitting edema  Skin/Integumen: No rashes noted grossly  Neuro/Psych: Quiet, reserved, moves all extremities    Medications       melatonin  1 mg Oral At Bedtime     pantoprazole  40 mg Oral QAM AC     polyethylene glycol  17 g Oral Daily     senna-docusate  1 tablet Oral BID     cholecalciferol  50 mcg Oral Daily       Data   No lab results found in last 7 days.    Imaging:   No results found for this or any previous visit (from the past 24 hour(s)).

## 2022-04-05 NOTE — PROGRESS NOTES
Care Management Follow Up    Length of Stay (days): 73    Expected Discharge Date: 04/13/2022     Concerns to be Addressed: discharge planning     Patient plan of care discussed at interdisciplinary rounds: Yes    Anticipated Discharge Disposition: Transitional Care     Anticipated Discharge Services:    Anticipated Discharge DME:      Patient/family educated on Medicare website which has current facility and service quality ratings: no  Education Provided on the Discharge Plan:    Patient/Family in Agreement with the Plan: yes    Referrals Placed by CM/SW: Post Acute Facilities  Private pay costs discussed: Not applicable    Additional Information:    Adaptive testing results received.  Elsi called Kamryn THORNE and left  with this info and asked for fax number if she wants/needs this report.  Elsi waiting to hear back on next steps.      Connie Smalls, BARBIESW

## 2022-04-05 NOTE — PLAN OF CARE
Goal Outcome Evaluation:      DATE & TIME: 4/5/22 ([id number])      Cognitive Concerns/ Orientation: A&Ox4   BEHAVIOR & AGGRESSION TOOL COLOR: Green     ABNL VS/O2: VSS.  MOBILITY: Independent in room, SBA in hallway. Steady on feet, ambulated multiple times during shift   PAIN MANAGMENT: Denies pain.   DIET: Regular-fair. Particular about her food choices, appetite fair  BOWEL/BLADDER: Continent  ABNL LAB/BG: NA, refused am labs  DRAIN/DEVICES: None  TELEMETRY RHYTHM: NA  SKIN: Scattered bruises. Dry skin.   TESTS/PROCEDURES: NA  D/C DATE: Pending placement  Discharge Barriers: placement  OTHER IMPORTANT INFO: ambulated in the halls frequently with staff. Coloring outside her room.

## 2022-04-05 NOTE — PROGRESS NOTES
Care Management Follow Up    Length of Stay (days): 73    Expected Discharge Date: 04/13/2022     Concerns to be Addressed: Placement in a group home. In order to qualify patient for Developmental Disability funding for housing, testing needed to be completed.  The IQ test and Adapative Test has been completed.   and [facility] are waiting for the written report from Dr Gramajo at  [facility].      Patient plan of care discussed at interdisciplinary rounds: Yes    Anticipated Discharge Disposition: Transitional Care     Anticipated Discharge Services:    Anticipated Discharge DME:      Patient/family educated on Medicare website which has current facility and service quality ratings: no  Education Provided on the Discharge Plan:    Patient/Family in Agreement with the Plan: yes    Referrals Placed by CM/SW: Post Acute Facilities  Private pay costs discussed: Not applicable    Additional Information:  Spoke with Kamryn Mcintyre at [facility]  [phone].  She has not received the Adaptive Testing results and expects the hospital will receive the results directly from [facility]. She called [facility] also and explained the importance of receiving the report as soon as possible in order to proceed with discharge planning.  She explained to writer that once  she receives the written report she can then assign patient to a worker to complete the [place] and then that worker will assist with placement.  Either this writer or Eleanor Smalls will contact [facility] again today and request to know  when we can expect the test results.      BARBIE MullinsSW

## 2022-04-06 PROCEDURE — 120N000001 HC R&B MED SURG/OB

## 2022-04-06 PROCEDURE — 250N000013 HC RX MED GY IP 250 OP 250 PS 637: Performed by: HOSPITALIST

## 2022-04-06 PROCEDURE — 99231 SBSQ HOSP IP/OBS SF/LOW 25: CPT | Performed by: INTERNAL MEDICINE

## 2022-04-06 PROCEDURE — 250N000013 HC RX MED GY IP 250 OP 250 PS 637: Performed by: INTERNAL MEDICINE

## 2022-04-06 PROCEDURE — 250N000013 HC RX MED GY IP 250 OP 250 PS 637: Performed by: REGISTERED NURSE

## 2022-04-06 RX ADMIN — POLYETHYLENE GLYCOL 3350 17 G: 17 POWDER, FOR SOLUTION ORAL at 09:40

## 2022-04-06 RX ADMIN — Medication 1 MG: at 21:11

## 2022-04-06 RX ADMIN — Medication 50 MCG: at 09:40

## 2022-04-06 RX ADMIN — PANTOPRAZOLE SODIUM 40 MG: 40 TABLET, DELAYED RELEASE ORAL at 09:41

## 2022-04-06 RX ADMIN — SENNOSIDES AND DOCUSATE SODIUM 1 TABLET: 8.6; 5 TABLET ORAL at 09:40

## 2022-04-06 RX ADMIN — SENNOSIDES AND DOCUSATE SODIUM 1 TABLET: 8.6; 5 TABLET ORAL at 21:11

## 2022-04-06 ASSESSMENT — ACTIVITIES OF DAILY LIVING (ADL)
ADLS_ACUITY_SCORE: 15
ADLS_ACUITY_SCORE: 12
ADLS_ACUITY_SCORE: 15
ADLS_ACUITY_SCORE: 14
ADLS_ACUITY_SCORE: 12
ADLS_ACUITY_SCORE: 15
ADLS_ACUITY_SCORE: 15
ADLS_ACUITY_SCORE: 14
ADLS_ACUITY_SCORE: 15
ADLS_ACUITY_SCORE: 14
ADLS_ACUITY_SCORE: 15
ADLS_ACUITY_SCORE: 14
ADLS_ACUITY_SCORE: 15
ADLS_ACUITY_SCORE: 14
ADLS_ACUITY_SCORE: 15
ADLS_ACUITY_SCORE: 15
ADLS_ACUITY_SCORE: 12
ADLS_ACUITY_SCORE: 14

## 2022-04-06 NOTE — PROGRESS NOTES
Care Management Follow Up    Length of Stay (days): 74    Expected Discharge Date: 04/13/2022     Concerns to be Addressed: discharge planning.     Patient plan of care discussed at interdisciplinary rounds: Yes    Anticipated Discharge Disposition: Current anticipated plan is discharge to a residential care home for persons with intellectual disability.      Anticipated Discharge Services:    Anticipated Discharge DME:      Patient/family educated on Medicare website which has current facility and service quality ratings: no  Education Provided on the Discharge Plan:    Patient/Family in Agreement with the Plan: yes    Referrals Placed by CM/SW: Post Acute Facilities  Private pay costs discussed: Not applicable    Additional Information:  The IQ testing and Adaptive Functioning tests have been completed and the report has been completed by Dr Gramajo.  This morning, writer faxed the document to Kamryn Mcintyre at [phone].  Will ask her to update us on the next step.      BARBIE MullinsSW

## 2022-04-06 NOTE — PROGRESS NOTES
CLINICAL NUTRITION SERVICES - REASSESSMENT NOTE    Malnutrition: (4/6)   % Weight Loss: Unable to determine  % Intake:  Decreased intake does not meet criteria for malnutrition   Subcutaneous Fat Loss:  Baseline  Muscle Loss:  Baseline   Fluid Retention:  None noted    Malnutrition Diagnosis: Patient does not meet two of the above criteria necessary for diagnosing malnutrition     EVALUATION OF PROGRESS TOWARD GOALS   Diet: Regular   Intake/Tolerance:   - Continues to eat about the same. Likes the crust of bread, will eat eggs. Saw pt in room today with breakfast tray - she had eaten most of her eggs, told me she had eaten too many. Said her left hand fed her too much. She does plan to eat the crust of all of her bread. She has no concerns at this time.   - Flowsheets indicate % of meals consumed, with good appetite.   - BM x1 on 4/4.     ASSESSED NUTRITION NEEDS:  Dosing Weight 33.6 kg   Estimated Energy Needs: 1871-1410 kcals (35-40 Kcal/Kg)  Justification: repletion and underweight  Estimated Protein Needs: 50-70 grams protein (1.5-2 g pro/Kg)  Justification: repletion and hypercatabolism with acute illness    Previous Goals:   Intake of at least 75% meals BID-TID  Evaluation: Met    Previous Nutrition Diagnosis:   No nutrition diagnosis identified at this time   Evaluation: No change    MALNUTRITION  % Weight Loss: Unable to determine  % Intake:  Decreased intake does not meet criteria for malnutrition   Subcutaneous Fat Loss:  Baseline  Muscle Loss:  Baseline   Fluid Retention:  None noted    Malnutrition Diagnosis: Patient does not meet two of the above criteria necessary for diagnosing malnutrition    CURRENT NUTRITION DIAGNOSIS  No nutrition diagnosis identified at this time     INTERVENTIONS  Recommendations / Nutrition Prescription  Regular diet. Encourage intakes.     Implementation  None new    Goals  Intake of >75% meals on average.       MONITORING AND EVALUATION:  Progress towards goals will be  monitored and evaluated per protocol and Practice Guidelines    Rosalba Adams RD, LD  [facility], [facility], [facility]  Pager: [phone]  Weekend Pager: [phone]

## 2022-04-06 NOTE — PLAN OF CARE
Goal Outcome Evaluation:    SUMMARY: Recovered Covid  DATE & TIME: 4/6/22   Cognitive Concerns/ Orientation: A&Ox4, quiet today.  BEHAVIOR & AGGRESSION TOOL COLOR: Green     ABNL VS/O2: VSS on RA  MOBILITY: Ind in room, SBA in hallway per pt request. Amb in leyva several times a day.  PAIN MANAGMENT: Denies pain.   DIET: Regular, requires assist to eat at regular times, to skip lunch and eat apple pie only. Ate lunch despite refusing. Staff should order more options for her, she tends to eat them.   BOWEL/BLADDER: Continent. Large BM x1  ABNL LAB/BG: NA  DRAIN/DEVICES: None  TELEMETRY RHYTHM: NA  SKIN: Scattered bruises. Dry skin.   TESTS/PROCEDURES: NA  D/C DATE: Pend placement, Brother, Maxi has temporary guardianship.  Discharge Barriers: Guardianship and Placement  OTHER IMPORTANT INFO: Coloring and interacting in leyva with staff. Will follow written instructions when placed in her room, example: washed her own hands in sink after eating and bathroom use.

## 2022-04-06 NOTE — PROGRESS NOTES
[facility]    Hospitalist Progress Note    Interval History   - High protein/calorie diet to encourage PO intake  - No complaints today, had a bowel movement this morning    Assessment & Plan   Summary: Miranda Dover is a 51 year old female with PMH Down Syndrome who was admitted on 1/22/2022 with COVID-19 pneumonia and acute hypoxic respiratory failure. Hospitalization has been prolonged due to need to establish guardianship following deaths of her parents. Awaiting placement.    Down syndrome  Developmental Delay  Failure to thrive  Had significant emotional, psychiatric issues during this stay due to the death of her parents and prolonged hospitalization. Discharge planning has been complicated by need for guardianship and payor source for placement. Patient's brother, Maxi, was granted 60 days of emergency guardianship on 2/15. Completed psychologic testing on 3/2/22 to assess current cognitive function and adaptive abilities. Permanent guardianship on 4/1/2022.  - Continue social work and clinical coordinator for transition    Severe malnutrition  Anorexia  Chronic abdominal pain  Chronic constipation  BMI ~13 on early this hospitalization with poor PO intake. SLP was consulted for possible dysphagia, and no evidence of dysphagia was noted. On 2/20, discussed with pt's brother; overall, felt that oral intake was probably acceptable at this point for discharge and did not feel we needed to pursue enteral feedings/g-tube at this point; desired workup for etiology of abdominal pain which he felt was contributing to her decreased PO intake. CT abd/pelvis 2/21 showed large amount of stool. EGD 2/22 relatively unremarkable. US abd no acute pathology. BMI up to 14.7 2/27.  - Encourage PO intake  - PPI  - Bowel regimen    Thyroid nodule:  Recheck TSH    COVID-19 recovered: Initially diagnosed 1/19/2022    E coli UTI, resolved    DVT Prophylaxis: Ambulatory  Code Status: Full Code  PT/OT:  Not needed  Diet: Combination Diet Regular Diet; High Kcal/High Protein Diet, ADULT      Disposition: Expected discharge per SW/CC    Maximino Deng MD  Text Page  (7am to 6pm)  -Data reviewed today: I reviewed all new labs and imaging results over the last 24 hours.    Physical Exam   Temp: 98.1  F (36.7  C) Temp src: Oral BP: 97/67 Pulse: 65   Resp: 18 SpO2: 100 % O2 Device: Oxymask    Vitals:    01/22/22 1140 02/05/22 0729 02/14/22 1329   Weight: 33.6 kg (74 lb 1.2 oz) 43.8 kg (96 lb 9.6 oz) 37.7 kg (83 lb 3.2 oz)     Vital Signs with Ranges  Temp:  [98.1  F (36.7  C)] 98.1  F (36.7  C)  Pulse:  [65] 65  Resp:  [18] 18  BP: (97)/(67) 97/67  SpO2:  [100 %] 100 %  I/O last 3 completed shifts:  In: 150 [P.O.:150]  Out: -   O2 requirements: none    Constitutional: Thin female in NAD  Cardiovascular: RRR, normal S1/2, no m/r/g  Respiratory: CTAB, no wheezing or crackles  Vascular: No LE pitting edema  Skin/Integumen: No rashes noted grossly  Neuro/Psych: Quiet, reserved, moves all extremities    Medications       melatonin  1 mg Oral At Bedtime     pantoprazole  40 mg Oral QAM AC     polyethylene glycol  17 g Oral Daily     senna-docusate  1 tablet Oral BID     cholecalciferol  50 mcg Oral Daily       Data   No lab results found in last 7 days.    Imaging:   No results found for this or any previous visit (from the past 24 hour(s)).

## 2022-04-06 NOTE — PLAN OF CARE
Goal Outcome Evaluation:    Cognitive Concerns/ Orientation: A&Ox4   BEHAVIOR & AGGRESSION TOOL COLOR: Green     ABNL VS/O2: VSS   MOBILITY: Independent in room, SBA in hallway. Steady on feet, ambulated in leyva x 1 this evening   PAIN MANAGMENT: Denies pain.   DIET: Regular,  Particular about her food choices, appetite fair  BOWEL/BLADDER: Continent  ABNL LAB/BG: NA, refused am labs  DRAIN/DEVICES: None  TELEMETRY RHYTHM: NA  SKIN: Scattered bruises. Dry skin.   TESTS/PROCEDURES: NA  D/C DATE: Pending placement  Discharge Barriers: placement  OTHER IMPORTANT INFO: Coloring outside her room.

## 2022-04-07 PROCEDURE — 120N000001 HC R&B MED SURG/OB

## 2022-04-07 PROCEDURE — 250N000013 HC RX MED GY IP 250 OP 250 PS 637: Performed by: HOSPITALIST

## 2022-04-07 PROCEDURE — 250N000013 HC RX MED GY IP 250 OP 250 PS 637: Performed by: INTERNAL MEDICINE

## 2022-04-07 PROCEDURE — 99231 SBSQ HOSP IP/OBS SF/LOW 25: CPT | Performed by: INTERNAL MEDICINE

## 2022-04-07 PROCEDURE — 250N000013 HC RX MED GY IP 250 OP 250 PS 637: Performed by: REGISTERED NURSE

## 2022-04-07 RX ADMIN — Medication 50 MCG: at 09:19

## 2022-04-07 RX ADMIN — Medication 1 MG: at 21:28

## 2022-04-07 RX ADMIN — POLYETHYLENE GLYCOL 3350 17 G: 17 POWDER, FOR SOLUTION ORAL at 09:19

## 2022-04-07 RX ADMIN — ACETAMINOPHEN 650 MG: 325 TABLET, FILM COATED ORAL at 16:48

## 2022-04-07 RX ADMIN — SENNOSIDES AND DOCUSATE SODIUM 1 TABLET: 8.6; 5 TABLET ORAL at 21:28

## 2022-04-07 RX ADMIN — PANTOPRAZOLE SODIUM 40 MG: 40 TABLET, DELAYED RELEASE ORAL at 09:19

## 2022-04-07 RX ADMIN — SENNOSIDES AND DOCUSATE SODIUM 1 TABLET: 8.6; 5 TABLET ORAL at 09:19

## 2022-04-07 ASSESSMENT — ACTIVITIES OF DAILY LIVING (ADL)
ADLS_ACUITY_SCORE: 12
ADLS_ACUITY_SCORE: 13
ADLS_ACUITY_SCORE: 12
ADLS_ACUITY_SCORE: 13
ADLS_ACUITY_SCORE: 13
ADLS_ACUITY_SCORE: 12
ADLS_ACUITY_SCORE: 13
ADLS_ACUITY_SCORE: 12
ADLS_ACUITY_SCORE: 13
ADLS_ACUITY_SCORE: 12
ADLS_ACUITY_SCORE: 13
ADLS_ACUITY_SCORE: 12
ADLS_ACUITY_SCORE: 13
ADLS_ACUITY_SCORE: 12
ADLS_ACUITY_SCORE: 12

## 2022-04-07 NOTE — PROGRESS NOTES
[facility]    Hospitalist Progress Note    Interval History   - No complaints today, coloring outside room per usual    Assessment & Plan   Summary: Miranda Dover is a 51 year old female with PMH Down Syndrome who was admitted on 1/22/2022 with COVID-19 pneumonia and acute hypoxic respiratory failure. Hospitalization has been prolonged due to need to establish guardianship following deaths of her parents. Awaiting placement.    Down syndrome  Developmental Delay  Failure to thrive  Had significant emotional, psychiatric issues during this stay due to the death of her parents and prolonged hospitalization. Discharge planning has been complicated by need for guardianship and payor source for placement. Patient's brother, Maxi, was granted 60 days of emergency guardianship on 2/15. Completed psychologic testing on 3/2/22 to assess current cognitive function and adaptive abilities. Permanent guardianship on 4/1/2022.  - Continue social work and clinical coordinator for transition    Severe malnutrition  Anorexia  Chronic abdominal pain  Chronic constipation  BMI ~13 on early this hospitalization with poor PO intake. SLP was consulted for possible dysphagia, and no evidence of dysphagia was noted. On 2/20, discussed with pt's brother; overall, felt that oral intake was probably acceptable at this point for discharge and did not feel we needed to pursue enteral feedings/g-tube at this point; desired workup for etiology of abdominal pain which he felt was contributing to her decreased PO intake. CT abd/pelvis 2/21 showed large amount of stool. EGD 2/22 relatively unremarkable. US abd no acute pathology. BMI up to 14.7 2/27.  - Encourage PO intake  - PPI  - Bowel regimen    Thyroid nodule:  Recheck TSH    COVID-19 recovered: Initially diagnosed 1/19/2022    E coli UTI, resolved    DVT Prophylaxis: Ambulatory  Code Status: Full Code  PT/OT: Not needed  Diet: Combination Diet Regular Diet; High  Kcal/High Protein Diet, ADULT      Disposition: Expected discharge per SW/CC    Maximino Deng MD  Text Page  (7am to 6pm)  -Data reviewed today: I reviewed all new labs and imaging results over the last 24 hours.    Physical Exam   Temp: 98.1  F (36.7  C)   BP: 107/55 Pulse: 59   Resp: 18 SpO2: 98 % O2 Device: None (Room air)    Vitals:    01/22/22 1140 02/05/22 0729 02/14/22 1329   Weight: 33.6 kg (74 lb 1.2 oz) 43.8 kg (96 lb 9.6 oz) 37.7 kg (83 lb 3.2 oz)     Vital Signs with Ranges  Temp:  [98.1  F (36.7  C)] 98.1  F (36.7  C)  Pulse:  [59] 59  Resp:  [18] 18  BP: (107)/(55) 107/55  SpO2:  [98 %] 98 %  I/O last 3 completed shifts:  In: 920 [P.O.:920]  Out: -   O2 requirements: none    Constitutional: Thin female in NAD  Cardiovascular: RRR, normal S1/2, no m/r/g  Respiratory: CTAB, no wheezing or crackles  Vascular: No LE pitting edema  Skin/Integumen: No rashes noted grossly  Neuro/Psych: Quiet, reserved, moves all extremities    Medications       melatonin  1 mg Oral At Bedtime     pantoprazole  40 mg Oral QAM AC     polyethylene glycol  17 g Oral Daily     senna-docusate  1 tablet Oral BID     cholecalciferol  50 mcg Oral Daily       Data   No lab results found in last 7 days.    Imaging:   No results found for this or any previous visit (from the past 24 hour(s)).

## 2022-04-07 NOTE — PLAN OF CARE
DATE & TIME: 4/7/2022 7596-3882  Cognitive Concerns/ Orientation: A&Ox4  BEHAVIOR & AGGRESSION TOOL COLOR: Green     ABNL VS/O2: VSS on RA, vitals done once daily   MOBILITY: Independent in room, SBA in hallway per pt request. Up in chair in hallway frequently  PAIN MANAGMENT: c/o headache this shift, given PRN tylenol x1  DIET: Regular  BOWEL/BLADDER: Continent. No BM this shift  ABNL LAB/BG: NA  DRAIN/DEVICES: None  TELEMETRY RHYTHM: NA  SKIN: Scattered bruises. Dry skin.   TESTS/PROCEDURES: NA  D/C DATE: Pending placement, BrotherMaxi has temporary guardianship.  Discharge Barriers: Guardianship and Placement  OTHER IMPORTANT INFO: Coloring and interacting in leyva with staff.

## 2022-04-07 NOTE — PLAN OF CARE
Goal Outcome Evaluation:     Cognitive Concerns/ Orientation: A&Ox4  BEHAVIOR & AGGRESSION TOOL COLOR: Green     ABNL VS/O2: VSS on RA, vitals done once daily   MOBILITY: Independent in room, SBA in hallway per pt request  PAIN MANAGMENT: Denies pain.   DIET: Regular  BOWEL/BLADDER: Continent. Large BM x 2 Wednesday   ABNL LAB/BG: NA  DRAIN/DEVICES: None  TELEMETRY RHYTHM: NA  SKIN: Scattered bruises. Dry skin.   TESTS/PROCEDURES: NA  D/C DATE: Pending placement, BrotherMaxi has temporary guardianship.  Discharge Barriers: Guardianship and Placement  OTHER IMPORTANT INFO: Coloring and interacting in leyva with staff.

## 2022-04-08 PROCEDURE — 250N000013 HC RX MED GY IP 250 OP 250 PS 637: Performed by: HOSPITALIST

## 2022-04-08 PROCEDURE — 250N000013 HC RX MED GY IP 250 OP 250 PS 637: Performed by: INTERNAL MEDICINE

## 2022-04-08 PROCEDURE — 99231 SBSQ HOSP IP/OBS SF/LOW 25: CPT | Performed by: INTERNAL MEDICINE

## 2022-04-08 PROCEDURE — 120N000001 HC R&B MED SURG/OB

## 2022-04-08 RX ORDER — CALCIUM CARBONATE 500 MG/1
500 TABLET, CHEWABLE ORAL DAILY PRN
Status: DISCONTINUED | OUTPATIENT
Start: 2022-04-08 | End: 2022-06-28 | Stop reason: HOSPADM

## 2022-04-08 RX ADMIN — PANTOPRAZOLE SODIUM 40 MG: 40 TABLET, DELAYED RELEASE ORAL at 09:46

## 2022-04-08 RX ADMIN — Medication 50 MCG: at 09:46

## 2022-04-08 RX ADMIN — Medication 1 MG: at 21:23

## 2022-04-08 RX ADMIN — CALCIUM CARBONATE (ANTACID) CHEW TAB 500 MG 500 MG: 500 CHEW TAB at 00:24

## 2022-04-08 RX ADMIN — SENNOSIDES AND DOCUSATE SODIUM 1 TABLET: 8.6; 5 TABLET ORAL at 21:23

## 2022-04-08 ASSESSMENT — ACTIVITIES OF DAILY LIVING (ADL)
ADLS_ACUITY_SCORE: 13

## 2022-04-08 NOTE — PLAN OF CARE
Goal Outcome Evaluation:    Plan of Care Reviewed With: patient     Overall Patient Progress: improving    SUMMARY: Recovered Covid  DATE & TIME: 4/8/2022 4618-7253  Cognitive Concerns/ Orientation: A&Ox4  BEHAVIOR & AGGRESSION TOOL COLOR: Green     ABNL VS/O2: VSS on RA, vitals done once daily   MOBILITY: Independent in room, SBA in hallway per pt request. Up in chair in hallway frequently  PAIN MANAGMENT: denies   DIET: Regular  BOWEL/BLADDER: Continent. No BM this shift  ABNL LAB/BG: NA  DRAIN/DEVICES: None  TELEMETRY RHYTHM: NA  SKIN: Scattered bruises. Dry skin.   TESTS/PROCEDURES: NA  D/C DATE: Pending placement, SW following.   Discharge Barriers: Guardianship and Placement  OTHER IMPORTANT INFO: Coloring and interacting in leyva with staff.

## 2022-04-08 NOTE — PROGRESS NOTES
DATE & TIME: 4/7/2022 6604-2094  Cognitive Concerns/ Orientation: A&Ox4  BEHAVIOR & AGGRESSION TOOL COLOR: Green     ABNL VS/O2: VSS on RA, vitals done once daily   MOBILITY: Independent in room, SBA in hallway per pt request. Up in chair in hallway frequently  PAIN MANAGMENT: denies  DIET: Regular  BOWEL/BLADDER: Continent. No BM this shift  ABNL LAB/BG: NA  DRAIN/DEVICES: None  TELEMETRY RHYTHM: NA  SKIN: Scattered bruises. Dry skin.   TESTS/PROCEDURES: NA  D/C DATE: Pending placement, BrotherMaxi has temporary guardianship.  Discharge Barriers: Guardianship and Placement  OTHER IMPORTANT INFO: Coloring and interacting in leyva with staff.    Patient refusing anticoagulation.  Patient reports that Neurologist told her not to take blood thinners.  Review of chart reveals that the current Neurology consult (11/2021) states NO contraindication to anticoagulation for surgery/surgical prophylaxis.  This finding was discussed with patient at length, including risks of thromboembolism, PE, death, etc., and patient restates that she does NOT want any anticoagulation/blood thinners.

## 2022-04-08 NOTE — PROGRESS NOTES
[facility]    Hospitalist Progress Note    Interval History   - No complaints today, finishing lunch inside her room    Assessment & Plan   Summary: Miranda Dover is a 51 year old female with PMH Down Syndrome who was admitted on 1/22/2022 with COVID-19 pneumonia and acute hypoxic respiratory failure. Hospitalization has been prolonged due to need to establish guardianship following deaths of her parents. Awaiting placement.    Down syndrome  Developmental Delay  Failure to thrive  Had significant emotional, psychiatric issues during this stay due to the death of her parents and prolonged hospitalization. Discharge planning has been complicated by need for guardianship and payor source for placement. Patient's brother, Maxi, was granted 60 days of emergency guardianship on 2/15. Completed psychologic testing on 3/2/22 to assess current cognitive function and adaptive abilities. Permanent guardianship on 4/1/2022.  - Continue social work and clinical coordinator for transition    Severe malnutrition  Anorexia  Chronic abdominal pain  Chronic constipation  BMI ~13 on early this hospitalization with poor PO intake. SLP was consulted for possible dysphagia, and no evidence of dysphagia was noted. On 2/20, discussed with pt's brother; overall, felt that oral intake was probably acceptable at this point for discharge and did not feel we needed to pursue enteral feedings/g-tube at this point; desired workup for etiology of abdominal pain which he felt was contributing to her decreased PO intake. CT abd/pelvis 2/21 showed large amount of stool. EGD 2/22 relatively unremarkable. US abd no acute pathology. BMI up to 14.7 2/27.  - Encourage PO intake  - PPI  - Bowel regimen    Thyroid nodule:  Recheck TSH    COVID-19 recovered: Initially diagnosed 1/19/2022    E coli UTI, resolved    DVT Prophylaxis: Ambulatory  Code Status: Full Code  PT/OT: Not needed  Diet: Combination Diet Regular Diet; High  Kcal/High Protein Diet, ADULT      Disposition: Expected discharge per SW/CC    Maximino Deng MD  Text Page  (7am to 6pm)  -Data reviewed today: I reviewed all new labs and imaging results over the last 24 hours.    Physical Exam   Temp: 98.1  F (36.7  C) Temp src: Oral BP: 98/58 Pulse: 77   Resp: 18 SpO2: 98 % O2 Device: None (Room air)    Vitals:    01/22/22 1140 02/05/22 0729 02/14/22 1329   Weight: 33.6 kg (74 lb 1.2 oz) 43.8 kg (96 lb 9.6 oz) 37.7 kg (83 lb 3.2 oz)     Vital Signs with Ranges  Temp:  [98.1  F (36.7  C)] 98.1  F (36.7  C)  Pulse:  [77] 77  Resp:  [18] 18  BP: (98)/(58) 98/58  SpO2:  [98 %] 98 %  I/O last 3 completed shifts:  In: 240 [P.O.:240]  Out: -   O2 requirements: none    Constitutional: Thin female in NAD  Cardiovascular: RRR, normal S1/2, no m/r/g  Respiratory: CTAB, no wheezing or crackles  Vascular: No LE pitting edema  Skin/Integumen: No rashes noted grossly  Neuro/Psych: Quiet, reserved, moves all extremities    Medications       melatonin  1 mg Oral At Bedtime     pantoprazole  40 mg Oral QAM AC     polyethylene glycol  17 g Oral Daily     senna-docusate  1 tablet Oral BID     cholecalciferol  50 mcg Oral Daily       Data   No lab results found in last 7 days.    Imaging:   No results found for this or any previous visit (from the past 24 hour(s)).

## 2022-04-09 PROCEDURE — 120N000001 HC R&B MED SURG/OB

## 2022-04-09 PROCEDURE — 99232 SBSQ HOSP IP/OBS MODERATE 35: CPT | Performed by: HOSPITALIST

## 2022-04-09 PROCEDURE — 250N000013 HC RX MED GY IP 250 OP 250 PS 637: Performed by: INTERNAL MEDICINE

## 2022-04-09 PROCEDURE — 250N000013 HC RX MED GY IP 250 OP 250 PS 637: Performed by: HOSPITALIST

## 2022-04-09 RX ADMIN — Medication 50 MCG: at 10:49

## 2022-04-09 RX ADMIN — PANTOPRAZOLE SODIUM 40 MG: 40 TABLET, DELAYED RELEASE ORAL at 10:49

## 2022-04-09 RX ADMIN — Medication 1 MG: at 21:35

## 2022-04-09 RX ADMIN — SENNOSIDES AND DOCUSATE SODIUM 1 TABLET: 8.6; 5 TABLET ORAL at 10:49

## 2022-04-09 RX ADMIN — SENNOSIDES AND DOCUSATE SODIUM 1 TABLET: 8.6; 5 TABLET ORAL at 21:35

## 2022-04-09 ASSESSMENT — ACTIVITIES OF DAILY LIVING (ADL)
ADLS_ACUITY_SCORE: 13
ADLS_ACUITY_SCORE: 15
ADLS_ACUITY_SCORE: 13
ADLS_ACUITY_SCORE: 13
ADLS_ACUITY_SCORE: 15
ADLS_ACUITY_SCORE: 15
ADLS_ACUITY_SCORE: 13
ADLS_ACUITY_SCORE: 15
ADLS_ACUITY_SCORE: 13
ADLS_ACUITY_SCORE: 13
ADLS_ACUITY_SCORE: 15
ADLS_ACUITY_SCORE: 13
ADLS_ACUITY_SCORE: 15
ADLS_ACUITY_SCORE: 15
ADLS_ACUITY_SCORE: 13

## 2022-04-09 NOTE — PROGRESS NOTES
[facility]    Medicine Progress Note - Hospitalist Service    Date of Admission:  1/22/2022    Assessment & Plan        Miranda Dover is a 51 year old female with PMH Down Syndrome who was admitted on 1/22/2022 with COVID-19 pneumonia and acute hypoxic respiratory failure. Hospitalization has been prolonged due to need to establish guardianship following deaths of her parents. Awaiting placement.     Down syndrome  Developmental Delay  Failure to thrive  Had significant emotional, psychiatric issues during this stay due to the death of her parents and prolonged hospitalization. Discharge planning has been complicated by need for guardianship and payor source for placement. Patient's brother, Maxi, was granted 60 days of emergency guardianship on 2/15. Completed psychologic testing on 3/2/22 to assess current cognitive function and adaptive abilities. Permanent guardianship on 4/1/2022.  - Continue social work and clinical coordinator for transition     Severe malnutrition  Anorexia  Chronic abdominal pain  Chronic constipation  BMI ~13 on early this hospitalization with poor PO intake. SLP was consulted for possible dysphagia, and no evidence of dysphagia was noted. On 2/20, discussed with pt's brother; overall, felt that oral intake was probably acceptable at this point for discharge and did not feel we needed to pursue enteral feedings/g-tube at this point; desired workup for etiology of abdominal pain which he felt was contributing to her decreased PO intake. CT abd/pelvis 2/21 showed large amount of stool. EGD 2/22 relatively unremarkable. US abd no acute pathology. BMI up to 14.7 2/27.  - Encourage PO intake  - PPI  - Bowel regimen     Thyroid nodule:  Recheck TSH     COVID-19 recovered: Initially diagnosed 1/19/2022     E coli UTI, resolved    4/9- stable overnight.  No complaints. Continue present care.  Awaiting placement.       Diet: Combination Diet Regular Diet; High  Kcal/High Protein Diet, ADULT    DVT Prophylaxis: Low Risk/Ambulatory with no VTE prophylaxis indicated  Zhu Catheter: Not present  Central Lines: None  Cardiac Monitoring: None  Code Status: Full Code      Disposition Plan   Expected Discharge: 04/13/2022     Anticipated discharge location: group home    Delays:     Placement - Group Homes            The patient's care was discussed with the Bedside Nurse and Patient.    Darien Eugene MD  Hospitalist Service  [facility]  Securely message with the Vocera Web Console (learn more here)  Text page via [employer] Paging/Directory         Clinically Significant Risk Factors Present on Admission                    ______________________________________________________________________    Interval History   No complaints     Data reviewed today: I reviewed all medications, new labs and imaging results over the last 24 hours. I personally reviewed no images or EKG's today.    Physical Exam   Vital Signs: Temp: 97.7  F (36.5  C) Temp src: Oral BP: 93/59 Pulse: 74   Resp: 18 SpO2: 99 % O2 Device: None (Room air)    Weight: 83 lbs 3.2 oz  Constitutional: awake, alert, cooperative, no apparent distress    Data   No lab results found in last 7 days.    No results found for this or any previous visit (from the past 24 hour(s)).  Medications       melatonin  1 mg Oral At Bedtime     pantoprazole  40 mg Oral QAM AC     polyethylene glycol  17 g Oral Daily     senna-docusate  1 tablet Oral BID     cholecalciferol  50 mcg Oral Daily

## 2022-04-09 NOTE — PLAN OF CARE
Goal Outcome Evaluation:    Plan of Care Reviewed With: patient     SUMMARY: Recovered Covid  DATE & TIME: 4/9/22 6056-0807  Cognitive Concerns/ Orientation: A&Ox4  BEHAVIOR & AGGRESSION TOOL COLOR: Green     ABNL VS/O2: VSS on RA, vitals done once daily   MOBILITY: Independent in room, SBA in hallway. Up in chair in hallway frequently  PAIN MANAGMENT: denies   DIET: Regular  BOWEL/BLADDER: Continent. No BM this shift, Last BM was 4/7, BS+, passing flatus, encouraged to take scheduled senna, declined Miralax.   ABNL LAB/BG: NA  DRAIN/DEVICES: None  TELEMETRY RHYTHM: NA  SKIN: Scattered bruises. Dry skin. Coccyx redness has resolved, no discoloration noted, skin intact.   TESTS/PROCEDURES: NA  D/C DATE: Pending placement, SW following.   Discharge Barriers: Guardianship and Placement  OTHER IMPORTANT INFO: calm and cooperative, coloring and interacting in leyva with staff.

## 2022-04-09 NOTE — PLAN OF CARE
Goal Outcome Evaluation:       SUMMARY: Recovered Covid  DATE & TIME: 4/8/2022 0175-8362  Cognitive Concerns/ Orientation: A&Ox4  BEHAVIOR & AGGRESSION TOOL COLOR: Green     ABNL VS/O2: VSS on RA, vitals done once daily   MOBILITY: Independent in room, SBA in hallway per pt request. Up in chair in hallway frequently  PAIN MANAGMENT: denies   DIET: Regular  BOWEL/BLADDER: Continent. No BM this shift  ABNL LAB/BG: NA  DRAIN/DEVICES: None  TELEMETRY RHYTHM: NA  SKIN: Scattered bruises. Dry skin.   TESTS/PROCEDURES: NA  D/C DATE: Pending placement, SW following.   Discharge Barriers: Guardianship and Placement  OTHER IMPORTANT INFO: Coloring and interacting in leyva with staff.

## 2022-04-10 PROCEDURE — 120N000001 HC R&B MED SURG/OB

## 2022-04-10 PROCEDURE — 250N000013 HC RX MED GY IP 250 OP 250 PS 637: Performed by: HOSPITALIST

## 2022-04-10 PROCEDURE — 99232 SBSQ HOSP IP/OBS MODERATE 35: CPT | Performed by: HOSPITALIST

## 2022-04-10 PROCEDURE — 250N000013 HC RX MED GY IP 250 OP 250 PS 637: Performed by: INTERNAL MEDICINE

## 2022-04-10 RX ADMIN — PANTOPRAZOLE SODIUM 40 MG: 40 TABLET, DELAYED RELEASE ORAL at 10:36

## 2022-04-10 RX ADMIN — Medication 1 MG: at 21:39

## 2022-04-10 RX ADMIN — SENNOSIDES AND DOCUSATE SODIUM 1 TABLET: 8.6; 5 TABLET ORAL at 21:39

## 2022-04-10 RX ADMIN — SENNOSIDES AND DOCUSATE SODIUM 1 TABLET: 8.6; 5 TABLET ORAL at 10:37

## 2022-04-10 RX ADMIN — Medication 50 MCG: at 10:36

## 2022-04-10 ASSESSMENT — ACTIVITIES OF DAILY LIVING (ADL)
ADLS_ACUITY_SCORE: 13

## 2022-04-10 NOTE — PROGRESS NOTES
[facility]    Medicine Progress Note - Hospitalist Service    Date of Admission:  1/22/2022    Assessment & Plan        Miranda Dover is a 51 year old female with PMH Down Syndrome who was admitted on 1/22/2022 with COVID-19 pneumonia and acute hypoxic respiratory failure. Hospitalization has been prolonged due to need to establish guardianship following deaths of her parents. Awaiting placement.     Down syndrome  Developmental Delay  Failure to thrive  Had significant emotional, psychiatric issues during this stay due to the death of her parents and prolonged hospitalization. Discharge planning has been complicated by need for guardianship and payor source for placement. Patient's brother, Maxi, was granted 60 days of emergency guardianship on 2/15. Completed psychologic testing on 3/2/22 to assess current cognitive function and adaptive abilities. Permanent guardianship on 4/1/2022.  - Continue social work and clinical coordinator for transition     Severe malnutrition  Anorexia  Chronic abdominal pain  Chronic constipation  BMI ~13 on early this hospitalization with poor PO intake. SLP was consulted for possible dysphagia, and no evidence of dysphagia was noted. On 2/20, discussed with pt's brother; overall, felt that oral intake was probably acceptable at this point for discharge and did not feel we needed to pursue enteral feedings/g-tube at this point; desired workup for etiology of abdominal pain which he felt was contributing to her decreased PO intake. CT abd/pelvis 2/21 showed large amount of stool. EGD 2/22 relatively unremarkable. US abd no acute pathology. BMI up to 14.7 2/27.  - Encourage PO intake  - PPI  - Bowel regimen     Thyroid nodule:  Recheck TSH Monday     COVID-19 recovered: Initially diagnosed 1/19/2022     E coli UTI, resolved    4/10- stable, continue present care. Labs tomorrow.       Diet: Combination Diet Regular Diet; High Kcal/High Protein Diet, ADULT     DVT Prophylaxis: Low Risk/Ambulatory with no VTE prophylaxis indicated  Zhu Catheter: Not present  Central Lines: None  Cardiac Monitoring: None  Code Status: Full Code      Disposition Plan   Expected Discharge: 04/15/2022     Anticipated discharge location: group home    Delays:     Placement - Group Homes            The patient's care was discussed with the Bedside Nurse and Patient.    Darien Eugene MD  Hospitalist Service  [facility]  Securely message with the Vocera Web Console (learn more here)  Text page via [employer] Paging/Directory         Clinically Significant Risk Factors Present on Admission                    ______________________________________________________________________    Interval History   No complaints     Data reviewed today: I reviewed all medications, new labs and imaging results over the last 24 hours. I personally reviewed no images or EKG's today.    Physical Exam   Vital Signs: Temp: 97.7  F (36.5  C) Temp src: Oral BP: 100/55 Pulse: 62   Resp: 16 SpO2: 99 % O2 Device: None (Room air)    Weight: 83 lbs 3.2 oz  Constitutional: awake, alert, cooperative, no apparent distress    Data   No lab results found in last 7 days.    No results found for this or any previous visit (from the past 24 hour(s)).  Medications       melatonin  1 mg Oral At Bedtime     pantoprazole  40 mg Oral QAM AC     polyethylene glycol  17 g Oral Daily     senna-docusate  1 tablet Oral BID     cholecalciferol  50 mcg Oral Daily

## 2022-04-10 NOTE — PLAN OF CARE
Goal Outcome Evaluation:         SUMMARY: Recovered Covid  DATE & TIME: 4/9/22 1930-0730  Cognitive Concerns/ Orientation: A&Ox4  BEHAVIOR & AGGRESSION TOOL COLOR: Green     ABNL VS/O2: VSS on RA, vitals done once daily   MOBILITY: Independent in room, SBA in hallway. Up in chair in hallway frequently  PAIN MANAGMENT: denies   DIET: Regular  BOWEL/BLADDER: Continent. No BM this shift, Last BM was 4/7, BS+, passing flatus, encouraged to take scheduled senna.  ABNL LAB/BG: NA  DRAIN/DEVICES: None  TELEMETRY RHYTHM: NA  SKIN: Scattered bruises. Dry skin. No discoloration noted, skin intact.   TESTS/PROCEDURES: NA  D/C DATE: Pending placement, SW following.   Discharge Barriers: Guardianship and Placement  OTHER IMPORTANT INFO: calm and cooperative, coloring and interacting in room.

## 2022-04-10 NOTE — PLAN OF CARE
Goal Outcome Evaluation:    Plan of Care Reviewed With: patient     SUMMARY: Recovered Covid  DATE & TIME: 4/10/22 0061-8143  Cognitive Concerns/ Orientation: A&Ox4  BEHAVIOR & AGGRESSION TOOL COLOR: Green     ABNL VS/O2: BP remains soft 100/55, other VSS on RA, vitals done once daily   MOBILITY: Independent in room, SBA in hallway. Up in chair in hallway frequently  PAIN MANAGMENT: denies   DIET: Regular  BOWEL/BLADDER: Continent. BM x1.   ABNL LAB/BG: NA  DRAIN/DEVICES: None  TELEMETRY RHYTHM: NA  SKIN: Scattered bruises. Dry skin. Coccyx redness has resolved, no discoloration noted, skin intact.   TESTS/PROCEDURES: NA  D/C DATE: Pending placement, SW following.   Discharge Barriers: Guardianship and Placement  OTHER IMPORTANT INFO: calm and cooperative, coloring and interacting in leyva with staff. Updated pt's brother Maxi, he's taking pt out for lunch tomorrow at 1400.

## 2022-04-11 PROCEDURE — 99232 SBSQ HOSP IP/OBS MODERATE 35: CPT | Performed by: HOSPITALIST

## 2022-04-11 PROCEDURE — 120N000001 HC R&B MED SURG/OB

## 2022-04-11 PROCEDURE — 250N000013 HC RX MED GY IP 250 OP 250 PS 637: Performed by: INTERNAL MEDICINE

## 2022-04-11 PROCEDURE — 250N000013 HC RX MED GY IP 250 OP 250 PS 637: Performed by: HOSPITALIST

## 2022-04-11 RX ADMIN — SENNOSIDES AND DOCUSATE SODIUM 1 TABLET: 8.6; 5 TABLET ORAL at 09:19

## 2022-04-11 RX ADMIN — Medication 1 MG: at 22:32

## 2022-04-11 RX ADMIN — Medication 50 MCG: at 09:19

## 2022-04-11 RX ADMIN — PANTOPRAZOLE SODIUM 40 MG: 40 TABLET, DELAYED RELEASE ORAL at 09:19

## 2022-04-11 RX ADMIN — SENNOSIDES AND DOCUSATE SODIUM 1 TABLET: 8.6; 5 TABLET ORAL at 22:32

## 2022-04-11 ASSESSMENT — ACTIVITIES OF DAILY LIVING (ADL)
ADLS_ACUITY_SCORE: 13

## 2022-04-11 NOTE — PLAN OF CARE
SUMMARY: Recovered Covid  DATE & TIME: 4/10/22 1584-9240  Cognitive Concerns/ Orientation: A&Ox4  BEHAVIOR & AGGRESSION TOOL COLOR: Green     ABNL VS/O2: VSS, BP runs soft, vitals done once daily so none done this shift.  MOBILITY: Independent in room, SBA in hallway. Up in chair in hallway frequently and ambulating in the hallway with staff.  PAIN MANAGMENT: denies   DIET: Regular  BOWEL/BLADDER: Continent, up to bathroom. No BM   ABNL LAB/BG: NA  DRAIN/DEVICES: None  TELEMETRY RHYTHM: NA  SKIN: Scattered bruises. Dry skin. Coccyx redness has resolved, no discoloration noted, skin intact.   TESTS/PROCEDURES: NA  D/C DATE: Pending placement, SW following.   Discharge Barriers: Guardianship and Placement  OTHER IMPORTANT INFO:

## 2022-04-11 NOTE — PLAN OF CARE
Goal Outcome Evaluation:        SUMMARY: Recovered Covid  DATE & TIME: 4/11 8772-8704  Cognitive Concerns/ Orientation: A&Ox4  BEHAVIOR & AGGRESSION TOOL COLOR: Green     ABNL VS/O2: VSS, on RA.  MOBILITY: Independent in room, SBA in hallway. Up in chair in hallway frequently and ambulating in the hallway with staff.  PAIN MANAGMENT: denies   DIET: Regular  BOWEL/BLADDER: Continent, up to bathroom. No BM   ABNL LAB/BG: NA  DRAIN/DEVICES: None  TELEMETRY RHYTHM: NA  SKIN: Scattered bruises. Dry skin. Not able to assess buttocks or sacrum  TESTS/PROCEDURES: NA  D/C DATE: Pending placement, SW following.   Discharge Barriers: Guardianship and Placement  OTHER IMPORTANT INFO: Pt brother visited and took pt on an outing.

## 2022-04-11 NOTE — PROGRESS NOTES
Care Management Follow Up    Length of Stay (days): 79    Expected Discharge Date: 04/15/2022     Concerns to be Addressed: discharge planning     Patient plan of care discussed at interdisciplinary rounds: Yes    Anticipated Discharge Disposition: Transitional Care     Anticipated Discharge Services:    Anticipated Discharge DME:      Patient/family educated on Medicare website which has current facility and service quality ratings: no  Education Provided on the Discharge Plan:    Patient/Family in Agreement with the Plan: yes    Referrals Placed by FADUMO/KRYSTIN: Post Acute Facilities  Private pay costs discussed: Not applicable    Additional Information:    Krystin called FADUMO Harry, and left vm to ensure she received the copy of IQ/adaptive tests and to inquire into next steps; waiting on return call back.    Krystin called brother, Maxi, and he confirmed that he is now permanent guardian for pt as of 4/1/22.  Maxi plans on bringing in copies of the paperwork today to place into pt's chart.      Update:  Kamryn THORNE, called Sw back and confirmed she received the test reports and has expedited the request for an  ([place] [facility] Assessment) to be assigned to pt.  FADUMO is hopeful that pt will be assigned shortly and will notify Krystin once this happens.       Connie Smalls, BARBIESW

## 2022-04-11 NOTE — PROGRESS NOTES
[facility]    Medicine Progress Note - Hospitalist Service    Date of Admission:  1/22/2022    Assessment & Plan        Miranda Dover is a 51 year old female with PMH Down Syndrome who was admitted on 1/22/2022 with COVID-19 pneumonia and acute hypoxic respiratory failure. Hospitalization has been prolonged due to need to establish guardianship following deaths of her parents. Awaiting placement.     Down syndrome  Developmental Delay  Failure to thrive  Had significant emotional, psychiatric issues during this stay due to the death of her parents and prolonged hospitalization. Discharge planning has been complicated by need for guardianship and payor source for placement. Patient's brother, Maxi, was granted 60 days of emergency guardianship on 2/15. Completed psychologic testing on 3/2/22 to assess current cognitive function and adaptive abilities. Permanent guardianship on 4/1/2022.  - Continue social work and clinical coordinator for transition     Severe malnutrition  Anorexia  Chronic abdominal pain  Chronic constipation  BMI ~13 on early this hospitalization with poor PO intake. SLP was consulted for possible dysphagia, and no evidence of dysphagia was noted. On 2/20, discussed with pt's brother; overall, felt that oral intake was probably acceptable at this point for discharge and did not feel we needed to pursue enteral feedings/g-tube at this point; desired workup for etiology of abdominal pain which he felt was contributing to her decreased PO intake. CT abd/pelvis 2/21 showed large amount of stool. EGD 2/22 relatively unremarkable. US abd no acute pathology. BMI up to 14.7 2/27.  - Encourage PO intake  - PPI  - Bowel regimen     Thyroid nodule:  Recheck TSH today     COVID-19 recovered: Initially diagnosed 1/19/2022     E coli UTI, resolved    4/11- stable, continue present care. Labs ordered for today not drawn yet.       Diet: Combination Diet Regular Diet; High Kcal/High  Protein Diet, ADULT    DVT Prophylaxis: Low Risk/Ambulatory with no VTE prophylaxis indicated  Zhu Catheter: Not present  Central Lines: None  Cardiac Monitoring: None  Code Status: Full Code      Disposition Plan   Expected Discharge: 04/15/2022     Anticipated discharge location: group home    Delays:     Placement - Group Homes            The patient's care was discussed with the patient.    Darien Eugene MD  Hospitalist Service  [facility]  Securely message with the Vocera Web Console (learn more here)  Text page via [employer] Paging/Directory         Clinically Significant Risk Factors Present on Admission                    ______________________________________________________________________    Interval History   No complaints     Data reviewed today: I reviewed all medications, new labs and imaging results over the last 24 hours. I personally reviewed no images or EKG's today.    Physical Exam   Vital Signs:                    Weight: 83 lbs 3.2 oz  Constitutional: awake, alert, cooperative, no apparent distress    Data   No lab results found in last 7 days.    No results found for this or any previous visit (from the past 24 hour(s)).  Medications       melatonin  1 mg Oral At Bedtime     pantoprazole  40 mg Oral QAM AC     polyethylene glycol  17 g Oral Daily     senna-docusate  1 tablet Oral BID     cholecalciferol  50 mcg Oral Daily

## 2022-04-12 PROCEDURE — 99232 SBSQ HOSP IP/OBS MODERATE 35: CPT | Performed by: HOSPITALIST

## 2022-04-12 PROCEDURE — 250N000013 HC RX MED GY IP 250 OP 250 PS 637: Performed by: INTERNAL MEDICINE

## 2022-04-12 PROCEDURE — 120N000001 HC R&B MED SURG/OB

## 2022-04-12 PROCEDURE — 250N000013 HC RX MED GY IP 250 OP 250 PS 637: Performed by: HOSPITALIST

## 2022-04-12 RX ADMIN — Medication 1 MG: at 21:05

## 2022-04-12 RX ADMIN — SENNOSIDES AND DOCUSATE SODIUM 1 TABLET: 8.6; 5 TABLET ORAL at 10:53

## 2022-04-12 RX ADMIN — PANTOPRAZOLE SODIUM 40 MG: 40 TABLET, DELAYED RELEASE ORAL at 10:54

## 2022-04-12 RX ADMIN — Medication 50 MCG: at 10:53

## 2022-04-12 RX ADMIN — SENNOSIDES AND DOCUSATE SODIUM 1 TABLET: 8.6; 5 TABLET ORAL at 21:05

## 2022-04-12 ASSESSMENT — ACTIVITIES OF DAILY LIVING (ADL)
ADLS_ACUITY_SCORE: 11
ADLS_ACUITY_SCORE: 13
ADLS_ACUITY_SCORE: 11
ADLS_ACUITY_SCORE: 13
ADLS_ACUITY_SCORE: 11
ADLS_ACUITY_SCORE: 13
ADLS_ACUITY_SCORE: 11
ADLS_ACUITY_SCORE: 13
ADLS_ACUITY_SCORE: 11
ADLS_ACUITY_SCORE: 13
ADLS_ACUITY_SCORE: 11

## 2022-04-12 NOTE — PLAN OF CARE
Goal Outcome Evaluation:    SUMMARY: Recovered Covid  DATE & TIME: 4/11 9607-4054  Cognitive Concerns/ Orientation: A&Ox4  BEHAVIOR & AGGRESSION TOOL COLOR: Green     ABNL VS/O2: VSS, on RA.  MOBILITY: Independent in room, SBA in hallway. Up in chair in hallway frequently and ambulating in the hallway with staff.  PAIN MANAGMENT: denies   DIET: Regular  BOWEL/BLADDER: Continent, up to bathroom. No BM   ABNL LAB/BG: NA  DRAIN/DEVICES: None  TELEMETRY RHYTHM: NA  SKIN: Scattered bruises. Dry skin. Not able to assess buttocks or sacrum  TESTS/PROCEDURES: NA  D/C DATE: Pending placement, SW following.   Discharge Barriers: Guardianship and Placement  OTHER IMPORTANT INFO: Pt brother visited and took pt on an outing.

## 2022-04-12 NOTE — PROGRESS NOTES
Care Management Follow Up    Length of Stay (days): 80    Expected Discharge Date: 04/18/2022     Concerns to be Addressed: discharge planning     Patient plan of care discussed at interdisciplinary rounds: yes    Anticipated Discharge Disposition: group home     Anticipated Discharge Services:    Anticipated Discharge DME:      Patient/family educated on Medicare website which has current facility and service quality ratings: no  Education Provided on the Discharge Plan:    Patient/Family in Agreement with the Plan: yes    Referrals Placed by CM/SW: Post Acute Facilities  Private pay costs discussed: Not applicable    Additional Information:  Received a call today from Franky Carreranepin Nuris MCCLELLAND assigned as patient's  to complete [place] and assist with discharge disposition. Юлия's office number is [phone].  Юлия spoke with the brother this afternoon and strongly advocated patient discharging to a group home and emphasized the need to make arrangements as soon as possible.  Юлия discussed with brother that MA application still needs verification documents before the process can be completed and he told Юлия he put the documents in the mail today.  Юлия will know once MA office receives the verification forms.    Юлия explained because of patient's Downs Syndrome dx, she can pursue placement simultaneously while completing the [place] Choice Assessment.   She has spoken to two group homes in [place] which have DD population and both have current vacancies.  Tomorrow writer is emailing Юлия clinical information and will coordinate a call between Юлия and patient's day shift bedside RN so Юлия can ask detailed questions about the services patient requires assistance with.          Connie Masterson, BARBIESW

## 2022-04-12 NOTE — PLAN OF CARE
Goal Outcome Evaluation:      SUMMARY: Recovered Covid  DATE & TIME: 4/11-4/12 2803-3148  Cognitive Concerns/ Orientation: A&Ox4  BEHAVIOR & AGGRESSION TOOL COLOR: Green     ABNL VS/O2: daily vitals  MOBILITY: Independent in room, SBA in hallway. Ambulates with staff I hallway frequently  PAIN MANAGMENT: denies pain  DIET: Regular  BOWEL/BLADDER: Continent, up to bathroom. No BM this shift  ABNL LAB/BG: NA  DRAIN/DEVICES: None  TELEMETRY RHYTHM: NA  SKIN: Scattered bruises. Dry skin. TESTS/PROCEDURES: NA  D/C DATE: Pending placement, SW following.   Discharge Barriers: Guardianship and Placement  OTHER IMPORTANT INFO:

## 2022-04-12 NOTE — PROGRESS NOTES
[facility]    Medicine Progress Note - Hospitalist Service    Date of Admission:  1/22/2022    Assessment & Plan        Miranda Dover is a 51 year old female with PMH Down Syndrome who was admitted on 1/22/2022 with COVID-19 pneumonia and acute hypoxic respiratory failure. Hospitalization has been prolonged due to need to establish guardianship following deaths of her parents. Awaiting placement.     Down syndrome  Developmental Delay  Failure to thrive  Had significant emotional, psychiatric issues during this stay due to the death of her parents and prolonged hospitalization. Discharge planning has been complicated by need for guardianship and payor source for placement. Patient's brother, Maxi, was granted 60 days of emergency guardianship on 2/15. Completed psychologic testing on 3/2/22 to assess current cognitive function and adaptive abilities. Permanent guardianship on 4/1/2022.  - Continue social work and clinical coordinator for transition     Severe malnutrition  Anorexia  Chronic abdominal pain  Chronic constipation  BMI ~13 on early this hospitalization with poor PO intake. SLP was consulted for possible dysphagia, and no evidence of dysphagia was noted. On 2/20, discussed with pt's brother; overall, felt that oral intake was probably acceptable at this point for discharge and did not feel we needed to pursue enteral feedings/g-tube at this point; desired workup for etiology of abdominal pain which he felt was contributing to her decreased PO intake. CT abd/pelvis 2/21 showed large amount of stool. EGD 2/22 relatively unremarkable. US abd no acute pathology. BMI up to 14.7 2/27.  - Encourage PO intake  - PPI  - Bowel regimen     Thyroid nodule:  Recheck TSH today     COVID-19 recovered: Initially diagnosed 1/19/2022     E coli UTI, resolved    4/12- stable- continue present care.  Labs were not done ? Refused.       Diet: Combination Diet Regular Diet; High Kcal/High Protein  Diet, ADULT    DVT Prophylaxis: Low Risk/Ambulatory with no VTE prophylaxis indicated  Zhu Catheter: Not present  Central Lines: None  Cardiac Monitoring: None  Code Status: Full Code      Disposition Plan   Expected Discharge: 04/15/2022     Anticipated discharge location: group home    Delays:     Placement - Group Homes            The patient's care was discussed with the patient.    Darien Eugene MD  Hospitalist Service  [facility]  Securely message with the Vocera Web Console (learn more here)  Text page via [employer] Paging/Directory         Clinically Significant Risk Factors Present on Admission                    ______________________________________________________________________    Interval History   No complaints     Data reviewed today: I reviewed all medications, new labs and imaging results over the last 24 hours. I personally reviewed no images or EKG's today.    Physical Exam   Vital Signs:                    Weight: 83 lbs 3.2 oz  Constitutional: awake, alert, cooperative, no apparent distress    Data   No lab results found in last 7 days.    No results found for this or any previous visit (from the past 24 hour(s)).  Medications       melatonin  1 mg Oral At Bedtime     pantoprazole  40 mg Oral QAM AC     polyethylene glycol  17 g Oral Daily     senna-docusate  1 tablet Oral BID     cholecalciferol  50 mcg Oral Daily

## 2022-04-12 NOTE — PLAN OF CARE
Goal Outcome Evaluation:    SUMMARY: Awaiting placement.   DATE & TIME: 04/12/22 15:00-19:00 pm   Cognitive Concerns/ Orientation: A & O x2-3. Calm and cooperative.   BEHAVIOR & AGGRESSION TOOL COLOR: Green     ABNL VS/O2: Daily vitals, WDL except soft BP.   MOBILITY: Independent in room, SBA in hallway. Ambulates with staff in hallway frequently  PAIN MANAGMENT: Denies pain  DIET: Regular, ate one meal. Declined lunch. Dinner ordered  BOWEL/BLADDER: Continent, up to bathroom. No BM this shift.   ABNL LAB/BG: NA  DRAIN/DEVICES: None  TELEMETRY RHYTHM: NA  SKIN: Pale. Scattered bruises. Dry skin. TESTS/PROCEDURES: NA  D/C DATE: Pending placement, SW following.   Discharge Barriers: Placement  OTHER IMPORTANT INFO: Slept in until around 10 am. Doing well, no changes or complaints. Ambulated in leyva x1. In hallway coloring.

## 2022-04-12 NOTE — PLAN OF CARE
SUMMARY: Awaiting placement.   DATE & TIME: 04/12/22 7-3 pm   Cognitive Concerns/ Orientation: A & O x2-3. Calm and cooperative.   BEHAVIOR & AGGRESSION TOOL COLOR: Green     ABNL VS/O2: Daily vitals, WDL except soft BP.   MOBILITY: Independent in room, SBA in hallway. Ambulates with staff in hallway frequently  PAIN MANAGMENT: Denies pain  DIET: Regular, ate one meal. Declined lunch.   BOWEL/BLADDER: Continent, up to bathroom. No BM this shift.   ABNL LAB/BG: NA  DRAIN/DEVICES: None  TELEMETRY RHYTHM: NA  SKIN: Pale. Scattered bruises. Dry skin.   TESTS/PROCEDURES: NA  D/C DATE: Pending placement, SW following.   Discharge Barriers: Placement  OTHER IMPORTANT INFO: Slept in until around 10 am. Doing well, no changes or complaints. Ambulated in leyva x1. In hallway coloring.

## 2022-04-13 PROCEDURE — 250N000013 HC RX MED GY IP 250 OP 250 PS 637: Performed by: INTERNAL MEDICINE

## 2022-04-13 PROCEDURE — 99232 SBSQ HOSP IP/OBS MODERATE 35: CPT | Performed by: HOSPITALIST

## 2022-04-13 PROCEDURE — 120N000001 HC R&B MED SURG/OB

## 2022-04-13 PROCEDURE — 250N000013 HC RX MED GY IP 250 OP 250 PS 637: Performed by: HOSPITALIST

## 2022-04-13 PROCEDURE — 250N000013 HC RX MED GY IP 250 OP 250 PS 637: Performed by: REGISTERED NURSE

## 2022-04-13 RX ADMIN — Medication 1 MG: at 21:30

## 2022-04-13 RX ADMIN — SENNOSIDES AND DOCUSATE SODIUM 1 TABLET: 8.6; 5 TABLET ORAL at 11:08

## 2022-04-13 RX ADMIN — Medication 50 MCG: at 11:08

## 2022-04-13 RX ADMIN — PANTOPRAZOLE SODIUM 40 MG: 40 TABLET, DELAYED RELEASE ORAL at 11:08

## 2022-04-13 RX ADMIN — POLYETHYLENE GLYCOL 3350 17 G: 17 POWDER, FOR SOLUTION ORAL at 11:08

## 2022-04-13 RX ADMIN — SENNOSIDES AND DOCUSATE SODIUM 1 TABLET: 8.6; 5 TABLET ORAL at 21:30

## 2022-04-13 ASSESSMENT — ACTIVITIES OF DAILY LIVING (ADL)
ADLS_ACUITY_SCORE: 13
ADLS_ACUITY_SCORE: 11
ADLS_ACUITY_SCORE: 13
ADLS_ACUITY_SCORE: 11
ADLS_ACUITY_SCORE: 11
ADLS_ACUITY_SCORE: 13
ADLS_ACUITY_SCORE: 11
ADLS_ACUITY_SCORE: 11
ADLS_ACUITY_SCORE: 13
ADLS_ACUITY_SCORE: 11
ADLS_ACUITY_SCORE: 13
ADLS_ACUITY_SCORE: 11

## 2022-04-13 NOTE — PLAN OF CARE
SUMMARY: Awaiting placement.   DATE & TIME: 04/13/22 3-7 pm  Cognitive Concerns/ Orientation: A & O x2-3. Calm and cooperative.   BEHAVIOR & AGGRESSION TOOL COLOR: Green     ABNL VS/O2: Daily vitals, WDL.   MOBILITY: Independent in room, SBA in hallway.   PAIN MANAGMENT: Denies pain  DIET: Regular  BOWEL/BLADDER: Continent, up to bathroom x1.  ABNL LAB/BG: Ordered for tomorrow.   DRAIN/DEVICES: None  TELEMETRY RHYTHM: NA  SKIN: Pale, CDI   TESTS/PROCEDURES: NA  D/C DATE: Pending placement, SW following.   Discharge Barriers: Placement  OTHER IMPORTANT INFO: No changes, no complaints.

## 2022-04-13 NOTE — PROGRESS NOTES
Care Management Follow Up    Length of Stay (days): 81    Expected Discharge Date: 04/22/2022     Concerns to be Addressed: discharge planning     Patient plan of care discussed at interdisciplinary rounds: Yes    Anticipated Discharge Disposition: Transitional Care     Anticipated Discharge Services:    Anticipated Discharge DME:      Patient/family educated on Medicare website which has current facility and service quality ratings: no  Education Provided on the Discharge Plan:    Patient/Family in Agreement with the Plan: yes    Referrals Placed by CM/SW: Post Acute Facilities  Private pay costs discussed: Not applicable    Additional Information:  Writer spoke with patient's brother today to acknowledge understanding of his discussion with [facility] Юлия Roblero and that she has sent him information about 4 groups home to consider for patient.  Writer explained with his permission writer can send clinical information to the facilities to assess patient.    Brother explained he was unable to open the encripted email Юлия sent.  Writer contacted Юлия and she will re-send the information not encripted. Brother wants to review the information about the group homes before information is sent. He explains this is all new to him and he wants to better understand the group homes before proceeding with the assessment.  Writer accepted and said writer will try calling him later tomorrow giving his time to review the information Юлия resends to him today.         BARBIE MullinsSW

## 2022-04-13 NOTE — PLAN OF CARE
SUMMARY: Awaiting placement.   DATE & TIME: 04/13/22 7-3 pm  Cognitive Concerns/ Orientation: A & O x2-3. Calm and cooperative.   BEHAVIOR & AGGRESSION TOOL COLOR: Green     ABNL VS/O2: Daily vitals, WDL.   MOBILITY: Independent in room, SBA in hallway.   PAIN MANAGMENT: Denies pain  DIET: Regular- low appetite today.   BOWEL/BLADDER: Continent, up to bathroom. BM this afternoon.   ABNL LAB/BG: Ordered for tomorrow.   DRAIN/DEVICES: None  TELEMETRY RHYTHM: NA  SKIN: Pale. Intact,   TESTS/PROCEDURES: NA  D/C DATE: Pending placement, SW following.   Discharge Barriers: Placement  OTHER IMPORTANT INFO: Up around the same time this am 10 am, and completed morning routine. Walked in hallway multiple times. Doing well, stable. No changes this shift.

## 2022-04-13 NOTE — PROGRESS NOTES
Medicine Progress Note - Hospitalist Service    Date of Admission:  1/22/2022    Assessment & Plan        Miranda Dover is a 51 year old female with PMH Down Syndrome who was admitted on 1/22/2022 with COVID-19 pneumonia and acute hypoxic respiratory failure. Hospitalization has been prolonged due to need to establish guardianship following deaths of her parents. Awaiting placement.     Down syndrome  Developmental Delay  Failure to thrive  Had significant emotional, psychiatric issues during this stay due to the death of her parents and prolonged hospitalization. Discharge planning has been complicated by need for guardianship and payor source for placement. Patient's brother, Maxi, was granted 60 days of emergency guardianship on 2/15. Completed psychologic testing on 3/2/22 to assess current cognitive function and adaptive abilities. Permanent guardianship on 4/1/2022.  - Continue social work and clinical coordinator for transition     Severe malnutrition  Anorexia  Chronic abdominal pain  Chronic constipation  BMI ~13 on early this hospitalization with poor PO intake. SLP was consulted for possible dysphagia, and no evidence of dysphagia was noted. On 2/20, discussed with pt's brother; overall, felt that oral intake was probably acceptable at this point for discharge and did not feel we needed to pursue enteral feedings/g-tube at this point; desired workup for etiology of abdominal pain which he felt was contributing to her decreased PO intake. CT abd/pelvis 2/21 showed large amount of stool. EGD 2/22 relatively unremarkable. US abd no acute pathology. BMI up to 14.7 2/27.  - Encourage PO intake  - PPI  - Bowel regimen     Thyroid nodule:  Recheck TSH today     COVID-19 recovered: Initially diagnosed 1/19/2022     E coli UTI, resolved    4/13- stable.  Will try to obtain labs tomorrow.  Continue other care.       Diet: Combination Diet Regular Diet; High Kcal/High  Protein Diet, ADULT    DVT Prophylaxis: Low Risk/Ambulatory with no VTE prophylaxis indicated  Zhu Catheter: Not present  Central Lines: None  Cardiac Monitoring: None  Code Status: Full Code      Disposition Plan   Expected Discharge: 04/22/2022     Anticipated discharge location: group home    Delays:     Placement - Group Homes            The patient's care was discussed with the patient.    Darien Eugene MD  Hospitalist Service  [facility]  Securely message with the Vocera Web Console (learn more here)  Text page via [employer] Paging/Directory         Clinically Significant Risk Factors Present on Admission                    ______________________________________________________________________    Interval History   No complaints     Data reviewed today: I reviewed all medications, new labs and imaging results over the last 24 hours. I personally reviewed no images or EKG's today.    Physical Exam   Vital Signs: Temp: 97.9  F (36.6  C) Temp src: Oral BP: 108/71 Pulse: 75   Resp: 16 SpO2: 98 % O2 Device: None (Room air)    Weight: 83 lbs 3.2 oz  Constitutional: awake, alert, cooperative, no apparent distress    Data   No lab results found in last 7 days.    No results found for this or any previous visit (from the past 24 hour(s)).  Medications       melatonin  1 mg Oral At Bedtime     pantoprazole  40 mg Oral QAM AC     polyethylene glycol  17 g Oral Daily     senna-docusate  1 tablet Oral BID     cholecalciferol  50 mcg Oral Daily

## 2022-04-13 NOTE — PLAN OF CARE
Goal Outcome Evaluation:      SUMMARY: Awaiting placement.   DATE & TIME: 04/12/22-4/13/22 [id number]  Cognitive Concerns/ Orientation: A & O x2-3. Calm and cooperative.   BEHAVIOR & AGGRESSION TOOL COLOR: Green     ABNL VS/O2: Daily vitals  MOBILITY: Independent in room, SBA in hallway.   PAIN MANAGMENT: Denies pain  DIET: Regular- ate all of dinner   BOWEL/BLADDER: Continent, up to bathroom. No BM this shift.   ABNL LAB/BG: NA  DRAIN/DEVICES: None  TELEMETRY RHYTHM: NA  SKIN: Pale. Scattered bruises. Dry skin. TESTS/PROCEDURES: NA  D/C DATE: Pending placement, SW following.   Discharge Barriers: Placement  OTHER IMPORTANT INFO: Doing well, no changes or complaints.

## 2022-04-14 PROCEDURE — 250N000013 HC RX MED GY IP 250 OP 250 PS 637: Performed by: INTERNAL MEDICINE

## 2022-04-14 PROCEDURE — 120N000001 HC R&B MED SURG/OB

## 2022-04-14 PROCEDURE — 250N000013 HC RX MED GY IP 250 OP 250 PS 637: Performed by: HOSPITALIST

## 2022-04-14 PROCEDURE — 99231 SBSQ HOSP IP/OBS SF/LOW 25: CPT | Performed by: INTERNAL MEDICINE

## 2022-04-14 RX ORDER — MULTIPLE VITAMINS W/ MINERALS TAB 9MG-400MCG
1 TAB ORAL DAILY
Status: DISCONTINUED | OUTPATIENT
Start: 2022-04-14 | End: 2022-06-28 | Stop reason: HOSPADM

## 2022-04-14 RX ADMIN — Medication 1 MG: at 22:38

## 2022-04-14 RX ADMIN — PANTOPRAZOLE SODIUM 40 MG: 40 TABLET, DELAYED RELEASE ORAL at 11:42

## 2022-04-14 RX ADMIN — Medication 50 MCG: at 11:42

## 2022-04-14 RX ADMIN — SENNOSIDES AND DOCUSATE SODIUM 1 TABLET: 8.6; 5 TABLET ORAL at 22:38

## 2022-04-14 RX ADMIN — MULTIPLE VITAMINS W/ MINERALS TAB 1 TABLET: TAB at 18:24

## 2022-04-14 RX ADMIN — SENNOSIDES AND DOCUSATE SODIUM 1 TABLET: 8.6; 5 TABLET ORAL at 11:42

## 2022-04-14 ASSESSMENT — ACTIVITIES OF DAILY LIVING (ADL)
ADLS_ACUITY_SCORE: 13

## 2022-04-14 NOTE — PROGRESS NOTES
[facility]    Medicine Progress Note - Hospitalist Service    Date of Admission:  1/22/2022    Assessment & Plan          Miranda Dover is a 51 year old female with PMH Down Syndrome who was admitted on 1/22/2022 with COVID-19 pneumonia and acute hypoxic respiratory failure. Hospitalization has been prolonged due to need to establish guardianship following deaths of her parents. Awaiting placement.     Down syndrome  Developmental Delay  Failure to thrive  Had significant emotional, psychiatric issues during this stay due to the death of her parents and prolonged hospitalization. Discharge planning has been complicated by need for guardianship and payor source for placement. Patient's brother, Maxi, was granted 60 days of emergency guardianship on 2/15. Completed psychologic testing on 3/2/22 to assess current cognitive function and adaptive abilities. Permanent guardianship on 4/1/2022.  - Continue social work and clinical coordinator for disposition.  Trying to find a group home for patient      Severe malnutrition  Anorexia  Chronic abdominal pain  Chronic constipation  BMI ~13 on early this hospitalization with poor PO intake. SLP was consulted for possible dysphagia, and no evidence of dysphagia was noted. On 2/20, discussed with pt's brother; overall, felt that oral intake was probably acceptable at this point for discharge and did not feel we needed to pursue enteral feedings/g-tube at this point; desired workup for etiology of abdominal pain which he felt was contributing to her decreased PO intake. CT abd/pelvis 2/21 showed large amount of stool. EGD 2/22 relatively unremarkable. US abd no acute pathology. BMI up to 14.7 2/27.  - Encourage PO intake  - PPI  - Bowel regimen     Thyroid nodule  Recheck TSH today     COVID-19 recovered  Initially diagnosed 1/19/2022     E coli UTI, resolved         Diet: Combination Diet Regular Diet; High Kcal/High Protein Diet, ADULT    DVT  Prophylaxis: Low Risk/Ambulatory with no VTE prophylaxis indicated  Zhu Catheter: Not present  Central Lines: None  Cardiac Monitoring: None  Code Status: Full Code      Disposition Plan   Expected Discharge: 04/22/2022     Anticipated discharge location: group home    Delays:     Placement - Group Homes            The patient's care was discussed with the Bedside Nurse, Care Coordinator/ and Patient.    Tariq Loaiza DO  Hospitalist Service  [facility]  Securely message with the Vocera Web Console (learn more here)  Text page via [employer] Paging/Directory         Clinically Significant Risk Factors Present on Admission                    ______________________________________________________________________    Interval History   Patient seen and evaluated.  No acute events over night.  Tolerating diet.  No fevers noted.  No hypoxia.  Calm and cooperative     Data reviewed today: I reviewed all medications, new labs and imaging results over the last 24 hours. I personally reviewed no images or EKG's today.    Physical Exam   Vital Signs: Temp: 98  F (36.7  C) Temp src: Oral BP: 107/71 Pulse: 80   Resp: 16 SpO2: 98 % O2 Device: None (Room air)    Weight: 83 lbs 3.2 oz  General Appearance: Resting comfortably. NAD   Respiratory: No respiratory distress on RA   Cardiovascular: RRR  GI: Soft.  Non-distended  Skin: No obvious rashes or cyanosis to exposed skin   Other: Alert.  Moving all extremities grossly      Data   No lab results found in last 7 days.    No results found for this or any previous visit (from the past 24 hour(s)).

## 2022-04-14 NOTE — PROGRESS NOTES
CLINICAL NUTRITION SERVICES - REASSESSMENT NOTE    Recommendations Ordered by Registered Dietitian (RD):   - Try to order TID for patient, increase variety of meals beyond pasta and turkey sandwich.   - High Kcal/High Protein diet - OK for two entrees.   - Recommend obtain weight   - Add Thera vit M daily    Malnutrition: (4/14)   % Weight Loss: Unable to determine  % Intake:  <75% for > 7 days (moderate malnutrition)  Subcutaneous Fat Loss:  Baseline  Muscle Loss:  Baseline  Fluid Retention:  None noted    Malnutrition Diagnosis: Need updated weight to update malnutrition status      EVALUATION OF PROGRESS TOWARD GOALS   Diet: High Kcal/High Protein diet  Intake/Tolerance:   - Meal ordering has been a bit more sporadic, most days she does receive two meals. Usually orders penne noodles or a deli turkey sandwich. Noted that MDs/RNs are to encourage more variety, as pt is likely to eat at least some of what is sent. She does typically eat 100%.     - BM x2 on 4/13.  - No labs since 3/9  - No weight since 2/14  - Senokot given today, miralax held. Vitamin D3 daily.     ASSESSED NUTRITION NEEDS:  Dosing Weight 33.6 kg   Estimated Energy Needs: 8451-7904 kcals (35-40 Kcal/Kg)  Justification: repletion and underweight  Estimated Protein Needs: 50-70 grams protein (1.5-2 g pro/Kg)  Justification: repletion and hypercatabolism with acute illness    NEW FINDINGS:   - Brother reviewing Group Homes for acceptable discharge plans.     Previous Goals:   Intake of >75% meals on average  Evaluation: Met - usually 100% for small meals BID    Previous Nutrition Diagnosis:   No nutrition diagnosis identified at this time   Evaluation: Declining    MALNUTRITION  % Weight Loss: Unable to determine  % Intake:  <75% for > 7 days (moderate malnutrition)  Subcutaneous Fat Loss:  Baseline  Muscle Loss:  Baseline  Fluid Retention:  None noted    Malnutrition Diagnosis: Need updated weight to update malnutrition status     CURRENT NUTRITION  DIAGNOSIS  Predicted inadequate nutrient intake (energy/protein/micronutrients) related to reliance on the same foods ordered on trays, only receiving 2 small meals/day each with just 1 item.     INTERVENTIONS  Recommendations / Nutrition Prescription  High kcal/high protein diet  Obtain updated weight  Add Thera vit M daily    Implementation  Multivitamin/Mineral and Collaboration and Referral of Nutrition care: ordered as above    Goals  Intake of at least 75% meals BID each with at least 2-3 items.       MONITORING AND EVALUATION:  Progress towards goals will be monitored and evaluated per protocol and Practice Guidelines    Rosalba Adams RD, LD  [facility], [facility]  Pager: [phone]  Weekend Pager: [phone]

## 2022-04-14 NOTE — PLAN OF CARE
Goal Outcome Evaluation:      SUMMARY: Awaiting placement.   DATE & TIME: 04/13/22-4/14/22 2069-7663  Cognitive Concerns/ Orientation: A & O x2-3. Calm and cooperative.   BEHAVIOR & AGGRESSION TOOL COLOR: Green     ABNL VS/O2: Daily vitals   MOBILITY: Independent in room, SBA in hallway.   PAIN MANAGMENT: Denies pain  DIET: Regular - ate 100% of dinner  BOWEL/BLADDER: Continent, up to bathroom x1.  ABNL LAB/BG: Ordered for 4/14   DRAIN/DEVICES: None  TELEMETRY RHYTHM: NA  SKIN: Pale, CDI   TESTS/PROCEDURES: NA  D/C DATE: Pending placement, SW following.   Discharge Barriers: Placement  OTHER IMPORTANT INFO: No changes, no complaints.

## 2022-04-14 NOTE — PLAN OF CARE
Goal Outcome Evaluation:    Plan of Care Reviewed With: patient     SUMMARY: Awaiting placement.   DATE & TIME: 04/14/22, [id number]  Cognitive Concerns/ Orientation: A & O x2-3. Calm and cooperative.   BEHAVIOR & AGGRESSION TOOL COLOR: Green     ABNL VS/O2: Daily vitals, WDL.   MOBILITY: Independent in room, SBA in hallway. Walking a lot today. Up to chair   PAIN MANAGMENT: C/O of some back pain. Refuses tylenol or pillow to back.   DIET: Regular-high david/protein, tolerating  BOWEL/BLADDER: Continent.  ABNL LAB/BG: Ordered for 4/14, attempting to work with pt to get completed. Lab notified at 1800 to come.   DRAIN/DEVICES: None  TELEMETRY RHYTHM: NA  SKIN: Pale, CDI   TESTS/PROCEDURES: NA  D/C DATE: Pending placement, SW following.   Discharge Barriers: Placement  OTHER IMPORTANT INFO: Stable. No changes. Daily weight charted

## 2022-04-14 NOTE — PLAN OF CARE
SUMMARY: Awaiting placement.   DATE & TIME: 04/14/22 7-3 pm  Cognitive Concerns/ Orientation: A & O x2-3. Calm and cooperative.   BEHAVIOR & AGGRESSION TOOL COLOR: Green     ABNL VS/O2: Daily vitals, WDL.   MOBILITY: Independent in room, SBA in hallway. Walking a lot today.   PAIN MANAGMENT: C/O of some back pain. Refuses tylenol or pillow to back.   DIET: Regular - ate only one meal.   BOWEL/BLADDER: Continent. BM charted yesterday.  ABNL LAB/BG: Ordered for 4/14, attempting to work with pt to get completed.   DRAIN/DEVICES: None  TELEMETRY RHYTHM: NA  SKIN: Pale, CDI   TESTS/PROCEDURES: NA  D/C DATE: Pending placement, SW following.   Discharge Barriers: Placement  OTHER IMPORTANT INFO: Stable. No changes.

## 2022-04-15 PROCEDURE — 120N000001 HC R&B MED SURG/OB

## 2022-04-15 PROCEDURE — 99231 SBSQ HOSP IP/OBS SF/LOW 25: CPT | Performed by: INTERNAL MEDICINE

## 2022-04-15 PROCEDURE — 250N000013 HC RX MED GY IP 250 OP 250 PS 637: Performed by: HOSPITALIST

## 2022-04-15 PROCEDURE — 250N000013 HC RX MED GY IP 250 OP 250 PS 637: Performed by: INTERNAL MEDICINE

## 2022-04-15 PROCEDURE — 250N000013 HC RX MED GY IP 250 OP 250 PS 637: Performed by: REGISTERED NURSE

## 2022-04-15 RX ADMIN — PANTOPRAZOLE SODIUM 40 MG: 40 TABLET, DELAYED RELEASE ORAL at 11:03

## 2022-04-15 RX ADMIN — Medication 50 MCG: at 11:04

## 2022-04-15 RX ADMIN — SENNOSIDES AND DOCUSATE SODIUM 1 TABLET: 8.6; 5 TABLET ORAL at 23:53

## 2022-04-15 RX ADMIN — MULTIPLE VITAMINS W/ MINERALS TAB 1 TABLET: TAB at 18:35

## 2022-04-15 RX ADMIN — POLYETHYLENE GLYCOL 3350 17 G: 17 POWDER, FOR SOLUTION ORAL at 11:03

## 2022-04-15 RX ADMIN — SENNOSIDES AND DOCUSATE SODIUM 1 TABLET: 8.6; 5 TABLET ORAL at 11:04

## 2022-04-15 RX ADMIN — Medication 1 MG: at 23:53

## 2022-04-15 ASSESSMENT — ACTIVITIES OF DAILY LIVING (ADL)
ADLS_ACUITY_SCORE: 13

## 2022-04-15 NOTE — PLAN OF CARE
Goal Outcome Evaluation:    SUMMARY: Awaiting placement.   DATE & TIME: 04/14/22-4/15/22, [id number]  Cognitive Concerns/ Orientation: A & O x2-3. Calm and cooperative.   BEHAVIOR & AGGRESSION TOOL COLOR: Green     ABNL VS/O2: Daily vitals   MOBILITY: Independent in room, SBA in hallway.  Colored in chair until bed.  PAIN MANAGMENT: Denies   DIET: Regular-high david/protein, tolerating  BOWEL/BLADDER: Continent. No BM this shift.  ABNL LAB/BG: Pt denied lab draw tonight.  DRAIN/DEVICES: None  TELEMETRY RHYTHM: NA  SKIN: Pale, CDI   TESTS/PROCEDURES: NA  D/C DATE: Pending placement, SW following.   Discharge Barriers: Placement  OTHER IMPORTANT INFO: Stable. No changes. Pt denied 4/14 lab draw - informed they will try again tomorrow AM.

## 2022-04-15 NOTE — PROGRESS NOTES
[facility]    Medicine Progress Note - Hospitalist Service    Date of Admission:  1/22/2022    Assessment & Plan        Miranda Dover is a 51 year old female with PMH Down Syndrome who was admitted on 1/22/2022 with COVID-19 pneumonia and acute hypoxic respiratory failure. Hospitalization has been prolonged due to need to establish guardianship following deaths of her parents. Awaiting placement.     Down syndrome  Developmental Delay  Failure to thrive  Had significant emotional, psychiatric issues during this stay due to the death of her parents and prolonged hospitalization. Discharge planning has been complicated by need for guardianship and payor source for placement. Patient's brother, Maxi, was granted 60 days of emergency guardianship on 2/15. Completed psychologic testing on 3/2/22 to assess current cognitive function and adaptive abilities. Permanent guardianship on 4/1/2022.  - Continue social work and clinical coordinator for disposition.  Trying to find a group home for patient      Severe malnutrition  Anorexia  Chronic abdominal pain  Chronic constipation  BMI ~13 on early this hospitalization with poor PO intake. SLP was consulted for possible dysphagia, and no evidence of dysphagia was noted. On 2/20, discussed with pt's brother; overall, felt that oral intake was probably acceptable at this point for discharge and did not feel we needed to pursue enteral feedings/g-tube at this point; desired workup for etiology of abdominal pain which he felt was contributing to her decreased PO intake. CT abd/pelvis 2/21 showed large amount of stool. EGD 2/22 relatively unremarkable. US abd no acute pathology. BMI up to 14.7 2/27.  - Encourage PO intake  - PPI  - Bowel regimen     Thyroid nodule  Recheck TSH today     COVID-19 recovered  Initially diagnosed 1/19/2022     E coli UTI, resolved         Diet: Combination Diet Regular Diet; High Kcal/High Protein Diet, ADULT    DVT  Prophylaxis: Low Risk/Ambulatory with no VTE prophylaxis indicated  Zhu Catheter: Not present  Central Lines: None  Cardiac Monitoring: None  Code Status: Full Code      Disposition Plan   Expected Discharge: 04/22/2022     Anticipated discharge location: group home    Delays:     Placement - Group Homes            The patient's care was discussed with the Bedside Nurse and Patient.    Tariq Loaiza DO  Hospitalist Service  [facility]  Securely message with the Vocera Web Console (learn more here)  Text page via [employer] Paging/Directory         Clinically Significant Risk Factors Present on Admission                    ______________________________________________________________________    Interval History   Patient seen and evaluated.  No acute events over night.  No fevers noted.  No hypoxia or respiratory distress.  Tolerating diet.     Data reviewed today: I reviewed all medications, new labs and imaging results over the last 24 hours. I personally reviewed no images or EKG's today.    Physical Exam   Vital Signs: Temp: 97.7  F (36.5  C)   BP: 109/72 Pulse: 66   Resp: 16        Weight: 88 lbs 14.4 oz  General Appearance: Resting comfortably in chair in leyva.  NAD  Respiratory: No respiratory distress   Cardiovascular: RRR  GI: Soft.  Non-distended  Skin: No obvious or rashes to exposed skin  Other: Alert.  Moving all extremities grossly      Data   No lab results found in last 7 days.    No results found for this or any previous visit (from the past 24 hour(s)).

## 2022-04-15 NOTE — PLAN OF CARE
Goal Outcome Evaluation:  Cognitive Concerns/ Orientation: A&Ox. Calm and cooperative.   BEHAVIOR & AGGRESSION TOOL COLOR: Green     ABNL VS/O2: Daily vitals  MOBILITY: Independent in room, SBA in hallway.    PAIN MANAGMENT: Denies   DIET: Regular-high david/protein, tolerating. Ate a late breakfast, declined lunch  BOWEL/BLADDER: Continent. No BM  ABNL LAB/BG: NA  DRAIN/DEVICES: None  TELEMETRY RHYTHM: NA  SKIN: Pale, dry  TESTS/PROCEDURES: NA  D/C DATE: Pending placement, SW following.   Discharge Barriers: Placement  OTHER IMPORTANT INFO: up in hallway coloring, ambulating. Took a shower, encouraged to wash her hair which hasn't been done in 2 months.

## 2022-04-16 PROCEDURE — 250N000013 HC RX MED GY IP 250 OP 250 PS 637: Performed by: INTERNAL MEDICINE

## 2022-04-16 PROCEDURE — 99231 SBSQ HOSP IP/OBS SF/LOW 25: CPT | Performed by: INTERNAL MEDICINE

## 2022-04-16 PROCEDURE — 250N000013 HC RX MED GY IP 250 OP 250 PS 637: Performed by: REGISTERED NURSE

## 2022-04-16 PROCEDURE — 120N000001 HC R&B MED SURG/OB

## 2022-04-16 PROCEDURE — 250N000013 HC RX MED GY IP 250 OP 250 PS 637: Performed by: HOSPITALIST

## 2022-04-16 RX ADMIN — Medication 1 MG: at 22:41

## 2022-04-16 RX ADMIN — MULTIPLE VITAMINS W/ MINERALS TAB 1 TABLET: TAB at 19:00

## 2022-04-16 RX ADMIN — Medication 50 MCG: at 10:08

## 2022-04-16 RX ADMIN — SENNOSIDES AND DOCUSATE SODIUM 1 TABLET: 8.6; 5 TABLET ORAL at 22:41

## 2022-04-16 RX ADMIN — SENNOSIDES AND DOCUSATE SODIUM 1 TABLET: 8.6; 5 TABLET ORAL at 10:08

## 2022-04-16 RX ADMIN — PANTOPRAZOLE SODIUM 40 MG: 40 TABLET, DELAYED RELEASE ORAL at 10:08

## 2022-04-16 RX ADMIN — POLYETHYLENE GLYCOL 3350 17 G: 17 POWDER, FOR SOLUTION ORAL at 10:17

## 2022-04-16 ASSESSMENT — ACTIVITIES OF DAILY LIVING (ADL)
ADLS_ACUITY_SCORE: 13

## 2022-04-16 NOTE — PLAN OF CARE
Goal Outcome Evaluation:  Cognitive Concerns/ Orientation: A&Ox4, calm and cooperative.  BEHAVIOR & AGGRESSION TOOL COLOR: Green     ABNL VS/O2:VSS  MOBILITY: Independent in room, SBA when ambulating in the hallway. Ambulates in the halls frequently with staff  PAIN MANAGMENT: Denies   DIET: Regular-high david/protein, needs encouragement to eat more than just turkey slices and white bread   BOWEL/BLADDER: Continent. No BM  ABNL LAB/BG: NA  DRAIN/DEVICES: None  TELEMETRY RHYTHM: NA  SKIN: Pale, dry  TESTS/PROCEDURES: NA  D/C DATE: Pending placement, SW following.   Discharge Barriers: Placement  OTHER IMPORTANT INFO: Sat in the hallway and colored and ambulated with staff, tearful when talking about going to a group home someday-stated that she will really miss us

## 2022-04-16 NOTE — PLAN OF CARE
SUMMARY: Awaiting placement.   DATE & TIME: 4/15/22 1900- 4/16/22 2265  Cognitive Concerns/ Orientation: A&O x4, calm and cooperative.  BEHAVIOR & AGGRESSION TOOL COLOR: Green     ABNL VS/O2: Daily vitals WNL  MOBILITY: Independent in room, SBA when ambulating in the hallway.  PAIN MANAGMENT: Denies   DIET: Regular-high david/protein, ate most of dinner last evening  BOWEL/BLADDER: Continent. No BM  ABNL LAB/BG: NA  DRAIN/DEVICES: None  TELEMETRY RHYTHM: NA  SKIN: Pale, dry  TESTS/PROCEDURES: NA  D/C DATE: Pending placement, SW following.   Discharge Barriers: Placement  OTHER IMPORTANT INFO: Sat in the hallway and colored and ambulated in the hallway x1 before bed.

## 2022-04-16 NOTE — PROGRESS NOTES
[facility]    Medicine Progress Note - Hospitalist Service    Date of Admission:  1/22/2022    Assessment & Plan         Miranda Dover is a 51 year old female with PMH Down Syndrome who was admitted on 1/22/2022 with COVID-19 pneumonia and acute hypoxic respiratory failure. Hospitalization has been prolonged due to need to establish guardianship following deaths of her parents. Awaiting placement.     Down syndrome  Developmental Delay  Failure to thrive  Had significant emotional, psychiatric issues during this stay due to the death of her parents and prolonged hospitalization. Discharge planning has been complicated by need for guardianship and payor source for placement. Patient's brother, Maxi, was granted 60 days of emergency guardianship on 2/15. Completed psychologic testing on 3/2/22 to assess current cognitive function and adaptive abilities. Permanent guardianship on 4/1/2022.  - Continue social work and clinical coordinator for disposition.  Trying to find a group home for patient      Severe malnutrition  Anorexia  Chronic abdominal pain  Chronic constipation  BMI ~13 on early this hospitalization with poor PO intake. SLP was consulted for possible dysphagia, and no evidence of dysphagia was noted. On 2/20, discussed with pt's brother; overall, felt that oral intake was probably acceptable at this point for discharge and did not feel we needed to pursue enteral feedings/g-tube at this point; desired workup for etiology of abdominal pain which he felt was contributing to her decreased PO intake. CT abd/pelvis 2/21 showed large amount of stool. EGD 2/22 relatively unremarkable. US abd no acute pathology. BMI up to 14.7 2/27.  - Encourage PO intake  - PPI  - Bowel regimen     Thyroid nodule  TSH has been elevated but normal T4 in the past   - Repeat TSH ordered but patient has been refusing blood draws.  This lab is not urgent and can be done as an outpatien t     COVID-19  recovered  Initially diagnosed 1/19/2022     E coli UTI, resolved           Diet: Combination Diet Regular Diet; High Kcal/High Protein Diet, ADULT    DVT Prophylaxis: Low Risk/Ambulatory with no VTE prophylaxis indicated  Zhu Catheter: Not present  Central Lines: None  Cardiac Monitoring: None  Code Status: Full Code      Disposition Plan   Expected Discharge: 04/22/2022     Anticipated discharge location: group home    Delays:     Placement - Group Homes            The patient's care was discussed with the Bedside Nurse and Patient.    Tariq Loaiza DO  Hospitalist Service  [facility]  Securely message with the Vocera Web Console (learn more here)  Text page via [employer] Paging/Directory         Clinically Significant Risk Factors Present on Admission                    ______________________________________________________________________    Interval History   Patient seen and evaluated.  Still awaiting placement.  No concerns.  No fevers.  Tolerating diet.  No hypoxia     Data reviewed today: I reviewed all medications, new labs and imaging results over the last 24 hours. I personally reviewed no images or EKG's today.    Physical Exam   Vital Signs: Temp: 97.8  F (36.6  C)   BP: 112/67 Pulse: 58   Resp: 18 SpO2: 99 % O2 Device: None (Room air)    Weight: 88 lbs 14.4 oz  General Appearance: Resting comfortably. NAD   Respiratory: No respiratory distress  Cardiovascular: Appears well perfused  GI: Soft.  Non-distended  Skin: No obvious rashes or cyanosis  Other: Alert.  Moving all extremities grossly      Data   No lab results found in last 7 days.    No results found for this or any previous visit (from the past 24 hour(s)).

## 2022-04-17 PROCEDURE — 250N000013 HC RX MED GY IP 250 OP 250 PS 637: Performed by: INTERNAL MEDICINE

## 2022-04-17 PROCEDURE — 99231 SBSQ HOSP IP/OBS SF/LOW 25: CPT | Performed by: INTERNAL MEDICINE

## 2022-04-17 PROCEDURE — 120N000001 HC R&B MED SURG/OB

## 2022-04-17 PROCEDURE — 250N000013 HC RX MED GY IP 250 OP 250 PS 637: Performed by: HOSPITALIST

## 2022-04-17 RX ADMIN — PANTOPRAZOLE SODIUM 40 MG: 40 TABLET, DELAYED RELEASE ORAL at 10:16

## 2022-04-17 RX ADMIN — SENNOSIDES AND DOCUSATE SODIUM 1 TABLET: 8.6; 5 TABLET ORAL at 10:16

## 2022-04-17 RX ADMIN — SENNOSIDES AND DOCUSATE SODIUM 1 TABLET: 8.6; 5 TABLET ORAL at 21:38

## 2022-04-17 RX ADMIN — Medication 50 MCG: at 10:16

## 2022-04-17 RX ADMIN — Medication 1 MG: at 21:38

## 2022-04-17 RX ADMIN — MULTIPLE VITAMINS W/ MINERALS TAB 1 TABLET: TAB at 21:34

## 2022-04-17 ASSESSMENT — ACTIVITIES OF DAILY LIVING (ADL)
ADLS_ACUITY_SCORE: 13
ADLS_ACUITY_SCORE: 15
ADLS_ACUITY_SCORE: 13
ADLS_ACUITY_SCORE: 15
ADLS_ACUITY_SCORE: 13
ADLS_ACUITY_SCORE: 15
ADLS_ACUITY_SCORE: 13
ADLS_ACUITY_SCORE: 15
ADLS_ACUITY_SCORE: 13
ADLS_ACUITY_SCORE: 15
ADLS_ACUITY_SCORE: 15
ADLS_ACUITY_SCORE: 13
ADLS_ACUITY_SCORE: 15
ADLS_ACUITY_SCORE: 13
ADLS_ACUITY_SCORE: 15
ADLS_ACUITY_SCORE: 13

## 2022-04-17 NOTE — PROGRESS NOTES
[facility]    Medicine Progress Note - Hospitalist Service    Date of Admission:  1/22/2022    Assessment & Plan        Miranda Dover is a 51 year old female with PMH Down Syndrome who was admitted on 1/22/2022 with COVID-19 pneumonia and acute hypoxic respiratory failure. Hospitalization has been prolonged due to need to establish guardianship following deaths of her parents. Awaiting placement.     Down syndrome  Developmental Delay  Failure to thrive  Had significant emotional, psychiatric issues during this stay due to the death of her parents and prolonged hospitalization. Discharge planning has been complicated by need for guardianship and payor source for placement. Patient's brother, Maxi, was granted 60 days of emergency guardianship on 2/15. Completed psychologic testing on 3/2/22 to assess current cognitive function and adaptive abilities. Permanent guardianship on 4/1/2022.  - Continue social work and clinical coordinator for disposition.  Trying to find a group home for patient      Severe malnutrition  Anorexia  Chronic abdominal pain  Chronic constipation  BMI ~13 on early this hospitalization with poor PO intake. SLP was consulted for possible dysphagia, and no evidence of dysphagia was noted. On 2/20, discussed with pt's brother; overall, felt that oral intake was probably acceptable at this point for discharge and did not feel we needed to pursue enteral feedings/g-tube at this point; desired workup for etiology of abdominal pain which he felt was contributing to her decreased PO intake. CT abd/pelvis 2/21 showed large amount of stool. EGD 2/22 relatively unremarkable. US abd no acute pathology. BMI up to 14.7 2/27.  - Encourage PO intake  - PPI  - Bowel regimen     Thyroid nodule  TSH has been elevated but normal T4 in the past   - Repeat TSH ordered but patient has been refusing blood draws.  This lab is not urgent and can be done as an outpatient     COVID-19  recovered  Initially diagnosed 1/19/2022     E coli UTI, resolved             Diet: Combination Diet Regular Diet; High Kcal/High Protein Diet, ADULT    DVT Prophylaxis: Low Risk/Ambulatory with no VTE prophylaxis indicated  Zhu Catheter: Not present  Central Lines: None  Cardiac Monitoring: None  Code Status: Full Code      Disposition Plan   Expected Discharge: 04/22/2022     Anticipated discharge location: group home    Delays:     Placement - Group Homes            The patient's care was discussed with the Bedside Nurse and Patient.    Tariq Loaiza DO  Hospitalist Service  [facility]  Securely message with the Vocera Web Console (learn more here)  Text page via [employer] Paging/Directory         Clinically Significant Risk Factors Present on Admission                    ______________________________________________________________________    Interval History   Patient seen and examined.  No acute events over night.  No fevers. No hypoxia.  Tolerating diet.     Data reviewed today: I reviewed all medications, new labs and imaging results over the last 24 hours. I personally reviewed no images or EKG's today.    Physical Exam   Vital Signs: Temp: 98.1  F (36.7  C) Temp src: Oral BP: 94/62 Pulse: 80   Resp: 16 SpO2: 95 % O2 Device: None (Room air)    Weight: 88 lbs 14.4 oz  General Appearance: Resting comfortably. NAD   Respiratory: Clear to auscultation.  No respiratory distress on RA   Cardiovascular: RRR  GI: Soft.  Non-distended  Skin: No obvious rashes or cyanosis  Other: Alert.  Moving all extremities grossly      Data   No lab results found in last 7 days.    No results found for this or any previous visit (from the past 24 hour(s)).

## 2022-04-17 NOTE — PLAN OF CARE
SUMMARY: Awaiting placement.   DATE & TIME: 4/16/22 1900- 4/17/22 5202  Cognitive Concerns/ Orientation: A&Ox4, calm and cooperative.  BEHAVIOR & AGGRESSION TOOL COLOR: Green     ABNL VS/O2: Daily vitals WNL  MOBILITY: Independent in room, SBA when ambulating in the hallway.   PAIN MANAGMENT: Denies   DIET: Regular-high david/protein, needs encouragement to eat   BOWEL/BLADDER: Continent. No BM  ABNL LAB/BG: NA  DRAIN/DEVICES: None  TELEMETRY RHYTHM: NA  SKIN: Pale, dry  TESTS/PROCEDURES: NA  D/C DATE: Pending placement, SW following.   Discharge Barriers: Placement  OTHER IMPORTANT INFO: Sat in the hallway and colored and ambulated in the hallway before bed.

## 2022-04-17 NOTE — PLAN OF CARE
[id number]: Patient alert and oriented, forgetful, pleasant. VSS on room air, denies pain except mild stomach discomfort. Ambulated in leyva this shift. Discharge plan pending placement.

## 2022-04-18 PROCEDURE — 250N000013 HC RX MED GY IP 250 OP 250 PS 637: Performed by: INTERNAL MEDICINE

## 2022-04-18 PROCEDURE — 250N000013 HC RX MED GY IP 250 OP 250 PS 637: Performed by: HOSPITALIST

## 2022-04-18 PROCEDURE — 250N000013 HC RX MED GY IP 250 OP 250 PS 637: Performed by: REGISTERED NURSE

## 2022-04-18 PROCEDURE — 120N000001 HC R&B MED SURG/OB

## 2022-04-18 PROCEDURE — 99231 SBSQ HOSP IP/OBS SF/LOW 25: CPT | Performed by: INTERNAL MEDICINE

## 2022-04-18 RX ADMIN — Medication 1 MG: at 21:27

## 2022-04-18 RX ADMIN — PANTOPRAZOLE SODIUM 40 MG: 40 TABLET, DELAYED RELEASE ORAL at 09:20

## 2022-04-18 RX ADMIN — Medication 50 MCG: at 09:20

## 2022-04-18 RX ADMIN — POLYETHYLENE GLYCOL 3350 17 G: 17 POWDER, FOR SOLUTION ORAL at 09:20

## 2022-04-18 RX ADMIN — SENNOSIDES AND DOCUSATE SODIUM 1 TABLET: 8.6; 5 TABLET ORAL at 09:20

## 2022-04-18 RX ADMIN — MULTIPLE VITAMINS W/ MINERALS TAB 1 TABLET: TAB at 21:27

## 2022-04-18 RX ADMIN — SENNOSIDES AND DOCUSATE SODIUM 1 TABLET: 8.6; 5 TABLET ORAL at 21:27

## 2022-04-18 ASSESSMENT — ACTIVITIES OF DAILY LIVING (ADL)
ADLS_ACUITY_SCORE: 13
ADLS_ACUITY_SCORE: 15
ADLS_ACUITY_SCORE: 13

## 2022-04-18 NOTE — PLAN OF CARE
Goal Outcome Evaluation:  SUMMARY: Awaiting placement.     DATE & TIME: 4/18/22, 1933-3730            Cognitive Concerns/ Orientation : A&O x 4   BEHAVIOR & AGGRESSION TOOL COLOR: Green     ABNL VS/O2: Daily vital signs WNL  MOBILITY: Independent in room, SBA when ambulating in hallway  PAIN MANAGMENT: Denies  DIET: Regular  BOWEL/BLADDER: Continent  ABNL LAB/BG: NA  DRAIN/DEVICES: None  TELEMETRY RHYTHM: NA  SKIN: Pale and dry. TESTS/PROCEDURES: NA  D/C DATE: Pending placement  Discharge Barriers: Placement.  SW from [facility] here this AM to assess pt

## 2022-04-18 NOTE — PLAN OF CARE
SUMMARY: Awaiting placement.   DATE & TIME: 4/17 2456-0573  Cognitive Concerns/ Orientation: A&Ox4, calm and cooperative.  BEHAVIOR & AGGRESSION TOOL COLOR: Green     ABNL VS/O2: Daily vitals WNL  MOBILITY: Independent in room, SBA when ambulating in the hallway. Ambulated in hallway x2 this shift  PAIN MANAGMENT: Denies   DIET: Regular-high david/protein, needs encouragement to eat   BOWEL/BLADDER: Continent. No BM  ABNL LAB/BG: NA  DRAIN/DEVICES: None  TELEMETRY RHYTHM: NA  SKIN: Pale, dry  TESTS/PROCEDURES: NA  D/C DATE: Pending placement, SW following.   Discharge Barriers: Placement  OTHER IMPORTANT INFO: Sits in hallway and colors/visits with staff often.

## 2022-04-18 NOTE — PLAN OF CARE
DATE & TIME: 4/17/22, 1967 - 8252   Cognitive Concerns/ Orientation : A&O x 4   BEHAVIOR & AGGRESSION TOOL COLOR: Green   ABNL VS/O2: Daily vital signs WNL  MOBILITY: Independent in room, SBA when ambulating in hallway  PAIN MANAGMENT: Denies  DIET: Regular  BOWEL/BLADDER: Continent  ABNL LAB/BG: NA  DRAIN/DEVICES: None  TELEMETRY RHYTHM: NA  SKIN: Pale and dry. Refused full skin assessment  TESTS/PROCEDURES: NA  D/C DATE: Pending placement  Discharge Barriers: Placement      Goal Outcome Evaluation:    Plan of Care Reviewed With: patient     Overall Patient Progress: improving

## 2022-04-18 NOTE — PROGRESS NOTES
[facility]    Medicine Progress Note - Hospitalist Service    Date of Admission:  1/22/2022    Assessment & Plan        Miranda Dover is a 51 year old female with PMH Down Syndrome who was admitted on 1/22/2022 with COVID-19 pneumonia and acute hypoxic respiratory failure. Hospitalization has been prolonged due to need to establish guardianship following deaths of her parents. Awaiting placement.     Down syndrome  Developmental Delay  Failure to thrive  Had significant emotional, psychiatric issues during this stay due to the death of her parents and prolonged hospitalization. Discharge planning has been complicated by need for guardianship and payor source for placement. Patient's brother, Maxi, was granted 60 days of emergency guardianship on 2/15. Completed psychologic testing on 3/2/22 to assess current cognitive function and adaptive abilities. Permanent guardianship on 4/1/2022.  - Continue social work and clinical coordinator for disposition.  Trying to find a group home for patient      Severe malnutrition  Anorexia  Chronic abdominal pain  Chronic constipation  BMI ~13 on early this hospitalization with poor PO intake. SLP was consulted for possible dysphagia, and no evidence of dysphagia was noted. On 2/20, discussed with pt's brother; overall, felt that oral intake was probably acceptable at this point for discharge and did not feel we needed to pursue enteral feedings/g-tube at this point; desired workup for etiology of abdominal pain which he felt was contributing to her decreased PO intake. CT abd/pelvis 2/21 showed large amount of stool. EGD 2/22 relatively unremarkable. US abd no acute pathology. BMI up to 14.7 2/27.  - Encourage PO intake  - PPI  - Bowel regimen     Thyroid nodule  TSH has been elevated but normal T4 in the past   - Repeat TSH ordered but patient has been refusing blood draws.  This lab is not urgent and can be done as an outpatient     COVID-19  recovered  Initially diagnosed 1/19/2022     E coli UTI, resolved               Diet: Combination Diet Regular Diet; High Kcal/High Protein Diet, ADULT    DVT Prophylaxis: Low Risk/Ambulatory with no VTE prophylaxis indicated  Zhu Catheter: Not present  Central Lines: None  Cardiac Monitoring: None  Code Status: Full Code      Disposition Plan   Expected Discharge: 04/22/2022     Anticipated discharge location: group home    Delays:     Placement - Group Homes            The patient's care was discussed with the Bedside Nurse and Patient.    Tariq Loaiza DO  Hospitalist Service  [facility]  Securely message with the Voc[employer] Web Console (learn more here)  Text page via [email or web address] Paging/Directory         Clinically Significant Risk Factors Present on Admission                    ______________________________________________________________________    Interval History   Patient seen and examined.  No acute events over night.  Tolerating diet.  No fevers.  No hypoxia.  No complaints of pain     Data reviewed today: I reviewed all medications, new labs and imaging results over the last 24 hours. I personally reviewed no images or EKG's today.    Physical Exam   Vital Signs: Temp: 97.6  F (36.4  C) Temp src: Oral BP: 93/64 Pulse: 70   Resp: 16 SpO2: 98 % O2 Device: None (Room air)    Weight: 88 lbs 14.4 oz  General Appearance: Resting comfortably.  NAD   Respiratory: No respiratory distress  Cardiovascular: RRR  GI: Soft.  Non-distended  Skin: No obvious rashes or cyanosis to exposed skin  Other: Alert.  Moving all extremities grossly      Data   No lab results found in last 7 days.    No results found for this or any previous visit (from the past 24 hour(s)).

## 2022-04-19 PROCEDURE — 250N000013 HC RX MED GY IP 250 OP 250 PS 637: Performed by: REGISTERED NURSE

## 2022-04-19 PROCEDURE — 250N000013 HC RX MED GY IP 250 OP 250 PS 637: Performed by: INTERNAL MEDICINE

## 2022-04-19 PROCEDURE — 250N000013 HC RX MED GY IP 250 OP 250 PS 637: Performed by: HOSPITALIST

## 2022-04-19 PROCEDURE — 120N000001 HC R&B MED SURG/OB

## 2022-04-19 PROCEDURE — 99231 SBSQ HOSP IP/OBS SF/LOW 25: CPT | Performed by: INTERNAL MEDICINE

## 2022-04-19 RX ADMIN — SENNOSIDES AND DOCUSATE SODIUM 1 TABLET: 8.6; 5 TABLET ORAL at 20:43

## 2022-04-19 RX ADMIN — SENNOSIDES AND DOCUSATE SODIUM 1 TABLET: 8.6; 5 TABLET ORAL at 09:35

## 2022-04-19 RX ADMIN — Medication 1 MG: at 21:25

## 2022-04-19 RX ADMIN — PANTOPRAZOLE SODIUM 40 MG: 40 TABLET, DELAYED RELEASE ORAL at 09:35

## 2022-04-19 RX ADMIN — POLYETHYLENE GLYCOL 3350 17 G: 17 POWDER, FOR SOLUTION ORAL at 09:35

## 2022-04-19 RX ADMIN — MULTIPLE VITAMINS W/ MINERALS TAB 1 TABLET: TAB at 20:43

## 2022-04-19 RX ADMIN — Medication 50 MCG: at 09:35

## 2022-04-19 ASSESSMENT — ACTIVITIES OF DAILY LIVING (ADL)
ADLS_ACUITY_SCORE: 13

## 2022-04-19 NOTE — PLAN OF CARE
Goal Outcome Evaluation:           SUMMARY: Awaiting placement.     DATE & TIME: 4/18/22, 3-11pm           Cognitive Concerns/ Orientation : A&O x 4   BEHAVIOR & AGGRESSION TOOL COLOR: Green     ABNL VS/O2: Daily vital signs WNL  MOBILITY: Independent in room, SBA when ambulating in hallway  PAIN MANAGMENT: Denies  DIET: Regular  BOWEL/BLADDER: Continent  ABNL LAB/BG: NA  DRAIN/DEVICES: None  TELEMETRY RHYTHM: NA  SKIN: Pale and dry. Little flaky to feet and +1 edema   TESTS/PROCEDURES: NA  D/C DATE: Pending placement  Discharge Barriers: Placement.  SW from [facility] here this AM to assess pt but do not see a note in chart. Report stated this week for discharge could be possible.

## 2022-04-19 NOTE — PLAN OF CARE
DATE & TIME: 4/18/22, 6310 - 0171   Cognitive Concerns/ Orientation : A&O x 4   BEHAVIOR & AGGRESSION TOOL COLOR: Green   ABNL VS/O2: Daily vital signs WNL  MOBILITY: Independent in room. SBA when ambulating in hallway  PAIN MANAGMENT: Denied  DIET: Regular  BOWEL/BLADDER: Continent  ABNL LAB/BG: NA  DRAIN/DEVICES: None  TELEMETRY RHYTHM: NA  SKIN: Pale and dry. Feet flaky with +1 edema  TESTS/PROCEDURES: NA  D/C DATE: Pending placement  Discharge Barriers: Placement  OTHER IMPORTANT INFO: NA    Goal Outcome Evaluation:    Plan of Care Reviewed With: patient     Overall Patient Progress: improving

## 2022-04-19 NOTE — PROGRESS NOTES
[facility]    Medicine Progress Note - Hospitalist Service    Date of Admission:  1/22/2022    Assessment & Plan         Miranda Dover is a 51 year old female with PMH Down Syndrome who was admitted on 1/22/2022 with COVID-19 pneumonia and acute hypoxic respiratory failure. Hospitalization has been prolonged due to need to establish guardianship following deaths of her parents. Awaiting placement.     Down syndrome  Developmental Delay  Failure to thrive  Had significant emotional, psychiatric issues during this stay due to the death of her parents and prolonged hospitalization. Discharge planning has been complicated by need for guardianship and payor source for placement. Patient's brother, Maxi, was granted 60 days of emergency guardianship on 2/15. Completed psychologic testing on 3/2/22 to assess current cognitive function and adaptive abilities. Permanent guardianship on 4/1/2022.  - Continue social work and clinical coordinator for disposition.  Trying to find a group home for patient      Severe malnutrition  Anorexia  Chronic abdominal pain  Chronic constipation  BMI ~13 on early this hospitalization with poor PO intake. SLP was consulted for possible dysphagia, and no evidence of dysphagia was noted. On 2/20, discussed with pt's brother; overall, felt that oral intake was probably acceptable at this point for discharge and did not feel we needed to pursue enteral feedings/g-tube at this point; desired workup for etiology of abdominal pain which he felt was contributing to her decreased PO intake. CT abd/pelvis 2/21 showed large amount of stool. EGD 2/22 relatively unremarkable. US abd no acute pathology. BMI up to 14.7 2/27.  - Encourage PO intake  - PPI  - Bowel regimen     Thyroid nodule  TSH has been elevated but normal T4 in the past   - Repeat TSH ordered but patient has been refusing blood draws.  This lab is not urgent and can be done as an outpatient     COVID-19  recovered  Initially diagnosed 1/19/2022     E coli UTI, resolved                 Diet: Combination Diet Regular Diet; High Kcal/High Protein Diet, ADULT    DVT Prophylaxis: Low Risk/Ambulatory with no VTE prophylaxis indicated  Zhu Catheter: Not present  Central Lines: None  Cardiac Monitoring: None  Code Status: Full Code      Disposition Plan   Expected Discharge: 04/22/2022     Anticipated discharge location: group home    Delays:     Placement - Group Homes            The patient's care was discussed with the Bedside Nurse and Patient.    Tariq Loaiza DO  Hospitalist Service  [facility]  Securely message with the Vocera Web Console (learn more here)  Text page via [employer] Paging/Directory         Clinically Significant Risk Factors Present on Admission                    ______________________________________________________________________    Interval History   Patient seen and examined.  No acute events over night.  Coloring in the leyva.  No fevers.  No hypoxia.  Tolerating diet    Data reviewed today: I reviewed all medications, new labs and imaging results over the last 24 hours. I personally reviewed no images or EKG's today.    Physical Exam   Vital Signs: Temp: 97.9  F (36.6  C) Temp src: Oral BP: 95/61 Pulse: 64   Resp: 18 SpO2: 96 % O2 Device: None (Room air)    Weight: 88 lbs 14.4 oz  General Appearance: Resting comfortably. NAD   Respiratory: Clear to auscultation.  No respiratory distress  Cardiovascular: RRR.  No obvious murmurs  GI: Soft.  Non-distended  Skin: No obvious rashes or cyanosis   Other: Alert.  Moving all extremities grossly      Data   No lab results found in last 7 days.    No results found for this or any previous visit (from the past 24 hour(s)).

## 2022-04-19 NOTE — PLAN OF CARE
Goal Outcome Evaluation:    SUMMARY: Awaiting placement.     DATE & TIME: 4/19/22, 3562-0226           Cognitive Concerns/ Orientation : A&O x 4   BEHAVIOR & AGGRESSION TOOL COLOR: Green     ABNL VS/O2: Daily vital signs WNL  MOBILITY: Independent in room. SBA when ambulating in hallway  PAIN MANAGMENT: Denied  DIET: Regular  BOWEL/BLADDER: Continent  ABNL LAB/BG: NA  DRAIN/DEVICES: None  TELEMETRY RHYTHM: NA  SKIN: Pale and dry. Feet flaky with +1 edema  TESTS/PROCEDURES: NA  D/C DATE: Pending placement  Discharge Barriers: Placement  OTHER IMPORTANT INFO: NA          Plan of Care Reviewed With: patient

## 2022-04-19 NOTE — PROGRESS NOTES
Care Management Follow Up    Length of Stay (days): 87    Expected Discharge Date: 04/22/2022     Concerns to be Addressed: discharge planning     Patient plan of care discussed at interdisciplinary rounds: Yes    Anticipated Discharge Disposition: Transitional Care     Anticipated Discharge Services:    Anticipated Discharge DME:      Patient/family educated on Medicare website which has current facility and service quality ratings: no  Education Provided on the Discharge Plan:    Patient/Family in Agreement with the Plan: yes    Referrals Placed by CM/KRYSTIN: Post Acute Facilities  Private pay costs discussed: Not applicable    Additional Information:    Krystin attempted to call brother/guardian, Maxi, to inquire into his thoughts around the GHs identified and whether he has been able to tour, etc; no answer and unable to leave .  Krystin Called FADUMO Redman with [employer] that completes [place], and per Юлия [place] is complete.  Юлия has identified 4 GHs but only 1 has been responsive thus far.  Юлия confirms that MA maikel is still under review and she is watching this document.      Connie Smalls, BARBIESW

## 2022-04-20 PROCEDURE — 250N000013 HC RX MED GY IP 250 OP 250 PS 637: Performed by: REGISTERED NURSE

## 2022-04-20 PROCEDURE — 99231 SBSQ HOSP IP/OBS SF/LOW 25: CPT | Performed by: INTERNAL MEDICINE

## 2022-04-20 PROCEDURE — 120N000001 HC R&B MED SURG/OB

## 2022-04-20 PROCEDURE — 250N000013 HC RX MED GY IP 250 OP 250 PS 637: Performed by: HOSPITALIST

## 2022-04-20 PROCEDURE — 250N000013 HC RX MED GY IP 250 OP 250 PS 637: Performed by: INTERNAL MEDICINE

## 2022-04-20 RX ADMIN — PANTOPRAZOLE SODIUM 40 MG: 40 TABLET, DELAYED RELEASE ORAL at 09:32

## 2022-04-20 RX ADMIN — SENNOSIDES AND DOCUSATE SODIUM 1 TABLET: 8.6; 5 TABLET ORAL at 22:19

## 2022-04-20 RX ADMIN — POLYETHYLENE GLYCOL 3350 17 G: 17 POWDER, FOR SOLUTION ORAL at 09:32

## 2022-04-20 RX ADMIN — Medication 50 MCG: at 09:32

## 2022-04-20 RX ADMIN — Medication 1 MG: at 22:24

## 2022-04-20 RX ADMIN — SENNOSIDES AND DOCUSATE SODIUM 1 TABLET: 8.6; 5 TABLET ORAL at 09:32

## 2022-04-20 ASSESSMENT — ACTIVITIES OF DAILY LIVING (ADL)
ADLS_ACUITY_SCORE: 15
ADLS_ACUITY_SCORE: 13
ADLS_ACUITY_SCORE: 15
ADLS_ACUITY_SCORE: 13
ADLS_ACUITY_SCORE: 15
ADLS_ACUITY_SCORE: 13
ADLS_ACUITY_SCORE: 15

## 2022-04-20 NOTE — PROGRESS NOTES
[facility]    Medicine Progress Note - Hospitalist Service    Date of Admission:  1/22/2022    Assessment & Plan        Miranda Dover is a 51 year old female with PMH Down Syndrome who was admitted on 1/22/2022 with COVID-19 pneumonia and acute hypoxic respiratory failure. Hospitalization has been prolonged due to need to establish guardianship following deaths of her parents. Awaiting placement.     Down syndrome  Developmental Delay  Failure to thrive  Had significant emotional, psychiatric issues during this stay due to the death of her parents and prolonged hospitalization. Discharge planning has been complicated by need for guardianship and payor source for placement. Patient's brother, Maxi, was granted 60 days of emergency guardianship on 2/15. Completed psychologic testing on 3/2/22 to assess current cognitive function and adaptive abilities. Permanent guardianship on 4/1/2022.  - Continue social work and clinical coordinator for disposition.  Trying to find a group home for patient      Severe malnutrition  Anorexia  Chronic abdominal pain  Chronic constipation  BMI ~13 on early this hospitalization with poor PO intake. SLP was consulted for possible dysphagia, and no evidence of dysphagia was noted. On 2/20, discussed with pt's brother; overall, felt that oral intake was probably acceptable at this point for discharge and did not feel we needed to pursue enteral feedings/g-tube at this point; desired workup for etiology of abdominal pain which he felt was contributing to her decreased PO intake. CT abd/pelvis 2/21 showed large amount of stool. EGD 2/22 relatively unremarkable. US abd no acute pathology. BMI up to 14.7 2/27.  - Encourage PO intake  - PPI  - Bowel regimen     Thyroid nodule  TSH has been elevated but normal T4 in the past   - Repeat TSH ordered but patient has been refusing blood draws.  This lab is not urgent and can be done as an outpatient     COVID-19  recovered  Initially diagnosed 1/19/2022     E coli UTI, resolved           Diet: Combination Diet Regular Diet; High Kcal/High Protein Diet, ADULT    DVT Prophylaxis: Low Risk/Ambulatory with no VTE prophylaxis indicated  Zuh Catheter: Not present  Central Lines: None  Cardiac Monitoring: None  Code Status: Full Code      Disposition Plan   Expected Discharge: 04/22/2022     Anticipated discharge location: group home    Delays:     Placement - Group Homes            The patient's care was discussed with the Bedside Nurse and Patient.    Tariq Loaiza DO  Hospitalist Service  [facility]  Securely message with the Vocera Web Console (learn more here)  Text page via [employer] Paging/Directory         Clinically Significant Risk Factors Present on Admission                    ______________________________________________________________________    Interval History   Patient seen and evaluated while walking the halls.  No complaints.  No pain or trouble breathing.  No fever.  Tolerating diet.      Data reviewed today: I reviewed all medications, new labs and imaging results over the last 24 hours. I personally reviewed no images or EKG's today.    Physical Exam   Vital Signs: Temp: 98.2  F (36.8  C) Temp src: Oral BP: (!) 89/55 Pulse: 70   Resp: 18 SpO2: 98 % O2 Device: None (Room air)    Weight: 88 lbs 14.4 oz  General Appearance: Resting comfortably.  NAD   Respiratory: No respiratory distress  Cardiovascular: RRR.  Appears well perfused  GI: Soft.  Non-distended  Skin: No obvious rashes or cyanosis  Other: Alert.  Moving all extremities grossly      Data   No lab results found in last 7 days.    No results found for this or any previous visit (from the past 24 hour(s)).

## 2022-04-20 NOTE — PLAN OF CARE
Goal Outcome Evaluation:      SUMMARY: Awaiting placement.   DATE & TIME: 4/19/22-4/20/22 9919-4782          Cognitive Concerns/ Orientation : A&O x 4   BEHAVIOR & AGGRESSION TOOL COLOR: Green     ABNL VS/O2: Daily vital signs WNL  MOBILITY: Independent in room. SBA when ambulating in hallway  PAIN MANAGMENT: Denies  DIET: Regular, good appetite today.  BOWEL/BLADDER: Continent  ABNL LAB/BG: NA  DRAIN/DEVICES: None  TELEMETRY RHYTHM: NA  SKIN: Pale and dry. Feet dry and flaky. TESTS/PROCEDURES: NA  D/C DATE: Pending placement  Discharge Barriers: Placement  OTHER IMPORTANT INFO: NA

## 2022-04-20 NOTE — PLAN OF CARE
SUMMARY: Awaiting placement.     DATE & TIME: 4/19/22, 3621-5121           Cognitive Concerns/ Orientation : A&O x 4   BEHAVIOR & AGGRESSION TOOL COLOR: Green     ABNL VS/O2: Daily vital signs WNL  MOBILITY: Independent in room. SBA when ambulating in hallway  PAIN MANAGMENT: Denies  DIET: Regular, good appetite today.  BOWEL/BLADDER: Continent  ABNL LAB/BG: NA  DRAIN/DEVICES: None  TELEMETRY RHYTHM: NA  SKIN: Pale and dry. Feet dry and flaky. TESTS/PROCEDURES: NA  D/C DATE: Pending placement  Discharge Barriers: Placement  OTHER IMPORTANT INFO: NA

## 2022-04-21 LAB — SARS-COV-2 RNA RESP QL NAA+PROBE: NEGATIVE

## 2022-04-21 PROCEDURE — 120N000001 HC R&B MED SURG/OB

## 2022-04-21 PROCEDURE — U0005 INFEC AGEN DETEC AMPLI PROBE: HCPCS | Performed by: INTERNAL MEDICINE

## 2022-04-21 PROCEDURE — 250N000013 HC RX MED GY IP 250 OP 250 PS 637: Performed by: HOSPITALIST

## 2022-04-21 PROCEDURE — 99231 SBSQ HOSP IP/OBS SF/LOW 25: CPT | Performed by: INTERNAL MEDICINE

## 2022-04-21 PROCEDURE — 250N000013 HC RX MED GY IP 250 OP 250 PS 637: Performed by: INTERNAL MEDICINE

## 2022-04-21 PROCEDURE — 250N000013 HC RX MED GY IP 250 OP 250 PS 637: Performed by: REGISTERED NURSE

## 2022-04-21 RX ADMIN — Medication 1 MG: at 21:46

## 2022-04-21 RX ADMIN — SENNOSIDES AND DOCUSATE SODIUM 1 TABLET: 8.6; 5 TABLET ORAL at 21:46

## 2022-04-21 RX ADMIN — POLYETHYLENE GLYCOL 3350 17 G: 17 POWDER, FOR SOLUTION ORAL at 11:21

## 2022-04-21 RX ADMIN — SENNOSIDES AND DOCUSATE SODIUM 1 TABLET: 8.6; 5 TABLET ORAL at 11:20

## 2022-04-21 RX ADMIN — Medication 50 MCG: at 11:20

## 2022-04-21 RX ADMIN — PANTOPRAZOLE SODIUM 40 MG: 40 TABLET, DELAYED RELEASE ORAL at 11:20

## 2022-04-21 ASSESSMENT — ACTIVITIES OF DAILY LIVING (ADL)
ADLS_ACUITY_SCORE: 15

## 2022-04-21 NOTE — PROGRESS NOTES
[facility]    Medicine Progress Note - Hospitalist Service    Date of Admission:  1/22/2022    Assessment & Plan                   Miranda Dover is a 51 year old female with PMH Down Syndrome who was admitted on 1/22/2022 with COVID-19 pneumonia and acute hypoxic respiratory failure. Hospitalization has been prolonged due to need to establish guardianship following deaths of her parents. Awaiting placement.     Down syndrome  Developmental Delay  Failure to thrive  Had significant emotional, psychiatric issues during this stay due to the death of her parents and prolonged hospitalization. Discharge planning has been complicated by need for guardianship and payor source for placement. Patient's brother, Maxi, was granted 60 days of emergency guardianship on 2/15. Completed psychologic testing on 3/2/22 to assess current cognitive function and adaptive abilities. Permanent guardianship on 4/1/2022.  - Continue social work and clinical coordinator for disposition.  Trying to find a group home for patient      Severe malnutrition  Anorexia  Chronic abdominal pain  Chronic constipation  BMI ~13 on early this hospitalization with poor PO intake. SLP was consulted for possible dysphagia, and no evidence of dysphagia was noted. On 2/20, discussed with pt's brother; overall, felt that oral intake was probably acceptable at this point for discharge and did not feel we needed to pursue enteral feedings/g-tube at this point; desired workup for etiology of abdominal pain which he felt was contributing to her decreased PO intake. CT abd/pelvis 2/21 showed large amount of stool. EGD 2/22 relatively unremarkable. US abd no acute pathology. BMI up to 14.7 2/27.  - Encourage PO intake  - PPI  - Bowel regimen     Thyroid nodule  TSH has been elevated but normal T4 in the past   - Repeat TSH ordered but patient has been refusing blood draws.  This lab is not urgent and can be done as an  outpatient     COVID-19 recovered  Initially diagnosed 1/19/2022     E coli UTI, resolved             Diet: Combination Diet Regular Diet; High Kcal/High Protein Diet, ADULT    DVT Prophylaxis: Low Risk/Ambulatory with no VTE prophylaxis indicated  Zhu Catheter: Not present  Central Lines: None  Cardiac Monitoring: None  Code Status: Full Code      Disposition Plan   Expected Discharge: 04/23/2022     Anticipated discharge location: group home    Delays:     Placement - Group Homes            The patient's care was discussed with the Bedside Nurse and Patient.    Tariq Loaiza DO  Hospitalist Service  [facility]  Securely message with the Vocera Web Console (learn more here)  Text page via [employer] Paging/Directory         Clinically Significant Risk Factors Present on Admission                    ______________________________________________________________________    Interval History   Patient seen and examined.  No acute events over night.  No fevers noted.  No hypoxia.  Tolerating diet.     Data reviewed today: I reviewed all medications, new labs and imaging results over the last 24 hours. I personally reviewed no images or EKG's today.    Physical Exam   Vital Signs: Temp: 98.2  F (36.8  C) Temp src: Oral BP: 93/56 Pulse: 69   Resp: 18 SpO2: 97 % O2 Device: None (Room air)    Weight: 88 lbs 14.4 oz  General Appearance: Resting comfortably. NAD   Respiratory: No respiratory distress  Cardiovascular: RRR  GI: Soft.  Non-distended  Skin: No obvious rashes or cyanosis  Other: Alert.  Moving all extremities grossly.  Ambulating in the halls without difficulty      Data   No lab results found in last 7 days.    No results found for this or any previous visit (from the past 24 hour(s)).

## 2022-04-21 NOTE — PLAN OF CARE
"Goal Outcome Evaluation:    Cognitive Concerns/ Orientation: A&Ox4   BEHAVIOR & AGGRESSION TOOL COLOR: Green  ABNL VS/O2: Daily Vitals stable  MOBILITY: Independent in her room. SBA in hallway. Ambulated multiple times in the halls with staff  PAIN MANAGMENT: Denies pain  DIET: Regular-fair appetite, declined lunch but ate a piece of apple pie after having a tub bath. Needs encouragement/reminders to drink fluids.  BOWEL/BLADDER: Continent  ABNL LAB/BG: NA  DRAIN/DEVICES: None  TELEMETRY RHYTHM: NA  SKIN: Pale and dry  TESTS/PROCEDURES: NA  D/C DATE: Pending placement; SW following  Discharge Barriers: Placement  OTHER IMPORTANT INFO: updated brothers Maxi, informed him that patient is getting very attached to multiple nurses and tends to ask day after day when they will be back. Informed him that patient gets tearful when writer has talked about moving to a group home in the future. She requests staff not say the words \"group home\". Requested that Maxi come and visit as she states she really missed him and wants to go live with him.                      "

## 2022-04-21 NOTE — PLAN OF CARE
Goal Outcome Evaluation:  Plan of Care Reviewed With: patient   Overall Patient Progress: improving  Cognitive Concerns/ Orientation: A&Ox 4   BEHAVIOR & AGGRESSION TOOL COLOR: Green  ABNL VS/O2: Daily Vitals only  MOBILITY: Independent in her room. SBA in hallway. Ambulated x1 in hallway with staff  PAIN MANAGMENT: Denies pain  DIET: Regular  BOWEL/BLADDER: Continent  ABNL LAB/BG: NA  DRAIN/DEVICES: None  TELEMETRY RHYTHM: NA  SKIN: Pale and dry  TESTS/PROCEDURES: NA  D/C DATE: Pending placement; SW following  Discharge Barriers: Placement       no discharge, no irritation, no pain, no redness, and no visual changes.

## 2022-04-21 NOTE — PROGRESS NOTES
Care Management Follow Up    Length of Stay (days): 89    Expected Discharge Date: 04/23/2022     Concerns to be Addressed: discharge planning     Patient plan of care discussed at interdisciplinary rounds: Yes    Anticipated Discharge Disposition: Group Home     Anticipated Discharge Services:    Anticipated Discharge DME:      Patient/family educated on Medicare website which has current facility and service quality ratings: no  Education Provided on the Discharge Plan:    Patient/Family in Agreement with the Plan: yes    Referrals Placed by CM/SW: Post Acute Facilities  Private pay costs discussed: Not applicable    Additional Information:  Writer spoke with the MNChoice  thru [facility] YESI Carrera at [phone] for an update.  She shares she had made referrals to 5 group homes which had vacancies however of the 5 only one group home still has a vacancy.  The group home is [facility] in [facility] which currently has 3 other females and capacity for total of 4 residents.  The group home has received a formal referral from Ms Roblero which includes clinical information.  Babak has also been in touch with the MA office and they are waiting for documentation from [place] Immigration and Social Security in order to process the MA application .  Plan:  Ms Roblero will update hospital SW and brother once MA is approved and if the [facility] facility can offer patient admission.   [facility] has multiple group homes and will also keep patient in mind if another appropriate group home has a vacancy.     ROGER Mullins

## 2022-04-21 NOTE — PROGRESS NOTES
"CLINICAL NUTRITION SERVICES - REASSESSMENT NOTE    Recommendations Ordered by Registered Dietitian (RD):   - High kcal/high protein - 2 entrees per tray OK.   - Continue to encourage variety in ordering  - Thera vit M daily.    Malnutrition:   (4/21)   % Weight Loss: None noted - wt up from admission  % Intake:  <75% for > 7 days (moderate malnutrition)  Subcutaneous Fat Loss:  Baseline  Muscle Loss:  Baseline  Fluid Retention:  None noted     Malnutrition Diagnosis: Does not meet criteria at this time, but pt is at risk     EVALUATION OF PROGRESS TOWARD GOALS   Diet: High Kcal/High Protein diet   Intake/Tolerance:   - No intakes documented since 4/16. RNs charting \"good appetite today\" or \"needs encouragement to eat\" at times. Patient not able to provide much insight into recent eating habits.   - Patient was eating 2 meals/day at baseline prior to admission.   - Receiving 2-3 meals daily for the past week. Rotating in some different foods, like pizza, eggs, peaches, yogurt, oatmeal.     - Updated weight obtained on 4/14: 40.3 kg. Up from admission.   - Last BM x1 on 4/15    ASSESSED NUTRITION NEEDS:  Dosing Weight 33.6 kg   Estimated Energy Needs: 6033-6127 kcals (35-40 Kcal/Kg)  Justification: repletion and underweight  Estimated Protein Needs: 50-70 grams protein (1.5-2 g pro/Kg)  Justification: repletion and hypercatabolism with acute illness    Previous Goals:   Intake of at least 75% meals BID each with at least 2-3 items  Evaluation: Unable to evaluate - lack of documentation. Meals are adequate.     Previous Nutrition Diagnosis:   Predicted inadequate nutrient intake (energy/protein/micronutrients) related to reliance on the same foods ordered on trays, only receiving 2 small meals/day each with just 1 item  Evaluation: No change    CURRENT NUTRITION DIAGNOSIS  Predicted inadequate nutrient intake (energy/protein/micronutrients) related to reliance on the same foods ordered on trays, only receiving 2 small " meals/day each with just 1 item    INTERVENTIONS  Recommendations / Nutrition Prescription  - High kcal/high protein - 2 entrees per tray OK.   - Continue to encourage variety in ordering  - Thera vit M daily.     Implementation  None new.     Goals  Intake of at least 75% meals BID each with at least 2-3 items    MONITORING AND EVALUATION:  Progress towards goals will be monitored and evaluated per protocol and Practice Guidelines    Rosalba Adams RD, LD  [facility] [place], [facility], [facility], [facility]  Pager: [phone]  Weekend Pager: [phone]

## 2022-04-22 PROCEDURE — 250N000013 HC RX MED GY IP 250 OP 250 PS 637: Performed by: INTERNAL MEDICINE

## 2022-04-22 PROCEDURE — 99231 SBSQ HOSP IP/OBS SF/LOW 25: CPT | Performed by: INTERNAL MEDICINE

## 2022-04-22 PROCEDURE — 250N000013 HC RX MED GY IP 250 OP 250 PS 637: Performed by: HOSPITALIST

## 2022-04-22 PROCEDURE — 120N000001 HC R&B MED SURG/OB

## 2022-04-22 RX ADMIN — Medication 50 MCG: at 09:58

## 2022-04-22 RX ADMIN — PANTOPRAZOLE SODIUM 40 MG: 40 TABLET, DELAYED RELEASE ORAL at 09:58

## 2022-04-22 RX ADMIN — Medication 1 MG: at 21:34

## 2022-04-22 RX ADMIN — SENNOSIDES AND DOCUSATE SODIUM 1 TABLET: 8.6; 5 TABLET ORAL at 21:34

## 2022-04-22 RX ADMIN — MULTIPLE VITAMINS W/ MINERALS TAB 1 TABLET: TAB at 21:34

## 2022-04-22 RX ADMIN — SENNOSIDES AND DOCUSATE SODIUM 1 TABLET: 8.6; 5 TABLET ORAL at 09:58

## 2022-04-22 ASSESSMENT — ACTIVITIES OF DAILY LIVING (ADL)
ADLS_ACUITY_SCORE: 15

## 2022-04-22 NOTE — PROGRESS NOTES
[facility]    Medicine Progress Note - Hospitalist Service    Date of Admission:  1/22/2022    Assessment & Plan            Miranda Dover is a 51 year old female with PMH Down Syndrome who was admitted on 1/22/2022 with COVID-19 pneumonia and acute hypoxic respiratory failure. Hospitalization has been prolonged due to need to establish guardianship following deaths of her parents. Awaiting placement.     Down syndrome  Developmental Delay  Failure to thrive  Had significant emotional, psychiatric issues during this stay due to the death of her parents and prolonged hospitalization. Discharge planning has been complicated by need for guardianship and payor source for placement. Patient's brother, Maxi, was granted 60 days of emergency guardianship on 2/15. Completed psychologic testing on 3/2/22 to assess current cognitive function and adaptive abilities. Permanent guardianship on 4/1/2022.  - Continue social work and clinical coordinator for disposition.  Trying to find a group home for patient      Severe malnutrition  Anorexia  Chronic abdominal pain  Chronic constipation  BMI ~13 on early this hospitalization with poor PO intake. SLP was consulted for possible dysphagia, and no evidence of dysphagia was noted. On 2/20, discussed with pt's brother; overall, felt that oral intake was probably acceptable at this point for discharge and did not feel we needed to pursue enteral feedings/g-tube at this point; desired workup for etiology of abdominal pain which he felt was contributing to her decreased PO intake. CT abd/pelvis 2/21 showed large amount of stool. EGD 2/22 relatively unremarkable. US abd no acute pathology. BMI up to 14.7 2/27.  - Encourage PO intake  - PPI  - Bowel regimen     Thyroid nodule  TSH has been elevated but normal T4 in the past   - Repeat TSH ordered but patient has been refusing blood draws.  This lab is not urgent and can be done as an outpatient     COVID-19  recovered  Initially diagnosed 1/19/2022     E coli UTI, resolved       Diet: Combination Diet Regular Diet; High Kcal/High Protein Diet, ADULT    DVT Prophylaxis: Low Risk/Ambulatory with no VTE prophylaxis indicated  Zhu Catheter: Not present  Central Lines: None  Cardiac Monitoring: None  Code Status: Full Code      Disposition Plan   Expected Discharge: 04/27/2022     Anticipated discharge location: group home    Delays:     Placement - Group Homes            The patient's care was discussed with the Bedside Nurse, Care Coordinator/ and Patient.    Tariq Loaiza DO  Hospitalist Service  [facility]  Securely message with the Vocera Web Console (learn more here)  Text page via [employer] Paging/Directory         Clinically Significant Risk Factors Present on Admission                    ______________________________________________________________________    Interval History   Patient seen and evaluated.  No acute events over night.  No hypoxia.  No fevers.  Tolerating diet.     Data reviewed today: I reviewed all medications, new labs and imaging results over the last 24 hours. I personally reviewed no images or EKG's today.    Physical Exam   Vital Signs: Temp: 97.8  F (36.6  C) Temp src: Oral BP: 94/64 Pulse: 68   Resp: 16 SpO2: 97 % O2 Device: None (Room air)    Weight: 88 lbs 14.4 oz  General Appearance: Resting comfortably. NAD   Respiratory: No respiratory distress  Cardiovascular: RRR.  Appears well perfused  GI: Soft. Non-distended  Skin: No obvious rashes or cyanosis  Other: Alert.  No edema      Data   No lab results found in last 7 days.    No results found for this or any previous visit (from the past 24 hour(s)).

## 2022-04-22 NOTE — PLAN OF CARE
Goal Outcome Evaluation:  Plan of Care Reviewed With: patient   Overall Patient Progress: improving  Cognitive Concerns/ Orientation: A&Ox4   BEHAVIOR & AGGRESSION TOOL COLOR: Green  ABNL VS/O2: Daily Vitals stable  MOBILITY: Independent in her room. SBA in hallway.  PAIN MANAGMENT: Denies pain  DIET: Regular- good appetite  BOWEL/BLADDER: Continent  ABNL LAB/BG: NA  DRAIN/DEVICES: None  TELEMETRY RHYTHM: NA  SKIN: Pale and dry  TESTS/PROCEDURES: NA  D/C DATE: Pending placement in a group home; SW following  Discharge Barriers: Placement

## 2022-04-22 NOTE — PLAN OF CARE
SUMMARY: Awaiting placement.   DATE & TIME: 4/22 8167-3157  Cognitive Concerns/ Orientation: A&Ox4   BEHAVIOR & AGGRESSION TOOL COLOR: Green  ABNL VS/O2: Daily Vitals stable  MOBILITY: Independent in room. SBA in hallway. Ambulated in leyva x2. Ambulated to bathroom x2.  PAIN MANAGMENT: Denies pain  DIET: Regular- fair appetite, declined lunch but ate some of her bread from breakfast.  BOWEL/BLADDER: Continent, no BM this shift.  ABNL LAB/BG: NA  DRAIN/DEVICES: None  TELEMETRY RHYTHM: NA  SKIN: Pale and dry  TESTS/PROCEDURES: NA  D/C DATE: Pending placement; SW following  Discharge Barriers: Placement  OTHER IMPORTANT INFO:

## 2022-04-23 PROCEDURE — 250N000013 HC RX MED GY IP 250 OP 250 PS 637: Performed by: INTERNAL MEDICINE

## 2022-04-23 PROCEDURE — 250N000013 HC RX MED GY IP 250 OP 250 PS 637: Performed by: HOSPITALIST

## 2022-04-23 PROCEDURE — 120N000001 HC R&B MED SURG/OB

## 2022-04-23 PROCEDURE — 99231 SBSQ HOSP IP/OBS SF/LOW 25: CPT | Performed by: INTERNAL MEDICINE

## 2022-04-23 RX ADMIN — PANTOPRAZOLE SODIUM 40 MG: 40 TABLET, DELAYED RELEASE ORAL at 09:59

## 2022-04-23 RX ADMIN — Medication 1 MG: at 21:14

## 2022-04-23 RX ADMIN — Medication 50 MCG: at 09:59

## 2022-04-23 RX ADMIN — MULTIPLE VITAMINS W/ MINERALS TAB 1 TABLET: TAB at 21:14

## 2022-04-23 RX ADMIN — SENNOSIDES AND DOCUSATE SODIUM 1 TABLET: 8.6; 5 TABLET ORAL at 21:14

## 2022-04-23 ASSESSMENT — ACTIVITIES OF DAILY LIVING (ADL)
ADLS_ACUITY_SCORE: 15

## 2022-04-23 NOTE — PLAN OF CARE
Goal Outcome Evaluation:    Plan of Care Reviewed With: patient     Overall Patient Progress: no change    SUMMARY: Awaiting placement.   DATE & TIME: 4/23/22 1930-0730  Cognitive Concerns/ Orientation: A&Ox4   BEHAVIOR & AGGRESSION TOOL COLOR: Green  ABNL VS/O2: Daily Vitals stable  MOBILITY: Independent in her room. SBA in hallway. Ambulated to nursing station.  PAIN MANAGMENT: Denies pain  DIET: Regular  BOWEL/BLADDER: Continent, no BM this shift.  ABNL LAB/BG: NA  DRAIN/DEVICES: None  TELEMETRY RHYTHM: NA  SKIN: Pale and dry  TESTS/PROCEDURES: NA  D/C DATE: Pending placement with group home; SW following  Discharge Barriers: pending group home placement   OTHER IMPORTANT INFO: pt requesting for female aide tonight

## 2022-04-23 NOTE — PLAN OF CARE
SUMMARY: Awaiting placement.   DATE & TIME: 4/23/22 6976-8205  Cognitive Concerns/ Orientation: A&Ox4   BEHAVIOR & AGGRESSION TOOL COLOR: Green  ABNL VS/O2: Daily Vitals stable  MOBILITY: Independent in her room. SBA in hallway. Ambulated in hallway x4.  PAIN MANAGMENT: Denies pain  DIET: Regular  BOWEL/BLADDER: Continent, 2 small BM's this shift.  ABNL LAB/BG: NA  DRAIN/DEVICES: None  TELEMETRY RHYTHM: NA  SKIN: Pale and dry  TESTS/PROCEDURES: NA  D/C DATE: Pending placement with group home; SW following  Discharge Barriers: pending group home placement   OTHER IMPORTANT INFO: pt prefers female aide .

## 2022-04-23 NOTE — PLAN OF CARE
Miranda is a 51 year old female, Alert and oriented but disoriented to situation. [facility] Down Syndrome who was admitted on 1/22/2022 with COVID-19 pneumonia and acute hypoxic respiratory failure. Vitals WNL. SBA, Ambulated in hallway with assistance. Continent to B/B. Hospitalization has been prolonged due to need to establish guardianship following deaths of her parents. Pending discharge. Monitoring continue. Safety measures maintained.

## 2022-04-23 NOTE — PROGRESS NOTES
[facility]    Hospitalist Progress Note    Interval History   - No acute events today, up around 930 AM, coloring outside    Assessment & Plan   Summary: Miranda Dover is a 51 year old female with PMH Down Syndrome who was admitted on 1/22/2022 with COVID-19 pneumonia and acute hypoxic respiratory failure. Hospitalization has been prolonged due to need to establish guardianship following deaths of her parents. Awaiting placement.     Down syndrome  Developmental Delay  Failure to thrive  Had significant emotional, psychiatric issues during this stay due to the death of her parents and prolonged hospitalization. Discharge planning has been complicated by need for guardianship and payor source for placement. Patient's brother, Maxi, was granted 60 days of emergency guardianship on 2/15. Completed psychologic testing on 3/2/22 to assess current cognitive function and adaptive abilities. Permanent guardianship on 4/1/2022.  - Continue social work and clinical coordinator for disposition.  Trying to find a group home for patient      Severe malnutrition  Anorexia  Chronic abdominal pain  Chronic constipation  BMI ~13 on early this hospitalization with poor PO intake. SLP was consulted for possible dysphagia, and no evidence of dysphagia was noted. On 2/20, discussed with pt's brother; overall, felt that oral intake was probably acceptable at this point for discharge and did not feel we needed to pursue enteral feedings/g-tube at this point; desired workup for etiology of abdominal pain which he felt was contributing to her decreased PO intake. CT abd/pelvis 2/21 showed large amount of stool. EGD 2/22 relatively unremarkable. US abd no acute pathology. BMI up to 14.7 2/27.  - Encourage PO intake  - PPI  - Bowel regimen     Thyroid nodule  TSH has been elevated but normal T4 in the past   - Repeat TSH ordered but patient has been refusing blood draws.  This lab is not urgent and can be done as  an outpatient     COVID-19 recovered  Initially diagnosed 1/19/2022     E coli UTI, resolved    DVT Prophylaxis: None  Code Status: Full Code  PT/OT: not needed  Diet: Combination Diet Regular Diet; High Kcal/High Protein Diet, ADULT      Disposition: Expected discharge to group home when found    Maximino Deng MD  Text Page  (7am to 6pm)  -Data reviewed today: I reviewed all new labs and imaging results over the last 24 hours.    Physical Exam   Temp: 97.4  F (36.3  C) Temp src: Oral BP: 99/65 Pulse: 70   Resp: 18 SpO2: 99 % O2 Device: None (Room air)    Vitals:    02/05/22 0729 02/14/22 1329 04/14/22 1552   Weight: 43.8 kg (96 lb 9.6 oz) 37.7 kg (83 lb 3.2 oz) 40.3 kg (88 lb 14.4 oz)     Vital Signs with Ranges  Temp:  [97.4  F (36.3  C)] 97.4  F (36.3  C)  Pulse:  [70] 70  Resp:  [18] 18  BP: (99)/(65) 99/65  SpO2:  [99 %] 99 %  No intake/output data recorded.  O2 requirements: none    Constitutional: Thin frail appearing female in NAD  HEENT: Eyes nonicteric  Respiratory: Breathing comfortably  Vascular: No LE pitting edema, noted distal pedal pulses  Skin/Integumen: No rashes  Neuro/Psych: Appropriate affect and mood. Moves all extremities    Medications       melatonin  1 mg Oral At Bedtime     multivitamin w/minerals  1 tablet Oral Daily     pantoprazole  40 mg Oral QAM AC     polyethylene glycol  17 g Oral Daily     senna-docusate  1 tablet Oral BID     cholecalciferol  50 mcg Oral Daily       Data   No lab results found in last 7 days.    Imaging:   No results found for this or any previous visit (from the past 24 hour(s)).

## 2022-04-24 PROCEDURE — 120N000001 HC R&B MED SURG/OB

## 2022-04-24 PROCEDURE — 250N000013 HC RX MED GY IP 250 OP 250 PS 637: Performed by: INTERNAL MEDICINE

## 2022-04-24 PROCEDURE — 250N000013 HC RX MED GY IP 250 OP 250 PS 637: Performed by: HOSPITALIST

## 2022-04-24 PROCEDURE — 99231 SBSQ HOSP IP/OBS SF/LOW 25: CPT | Performed by: INTERNAL MEDICINE

## 2022-04-24 RX ADMIN — SENNOSIDES AND DOCUSATE SODIUM 1 TABLET: 8.6; 5 TABLET ORAL at 21:23

## 2022-04-24 RX ADMIN — MULTIPLE VITAMINS W/ MINERALS TAB 1 TABLET: TAB at 21:23

## 2022-04-24 RX ADMIN — PANTOPRAZOLE SODIUM 40 MG: 40 TABLET, DELAYED RELEASE ORAL at 09:35

## 2022-04-24 RX ADMIN — Medication 50 MCG: at 09:35

## 2022-04-24 RX ADMIN — SENNOSIDES AND DOCUSATE SODIUM 1 TABLET: 8.6; 5 TABLET ORAL at 09:35

## 2022-04-24 RX ADMIN — Medication 1 MG: at 21:23

## 2022-04-24 ASSESSMENT — ACTIVITIES OF DAILY LIVING (ADL)
ADLS_ACUITY_SCORE: 15

## 2022-04-24 NOTE — PLAN OF CARE
SUMMARY: Awaiting placement.   DATE & TIME: 4/24/22 7262-5039  Cognitive Concerns/ Orientation: A&Ox4   BEHAVIOR & AGGRESSION TOOL COLOR: Green  ABNL VS/O2: Daily Vitals stable  MOBILITY: Independent in her room. SBA in hallway. Ambulated in leyva x3.   PAIN MANAGMENT: Denies pain  DIET: Regular- little appetite today  BOWEL/BLADDER: Continent  ABNL LAB/BG: NA  DRAIN/DEVICES: None  TELEMETRY RHYTHM: NA  SKIN: Pale and dry  TESTS/PROCEDURES: NA  D/C DATE: Pending placement with group home; SW following  Discharge Barriers: pending group home placement   OTHER IMPORTANT INFO: pt prefers female aide.

## 2022-04-24 NOTE — PLAN OF CARE
Goal Outcome Evaluation:    Plan of Care Reviewed With: patient     Overall Patient Progress: no change    SUMMARY: Awaiting placement.   DATE & TIME: 4/24/22 1930-0730  Cognitive Concerns/ Orientation: A&Ox4   BEHAVIOR & AGGRESSION TOOL COLOR: Green  ABNL VS/O2: Daily Vitals stable  MOBILITY: Independent in her room. SBA in hallway. Ambulated to nursing station and back to room.   PAIN MANAGMENT: Denies pain  DIET: Regular- 100% of supper was eaten.  BOWEL/BLADDER: Continent  ABNL LAB/BG: NA  DRAIN/DEVICES: None  TELEMETRY RHYTHM: NA  SKIN: Pale and dry  TESTS/PROCEDURES: NA  D/C DATE: Pending placement with group home; SW following  Discharge Barriers: pending group home placement   OTHER IMPORTANT INFO: pt prefers female aide.

## 2022-04-24 NOTE — PROGRESS NOTES
[facility]    Hospitalist Progress Note    Interval History   - Some abdominal discomfort eating today otherwise doing fine now. Hanging out with nurses on the nursing station    Assessment & Plan   Summary: Miranda Dover is a 51 year old female with PMH Down Syndrome who was admitted on 1/22/2022 with COVID-19 pneumonia and acute hypoxic respiratory failure. Hospitalization has been prolonged due to need to establish guardianship following deaths of her parents. Awaiting placement.     Down syndrome  Developmental Delay  Failure to thrive  Had significant emotional, psychiatric issues during this stay due to the death of her parents and prolonged hospitalization. Discharge planning has been complicated by need for guardianship and payor source for placement. Patient's brother, Maxi, was granted 60 days of emergency guardianship on 2/15. Completed psychologic testing on 3/2/22 to assess current cognitive function and adaptive abilities. Permanent guardianship on 4/1/2022.  - Continue social work and clinical coordinator for disposition.  Trying to find a group home for patient      Severe malnutrition  Anorexia  Chronic abdominal pain  Chronic constipation  BMI ~13 on early this hospitalization with poor PO intake. SLP was consulted for possible dysphagia, and no evidence of dysphagia was noted. On 2/20, discussed with pt's brother; overall, felt that oral intake was probably acceptable at this point for discharge and did not feel we needed to pursue enteral feedings/g-tube at this point; desired workup for etiology of abdominal pain which he felt was contributing to her decreased PO intake. CT abd/pelvis 2/21 showed large amount of stool. EGD 2/22 relatively unremarkable. US abd no acute pathology. BMI up to 14.7 2/27.  - Encourage PO intake  - PPI  - Bowel regimen     Thyroid nodule  TSH has been elevated but normal T4 in the past   - Repeat TSH ordered but patient has been refusing  blood draws.  This lab is not urgent and can be done as an outpatient     COVID-19 recovered  Initially diagnosed 1/19/2022     E coli UTI, resolved    DVT Prophylaxis: None  Code Status: Full Code  PT/OT: not needed  Diet: Combination Diet Regular Diet; High Kcal/High Protein Diet, ADULT      Disposition: Expected discharge to group home when found    Maximino Deng MD  Text Page  (7am to 6pm)  -Data reviewed today: I reviewed all new labs and imaging results over the last 24 hours.    Physical Exam   Temp: 98  F (36.7  C) Temp src: Oral BP: 100/64 Pulse: 70   Resp: 18 SpO2: 96 % O2 Device: None (Room air)    Vitals:    02/05/22 0729 02/14/22 1329 04/14/22 1552   Weight: 43.8 kg (96 lb 9.6 oz) 37.7 kg (83 lb 3.2 oz) 40.3 kg (88 lb 14.4 oz)     Vital Signs with Ranges  Temp:  [98  F (36.7  C)] 98  F (36.7  C)  Pulse:  [70] 70  Resp:  [18] 18  BP: (100)/(64) 100/64  SpO2:  [96 %] 96 %  I/O last 3 completed shifts:  In: 240 [P.O.:240]  Out: -   O2 requirements: none    Constitutional: Thin frail appearing female in NAD  HEENT: Eyes nonicteric  Respiratory: Breathing comfortably  Vascular: No LE pitting edema, noted distal pedal pulses  Skin/Integumen: No rashes  Neuro/Psych: Appropriate affect and mood. Moves all extremities    Medications       melatonin  1 mg Oral At Bedtime     multivitamin w/minerals  1 tablet Oral Daily     pantoprazole  40 mg Oral QAM AC     polyethylene glycol  17 g Oral Daily     senna-docusate  1 tablet Oral BID     cholecalciferol  50 mcg Oral Daily       Data   No lab results found in last 7 days.    Imaging:   No results found for this or any previous visit (from the past 24 hour(s)).

## 2022-04-25 PROCEDURE — 250N000013 HC RX MED GY IP 250 OP 250 PS 637: Performed by: HOSPITALIST

## 2022-04-25 PROCEDURE — 250N000013 HC RX MED GY IP 250 OP 250 PS 637: Performed by: INTERNAL MEDICINE

## 2022-04-25 PROCEDURE — 99231 SBSQ HOSP IP/OBS SF/LOW 25: CPT | Performed by: INTERNAL MEDICINE

## 2022-04-25 PROCEDURE — 120N000001 HC R&B MED SURG/OB

## 2022-04-25 PROCEDURE — 250N000013 HC RX MED GY IP 250 OP 250 PS 637: Performed by: REGISTERED NURSE

## 2022-04-25 RX ADMIN — PANTOPRAZOLE SODIUM 40 MG: 40 TABLET, DELAYED RELEASE ORAL at 10:34

## 2022-04-25 RX ADMIN — Medication 1 MG: at 21:34

## 2022-04-25 RX ADMIN — Medication 50 MCG: at 10:34

## 2022-04-25 RX ADMIN — MULTIPLE VITAMINS W/ MINERALS TAB 1 TABLET: TAB at 21:34

## 2022-04-25 RX ADMIN — POLYETHYLENE GLYCOL 3350 17 G: 17 POWDER, FOR SOLUTION ORAL at 10:37

## 2022-04-25 RX ADMIN — SENNOSIDES AND DOCUSATE SODIUM 1 TABLET: 8.6; 5 TABLET ORAL at 10:34

## 2022-04-25 RX ADMIN — SENNOSIDES AND DOCUSATE SODIUM 1 TABLET: 8.6; 5 TABLET ORAL at 21:34

## 2022-04-25 ASSESSMENT — ACTIVITIES OF DAILY LIVING (ADL)
ADLS_ACUITY_SCORE: 15
ADLS_ACUITY_SCORE: 13
ADLS_ACUITY_SCORE: 13
ADLS_ACUITY_SCORE: 15
ADLS_ACUITY_SCORE: 13
ADLS_ACUITY_SCORE: 15

## 2022-04-25 NOTE — PLAN OF CARE
Goal Outcome Evaluation:    Plan of Care Reviewed With: patient     Overall Patient Progress: no change     SUMMARY: Awaiting placement.   DATE & TIME: 4/25/22 4428-5392  Cognitive Concerns/ Orientation: A&Ox4   BEHAVIOR & AGGRESSION TOOL COLOR: Green  ABNL VS/O2: Daily Vitals stable  MOBILITY: Independent in her room. SBA in hallway.  PAIN MANAGMENT: Denies pain  DIET: Regular  BOWEL/BLADDER: Continent   ABNL LAB/BG: NA  DRAIN/DEVICES: None  TELEMETRY RHYTHM: NA  SKIN: Pale and dry  TESTS/PROCEDURES: NA  D/C DATE: Pending placement with group home; SW following  Discharge Barriers: pending group home placement   OTHER IMPORTANT INFO: pt prefers female aide. Encourage pt to participate in cares.

## 2022-04-25 NOTE — PROGRESS NOTES
[facility]    Hospitalist Progress Note    Interval History   - Awoke around 9am this morning, no issues so far this morning, no acute events overnight.    Assessment & Plan   Summary: Miranda Dover is a 51 year old female with PMH Down Syndrome who was admitted on 1/22/2022 with COVID-19 pneumonia and acute hypoxic respiratory failure. Hospitalization has been prolonged due to need to establish guardianship following deaths of her parents. Awaiting placement.     Down syndrome  Developmental Delay  Failure to thrive  Had significant emotional, psychiatric issues during this stay due to the death of her parents and prolonged hospitalization. Discharge planning has been complicated by need for guardianship and payor source for placement. Patient's brother, Maxi, was granted 60 days of emergency guardianship on 2/15. Completed psychologic testing on 3/2/22 to assess current cognitive function and adaptive abilities. Permanent guardianship on 4/1/2022.  - Continue social work and clinical coordinator for disposition.  Trying to find a group home for patient      Severe malnutrition  Anorexia  Chronic abdominal pain  Chronic constipation  BMI ~13 on early this hospitalization with poor PO intake. SLP was consulted for possible dysphagia, and no evidence of dysphagia was noted. On 2/20, discussed with pt's brother; overall, felt that oral intake was probably acceptable at this point for discharge and did not feel we needed to pursue enteral feedings/g-tube at this point; desired workup for etiology of abdominal pain which he felt was contributing to her decreased PO intake. CT abd/pelvis 2/21 showed large amount of stool. EGD 2/22 relatively unremarkable. US abd no acute pathology. BMI up to 14.7 2/27.  - Encourage PO intake  - PPI  - Bowel regimen     Thyroid nodule  TSH has been elevated but normal T4 in the past   - Repeat TSH ordered but patient has been refusing blood draws.  This lab  is not urgent and can be done as an outpatient     COVID-19 recovered  Initially diagnosed 1/19/2022     E coli UTI, resolved    DVT Prophylaxis: None  Code Status: Full Code  PT/OT: not needed  Diet: Combination Diet Regular Diet; High Kcal/High Protein Diet, ADULT      Disposition: Expected discharge to group home when found    Maximino Deng MD  Text Page  (7am to 6pm)  -Data reviewed today: I reviewed all new labs and imaging results over the last 24 hours.    Physical Exam   Temp: 97.8  F (36.6  C) Temp src: Oral BP: 102/66 Pulse: 69   Resp: 18 SpO2: 96 %      Vitals:    02/05/22 0729 02/14/22 1329 04/14/22 1552   Weight: 43.8 kg (96 lb 9.6 oz) 37.7 kg (83 lb 3.2 oz) 40.3 kg (88 lb 14.4 oz)     Vital Signs with Ranges  Temp:  [97.8  F (36.6  C)] 97.8  F (36.6  C)  Pulse:  [69] 69  Resp:  [18] 18  BP: (102)/(66) 102/66  SpO2:  [96 %] 96 %  I/O last 3 completed shifts:  In: 240 [P.O.:240]  Out: -   O2 requirements: none    Constitutional: Thin frail appearing female in NAD  HEENT: Eyes nonicteric  Respiratory: Breathing comfortably  Vascular: No LE pitting edema, noted distal pedal pulses  Skin/Integumen: No rashes  Neuro/Psych: Appropriate affect and mood. Moves all extremities    Medications       melatonin  1 mg Oral At Bedtime     multivitamin w/minerals  1 tablet Oral Daily     pantoprazole  40 mg Oral QAM AC     polyethylene glycol  17 g Oral Daily     senna-docusate  1 tablet Oral BID     cholecalciferol  50 mcg Oral Daily       Data   No lab results found in last 7 days.    Imaging:   No results found for this or any previous visit (from the past 24 hour(s)).

## 2022-04-25 NOTE — PLAN OF CARE
Goal Outcome Evaluation:  Cognitive Concerns/ Orientation: A&Ox4. Pleasant  BEHAVIOR & AGGRESSION TOOL COLOR: Green  ABNL VS/O2: Daily Vitals stable  MOBILITY: Independent in her room. SBA in hallway.  PAIN MANAGMENT: Denies pain  DIET: Regular, staff ordering her meals. Declined lunch, offered fluids  BOWEL/BLADDER: Continent. 1 BM today  ABNL LAB/BG: NA  DRAIN/DEVICES: None  TELEMETRY RHYTHM: NA  SKIN: Pale and dry  TESTS/PROCEDURES: NA  D/C DATE: Pending placement with group home; SW following  Discharge Barriers: pending group home placement   OTHER IMPORTANT INFO: pt prefers female aide. Encourage patient to do any many of her own ADLs as possible-she is capable of doing them but prefers that we do them for her. Tub scheduled for tomorrow. Coloring, drawing, and writing outside her room at the table & chair set up.

## 2022-04-25 NOTE — PLAN OF CARE
Goal Outcome Evaluation:    SUMMARY: Awaiting placement.   DATE & TIME: 4/24/22 9280-6453  Cognitive Concerns/ Orientation: A&Ox4   BEHAVIOR & AGGRESSION TOOL COLOR: Green  ABNL VS/O2: Daily Vitals stable  MOBILITY: Independent in her room. SBA in hallway. Ambulated in leyva with various staff   PAIN MANAGMENT: Denies pain  DIET: Regular- good appetite today  BOWEL/BLADDER: Continent - BM x1  ABNL LAB/BG: NA  DRAIN/DEVICES: None  TELEMETRY RHYTHM: NA  SKIN: Pale and dry  TESTS/PROCEDURES: NA  D/C DATE: Pending placement with group home; SW following  Discharge Barriers: pending group home placement   OTHER IMPORTANT INFO: pt prefers female aide.

## 2022-04-26 PROCEDURE — 250N000013 HC RX MED GY IP 250 OP 250 PS 637: Performed by: INTERNAL MEDICINE

## 2022-04-26 PROCEDURE — 250N000013 HC RX MED GY IP 250 OP 250 PS 637: Performed by: REGISTERED NURSE

## 2022-04-26 PROCEDURE — 120N000001 HC R&B MED SURG/OB

## 2022-04-26 PROCEDURE — 99231 SBSQ HOSP IP/OBS SF/LOW 25: CPT | Performed by: INTERNAL MEDICINE

## 2022-04-26 PROCEDURE — 250N000013 HC RX MED GY IP 250 OP 250 PS 637: Performed by: HOSPITALIST

## 2022-04-26 RX ADMIN — Medication 50 MCG: at 09:53

## 2022-04-26 RX ADMIN — MULTIPLE VITAMINS W/ MINERALS TAB 1 TABLET: TAB at 22:15

## 2022-04-26 RX ADMIN — SENNOSIDES AND DOCUSATE SODIUM 1 TABLET: 8.6; 5 TABLET ORAL at 09:53

## 2022-04-26 RX ADMIN — PANTOPRAZOLE SODIUM 40 MG: 40 TABLET, DELAYED RELEASE ORAL at 09:53

## 2022-04-26 RX ADMIN — SENNOSIDES AND DOCUSATE SODIUM 1 TABLET: 8.6; 5 TABLET ORAL at 22:15

## 2022-04-26 RX ADMIN — Medication 1 MG: at 22:15

## 2022-04-26 RX ADMIN — POLYETHYLENE GLYCOL 3350 17 G: 17 POWDER, FOR SOLUTION ORAL at 09:53

## 2022-04-26 ASSESSMENT — ACTIVITIES OF DAILY LIVING (ADL)
ADLS_ACUITY_SCORE: 13

## 2022-04-26 NOTE — PLAN OF CARE
Goal Outcome Evaluation:  Cognitive Concerns/ Orientation: A&O x4. Pleasant.  BEHAVIOR & AGGRESSION TOOL COLOR: Green  ABNL VS/O2: No overnight VS  MOBILITY: Independent in her room. SBA in hallway. Ambulated in the halls with staff  PAIN MANAGMENT: Denies pain  DIET: Regular, staff ordering her meals. Declined breakfast. Ate an excellent lunch.  BOWEL/BLADDER: Continent  DRAIN/DEVICES: None  SKIN: Pale and dry. BLE 1+ edema.  D/C DATE: Pending placement with group home; SW following  Discharge Barriers: Pending group home placement   OTHER IMPORTANT INFO: Encouraged pt to complete ADL's independently. Tub completed this afternoon.Losing large amounts of head when combing her hair every few days, keeps her hair in a braid.

## 2022-04-26 NOTE — PLAN OF CARE
Goal Outcome Evaluation:    Plan of Care Reviewed With: patient     Overall Patient Progress: no change    DATE & TIME: 4/25/22 9308-3532  Cognitive Concerns/ Orientation: A&O x4. Pleasant.  BEHAVIOR & AGGRESSION TOOL COLOR: Green  ABNL VS/O2: No overnight VS  MOBILITY: Independent in her room. SBA in hallway.  PAIN MANAGMENT: Denies pain  DIET: Regular, staff ordering her meals. Ate a snack at bedtime with 2 cups of milk.  BOWEL/BLADDER: Continent, up to BR. 1 BM at bedtime  DRAIN/DEVICES: None  SKIN: Pale and dry. Lotion applied. BLE 1+ edema, discussed with pt not wearing tight high socks too long  D/C DATE: Pending placement with group home; SW following  Discharge Barriers: Pending group home placement   OTHER IMPORTANT INFO: Encouraged pt to complete ADL's independently. Pt agreeable to tub tomorrow with day shift staff.

## 2022-04-26 NOTE — PROGRESS NOTES
[facility]    Hospitalist Progress Note    Interval History   - No issues today, coloring outside her room per her usual routine    Assessment & Plan   Summary: Miranda Dover is a 51 year old female with PMH Down Syndrome who was admitted on 1/22/2022 with COVID-19 pneumonia and acute hypoxic respiratory failure. Hospitalization has been prolonged due to need to establish guardianship following deaths of her parents. Awaiting placement.     Down syndrome  Developmental Delay  Failure to thrive  Had significant emotional, psychiatric issues during this stay due to the death of her parents and prolonged hospitalization. Discharge planning has been complicated by need for guardianship and payor source for placement. Patient's brother, Maxi, was granted 60 days of emergency guardianship on 2/15. Completed psychologic testing on 3/2/22 to assess current cognitive function and adaptive abilities. Permanent guardianship on 4/1/2022.  - Continue social work and clinical coordinator for disposition.  Trying to find a group home for patient      Severe malnutrition  Anorexia  Chronic abdominal pain  Chronic constipation  BMI ~13 on early this hospitalization with poor PO intake. SLP was consulted for possible dysphagia, and no evidence of dysphagia was noted. On 2/20, discussed with pt's brother; overall, felt that oral intake was probably acceptable at this point for discharge and did not feel we needed to pursue enteral feedings/g-tube at this point; desired workup for etiology of abdominal pain which he felt was contributing to her decreased PO intake. CT abd/pelvis 2/21 showed large amount of stool. EGD 2/22 relatively unremarkable. US abd no acute pathology. BMI up to 14.7 2/27.  - Encourage PO intake  - PPI  - Bowel regimen     Thyroid nodule  TSH has been elevated but normal T4 in the past   - Repeat TSH ordered but patient has been refusing blood draws.  This lab is not urgent and can be  done as an outpatient     COVID-19 recovered  Initially diagnosed 1/19/2022     E coli UTI, resolved    DVT Prophylaxis: None  Code Status: Full Code  PT/OT: not needed  Diet: Combination Diet Regular Diet; High Kcal/High Protein Diet, ADULT      Disposition: Expected discharge to group home when found    Maximino Deng MD  Text Page  (7am to 6pm)  -Data reviewed today: I reviewed all new labs and imaging results over the last 24 hours.    Physical Exam   Temp: 97.6  F (36.4  C)   BP: 99/63 Pulse: 61   Resp: 18        Vitals:    02/05/22 0729 02/14/22 1329 04/14/22 1552   Weight: 43.8 kg (96 lb 9.6 oz) 37.7 kg (83 lb 3.2 oz) 40.3 kg (88 lb 14.4 oz)     Vital Signs with Ranges  Temp:  [97.6  F (36.4  C)] 97.6  F (36.4  C)  Pulse:  [61] 61  Resp:  [18] 18  BP: (99)/(63) 99/63  I/O last 3 completed shifts:  In: 480 [P.O.:480]  Out: -   O2 requirements: none    Constitutional: Thin frail appearing female in NAD  HEENT: Eyes nonicteric, Down facies  Respiratory: Breathing comfortably  Vascular: No LE pitting edema  Skin/Integumen: No rashes  Neuro/Psych: Appropriate affect and mood, quiet and pleasant. Moves all extremities    Medications       melatonin  1 mg Oral At Bedtime     multivitamin w/minerals  1 tablet Oral Daily     pantoprazole  40 mg Oral QAM AC     polyethylene glycol  17 g Oral Daily     senna-docusate  1 tablet Oral BID     cholecalciferol  50 mcg Oral Daily       Data   No lab results found in last 7 days.    Imaging:   No results found for this or any previous visit (from the past 24 hour(s)).

## 2022-04-27 PROCEDURE — 120N000001 HC R&B MED SURG/OB

## 2022-04-27 PROCEDURE — 250N000013 HC RX MED GY IP 250 OP 250 PS 637: Performed by: HOSPITALIST

## 2022-04-27 PROCEDURE — 99231 SBSQ HOSP IP/OBS SF/LOW 25: CPT | Performed by: INTERNAL MEDICINE

## 2022-04-27 PROCEDURE — 250N000013 HC RX MED GY IP 250 OP 250 PS 637: Performed by: INTERNAL MEDICINE

## 2022-04-27 PROCEDURE — 250N000013 HC RX MED GY IP 250 OP 250 PS 637: Performed by: REGISTERED NURSE

## 2022-04-27 RX ADMIN — POLYETHYLENE GLYCOL 3350 17 G: 17 POWDER, FOR SOLUTION ORAL at 07:43

## 2022-04-27 RX ADMIN — Medication 1 MG: at 21:19

## 2022-04-27 RX ADMIN — MULTIPLE VITAMINS W/ MINERALS TAB 1 TABLET: TAB at 21:19

## 2022-04-27 RX ADMIN — SENNOSIDES AND DOCUSATE SODIUM 1 TABLET: 8.6; 5 TABLET ORAL at 21:19

## 2022-04-27 RX ADMIN — SENNOSIDES AND DOCUSATE SODIUM 1 TABLET: 8.6; 5 TABLET ORAL at 07:42

## 2022-04-27 RX ADMIN — PANTOPRAZOLE SODIUM 40 MG: 40 TABLET, DELAYED RELEASE ORAL at 07:42

## 2022-04-27 RX ADMIN — Medication 50 MCG: at 07:42

## 2022-04-27 ASSESSMENT — ACTIVITIES OF DAILY LIVING (ADL)
ADLS_ACUITY_SCORE: 13

## 2022-04-27 NOTE — PLAN OF CARE
Goal Outcome Evaluation:  Cognitive Concerns/ Orientation: A&Ox4. Baseline. Pleasant, calm, and cooperative.   BEHAVIOR & AGGRESSION TOOL COLOR: Green  ABNL VS/O2: VSS  MOBILITY: Independent in her room. SBA in hallway. Ambulated in the halls   PAIN MANAGMENT: Denies pain  DIET: Regular, staff ordering her meals, good appetite  BOWEL/BLADDER: Continent. No BM. Encouraging pt to complete continence cares independently.   DRAIN/DEVICES: None  SKIN: Pale and dry. BLE 1+ edema.  D/C DATE: Pending placement with group home;  following  Discharge Barriers: Pending group home placement and MA approval  OTHER IMPORTANT INFO:   Requires encouragement/reassurance to complete ADL's independently. Coloring outside the room.

## 2022-04-27 NOTE — PLAN OF CARE
Goal Outcome Evaluation:    Plan of Care Reviewed With: patient     Overall Patient Progress: no change    DATE & TIME: 4/26/22 7453-6801  Cognitive Concerns/ Orientation: A&O x4. Baseline. Pleasant, calm, and cooperative.   BEHAVIOR & AGGRESSION TOOL COLOR: Green  ABNL VS/O2: No overnight VS  MOBILITY: Independent in her room. SBA in hallway. Ambulated in the halls with staff x3  PAIN MANAGMENT: Denies pain  DIET: Regular, staff ordering her meals  BOWEL/BLADDER: Continent. No BM. Encouraging pt to complete continence cares independently.   DRAIN/DEVICES: None  SKIN: Pale and dry. BLE 1+ edema.  D/C DATE: Pending placement with group home; SW following  Discharge Barriers: Pending group home placement and MA approval  OTHER IMPORTANT INFO:   Requires encouragement/reassurance to complete ADL's independently. Will frequently make excuses as to why she cannot complete them, but is able to do them. Pt should be undressing/dressing self and completing hygiene cares with minimal assistance.

## 2022-04-28 PROCEDURE — 250N000013 HC RX MED GY IP 250 OP 250 PS 637: Performed by: HOSPITALIST

## 2022-04-28 PROCEDURE — 120N000001 HC R&B MED SURG/OB

## 2022-04-28 PROCEDURE — 250N000013 HC RX MED GY IP 250 OP 250 PS 637: Performed by: INTERNAL MEDICINE

## 2022-04-28 PROCEDURE — 99231 SBSQ HOSP IP/OBS SF/LOW 25: CPT | Performed by: INTERNAL MEDICINE

## 2022-04-28 RX ADMIN — SENNOSIDES AND DOCUSATE SODIUM 1 TABLET: 8.6; 5 TABLET ORAL at 10:16

## 2022-04-28 RX ADMIN — Medication 50 MCG: at 10:16

## 2022-04-28 RX ADMIN — PANTOPRAZOLE SODIUM 40 MG: 40 TABLET, DELAYED RELEASE ORAL at 10:16

## 2022-04-28 RX ADMIN — Medication 1 MG: at 21:37

## 2022-04-28 RX ADMIN — SENNOSIDES AND DOCUSATE SODIUM 1 TABLET: 8.6; 5 TABLET ORAL at 21:37

## 2022-04-28 RX ADMIN — CALCIUM CARBONATE (ANTACID) CHEW TAB 500 MG 500 MG: 500 CHEW TAB at 23:53

## 2022-04-28 RX ADMIN — CALCIUM CARBONATE (ANTACID) CHEW TAB 500 MG 500 MG: 500 CHEW TAB at 00:44

## 2022-04-28 ASSESSMENT — ACTIVITIES OF DAILY LIVING (ADL)
ADLS_ACUITY_SCORE: 15
ADLS_ACUITY_SCORE: 13
ADLS_ACUITY_SCORE: 13
ADLS_ACUITY_SCORE: 15
ADLS_ACUITY_SCORE: 15
ADLS_ACUITY_SCORE: 13
ADLS_ACUITY_SCORE: 15
ADLS_ACUITY_SCORE: 13
ADLS_ACUITY_SCORE: 15
ADLS_ACUITY_SCORE: 13
ADLS_ACUITY_SCORE: 15
ADLS_ACUITY_SCORE: 13
ADLS_ACUITY_SCORE: 15

## 2022-04-28 NOTE — PLAN OF CARE
Goal Outcome Evaluation:    Plan of Care Reviewed With: patient     Overall Patient Progress: no change    SUMMARY: Awaiting placement.   DATE & TIME: 4/28/22 1930-0730  Cognitive Concerns/ Orientation: A&Ox4. Baseline. Pleasant, calm, and cooperative.   BEHAVIOR & AGGRESSION TOOL COLOR: Green  ABNL VS/O2: VSS  MOBILITY: Independent in her room. SBA in hallway. Ambulated frequently in the hallways with staff this evening   PAIN MANAGMENT: C/o stomach ache- PRN tums given   DIET: Regular, staff ordering her meals, good appetite  BOWEL/BLADDER: Continent. No BM. Encouraging pt to complete continence cares independently.   DRAIN/DEVICES: None  SKIN: Pale and dry. BLE 1+ edema.  D/C DATE: Pending placement with group home;  following  Discharge Barriers: Pending group home placement and MA approval  OTHER IMPORTANT INFO:   Requires encouragement/reassurance to complete ADL's independently.

## 2022-04-28 NOTE — PROGRESS NOTES
"CLINICAL NUTRITION SERVICES - REASSESSMENT NOTE      Malnutrition:   (4/21)   % Weight Loss: None noted - wt up from admission  % Intake:  <75% for > 7 days (moderate malnutrition)  Subcutaneous Fat Loss:  Baseline  Muscle Loss:  Baseline  Fluid Retention:  None noted     Malnutrition Diagnosis: Does not meet criteria at this time, but pt is at risk       EVALUATION OF PROGRESS TOWARD GOALS   Diet:  High Kcal/High Protein diet     Intake/Tolerance:    Visited patient this afternoon, she is sitting outside her room and coloring  She says the food has been \"so so\" but her appetite has been alright   She is not interested in ordering lunch today, \"sometimes I like to skip\"   Per review of meals ordered over the past few days, she has been receiving 3 meals daily with 3-5 items per meal, consuming sometimes 100% or sometimes 0%, per RN flowsheets  Says she plans to order a good dinner tonight       ASSESSED NUTRITION NEEDS:  Dosing Weight 33.6 kg   Estimated Energy Needs: 6670-2452 kcals (35-40 Kcal/Kg)  Justification: repletion and underweight  Estimated Protein Needs: 50-70 grams protein (1.5-2 g pro/Kg)  Justification: repletion and hypercatabolism with acute illness      NEW FINDINGS:   Last weight 40.3 kg 2 weeks ago  Adequately stooling   No other new updates per chart review     Previous Goals:   Intake of at least 75% meals BID each with at least 2-3 items  Evaluation: Met    Previous Nutrition Diagnosis:   Predicted inadequate nutrient intake (energy/protein/micronutrients) related to reliance on the same foods ordered on trays, only receiving 2 small meals/day each with just 1 item  Evaluation: No change      CURRENT NUTRITION DIAGNOSIS  Predicted inadequate nutrient intake (energy/protein/micronutrients) related to variable appetite and food intake, reliance on similar food items for meals and intermittently declining meals    INTERVENTIONS  Recommendations / Nutrition Prescription  Continue diet and meal " encouragement/variety     Implementation  None new today    Goals  Patient will consume at least 75% meals BID with 3 items per meal      MONITORING AND EVALUATION:  Progress towards goals will be monitored and evaluated per protocol and Practice Guidelines      Sho Vo RD, LD  Clinical Dietitian   [place] [id number], [facility], Ortho, Ortho spine  Pager: [phone]

## 2022-04-28 NOTE — PLAN OF CARE
SUMMARY: Awaiting placement.   DATE & TIME: 4/28/22 7-3 pm   Cognitive Concerns/ Orientation: Alert. Pleasant, calm, and cooperative.   BEHAVIOR & AGGRESSION TOOL COLOR: Green  ABNL VS/O2: VSS on RA, soft BP.   MOBILITY: Independent in her room. SBA in hallway. Ambulated frequently in the hallways with staff.  PAIN MANAGMENT: C/O stomach ache, all the time.   DIET: Regular, staff ordering her meals, fair appetite.  BOWEL/BLADDER: Continent. No BM. Refuses to drink Miralax.   DRAIN/DEVICES: None  SKIN: Pale and dry. BLE 1+ edema. Coccyx red/pink blanchable.   D/C DATE: Pending placement with group home; SW following  Discharge Barriers: Pending group home placement and MA approval  OTHER IMPORTANT INFO:   Requires encouragement/reassurance to complete ADL's independently. Stable. No changes.

## 2022-04-28 NOTE — PROGRESS NOTES
[facility]    Hospitalist Progress Note    Interval History   - Patient remain in her usual state of health, sitting in chair in hallway and coloring. Offer no complaints, gave me thumbs up that she is doing well.     Assessment & Plan   Summary: Miranda Dover is a 51 year old female with PMH Down Syndrome who was admitted on 1/22/2022 with COVID-19 pneumonia and acute hypoxic respiratory failure. Hospitalization has been prolonged due to need to establish guardianship following deaths of her parents. Awaiting placement.    Patient overall remain stable and in no need for acute hospitalization.      Down syndrome  Developmental Delay  Failure to thrive  Had significant emotional, psychiatric issues during this stay due to the death of her parents and prolonged hospitalization. Discharge planning has been complicated by need for guardianship and payor source for placement. Patient's brother, Maxi, was granted 60 days of emergency guardianship on 2/15. Completed psychologic testing on 3/2/22 to assess current cognitive function and adaptive abilities. Permanent guardianship on 4/1/2022.  - Continue social work and clinical coordinator for disposition.  Trying to find a group home for patient      Severe malnutrition  Anorexia  Chronic abdominal pain  Chronic constipation  BMI ~13 on early this hospitalization with poor PO intake. SLP was consulted for possible dysphagia, and no evidence of dysphagia was noted. On 2/20, discussed with pt's brother; overall, felt that oral intake was probably acceptable at this point for discharge and did not feel we needed to pursue enteral feedings/g-tube at this point; desired workup for etiology of abdominal pain which he felt was contributing to her decreased PO intake. CT abd/pelvis 2/21 showed large amount of stool. EGD 2/22 relatively unremarkable. US abd no acute pathology. BMI up to 14.7 2/27.  - Encourage PO intake  - PPI  - Bowel  regimen     Thyroid nodule  TSH has been elevated but normal T4 in the past   - Repeat TSH ordered but patient has been refusing blood draws.  This lab is not urgent and can be done as an outpatient     COVID-19 recovered  Initially diagnosed 1/19/2022     E coli UTI, resolved    DVT Prophylaxis: None  Code Status: Full Code  PT/OT: not needed  Diet: Combination Diet Regular Diet; High Kcal/High Protein Diet, ADULT      Disposition: Expected discharge to group home when found    Vic Thomas MD  Text Page  (7am to 6pm)  -Data reviewed today: I reviewed all new labs and imaging results over the last 24 hours.    Physical Exam   Temp: 97.5  F (36.4  C) Temp src: Oral BP: 91/45 Pulse: 60   Resp: 14 SpO2: 100 % O2 Device: None (Room air)    Vitals:    02/05/22 0729 02/14/22 1329 04/14/22 1552   Weight: 43.8 kg (96 lb 9.6 oz) 37.7 kg (83 lb 3.2 oz) 40.3 kg (88 lb 14.4 oz)     Vital Signs with Ranges  Temp:  [97.5  F (36.4  C)] 97.5  F (36.4  C)  Pulse:  [60] 60  Resp:  [14] 14  BP: (91)/(45) 91/45  SpO2:  [100 %] 100 %  No intake/output data recorded.  O2 requirements: none    Constitutional: Thin frail appearing female in NAD  HEENT: Eyes nonicteric, Down facies  Respiratory: Breathing comfortably  Vascular: No LE pitting edema  Skin/Integumen: No rashes  Neuro/Psych: Appropriate affect and mood, quiet and pleasant. Moves all extremities    Medications       melatonin  1 mg Oral At Bedtime     multivitamin w/minerals  1 tablet Oral Daily     pantoprazole  40 mg Oral QAM AC     polyethylene glycol  17 g Oral Daily     senna-docusate  1 tablet Oral BID     cholecalciferol  50 mcg Oral Daily       Data   No lab results found in last 7 days.    Imaging:   No results found for this or any previous visit (from the past 24 hour(s)).

## 2022-04-29 PROCEDURE — 250N000013 HC RX MED GY IP 250 OP 250 PS 637: Performed by: INTERNAL MEDICINE

## 2022-04-29 PROCEDURE — 250N000013 HC RX MED GY IP 250 OP 250 PS 637: Performed by: HOSPITALIST

## 2022-04-29 PROCEDURE — 99231 SBSQ HOSP IP/OBS SF/LOW 25: CPT | Performed by: INTERNAL MEDICINE

## 2022-04-29 PROCEDURE — 250N000013 HC RX MED GY IP 250 OP 250 PS 637: Performed by: REGISTERED NURSE

## 2022-04-29 PROCEDURE — 120N000001 HC R&B MED SURG/OB

## 2022-04-29 RX ADMIN — SENNOSIDES AND DOCUSATE SODIUM 1 TABLET: 8.6; 5 TABLET ORAL at 09:53

## 2022-04-29 RX ADMIN — Medication 50 MCG: at 09:53

## 2022-04-29 RX ADMIN — SENNOSIDES AND DOCUSATE SODIUM 1 TABLET: 8.6; 5 TABLET ORAL at 22:49

## 2022-04-29 RX ADMIN — PANTOPRAZOLE SODIUM 40 MG: 40 TABLET, DELAYED RELEASE ORAL at 09:53

## 2022-04-29 RX ADMIN — Medication 1 MG: at 22:49

## 2022-04-29 RX ADMIN — POLYETHYLENE GLYCOL 3350 17 G: 17 POWDER, FOR SOLUTION ORAL at 09:54

## 2022-04-29 RX ADMIN — MULTIPLE VITAMINS W/ MINERALS TAB 1 TABLET: TAB at 22:49

## 2022-04-29 ASSESSMENT — ACTIVITIES OF DAILY LIVING (ADL)
ADLS_ACUITY_SCORE: 15

## 2022-04-29 NOTE — PLAN OF CARE
Goal Outcome Evaluation:    Plan of Care Reviewed With: patient     Overall Patient Progress: no change    SUMMARY: Awaiting placement.   DATE & TIME: 4/29/22, 2851-5314  Cognitive Concerns/ Orientation: Alert. Pleasant, calm, and cooperative.   BEHAVIOR & AGGRESSION TOOL COLOR: Green  ABNL VS/O2: VSS on RA, soft BP. Daily vitals.  MOBILITY: Independent in her room. SBA in hallway. Ambulated frequently in the hallways with staff.  PAIN MANAGMENT: C/O stomach ache, baseline- tums given   DIET: Regular- staff ordering her meals  BOWEL/BLADDER: Continent.  DRAIN/DEVICES: None  SKIN: Pale and dry. BLE 1+ edema. Coccyx red/pink blanchable.   D/C DATE: Pending placement with group home;  following  Discharge Barriers: Pending group home placement and MA approval  OTHER IMPORTANT INFO: Pt up coloring outside of her room this evening. Encourage pt to participate in cares.

## 2022-04-29 NOTE — PROGRESS NOTES
[facility]    Hospitalist Progress Note    Interval History   - walking in hallway with the RN, doing well, offer no complaints.     Assessment & Plan   Summary: Miranda Dover is a 51 year old female with PMH Down Syndrome who was admitted on 1/22/2022 with COVID-19 pneumonia and acute hypoxic respiratory failure. Hospitalization has been prolonged due to need to establish guardianship following deaths of her parents. Awaiting placement.    Patient overall remain stable and in no need for acute hospitalization.      Down syndrome  Developmental Delay  Failure to thrive  Had significant emotional, psychiatric issues during this stay due to the death of her parents and prolonged hospitalization. Discharge planning has been complicated by need for guardianship and payor source for placement. Patient's brother, Maxi, was granted 60 days of emergency guardianship on 2/15. Completed psychologic testing on 3/2/22 to assess current cognitive function and adaptive abilities. Permanent guardianship on 4/1/2022.  - Continue social work and clinical coordinator for disposition.  Trying to find a group home for patient      Severe malnutrition  Anorexia  Chronic abdominal pain  Chronic constipation  BMI ~13 on early this hospitalization with poor PO intake. SLP was consulted for possible dysphagia, and no evidence of dysphagia was noted. On 2/20, discussed with pt's brother; overall, felt that oral intake was probably acceptable at this point for discharge and did not feel we needed to pursue enteral feedings/g-tube at this point; desired workup for etiology of abdominal pain which he felt was contributing to her decreased PO intake. CT abd/pelvis 2/21 showed large amount of stool. EGD 2/22 relatively unremarkable. US abd no acute pathology. BMI up to 14.7 2/27.  - Encourage PO intake  - PPI  - Bowel regimen     Thyroid nodule  TSH has been elevated but normal T4 in the past   - Repeat TSH ordered  but patient has been refusing blood draws.  This lab is not urgent and can be done as an outpatient     COVID-19 recovered  Initially diagnosed 1/19/2022     E coli UTI, resolved    DVT Prophylaxis: None  Code Status: Full Code  PT/OT: not needed  Diet: Combination Diet Regular Diet; High Kcal/High Protein Diet, ADULT      Disposition: Expected discharge to group home when found    Vic Thomas MD, MD  Text Page  (7am to 6pm)  -Data reviewed today: I reviewed all new labs and imaging results over the last 24 hours.    Physical Exam   Temp: 98  F (36.7  C) Temp src: Oral BP: 97/57 Pulse: 61   Resp: 18 SpO2: 96 % O2 Device: None (Room air)    Vitals:    02/05/22 0729 02/14/22 1329 04/14/22 1552   Weight: 43.8 kg (96 lb 9.6 oz) 37.7 kg (83 lb 3.2 oz) 40.3 kg (88 lb 14.4 oz)     Vital Signs with Ranges  Temp:  [98  F (36.7  C)] 98  F (36.7  C)  Pulse:  [61] 61  Resp:  [18] 18  BP: (97)/(57) 97/57  SpO2:  [96 %] 96 %  I/O last 3 completed shifts:  In: 550 [P.O.:550]  Out: -   O2 requirements: none    Constitutional: Thin frail appearing female in NAD  HEENT: Eyes nonicteric, Down facies  Respiratory: Breathing comfortably  Vascular: No LE pitting edema  Skin/Integumen: No rashes  Neuro/Psych: Appropriate affect and mood, quiet and pleasant. Moves all extremities    Medications       melatonin  1 mg Oral At Bedtime     multivitamin w/minerals  1 tablet Oral Daily     pantoprazole  40 mg Oral QAM AC     polyethylene glycol  17 g Oral Daily     senna-docusate  1 tablet Oral BID     cholecalciferol  50 mcg Oral Daily       Data   No lab results found in last 7 days.    Imaging:   No results found for this or any previous visit (from the past 24 hour(s)).

## 2022-04-29 NOTE — PLAN OF CARE
Goal Outcome Evaluation: ongoing, progressing    SUMMARY: Awaiting placement.   DATE & TIME: 4/29/22, 7272-4008  Cognitive Concerns/ Orientation: Alert. Pleasant, calm, and cooperative.   BEHAVIOR & AGGRESSION TOOL COLOR: Green  ABNL VS/O2: VSS on RA, soft BP. Daily vitals.  MOBILITY: Independent in her room. SBA in hallway. Ambulated twice this shift hallways with staff.  PAIN MANAGMENT: C/O stomach ache, baseline  DIET: Regular- staff ordering her meals, poor appetite  BOWEL/BLADDER: Continent.  DRAIN/DEVICES: None  SKIN: Pale and dry. BLE 1+ edema. Coccyx red/pink blanchable.   D/C DATE: Pending placement with group home; SW following  Discharge Barriers: Pending group home placement and MA approval  OTHER IMPORTANT INFO: Pt up coloring outside of her room this morning. Encourage pt to participate in cares.

## 2022-04-29 NOTE — PLAN OF CARE
Goal Outcome Evaluation:                SUMMARY: Awaiting placement.   DATE & TIME: 4/28/22, pm shift   Cognitive Concerns/ Orientation: Alert. Pleasant, calm, and cooperative.   BEHAVIOR & AGGRESSION TOOL COLOR: Green  ABNL VS/O2: VSS on RA, soft BP. Daily vitals.  MOBILITY: Independent in her room. SBA in hallway. Ambulated frequently in the hallways with staff.  PAIN MANAGMENT: C/O stomach ache, all the time.   DIET: Regular, staff ordering her meals, fair appetite.  BOWEL/BLADDER: Continent. Had bm.  DRAIN/DEVICES: None  SKIN: Pale and dry. BLE 1+ edema. Coccyx red/pink blanchable.   D/C DATE: Pending placement with group home;  following  Discharge Barriers: Pending group home placement and MA approval  OTHER IMPORTANT INFO:

## 2022-04-30 PROCEDURE — 250N000013 HC RX MED GY IP 250 OP 250 PS 637: Performed by: INTERNAL MEDICINE

## 2022-04-30 PROCEDURE — 250N000013 HC RX MED GY IP 250 OP 250 PS 637: Performed by: HOSPITALIST

## 2022-04-30 PROCEDURE — 250N000013 HC RX MED GY IP 250 OP 250 PS 637: Performed by: REGISTERED NURSE

## 2022-04-30 PROCEDURE — 99231 SBSQ HOSP IP/OBS SF/LOW 25: CPT | Performed by: INTERNAL MEDICINE

## 2022-04-30 PROCEDURE — 120N000001 HC R&B MED SURG/OB

## 2022-04-30 RX ADMIN — Medication 1 MG: at 21:12

## 2022-04-30 RX ADMIN — SENNOSIDES AND DOCUSATE SODIUM 1 TABLET: 8.6; 5 TABLET ORAL at 10:49

## 2022-04-30 RX ADMIN — POLYETHYLENE GLYCOL 3350 17 G: 17 POWDER, FOR SOLUTION ORAL at 10:49

## 2022-04-30 RX ADMIN — MULTIPLE VITAMINS W/ MINERALS TAB 1 TABLET: TAB at 21:12

## 2022-04-30 RX ADMIN — PANTOPRAZOLE SODIUM 40 MG: 40 TABLET, DELAYED RELEASE ORAL at 10:49

## 2022-04-30 RX ADMIN — SENNOSIDES AND DOCUSATE SODIUM 1 TABLET: 8.6; 5 TABLET ORAL at 21:12

## 2022-04-30 RX ADMIN — Medication 50 MCG: at 10:49

## 2022-04-30 ASSESSMENT — ACTIVITIES OF DAILY LIVING (ADL)
ADLS_ACUITY_SCORE: 15

## 2022-04-30 NOTE — PLAN OF CARE
Goal Outcome Evaluation: ongoing, progressing    SUMMARY: Awaiting placement.   DATE & TIME: 4/30/22 4498-6367        Cognitive Concerns/ Orientation : Alert, calm and cooperative   BEHAVIOR & AGGRESSION TOOL COLOR: Green  ABNL VS/O2: Daily vital signs           MOBILITY: Independent in room. SBA in hallways, prefers to hold arm of staff when walking  PAIN MANAGMENT: complained of stomach discomfort before breakfast, resolved after eating  DIET: Regular, poor appetite  BOWEL/BLADDER: Continent  ABNL LAB/BG: NA  DRAIN/DEVICES: None  TELEMETRY RHYTHM: NA  SKIN: Pale and dry. +1 edema to BLE. Coccyx pink/red, blanchable  TESTS/PROCEDURES: NA  D/C DATE: Pending placement with group home  Discharge Barriers: Group home placement and MA approval

## 2022-04-30 NOTE — PLAN OF CARE
DATE & TIME: 4/29/22, 3210 - 6406   Cognitive Concerns/ Orientation : Alert, calm and cooperative   BEHAVIOR & AGGRESSION TOOL COLOR: Green  ABNL VS/O2: Daily vital signs   MOBILITY: Independent in room. SBA in hallways  PAIN MANAGMENT: Denied  DIET: Regular  BOWEL/BLADDER: Continent  ABNL LAB/BG: NA  DRAIN/DEVICES: None  TELEMETRY RHYTHM: NA  SKIN: Pale and dry. +1 edema to BLE. Coccyx pink/red, blanchable  TESTS/PROCEDURES: NA  D/C DATE: Pending placement with group home  Discharge Barriers: Group home placement and MA approval  OTHER IMPORTANT INFO: Patient refusing weekly nasal swab for COVID    Goal Outcome Evaluation:    Plan of Care Reviewed With: patient     Overall Patient Progress: improving

## 2022-04-30 NOTE — PLAN OF CARE
SUMMARY: Awaiting placement.   DATE & TIME: 4/29/22, Evenings   Cognitive Concerns/ Orientation: Alert. Pleasant, calm, and cooperative.   BEHAVIOR & AGGRESSION TOOL COLOR: Green  ABNL VS/O2: VSS on RA, soft BP. Daily vitals.  MOBILITY: Independent in her room. SBA in hallway. Ambulating freq in halls.   PAIN MANAGMENT: C/O stomach ache, baseline.   DIET: Regular.Late lunch today. Good appetite for dinner. Encouraging fluids.   BOWEL/BLADDER: Continent. Using bathroom.   DRAIN/DEVICES: None  SKIN: Pale and dry. BLE 1+ edema. Coccyx red/pink blanchable.   D/C DATE: Pending placement with group home; SW following  Discharge Barriers: Pending group home placement and MA approval  OTHER IMPORTANT INFO: Pt refusing weekly nasal swab. Encouraged and gave rationale. Still refusing. Time needed to complete cares and medications.

## 2022-04-30 NOTE — PROGRESS NOTES
[facility]    Hospitalist Progress Note    Interval History   - offer no complaints this morning.     Assessment & Plan   Summary: Miranda Dover is a 51 year old female with PMH Down Syndrome who was admitted on 1/22/2022 with COVID-19 pneumonia and acute hypoxic respiratory failure. Hospitalization has been prolonged due to need to establish guardianship following deaths of her parents. Awaiting placement.    Patient overall remain stable and in no need for acute hospitalization.      Down syndrome  Developmental Delay  Failure to thrive  Had significant emotional, psychiatric issues during this stay due to the death of her parents and prolonged hospitalization. Discharge planning has been complicated by need for guardianship and payor source for placement. Patient's brother, Maxi, was granted 60 days of emergency guardianship on 2/15. Completed psychologic testing on 3/2/22 to assess current cognitive function and adaptive abilities. Permanent guardianship on 4/1/2022.  - Continue social work and clinical coordinator for disposition.  Trying to find a group home for patient      Severe malnutrition  Anorexia  Chronic abdominal pain  Chronic constipation  BMI ~13 on early this hospitalization with poor PO intake. SLP was consulted for possible dysphagia, and no evidence of dysphagia was noted. On 2/20, discussed with pt's brother; overall, felt that oral intake was probably acceptable at this point for discharge and did not feel we needed to pursue enteral feedings/g-tube at this point; desired workup for etiology of abdominal pain which he felt was contributing to her decreased PO intake. CT abd/pelvis 2/21 showed large amount of stool. EGD 2/22 relatively unremarkable. US abd no acute pathology. BMI up to 14.7 2/27.  - Encourage PO intake  - PPI  - Bowel regimen     Thyroid nodule  TSH has been elevated but normal T4 in the past   - Repeat TSH ordered but patient has been refusing  blood draws.  This lab is not urgent and can be done as an outpatient     COVID-19 recovered  Initially diagnosed 1/19/2022     E coli UTI, resolved    DVT Prophylaxis: None  Code Status: Full Code  PT/OT: not needed  Diet: Combination Diet Regular Diet; High Kcal/High Protein Diet, ADULT      Disposition: Expected discharge to group home when found    Vic Thomas MD, MD  Text Page  (7am to 6pm)  -Data reviewed today: I reviewed all new labs and imaging results over the last 24 hours.    Physical Exam   Temp: 98.4  F (36.9  C) Temp src: Oral BP: 90/53 Pulse: 64   Resp: 16        Vitals:    02/05/22 0729 02/14/22 1329 04/14/22 1552   Weight: 43.8 kg (96 lb 9.6 oz) 37.7 kg (83 lb 3.2 oz) 40.3 kg (88 lb 14.4 oz)     Vital Signs with Ranges  Temp:  [98.4  F (36.9  C)] 98.4  F (36.9  C)  Pulse:  [64] 64  Resp:  [16] 16  BP: (90)/(53) 90/53  I/O last 3 completed shifts:  In: 240 [P.O.:240]  Out: -   O2 requirements: none    Constitutional: Thin frail appearing female in NAD  HEENT: Eyes nonicteric, Down facies  Respiratory: Breathing comfortably  Vascular: No LE pitting edema  Skin/Integumen: No rashes  Neuro/Psych: Appropriate affect and mood, quiet and pleasant. Moves all extremities    Medications       melatonin  1 mg Oral At Bedtime     multivitamin w/minerals  1 tablet Oral Daily     pantoprazole  40 mg Oral QAM AC     polyethylene glycol  17 g Oral Daily     senna-docusate  1 tablet Oral BID     cholecalciferol  50 mcg Oral Daily       Data   No lab results found in last 7 days.    Imaging:   No results found for this or any previous visit (from the past 24 hour(s)).

## 2022-05-01 PROCEDURE — 99231 SBSQ HOSP IP/OBS SF/LOW 25: CPT | Performed by: INTERNAL MEDICINE

## 2022-05-01 PROCEDURE — 250N000013 HC RX MED GY IP 250 OP 250 PS 637: Performed by: HOSPITALIST

## 2022-05-01 PROCEDURE — 250N000013 HC RX MED GY IP 250 OP 250 PS 637: Performed by: REGISTERED NURSE

## 2022-05-01 PROCEDURE — 120N000001 HC R&B MED SURG/OB

## 2022-05-01 PROCEDURE — 250N000013 HC RX MED GY IP 250 OP 250 PS 637: Performed by: INTERNAL MEDICINE

## 2022-05-01 RX ADMIN — Medication 50 MCG: at 09:27

## 2022-05-01 RX ADMIN — PANTOPRAZOLE SODIUM 40 MG: 40 TABLET, DELAYED RELEASE ORAL at 09:27

## 2022-05-01 RX ADMIN — CALCIUM CARBONATE (ANTACID) CHEW TAB 500 MG 500 MG: 500 CHEW TAB at 01:06

## 2022-05-01 RX ADMIN — SENNOSIDES AND DOCUSATE SODIUM 1 TABLET: 8.6; 5 TABLET ORAL at 21:29

## 2022-05-01 RX ADMIN — POLYETHYLENE GLYCOL 3350 17 G: 17 POWDER, FOR SOLUTION ORAL at 09:27

## 2022-05-01 RX ADMIN — Medication 1 MG: at 21:28

## 2022-05-01 RX ADMIN — MULTIPLE VITAMINS W/ MINERALS TAB 1 TABLET: TAB at 21:29

## 2022-05-01 RX ADMIN — SENNOSIDES AND DOCUSATE SODIUM 1 TABLET: 8.6; 5 TABLET ORAL at 09:27

## 2022-05-01 ASSESSMENT — ACTIVITIES OF DAILY LIVING (ADL)
ADLS_ACUITY_SCORE: 15
ADLS_ACUITY_SCORE: 13
ADLS_ACUITY_SCORE: 15
ADLS_ACUITY_SCORE: 13
ADLS_ACUITY_SCORE: 15
ADLS_ACUITY_SCORE: 13
ADLS_ACUITY_SCORE: 15
ADLS_ACUITY_SCORE: 15
ADLS_ACUITY_SCORE: 13
ADLS_ACUITY_SCORE: 15
ADLS_ACUITY_SCORE: 13
ADLS_ACUITY_SCORE: 15
ADLS_ACUITY_SCORE: 13
ADLS_ACUITY_SCORE: 13
ADLS_ACUITY_SCORE: 15
ADLS_ACUITY_SCORE: 13
ADLS_ACUITY_SCORE: 15

## 2022-05-01 NOTE — PROGRESS NOTES
[facility]    Hospitalist Progress Note    Interval History   - offer no complaints this morning.     Assessment & Plan   Summary: Miranda Dover is a 51 year old female with PMH Down Syndrome who was admitted on 1/22/2022 with COVID-19 pneumonia and acute hypoxic respiratory failure. Hospitalization has been prolonged due to need to establish guardianship following deaths of her parents. Awaiting placement.    Patient is in hospital for longtime given problem with placement at this time  She remain medically stable at this time without change in her clinical status.        Down syndrome  Developmental Delay  Failure to thrive  Had significant emotional, psychiatric issues during this stay due to the death of her parents and prolonged hospitalization. Discharge planning has been complicated by need for guardianship and payor source for placement. Patient's brother, Maxi, was granted 60 days of emergency guardianship on 2/15. Completed psychologic testing on 3/2/22 to assess current cognitive function and adaptive abilities. Permanent guardianship on 4/1/2022.  - Continue social work and clinical coordinator for disposition.  Trying to find a group home for patient      Severe malnutrition  Anorexia  Chronic abdominal pain  Chronic constipation  BMI ~13 on early this hospitalization with poor PO intake. SLP was consulted for possible dysphagia, and no evidence of dysphagia was noted. On 2/20, discussed with pt's brother; overall, felt that oral intake was probably acceptable at this point for discharge and did not feel we needed to pursue enteral feedings/g-tube at this point; desired workup for etiology of abdominal pain which he felt was contributing to her decreased PO intake. CT abd/pelvis 2/21 showed large amount of stool. EGD 2/22 relatively unremarkable. US abd no acute pathology. BMI up to 14.7 2/27.  - Encourage PO intake  - PPI  - Bowel regimen  Overall stable.      Thyroid  nodule  TSH has been elevated but normal T4 in the past   - Repeat TSH ordered but patient has been refusing blood draws.  This lab is not urgent and can be done as an outpatient     COVID-19 recovered  Initially diagnosed 1/19/2022     E coli UTI, resolved    DVT Prophylaxis: None  Code Status: Full Code  PT/OT: not needed  Diet: Combination Diet Regular Diet; High Kcal/High Protein Diet, ADULT      Disposition: Expected discharge to group home when found    Vic Thomas MD, MD  Text Page  (7am to 6pm)  -Data reviewed today:no new labs or imaging at this time, vitals remain stable.    Physical Exam   Temp: 97.4  F (36.3  C) Temp src: Oral BP: (!) 89/55 Pulse: 60   Resp: 16 SpO2: 99 % O2 Device: None (Room air)    Vitals:    02/05/22 0729 02/14/22 1329 04/14/22 1552   Weight: 43.8 kg (96 lb 9.6 oz) 37.7 kg (83 lb 3.2 oz) 40.3 kg (88 lb 14.4 oz)     Vital Signs with Ranges  Temp:  [97.4  F (36.3  C)] 97.4  F (36.3  C)  Pulse:  [60] 60  Resp:  [16] 16  BP: (89)/(55) 89/55  SpO2:  [99 %] 99 %  No intake/output data recorded.  O2 requirements: none    Constitutional: awake,  Alert and in no distress.   HEENT: Eyes nonicteric, Down facies  Respiratory: Breathing comfortably  Vascular: No LE pitting edema  Skin/Integumen: No rashes  Neuro/Psych: Appropriate affect and mood, quiet and pleasant. Moves all extremities, no focal deficit.     Medications       melatonin  1 mg Oral At Bedtime     multivitamin w/minerals  1 tablet Oral Daily     pantoprazole  40 mg Oral QAM AC     polyethylene glycol  17 g Oral Daily     senna-docusate  1 tablet Oral BID     cholecalciferol  50 mcg Oral Daily       Data   No lab results found in last 7 days.    Imaging:   No results found for this or any previous visit (from the past 24 hour(s)).

## 2022-05-01 NOTE — PLAN OF CARE
Goal Outcome Evaluation: ongoing, progressing    SUMMARY: Awaiting placement.   DATE & TIME: 5/1/22 6952-0744             Cognitive Concerns/ Orientation : Alert, calm and cooperative  BEHAVIOR & AGGRESSION TOOL COLOR: Green     ABNL VS/O2: Daily vital signs  MOBILITY: Independent in room, SBA in hallway  PAIN MANAGMENT: Complained of stomach discomfort the resolved after eating breakfast  DIET: Regular, appetite fair  BOWEL/BLADDER: Continent  ABNL LAB/BG: NA  DRAIN/DEVICES: None  TELEMETRY RHYTHM: NA  SKIN: Pale and dry. +1 edema to BLE. Coccyx pink/red, blanchable  TESTS/PROCEDURES: NA  D/C DATE: Pending placement with group home  Discharge Barriers: Group home placement and MA approval  OTHER IMPORTANT INFO: Patient refusing weekly nasal swab for COVID. See daily routine on bathroom door, encourage independence

## 2022-05-01 NOTE — PLAN OF CARE
DATE & TIME: 4/30/22, 2300 - 1930    Cognitive Concerns/ Orientation : Alert, calm and cooperative  BEHAVIOR & AGGRESSION TOOL COLOR: Green   ABNL VS/O2: Daily vital signs  MOBILITY: Independent in room, SBA in hallway  PAIN MANAGMENT: Requested Tums for stomach burn/discomfort. Helpful  DIET: Regular  BOWEL/BLADDER: Continent  ABNL LAB/BG: NA  DRAIN/DEVICES: None  TELEMETRY RHYTHM: NA  SKIN: Pale and dry. +1 edema to BLE. Coccyx pink/red, blanchable  TESTS/PROCEDURES: NA  D/C DATE: Pending placement with group home  Discharge Barriers: Group home placement and MA approval  OTHER IMPORTANT INFO: Patient refusing weekly nasal swab for COVID    Goal Outcome Evaluation:    Plan of Care Reviewed With: patient     Overall Patient Progress: improving

## 2022-05-01 NOTE — PLAN OF CARE
Goal Outcome Evaluation:      SUMMARY: Awaiting placement.     DATE & TIME: 4/30/22 3-11pm shift      Cognitive Concerns/ Orientation : Alert, calm and cooperative   BEHAVIOR & AGGRESSION TOOL COLOR: Green  ABNL VS/O2: Daily vital signs           MOBILITY: Independent in room. SBA in hallways, prefers to hold arm of staff when walking  PAIN MANAGMENT: belching at times but not complaining of abdominal pain.   DIET: Regular, fair-good appetite this cameron.   BOWEL/BLADDER: Continent, had a BM this shift  ABNL LAB/BG: NA  DRAIN/DEVICES: None  TELEMETRY RHYTHM: NA  SKIN: Pale and dry. +1 edema to BLE. Coccyx pink/red, blanchable  TESTS/PROCEDURES: NA  D/C DATE: Pending placement with group home  Discharge Barriers: Group home placement and MA approval  OTHER IMPORTANT INFO: Patient refusing weekly nasal swab for COVID

## 2022-05-02 PROCEDURE — 250N000013 HC RX MED GY IP 250 OP 250 PS 637: Performed by: REGISTERED NURSE

## 2022-05-02 PROCEDURE — 250N000013 HC RX MED GY IP 250 OP 250 PS 637: Performed by: INTERNAL MEDICINE

## 2022-05-02 PROCEDURE — 120N000001 HC R&B MED SURG/OB

## 2022-05-02 PROCEDURE — 99231 SBSQ HOSP IP/OBS SF/LOW 25: CPT | Performed by: HOSPITALIST

## 2022-05-02 PROCEDURE — 250N000013 HC RX MED GY IP 250 OP 250 PS 637: Performed by: HOSPITALIST

## 2022-05-02 RX ADMIN — SENNOSIDES AND DOCUSATE SODIUM 1 TABLET: 8.6; 5 TABLET ORAL at 08:59

## 2022-05-02 RX ADMIN — SENNOSIDES AND DOCUSATE SODIUM 1 TABLET: 8.6; 5 TABLET ORAL at 21:01

## 2022-05-02 RX ADMIN — PANTOPRAZOLE SODIUM 40 MG: 40 TABLET, DELAYED RELEASE ORAL at 08:59

## 2022-05-02 RX ADMIN — Medication 1 MG: at 21:01

## 2022-05-02 RX ADMIN — POLYETHYLENE GLYCOL 3350 17 G: 17 POWDER, FOR SOLUTION ORAL at 08:59

## 2022-05-02 RX ADMIN — Medication 50 MCG: at 08:59

## 2022-05-02 RX ADMIN — MULTIPLE VITAMINS W/ MINERALS TAB 1 TABLET: TAB at 21:01

## 2022-05-02 ASSESSMENT — ACTIVITIES OF DAILY LIVING (ADL)
ADLS_ACUITY_SCORE: 14
ADLS_ACUITY_SCORE: 13
ADLS_ACUITY_SCORE: 12
ADLS_ACUITY_SCORE: 14
ADLS_ACUITY_SCORE: 13
ADLS_ACUITY_SCORE: 13
ADLS_ACUITY_SCORE: 14
ADLS_ACUITY_SCORE: 13
ADLS_ACUITY_SCORE: 12
ADLS_ACUITY_SCORE: 12
ADLS_ACUITY_SCORE: 14
ADLS_ACUITY_SCORE: 13
ADLS_ACUITY_SCORE: 13
ADLS_ACUITY_SCORE: 14
ADLS_ACUITY_SCORE: 12
ADLS_ACUITY_SCORE: 13
ADLS_ACUITY_SCORE: 13
ADLS_ACUITY_SCORE: 12
ADLS_ACUITY_SCORE: 14
ADLS_ACUITY_SCORE: 13
ADLS_ACUITY_SCORE: 12
ADLS_ACUITY_SCORE: 13

## 2022-05-02 NOTE — PLAN OF CARE
SUMMARY: Awaiting placement.   DATE & TIME: 5/1/22 8078-0520            Cognitive Concerns/ Orientation : Alert, calm and cooperative  BEHAVIOR & AGGRESSION TOOL COLOR: Green     ABNL VS/O2: Daily vital signs  MOBILITY: Independent in room, could be Ind in halls but pt is fearful to walk alone. PAIN MANAGMENT: denies  DIET: Regular, appetite fair-poor tonight  BOWEL/BLADDER: Continent  ABNL LAB/BG: NA  DRAIN/DEVICES: None  TELEMETRY RHYTHM: NA  SKIN: Pale and dry. +1 edema to BLE. Coccyx pink/red, blanchable  TESTS/PROCEDURES: NA  D/C DATE: Pending placement with group home  Discharge Barriers: Group home placement and MA approval  OTHER IMPORTANT INFO: Patient refusing weekly nasal swab for COVID. See daily routine on bathroom door, encourage independence

## 2022-05-02 NOTE — PLAN OF CARE
Goal Outcome Evaluation:      SUMMARY: Awaiting placement.   DATE & TIME: 5/2/220730-1930  Cognitive Concerns/ Orientation : Alert, calm and cooperative  BEHAVIOR & AGGRESSION TOOL COLOR: Green     ABNL VS/O2: Daily vital signs  MOBILITY: Independent in room, could be Ind in halls but pt is fearful to walk alone. PAIN MANAGMENT: denies  DIET: Regular, appetite fair-poor tonight  BOWEL/BLADDER: Continent  ABNL LAB/BG: NA  DRAIN/DEVICES: None  TELEMETRY RHYTHM: NA  SKIN: Pale and dry. +1 edema to BLE. Coccyx pink/red, blanchable  TESTS/PROCEDURES: NA  D/C DATE: Pending placement with group home  Discharge Barriers: Group home placement and MA approval  OTHER IMPORTANT INFO: Patient refusing weekly nasal swab for COVID. See daily routine on bathroom door, encourage independence

## 2022-05-02 NOTE — PLAN OF CARE
Goal Outcome Evaluation:        SUMMARY: Awaiting placement.   DATE & TIME: 5/1/22 3-11pm shift            Cognitive Concerns/ Orientation : Alert, calm and cooperative  BEHAVIOR & AGGRESSION TOOL COLOR: Green     ABNL VS/O2: Daily vital signs  MOBILITY: Independent in room, could be Ind in halls but pt is fearful to walk alone. PAIN MANAGMENT: denies  DIET: Regular, appetite fair-poor tonight  BOWEL/BLADDER: Continent  ABNL LAB/BG: NA  DRAIN/DEVICES: None  TELEMETRY RHYTHM: NA  SKIN: Pale and dry. +1 edema to BLE. Coccyx pink/red, blanchable  TESTS/PROCEDURES: NA  D/C DATE: Pending placement with group home  Discharge Barriers: Group home placement and MA approval  OTHER IMPORTANT INFO: Patient refusing weekly nasal swab for COVID. See daily routine on bathroom door, encourage independence

## 2022-05-02 NOTE — PROGRESS NOTES
[facility]  Hospitalist Progress Note    When I evaluated patient: 05/02/2022      Interval History   -Chart reviewed, discussed with nursing staff.  Patient is ambulating in the hallway with nursing staff.  Noted some nasal congestion but patient denied any dyspnea.    Assessment & Plan   Summary: Miranda Dover is a 51 year old female with PMH Down Syndrome who was admitted on 1/22/2022 with COVID-19 pneumonia and acute hypoxic respiratory failure. Hospitalization has been prolonged due to need to establish guardianship following deaths of her parents. Awaiting placement.    Patient is in hospital for longtime given problem with placement at this time  She remain medically stable at this time without change in her clinical status.        Down syndrome  Developmental Delay  Failure to thrive  Had significant emotional, psychiatric issues during this stay due to the death of her parents and prolonged hospitalization. Discharge planning has been complicated by need for guardianship and payor source for placement. Patient's brother, Maxi, was granted 60 days of emergency guardianship on 2/15. Completed psychologic testing on 3/2/22 to assess current cognitive function and adaptive abilities. Permanent guardianship on 4/1/2022.  - Continue social work and clinical coordinator for disposition.  Trying to find a group home for patient      Severe malnutrition  Anorexia  Chronic abdominal pain  Chronic constipation  BMI ~13 on early this hospitalization with poor PO intake. SLP was consulted for possible dysphagia, and no evidence of dysphagia was noted. On 2/20, discussed with pt's brother; overall, felt that oral intake was probably acceptable at this point for discharge and did not feel we needed to pursue enteral feedings/g-tube at this point; desired workup for etiology of abdominal pain which he felt was contributing to her decreased PO intake. CT abd/pelvis 2/21 showed large amount of  stool. EGD 2/22 relatively unremarkable. US abd no acute pathology. BMI up to 14.7 2/27.  - Encourage PO intake  - PPI  - Bowel regimen  Overall stable.      Thyroid nodule  TSH has been elevated but normal T4 in the past   - Repeat TSH ordered but patient has been refusing blood draws.  This lab is not urgent and can be done as an outpatient     COVID-19 recovered  Initially diagnosed 1/19/2022     E coli UTI, resolved    DVT Prophylaxis: None  Code Status: Full Code  PT/OT: not needed  Diet: Combination Diet Regular Diet; High Kcal/High Protein Diet, ADULT      Disposition: Expected discharge to group home when found    Lynn Mendoza MD   Hospitalist    -Data reviewed today:no new labs or imaging at this time, vitals remain stable.    Physical Exam   Temp: 97.2  F (36.2  C) Temp src: Oral BP: 106/64 Pulse: 56   Resp: 16 SpO2: 94 % O2 Device: None (Room air)    Vitals:    02/05/22 0729 02/14/22 1329 04/14/22 1552   Weight: 43.8 kg (96 lb 9.6 oz) 37.7 kg (83 lb 3.2 oz) 40.3 kg (88 lb 14.4 oz)     Vital Signs with Ranges  Temp:  [97.2  F (36.2  C)] 97.2  F (36.2  C)  Pulse:  [56] 56  Resp:  [16] 16  BP: (106)/(64) 106/64  SpO2:  [94 %] 94 %  No intake/output data recorded.  O2 requirements: none    Constitutional: awake,  Alert and in no distress.   HEENT: Eyes nonicteric, Down facies  Respiratory: Breathing comfortably  Skin/Integumen: No rashes  Neuro/Psych: Appropriate affect and mood, quiet and pleasant. Moves all extremities, no focal deficit.     Medications       melatonin  1 mg Oral At Bedtime     multivitamin w/minerals  1 tablet Oral Daily     pantoprazole  40 mg Oral QAM AC     polyethylene glycol  17 g Oral Daily     senna-docusate  1 tablet Oral BID     cholecalciferol  50 mcg Oral Daily       Data   No lab results found in last 7 days.    Imaging:   No results found for this or any previous visit (from the past 24 hour(s)).

## 2022-05-03 PROCEDURE — 250N000013 HC RX MED GY IP 250 OP 250 PS 637: Performed by: INTERNAL MEDICINE

## 2022-05-03 PROCEDURE — 99231 SBSQ HOSP IP/OBS SF/LOW 25: CPT | Performed by: HOSPITALIST

## 2022-05-03 PROCEDURE — 250N000013 HC RX MED GY IP 250 OP 250 PS 637: Performed by: REGISTERED NURSE

## 2022-05-03 PROCEDURE — 250N000013 HC RX MED GY IP 250 OP 250 PS 637: Performed by: HOSPITALIST

## 2022-05-03 PROCEDURE — 120N000001 HC R&B MED SURG/OB

## 2022-05-03 RX ADMIN — PANTOPRAZOLE SODIUM 40 MG: 40 TABLET, DELAYED RELEASE ORAL at 08:46

## 2022-05-03 RX ADMIN — POLYETHYLENE GLYCOL 3350 17 G: 17 POWDER, FOR SOLUTION ORAL at 08:46

## 2022-05-03 RX ADMIN — Medication 50 MCG: at 08:46

## 2022-05-03 RX ADMIN — MULTIPLE VITAMINS W/ MINERALS TAB 1 TABLET: TAB at 21:14

## 2022-05-03 RX ADMIN — SENNOSIDES AND DOCUSATE SODIUM 1 TABLET: 8.6; 5 TABLET ORAL at 21:14

## 2022-05-03 RX ADMIN — Medication 1 MG: at 21:14

## 2022-05-03 RX ADMIN — SENNOSIDES AND DOCUSATE SODIUM 1 TABLET: 8.6; 5 TABLET ORAL at 08:46

## 2022-05-03 ASSESSMENT — ACTIVITIES OF DAILY LIVING (ADL)
ADLS_ACUITY_SCORE: 14
ADLS_ACUITY_SCORE: 16
ADLS_ACUITY_SCORE: 14
ADLS_ACUITY_SCORE: 16
ADLS_ACUITY_SCORE: 14

## 2022-05-03 NOTE — PROGRESS NOTES
[facility]  Hospitalist Progress Note    When I evaluated patient: 05/03/2022      Interval History   -Chart reviewed, discussed with nursing staff.  Patient is ambulating in the room. Did not report any symptoms    Assessment & Plan   Summary: Miranda Dover is a 51 year old female with PMH Down Syndrome who was admitted on 1/22/2022 with COVID-19 pneumonia and acute hypoxic respiratory failure. Hospitalization has been prolonged due to need to establish guardianship following deaths of her parents. Awaiting placement.    Patient is in hospital for longtime given problem with placement at this time  She remain medically stable at this time without change in her clinical status.        Down syndrome  Developmental Delay  Failure to thrive  Had significant emotional, psychiatric issues during this stay due to the death of her parents and prolonged hospitalization. Discharge planning has been complicated by need for guardianship and payor source for placement. Patient's brother, Maxi, was granted 60 days of emergency guardianship on 2/15. Completed psychologic testing on 3/2/22 to assess current cognitive function and adaptive abilities. Permanent guardianship on 4/1/2022.  - Continue social work and clinical coordinator for disposition.  Trying to find a group home for patient      Severe malnutrition  Anorexia  Chronic abdominal pain  Chronic constipation  BMI ~13 on early this hospitalization with poor PO intake. SLP was consulted for possible dysphagia, and no evidence of dysphagia was noted. On 2/20, discussed with pt's brother; overall, felt that oral intake was probably acceptable at this point for discharge and did not feel we needed to pursue enteral feedings/g-tube at this point; desired workup for etiology of abdominal pain which he felt was contributing to her decreased PO intake. CT abd/pelvis 2/21 showed large amount of stool. EGD 2/22 relatively unremarkable. US abd no acute  pathology. BMI up to 14.7 2/27.  - Encourage PO intake  - PPI  - Bowel regimen  Overall stable.      Thyroid nodule  TSH has been elevated but normal T4 in the past   - Repeat TSH ordered but patient has been refusing blood draws.  This lab is not urgent and can be done as an outpatient     COVID-19 recovered  Initially diagnosed 1/19/2022     E coli UTI, resolved    DVT Prophylaxis: None  Code Status: Full Code  PT/OT: not needed  Diet: Combination Diet Regular Diet; High Kcal/High Protein Diet, ADULT      Disposition: Expected discharge to group home when found    Lynn Mendoza MD   Hospitalist  -Data reviewed today:no new labs or imaging at this time, vitals remain stable.    Physical Exam   Temp: 97.2  F (36.2  C) Temp src: Oral BP: 107/71 Pulse: 56   Resp: 16 SpO2: 100 % O2 Device: None (Room air)    Vitals:    02/05/22 0729 02/14/22 1329 04/14/22 1552   Weight: 43.8 kg (96 lb 9.6 oz) 37.7 kg (83 lb 3.2 oz) 40.3 kg (88 lb 14.4 oz)     Vital Signs with Ranges  Temp:  [97.2  F (36.2  C)-97.8  F (36.6  C)] 97.2  F (36.2  C)  Pulse:  [56-70] 56  Resp:  [16] 16  BP: ()/(62-71) 107/71  SpO2:  [100 %] 100 %  No intake/output data recorded.  O2 requirements: none    Constitutional: awake,  Alert and in no distress.   HEENT: Eyes nonicteric, Down facies  Respiratory: Breathing comfortably  Skin/Integumen: No rashes  Neuro/Psych: Appropriate affect and mood, quiet and pleasant. Moves all extremities, no focal deficit.     Medications       melatonin  1 mg Oral At Bedtime     multivitamin w/minerals  1 tablet Oral Daily     pantoprazole  40 mg Oral QAM AC     polyethylene glycol  17 g Oral Daily     senna-docusate  1 tablet Oral BID     cholecalciferol  50 mcg Oral Daily       Data   No lab results found in last 7 days.    Imaging:   No results found for this or any previous visit (from the past 24 hour(s)).

## 2022-05-03 NOTE — PLAN OF CARE
Goal Outcome Evaluation:    SUMMARY: Awaiting placement.   DATE & TIME: 5/2/22 1044-8393  Cognitive Concerns/ Orientation : Alert, calm and cooperative  BEHAVIOR & AGGRESSION TOOL COLOR: Green     ABNL VS/O2: Daily vital signs  MOBILITY: Independent in room, could be Ind in halls but pt is fearful to walk alone. PAIN MANAGMENT: denies  DIET: Regular, appetite fair-poor tonight  BOWEL/BLADDER: Continent  ABNL LAB/BG: NA  DRAIN/DEVICES: None  TELEMETRY RHYTHM: NA  SKIN: Pale and dry. +1 edema to BLE. Coccyx pink/red, blanchable  TESTS/PROCEDURES: NA  D/C DATE: Pending placement with group home  Discharge Barriers: Group home placement and MA approval  OTHER IMPORTANT INFO: Patient refusing weekly nasal swab for COVID. See daily routine on bathroom door, encourage independence

## 2022-05-03 NOTE — PLAN OF CARE
Goal Outcome Evaluation:  SUMMARY: Awaiting placement.   DATE & TIME: 5/2322 0730-1930  Cognitive Concerns/ Orientation : Alert, calm and cooperative  BEHAVIOR & AGGRESSION TOOL COLOR: Green     ABNL VS/O2: Daily vital signs  MOBILITY: Independent in room, could be Ind in halls but pt is fearful to walk alone. PAIN MANAGMENT: denies  DIET: Regular  BOWEL/BLADDER: Continent  ABNL LAB/BG: NA  DRAIN/DEVICES: None  TELEMETRY RHYTHM: NA  SKIN: Pale and dry. +1 edema to BLE. Coccyx pink/red, blanchable  TESTS/PROCEDURES: NA  D/C DATE: Pending placement with group home  Discharge Barriers: Group home placement and MA approval  OTHER IMPORTANT INFO: Patient refusing weekly nasal swab for COVID. See daily routine on bathroom door, encourage independence

## 2022-05-04 PROCEDURE — 250N000013 HC RX MED GY IP 250 OP 250 PS 637: Performed by: HOSPITALIST

## 2022-05-04 PROCEDURE — 250N000013 HC RX MED GY IP 250 OP 250 PS 637: Performed by: INTERNAL MEDICINE

## 2022-05-04 PROCEDURE — 120N000001 HC R&B MED SURG/OB

## 2022-05-04 PROCEDURE — 250N000013 HC RX MED GY IP 250 OP 250 PS 637: Performed by: REGISTERED NURSE

## 2022-05-04 RX ADMIN — Medication 50 MCG: at 09:57

## 2022-05-04 RX ADMIN — PANTOPRAZOLE SODIUM 40 MG: 40 TABLET, DELAYED RELEASE ORAL at 09:57

## 2022-05-04 RX ADMIN — POLYETHYLENE GLYCOL 3350 17 G: 17 POWDER, FOR SOLUTION ORAL at 10:01

## 2022-05-04 RX ADMIN — SENNOSIDES AND DOCUSATE SODIUM 1 TABLET: 8.6; 5 TABLET ORAL at 09:58

## 2022-05-04 RX ADMIN — SENNOSIDES AND DOCUSATE SODIUM 1 TABLET: 8.6; 5 TABLET ORAL at 21:30

## 2022-05-04 RX ADMIN — Medication 1 MG: at 21:30

## 2022-05-04 RX ADMIN — MULTIPLE VITAMINS W/ MINERALS TAB 1 TABLET: TAB at 21:30

## 2022-05-04 ASSESSMENT — ACTIVITIES OF DAILY LIVING (ADL)
ADLS_ACUITY_SCORE: 15
ADLS_ACUITY_SCORE: 14
ADLS_ACUITY_SCORE: 15
ADLS_ACUITY_SCORE: 14
ADLS_ACUITY_SCORE: 15
ADLS_ACUITY_SCORE: 15
ADLS_ACUITY_SCORE: 14
ADLS_ACUITY_SCORE: 15

## 2022-05-04 NOTE — PROGRESS NOTES
[facility]  Hospitalist Progress Note    When I evaluated patient: 05/04/2022      Interval History   - Patient is ambulating in the hallway, no acute issues reported .    Assessment & Plan   Summary: Miranda Dover is a 51 year old female with PMH Down Syndrome who was admitted on 1/22/2022 with COVID-19 pneumonia and acute hypoxic respiratory failure. Hospitalization has been prolonged due to need to establish guardianship following deaths of her parents. Awaiting placement.    Patient is in hospital for longtime given problem with placement at this time  She remain medically stable at this time without change in her clinical status.        Down syndrome  Developmental Delay  Failure to thrive  Had significant emotional, psychiatric issues during this stay due to the death of her parents and prolonged hospitalization. Discharge planning has been complicated by need for guardianship and payor source for placement. Patient's brother, Maxi, was granted 60 days of emergency guardianship on 2/15. Completed psychologic testing on 3/2/22 to assess current cognitive function and adaptive abilities. Permanent guardianship on 4/1/2022.  - Continue social work and clinical coordinator for disposition.  Trying to find a group home for patient      Severe malnutrition  Anorexia  Chronic abdominal pain  Chronic constipation  BMI ~13 on early this hospitalization with poor PO intake. SLP was consulted for possible dysphagia, and no evidence of dysphagia was noted. On 2/20, discussed with pt's brother; overall, felt that oral intake was probably acceptable at this point for discharge and did not feel we needed to pursue enteral feedings/g-tube at this point; desired workup for etiology of abdominal pain which he felt was contributing to her decreased PO intake. CT abd/pelvis 2/21 showed large amount of stool. EGD 2/22 relatively unremarkable. US abd no acute pathology. BMI up to 14.7 2/27.  -  Encourage PO intake  - PPI  - Bowel regimen  Overall stable.      Thyroid nodule  TSH has been elevated but normal T4 in the past   - Repeat TSH ordered but patient has been refusing blood draws.  This lab is not urgent and can be done as an outpatient     COVID-19 recovered  Initially diagnosed 1/19/2022     E coli UTI, resolved    DVT Prophylaxis: None  Code Status: Full Code  PT/OT: not needed  Diet: Combination Diet Regular Diet; High Kcal/High Protein Diet, ADULT      Disposition: Expected discharge to group home when found    Lynn Mendoza MD   Hospitalist  -Data reviewed today:no new labs or imaging at this time, vitals remain stable.    Physical Exam                      Vitals:    02/05/22 0729 02/14/22 1329 04/14/22 1552   Weight: 43.8 kg (96 lb 9.6 oz) 37.7 kg (83 lb 3.2 oz) 40.3 kg (88 lb 14.4 oz)     Vital Signs with Ranges     I/O last 3 completed shifts:  In: 400 [P.O.:400]  Out: -   O2 requirements: none    Constitutional: awake,  Alert and in no distress.   HEENT: Eyes nonicteric, Down facies  Respiratory: Breathing comfortably  Skin/Integumen: No rashes  Neuro/Psych: Appropriate affect and mood, quiet and pleasant. Moves all extremities, no focal deficit.     Medications       melatonin  1 mg Oral At Bedtime     multivitamin w/minerals  1 tablet Oral Daily     pantoprazole  40 mg Oral QAM AC     polyethylene glycol  17 g Oral Daily     senna-docusate  1 tablet Oral BID     cholecalciferol  50 mcg Oral Daily       Data   No lab results found in last 7 days.    Imaging:   No results found for this or any previous visit (from the past 24 hour(s)).

## 2022-05-04 NOTE — PLAN OF CARE
SUMMARY: Awaiting placement.   DATE & TIME: 5/4/2022 Day shift  Cognitive Concerns/ Orientation : Alert, calm and cooperative  BEHAVIOR & AGGRESSION TOOL COLOR: Green     ABNL VS/O2: Daily vital signs  MOBILITY: Independent in room,  Ind in halls   PAIN MANAGMENT: denies (though complains of psychosomatic abdominal pain)  DIET: Regular- poor, poor diet  BOWEL/BLADDER: Continent- no BM today  ABNL LAB/BG: NA  DRAIN/DEVICES: None  TELEMETRY RHYTHM: NA  SKIN: Pale and dry. +1 edema to BLE. Coccyx pink/red, blanchable  TESTS/PROCEDURES: NA  D/C DATE: Pending placement with group home  Discharge Barriers: Group home placement and MA approval  OTHER IMPORTANT INFO: Patient refusing weekly nasal swab for COVID. See daily routine on bathroom door, encourage independence

## 2022-05-04 NOTE — PLAN OF CARE
Goal Outcome Evaluation:      SUMMARY: Awaiting placement.   DATE & TIME: 5/3/2022-5/4/2022 2116-5725  Cognitive Concerns/ Orientation : Alert, calm and cooperative  BEHAVIOR & AGGRESSION TOOL COLOR: Green     ABNL VS/O2: Daily vital signs  MOBILITY: Independent in room, could be Ind in halls but pt is fearful to walk alone. PAIN MANAGMENT: denies  DIET: Regular  BOWEL/BLADDER: Continent  ABNL LAB/BG: NA  DRAIN/DEVICES: None  TELEMETRY RHYTHM: NA  SKIN: Pale and dry. +1 edema to BLE. Coccyx pink/red, blanchable  TESTS/PROCEDURES: NA  D/C DATE: Pending placement with group home  Discharge Barriers: Group home placement and MA approval  OTHER IMPORTANT INFO: Patient refusing weekly nasal swab for COVID. See daily routine on bathroom door, encourage independence

## 2022-05-04 NOTE — PROGRESS NOTES
Care Management Follow Up    Length of Stay (days): 102    Expected Discharge Date: 05/11/2022     Concerns to be Addressed: discharge planning     Patient plan of care discussed at interdisciplinary rounds: Yes    Anticipated Discharge Disposition: Group Home     Anticipated Discharge Services:    Anticipated Discharge DME:      Patient/family educated on Medicare website which has current facility and service quality ratings: no  Education Provided on the Discharge Plan:    Patient/Family in Agreement with the Plan: yes    Referrals Placed by CM/SW: Post Acute Facilities  Private pay costs discussed: Not applicable    Additional Information:    Elsi called FADUMO Redman to inquire into MA application status, [place] assessment progress, and update on [facility] assessment of pt for placement; left vm and waiting on return call back.    Update:  Sw spoke with FADUMO and she confirmed she completed the [place] assessment but MA maikel remains pending.  FADUMO states that brother, Maxi, has been attempting to connect with social security dept and is having difficulty connecting.  FADUMO also stated that RSDI form needs to be completed.  FADUMO states that 2 GHs in [place] have been found with openings and she sent the info to Maxi this week so he can set up a tour.  FADUMO states that once Maxi selects a GH, she can then begin the rate negotiation process, even if MA maikel is pending.  Regardless, though, MA maikel must be completed/approved in order to secure any funding for pt.  Elsi called Maxi and confirmed that he received GH info and toured one of the homes this week, describing this home as 'decent'.  He has contacted the 2nd home and is waiting on a call back; would like to tour this home, too, to compare/contrast.  Maxi confirmed that he did connect with social security and provided them with the documentation they needed, however, has been unable to connect to determine that their portion/process has been completed.  He does know they received  the documentation he submitted b/c they provided confirmation of this.  Maxi is unsure what else needs to be completed for MA maikel and plans on connecting with CM today.  Sw to f/u with Maxi regarding GH progress.      Connie Smalls, LICSW

## 2022-05-05 PROCEDURE — 250N000013 HC RX MED GY IP 250 OP 250 PS 637: Performed by: HOSPITALIST

## 2022-05-05 PROCEDURE — 250N000013 HC RX MED GY IP 250 OP 250 PS 637: Performed by: REGISTERED NURSE

## 2022-05-05 PROCEDURE — 120N000001 HC R&B MED SURG/OB

## 2022-05-05 PROCEDURE — 250N000013 HC RX MED GY IP 250 OP 250 PS 637: Performed by: INTERNAL MEDICINE

## 2022-05-05 PROCEDURE — 99231 SBSQ HOSP IP/OBS SF/LOW 25: CPT | Performed by: HOSPITALIST

## 2022-05-05 RX ADMIN — SENNOSIDES AND DOCUSATE SODIUM 1 TABLET: 8.6; 5 TABLET ORAL at 22:11

## 2022-05-05 RX ADMIN — POLYETHYLENE GLYCOL 3350 17 G: 17 POWDER, FOR SOLUTION ORAL at 10:21

## 2022-05-05 RX ADMIN — PANTOPRAZOLE SODIUM 40 MG: 40 TABLET, DELAYED RELEASE ORAL at 10:21

## 2022-05-05 RX ADMIN — Medication 1 MG: at 22:11

## 2022-05-05 RX ADMIN — Medication 50 MCG: at 10:21

## 2022-05-05 RX ADMIN — MULTIPLE VITAMINS W/ MINERALS TAB 1 TABLET: TAB at 22:11

## 2022-05-05 RX ADMIN — SENNOSIDES AND DOCUSATE SODIUM 1 TABLET: 8.6; 5 TABLET ORAL at 10:21

## 2022-05-05 ASSESSMENT — ACTIVITIES OF DAILY LIVING (ADL)
ADLS_ACUITY_SCORE: 15

## 2022-05-05 NOTE — PLAN OF CARE
Goal Outcome Evaluation:    SUMMARY: Awaiting placement.   DATE & TIME: 5/4/2022-5/5/22 4934-1613  Cognitive Concerns/ Orientation : A&Ox4  BEHAVIOR & AGGRESSION TOOL COLOR: Green     ABNL VS/O2: Daily vital signs  MOBILITY: Independent in halls and room  PAIN MANAGMENT: Denies.  DIET: Regular- fair appetite.  BOWEL/BLADDER: Continent- no BM tonight  ABNL LAB/BG: NA  DRAIN/DEVICES: None  TELEMETRY RHYTHM: NA  SKIN: Pale and dry. +1 edema to ankles.  TESTS/PROCEDURES: NA  D/C DATE: Pending placement with group home  Discharge Barriers: Group home placement and MA approval  OTHER IMPORTANT INFO: Patient refusing weekly nasal swab for COVID. See daily routine on bathroom door, encourage independence. Pt went to bed around 0130.

## 2022-05-05 NOTE — PROGRESS NOTES
[facility]  Hospitalist Progress Note    When I evaluated patient: 05/05/2022      Interval History   - Up and ambulating in hallway with RN, no acute issues reported .    Assessment & Plan   Summary: Miranda Dover is a 51 year old female with PMH Down Syndrome who was admitted on 1/22/2022 with COVID-19 pneumonia and acute hypoxic respiratory failure. Hospitalization has been prolonged due to need to establish guardianship following deaths of her parents. Awaiting placement.    Patient is in hospital for longtime given problem with placement at this time  She remain medically stable at this time without change in her clinical status.        Down syndrome  Developmental Delay  Failure to thrive  Had significant emotional, psychiatric issues during this stay due to the death of her parents and prolonged hospitalization. Discharge planning has been complicated by need for guardianship and payor source for placement. Patient's brother, Maxi, was granted 60 days of emergency guardianship on 2/15. Completed psychologic testing on 3/2/22 to assess current cognitive function and adaptive abilities. Permanent guardianship on 4/1/2022.  - Continue social work and clinical coordinator for disposition.  Trying to find a group home for patient      Severe malnutrition  Anorexia  Chronic abdominal pain  Chronic constipation  BMI ~13 on early this hospitalization with poor PO intake. SLP was consulted for possible dysphagia, and no evidence of dysphagia was noted. On 2/20, discussed with pt's brother; overall, felt that oral intake was probably acceptable at this point for discharge and did not feel we needed to pursue enteral feedings/g-tube at this point; desired workup for etiology of abdominal pain which he felt was contributing to her decreased PO intake. CT abd/pelvis 2/21 showed large amount of stool. EGD 2/22 relatively unremarkable. US abd no acute pathology. BMI up to 14.7 2/27.  -  Encourage PO intake  - PPI  - Bowel regimen  Overall stable.      Thyroid nodule  TSH has been elevated but normal T4 in the past   - Repeat TSH ordered but patient has been refusing blood draws.  This lab is not urgent and can be done as an outpatient     COVID-19 recovered  Initially diagnosed 1/19/2022     E coli UTI, resolved    DVT Prophylaxis: None  Code Status: Full Code  PT/OT: not needed  Diet: Combination Diet Regular Diet; High Kcal/High Protein Diet, ADULT      Disposition: Expected discharge to group home when found    Lynn Mendoza MD   Hospitalist  -Data reviewed today:no new labs or imaging at this time, vitals remain stable.    Physical Exam   Temp: 97.9  F (36.6  C) Temp src: Oral BP: 96/58 Pulse: 66   Resp: 16 SpO2: 99 % O2 Device: None (Room air)    Vitals:    02/05/22 0729 02/14/22 1329 04/14/22 1552   Weight: 43.8 kg (96 lb 9.6 oz) 37.7 kg (83 lb 3.2 oz) 40.3 kg (88 lb 14.4 oz)     Vital Signs with Ranges  Temp:  [97.9  F (36.6  C)] 97.9  F (36.6  C)  Pulse:  [66] 66  Resp:  [16] 16  BP: (96)/(58) 96/58  SpO2:  [99 %] 99 %  I/O last 3 completed shifts:  In: 200 [P.O.:200]  Out: -   O2 requirements: none    Constitutional: awake,  Alert and in no distress.   HEENT:  Down facies  Respiratory: Breathing comfortably  Skin/Integumen: No rashes  Neuro/Psych: Appropriate affect and mood, quiet and pleasant. Moves all extremities, no focal deficit.     Medications       melatonin  1 mg Oral At Bedtime     multivitamin w/minerals  1 tablet Oral Daily     pantoprazole  40 mg Oral QAM AC     polyethylene glycol  17 g Oral Daily     senna-docusate  1 tablet Oral BID     cholecalciferol  50 mcg Oral Daily       Data   No lab results found in last 7 days.    Imaging:   No results found for this or any previous visit (from the past 24 hour(s)).

## 2022-05-05 NOTE — PROGRESS NOTES
CLINICAL NUTRITION SERVICES - REASSESSMENT NOTE      Malnutrition:   (4/21)   % Weight Loss: None noted - wt up from admission  % Intake:  <75% for > 7 days (moderate malnutrition)  Subcutaneous Fat Loss:  Baseline  Muscle Loss:  Baseline  Fluid Retention:  None noted     Malnutrition Diagnosis: Does not meet criteria at this time, but pt is at risk       EVALUATION OF PROGRESS TOWARD GOALS   Diet:  Regular, High Kcal/High Protein     Intake/Tolerance:  Patient has been eating % of meals over the past week. Ordering 2-3 meals/day with good variety. Spoke with patient in hallway this afternoon - she had a late breakfast and therefore declined my offer of ordering her something to lunch. States her meals are going well, no needs from us at this time.       ASSESSED NUTRITION NEEDS:  Dosing Weight 40.3 kg (4/14)  Estimated Energy Needs: 5334-4844 kcals (35-40 Kcal/Kg)  Justification: repletion and underweight  Estimated Protein Needs: 60+ grams protein (1.5+ g pro/Kg)  Justification: repletion and hypercatabolism with acute illness      NEW FINDINGS:   - Labs: None new to review.   - Meds: thera-vit-m for micronutrients, miralax, senna BID, vitamin D3 50 mcg/day  - GI: BM x 1-2 daily, last on 5/3  - Dispo: Pending GH placement, SW following   - Weight: No new weight over the past month to evaluate trends. Updated nutrition needs with most recent weight as above.     Vitals:    01/22/22 1140 02/05/22 0729 02/14/22 1329 04/14/22 1552   Weight: 33.6 kg (74 lb 1.2 oz) 43.8 kg (96 lb 9.6 oz) 37.7 kg (83 lb 3.2 oz) 40.3 kg (88 lb 14.4 oz)       Previous Goals:   Patient will consume at least 75% meals BID with 3 items per meal  Evaluation: Met    Previous Nutrition Diagnosis:   Predicted inadequate nutrient intake (energy/protein/micronutrients) related to variable appetite and food intake, reliance on similar food items for meals and intermittently declining meals  Evaluation: No change      CURRENT NUTRITION  DIAGNOSIS  Predicted inadequate nutrient intake (energy/protein/micronutrients) related to variable appetite and food intake, reliance on similar food items for meals and intermittently declining meals    INTERVENTIONS  Recommendations / Nutrition Prescription  Continue diet order as ordered   Encourage meals BID at minimum     Implementation  No new nutrition interventions at this time     Goals  Patient will consume at least 75% meals BID with 3 items per meal      MONITORING AND EVALUATION:  Progress towards goals will be monitored and evaluated per protocol and Practice Guidelines      Raina Morgan RD, LD  [facility] [address] Pager: [phone]

## 2022-05-05 NOTE — PLAN OF CARE
Goal Outcome Evaluation:      SUMMARY: Awaiting placement.   DATE & TIME: 5/5/22 ([id number])  Cognitive Concerns/ Orientation : A&Ox4  BEHAVIOR & AGGRESSION TOOL COLOR: Green     ABNL VS/O2: Daily vital signs  MOBILITY: Independent in halls and room, ambulating with nursing also  PAIN MANAGMENT: Denies.  DIET: Regular- fair appetite.  BOWEL/BLADDER: Continent- no BM today  ABNL LAB/BG: NA  DRAIN/DEVICES: None  TELEMETRY RHYTHM: NA  SKIN: Pale and dry. +1 edema to ankles.  TESTS/PROCEDURES: NA  D/C DATE: Pending placement with group home  Discharge Barriers: Group home placement and MA approval  OTHER IMPORTANT INFO: Patient refusing weekly nasal swab for COVID. See daily routine on bathroom door, encourage independence.

## 2022-05-05 NOTE — PLAN OF CARE
Goal Outcome Evaluation:    Plan of Care Reviewed With: patient     Overall Patient Progress: no change    SUMMARY: Awaiting placement.   DATE & TIME: 5/4/2022 3315-7602  Cognitive Concerns/ Orientation : A&Ox4  BEHAVIOR & AGGRESSION TOOL COLOR: Green     ABNL VS/O2: Daily vital signs  MOBILITY: Independent in halls and room  PAIN MANAGMENT: Denies.  DIET: Regular- fair appetite.  BOWEL/BLADDER: Continent- no BM today  ABNL LAB/BG: NA  DRAIN/DEVICES: None  TELEMETRY RHYTHM: NA  SKIN: Pale and dry. +1 edema to ankles.  TESTS/PROCEDURES: NA  D/C DATE: Pending placement with group home  Discharge Barriers: Group home placement and MA approval  OTHER IMPORTANT INFO: Patient refusing weekly nasal swab for COVID. See daily routine on bathroom door, encourage independence.

## 2022-05-06 PROCEDURE — 250N000013 HC RX MED GY IP 250 OP 250 PS 637: Performed by: REGISTERED NURSE

## 2022-05-06 PROCEDURE — 99231 SBSQ HOSP IP/OBS SF/LOW 25: CPT | Performed by: HOSPITALIST

## 2022-05-06 PROCEDURE — 250N000013 HC RX MED GY IP 250 OP 250 PS 637: Performed by: INTERNAL MEDICINE

## 2022-05-06 PROCEDURE — 250N000013 HC RX MED GY IP 250 OP 250 PS 637: Performed by: HOSPITALIST

## 2022-05-06 PROCEDURE — 120N000001 HC R&B MED SURG/OB

## 2022-05-06 RX ADMIN — MULTIPLE VITAMINS W/ MINERALS TAB 1 TABLET: TAB at 20:42

## 2022-05-06 RX ADMIN — SENNOSIDES AND DOCUSATE SODIUM 1 TABLET: 8.6; 5 TABLET ORAL at 20:42

## 2022-05-06 RX ADMIN — PANTOPRAZOLE SODIUM 40 MG: 40 TABLET, DELAYED RELEASE ORAL at 10:02

## 2022-05-06 RX ADMIN — SENNOSIDES AND DOCUSATE SODIUM 1 TABLET: 8.6; 5 TABLET ORAL at 10:02

## 2022-05-06 RX ADMIN — Medication 1 MG: at 20:42

## 2022-05-06 RX ADMIN — POLYETHYLENE GLYCOL 3350 17 G: 17 POWDER, FOR SOLUTION ORAL at 10:03

## 2022-05-06 RX ADMIN — Medication 50 MCG: at 10:02

## 2022-05-06 ASSESSMENT — ACTIVITIES OF DAILY LIVING (ADL)
ADLS_ACUITY_SCORE: 15

## 2022-05-06 NOTE — PROGRESS NOTES
[facility]    Medicine Progress Note - Hospitalist Service    Date of Admission:  1/22/2022    Assessment & Plan          Miranda Dover is a 51 year old female with PMH Down Syndrome who was admitted on 1/22/2022 with COVID-19 pneumonia and acute hypoxic respiratory failure.     Hospitalization has been prolonged due to need to establish guardianship following deaths of her parents. Awaiting placement.     Patient is in hospital for longtime given problem with placement at this timeShe remain medically stable at this time without change in her clinical status. Continues to declines labs         Down syndrome  Developmental Delay  Failure to thrive  Had significant emotional, psychiatric issues during this stay due to the death of her parents and prolonged hospitalization. Discharge planning has been complicated by need for guardianship and payor source for placement.   -Patient's brother, Maxi, was granted 60 days of emergency guardianship on 2/15. Completed psychologic testing on 3/2/22 to assess current cognitive function and adaptive abilities. Permanent guardianship on 4/1/2022.  - Continue social work and clinical coordinator for disposition  - we are working on getting her group home and family has been in agreement      Severe malnutrition  Anorexia  Chronic abdominal pain  Chronic constipation  BMI ~13 on early this hospitalization with poor PO intake. SLP was consulted for possible dysphagia, and no evidence of dysphagia was noted.   -On 2/20, discussed with pt's brother; overall, felt that oral intake was probably acceptable at this point for discharge and did not feel we needed to pursue enteral feedings/g-tube at this point; desired workup for etiology of abdominal pain which he felt was contributing to her decreased PO intake.  - CT abd/pelvis 2/21 showed large amount of stool. EGD 2/22 relatively unremarkable. US abd no acute pathology. BMI up to 14.7 2/27.  - Encourage PO  intake  - PPI  - Bowel regimen      Thyroid nodule  TSH has been elevated but normal T4 in the past   - Repeat TSH ordered but patient has been refusing blood draws.  This lab is not urgent and can be done as an outpatient     COVID-19 recovered  Initially diagnosed 1/19/2022     E coli UTI, resolved       Diet: Combination Diet Regular Diet; High Kcal/High Protein Diet, ADULT    DVT Prophylaxis: Ambulate every shift  Zhu Catheter: Not present  Central Lines: None  Cardiac Monitoring: None  Code Status: Full Code      Disposition Plan   Expected Discharge: 05/11/2022     Anticipated discharge location: group home    Delays:     Placement - Group Homes            The patient's care was discussed with the Bedside Nurse and Patient.    Vijay Hoffmann MD  Hospitalist Service  [facility]  Securely message with the Vocera Web Console (learn more here)  Text page via [email or web address] Paging/Directory         Clinically Significant Risk Factors Present on Admission                    ______________________________________________________________________    Interval History     She was sitting in the bed and was cooperative and I educated her the importance of labs and she declined again today and has done that in the past also     She is eating and drinking appropriately    She also loves to draw     No fever and she has been behaving appropriate per staff    Data reviewed today: I reviewed all medications, new labs and imaging results over the last 24 hours. I personally reviewed no images or EKG's today.    Physical Exam   Vital Signs: Temp: 98  F (36.7  C) Temp src: Oral BP: 97/58 Pulse: 69   Resp: 15 SpO2: 97 % O2 Device: None (Room air)    Weight: 88 lbs 14.4 oz        General: Patient appears comfortable and in no acute distress.  HEENT: down facies    Neck: Neck is supple   Respiratory: Lungs are clear to auscultation bilaterally with no wheeze or crackles   Cardiovascular: Regular rate , S1 and S2  normal with no murmer   Abdomen:   soft , non tender , non distended  Skin: No skin rashes or lesions to inspection or palpation.  Neurologic:  No facial droop  Musculoskeletal: moving upper and lower extremity   Psychiatric: cooperative     Data   Medications       melatonin  1 mg Oral At Bedtime     multivitamin w/minerals  1 tablet Oral Daily     pantoprazole  40 mg Oral QAM AC     polyethylene glycol  17 g Oral Daily     senna-docusate  1 tablet Oral BID     cholecalciferol  50 mcg Oral Daily

## 2022-05-06 NOTE — PLAN OF CARE
SUMMARY: Awaiting placement.   DATE & TIME: 5/5/22-5/6/22 6589-2702  Cognitive Concerns/ Orientation : A&Ox4  BEHAVIOR & AGGRESSION TOOL COLOR: Green     ABNL VS/O2: Daily vital signs  MOBILITY: Independent in rooms and halls, ambulated with staff in halls multiple times before bed.   PAIN MANAGMENT: Denies.  DIET: Regular- ate 100% of dinner  BOWEL/BLADDER: Continent- no BM, scheduled stool softener given  ABNL LAB/BG: N/A  DRAIN/DEVICES: None  TELEMETRY RHYTHM: N/A  SKIN: Pale and dry. +1 edema to ankles.  TESTS/PROCEDURES: N/A  D/C DATE: Pending placement with group home  Discharge Barriers: Group home placement and MA approval  OTHER IMPORTANT INFO: Patient refusing weekly nasal swab for COVID. See daily routine on bathroom door, encourage independence.

## 2022-05-06 NOTE — PLAN OF CARE
SUMMARY: Awaiting placement.   DATE & TIME: 5/6/2022 AM shift  Cognitive Concerns/ Orientation : Alert, calm and cooperative.   BEHAVIOR & AGGRESSION TOOL COLOR: Green     ABNL VS/O2: VSS on RA  MOBILITY: Independent in room, Ind in halls. Likes to walk around the [facility] and talk to staff. Very friendly.   PAIN MANAGMENT: denies pain except for abdominal discomfort at times. Chronic abdominal pain.  DIET: Regular- poor appetite. Eating small amounts. Encouraged to drink water.   BOWEL/BLADDER: Continent- no BM today.   ABNL LAB/BG: NA  DRAIN/DEVICES: None  TELEMETRY RHYTHM: NA  SKIN: Pale and dry. +1 edema to BLE. Coccyx with blanchable erythema.  TESTS/PROCEDURES: NA  D/C DATE: Pending placement with group home  Discharge Barriers: Group home placement, SW is following.  OTHER IMPORTANT INFO: Pt has been encouraged to do ADLs on her own; she is capable but asks for assistance. Needs some encouragement.

## 2022-05-07 PROCEDURE — 99231 SBSQ HOSP IP/OBS SF/LOW 25: CPT | Performed by: INTERNAL MEDICINE

## 2022-05-07 PROCEDURE — 250N000013 HC RX MED GY IP 250 OP 250 PS 637: Performed by: REGISTERED NURSE

## 2022-05-07 PROCEDURE — 250N000013 HC RX MED GY IP 250 OP 250 PS 637: Performed by: HOSPITALIST

## 2022-05-07 PROCEDURE — 120N000001 HC R&B MED SURG/OB

## 2022-05-07 PROCEDURE — 250N000013 HC RX MED GY IP 250 OP 250 PS 637: Performed by: INTERNAL MEDICINE

## 2022-05-07 RX ADMIN — SENNOSIDES AND DOCUSATE SODIUM 1 TABLET: 8.6; 5 TABLET ORAL at 11:22

## 2022-05-07 RX ADMIN — PANTOPRAZOLE SODIUM 40 MG: 40 TABLET, DELAYED RELEASE ORAL at 11:22

## 2022-05-07 RX ADMIN — Medication 1 MG: at 20:56

## 2022-05-07 RX ADMIN — SENNOSIDES AND DOCUSATE SODIUM 1 TABLET: 8.6; 5 TABLET ORAL at 20:56

## 2022-05-07 RX ADMIN — POLYETHYLENE GLYCOL 3350 17 G: 17 POWDER, FOR SOLUTION ORAL at 11:23

## 2022-05-07 RX ADMIN — MULTIPLE VITAMINS W/ MINERALS TAB 1 TABLET: TAB at 20:56

## 2022-05-07 RX ADMIN — Medication 50 MCG: at 11:22

## 2022-05-07 ASSESSMENT — ACTIVITIES OF DAILY LIVING (ADL)
ADLS_ACUITY_SCORE: 15
ADLS_ACUITY_SCORE: 13
ADLS_ACUITY_SCORE: 15
ADLS_ACUITY_SCORE: 13
ADLS_ACUITY_SCORE: 13
ADLS_ACUITY_SCORE: 15
ADLS_ACUITY_SCORE: 13
ADLS_ACUITY_SCORE: 15
ADLS_ACUITY_SCORE: 15
ADLS_ACUITY_SCORE: 13
ADLS_ACUITY_SCORE: 15
ADLS_ACUITY_SCORE: 15
ADLS_ACUITY_SCORE: 13
ADLS_ACUITY_SCORE: 15
ADLS_ACUITY_SCORE: 15
ADLS_ACUITY_SCORE: 13

## 2022-05-07 NOTE — PLAN OF CARE
SUMMARY: Awaiting placement.   DATE & TIME: 5/6/2022 2603-6679  Cognitive Concerns/ Orientation : Alert, calm and cooperative.   BEHAVIOR & AGGRESSION TOOL COLOR: Green     ABNL VS/O2: VSS on RA  MOBILITY: Independent in room, Ind in halls. Likes walking around [facility] and interacting with staff.  PAIN MANAGMENT: denies pain except for abdominal discomfort at times. Chronic abdominal pain.  DIET: Regular- poor appetite. Eating small amounts.    BOWEL/BLADDER: Continent- no BM today.   ABNL LAB/BG: NA  DRAIN/DEVICES: None  TELEMETRY RHYTHM: NA  SKIN: Pale and dry. +1 edema to BLE. Coccyx with blanchable erythema.  TESTS/PROCEDURES: NA  D/C DATE: Pending placement with group home  Discharge Barriers: Group home placement, SW is following.  OTHER IMPORTANT INFO: Needs some encouragement to perform ADL's on own.

## 2022-05-07 NOTE — PROGRESS NOTES
[facility]    Medicine Progress Note - Hospitalist Service    Date of Admission:  1/22/2022    Assessment & Plan          Miranda Dover is a 51 year old female with PMH Down Syndrome who was admitted on 1/22/2022 with COVID-19 pneumonia and acute hypoxic respiratory failure.     Hospitalization has been prolonged due to need to establish guardianship following deaths of her parents. Awaiting placement.     Patient is in hospital for longtime given problem with placement at this time, She remain medically stable at this time without change in her clinical status. Continues to declines labs         Down syndrome  Developmental Delay  Failure to thrive  Had significant emotional, psychiatric issues during this stay due to the death of her parents and prolonged hospitalization. Discharge planning has been complicated by need for guardianship and payor source for placement.   -Patient's brother, Maxi, was granted 60 days of emergency guardianship on 2/15. Completed psychologic testing on 3/2/22 to assess current cognitive function and adaptive abilities. Permanent guardianship on 4/1/2022.  - Continue social work and clinical coordinator for disposition  - we are working on getting her group home and family has been in agreement      Severe malnutrition  Anorexia  Chronic abdominal pain  Chronic constipation  BMI ~13 on early this hospitalization with poor PO intake. SLP was consulted for possible dysphagia, and no evidence of dysphagia was noted.   -On 2/20, discussed with pt's brother; overall, felt that oral intake was probably acceptable at this point for discharge and did not feel we needed to pursue enteral feedings/g-tube at this point; desired workup for etiology of abdominal pain which he felt was contributing to her decreased PO intake.  - CT abd/pelvis 2/21 showed large amount of stool. EGD 2/22 relatively unremarkable. US abd no acute pathology. BMI up to 14.7 2/27.  - Encourage  PO intake  - PPI  - Bowel regimen      Thyroid nodule  TSH has been elevated but normal T4 in the past   - Repeat TSH ordered but patient has been refusing blood draws.  This lab is not urgent and can be done as an outpatient     COVID-19 recovered  Initially diagnosed 1/19/2022     E coli UTI, resolved       Diet: Combination Diet Regular Diet; High Kcal/High Protein Diet, ADULT    DVT Prophylaxis: Ambulate every shift  Zhu Catheter: Not present  Central Lines: None  Cardiac Monitoring: None  Code Status: Full Code      Disposition Plan   Expected Discharge: 05/11/2022     Anticipated discharge location: group home    Delays:     Placement - Group Homes            The patient's care was discussed with the Bedside Nurse and Patient.    Vic Thomas MD  Hospitalist Service  [facility]  Securely message with the Vocera Web Console (learn more here)  Text page via [employer] Paging/Directory         Clinically Significant Risk Factors Present on Admission                    ______________________________________________________________________    Interval History     Offer no complaint this morning, just waking up.     No other significant event overnight.     Data reviewed today: I reviewed all medications, new labs and imaging results over the last 24 hours. I personally reviewed no images or EKG's today.    Physical Exam   Vital Signs: Temp: 98  F (36.7  C) Temp src: Oral BP: 97/57 Pulse: 95   Resp: 16 SpO2: 94 % O2 Device: None (Room air)    Weight: 88 lbs 14.4 oz        General: Patient appears comfortable and in no acute distress.  HEENT: down facies    Neck: Neck is supple   Respiratory: Lungs are clear to auscultation bilaterally with no wheeze or crackles   Cardiovascular: Regular rate , S1 and S2 normal with no murmer   Abdomen:   soft , non tender , non distended  Skin: No skin rashes or lesions to inspection or palpation.  Neurologic:  No facial droop  Musculoskeletal: moving upper  and lower extremity   Psychiatric: cooperative     Data   Medications       melatonin  1 mg Oral At Bedtime     multivitamin w/minerals  1 tablet Oral Daily     pantoprazole  40 mg Oral QAM AC     polyethylene glycol  17 g Oral Daily     senna-docusate  1 tablet Oral BID     cholecalciferol  50 mcg Oral Daily

## 2022-05-07 NOTE — PLAN OF CARE
SUMMARY: Awaiting placement.   DATE & TIME: 5/7/2022 AM shift  Cognitive Concerns/ Orientation : Alert, calm and cooperative.   BEHAVIOR & AGGRESSION TOOL COLOR: Green     ABNL VS/O2: VSS on RA  MOBILITY: Independent in room, Ind in halls. At times walks around with staff. Very friendly.   PAIN MANAGMENT: denies pain except for abdominal discomfort at times. Chronic abdominal pain.  DIET: Regular- poor appetite. Eating small amounts. Encouraged to drink water. Likes apple juice.   BOWEL/BLADDER: Continent- no BM today. +BS. Did not finish miralax today, but did take senna.   ABNL LAB/BG: NA  DRAIN/DEVICES: None  TELEMETRY RHYTHM: NA  SKIN: Pale and dry. +1 edema to BLE. Coccyx with blanchable erythema.  TESTS/PROCEDURES: NA  D/C DATE: Pending placement with group home  Discharge Barriers: Group home placement, SW is following.  OTHER IMPORTANT INFO: Pt has been encouraged to do ADLs on her own; she is capable but asks for assistance. Needs some encouragement.

## 2022-05-07 NOTE — PLAN OF CARE
Goal Outcome Evaluation:    Plan of Care Reviewed With: patient     Overall Patient Progress: no change    DATE & TIME: 5/6/2022 7767-5219  Cognitive Concerns/ Orientation : A&O x4, calm & cooperative   BEHAVIOR & AGGRESSION TOOL COLOR: Green     ABNL VS/O2: VSS on RA  MOBILITY: Independent in room and halls. Likes to walk with staff when able  PAIN MANAGMENT: Denies pain ex chronic abd discomfort   DIET: Regular- ate 100% of dinner  BOWEL/BLADDER: Continent. No BM since 5/3  DRAIN/DEVICES: None  SKIN: Pale and dry. +1 edema to BLE. Coccyx blanchable redness.  D/C DATE: Pending placement with group home and MA  Discharge Barriers:  placement and MA  OTHER IMPORTANT INFO: Needs some encouragement to perform ADL's on own, improving

## 2022-05-08 PROCEDURE — 250N000013 HC RX MED GY IP 250 OP 250 PS 637: Performed by: HOSPITALIST

## 2022-05-08 PROCEDURE — 250N000013 HC RX MED GY IP 250 OP 250 PS 637: Performed by: INTERNAL MEDICINE

## 2022-05-08 PROCEDURE — 250N000013 HC RX MED GY IP 250 OP 250 PS 637: Performed by: REGISTERED NURSE

## 2022-05-08 PROCEDURE — 120N000001 HC R&B MED SURG/OB

## 2022-05-08 PROCEDURE — 99232 SBSQ HOSP IP/OBS MODERATE 35: CPT | Performed by: INTERNAL MEDICINE

## 2022-05-08 RX ORDER — POLYETHYLENE GLYCOL 3350 17 G/17G
17 POWDER, FOR SOLUTION ORAL 2 TIMES DAILY
Status: DISCONTINUED | OUTPATIENT
Start: 2022-05-08 | End: 2022-06-28 | Stop reason: HOSPADM

## 2022-05-08 RX ADMIN — MULTIPLE VITAMINS W/ MINERALS TAB 1 TABLET: TAB at 21:43

## 2022-05-08 RX ADMIN — SENNOSIDES AND DOCUSATE SODIUM 1 TABLET: 8.6; 5 TABLET ORAL at 11:00

## 2022-05-08 RX ADMIN — BISACODYL 10 MG: 10 SUPPOSITORY RECTAL at 11:00

## 2022-05-08 RX ADMIN — Medication 1 MG: at 21:43

## 2022-05-08 RX ADMIN — PANTOPRAZOLE SODIUM 40 MG: 40 TABLET, DELAYED RELEASE ORAL at 11:00

## 2022-05-08 RX ADMIN — POLYETHYLENE GLYCOL 3350 17 G: 17 POWDER, FOR SOLUTION ORAL at 11:11

## 2022-05-08 RX ADMIN — SENNOSIDES AND DOCUSATE SODIUM 1 TABLET: 8.6; 5 TABLET ORAL at 21:43

## 2022-05-08 RX ADMIN — Medication 50 MCG: at 11:00

## 2022-05-08 ASSESSMENT — ACTIVITIES OF DAILY LIVING (ADL)
ADLS_ACUITY_SCORE: 15
ADLS_ACUITY_SCORE: 13
ADLS_ACUITY_SCORE: 15

## 2022-05-08 NOTE — PROGRESS NOTES
[facility]    Medicine Progress Note - Hospitalist Service    Date of Admission:  1/22/2022    Assessment & Plan          Miranda Dover is a 51 year old female with PMH Down Syndrome who was admitted on 1/22/2022 with COVID-19 pneumonia and acute hypoxic respiratory failure.     Hospitalization has been prolonged due to need to establish guardianship following deaths of her parents. Awaiting placement.     Patient is in hospital for longtime given problem with placement at this time, She remain medically stable at this time without change in her clinical status. Continues to declines labs         Down syndrome  Developmental Delay  Failure to thrive  Had significant emotional, psychiatric issues during this stay due to the death of her parents and prolonged hospitalization. Discharge planning has been complicated by need for guardianship and payor source for placement.   -Patient's brother, Maxi, was granted 60 days of emergency guardianship on 2/15. Completed psychologic testing on 3/2/22 to assess current cognitive function and adaptive abilities. Permanent guardianship on 4/1/2022.  - Continue social work and clinical coordinator for disposition  - we are working on getting her group home and family has been in agreement      Severe malnutrition  Anorexia  Chronic abdominal pain  Chronic constipation  BMI ~13 on early this hospitalization with poor PO intake. SLP was consulted for possible dysphagia, and no evidence of dysphagia was noted.   -On 2/20, discussed with pt's brother; overall, felt that oral intake was probably acceptable at this point for discharge and did not feel we needed to pursue enteral feedings/g-tube at this point; desired workup for etiology of abdominal pain which he felt was contributing to her decreased PO intake.  - CT abd/pelvis 2/21 showed large amount of stool. EGD 2/22 relatively unremarkable. US abd no acute pathology. BMI up to 14.7 2/27.  - Encourage  PO intake  - PPI  - Bowel regimen , increase Miralax to BID, continue Senna bid, use suppository as needed.      Thyroid nodule  TSH has been elevated but normal T4 in the past   - Repeat TSH ordered but patient has been refusing blood draws.  This lab is not urgent and can be done as an outpatient     COVID-19 recovered  Initially diagnosed 1/19/2022     E coli UTI, resolved       Diet: Combination Diet Regular Diet; High Kcal/High Protein Diet, ADULT    DVT Prophylaxis: Ambulate every shift  Zhu Catheter: Not present  Central Lines: None  Cardiac Monitoring: None  Code Status: Full Code      Disposition Plan   Expected Discharge: 05/11/2022     Anticipated discharge location: group home    Delays:     Placement - Group Homes            The patient's care was discussed with the Bedside Nurse and Patient.    Vic Thomas MD  Hospitalist Service  [facility]  Securely message with the Vocera Web Console (learn more here)  Text page via [employer] Paging/Directory         Clinically Significant Risk Factors Present on Admission                    ______________________________________________________________________    Interval History   Patient sleeping this morning at time of my visit, patient has no BM for 5 days.       No other significant event overnight.     Data reviewed today: I reviewed all medications, new labs and imaging results over the last 24 hours. I personally reviewed no images or EKG's today.    Physical Exam   Vital Signs: Temp: 97.9  F (36.6  C) Temp src: Oral BP: 99/57 Pulse: 63   Resp: 15 SpO2: 96 % O2 Device: None (Room air)    Weight: 88 lbs 14.4 oz        General: Patient appears comfortable and in no acute distress.  HEENT: down facies    Neck: Neck is supple   Respiratory: Lungs are clear to auscultation bilaterally with no wheeze or crackles   Cardiovascular: Regular rate , S1 and S2 normal with no murmer   Abdomen:   soft , non tender , non distended  Skin: No skin  rashes or lesions to inspection or palpation.  Neurologic:  No facial droop  Musculoskeletal: moving upper and lower extremity   Psychiatric: cooperative     Data   Medications       melatonin  1 mg Oral At Bedtime     multivitamin w/minerals  1 tablet Oral Daily     pantoprazole  40 mg Oral QAM AC     polyethylene glycol  17 g Oral BID     senna-docusate  1 tablet Oral BID     cholecalciferol  50 mcg Oral Daily

## 2022-05-08 NOTE — PLAN OF CARE
SUMMARY: Awaiting placement.   DATE & TIME: 5/7/2022 5854-5485  Cognitive Concerns/ Orientation : Alert, calm and cooperative.   BEHAVIOR & AGGRESSION TOOL COLOR: Green     ABNL VS/O2: VSS on RA  MOBILITY: Independent in room, Ind in halls. Likes to walk around halls and interact with staff.  PAIN MANAGMENT: denies pain, has occasional abdominal discomfort.  DIET: Regular- poor appetite. Eating small amounts. Encouraged to drink water. Likes milk and apple juice.   BOWEL/BLADDER: Continent- no BM today. +BS, took senna tonight.   ABNL LAB/BG: NA  DRAIN/DEVICES: None  TELEMETRY RHYTHM: NA  SKIN: Pale and dry. TESTS/PROCEDURES: NA  D/C DATE: Pending placement with group home  Discharge Barriers: Group home placement, SW is following.  OTHER IMPORTANT INFO: Pt has been encouraged to do ADLs on her own; she is capable but asks for assistance. Needs some encouragement.

## 2022-05-08 NOTE — PLAN OF CARE
SUMMARY: Awaiting placement.   DATE & TIME: 5/8/2022 AM shift  Cognitive Concerns/ Orientation : Alert, calm and cooperative.   BEHAVIOR & AGGRESSION TOOL COLOR: Green     ABNL VS/O2: VSS on RA  MOBILITY: Independent in room, Ind in halls. At times walks around with staff. Very friendly.   PAIN MANAGMENT: denies pain except for abdominal discomfort at times. Chronic abdominal pain. MD notified about no BM for 5 days, miralax increased to BID and suppository given today.   DIET: Regular- poor appetite. Eating small amounts. Encouraged to drink water. Likes apple juice. Dislikes taste of miralax so only drinks half of it.   BOWEL/BLADDER: Continent- no BM today. +BS. Senna and suppository today, and only half of miralax given (pt does not like the taste)  ABNL LAB/BG: NA  DRAIN/DEVICES: None  TELEMETRY RHYTHM: NA  SKIN: Pale and dry. +1 edema to BLE. Coccyx with blanchable erythema.  TESTS/PROCEDURES: NA  D/C DATE: Pending placement with group home  Discharge Barriers: Group home placement, SW is following.  OTHER IMPORTANT INFO: Pt has been encouraged to do ADLs on her own; she is capable but asks for assistance. Needs some encouragement. Plan for brother to visit today.

## 2022-05-08 NOTE — PLAN OF CARE
Goal Outcome Evaluation:    Plan of Care Reviewed With: patient     Overall Patient Progress: no change    DATE & TIME: 5/7/2022 3965-1649  Cognitive Concerns/ Orientation : A&O x4, calm & cooperative. Reports feeling excited to spend Sunday with her brother  BEHAVIOR & AGGRESSION TOOL COLOR: Green     ABNL VS/O2: VSS on RA  MOBILITY: Independent in room, ind in halls. Likes to walk around halls and interact with staff.  PAIN MANAGMENT: Denies pain except for occasional abdominal discomfort.  DIET: Regular- poor appetite. Eating small amounts. Encouraged to drink water. Likes milk and apple juice.   BOWEL/BLADDER: Continent, constipated. Receiving scheduled bowel meds  ABNL LAB/BG: NA  DRAIN/DEVICES: None  SKIN: Pale, dry, bruised  D/C DATE: Pending placement with group home  Discharge Barriers: Group home placement and MA. SW is following.  OTHER IMPORTANT INFO: Pt has been encouraged to do ADLs on her own; she is capable but asks for assistance/states reasons why she is unable to.

## 2022-05-09 PROCEDURE — 250N000013 HC RX MED GY IP 250 OP 250 PS 637: Performed by: HOSPITALIST

## 2022-05-09 PROCEDURE — 120N000001 HC R&B MED SURG/OB

## 2022-05-09 PROCEDURE — 250N000013 HC RX MED GY IP 250 OP 250 PS 637: Performed by: INTERNAL MEDICINE

## 2022-05-09 PROCEDURE — 99231 SBSQ HOSP IP/OBS SF/LOW 25: CPT | Performed by: INTERNAL MEDICINE

## 2022-05-09 RX ADMIN — POLYETHYLENE GLYCOL 3350 17 G: 17 POWDER, FOR SOLUTION ORAL at 10:49

## 2022-05-09 RX ADMIN — PANTOPRAZOLE SODIUM 40 MG: 40 TABLET, DELAYED RELEASE ORAL at 10:49

## 2022-05-09 RX ADMIN — MULTIPLE VITAMINS W/ MINERALS TAB 1 TABLET: TAB at 22:23

## 2022-05-09 RX ADMIN — Medication 1 MG: at 22:23

## 2022-05-09 RX ADMIN — POLYETHYLENE GLYCOL 3350 17 G: 17 POWDER, FOR SOLUTION ORAL at 00:08

## 2022-05-09 RX ADMIN — SENNOSIDES AND DOCUSATE SODIUM 1 TABLET: 8.6; 5 TABLET ORAL at 10:50

## 2022-05-09 RX ADMIN — SENNOSIDES AND DOCUSATE SODIUM 1 TABLET: 8.6; 5 TABLET ORAL at 22:23

## 2022-05-09 RX ADMIN — Medication 50 MCG: at 10:49

## 2022-05-09 ASSESSMENT — ACTIVITIES OF DAILY LIVING (ADL)
ADLS_ACUITY_SCORE: 33
ADLS_ACUITY_SCORE: 15
ADLS_ACUITY_SCORE: 33
ADLS_ACUITY_SCORE: 15
ADLS_ACUITY_SCORE: 33
ADLS_ACUITY_SCORE: 15
ADLS_ACUITY_SCORE: 33
ADLS_ACUITY_SCORE: 33
ADLS_ACUITY_SCORE: 15
ADLS_ACUITY_SCORE: 33

## 2022-05-09 NOTE — PLAN OF CARE
Goal Outcome Evaluation:  DATE & TIME: 5/9/22 5879-4095    Cognitive Concerns/ Orientation : Pt A/Ox4,    BEHAVIOR & AGGRESSION TOOL COLOR: Green   ABNL VS/O2: VSS on RA, daily vitals   MOBILITY: Independent  PAIN MANAGMENT: No complaints this shift  DIET: Regular  BOWEL/BLADDER: Continent.   SKIN: Blanchable redness on coccyx  D/C DATE: Discharge pending placement to group home  OTHER IMPORTANT INFO: Encourage pt to do own ADLs.  Was agreeable with minimal assistance.        Plan of Care Reviewed With: patient

## 2022-05-09 NOTE — PLAN OF CARE
DATE & TIME: 5/8/22 9927-6269    Cognitive Concerns/ Orientation : Pt A/Ox4, frustrated and tearful at beginning of shift.   BEHAVIOR & AGGRESSION TOOL COLOR: Green   ABNL VS/O2: VSS on RA, daily vitals   MOBILITY: Independent  PAIN MANAGMENT: Complaints of some abdominal discomfort, encouraged patient to take scheduled Miralax  DIET: Regular  BOWEL/BLADDER: Continent. Bowel movement overnight.   SKIN: Blanchable redness on coccyx  D/C DATE: Discharge pending placement to group home  OTHER IMPORTANT INFO: Encourage pt to do ADLs she will ask for assistance but is capable of completing on her own. Pt needs repeated encouragement. Pt frustrated and tearful at the start of shift due to staff encouraging pt to complete own ADLs and pt stating that she can't and needs help. After an hour pt finally agreed to complete most of her ADLs on her own with minimal assistance.

## 2022-05-09 NOTE — PROGRESS NOTES
[facility]    Medicine Progress Note - Hospitalist Service    Date of Admission:  1/22/2022    Assessment & Plan          Miranda Dover is a 51 year old female with PMH Down Syndrome who was admitted on 1/22/2022 with COVID-19 pneumonia and acute hypoxic respiratory failure.     Hospitalization has been prolonged due to need to establish guardianship following deaths of her parents. Awaiting placement.     Patient is in hospital for longtime given problem with placement at this time, She remain medically stable at this time without change in her clinical status. Continues to declines labs         Down syndrome  Developmental Delay  Failure to thrive  Had significant emotional, psychiatric issues during this stay due to the death of her parents and prolonged hospitalization. Discharge planning has been complicated by need for guardianship and payor source for placement.   -Patient's brother, Maxi, was granted 60 days of emergency guardianship on 2/15. Completed psychologic testing on 3/2/22 to assess current cognitive function and adaptive abilities. Permanent guardianship on 4/1/2022.  - Continue social work and clinical coordinator for disposition  - we are working on getting her group home and family has been in agreement      Severe malnutrition  Anorexia  Chronic abdominal pain  Chronic constipation  BMI ~13 on early this hospitalization with poor PO intake. SLP was consulted for possible dysphagia, and no evidence of dysphagia was noted.   -On 2/20, discussed with pt's brother; overall, felt that oral intake was probably acceptable at this point for discharge and did not feel we needed to pursue enteral feedings/g-tube at this point; desired workup for etiology of abdominal pain which he felt was contributing to her decreased PO intake.  - CT abd/pelvis 2/21 showed large amount of stool. EGD 2/22 relatively unremarkable. US abd no acute pathology. BMI up to 14.7 2/27.  - Encourage  PO intake  - PPI  - Bowel regimen , increase Miralax to BID, continue Senna bid, use suppository as needed.   Had BM on 5/8     Thyroid nodule  TSH has been elevated but normal T4 in the past   - Repeat TSH ordered but patient has been refusing blood draws.  This lab is not urgent and can be done as an outpatient     COVID-19 recovered  Initially diagnosed 1/19/2022     E coli UTI, resolved       Diet: Combination Diet Regular Diet; High Kcal/High Protein Diet, ADULT    DVT Prophylaxis: Ambulate every shift  Zhu Catheter: Not present  Central Lines: None  Cardiac Monitoring: None  Code Status: Full Code      Disposition Plan   Expected Discharge: 05/11/2022     Anticipated discharge location: group home    Delays:     Placement - Group Homes            The patient's care was discussed with the Bedside Nurse and Patient.    Vic Thomas MD  Hospitalist Service  [facility]  Securely message with the Vocera Web Console (learn more here)  Text page via [employer] Paging/Directory         Clinically Significant Risk Factors Present on Admission                    ______________________________________________________________________    Interval History   Patient sleeping again and does not want to wake up at this time.   She had BM yesterday       No other significant event overnight.     Data reviewed today: I reviewed all medications, new labs and imaging results over the last 24 hours. I personally reviewed no images or EKG's today.    Physical Exam   Vital Signs: Temp: 97.7  F (36.5  C) Temp src: Oral BP: 101/62 Pulse: 70   Resp: 16 SpO2: 95 % O2 Device: None (Room air)    Weight: 88 lbs 14.4 oz        General: Patient appears comfortable and in no acute distress.  HEENT: down facies    Neck: Neck is supple   Respiratory: Lungs are clear to auscultation bilaterally with no wheeze or crackles   Cardiovascular: Regular rate , S1 and S2 normal with no murmer   Abdomen:   soft , non tender , non  distended  Skin: No skin rashes or lesions to inspection or palpation.  Neurologic:  No facial droop  Musculoskeletal: moving upper and lower extremity   Psychiatric: cooperative     Data   Medications       melatonin  1 mg Oral At Bedtime     multivitamin w/minerals  1 tablet Oral Daily     pantoprazole  40 mg Oral QAM AC     polyethylene glycol  17 g Oral BID     senna-docusate  1 tablet Oral BID     cholecalciferol  50 mcg Oral Daily

## 2022-05-09 NOTE — PLAN OF CARE
SUMMARY: Awaiting placement.   DATE & TIME: 5/8/2022 5325-4727  Cognitive Concerns/ Orientation : Alert, calm and cooperative.   BEHAVIOR & AGGRESSION TOOL COLOR: Green     ABNL VS/O2: VSS on RA  MOBILITY: Independent in room, Ind in halls. Likes to walk in halls and interacts with staff.   PAIN MANAGMENT: denies pain. Has abdominal discomfort at times.   DIET: Regular- poor appetite. Eating small amounts.  Encouraged to drink more water and less soda. Pt dislikes Miralax and refusing to take.   BOWEL/BLADDER: Continent- no BM today. Bowel sounds present. Refusing to take scheduled Miralax (says she dislikes the taste) but did take scheduled senna.  ABNL LAB/BG: NA  DRAIN/DEVICES: None  TELEMETRY RHYTHM: NA  SKIN: Pale and dry. Coccyx with blanchable erythema.  TESTS/PROCEDURES: NA  D/C DATE: Pending placement with group home  Discharge Barriers: Group home placement, SW is following.  OTHER IMPORTANT INFO: Pt has been encouraged to do ADLs on her own; she is capable but asks for assistance. Needs some encouragement. Brother came to visit today and took her out to dinner. Pt in good spirits today when she returned.

## 2022-05-10 PROCEDURE — 99232 SBSQ HOSP IP/OBS MODERATE 35: CPT | Performed by: INTERNAL MEDICINE

## 2022-05-10 PROCEDURE — 250N000013 HC RX MED GY IP 250 OP 250 PS 637: Performed by: INTERNAL MEDICINE

## 2022-05-10 PROCEDURE — 250N000013 HC RX MED GY IP 250 OP 250 PS 637: Performed by: HOSPITALIST

## 2022-05-10 PROCEDURE — 120N000001 HC R&B MED SURG/OB

## 2022-05-10 RX ADMIN — MULTIPLE VITAMINS W/ MINERALS TAB 1 TABLET: TAB at 21:57

## 2022-05-10 RX ADMIN — POLYETHYLENE GLYCOL 3350 17 G: 17 POWDER, FOR SOLUTION ORAL at 11:01

## 2022-05-10 RX ADMIN — PANTOPRAZOLE SODIUM 40 MG: 40 TABLET, DELAYED RELEASE ORAL at 11:02

## 2022-05-10 RX ADMIN — Medication 50 MCG: at 11:02

## 2022-05-10 RX ADMIN — SENNOSIDES AND DOCUSATE SODIUM 1 TABLET: 8.6; 5 TABLET ORAL at 11:02

## 2022-05-10 RX ADMIN — SENNOSIDES AND DOCUSATE SODIUM 1 TABLET: 8.6; 5 TABLET ORAL at 21:57

## 2022-05-10 RX ADMIN — Medication 1 MG: at 21:57

## 2022-05-10 RX ADMIN — POLYETHYLENE GLYCOL 3350 17 G: 17 POWDER, FOR SOLUTION ORAL at 21:22

## 2022-05-10 ASSESSMENT — ACTIVITIES OF DAILY LIVING (ADL)
ADLS_ACUITY_SCORE: 33
ADLS_ACUITY_SCORE: 32
ADLS_ACUITY_SCORE: 33
ADLS_ACUITY_SCORE: 32
ADLS_ACUITY_SCORE: 33
ADLS_ACUITY_SCORE: 32
ADLS_ACUITY_SCORE: 32
ADLS_ACUITY_SCORE: 33
ADLS_ACUITY_SCORE: 29
ADLS_ACUITY_SCORE: 33

## 2022-05-10 NOTE — PROGRESS NOTES
Care Management Follow Up    Length of Stay (days): 108    Expected Discharge Date: 05/13/2022     Concerns to be Addressed: discharge planning     Patient plan of care discussed at interdisciplinary rounds: Yes    Anticipated Discharge Disposition: Group Home     Anticipated Discharge Services:    Anticipated Discharge DME:      Patient/family educated on Medicare website which has current facility and service quality ratings: no  Education Provided on the Discharge Plan:    Patient/Family in Agreement with the Plan: yes    Referrals Placed by FADUMO/KRYSTIN: Post Acute Facilities  Private pay costs discussed: Not applicable    Additional Information:    Krystin called both guardian/brother and Yolanda THORNE, and left voicemails to check on progress/status; waiting on return calls back.    Update:  Yolanda called Sw back and left a vm with the following information:  She spoke with Miguel Angel today and he is submitting social security documents today, MA maikel remains pending.  Miguel Angel also toured [facility] today and is feeling very positive about this home; FADUMO Guerrero, is sending official referral to them today.        Connie Smalls, BARBIESW

## 2022-05-10 NOTE — PLAN OF CARE
Goal Outcome Evaluation:    DATE & TIME: 5/9/22 1210-1550    Cognitive Concerns/ Orientation : Pt A/Ox4,    BEHAVIOR & AGGRESSION TOOL COLOR: Green   ABNL VS/O2: VSS on RA, daily vitals   MOBILITY: Independent  PAIN MANAGMENT: complained of hand pain that resolved.  DIET: Regular - good appetite  BOWEL/BLADDER: Continent.   SKIN: Blanchable redness on coccyx  D/C DATE: Discharge pending placement to group home  OTHER IMPORTANT INFO: Encouraged pt to do own ADLs with minimal assistance - pt easily upset and noncompliant at times.

## 2022-05-10 NOTE — PLAN OF CARE
Goal Outcome Evaluation:    DATE & TIME: 5/9/22-5/10/22 1559-3825  Cognitive Concerns/ Orientation : Pt A/Ox4,    BEHAVIOR & AGGRESSION TOOL COLOR: Green   ABNL VS/O2: VSS on RA, daily vitals   MOBILITY: Independent  PAIN MANAGMENT: denied pain  DIET: Regular - good appetite  BOWEL/BLADDER: Continent.   SKIN: Blanchable redness on coccyx  D/C DATE: Discharge pending placement to group home  OTHER IMPORTANT INFO: Encouraged pt to do own ADLs pt easily upset and noncompliant at times.

## 2022-05-10 NOTE — PROGRESS NOTES
[facility]    Medicine Progress Note - Hospitalist Service    Date of Admission:  1/22/2022    Assessment & Plan          Miranda Dover is a 51 year old female with PMH Down Syndrome who was admitted on 1/22/2022 with COVID-19 pneumonia and acute hypoxic respiratory failure.     Hospitalization has been prolonged due to need to establish guardianship following deaths of her parents. Awaiting placement.     Patient is in hospital for longtime given problem with placement at this time, She remain medically stable at this time without change in her clinical status. Continues to declines labs     No change in her clinical status at this time.         Down syndrome  Developmental Delay  Failure to thrive  Had significant emotional, psychiatric issues during this stay due to the death of her parents and prolonged hospitalization. Discharge planning has been complicated by need for guardianship and payor source for placement.   -Patient's brother, Maxi, was granted 60 days of emergency guardianship on 2/15. Completed psychologic testing on 3/2/22 to assess current cognitive function and adaptive abilities. Permanent guardianship on 4/1/2022.  - Continue social work and clinical coordinator for disposition  - we are working on getting her group home and family has been in agreement      Severe malnutrition  Anorexia  Chronic abdominal pain  Chronic constipation  BMI ~13 on early this hospitalization with poor PO intake. SLP was consulted for possible dysphagia, and no evidence of dysphagia was noted.   -On 2/20, discussed with pt's brother; overall, felt that oral intake was probably acceptable at this point for discharge and did not feel we needed to pursue enteral feedings/g-tube at this point; desired workup for etiology of abdominal pain which he felt was contributing to her decreased PO intake.  - CT abd/pelvis 2/21 showed large amount of stool. EGD 2/22 relatively unremarkable. US abd no  acute pathology. BMI up to 14.7 2/27.  - Encourage PO intake  - PPI  - Bowel regimen , increase Miralax to BID, continue Senna bid, use suppository as needed.   Had BM on 5/8     Thyroid nodule  TSH has been elevated but normal T4 in the past   - Repeat TSH ordered but patient has been refusing blood draws.  This lab is not urgent and can be done as an outpatient     COVID-19 recovered  Initially diagnosed 1/19/2022     E coli UTI, resolved       Diet: Combination Diet Regular Diet; High Kcal/High Protein Diet, ADULT    DVT Prophylaxis: Ambulate every shift  Zhu Catheter: Not present  Central Lines: None  Cardiac Monitoring: None  Code Status: Full Code      Disposition Plan   Expected Discharge: 05/13/2022     Anticipated discharge location: group home    Delays:     Placement - Group Homes            The patient's care was discussed with the Bedside Nurse and Patient.    Vic Thomas MD  Hospitalist Service  [facility]  Securely message with the Vocera Web Console (learn more here)  Text page via [employer] Paging/Directory         Clinically Significant Risk Factors Present on Admission                    ______________________________________________________________________    Interval History   No complaints today. Slept well       No other significant event overnight.     Data reviewed today: I reviewed all medications, new labs and imaging results over the last 24 hours. I personally reviewed no images or EKG's today.    Physical Exam   Vital Signs: Temp: 97.7  F (36.5  C) Temp src: Oral BP: 104/56 Pulse: 62   Resp: 18 SpO2: 97 % O2 Device: None (Room air)    Weight: 88 lbs 14.4 oz        General: Patient appears comfortable and in no acute distress.  HEENT: down facies    Neck: Neck is supple   Respiratory: Lungs are clear to auscultation bilaterally with no wheeze or crackles   Cardiovascular: Regular rate , S1 and S2 normal with no murmer   Abdomen:   soft , non tender , non  distended  Skin: No skin rashes or lesions to inspection or palpation.  Neurologic:  No facial droop  Musculoskeletal: moving upper and lower extremity   Psychiatric: cooperative     Data   Medications       melatonin  1 mg Oral At Bedtime     multivitamin w/minerals  1 tablet Oral Daily     pantoprazole  40 mg Oral QAM AC     polyethylene glycol  17 g Oral BID     senna-docusate  1 tablet Oral BID     cholecalciferol  50 mcg Oral Daily

## 2022-05-11 PROCEDURE — 250N000013 HC RX MED GY IP 250 OP 250 PS 637: Performed by: INTERNAL MEDICINE

## 2022-05-11 PROCEDURE — 120N000001 HC R&B MED SURG/OB

## 2022-05-11 PROCEDURE — 250N000013 HC RX MED GY IP 250 OP 250 PS 637: Performed by: HOSPITALIST

## 2022-05-11 PROCEDURE — 99231 SBSQ HOSP IP/OBS SF/LOW 25: CPT | Performed by: INTERNAL MEDICINE

## 2022-05-11 RX ADMIN — PANTOPRAZOLE SODIUM 40 MG: 40 TABLET, DELAYED RELEASE ORAL at 10:10

## 2022-05-11 RX ADMIN — POLYETHYLENE GLYCOL 3350 17 G: 17 POWDER, FOR SOLUTION ORAL at 21:37

## 2022-05-11 RX ADMIN — POLYETHYLENE GLYCOL 3350 17 G: 17 POWDER, FOR SOLUTION ORAL at 10:10

## 2022-05-11 RX ADMIN — MULTIPLE VITAMINS W/ MINERALS TAB 1 TABLET: TAB at 21:37

## 2022-05-11 RX ADMIN — SENNOSIDES AND DOCUSATE SODIUM 1 TABLET: 8.6; 5 TABLET ORAL at 21:37

## 2022-05-11 RX ADMIN — Medication 1 MG: at 21:37

## 2022-05-11 RX ADMIN — Medication 50 MCG: at 10:10

## 2022-05-11 RX ADMIN — SENNOSIDES AND DOCUSATE SODIUM 1 TABLET: 8.6; 5 TABLET ORAL at 10:10

## 2022-05-11 ASSESSMENT — ACTIVITIES OF DAILY LIVING (ADL)
ADLS_ACUITY_SCORE: 32
ADLS_ACUITY_SCORE: 29
ADLS_ACUITY_SCORE: 32

## 2022-05-11 NOTE — PLAN OF CARE
Goal Outcome Evaluation:                    DATE & TIME: 5/11/22 1500  Cognitive Concerns/ Orientation : A&O x4, calm & cooperative. Baseline mentation. Engaging with staff  BEHAVIOR & AGGRESSION TOOL COLOR: Green   ABNL VS/O2: None  MOBILITY: Independent. Enjoys walking with staff but occasionally walks alone  PAIN MANAGMENT: Denied pain  DIET: Regular, ate 100% of late breakfast.  BOWEL/BLADDER: Continent. No stool today so far. Frustrated to pull pants up/down and wipe independently but able to.   SKIN: BLE 1+ edema (socks removed when sleeping due to edema), pale  D/C DATE: Discharge pending MA and placement to group home     OTHER IMPORTANT INFO: Note left for SW to address whether pt should be completing ADL's independently with minimal assistance or if group home will be providing A x1 without pt help for cares. Pt able to complete independently but can be stubborn and needs encouragement to be more independant, oftentimes takes 45 min in BR, pt becomes frustrated with own cares and would rather staff complete. Late mother provided ADL assistance PTA. Staff wanting to make plan to ensure consistency between shifts and ensure best transition at discharge for pt.

## 2022-05-11 NOTE — PLAN OF CARE
Goal Outcome Evaluation:    Plan of Care Reviewed With: patient     Overall Patient Progress: no change    DATE & TIME: 5/10/22 0960-0203  Cognitive Concerns/ Orientation : A&O x4, calm & cooperative. Baseline mentation. Engaging with staff  BEHAVIOR & AGGRESSION TOOL COLOR: Green   ABNL VS/O2: None  MOBILITY: Independent. Enjoys walking with staff but occasionally walks alone  PAIN MANAGMENT: Denied pain  DIET: Regular, ate 100% of dinner   BOWEL/BLADDER: Continent. 1 very large loose/soft BM. Frustrated to pull pants up/down and wipe independently but able to.   SKIN: BLE 1+ edema (socks removed when sleeping due to edema), pale  D/C DATE: Discharge pending MA and placement to group home     OTHER IMPORTANT INFO: Note left for SW to address whether pt should be completing ADL's independently with minimal assistance or if group home will be providing A x1 without pt help for cares. Pt able to complete independently with encouragement, oftentimes takes 45 min-2 hours to go to bathroom and change clothes at bedtime with encouragement. This makes pt very frustrated and would rather staff complete. Late mother provided ADL assistance PTA. Staff wanting to make plan to ensure consistency between shifts and ensure best transition at discharge for pt.

## 2022-05-11 NOTE — PROGRESS NOTES
[facility]    Medicine Progress Note - Hospitalist Service    Date of Admission:  1/22/2022    Assessment & Plan          Miranda Dover is a 51 year old female with PMH Down Syndrome who was admitted on 1/22/2022 with COVID-19 pneumonia and acute hypoxic respiratory failure.     Hospitalization has been prolonged due to need to establish guardianship following deaths of her parents. Awaiting placement.     Patient is in hospital for longtime given problem with placement at this time, She remain medically stable at this time without change in her clinical status. Continues to declines labs     No change in her clinical status at this time.         Down syndrome  Developmental Delay  Failure to thrive  Had significant emotional, psychiatric issues during this stay due to the death of her parents and prolonged hospitalization. Discharge planning has been complicated by need for guardianship and payor source for placement.   -Patient's brother, Maxi, was granted 60 days of emergency guardianship on 2/15. Completed psychologic testing on 3/2/22 to assess current cognitive function and adaptive abilities. Permanent guardianship on 4/1/2022.  - Continue social work and clinical coordinator for disposition  - we are working on getting her group home and family has been in agreement      Severe malnutrition  Anorexia  Chronic abdominal pain  Chronic constipation  BMI ~13 on early this hospitalization with poor PO intake. SLP was consulted for possible dysphagia, and no evidence of dysphagia was noted.   -On 2/20, discussed with pt's brother; overall, felt that oral intake was probably acceptable at this point for discharge and did not feel we needed to pursue enteral feedings/g-tube at this point; desired workup for etiology of abdominal pain which he felt was contributing to her decreased PO intake.  - CT abd/pelvis 2/21 showed large amount of stool. EGD 2/22 relatively unremarkable. US abd no  acute pathology. BMI up to 14.7 2/27.  - Encourage PO intake  - PPI  - Bowel regimen , increase Miralax to BID, continue Senna bid, use suppository as needed.   Had BM on 5/8     Thyroid nodule  TSH has been elevated but normal T4 in the past   - Repeat TSH ordered but patient has been refusing blood draws.  This lab is not urgent and can be done as an outpatient     COVID-19 recovered  Initially diagnosed 1/19/2022     E coli UTI, resolved       Diet: Combination Diet Regular Diet; High Kcal/High Protein Diet, ADULT    DVT Prophylaxis: Ambulate every shift  Zhu Catheter: Not present  Central Lines: None  Cardiac Monitoring: None  Code Status: Full Code      Disposition Plan   Expected Discharge: 05/31/2022     Anticipated discharge location: group home    Delays:     Placement - Group Homes            The patient's care was discussed with the Bedside Nurse and Patient.    Vic Thomas MD  Hospitalist Service  [facility]  Securely message with the Vocera Web Console (learn more here)  Text page via [employer] Paging/Directory         Clinically Significant Risk Factors Present on Admission                    ______________________________________________________________________    Interval History   Sleeping comfortably, offer no complaints.       No other significant event overnight.     Data reviewed today: I reviewed all medications, new labs and imaging results over the last 24 hours. I personally reviewed no images or EKG's today.    Physical Exam   Vital Signs: Temp: 98.3  F (36.8  C) Temp src: Oral BP: 104/66 Pulse: 74   Resp: 16 SpO2: 96 % O2 Device: None (Room air)    Weight: 88 lbs 14.4 oz        General: Patient appears comfortable and in no acute distress.  HEENT: down facies    Neck: Neck is supple   Respiratory: Lungs are clear to auscultation bilaterally with no wheeze or crackles   Cardiovascular: Regular rate , S1 and S2 normal with no murmer   Abdomen:   soft , non tender ,  non distended  Skin: No skin rashes or lesions to inspection or palpation.  Neurologic:  No facial droop  Musculoskeletal: moving upper and lower extremity   Psychiatric: cooperative     Data   Medications       melatonin  1 mg Oral At Bedtime     multivitamin w/minerals  1 tablet Oral Daily     pantoprazole  40 mg Oral QAM AC     polyethylene glycol  17 g Oral BID     senna-docusate  1 tablet Oral BID     cholecalciferol  50 mcg Oral Daily

## 2022-05-12 PROCEDURE — 250N000013 HC RX MED GY IP 250 OP 250 PS 637: Performed by: HOSPITALIST

## 2022-05-12 PROCEDURE — 99231 SBSQ HOSP IP/OBS SF/LOW 25: CPT | Performed by: INTERNAL MEDICINE

## 2022-05-12 PROCEDURE — 120N000001 HC R&B MED SURG/OB

## 2022-05-12 PROCEDURE — 250N000013 HC RX MED GY IP 250 OP 250 PS 637: Performed by: INTERNAL MEDICINE

## 2022-05-12 RX ADMIN — MULTIPLE VITAMINS W/ MINERALS TAB 1 TABLET: TAB at 21:53

## 2022-05-12 RX ADMIN — POLYETHYLENE GLYCOL 3350 17 G: 17 POWDER, FOR SOLUTION ORAL at 21:54

## 2022-05-12 RX ADMIN — POLYETHYLENE GLYCOL 3350 17 G: 17 POWDER, FOR SOLUTION ORAL at 10:32

## 2022-05-12 RX ADMIN — Medication 50 MCG: at 10:32

## 2022-05-12 RX ADMIN — SENNOSIDES AND DOCUSATE SODIUM 1 TABLET: 8.6; 5 TABLET ORAL at 10:32

## 2022-05-12 RX ADMIN — Medication 1 MG: at 21:54

## 2022-05-12 RX ADMIN — PANTOPRAZOLE SODIUM 40 MG: 40 TABLET, DELAYED RELEASE ORAL at 10:31

## 2022-05-12 RX ADMIN — SENNOSIDES AND DOCUSATE SODIUM 1 TABLET: 8.6; 5 TABLET ORAL at 21:53

## 2022-05-12 ASSESSMENT — ACTIVITIES OF DAILY LIVING (ADL)
ADLS_ACUITY_SCORE: 29
ADLS_ACUITY_SCORE: 32
ADLS_ACUITY_SCORE: 29

## 2022-05-12 NOTE — PROGRESS NOTES
[facility]    Medicine Progress Note - Hospitalist Service    Date of Admission:  1/22/2022    Assessment & Plan          Miranda Dover is a 51 year old female with PMH Down Syndrome who was admitted on 1/22/2022 with COVID-19 pneumonia and acute hypoxic respiratory failure.     Hospitalization has been prolonged due to need to establish guardianship following deaths of her parents. Awaiting placement.     Patient is in hospital for longtime given problem with placement at this time, She remain medically stable at this time without change in her clinical status. Continues to declines labs     No change in her clinical status today         Down syndrome  Developmental Delay  Failure to thrive  Had significant emotional, psychiatric issues during this stay due to the death of her parents and prolonged hospitalization. Discharge planning has been complicated by need for guardianship and payor source for placement.   -Patient's brother, Maxi, was granted 60 days of emergency guardianship on 2/15. Completed psychologic testing on 3/2/22 to assess current cognitive function and adaptive abilities. Permanent guardianship on 4/1/2022.  - Continue social work and clinical coordinator for disposition  - we are working on getting her group home and family has been in agreement      Severe malnutrition  Anorexia  Chronic abdominal pain  Chronic constipation  BMI ~13 on early this hospitalization with poor PO intake. SLP was consulted for possible dysphagia, and no evidence of dysphagia was noted.   -On 2/20, discussed with pt's brother; overall, felt that oral intake was probably acceptable at this point for discharge and did not feel we needed to pursue enteral feedings/g-tube at this point; desired workup for etiology of abdominal pain which he felt was contributing to her decreased PO intake.  - CT abd/pelvis 2/21 showed large amount of stool. EGD 2/22 relatively unremarkable. US abd no acute  pathology. BMI up to 14.7 2/27.  - Encourage PO intake  - PPI  - Bowel regimen , increase Miralax to BID, continue Senna bid, use suppository as needed.   Had BM on 5/8     Thyroid nodule  TSH has been elevated but normal T4 in the past   - Repeat TSH ordered but patient has been refusing blood draws.  This lab is not urgent and can be done as an outpatient     COVID-19 recovered  Initially diagnosed 1/19/2022     E coli UTI, resolved       Diet: Combination Diet Regular Diet; High Kcal/High Protein Diet, ADULT    DVT Prophylaxis: Ambulate every shift  Zhu Catheter: Not present  Central Lines: None  Cardiac Monitoring: None  Code Status: Full Code      Disposition Plan   Expected Discharge: 05/31/2022     Anticipated discharge location: group home    Delays:     Placement - Group Homes            The patient's care was discussed with the Bedside Nurse and Patient.    Vic Thomas MD  Hospitalist Service  [facility]  Securely message with the Vocera Web Console (learn more here)  Text page via [employer] Paging/Directory         Clinically Significant Risk Factors Present on Admission                    ______________________________________________________________________    Interval History   Patient was sleeping at time of my visit and wanted to sleep more and does not want to wake up at this time.       No other significant event overnight.     Data reviewed today: I reviewed all medications, new labs and imaging results over the last 24 hours. I personally reviewed no images or EKG's today.    Physical Exam   Vital Signs: Temp: 98.3  F (36.8  C) Temp src: Oral BP: 98/63 Pulse: 63   Resp: 16 SpO2: 97 % O2 Device: None (Room air)    Weight: 88 lbs 14.4 oz        General: Patient appears comfortable and in no acute distress.  HEENT: down facies    Neck: Neck is supple   Respiratory: Lungs are clear to auscultation bilaterally with no wheeze or crackles   Cardiovascular: Regular rate , S1 and  S2 normal with no murmer   Abdomen:   soft , non tender , non distended  Skin: No skin rashes or lesions to inspection or palpation.  Neurologic:  No facial droop  Musculoskeletal: moving upper and lower extremity   Psychiatric: cooperative     Data   Medications       melatonin  1 mg Oral At Bedtime     multivitamin w/minerals  1 tablet Oral Daily     pantoprazole  40 mg Oral QAM AC     polyethylene glycol  17 g Oral BID     senna-docusate  1 tablet Oral BID     cholecalciferol  50 mcg Oral Daily

## 2022-05-12 NOTE — PLAN OF CARE
Goal Outcome Evaluation:    Plan of Care Reviewed With: patient     Overall Patient Progress: no change    DATE & TIME: 5/11/22 3808-5990  Cognitive Concerns/ Orientation : A&O x4, calm & cooperative. Baseline mentation. Engaging with staff  BEHAVIOR & AGGRESSION TOOL COLOR: Green   ABNL VS/O2: None  MOBILITY: Independent. Enjoys walking with staff but occasionally walks alone  PAIN MANAGMENT: Denied pain  DIET: Regular, ate 100% of dinner  BOWEL/BLADDER: Continent. Frustrated to pull pants up/down and wipe independently but able to.   SKIN: BLE 1-2+ edema (socks removed when sleeping due to edema, pt is aware that she cannot sleep with socks on), pale  D/C DATE: Discharge pending MA and placement to group home     OTHER IMPORTANT INFO: Note left for SW to address whether pt should be completing ADL's independently with minimal assistance or if group home will be providing A x1 without pt help for cares. Pt able to complete independently but can be stubborn and needs encouragement to be more independant, oftentimes takes 45 min in BR, pt becomes frustrated with own cares and would rather staff complete. Late mother provided ADL assistance PTA. Staff wanting to make plan to ensure consistency between shifts and ensure best transition at discharge for pt.

## 2022-05-12 NOTE — PROGRESS NOTES
CLINICAL NUTRITION SERVICES - REASSESSMENT NOTE    Future/Additional Recommendations:   Regular / High Kcal&High Protein diet to allow multiple entrees  Meals BID at minimum    Malnutrition:   (4/21)   % Weight Loss: None noted - wt up from admission  % Intake:  <75% for > 7 days (moderate malnutrition)  Subcutaneous Fat Loss:  Baseline  Muscle Loss:  Baseline  Fluid Retention:  None noted     Malnutrition Diagnosis: Does not meet criteria at this time, but pt is at risk     EVALUATION OF PROGRESS TOWARD GOALS   Diet: Regular, High kcal/high protein to allow for multiple entrees per tray.   Intake/Tolerance:   - Eating 100% per flowsheets for the past week. Intakes typically range %, sometimes patient elects to skip meals. Trays typically have 3-4+ items.   - Stooling:   BM x1 each on 5/8-5/10.   - Labs: Last drawn 3/9  - Weight trending:   Last measured on 4/14: 40.3 kg.     ASSESSED NUTRITION NEEDS:  Dosing Weight 40.3 kg (4/14)  Estimated Energy Needs: 4478-1155 kcals (35-40 Kcal/Kg)  Justification: repletion and underweight  Estimated Protein Needs: 60+ grams protein (1.5+ g pro/Kg)  Justification: repletion and hypercatabolism with acute illness    NEW FINDINGS:   - awaiting appropriate placement.     Previous Goals:   Patient will consume at least 75% meals BID with 3 items per meal  Evaluation: Met    Previous Nutrition Diagnosis:   Predicted inadequate nutrient intake (energy/protein/micronutrients) related to variable appetite and food intake, reliance on similar food items for meals and intermittently declining meals  Evaluation: No change    CURRENT NUTRITION DIAGNOSIS  Predicted inadequate nutrient intake (energy/protein/micronutrients) related to variable appetite and food intake, reliance on similar food items for meals and intermittently declining meals    INTERVENTIONS  Recommendations / Nutrition Prescription  Regular / High Kcal&High Protein diet to allow multiple entrees  Meals BID at  minimum     Implementation  None new    Goals  Intake of at least 75% meals BID-TID each with at least 3 items.     MONITORING AND EVALUATION:  Progress towards goals will be monitored and evaluated per protocol and Practice Guidelines    Rosalba Adams RD, LD  [facility] [place], [facility]  Pager: [phone]  Weekend Pager: [phone]

## 2022-05-13 PROCEDURE — 99231 SBSQ HOSP IP/OBS SF/LOW 25: CPT | Performed by: INTERNAL MEDICINE

## 2022-05-13 PROCEDURE — 120N000001 HC R&B MED SURG/OB

## 2022-05-13 PROCEDURE — 250N000013 HC RX MED GY IP 250 OP 250 PS 637: Performed by: HOSPITALIST

## 2022-05-13 PROCEDURE — 250N000013 HC RX MED GY IP 250 OP 250 PS 637: Performed by: INTERNAL MEDICINE

## 2022-05-13 RX ADMIN — PANTOPRAZOLE SODIUM 40 MG: 40 TABLET, DELAYED RELEASE ORAL at 09:55

## 2022-05-13 RX ADMIN — Medication 50 MCG: at 09:55

## 2022-05-13 RX ADMIN — SENNOSIDES AND DOCUSATE SODIUM 1 TABLET: 8.6; 5 TABLET ORAL at 09:55

## 2022-05-13 RX ADMIN — Medication 1 MG: at 22:36

## 2022-05-13 RX ADMIN — SENNOSIDES AND DOCUSATE SODIUM 1 TABLET: 8.6; 5 TABLET ORAL at 22:36

## 2022-05-13 RX ADMIN — POLYETHYLENE GLYCOL 3350 17 G: 17 POWDER, FOR SOLUTION ORAL at 09:56

## 2022-05-13 RX ADMIN — MULTIPLE VITAMINS W/ MINERALS TAB 1 TABLET: TAB at 22:36

## 2022-05-13 ASSESSMENT — ACTIVITIES OF DAILY LIVING (ADL)
ADLS_ACUITY_SCORE: 31
ADLS_ACUITY_SCORE: 29
ADLS_ACUITY_SCORE: 31
ADLS_ACUITY_SCORE: 29
ADLS_ACUITY_SCORE: 31
ADLS_ACUITY_SCORE: 29

## 2022-05-13 NOTE — PROGRESS NOTES
Care Management Follow Up    Length of Stay (days): 111    Expected Discharge Date: 05/31/2022     Concerns to be Addressed: discharge planning     Patient plan of care discussed at interdisciplinary rounds: Yes    Anticipated Discharge Disposition: Group Home.  Patient has been clinically accepted by a [facility]. and the brother has toured the facility and is pleased.  He has made arrangements for patient to tour the facility and have lunch on 5/18 per Юлия Roblero, the [facility]  ([phone]).  Ms Roblero explains the brother has submitted all the necessary MA paperwork and she waiting for application to be completed.  She next will begin rate negotiations with the [facility].      Anticipated Discharge Services:    Anticipated Discharge DME:      Patient/family educated on Medicare website which has current facility and service quality ratings: no  Education Provided on the Discharge Plan:    Patient/Family in Agreement with the Plan: yes    Referrals Placed by CM/SW: Post Acute Facilities  Private pay costs discussed: Not applicable    Additional Information:  Ms Roblero explains the [facility] is a small home which serves patients with a developmental disability and the home has other patient's with Down Syndrome.  The home is staffed with 24/7 staffing and the staff can assist patient with all ADL's including  personal cares such as dressing, tolieting and showers/bathing.  They can provide  cuing and supervision or hands on assistance. Per Ms Annika, writer can pass on to our nursing staff that it is not necessary to push patient to be independent.  The home will be providing all meals, medication administration.        BARBIE MullinsSW

## 2022-05-13 NOTE — PLAN OF CARE
Goal Outcome Evaluation:      DATE & TIME: 5/13/22 8702-1641  Cognitive Concerns/ Orientation : A&O x4, calm & cooperative. Baseline mentation. Engaging with staff  BEHAVIOR & AGGRESSION TOOL COLOR: Green   ABNL VS/O2: None  MOBILITY: Independent. Enjoys walking with staff but occasionally walks alone  PAIN MANAGMENT: Denied pain  DIET: Regular, ate 100% of dinner  BOWEL/BLADDER: Continent. Frustrated to pull pants up/down and wipe independently but able to.   SKIN: BLE 1-2+ edema ankles and feet. Socks need to be removed at bedtime and placed in closet so pt does not put them on overnight.   D/C DATE: Discharge pending MA and placement to group home     OTHER IMPORTANT INFO:  Talked with SW & she addressed  that group home will be providing assistance for patient, no need to make patient do things that she doesn't want to do.

## 2022-05-13 NOTE — PLAN OF CARE
Goal Outcome Evaluation:                    DATE & TIME: 5/12/22 1500  Cognitive Concerns/ Orientation : A&O x4, calm & cooperative. Baseline mentation. Engaging with staff  BEHAVIOR & AGGRESSION TOOL COLOR: Green   ABNL VS/O2: None  MOBILITY: Independent. Enjoys walking with staff but occasionally walks alone  PAIN MANAGMENT: Denied pain  DIET: Regular, ate 75% of breakfast, refused lunch.  BOWEL/BLADDER: Continent. Frustrated to pull pants up/down and wipe independently but able to. Had BM today.  SKIN: BLE 1+ edema ankles and feet, pale  D/C DATE: Discharge pending MA and placement to group home     OTHER IMPORTANT INFO: Note left for SW to address whether pt should be completing ADL's independently with minimal assistance or if group home will be providing A x1 without pt help for cares. Pt able to complete independently but can be stubborn and needs encouragement to be more independant, oftentimes takes 45 min in BR, pt becomes frustrated with own cares and would rather staff complete. Late mother provided ADL assistance PTA. Staff wanting to make plan to ensure consistency between shifts and ensure best transition at discharge for pt.

## 2022-05-13 NOTE — PLAN OF CARE
Goal Outcome Evaluation:    Plan of Care Reviewed With: patient     Overall Patient Progress: no change    DATE & TIME: 5/12/22 4230-1727  Cognitive Concerns/ Orientation : A&O x4, calm & cooperative. Baseline mentation. Engaging with staff  BEHAVIOR & AGGRESSION TOOL COLOR: Green   ABNL VS/O2: None  MOBILITY: Independent. Enjoys walking with staff but occasionally walks alone  PAIN MANAGMENT: Denied pain  DIET: Regular, ate 100% of dinner  BOWEL/BLADDER: Continent. Frustrated to pull pants up/down and wipe independently but able to.   SKIN: BLE 1-2+ edema ankles and feet. Socks need to be removed at bedtime and placed in closet so pt does not put them on overnight.   D/C DATE: Discharge pending MA and placement to group home     OTHER IMPORTANT INFO: Note left for SW to address whether pt should be completing ADL's independently with minimal assistance or if group home will be providing A x1 without pt help for cares. Pt able to complete independently but can be stubborn and needs encouragement to be more independant, oftentimes takes 45 min-2 hours for bedtime routine (changing clothing and using the bathroom). Pt becomes frustrated with own cares and would rather staff complete. Late mother provided ADL assistance PTA. Staff wanting to make plan to ensure consistency between shifts and ensure best transition at discharge for pt.

## 2022-05-14 PROCEDURE — 250N000013 HC RX MED GY IP 250 OP 250 PS 637: Performed by: INTERNAL MEDICINE

## 2022-05-14 PROCEDURE — 250N000013 HC RX MED GY IP 250 OP 250 PS 637: Performed by: HOSPITALIST

## 2022-05-14 PROCEDURE — 120N000001 HC R&B MED SURG/OB

## 2022-05-14 PROCEDURE — 99231 SBSQ HOSP IP/OBS SF/LOW 25: CPT | Performed by: HOSPITALIST

## 2022-05-14 RX ADMIN — MULTIPLE VITAMINS W/ MINERALS TAB 1 TABLET: TAB at 21:19

## 2022-05-14 RX ADMIN — Medication 50 MCG: at 10:04

## 2022-05-14 RX ADMIN — Medication 1 MG: at 21:19

## 2022-05-14 RX ADMIN — PANTOPRAZOLE SODIUM 40 MG: 40 TABLET, DELAYED RELEASE ORAL at 10:04

## 2022-05-14 RX ADMIN — SENNOSIDES AND DOCUSATE SODIUM 1 TABLET: 8.6; 5 TABLET ORAL at 10:04

## 2022-05-14 RX ADMIN — SENNOSIDES AND DOCUSATE SODIUM 1 TABLET: 8.6; 5 TABLET ORAL at 21:19

## 2022-05-14 RX ADMIN — POLYETHYLENE GLYCOL 3350 17 G: 17 POWDER, FOR SOLUTION ORAL at 10:04

## 2022-05-14 ASSESSMENT — ACTIVITIES OF DAILY LIVING (ADL)
ADLS_ACUITY_SCORE: 31

## 2022-05-14 NOTE — PLAN OF CARE
DATE & TIME: 5/13/22 1900- 5/14/22 0730  Cognitive Concerns/ Orientation : A&O x4, calm & cooperative. Baseline mentation. Engaging with staff  BEHAVIOR & AGGRESSION TOOL COLOR: Green   ABNL VS/O2: None  MOBILITY: Independent. Enjoys walking with staff but occasionally walks alone  PAIN MANAGMENT: Denied pain  DIET: Regular, ate 100% of dinner  BOWEL/BLADDER: Continent  SKIN: BLE 1-2+ edema ankles and feet. Socks need to be removed at bedtime and placed in closet so pt does not put them on overnight. Pt refused sock removal.  D/C DATE: Discharge pending MA and placement to group home     OTHER IMPORTANT INFO:  Ambulated in leyva several times. Pt refused sock removal at bedtime.  Several attempts were made to convince pt ;  education provided as to why socks need to be removed at night.

## 2022-05-14 NOTE — PROGRESS NOTES
[facility]    Medicine Progress Note - Hospitalist Service    Date of Admission:  1/22/2022    Assessment & Plan          Miranda Dover is a 51 year old female with PMH Down Syndrome who was admitted on 1/22/2022 with COVID-19 pneumonia and acute hypoxic respiratory failure.     Hospitalization has been prolonged due to need to establish guardianship following deaths of her parents. Awaiting placement.     Patient is in hospital for longtime given problem with placement at this time, She remain medically stable at this time without change in her clinical status. Continues to declines labs     Patient remains overall stable at this time.         Down syndrome  Developmental Delay  Failure to thrive  Had significant emotional, psychiatric issues during this stay due to the death of her parents and prolonged hospitalization. Discharge planning has been complicated by need for guardianship and payor source for placement.   -Patient's brother, Maxi, was granted 60 days of emergency guardianship on 2/15. Completed psychologic testing on 3/2/22 to assess current cognitive function and adaptive abilities. Permanent guardianship on 4/1/2022.  - Continue social work and clinical coordinator for disposition  - we are working on getting her into our group home and family has been in agreement, not able to find any place for her at this time.      Severe malnutrition  Anorexia  Chronic abdominal pain  Chronic constipation  BMI ~13 on early this hospitalization with poor PO intake. SLP was consulted for possible dysphagia, and no evidence of dysphagia was noted.   -On 2/20, discussed with pt's brother; overall, felt that oral intake was probably acceptable at this point for discharge and did not feel we needed to pursue enteral feedings/g-tube at this point; desired workup for etiology of abdominal pain which he felt was contributing to her decreased PO intake.  - CT abd/pelvis 2/21 showed large  amount of stool. EGD 2/22 relatively unremarkable. US abd no acute pathology. BMI up to 14.7 2/27.  - Encourage PO intake  - PPI  - Bowel regimen ,  Miralax to BID, continue Senna bid, use suppository as needed.      Thyroid nodule  TSH has been elevated but normal T4 in the past   - Repeat TSH ordered but patient has been refusing blood draws.  This lab is not urgent and can be done as an outpatient     COVID-19 recovered  Initially diagnosed 1/19/2022     E coli UTI, resolved       Diet: Combination Diet Regular Diet; High Kcal/High Protein Diet, ADULT    DVT Prophylaxis: Ambulate every shift  Zhu Catheter: Not present  Central Lines: None  Cardiac Monitoring: None  Code Status: Full Code      Disposition Plan   Expected Discharge: 05/31/2022     Anticipated discharge location: group home    Delays:     Placement - Group Homes            The patient's care was discussed with the Bedside Nurse and Patient.    Huyen Sepulveda MD  Hospitalist Service  [facility]  Securely message with the [employer] Web Console (learn more here)  Text page via [employer] Paging/Directory         Clinically Significant Risk Factors Present on Admission                    ______________________________________________________________________    Interval History   Patient alert and seated comfortably in chair, eating her breakfast.  Did not voice any concerns.    No other significant event overnight.     Data reviewed today: I reviewed all medications, new labs and imaging results over the last 24 hours. I personally reviewed no images or EKG's today.    Physical Exam   Vital Signs: Temp: 97.6  F (36.4  C) Temp src: Oral BP: 100/55 Pulse: 68   Resp: 18 SpO2: 97 % O2 Device: None (Room air)    Weight: 88 lbs 14.4 oz        General: Patient appears comfortable and in no acute distress.  HEENT: down syndrome facies    Respiratory: Nonlabored breathing  Cardiovascular:   Abdomen:   soft , non distended  Neurologic:  No  facial droop, moves all extremities  Psychiatric: cooperative     Data   Medications       melatonin  1 mg Oral At Bedtime     multivitamin w/minerals  1 tablet Oral Daily     pantoprazole  40 mg Oral QAM AC     polyethylene glycol  17 g Oral BID     senna-docusate  1 tablet Oral BID     cholecalciferol  50 mcg Oral Daily

## 2022-05-14 NOTE — PLAN OF CARE
Goal Outcome Evaluation:  DATE & TIME: 5/14/22 3671-5580  Cognitive Concerns/ Orientation : A&O x4, calm & cooperative. Baseline mentation. Engaging with staff  BEHAVIOR & AGGRESSION TOOL COLOR: Green   ABNL VS/O2: None  MOBILITY: Independent. Enjoys walking with staff but occasionally walks alone  PAIN MANAGMENT: Denied pain  DIET: Regular  BOWEL/BLADDER: Continent  SKIN: BLE 1-2+ edema ankles and feet. Socks need to be removed at bedtime and placed in closet so pt does not put them on overnight. Pt refused sock removal.  D/C DATE: Discharge pending MA and placement to group home     OTHER IMPORTANT INFO:

## 2022-05-15 PROCEDURE — 250N000013 HC RX MED GY IP 250 OP 250 PS 637: Performed by: HOSPITALIST

## 2022-05-15 PROCEDURE — 250N000013 HC RX MED GY IP 250 OP 250 PS 637: Performed by: INTERNAL MEDICINE

## 2022-05-15 PROCEDURE — 99231 SBSQ HOSP IP/OBS SF/LOW 25: CPT | Performed by: HOSPITALIST

## 2022-05-15 PROCEDURE — 120N000001 HC R&B MED SURG/OB

## 2022-05-15 RX ADMIN — PANTOPRAZOLE SODIUM 40 MG: 40 TABLET, DELAYED RELEASE ORAL at 10:25

## 2022-05-15 RX ADMIN — MULTIPLE VITAMINS W/ MINERALS TAB 1 TABLET: TAB at 21:29

## 2022-05-15 RX ADMIN — CALCIUM CARBONATE (ANTACID) CHEW TAB 500 MG 500 MG: 500 CHEW TAB at 01:04

## 2022-05-15 RX ADMIN — POLYETHYLENE GLYCOL 3350 17 G: 17 POWDER, FOR SOLUTION ORAL at 10:24

## 2022-05-15 RX ADMIN — Medication 50 MCG: at 10:25

## 2022-05-15 RX ADMIN — Medication 1 MG: at 21:29

## 2022-05-15 RX ADMIN — SENNOSIDES AND DOCUSATE SODIUM 1 TABLET: 8.6; 5 TABLET ORAL at 21:29

## 2022-05-15 RX ADMIN — SENNOSIDES AND DOCUSATE SODIUM 1 TABLET: 8.6; 5 TABLET ORAL at 10:25

## 2022-05-15 ASSESSMENT — ACTIVITIES OF DAILY LIVING (ADL)
ADLS_ACUITY_SCORE: 33
ADLS_ACUITY_SCORE: 32
ADLS_ACUITY_SCORE: 33
ADLS_ACUITY_SCORE: 32
ADLS_ACUITY_SCORE: 33
ADLS_ACUITY_SCORE: 32
ADLS_ACUITY_SCORE: 31
ADLS_ACUITY_SCORE: 32
ADLS_ACUITY_SCORE: 33
ADLS_ACUITY_SCORE: 33
ADLS_ACUITY_SCORE: 32
ADLS_ACUITY_SCORE: 33

## 2022-05-15 NOTE — PROGRESS NOTES
Medicine Progress Note - Hospitalist Service    Date of Admission:  1/22/2022    Assessment & Plan          Miranda Dover is a 51 year old female with PMH Down Syndrome who was admitted on 1/22/2022 with COVID-19 pneumonia and acute hypoxic respiratory failure.     Hospitalization has been prolonged due to need to establish guardianship following deaths of her parents. Awaiting placement.     Patient is in hospital for longtime given problem with placement at this time, She remain medically stable at this time without change in her clinical status. Continues to declines labs     Patient remains overall stable at this time.         Down syndrome  Developmental Delay  Failure to thrive  Had significant emotional, psychiatric issues during this stay due to the death of her parents and prolonged hospitalization. Discharge planning has been complicated by need for guardianship and payor source for placement.   -Patient's brother, Maxi, was granted 60 days of emergency guardianship on 2/15. Completed psychologic testing on 3/2/22 to assess current cognitive function and adaptive abilities. Permanent guardianship on 4/1/2022.  - Continue social work and clinical coordinator for disposition  - we are working on getting her into our group home and family has been in agreement, not able to find any place for her at this time.      Severe malnutrition  Anorexia  Chronic abdominal pain  Chronic constipation  BMI ~13 on early this hospitalization with poor PO intake. SLP was consulted for possible dysphagia, and no evidence of dysphagia was noted.   -On 2/20, discussed with pt's brother; overall, felt that oral intake was probably acceptable at this point for discharge and did not feel we needed to pursue enteral feedings/g-tube at this point; desired workup for etiology of abdominal pain which he felt was contributing to her decreased PO intake.  - CT abd/pelvis 2/21 showed large  amount of stool. EGD 2/22 relatively unremarkable. US abd no acute pathology. BMI up to 14.7 2/27.  - Encourage PO intake  - PPI  - Bowel regimen ,  Miralax to BID, continue Senna bid, use suppository as needed.      Thyroid nodule  TSH has been elevated but normal T4 in the past   - Repeat TSH ordered but patient has been refusing blood draws.  This lab is not urgent and can be done as an outpatient     COVID-19 recovered  Initially diagnosed 1/19/2022     E coli UTI, resolved       Diet: Combination Diet Regular Diet; High Kcal/High Protein Diet, ADULT    DVT Prophylaxis: Ambulate every shift  Zhu Catheter: Not present  Central Lines: None  Cardiac Monitoring: None  Code Status: Full Code      Disposition Plan   Expected Discharge: 05/31/2022     Anticipated discharge location: group home    Delays:     Placement - Group Homes            The patient's care was discussed with the Bedside Nurse and Patient.    Huyen Sepulveda MD  Hospitalist Service  [facility]  Securely message with the Vocera Web Console (learn more here)  Text page via [employer] Paging/Directory         Clinically Significant Risk Factors Present on Admission                    ______________________________________________________________________    Interval History   Patient seen walking around in her room.  Denied any acute concerns.  Awaiting placement.    No other significant event overnight.     Data reviewed today: I reviewed all medications, new labs and imaging results over the last 24 hours. I personally reviewed no images or EKG's today.    Physical Exam   Vital Signs: Temp: 97.8  F (36.6  C) Temp src: Oral BP: 99/58 Pulse: 58   Resp: 16 SpO2: 100 % O2 Device: None (Room air)    Weight: 88 lbs 14.4 oz        General: Patient appears comfortable and in no acute distress.  HEENT: down syndrome facies    Respiratory: Nonlabored breathing  Cardiovascular:   Abdomen:   soft , non distended  Neurologic:  No facial  droop, moves all extremities  Psychiatric: cooperative     Data   Medications       melatonin  1 mg Oral At Bedtime     multivitamin w/minerals  1 tablet Oral Daily     pantoprazole  40 mg Oral QAM AC     polyethylene glycol  17 g Oral BID     senna-docusate  1 tablet Oral BID     cholecalciferol  50 mcg Oral Daily

## 2022-05-15 NOTE — PLAN OF CARE
Goal Outcome Evaluation:        DATE & TIME: 5/15/22 7429-8105  Cognitive Concerns/ Orientation : A&O x4, calm & cooperative.  BEHAVIOR & AGGRESSION TOOL COLOR: Green   ABNL VS/O2: None  MOBILITY: Independent. Enjoys walking with staff but occasionally walks alone  PAIN MANAGMENT: NA  DIET: Regular  BOWEL/BLADDER: Continent  SKIN: BLE 1+ edema ankles and feet. Socks removed at bedtime, hospital slippers on,  D/C DATE: Discharge pending MA and placement to group home     OTHER IMPORTANT INFO:

## 2022-05-16 PROCEDURE — 120N000001 HC R&B MED SURG/OB

## 2022-05-16 PROCEDURE — 99231 SBSQ HOSP IP/OBS SF/LOW 25: CPT | Performed by: HOSPITALIST

## 2022-05-16 PROCEDURE — 250N000013 HC RX MED GY IP 250 OP 250 PS 637: Performed by: HOSPITALIST

## 2022-05-16 PROCEDURE — 250N000013 HC RX MED GY IP 250 OP 250 PS 637: Performed by: INTERNAL MEDICINE

## 2022-05-16 RX ADMIN — Medication 50 MCG: at 12:33

## 2022-05-16 RX ADMIN — MULTIPLE VITAMINS W/ MINERALS TAB 1 TABLET: TAB at 22:44

## 2022-05-16 RX ADMIN — PANTOPRAZOLE SODIUM 40 MG: 40 TABLET, DELAYED RELEASE ORAL at 12:33

## 2022-05-16 RX ADMIN — SENNOSIDES AND DOCUSATE SODIUM 1 TABLET: 8.6; 5 TABLET ORAL at 22:44

## 2022-05-16 RX ADMIN — POLYETHYLENE GLYCOL 3350 17 G: 17 POWDER, FOR SOLUTION ORAL at 12:33

## 2022-05-16 RX ADMIN — SENNOSIDES AND DOCUSATE SODIUM 1 TABLET: 8.6; 5 TABLET ORAL at 12:33

## 2022-05-16 RX ADMIN — Medication 1 MG: at 22:44

## 2022-05-16 ASSESSMENT — ACTIVITIES OF DAILY LIVING (ADL)
ADLS_ACUITY_SCORE: 34
ADLS_ACUITY_SCORE: 33
ADLS_ACUITY_SCORE: 34

## 2022-05-16 NOTE — PROGRESS NOTES
[facility]    Medicine Progress Note - Hospitalist Service    Date of Admission:  1/22/2022    Assessment & Plan          Miranda Dover is a 51 year old female with PMH Down Syndrome who was admitted on 1/22/2022 with COVID-19 pneumonia and acute hypoxic respiratory failure.     Hospitalization has been prolonged due to need to establish guardianship following deaths of her parents. Awaiting placement.     Patient is in hospital for longtime given problem with placement at this time, She remain medically stable at this time without change in her clinical status. Continues to declines labs     Patient remains overall stable at this time.         Down syndrome  Developmental Delay  Failure to thrive  Had significant emotional, psychiatric issues during this stay due to the death of her parents and prolonged hospitalization. Discharge planning has been complicated by need for guardianship and payor source for placement.   -Patient's brother, Maxi, was granted 60 days of emergency guardianship on 2/15. Completed psychologic testing on 3/2/22 to assess current cognitive function and adaptive abilities. Permanent guardianship on 4/1/2022.  - Continue social work and clinical coordinator for disposition  - we are working on getting her into our group home and family has been in agreement, not able to find any place for her at this time.      Severe malnutrition  Anorexia  Chronic abdominal pain  Chronic constipation  BMI ~13 on early this hospitalization with poor PO intake. SLP was consulted for possible dysphagia, and no evidence of dysphagia was noted.   -On 2/20, discussed with pt's brother; overall, felt that oral intake was probably acceptable at this point for discharge and did not feel we needed to pursue enteral feedings/g-tube at this point; desired workup for etiology of abdominal pain which he felt was contributing to her decreased PO intake.  - CT abd/pelvis 2/21 showed large  amount of stool. EGD 2/22 relatively unremarkable. US abd no acute pathology. BMI up to 14.7 2/27.  - Encourage PO intake  - PPI  - Bowel regimen ,  Miralax to BID, continue Senna bid, use suppository as needed.      Thyroid nodule  TSH has been elevated but normal T4 in the past   - Repeat TSH ordered but patient has been refusing blood draws.  This lab is not urgent and can be done as an outpatient     COVID-19 recovered  Initially diagnosed 1/19/2022     E coli UTI, resolved       Diet: Combination Diet Regular Diet; High Kcal/High Protein Diet, ADULT    DVT Prophylaxis: Ambulate every shift  Zhu Catheter: Not present  Central Lines: None  Cardiac Monitoring: None  Code Status: Full Code      Disposition Plan   Expected Discharge: 05/31/2022     Anticipated discharge location: group home    Delays:     Placement - Group Homes            The patient's care was discussed with the Bedside Nurse and Patient.    Huyen Sepulveda MD  Hospitalist Service  [facility]  Securely message with the Vocera Web Console (learn more here)  Text page via [employer] Paging/Directory         Clinically Significant Risk Factors Present on Admission                    ______________________________________________________________________    Interval History   Patient seen laying comfortably in bed.  Denied any acute concerns.  Awaiting placement.    No other significant event overnight.     Data reviewed today: I reviewed all medications, new labs and imaging results over the last 24 hours. I personally reviewed no images or EKG's today.    Physical Exam   Vital Signs: Temp: 98  F (36.7  C) Temp src: Oral BP: 92/58 Pulse: 69   Resp: 16 SpO2: 96 % O2 Device: None (Room air)    Weight: 88 lbs 14.4 oz        General: Patient appears comfortable and in no acute distress.  HEENT: down syndrome facies    Respiratory: Nonlabored breathing  Cardiovascular:   Abdomen:   soft , non distended  Neurologic:  No facial droop,  moves all extremities  Psychiatric: cooperative     Data   Medications       melatonin  1 mg Oral At Bedtime     multivitamin w/minerals  1 tablet Oral Daily     pantoprazole  40 mg Oral QAM AC     polyethylene glycol  17 g Oral BID     senna-docusate  1 tablet Oral BID     cholecalciferol  50 mcg Oral Daily

## 2022-05-16 NOTE — PLAN OF CARE
Goal Outcome Evaluation:      DATE & TIME: 5/15/22-5/16/22 4719-5608  Cognitive Concerns/ Orientation : A&O x4, calm & cooperative.  BEHAVIOR & AGGRESSION TOOL COLOR: Green   ABNL VS/O2: None this shift  MOBILITY: Independent. Enjoys walking with staff but occasionally walks alone and steady.  PAIN MANAGMENT: NA  DIET: Regular  BOWEL/BLADDER: Continent BB  SKIN: BLE 1+ edema ankles and feet. Socks removed at bedtime per instruction.  D/C DATE: Discharge pending MA and placement to group home     OTHER IMPORTANT INFO:

## 2022-05-17 PROCEDURE — 250N000013 HC RX MED GY IP 250 OP 250 PS 637: Performed by: INTERNAL MEDICINE

## 2022-05-17 PROCEDURE — 250N000013 HC RX MED GY IP 250 OP 250 PS 637: Performed by: HOSPITALIST

## 2022-05-17 PROCEDURE — 99231 SBSQ HOSP IP/OBS SF/LOW 25: CPT | Performed by: HOSPITALIST

## 2022-05-17 PROCEDURE — 120N000001 HC R&B MED SURG/OB

## 2022-05-17 RX ADMIN — Medication 1 MG: at 21:57

## 2022-05-17 RX ADMIN — SENNOSIDES AND DOCUSATE SODIUM 1 TABLET: 8.6; 5 TABLET ORAL at 21:58

## 2022-05-17 RX ADMIN — PANTOPRAZOLE SODIUM 40 MG: 40 TABLET, DELAYED RELEASE ORAL at 09:45

## 2022-05-17 RX ADMIN — SENNOSIDES AND DOCUSATE SODIUM 1 TABLET: 8.6; 5 TABLET ORAL at 09:45

## 2022-05-17 RX ADMIN — Medication 50 MCG: at 09:45

## 2022-05-17 RX ADMIN — MULTIPLE VITAMINS W/ MINERALS TAB 1 TABLET: TAB at 21:57

## 2022-05-17 RX ADMIN — POLYETHYLENE GLYCOL 3350 17 G: 17 POWDER, FOR SOLUTION ORAL at 09:50

## 2022-05-17 ASSESSMENT — ACTIVITIES OF DAILY LIVING (ADL)
ADLS_ACUITY_SCORE: 34

## 2022-05-17 NOTE — PROGRESS NOTES
[facility]    Medicine Progress Note - Hospitalist Service    Date of Admission:  1/22/2022    Assessment & Plan          Miranda Dover is a 51 year old female with PMH Down Syndrome who was admitted on 1/22/2022 with COVID-19 pneumonia and acute hypoxic respiratory failure.     Hospitalization has been prolonged due to need to establish guardianship following deaths of her parents. Awaiting placement.     Patient is in hospital for longtime given problem with placement at this time, She remain medically stable at this time without change in her clinical status. Continues to declines labs     Patient remains overall stable at this time.         Down syndrome  Developmental Delay  Failure to thrive  Had significant emotional, psychiatric issues during this stay due to the death of her parents and prolonged hospitalization. Discharge planning has been complicated by need for guardianship and payor source for placement.   -Patient's brother, Maxi, was granted 60 days of emergency guardianship on 2/15. Completed psychologic testing on 3/2/22 to assess current cognitive function and adaptive abilities. Permanent guardianship on 4/1/2022.  - Continue social work and clinical coordinator for disposition  - we are working on getting her into our group home and family has been in agreement, not able to find any place for her at this time.      Severe malnutrition  Anorexia  Chronic abdominal pain  Chronic constipation  BMI ~13 on early this hospitalization with poor PO intake. SLP was consulted for possible dysphagia, and no evidence of dysphagia was noted.   -On 2/20, discussed with pt's brother; overall, felt that oral intake was probably acceptable at this point for discharge and did not feel we needed to pursue enteral feedings/g-tube at this point; desired workup for etiology of abdominal pain which he felt was contributing to her decreased PO intake.  - CT abd/pelvis 2/21 showed large  amount of stool. EGD 2/22 relatively unremarkable. US abd no acute pathology. BMI up to 14.7 2/27.  - Encourage PO intake  - PPI  - Bowel regimen ,  Miralax to BID, continue Senna bid, use suppository as needed.      Thyroid nodule  TSH has been elevated but normal T4 in the past   - Repeat TSH ordered but patient has been refusing blood draws.  This lab is not urgent and can be done as an outpatient     COVID-19 recovered  Initially diagnosed 1/19/2022     E coli UTI, resolved       Diet: Combination Diet Regular Diet; High Kcal/High Protein Diet, ADULT    DVT Prophylaxis: Ambulate every shift  Zhu Catheter: Not present  Central Lines: None  Cardiac Monitoring: None  Code Status: Full Code      Disposition Plan   Expected Discharge: 05/31/2022     Anticipated discharge location: group home    Delays:     Placement - Group Homes            The patient's care was discussed with the Bedside Nurse and Patient.    Huyen Sepulveda MD  Hospitalist Service  [facility]  Securely message with the Vocera Web Console (learn more here)  Text page via [employer] Paging/Directory         Clinically Significant Risk Factors Present on Admission                    ______________________________________________________________________    Interval History   Patient seen ambulating hallways.  Denied any acute concerns.  Awaiting placement.    No other significant event overnight.     Data reviewed today: I reviewed all medications, new labs and imaging results over the last 24 hours. I personally reviewed no images or EKG's today.    Physical Exam   Vital Signs: Temp: 97.8  F (36.6  C) Temp src: Oral BP: 99/62 Pulse: 63   Resp: 16 SpO2: 96 % O2 Device: None (Room air)    Weight: 88 lbs 14.4 oz        General: Patient appears comfortable and in no acute distress.  HEENT: down syndrome facies    Respiratory: Nonlabored breathing  Cardiovascular:   Abdomen:   soft , non distended  Neurologic:  No facial droop,  moves all extremities  Psychiatric: cooperative     Data   Medications       melatonin  1 mg Oral At Bedtime     multivitamin w/minerals  1 tablet Oral Daily     pantoprazole  40 mg Oral QAM AC     polyethylene glycol  17 g Oral BID     senna-docusate  1 tablet Oral BID     cholecalciferol  50 mcg Oral Daily

## 2022-05-17 NOTE — PLAN OF CARE
Goal Outcome Evaluation:  Plan of Care Reviewed With: patient   Overall Patient Progress: improving  Cognitive Concerns/ Orientation : A&O x4, calm & cooperative.  BEHAVIOR & AGGRESSION TOOL COLOR: Green   ABNL VS/O2: VSS on RA (daily VS checks)  MOBILITY: Independent. SBA when ambulating in the hallway with staff  PAIN MANAGMENT: denies pain   DIET: Regular diet, tolerating   BOWEL/BLADDER: Continent  SKIN: BLE 1+ edema ankles and feet.   D/C DATE: Discharge pending MA and placement to group home

## 2022-05-17 NOTE — PLAN OF CARE
Goal Outcome Evaluation:  DATE & TIME: 5/17/22 0409-3060  Cognitive Concerns/ Orientation : A&O x4, calm & cooperative.  BEHAVIOR & AGGRESSION TOOL COLOR: Green   ABNL VS/O2: VSS on RA (daily VS checks)  MOBILITY: Independent. Enjoys walking with staff but occasionally walks alone and steady.  PAIN MANAGMENT: denies pain   DIET: Regular diet, tolerating   BOWEL/BLADDER: Continent  SKIN: BLE 1+ edema ankles and feet.   D/C DATE: Discharge pending MA and placement to group home     OTHER IMPORTANT INFO:

## 2022-05-17 NOTE — PLAN OF CARE
DATE & TIME: 5/16/22 4603-3473  Cognitive Concerns/ Orientation : A&O x4, calm & cooperative.  BEHAVIOR & AGGRESSION TOOL COLOR: Green   ABNL VS/O2: VSS on RA (daily VS checks)  MOBILITY: Independent. Enjoys walking with staff but occasionally walks alone and steady.  PAIN MANAGMENT: denies pain   DIET: Regular diet, tolerating   BOWEL/BLADDER: Continent, up to bathroom.   SKIN: BLE 1+ edema ankles and feet.   D/C DATE: Discharge pending MA and placement to group home       Goal Outcome Evaluation: plan of care reviewed with patient, discharge pending placement

## 2022-05-18 PROCEDURE — 120N000001 HC R&B MED SURG/OB

## 2022-05-18 PROCEDURE — 250N000013 HC RX MED GY IP 250 OP 250 PS 637: Performed by: HOSPITALIST

## 2022-05-18 PROCEDURE — 250N000013 HC RX MED GY IP 250 OP 250 PS 637: Performed by: INTERNAL MEDICINE

## 2022-05-18 PROCEDURE — 99231 SBSQ HOSP IP/OBS SF/LOW 25: CPT | Performed by: HOSPITALIST

## 2022-05-18 RX ADMIN — SENNOSIDES AND DOCUSATE SODIUM 1 TABLET: 8.6; 5 TABLET ORAL at 23:12

## 2022-05-18 RX ADMIN — MULTIPLE VITAMINS W/ MINERALS TAB 1 TABLET: TAB at 23:12

## 2022-05-18 RX ADMIN — SENNOSIDES AND DOCUSATE SODIUM 1 TABLET: 8.6; 5 TABLET ORAL at 09:47

## 2022-05-18 RX ADMIN — Medication 1 MG: at 23:12

## 2022-05-18 RX ADMIN — PANTOPRAZOLE SODIUM 40 MG: 40 TABLET, DELAYED RELEASE ORAL at 09:47

## 2022-05-18 RX ADMIN — Medication 50 MCG: at 09:47

## 2022-05-18 RX ADMIN — POLYETHYLENE GLYCOL 3350 17 G: 17 POWDER, FOR SOLUTION ORAL at 09:47

## 2022-05-18 ASSESSMENT — ACTIVITIES OF DAILY LIVING (ADL)
ADLS_ACUITY_SCORE: 34

## 2022-05-18 NOTE — PLAN OF CARE
Goal Outcome Evaluation:  Plan of Care Reviewed With: patient   Overall Patient Progress: improving  Cognitive Concerns/ Orientation : A&O x4, calm & cooperative.  BEHAVIOR & AGGRESSION TOOL COLOR: Green   ABNL VS/O2: VSS on RA (daily VS checks)  MOBILITY: Independent. Enjoys walking with staff but occasionally walks independently  PAIN MANAGMENT: denies pain   DIET: Regular diet, tolerating   BOWEL/BLADDER: Continent  SKIN: BLE 1+ edema ankles and feet.   D/C DATE: Discharge pending MA and placement to group home

## 2022-05-18 NOTE — PROGRESS NOTES
[facility]    Medicine Progress Note - Hospitalist Service    Date of Admission:  1/22/2022    Assessment & Plan          Miranda Dover is a 51 year old female with PMH Down Syndrome who was admitted on 1/22/2022 with COVID-19 pneumonia and acute hypoxic respiratory failure.     Hospitalization has been prolonged due to need to establish guardianship following deaths of her parents. Awaiting placement.     Patient is in hospital for longtime given problem with placement at this time, She remain medically stable at this time without change in her clinical status. Continues to declines labs     Patient remains overall stable at this time.         Down syndrome  Developmental Delay  Failure to thrive  Had significant emotional, psychiatric issues during this stay due to the death of her parents and prolonged hospitalization. Discharge planning has been complicated by need for guardianship and payor source for placement.   -Patient's brother, Maxi, was granted 60 days of emergency guardianship on 2/15. Completed psychologic testing on 3/2/22 to assess current cognitive function and adaptive abilities. Permanent guardianship on 4/1/2022.  - Continue social work and clinical coordinator for disposition  - we are working on getting her into our group home and family has been in agreement, not able to find any place for her at this time.      Severe malnutrition  Anorexia  Chronic abdominal pain  Chronic constipation  BMI ~13 on early this hospitalization with poor PO intake. SLP was consulted for possible dysphagia, and no evidence of dysphagia was noted.   -On 2/20, discussed with pt's brother; overall, felt that oral intake was probably acceptable at this point for discharge and did not feel we needed to pursue enteral feedings/g-tube at this point; desired workup for etiology of abdominal pain which he felt was contributing to her decreased PO intake.  - CT abd/pelvis 2/21 showed large  amount of stool. EGD 2/22 relatively unremarkable. US abd no acute pathology. BMI up to 14.7 2/27.  - Encourage PO intake  - PPI  - Bowel regimen ,  Miralax to BID, continue Senna bid, use suppository as needed.      Thyroid nodule  TSH has been elevated but normal T4 in the past   - Repeat TSH ordered but patient has been refusing blood draws.  This lab is not urgent and can be done as an outpatient     COVID-19 recovered  Initially diagnosed 1/19/2022     E coli UTI, resolved       Diet: Combination Diet Regular Diet; High Kcal/High Protein Diet, ADULT    DVT Prophylaxis: Ambulate every shift  Zhu Catheter: Not present  Central Lines: None  Cardiac Monitoring: None  Code Status: Full Code      Disposition Plan   Expected Discharge: 05/31/2022     Anticipated discharge location: group home    Delays:     Placement - Group Homes            The patient's care was discussed with the Bedside Nurse and Patient.    Huyen Sepulveda MD  Hospitalist Service  [facility]  Securely message with the Vocera Web Console (learn more here)  Text page via [employer] Paging/Directory         Clinically Significant Risk Factors Present on Admission                    ______________________________________________________________________    Interval History   Patient seen ambulating in her room.  Denied any acute concerns.  Awaiting placement.    No other significant event overnight.     Data reviewed today: I reviewed all medications, new labs and imaging results over the last 24 hours. I personally reviewed no images or EKG's today.    Physical Exam   Vital Signs: Temp: 97.3  F (36.3  C) Temp src: Oral BP: 104/61 Pulse: 58   Resp: 18 SpO2: 100 % O2 Device: None (Room air)    Weight: 88 lbs 14.4 oz        General: Patient appears comfortable and in no acute distress.  HEENT: down syndrome facies    Respiratory: Nonlabored breathing  Cardiovascular:   Abdomen:   soft , non distended  Neurologic:  No facial droop,  moves all extremities  Psychiatric: cooperative     Data   Medications       melatonin  1 mg Oral At Bedtime     multivitamin w/minerals  1 tablet Oral Daily     pantoprazole  40 mg Oral QAM AC     polyethylene glycol  17 g Oral BID     senna-docusate  1 tablet Oral BID     cholecalciferol  50 mcg Oral Daily

## 2022-05-18 NOTE — PLAN OF CARE
Goal Outcome Evaluation:      DATE & TIME: 5/18/22 7247-5209  Cognitive Concerns/ Orientation : A&O x4, calm & cooperative.  BEHAVIOR & AGGRESSION TOOL COLOR: Green   ABNL VS/O2: VSS on RA (daily VS checks)  MOBILITY: Independent. Enjoys walking with staff but occasionally walks independently  PAIN MANAGMENT: denies pain   DIET: Regular diet, tolerating   BOWEL/BLADDER: Continent  SKIN: BLE 1+ edema ankles and feet.   D/C DATE: Discharge pending MA and placement to group home     OTHER IMPORTANT INFO: Patients brother picked her up today to bring her to see/meet group home.

## 2022-05-19 PROCEDURE — 250N000013 HC RX MED GY IP 250 OP 250 PS 637: Performed by: HOSPITALIST

## 2022-05-19 PROCEDURE — 250N000013 HC RX MED GY IP 250 OP 250 PS 637: Performed by: INTERNAL MEDICINE

## 2022-05-19 PROCEDURE — 99231 SBSQ HOSP IP/OBS SF/LOW 25: CPT | Performed by: HOSPITALIST

## 2022-05-19 PROCEDURE — 120N000001 HC R&B MED SURG/OB

## 2022-05-19 RX ADMIN — MULTIPLE VITAMINS W/ MINERALS TAB 1 TABLET: TAB at 21:16

## 2022-05-19 RX ADMIN — SENNOSIDES AND DOCUSATE SODIUM 1 TABLET: 8.6; 5 TABLET ORAL at 10:59

## 2022-05-19 RX ADMIN — POLYETHYLENE GLYCOL 3350 17 G: 17 POWDER, FOR SOLUTION ORAL at 11:01

## 2022-05-19 RX ADMIN — SENNOSIDES AND DOCUSATE SODIUM 1 TABLET: 8.6; 5 TABLET ORAL at 21:16

## 2022-05-19 RX ADMIN — POLYETHYLENE GLYCOL 3350 17 G: 17 POWDER, FOR SOLUTION ORAL at 21:16

## 2022-05-19 RX ADMIN — PANTOPRAZOLE SODIUM 40 MG: 40 TABLET, DELAYED RELEASE ORAL at 11:00

## 2022-05-19 RX ADMIN — Medication 50 MCG: at 11:00

## 2022-05-19 RX ADMIN — Medication 1 MG: at 21:16

## 2022-05-19 ASSESSMENT — ACTIVITIES OF DAILY LIVING (ADL)
ADLS_ACUITY_SCORE: 34
ADLS_ACUITY_SCORE: 34
ADLS_ACUITY_SCORE: 33
ADLS_ACUITY_SCORE: 34
ADLS_ACUITY_SCORE: 33
ADLS_ACUITY_SCORE: 33
ADLS_ACUITY_SCORE: 34
ADLS_ACUITY_SCORE: 33

## 2022-05-19 NOTE — PROGRESS NOTES
[facility]    Medicine Progress Note - Hospitalist Service    Date of Admission:  1/22/2022    Assessment & Plan          Miranda Dover is a 51 year old female with PMH Down Syndrome who was admitted on 1/22/2022 with COVID-19 pneumonia and acute hypoxic respiratory failure.     Hospitalization has been prolonged due to need to establish guardianship following deaths of her parents. Awaiting placement.     Patient is in hospital for longtime given problem with placement at this time, She remain medically stable at this time without change in her clinical status. Continues to declines labs     Patient remains overall stable at this time.         Down syndrome  Developmental Delay  Failure to thrive  Had significant emotional, psychiatric issues during this stay due to the death of her parents and prolonged hospitalization. Discharge planning has been complicated by need for guardianship and payor source for placement.   -Patient's brother, Maxi, was granted 60 days of emergency guardianship on 2/15. Completed psychologic testing on 3/2/22 to assess current cognitive function and adaptive abilities. Permanent guardianship on 4/1/2022.  - Continue social work and clinical coordinator for disposition  - we are working on getting her into our group home and family has been in agreement, not able to find any place for her at this time.      Severe malnutrition  Anorexia  Chronic abdominal pain  Chronic constipation  BMI ~13 on early this hospitalization with poor PO intake. SLP was consulted for possible dysphagia, and no evidence of dysphagia was noted.   -On 2/20, discussed with pt's brother; overall, felt that oral intake was probably acceptable at this point for discharge and did not feel we needed to pursue enteral feedings/g-tube at this point; desired workup for etiology of abdominal pain which he felt was contributing to her decreased PO intake.  - CT abd/pelvis 2/21 showed large  amount of stool. EGD 2/22 relatively unremarkable. US abd no acute pathology. BMI up to 14.7 2/27.  - Encourage PO intake  - PPI  - Bowel regimen ,  Miralax to BID, continue Senna bid, use suppository as needed.      Thyroid nodule  TSH has been elevated but normal T4 in the past   - Repeat TSH ordered but patient has been refusing blood draws.  This lab is not urgent and can be done as an outpatient     COVID-19 recovered  Initially diagnosed 1/19/2022     E coli UTI, resolved       Diet: Combination Diet Regular Diet; High Kcal/High Protein Diet, ADULT    DVT Prophylaxis: Ambulate every shift  Zhu Catheter: Not present  Central Lines: None  Cardiac Monitoring: None  Code Status: Full Code      Disposition Plan   Expected Discharge: 05/31/2022     Anticipated discharge location: group home    Delays:     Placement - Group Homes            The patient's care was discussed with the Bedside Nurse and Patient.    Huyen Sepulveda MD  Hospitalist Service  [facility]  Securely message with the Vocera Web Console (learn more here)  Text page via [employer] Paging/Directory         Clinically Significant Risk Factors Present on Admission                    ______________________________________________________________________    Interval History   Patient seen laying comfortably in bed.  Did not voice any concerns.  No concerns per nursing.  No other significant event overnight.     Data reviewed today: I reviewed all medications, new labs and imaging results over the last 24 hours. I personally reviewed no images or EKG's today.    Physical Exam   Vital Signs: Temp: 97.7  F (36.5  C) Temp src: Oral BP: 100/61 Pulse: 65   Resp: 16 SpO2: 97 % O2 Device: None (Room air)    Weight: 88 lbs 14.4 oz        General: Patient appears comfortable and in no acute distress.  HEENT: down syndrome facies    Respiratory: Nonlabored breathing  Cardiovascular:   Abdomen:   soft , non distended  Neurologic:  No facial  droop, moves all extremities  Psychiatric: cooperative     Data   Medications       melatonin  1 mg Oral At Bedtime     multivitamin w/minerals  1 tablet Oral Daily     pantoprazole  40 mg Oral QAM AC     polyethylene glycol  17 g Oral BID     senna-docusate  1 tablet Oral BID     cholecalciferol  50 mcg Oral Daily

## 2022-05-19 NOTE — PLAN OF CARE
"DATE & TIME: 5/19/22 AM shift  Cognitive Concerns/ Orientation : A&O x4, calm & cooperative.  BEHAVIOR & AGGRESSION TOOL COLOR: Green             ABNL VS/O2: VSS on RA; VS done once a day.   MOBILITY: Independent. Enjoys walking with staff but occasionally walks independently. Up to the chair for meals; eats in her room.   PAIN MANAGMENT: denies pain this shift; declined to take miralax as it makes her stomach \"hurt\".   DIET: Regular diet, tolerating. Has some drinks and foods that were brought in by brother; ordered omelette this morning (did not eat much of it).   BOWEL/BLADDER: Continent; no BM since 5/15. +BS and passing flatus.   SKIN: BLE 1+ edema ankles and feet. Buttock with blanchable erythema, encouraged to switch position. Scalp flaky, asked if she wants to take bath but pt declined.   D/C DATE: Discharge pending MA paperwork and placement to group home ([facility]). SW is following.   OTHER IMPORTANT INFO:pt's brother and pt toured the  on 5/18.    "

## 2022-05-19 NOTE — PROGRESS NOTES
CLINICAL NUTRITION SERVICES - REASSESSMENT NOTE    Malnutrition:   (4/21)   % Weight Loss: None noted - wt up from admission  % Intake:  <75% for > 7 days (moderate malnutrition)  Subcutaneous Fat Loss:  Baseline  Muscle Loss:  Baseline  Fluid Retention:  None noted     Malnutrition Diagnosis: Does not meet criteria at this time     EVALUATION OF PROGRESS TOWARD GOALS   Diet: Regular + High Kcal/High Protein diet  Intake/Tolerance:   - Eating % of meals ordered TID most days.   - Stooling - BM x2 on 5/15  - Last weight measured on 4/14.     ASSESSED NUTRITION NEEDS:  Dosing Weight 40.3 kg (4/14)  Estimated Energy Needs: 4254-4235 kcals (35-40 Kcal/Kg)  Justification: repletion and underweight  Estimated Protein Needs: 60+ grams protein (1.5+ g pro/Kg)  Justification: repletion and hypercatabolism with acute illness    NEW FINDINGS:   - Chart reviewed. Patient has been accepted to a Group Home    Previous Goals:   Intake of at least 75% meals BID-TID each with at least 3 items  Evaluation: Met    Previous Nutrition Diagnosis:   Predicted inadequate nutrient intake (energy/protein/micronutrients) related to variable appetite and food intake, reliance on similar food items for meals and intermittently declining meals  Evaluation: No change    CURRENT NUTRITION DIAGNOSIS  Predicted inadequate nutrient intake (energy/protein/micronutrients) related to variable appetite and food intake, reliance on similar food items for meals and intermittently declining meals    INTERVENTIONS  Recommendations / Nutrition Prescription  Continue diet as ordered    Implementation  No changes    Goals  Intake of 75% of meals at least BID with 3+ items per meal.       MONITORING AND EVALUATION:  Progress towards goals will be monitored and evaluated per protocol and Practice Guidelines    Rosalba Adams RD, LD  [facility], [facility], [facility]  Pager: [phone]  Weekend Pager: [phone]

## 2022-05-19 NOTE — PLAN OF CARE
DATE & TIME: 5/18/22 1900- 5/19/22 5193  Cognitive Concerns/ Orientation : A&O x4, calm & cooperative.  BEHAVIOR & AGGRESSION TOOL COLOR: Green   ABNL VS/O2: VSS on RA (daily VS checks)  MOBILITY: Independent. Enjoys walking with staff but occasionally walks independently  PAIN MANAGMENT: Denies pain   DIET: Regular diet, tolerating   BOWEL/BLADDER: Continent  SKIN: BLE 1+ edema ankles and feet.   D/C DATE: Discharge pending MA and placement to group home

## 2022-05-20 PROCEDURE — 120N000001 HC R&B MED SURG/OB

## 2022-05-20 PROCEDURE — 250N000013 HC RX MED GY IP 250 OP 250 PS 637: Performed by: INTERNAL MEDICINE

## 2022-05-20 PROCEDURE — 250N000013 HC RX MED GY IP 250 OP 250 PS 637: Performed by: HOSPITALIST

## 2022-05-20 PROCEDURE — 99231 SBSQ HOSP IP/OBS SF/LOW 25: CPT | Performed by: INTERNAL MEDICINE

## 2022-05-20 RX ADMIN — PANTOPRAZOLE SODIUM 40 MG: 40 TABLET, DELAYED RELEASE ORAL at 13:06

## 2022-05-20 RX ADMIN — SENNOSIDES AND DOCUSATE SODIUM 1 TABLET: 8.6; 5 TABLET ORAL at 21:37

## 2022-05-20 RX ADMIN — Medication 1 MG: at 21:37

## 2022-05-20 RX ADMIN — POLYETHYLENE GLYCOL 3350 17 G: 17 POWDER, FOR SOLUTION ORAL at 13:07

## 2022-05-20 RX ADMIN — MULTIPLE VITAMINS W/ MINERALS TAB 1 TABLET: TAB at 21:37

## 2022-05-20 RX ADMIN — SENNOSIDES AND DOCUSATE SODIUM 1 TABLET: 8.6; 5 TABLET ORAL at 13:06

## 2022-05-20 RX ADMIN — Medication 50 MCG: at 13:06

## 2022-05-20 ASSESSMENT — ACTIVITIES OF DAILY LIVING (ADL)
ADLS_ACUITY_SCORE: 33
ADLS_ACUITY_SCORE: 34
ADLS_ACUITY_SCORE: 33

## 2022-05-20 NOTE — PROGRESS NOTES
[facility]    Medicine Progress Note - Hospitalist Service        Date of Admission:  1/22/2022 11:31 AM    Assessment & Plan:   Miranda Dover is a 51 year old female with PMH Down Syndrome who was admitted on 1/22/2022 with COVID-19 pneumonia and acute hypoxic respiratory failure.      Hospitalization has been prolonged due to need to establish guardianship following deaths of her parents. Awaiting placement.     Patient is in hospital for longtime given problem with placement at this time, She remain medically stable at this time without change in her clinical status. Continues to declines labs      Patient remains overall stable at this time.         Down syndrome  Developmental Delay  Failure to thrive  Had significant emotional, psychiatric issues during this stay due to the death of her parents and prolonged hospitalization. Discharge planning has been complicated by need for guardianship and payor source for placement.   -Patient's brother, Maxi, was granted 60 days of emergency guardianship on 2/15. Completed psychologic testing on 3/2/22 to assess current cognitive function and adaptive abilities. Permanent guardianship on 4/1/2022.  - Continue social work and clinical coordinator for disposition  - we are working on getting her into our group home and family has been in agreement, not able to find any place for her at this time.      Severe malnutrition  Anorexia  Chronic abdominal pain  Chronic constipation  BMI ~13 on early this hospitalization with poor PO intake. SLP was consulted for possible dysphagia, and no evidence of dysphagia was noted.   -On 2/20, discussed with pt's brother; overall, felt that oral intake was probably acceptable at this point for discharge and did not feel we needed to pursue enteral feedings/g-tube at this point; desired workup for etiology of abdominal pain which he felt was contributing to her decreased PO intake.  - CT abd/pelvis 2/21 showed large  "amount of stool. EGD 2/22 relatively unremarkable. US abd no acute pathology. BMI up to 14.7 2/27.  - Encourage PO intake  - PPI  - Bowel regimen ,  Miralax to BID, continue Senna bid, use suppository as needed.      Thyroid nodule  TSH has been elevated but normal T4 in the past   - Repeat TSH ordered but patient has been refusing blood draws.  This lab is not urgent and can be done as an outpatient     COVID-19 recovered  Initially diagnosed 1/19/2022     E coli UTI, resolved          Diet: Combination Diet Regular Diet; High Kcal/High Protein Diet, ADULT     DVT Prophylaxis: Ambulate every shift   Zhu Catheter: Not present  Code Status: Full Code     Disposition Plan    Expected Discharge: 05/31/2022     Anticipated discharge location: group [place]    Delays:     Placement - [facility]          Entered: Billy Posada MD 05/20/2022, 12:33 PM        Clinically Significant Risk Factors Present on Admission                    The patient's care was discussed with the Bedside Nurse and Patient.    Billy Posada MD  Hospitalist Service  [facility]  Text Page 7AM-6PM  Securely message with the Vocera Web Console (learn more here)  Text page via [employer] Paging/Directory    ______________________________________________________________________    Interval History   No acute events overnight.  Awaiting placement.    Data reviewed today: I reviewed all medications, new labs and imaging results over the last 24 hours. I personally reviewed no images or EKG's today.    Physical Exam   Vital signs:  Temp: 98.4  F (36.9  C) Temp src: Oral BP: 98/63 Pulse: 65   Resp: 16 SpO2: 98 % O2 Device: None (Room air) Oxygen Delivery: 1 LPM Height: 160 cm (5' 3\") Weight: 40.3 kg (88 lb 14.4 oz)  Estimated body mass index is 15.75 kg/m  as calculated from the following:    Height as of this encounter: 1.6 m (5' 3\").    Weight as of this encounter: 40.3 kg (88 lb 14.4 oz).      Wt Readings from Last 2 " Encounters:   04/14/22 40.3 kg (88 lb 14.4 oz)   02/23/21 33.6 kg (74 lb)       Gen: AAO; NAD  Resp: CTA B/L, normal WOB  Neuro- CN- intact.      Data   No lab results found in last 7 days.    No results found for this or any previous visit (from the past 24 hour(s)).  Medications       melatonin  1 mg Oral At Bedtime     multivitamin w/minerals  1 tablet Oral Daily     pantoprazole  40 mg Oral QAM AC     polyethylene glycol  17 g Oral BID     senna-docusate  1 tablet Oral BID     cholecalciferol  50 mcg Oral Daily

## 2022-05-20 NOTE — PLAN OF CARE
DATE & TIME: 5/20/22 8030-3739    Cognitive Concerns/ Orientation : A&O x4   BEHAVIOR & AGGRESSION TOOL COLOR: Green  CIWA SCORE: NA   ABNL VS/O2: VSS, done daily  MOBILITY: Ind, enjoys walks in the [place] with and without staff  PAIN MANAGMENT: Denies  DIET: Regular  BOWEL/BLADDER: Continent, medium BM this shift  ABNL LAB/BG: None new  DRAIN/DEVICES: None  TELEMETRY RHYTHM: NA  SKIN: Intact, flaky scalp.  1+ edema on ankles  TESTS/PROCEDURES: None  D/C DATE: Discharge pending MA paperwork and group home placement, SW following.

## 2022-05-20 NOTE — PLAN OF CARE
Goal Outcome Evaluation: ongoing, progressing    DATE & TIME: 5/19/22 1632-5956  Cognitive Concerns/ Orientation : A&O x4, calm & cooperative.  BEHAVIOR & AGGRESSION TOOL COLOR: Green             ABNL VS/O2: VSS on RA; VS done once a day.   MOBILITY: Independent. Walked in hallway with staff and alone several times this shift, Up to the chair for meals; eats in her room.   PAIN MANAGMENT: denies pain this shift  DIET: Regular diet, tolerating. Has some drinks and foods that were brought in by brother; ate some apple pie this afternoon  BOWEL/BLADDER: Continent; no BM since 5/15. +BS and passing flatus.   SKIN: BLE 1+ edema ankles and feet. Buttock with blanchable erythema, encouraged to switch position. Scalp flaky, asked if she wants to take bath but pt declined, stated she has a plan to take a tub tomorrow   D/C DATE: Discharge pending MA paperwork and placement to group home ([facility]). SW is following.   OTHER IMPORTANT INFO:pt's brother and pt toured the  on 5/18.

## 2022-05-21 PROCEDURE — 99231 SBSQ HOSP IP/OBS SF/LOW 25: CPT | Performed by: INTERNAL MEDICINE

## 2022-05-21 PROCEDURE — 250N000013 HC RX MED GY IP 250 OP 250 PS 637: Performed by: INTERNAL MEDICINE

## 2022-05-21 PROCEDURE — 250N000013 HC RX MED GY IP 250 OP 250 PS 637: Performed by: HOSPITALIST

## 2022-05-21 PROCEDURE — 120N000001 HC R&B MED SURG/OB

## 2022-05-21 RX ADMIN — POLYETHYLENE GLYCOL 3350 17 G: 17 POWDER, FOR SOLUTION ORAL at 11:15

## 2022-05-21 RX ADMIN — POLYETHYLENE GLYCOL 3350 17 G: 17 POWDER, FOR SOLUTION ORAL at 21:35

## 2022-05-21 RX ADMIN — SENNOSIDES AND DOCUSATE SODIUM 1 TABLET: 8.6; 5 TABLET ORAL at 21:36

## 2022-05-21 RX ADMIN — MULTIPLE VITAMINS W/ MINERALS TAB 1 TABLET: TAB at 21:36

## 2022-05-21 RX ADMIN — PANTOPRAZOLE SODIUM 40 MG: 40 TABLET, DELAYED RELEASE ORAL at 11:15

## 2022-05-21 RX ADMIN — Medication 1 MG: at 21:36

## 2022-05-21 RX ADMIN — SENNOSIDES AND DOCUSATE SODIUM 1 TABLET: 8.6; 5 TABLET ORAL at 11:15

## 2022-05-21 RX ADMIN — Medication 50 MCG: at 11:15

## 2022-05-21 ASSESSMENT — ACTIVITIES OF DAILY LIVING (ADL)
ADLS_ACUITY_SCORE: 33
ADLS_ACUITY_SCORE: 34
ADLS_ACUITY_SCORE: 34
ADLS_ACUITY_SCORE: 33
ADLS_ACUITY_SCORE: 34
ADLS_ACUITY_SCORE: 34
ADLS_ACUITY_SCORE: 33
ADLS_ACUITY_SCORE: 34
ADLS_ACUITY_SCORE: 34

## 2022-05-21 NOTE — PLAN OF CARE
Goal Outcome Evaluation:      DATE & TIME: 5/21/22 ([id number])   Cognitive Concerns/ Orientation : A&Ox 4   BEHAVIOR & AGGRESSION TOOL COLOR: Green  CIWA SCORE: NA   ABNL VS/O2: VSS - done daily  MOBILITY: Ind, enjoys walking in the [place] with and without staff  PAIN MANAGMENT: Denies  DIET: Regular, tolerating   BOWEL/BLADDER: Continent, No BM   ABNL LAB/BG: None new  DRAIN/DEVICES: None  TELEMETRY RHYTHM: NA  SKIN: Intact, scat bruising, flaky scalp.  1+ edema on ankles, feet, legs  TESTS/PROCEDURES: None  D/C DATE: Discharge pending MA paperwork and group home placement, SW following.

## 2022-05-21 NOTE — PROGRESS NOTES
[facility]    Medicine Progress Note - Hospitalist Service        Date of Admission:  1/22/2022 11:31 AM    Assessment & Plan:   Miranda Dover is a 51 year old female with PMH Down Syndrome who was admitted on 1/22/2022 with COVID-19 pneumonia and acute hypoxic respiratory failure.      Hospitalization has been prolonged due to need to establish guardianship following deaths of her parents. Awaiting placement.     Patient is in hospital for longtime given problem with placement at this time, She remain medically stable at this time without change in her clinical status. Continues to declines labs      Patient remains overall stable at this time.         Down syndrome  Developmental Delay  Failure to thrive  Had significant emotional, psychiatric issues during this stay due to the death of her parents and prolonged hospitalization. Discharge planning has been complicated by need for guardianship and payor source for placement.   -Patient's brother, Maxi, was granted 60 days of emergency guardianship on 2/15. Completed psychologic testing on 3/2/22 to assess current cognitive function and adaptive abilities. Permanent guardianship on 4/1/2022.  - Continue social work and clinical coordinator for disposition  - we are working on getting her into our group home and family has been in agreement, not able to find any place for her at this time.      Severe malnutrition  Anorexia  Chronic abdominal pain  Chronic constipation  BMI ~13 on early this hospitalization with poor PO intake. SLP was consulted for possible dysphagia, and no evidence of dysphagia was noted.   -On 2/20, discussed with pt's brother; overall, felt that oral intake was probably acceptable at this point for discharge and did not feel we needed to pursue enteral feedings/g-tube at this point; desired workup for etiology of abdominal pain which he felt was contributing to her decreased PO intake.  - CT abd/pelvis 2/21 showed large  "amount of stool. EGD 2/22 relatively unremarkable. US abd no acute pathology. BMI up to 14.7 2/27.  - Encourage PO intake  - PPI  - Bowel regimen ,  Miralax to BID, continue Senna bid, use suppository as needed.      Thyroid nodule  TSH has been elevated but normal T4 in the past   - Repeat TSH ordered but patient has been refusing blood draws.  This lab is not urgent and can be done as an outpatient     COVID-19 recovered  Initially diagnosed 1/19/2022     E coli UTI, resolved          Diet: Combination Diet Regular Diet; High Kcal/High Protein Diet, ADULT     DVT Prophylaxis: Ambulate every shift   Zhu Catheter: Not present  Code Status: Full Code     Disposition Plan    Expected Discharge: 05/31/2022     Anticipated discharge location: group [place]    Delays:     Placement - [facility]          Entered: Billy Posada MD 05/21/2022, 11:11 AM        Clinically Significant Risk Factors Present on Admission                    The patient's care was discussed with the Bedside Nurse and Patient.    Billy Posada MD  Hospitalist Service  [facility]  Text Page 7AM-6PM  Securely message with the Vocera Web Console (learn more here)  Text page via [employer] Paging/Directory    ______________________________________________________________________    Interval History   No new events. Awaiting placement    Data reviewed today: I reviewed all medications, new labs and imaging results over the last 24 hours. I personally reviewed no images or EKG's today.    Physical Exam   Vital signs:  Temp: 98.6  F (37  C) Temp src: Oral BP: 107/65 Pulse: 63   Resp: 16 SpO2: 99 % O2 Device: None (Room air) Oxygen Delivery: 1 LPM Height: 160 cm (5' 3\") Weight: 40.3 kg (88 lb 14.4 oz)  Estimated body mass index is 15.75 kg/m  as calculated from the following:    Height as of this encounter: 1.6 m (5' 3\").    Weight as of this encounter: 40.3 kg (88 lb 14.4 oz).      Wt Readings from Last 2 Encounters:   04/14/22 " 40.3 kg (88 lb 14.4 oz)   02/23/21 33.6 kg (74 lb)       Gen: AAO; NAD  Resp: CTA B/L, normal WOB  Neuro- CN- intact.      Data   No lab results found in last 7 days.    No results found for this or any previous visit (from the past 24 hour(s)).  Medications       melatonin  1 mg Oral At Bedtime     multivitamin w/minerals  1 tablet Oral Daily     pantoprazole  40 mg Oral QAM AC     polyethylene glycol  17 g Oral BID     senna-docusate  1 tablet Oral BID     cholecalciferol  50 mcg Oral Daily

## 2022-05-21 NOTE — PLAN OF CARE
Goal Outcome Evaluation:    DATE & TIME: 5/20/22-5/21/22 ([id number])    Cognitive Concerns/ Orientation : A&Ox 4   BEHAVIOR & AGGRESSION TOOL COLOR: Green  CIWA SCORE: NA   ABNL VS/O2: VSS - done daily  MOBILITY: Ind, enjoys walking in the [place] with and without staff  PAIN MANAGMENT: Denies  DIET: Regular  BOWEL/BLADDER: Continent, No BM   ABNL LAB/BG: None new  DRAIN/DEVICES: None  TELEMETRY RHYTHM: NA  SKIN: Intact, scat bruising, flaky scalp.  1+ edema on ankles  TESTS/PROCEDURES: None  D/C DATE: Discharge pending MA paperwork and group home placement, SW following.

## 2022-05-21 NOTE — PLAN OF CARE
DATE & TIME: 5/20/22 (2332-5728)    Cognitive Concerns/ Orientation : A&Ox 4   BEHAVIOR & AGGRESSION TOOL COLOR: Green  CIWA SCORE: NA   ABNL VS/O2: VSS, done daily  MOBILITY: Ind, enjoys walking in the [place] with and without staff  PAIN MANAGMENT: Denies  DIET: Regular, Had some drinks, and ate dinner  BOWEL/BLADDER: Continent, No BM   ABNL LAB/BG: None new  DRAIN/DEVICES: None  TELEMETRY RHYTHM: NA  SKIN: Intact, flaky scalp.  1+ edema on ankles  TESTS/PROCEDURES: None  D/C DATE: Discharge pending MA paperwork and group home placement, SW following.

## 2022-05-22 PROCEDURE — 250N000013 HC RX MED GY IP 250 OP 250 PS 637: Performed by: INTERNAL MEDICINE

## 2022-05-22 PROCEDURE — 120N000001 HC R&B MED SURG/OB

## 2022-05-22 PROCEDURE — 250N000013 HC RX MED GY IP 250 OP 250 PS 637: Performed by: HOSPITALIST

## 2022-05-22 PROCEDURE — 99231 SBSQ HOSP IP/OBS SF/LOW 25: CPT | Performed by: INTERNAL MEDICINE

## 2022-05-22 RX ADMIN — Medication 50 MCG: at 11:14

## 2022-05-22 RX ADMIN — PANTOPRAZOLE SODIUM 40 MG: 40 TABLET, DELAYED RELEASE ORAL at 11:14

## 2022-05-22 RX ADMIN — Medication 1 MG: at 21:40

## 2022-05-22 RX ADMIN — SENNOSIDES AND DOCUSATE SODIUM 1 TABLET: 8.6; 5 TABLET ORAL at 21:40

## 2022-05-22 RX ADMIN — SENNOSIDES AND DOCUSATE SODIUM 1 TABLET: 8.6; 5 TABLET ORAL at 11:14

## 2022-05-22 RX ADMIN — MULTIPLE VITAMINS W/ MINERALS TAB 1 TABLET: TAB at 21:40

## 2022-05-22 RX ADMIN — POLYETHYLENE GLYCOL 3350 17 G: 17 POWDER, FOR SOLUTION ORAL at 11:14

## 2022-05-22 RX ADMIN — POLYETHYLENE GLYCOL 3350 17 G: 17 POWDER, FOR SOLUTION ORAL at 21:40

## 2022-05-22 ASSESSMENT — ACTIVITIES OF DAILY LIVING (ADL)
ADLS_ACUITY_SCORE: 34
ADLS_ACUITY_SCORE: 34
ADLS_ACUITY_SCORE: 33
ADLS_ACUITY_SCORE: 34
ADLS_ACUITY_SCORE: 34
ADLS_ACUITY_SCORE: 33
ADLS_ACUITY_SCORE: 34

## 2022-05-22 NOTE — PLAN OF CARE
Goal Outcome Evaluation:      DATE & TIME: 5/22/22 ([id number])  Cognitive Concerns/ Orientation : A&Ox 4   BEHAVIOR & AGGRESSION TOOL COLOR: Green  CIWA SCORE: NA   ABNL VS/O2: VSS - done daily  MOBILITY: Ind, walked around halls multiple times during shift   PAIN MANAGMENT: Denies  DIET: Regular, tolerating, good appetite  BOWEL/BLADDER: Continent, no BM  ABNL LAB/BG: None new  DRAIN/DEVICES: None  TELEMETRY RHYTHM: NA  SKIN: Intact, scat bruising, flaky scalp.  1+ edema on feet  TESTS/PROCEDURES: None  D/C DATE: Discharge pending MA paperwork and group home placement, SW following.

## 2022-05-22 NOTE — PROGRESS NOTES
[facility]    Medicine Progress Note - Hospitalist Service        Date of Admission:  1/22/2022 11:31 AM    Assessment & Plan:   Miranda Dover is a 51 year old female with PMH Down Syndrome who was admitted on 1/22/2022 with COVID-19 pneumonia and acute hypoxic respiratory failure.      Hospitalization has been prolonged due to need to establish guardianship following deaths of her parents. Awaiting placement.     Patient is in hospital for longtime given problem with placement at this time, She remain medically stable at this time without change in her clinical status. Continues to declines labs.     Patient remains overall stable at this time.     Down syndrome  Developmental Delay  Failure to thrive  Had significant emotional, psychiatric issues during this stay due to the death of her parents and prolonged hospitalization. Discharge planning has been complicated by need for guardianship and payor source for placement.   -Patient's brother, Maxi, was granted 60 days of emergency guardianship on 2/15. Completed psychologic testing on 3/2/22 to assess current cognitive function and adaptive abilities. Permanent guardianship on 4/1/2022.  - social work and clinical coordinator assisting with disposition  - we are working on getting her into our group home and family has been in agreement, not able to find any place for her at this time.      Severe malnutrition  Anorexia  Chronic abdominal pain  Chronic constipation  BMI ~13 on early this hospitalization with poor PO intake. SLP was consulted for possible dysphagia, and no evidence of dysphagia was noted.   -On 2/20, discussed with pt's brother; overall, felt that oral intake was probably acceptable at this point for discharge and did not feel we needed to pursue enteral feedings/g-tube at this point; desired workup for etiology of abdominal pain which he felt was contributing to her decreased PO intake.  - CT abd/pelvis 2/21 showed large amount  "of stool. EGD 2/22 relatively unremarkable. US abd no acute pathology. BMI up to 14.7 2/27.  - Encourage PO intake  - PPI  - Bowel regimen ,  Miralax to BID, continue Senna bid, use suppository as needed.      Thyroid nodule  TSH has been elevated but normal T4 in the past   - Repeat TSH ordered but patient has been refusing blood draws.  This lab is not urgent and can be done as an outpatient     COVID-19 recovered  Initially diagnosed 1/19/2022     E coli UTI, resolved          Diet: Combination Diet Regular Diet; High Kcal/High Protein Diet, ADULT     DVT Prophylaxis: Ambulate every shift   Zhu Catheter: Not present  Code Status: Full Code     Disposition Plan    Expected Discharge: Unclear at this time, awaiting placement to a group home  Anticipated discharge location: group [place]    Delays:     Placement - Group Homes          Entered: Billy Posada MD 05/22/2022, 10:30 AM        Clinically Significant Risk Factors Present on Admission                    The patient's care was discussed with the Bedside Nurse and Patient.    Billy Posada MD  Hospitalist Service  [facility]  Text Page 7AM-6PM  Securely message with the Vocera Web Console (learn more here)  Text page via [employer] Paging/Directory    ______________________________________________________________________    Interval History   No new events.  No new nursing concerns.  Awaiting placement.  Awaiting placement.    Data reviewed today: I reviewed all medications, new labs and imaging results over the last 24 hours. I personally reviewed no images or EKG's today.    Physical Exam   Vital signs:                  Oxygen Delivery: 1 LPM Height: 160 cm (5' 3\") Weight: 40.3 kg (88 lb 14.4 oz)  Estimated body mass index is 15.75 kg/m  as calculated from the following:    Height as of this encounter: 1.6 m (5' 3\").    Weight as of this encounter: 40.3 kg (88 lb 14.4 oz).      Wt Readings from Last 2 Encounters:   04/14/22 40.3 " kg (88 lb 14.4 oz)   02/23/21 33.6 kg (74 lb)       Gen: AAO; NAD  Resp: CTA B/L, normal WOB  Neuro- CN- intact.      Data   No lab results found in last 7 days.    No results found for this or any previous visit (from the past 24 hour(s)).  Medications       melatonin  1 mg Oral At Bedtime     multivitamin w/minerals  1 tablet Oral Daily     pantoprazole  40 mg Oral QAM AC     polyethylene glycol  17 g Oral BID     senna-docusate  1 tablet Oral BID     cholecalciferol  50 mcg Oral Daily

## 2022-05-22 NOTE — PLAN OF CARE
Goal Outcome Evaluation:    DATE & TIME: 5/21/22-5/22/22 0173-7124   Cognitive Concerns/ Orientation : A&Ox 4   BEHAVIOR & AGGRESSION TOOL COLOR: Green  CIWA SCORE: NA   ABNL VS/O2: VSS - done daily  MOBILITY: Ind, walked around halls and watched the sunset.  PAIN MANAGMENT: Denies  DIET: Regular, tolerating. Finished 100% of dinner.  BOWEL/BLADDER: Continent, 1 medium BM.  ABNL LAB/BG: None new  DRAIN/DEVICES: None  TELEMETRY RHYTHM: NA  SKIN: Intact, scat bruising, flaky scalp.  1+ edema on feet  TESTS/PROCEDURES: None  D/C DATE: Discharge pending MA paperwork and group home placement, SW following.

## 2022-05-23 PROCEDURE — 250N000013 HC RX MED GY IP 250 OP 250 PS 637: Performed by: INTERNAL MEDICINE

## 2022-05-23 PROCEDURE — 99231 SBSQ HOSP IP/OBS SF/LOW 25: CPT | Performed by: INTERNAL MEDICINE

## 2022-05-23 PROCEDURE — 120N000001 HC R&B MED SURG/OB

## 2022-05-23 PROCEDURE — 250N000013 HC RX MED GY IP 250 OP 250 PS 637: Performed by: HOSPITALIST

## 2022-05-23 RX ADMIN — PANTOPRAZOLE SODIUM 40 MG: 40 TABLET, DELAYED RELEASE ORAL at 09:52

## 2022-05-23 RX ADMIN — SENNOSIDES AND DOCUSATE SODIUM 1 TABLET: 8.6; 5 TABLET ORAL at 21:50

## 2022-05-23 RX ADMIN — Medication 1 MG: at 21:50

## 2022-05-23 RX ADMIN — POLYETHYLENE GLYCOL 3350 17 G: 17 POWDER, FOR SOLUTION ORAL at 09:52

## 2022-05-23 RX ADMIN — Medication 50 MCG: at 09:52

## 2022-05-23 RX ADMIN — SENNOSIDES AND DOCUSATE SODIUM 1 TABLET: 8.6; 5 TABLET ORAL at 09:52

## 2022-05-23 RX ADMIN — MULTIPLE VITAMINS W/ MINERALS TAB 1 TABLET: TAB at 21:50

## 2022-05-23 RX ADMIN — POLYETHYLENE GLYCOL 3350 17 G: 17 POWDER, FOR SOLUTION ORAL at 21:50

## 2022-05-23 ASSESSMENT — ACTIVITIES OF DAILY LIVING (ADL)
ADLS_ACUITY_SCORE: 34
ADLS_ACUITY_SCORE: 33
ADLS_ACUITY_SCORE: 34
ADLS_ACUITY_SCORE: 33
ADLS_ACUITY_SCORE: 33
ADLS_ACUITY_SCORE: 34
ADLS_ACUITY_SCORE: 34

## 2022-05-23 NOTE — PROGRESS NOTES
[facility]    Medicine Progress Note - Hospitalist Service        Date of Admission:  1/22/2022 11:31 AM    Assessment & Plan:   Miranda Dover is a 51 year old female with PMH Down Syndrome who was admitted on 1/22/2022 with COVID-19 pneumonia and acute hypoxic respiratory failure.      Hospitalization has been prolonged due to need to establish guardianship following deaths of her parents. Awaiting placement.     Patient is in hospital for longtime given problem with placement at this time, She remain medically stable at this time without change in her clinical status. Continues to declines labs.     Patient remains overall stable at this time.     Down syndrome  Developmental Delay  Failure to thrive  Had significant emotional, psychiatric issues during this stay due to the death of her parents and prolonged hospitalization. Discharge planning has been complicated by need for guardianship and payor source for placement.   -Patient's brother, Maxi, was granted 60 days of emergency guardianship on 2/15. Completed psychologic testing on 3/2/22 to assess current cognitive function and adaptive abilities. Permanent guardianship on 4/1/2022.  - social work and clinical coordinator assisting with disposition  - we are working on getting her into our group home and family has been in agreement, not able to find any place for her at this time.      Severe malnutrition  Anorexia  Chronic abdominal pain  Chronic constipation  BMI ~13 on early this hospitalization with poor PO intake. SLP was consulted for possible dysphagia, and no evidence of dysphagia was noted.   -On 2/20, discussed with pt's brother; overall, felt that oral intake was probably acceptable at this point for discharge and did not feel we needed to pursue enteral feedings/g-tube at this point; desired workup for etiology of abdominal pain which he felt was contributing to her decreased PO intake.  - CT abd/pelvis 2/21 showed large amount  "of stool. EGD 2/22 relatively unremarkable. US abd no acute pathology. BMI up to 14.7 2/27.  - Encourage PO intake  - PPI  - Bowel regimen ,  Miralax to BID, continue Senna bid, use suppository as needed.      Thyroid nodule  TSH has been elevated but normal T4 in the past   - Repeat TSH ordered but patient has been refusing blood draws.  This lab is not urgent and can be done as an outpatient     COVID-19 recovered  Initially diagnosed 1/19/2022     E coli UTI, resolved          Diet: Combination Diet Regular Diet; High Kcal/High Protein Diet, ADULT     DVT Prophylaxis: Ambulate every shift   Zhu Catheter: Not present  Code Status: Full Code     Disposition Plan    Expected Discharge: Unclear at this time, awaiting placement to a group home  Anticipated discharge location: group [place]    Delays:     Placement - Group [facility]          Entered: Billy Posada MD 05/23/2022, 9:09 AM        Clinically Significant Risk Factors Present on Admission                    The patient's care was discussed with the Bedside Nurse and Patient.    Billy Posada MD  Hospitalist Service  [facility]  Text Page 7AM-6PM  Securely message with the Vocera Web Console (learn more here)  Text page via [employer] Paging/Directory    ______________________________________________________________________    Interval History   No new active issues.  Patient denies new complaints.  Awaiting placement    Data reviewed today: I reviewed all medications, new labs and imaging results over the last 24 hours. I personally reviewed no images or EKG's today.    Physical Exam   Vital signs:  Temp: 98.5  F (36.9  C) Temp src: Oral BP: 94/52 Pulse: 67   Resp: 18 SpO2: 98 % O2 Device: None (Room air) Oxygen Delivery: 1 LPM Height: 160 cm (5' 3\") Weight: 40.3 kg (88 lb 14.4 oz)  Estimated body mass index is 15.75 kg/m  as calculated from the following:    Height as of this encounter: 1.6 m (5' 3\").    Weight as of this encounter: " 40.3 kg (88 lb 14.4 oz).      Wt Readings from Last 2 Encounters:   04/14/22 40.3 kg (88 lb 14.4 oz)   02/23/21 33.6 kg (74 lb)       Gen: AAO; NAD  Resp: CTA B/L, normal WOB  Neuro- CN- intact.      Data   No lab results found in last 7 days.    No results found for this or any previous visit (from the past 24 hour(s)).  Medications       melatonin  1 mg Oral At Bedtime     multivitamin w/minerals  1 tablet Oral Daily     pantoprazole  40 mg Oral QAM AC     polyethylene glycol  17 g Oral BID     senna-docusate  1 tablet Oral BID     cholecalciferol  50 mcg Oral Daily

## 2022-05-23 NOTE — PLAN OF CARE
Goal Outcome Evaluation:    DATE & TIME: 5/22/22-5/23/22 ([id number])  Cognitive Concerns/ Orientation : A&Ox 4   BEHAVIOR & AGGRESSION TOOL COLOR: Green  CIWA SCORE: NA   ABNL VS/O2: VSS - done daily  MOBILITY: Ind, walked around halls multiple times during shift   PAIN MANAGMENT: Denies  DIET: Regular, tolerating, good appetite  BOWEL/BLADDER: Continent, no BM  ABNL LAB/BG: None new  DRAIN/DEVICES: None  TELEMETRY RHYTHM: NA  SKIN: Intact, scat bruising, flaky scalp.  1+ edema on feet  TESTS/PROCEDURES: None  D/C DATE: Discharge pending MA paperwork and group home placement, SW following.  OTHER: Pt walked around halls most of evening doing scavenger hunt. Ate dinner at 2130, insisted on coloring until she was ready for bed at 0030.

## 2022-05-23 NOTE — PLAN OF CARE
Goal Outcome Evaluation:  DATE & TIME: 5/23/22 4230-5812  Cognitive Concerns/ Orientation : A&Ox 4   BEHAVIOR & AGGRESSION TOOL COLOR: Green  CIWA SCORE: NA   ABNL VS/O2: VSS - done daily  MOBILITY: Ind, walking halls  PAIN MANAGMENT: Denies  DIET: Regular, tolerating, good appetite  BOWEL/BLADDER: Continent, BM this shift  ABNL LAB/BG: None new  DRAIN/DEVICES: None  TELEMETRY RHYTHM: NA  SKIN: Intact, scattered bruising, flaky scalp.  1+ edema on feet.    TESTS/PROCEDURES: None  D/C DATE: Discharge pending MA paperwork and group home placement, SW following.        Plan of Care Reviewed With: patient     Overall Patient Progress: no change

## 2022-05-24 LAB — SARS-COV-2 RNA RESP QL NAA+PROBE: NEGATIVE

## 2022-05-24 PROCEDURE — 250N000013 HC RX MED GY IP 250 OP 250 PS 637: Performed by: INTERNAL MEDICINE

## 2022-05-24 PROCEDURE — U0005 INFEC AGEN DETEC AMPLI PROBE: HCPCS | Performed by: INTERNAL MEDICINE

## 2022-05-24 PROCEDURE — 99231 SBSQ HOSP IP/OBS SF/LOW 25: CPT | Performed by: INTERNAL MEDICINE

## 2022-05-24 PROCEDURE — 120N000001 HC R&B MED SURG/OB

## 2022-05-24 PROCEDURE — 250N000013 HC RX MED GY IP 250 OP 250 PS 637: Performed by: HOSPITALIST

## 2022-05-24 RX ADMIN — POLYETHYLENE GLYCOL 3350 17 G: 17 POWDER, FOR SOLUTION ORAL at 21:40

## 2022-05-24 RX ADMIN — CALCIUM CARBONATE (ANTACID) CHEW TAB 500 MG 500 MG: 500 CHEW TAB at 01:24

## 2022-05-24 RX ADMIN — PANTOPRAZOLE SODIUM 40 MG: 40 TABLET, DELAYED RELEASE ORAL at 10:32

## 2022-05-24 RX ADMIN — SENNOSIDES AND DOCUSATE SODIUM 1 TABLET: 8.6; 5 TABLET ORAL at 21:33

## 2022-05-24 RX ADMIN — Medication 1 MG: at 21:33

## 2022-05-24 RX ADMIN — MULTIPLE VITAMINS W/ MINERALS TAB 1 TABLET: TAB at 21:33

## 2022-05-24 RX ADMIN — SENNOSIDES AND DOCUSATE SODIUM 1 TABLET: 8.6; 5 TABLET ORAL at 10:33

## 2022-05-24 RX ADMIN — Medication 50 MCG: at 10:32

## 2022-05-24 RX ADMIN — POLYETHYLENE GLYCOL 3350 17 G: 17 POWDER, FOR SOLUTION ORAL at 10:33

## 2022-05-24 ASSESSMENT — ACTIVITIES OF DAILY LIVING (ADL)
ADLS_ACUITY_SCORE: 34

## 2022-05-24 NOTE — PLAN OF CARE
Goal Outcome Evaluation:                    DATE & TIME: 5/23/22-5/24/22 7060-7988  Cognitive Concerns/ Orientation : A&Ox 4   BEHAVIOR & AGGRESSION TOOL COLOR: Green  CIWA SCORE: NA   ABNL VS/O2: VSS - done daily  MOBILITY: Ind, walking halls  PAIN MANAGMENT: Denies  DIET: Regular, tolerating, good appetite  BOWEL/BLADDER: Continent, BM this AM  ABNL LAB/BG: None new  DRAIN/DEVICES: None  TELEMETRY RHYTHM: NA  SKIN: Intact, scattered bruising, flaky scalp.  1+ edema on feet.    TESTS/PROCEDURES: None  D/C DATE: Discharge pending MA paperwork, has group home placement now, SW following.

## 2022-05-24 NOTE — PLAN OF CARE
DATE & TIME: 5/24/2022 0700-1930  Cognitive Concerns/ Orientation : A&Ox 4   BEHAVIOR & AGGRESSION TOOL COLOR: Green  CIWA SCORE: NA   ABNL VS/O2: VSS - done daily  MOBILITY: Independent, walking halls often  PAIN MANAGMENT: Denies  DIET: Regular, tolerating, good appetite  BOWEL/BLADDER: Continent  ABNL LAB/BG: None new  DRAIN/DEVICES: None  TELEMETRY RHYTHM: NA  SKIN: Intact, scattered bruising, flaky scalp.  1+ edema on feet.    TESTS/PROCEDURES: None  D/C DATE: Discharge pending MA paperwork, has group home placement now, SW following. Slept in til 1030 today, declined a bath- says she will do it Friday.

## 2022-05-24 NOTE — PLAN OF CARE
Goal Outcome Evaluation:      DATE & TIME: 5/23/22 3-11pm shift  Cognitive Concerns/ Orientation : A&Ox 4   BEHAVIOR & AGGRESSION TOOL COLOR: Green  CIWA SCORE: NA   ABNL VS/O2: VSS - done daily  MOBILITY: Ind, walking halls  PAIN MANAGMENT: Denies  DIET: Regular, tolerating, good appetite  BOWEL/BLADDER: Continent, BM this AM  ABNL LAB/BG: None new  DRAIN/DEVICES: None  TELEMETRY RHYTHM: NA  SKIN: Intact, scattered bruising, flaky scalp.  1+ edema on feet.    TESTS/PROCEDURES: None  D/C DATE: Discharge pending MA paperwork, has group home placement now, SW following.

## 2022-05-24 NOTE — PROGRESS NOTES
[facility]    Medicine Progress Note - Hospitalist Service        Date of Admission:  1/22/2022 11:31 AM    Assessment & Plan:   Miranda Dover is a 51 year old female with PMH Down Syndrome who was admitted on 1/22/2022 with COVID-19 pneumonia and acute hypoxic respiratory failure.      Hospitalization has been prolonged due to need to establish guardianship following deaths of her parents. Awaiting placement.     Patient is in hospital for longtime given problem with placement at this time, She remain medically stable at this time without change in her clinical status. Continues to declines labs.     Patient remains overall stable at this time.     Down syndrome  Developmental Delay  Failure to thrive  Had significant emotional, psychiatric issues during this stay due to the death of her parents and prolonged hospitalization. Discharge planning has been complicated by need for guardianship and payor source for placement.   -Patient's brother, Maxi, was granted 60 days of emergency guardianship on 2/15. Completed psychologic testing on 3/2/22 to assess current cognitive function and adaptive abilities. Permanent guardianship on 4/1/2022.  - social work and clinical coordinator assisting with disposition  - we are working on getting her into our group home and family has been in agreement, not able to find any place for her at this time.      Severe malnutrition  Anorexia  Chronic abdominal pain  Chronic constipation  BMI ~13 on early this hospitalization with poor PO intake. SLP was consulted for possible dysphagia, and no evidence of dysphagia was noted.   -On 2/20, discussed with pt's brother; overall, felt that oral intake was probably acceptable at this point for discharge and did not feel we needed to pursue enteral feedings/g-tube at this point; desired workup for etiology of abdominal pain which he felt was contributing to her decreased PO intake.  - CT abd/pelvis 2/21 showed large amount  "of stool. EGD 2/22 relatively unremarkable. US abd no acute pathology. BMI up to 14.7 2/27.  - Encourage PO intake  - PPI  - Bowel regimen ,  Miralax to BID, continue Senna bid, use suppository as needed.      Thyroid nodule  TSH has been elevated but normal T4 in the past   - Repeat TSH ordered but patient has been refusing blood draws.  This lab is not urgent and can be done as an outpatient     COVID-19 recovered  Initially diagnosed 1/19/2022     E coli UTI, resolved          Diet: Combination Diet Regular Diet; High Kcal/High Protein Diet, ADULT     DVT Prophylaxis: Ambulate every shift   Zhu Catheter: Not present  Code Status: Full Code     Disposition Plan    Expected Discharge: Unclear at this time, awaiting placement to a group home  Anticipated discharge location: group [place]    Delays:     Placement - Group [facility]          Entered: Billy Posada MD 05/24/2022, 12:16 PM        Clinically Significant Risk Factors Present on Admission                    The patient's care was discussed with the Bedside Nurse and Patient.    Billy Posada MD  Hospitalist Service  [facility]  Text Page 7AM-6PM  Securely message with the Vocera Web Console (learn more here)  Text page via [employer] Paging/Directory    ______________________________________________________________________    Interval History   Patient denies new complaints.  No acute nursing concerns.  Awaiting placement    Data reviewed today: I reviewed all medications, new labs and imaging results over the last 24 hours. I personally reviewed no images or EKG's today.    Physical Exam   Vital signs:  Temp: 97.8  F (36.6  C) Temp src: Oral BP: 94/50 Pulse: 67   Resp: 16 SpO2: 98 % O2 Device: None (Room air) Oxygen Delivery: 1 LPM Height: 160 cm (5' 3\") Weight: 40.3 kg (88 lb 14.4 oz)  Estimated body mass index is 15.75 kg/m  as calculated from the following:    Height as of this encounter: 1.6 m (5' 3\").    Weight as of this " encounter: 40.3 kg (88 lb 14.4 oz).      Wt Readings from Last 2 Encounters:   04/14/22 40.3 kg (88 lb 14.4 oz)   02/23/21 33.6 kg (74 lb)       Gen: AAO; NAD  Resp: CTA B/L, normal WOB  Neuro- CN- intact.      Data   No lab results found in last 7 days.    No results found for this or any previous visit (from the past 24 hour(s)).  Medications       melatonin  1 mg Oral At Bedtime     multivitamin w/minerals  1 tablet Oral Daily     pantoprazole  40 mg Oral QAM AC     polyethylene glycol  17 g Oral BID     senna-docusate  1 tablet Oral BID     cholecalciferol  50 mcg Oral Daily

## 2022-05-25 PROCEDURE — 250N000013 HC RX MED GY IP 250 OP 250 PS 637: Performed by: HOSPITALIST

## 2022-05-25 PROCEDURE — 250N000013 HC RX MED GY IP 250 OP 250 PS 637: Performed by: INTERNAL MEDICINE

## 2022-05-25 PROCEDURE — 99231 SBSQ HOSP IP/OBS SF/LOW 25: CPT | Performed by: INTERNAL MEDICINE

## 2022-05-25 PROCEDURE — 120N000001 HC R&B MED SURG/OB

## 2022-05-25 RX ADMIN — PANTOPRAZOLE SODIUM 40 MG: 40 TABLET, DELAYED RELEASE ORAL at 10:14

## 2022-05-25 RX ADMIN — SENNOSIDES AND DOCUSATE SODIUM 1 TABLET: 8.6; 5 TABLET ORAL at 20:42

## 2022-05-25 RX ADMIN — Medication 50 MCG: at 10:14

## 2022-05-25 RX ADMIN — Medication 1 MG: at 20:42

## 2022-05-25 RX ADMIN — MULTIPLE VITAMINS W/ MINERALS TAB 1 TABLET: TAB at 20:42

## 2022-05-25 RX ADMIN — POLYETHYLENE GLYCOL 3350 17 G: 17 POWDER, FOR SOLUTION ORAL at 20:42

## 2022-05-25 RX ADMIN — POLYETHYLENE GLYCOL 3350 17 G: 17 POWDER, FOR SOLUTION ORAL at 10:14

## 2022-05-25 RX ADMIN — SENNOSIDES AND DOCUSATE SODIUM 1 TABLET: 8.6; 5 TABLET ORAL at 10:15

## 2022-05-25 ASSESSMENT — ACTIVITIES OF DAILY LIVING (ADL)
ADLS_ACUITY_SCORE: 34

## 2022-05-25 NOTE — PROGRESS NOTES
[facility]    Medicine Progress Note - Hospitalist Service        Date of Admission:  1/22/2022 11:31 AM    Assessment & Plan:   Miranda Dover is a 51 year old female with PMH Down Syndrome who was admitted on 1/22/2022 with COVID-19 pneumonia and acute hypoxic respiratory failure.      Hospitalization has been prolonged due to need to establish guardianship following deaths of her parents. Awaiting placement.     Patient is in hospital for longtime given problem with placement at this time, She remain medically stable at this time without change in her clinical status. Continues to declines labs.     Patient remains overall stable at this time.     Down syndrome  Developmental Delay  Failure to thrive  Had significant emotional, psychiatric issues during this stay due to the death of her parents and prolonged hospitalization. Discharge planning has been complicated by need for guardianship and payor source for placement.   -Patient's brother, Maxi, was granted 60 days of emergency guardianship on 2/15. Completed psychologic testing on 3/2/22 to assess current cognitive function and adaptive abilities. Permanent guardianship on 4/1/2022.  - social work and clinical coordinator assisting with disposition  - we are working on getting her into our group home and family has been in agreement, not able to find any place for her at this time.      Severe malnutrition  Anorexia  Chronic abdominal pain  Chronic constipation  BMI ~13 on early this hospitalization with poor PO intake. SLP was consulted for possible dysphagia, and no evidence of dysphagia was noted.   -On 2/20, discussed with pt's brother; overall, felt that oral intake was probably acceptable at this point for discharge and did not feel we needed to pursue enteral feedings/g-tube at this point; desired workup for etiology of abdominal pain which he felt was contributing to her decreased PO intake.  - CT abd/pelvis 2/21 showed large amount  "of stool. EGD 2/22 relatively unremarkable. US abd no acute pathology. BMI up to 14.7 2/27.  - Encourage PO intake  - PPI  - Bowel regimen ,  Miralax to BID, continue Senna bid, use suppository as needed.      Thyroid nodule  TSH has been elevated but normal T4 in the past   - Repeat TSH ordered but patient has been refusing blood draws.  This lab is not urgent and can be done as an outpatient     COVID-19 recovered  Initially diagnosed 1/19/2022     E coli UTI, resolved          Diet: Combination Diet Regular Diet; High Kcal/High Protein Diet, ADULT     DVT Prophylaxis: Ambulate every shift   Zhu Catheter: Not present  Code Status: Full Code     Disposition Plan    Expected Discharge: Unclear at this time, awaiting placement to a group home  Anticipated discharge location: group [place]    Delays:     Placement - Group [facility]          Entered: Billy Posada MD 05/25/2022, 10:50 AM        Clinically Significant Risk Factors Present on Admission                    The patient's care was discussed with the Bedside Nurse and Patient.    Billy Posada MD  Hospitalist Service  [facility]  Text Page 7AM-6PM  Securely message with the Vocera Web Console (learn more here)  Text page via [email or web address] Paging/Directory    ______________________________________________________________________    Interval History   Resting.  Does not want to be bothered.  No new complaints.  No acute nursing concerns.    Awaiting placement.    Data reviewed today: I reviewed all medications, new labs and imaging results over the last 24 hours. I personally reviewed no images or EKG's today.    Physical Exam   Vital signs:  Temp: 97.8  F (36.6  C) Temp src: Oral BP: 93/55 Pulse: 67   Resp: 18 SpO2: 100 % O2 Device: None (Room air) Oxygen Delivery: 1 LPM Height: 160 cm (5' 3\") Weight: 40.3 kg (88 lb 14.4 oz)  Estimated body mass index is 15.75 kg/m  as calculated from the following:    Height as of this encounter: 1.6 m " "(5' 3\").    Weight as of this encounter: 40.3 kg (88 lb 14.4 oz).      Wt Readings from Last 2 Encounters:   04/14/22 40.3 kg (88 lb 14.4 oz)   02/23/21 33.6 kg (74 lb)       Gen: AAO; NAD  Resp: CTA B/L, normal WOB  Neuro- CN- intact.      Data   No lab results found in last 7 days.    No results found for this or any previous visit (from the past 24 hour(s)).  Medications       melatonin  1 mg Oral At Bedtime     multivitamin w/minerals  1 tablet Oral Daily     pantoprazole  40 mg Oral QAM AC     polyethylene glycol  17 g Oral BID     senna-docusate  1 tablet Oral BID     cholecalciferol  50 mcg Oral Daily       "

## 2022-05-25 NOTE — PROGRESS NOTES
Care Management Follow Up    Length of Stay (days): 123    Expected Discharge Date: 05/31/2022     Concerns to be Addressed: discharge planning to a group home  Patient plan of care discussed at interdisciplinary rounds: Yes    Anticipated Discharge Disposition: Group Home     Anticipated Discharge Services:    Anticipated Discharge DME:      Patient/family educated on Medicare website which has current facility and service quality ratings: no  Education Provided on the Discharge Plan:    Patient/Family in Agreement with the Plan: yes    Referrals Placed by CM/SW: Post Acute Facilities  Private pay costs discussed: Not applicable    Additional Information:  Writer spoke with HUSSAIN [employer] CM at [phone] requesting an update.   Ms Roblero reports noah's MA application has been approved and is retro to 2-1-22.  Ms Roblero spoke with patient's brother/guardian yesterday.  She reports Matt(brother) has decided to accept the vacancy at the [facility] where patient will live with 3 other females.  The next step is that Ms Roblero has asked [facility] to begin the negotiating process for their rate.  Once the funding rate has been established, the next step is to determine patient's move in date to [facility].  Ms Canales relayed that per Matt, patient was not as pleased with the home as he was.  Given this writer feels hospital staff will need to speak with Matt and determine how we can coordinate efforts to help patient feel more secure with move.  Patient has been here since 1/22 and the unit staff have been very attentive and kind to patient.  It will be natural reaction if patient is feeling reluctant to leave or apprehensive of the move.         Connie Masterson, [facility] Center[facility]

## 2022-05-25 NOTE — PLAN OF CARE
DATE & TIME: 5/25/2022 5051-2608  Cognitive Concerns/ Orientation : A&Ox 4   BEHAVIOR & AGGRESSION TOOL COLOR: Green  CIWA SCORE: NA   ABNL VS/O2: VSS - done daily  MOBILITY: Independent, walking halls often  PAIN MANAGMENT: Denies  DIET: Regular, tolerating, good appetite  BOWEL/BLADDER: Continent  ABNL LAB/BG: None new  DRAIN/DEVICES: None  TELEMETRY RHYTHM: NA  SKIN: Intact, scattered bruising, flaky scalp.  1+ edema on feet.    TESTS/PROCEDURES: None  D/C DATE: Discharge pending MA paperwork, has group home placement now, SW following. Does not want to shower until friday

## 2022-05-25 NOTE — PLAN OF CARE
Goal Outcome Evaluation:    DATE & TIME: 5/24/2022-5/25/22 7489-6767  Cognitive Concerns/ Orientation : A&Ox 4   BEHAVIOR & AGGRESSION TOOL COLOR: Green  CIWA SCORE: NA   ABNL VS/O2: VSS - done daily  MOBILITY: Independent, walking halls often  PAIN MANAGMENT: Denies  DIET: Regular, tolerating, good appetite  BOWEL/BLADDER: Continent  ABNL LAB/BG: None new  DRAIN/DEVICES: None  TELEMETRY RHYTHM: NA  SKIN: Intact, scattered bruising, flaky scalp.  1+ edema on feet.    TESTS/PROCEDURES: None  D/C DATE: Discharge pending MA paperwork, has group home placement now, SW following.

## 2022-05-26 PROCEDURE — 250N000013 HC RX MED GY IP 250 OP 250 PS 637: Performed by: HOSPITALIST

## 2022-05-26 PROCEDURE — 250N000013 HC RX MED GY IP 250 OP 250 PS 637: Performed by: INTERNAL MEDICINE

## 2022-05-26 PROCEDURE — 99231 SBSQ HOSP IP/OBS SF/LOW 25: CPT | Performed by: INTERNAL MEDICINE

## 2022-05-26 PROCEDURE — 120N000001 HC R&B MED SURG/OB

## 2022-05-26 RX ADMIN — PANTOPRAZOLE SODIUM 40 MG: 40 TABLET, DELAYED RELEASE ORAL at 10:29

## 2022-05-26 RX ADMIN — Medication 50 MCG: at 10:28

## 2022-05-26 RX ADMIN — MULTIPLE VITAMINS W/ MINERALS TAB 1 TABLET: TAB at 22:12

## 2022-05-26 RX ADMIN — SENNOSIDES AND DOCUSATE SODIUM 1 TABLET: 8.6; 5 TABLET ORAL at 22:12

## 2022-05-26 RX ADMIN — POLYETHYLENE GLYCOL 3350 17 G: 17 POWDER, FOR SOLUTION ORAL at 10:29

## 2022-05-26 RX ADMIN — SENNOSIDES AND DOCUSATE SODIUM 1 TABLET: 8.6; 5 TABLET ORAL at 10:29

## 2022-05-26 RX ADMIN — Medication 1 MG: at 22:12

## 2022-05-26 RX ADMIN — POLYETHYLENE GLYCOL 3350 17 G: 17 POWDER, FOR SOLUTION ORAL at 22:12

## 2022-05-26 ASSESSMENT — ACTIVITIES OF DAILY LIVING (ADL)
ADLS_ACUITY_SCORE: 34

## 2022-05-26 NOTE — PLAN OF CARE
Goal Outcome Evaluation:    Plan of Care Reviewed With: patient     DATE & TIME: 5/26/22 1343-3689  Cognitive Concerns/ Orientation : A&Ox 4   BEHAVIOR & AGGRESSION TOOL COLOR: Green  CIWA SCORE: NA   ABNL VS/O2: VSS - done daily  MOBILITY: Independent, walking halls often  PAIN MANAGMENT: Denies  DIET: Regular, tolerating, good appetite  BOWEL/BLADDER: Continent  ABNL LAB/BG: None new  DRAIN/DEVICES: None  TELEMETRY RHYTHM: NA  SKIN: Intact, scattered bruising, flaky scalp.  1+ edema on feet.    TESTS/PROCEDURES: None  D/C DATE: Discharge pending MA paperwork, has group home placement now, SW following. Declined bath or shower, pt reported she wants to shower on Friday.

## 2022-05-26 NOTE — PROGRESS NOTES
[facility]    Medicine Progress Note - Hospitalist Service        Date of Admission:  1/22/2022 11:31 AM    Assessment & Plan:   Miranda Dover is a 51 year old female with PMH Down Syndrome who was admitted on 1/22/2022 with COVID-19 pneumonia and acute hypoxic respiratory failure.      Hospitalization has been prolonged due to need to establish guardianship following deaths of her parents. Awaiting placement.     Patient is in hospital for longtime given problem with placement at this time, She remain medically stable at this time without change in her clinical status.      Patient remains overall stable at this time.     Down syndrome  Developmental Delay  Failure to thrive  Had significant emotional, psychiatric issues during this stay due to the death of her parents and prolonged hospitalization. Discharge planning has been complicated by need for guardianship and payor source for placement.   -Patient's brother, Maxi, was granted 60 days of emergency guardianship on 2/15. Completed psychologic testing on 3/2/22 to assess current cognitive function and adaptive abilities. Permanent guardianship on 4/1/2022.  - social work and clinical coordinator assisting with disposition  - we are working on getting her into our group home and family has been in agreement, not able to find any place for her at this time.      Severe malnutrition  Anorexia  Chronic abdominal pain  Chronic constipation  BMI ~13 on early this hospitalization with poor PO intake. SLP was consulted for possible dysphagia, and no evidence of dysphagia was noted.   -On 2/20, discussed with pt's brother; overall, felt that oral intake was probably acceptable at this point for discharge and did not feel we needed to pursue enteral feedings/g-tube at this point; desired workup for etiology of abdominal pain which he felt was contributing to her decreased PO intake.  - CT abd/pelvis 2/21 showed large amount of stool. EGD 2/22  "relatively unremarkable. US abd no acute pathology. BMI up to 14.7 2/27.  - Encourage PO intake  - PPI  - Bowel regimen ,  Miralax to BID, continue Senna bid, use suppository as needed.      Thyroid nodule  TSH has been elevated but normal T4 in the past   - Repeat TSH ordered but patient has been refusing blood draws.  This lab is not urgent and can be done as an outpatient     COVID-19 recovered  Initially diagnosed 1/19/2022     E coli UTI, resolved          Diet: Combination Diet Regular Diet; High Kcal/High Protein Diet, ADULT     DVT Prophylaxis: Ambulate every shift   Zhu Catheter: Not present  Code Status: Full Code     Disposition Plan    Expected Discharge: Unclear at this time, awaiting placement to a group home  Anticipated discharge location: group home    Delays:     Placement - Group [facility]          Entered: Billy Posada MD 05/26/2022, 11:28 AM        Clinically Significant Risk Factors Present on Admission                    The patient's care was discussed with the Bedside Nurse and Patient.    Billy Posada MD  Hospitalist Service  [facility]  Text Page 7AM-6PM  Securely message with the Vocera Web Console (learn more here)  Text page via [email or web address] Paging/Directory    ______________________________________________________________________    Interval History   No new complaints.  She is resting.  She does not want to be bothered this morning.  Awaiting placement.      Data reviewed today: I reviewed all medications, new labs and imaging results over the last 24 hours. I personally reviewed no images or EKG's today.    Physical Exam   Vital signs:  Temp: 97.9  F (36.6  C) Temp src: Oral BP: 98/65     Resp: 18 SpO2: 99 % O2 Device: None (Room air) Oxygen Delivery: 1 LPM Height: 160 cm (5' 3\") Weight: 40.3 kg (88 lb 14.4 oz)  Estimated body mass index is 15.75 kg/m  as calculated from the following:    Height as of this encounter: 1.6 m (5' 3\").    Weight as of this " encounter: 40.3 kg (88 lb 14.4 oz).      Wt Readings from Last 2 Encounters:   04/14/22 40.3 kg (88 lb 14.4 oz)   02/23/21 33.6 kg (74 lb)       Gen: AAO; NAD  Resp: normal WOB  Neuro- CN- intact.      Data   No lab results found in last 7 days.    No results found for this or any previous visit (from the past 24 hour(s)).  Medications       melatonin  1 mg Oral At Bedtime     multivitamin w/minerals  1 tablet Oral Daily     pantoprazole  40 mg Oral QAM AC     polyethylene glycol  17 g Oral BID     senna-docusate  1 tablet Oral BID     cholecalciferol  50 mcg Oral Daily

## 2022-05-26 NOTE — PLAN OF CARE
Goal Outcome Evaluation:                    DATE & TIME: 5/25/22-5/26/22 5688-2175  Cognitive Concerns/ Orientation : A&Ox 4   BEHAVIOR & AGGRESSION TOOL COLOR: Green  CIWA SCORE: NA   ABNL VS/O2: VSS - done daily  MOBILITY: Independent, walking halls often  PAIN MANAGMENT: Denies  DIET: Regular, tolerating, good appetite  BOWEL/BLADDER: Continent  ABNL LAB/BG: None new  DRAIN/DEVICES: None  TELEMETRY RHYTHM: NA  SKIN: Intact, scattered bruising, flaky scalp.  1+ edema on feet.    TESTS/PROCEDURES: None  D/C DATE: Discharge pending MA paperwork, has group home placement now, SW following. Does not want to shower until friday

## 2022-05-27 PROCEDURE — 250N000013 HC RX MED GY IP 250 OP 250 PS 637: Performed by: HOSPITALIST

## 2022-05-27 PROCEDURE — 250N000013 HC RX MED GY IP 250 OP 250 PS 637: Performed by: INTERNAL MEDICINE

## 2022-05-27 PROCEDURE — 99232 SBSQ HOSP IP/OBS MODERATE 35: CPT | Performed by: HOSPITALIST

## 2022-05-27 PROCEDURE — 120N000001 HC R&B MED SURG/OB

## 2022-05-27 RX ADMIN — Medication 1 MG: at 21:50

## 2022-05-27 RX ADMIN — SENNOSIDES AND DOCUSATE SODIUM 1 TABLET: 8.6; 5 TABLET ORAL at 09:39

## 2022-05-27 RX ADMIN — PANTOPRAZOLE SODIUM 40 MG: 40 TABLET, DELAYED RELEASE ORAL at 09:39

## 2022-05-27 RX ADMIN — POLYETHYLENE GLYCOL 3350 17 G: 17 POWDER, FOR SOLUTION ORAL at 21:50

## 2022-05-27 RX ADMIN — MULTIPLE VITAMINS W/ MINERALS TAB 1 TABLET: TAB at 21:50

## 2022-05-27 RX ADMIN — SENNOSIDES AND DOCUSATE SODIUM 1 TABLET: 8.6; 5 TABLET ORAL at 21:50

## 2022-05-27 RX ADMIN — Medication 50 MCG: at 09:39

## 2022-05-27 ASSESSMENT — ACTIVITIES OF DAILY LIVING (ADL)
ADLS_ACUITY_SCORE: 34
ADLS_ACUITY_SCORE: 34
ADLS_ACUITY_SCORE: 33
ADLS_ACUITY_SCORE: 33
ADLS_ACUITY_SCORE: 34
ADLS_ACUITY_SCORE: 33
ADLS_ACUITY_SCORE: 33
ADLS_ACUITY_SCORE: 34
ADLS_ACUITY_SCORE: 33
ADLS_ACUITY_SCORE: 34
ADLS_ACUITY_SCORE: 34
ADLS_ACUITY_SCORE: 33

## 2022-05-27 NOTE — PROGRESS NOTES
CLINICAL NUTRITION SERVICES - REASSESSMENT NOTE      Malnutrition:(4/21)   % Weight Loss: None noted - wt up from admission  % Intake:  <75% for > 7 days (moderate malnutrition)  Subcutaneous Fat Loss:  Baseline  Muscle Loss:  Baseline  Fluid Retention:  None noted     Malnutrition Diagnosis: Does not meet criteria at this time       EVALUATION OF PROGRESS TOWARD GOALS   Diet: Regular     Intake/Tolerance:  Visited patient and reviewed chart  Intakes have been consistently good at % meals BID for the past 3 days   Meals ordered are variable in sizes (ranging from 1-6 items per tray), averaging ~1100 kcal and 57 g protein per day   Today she reports that her stomach was upset after what she ate yesterday. After having a BM she feels a bit better     ASSESSED NUTRITION NEEDS:  Dosing Weight 40.3 kg (4/14)  Estimated Energy Needs: 9924-8661 kcals (35-40 Kcal/Kg)  Justification: repletion and underweight  Estimated Protein Needs: 60+ grams protein (1.5+ g pro/Kg)  Justification: repletion and hypercatabolism with acute illness      NEW FINDINGS:   No weight since 4/14 - re-check today   Now has group home placement - discharge pending MA paperwork     Previous Goals:   Intake of 75% of meals at least BID with 3+ items per meal.   Evaluation: Not met consistently     Previous Nutrition Diagnosis:   Predicted inadequate nutrient intake (energy/protein/micronutrients) related to variable appetite and food intake, reliance on similar food items for meals and intermittently declining meals  Evaluation: No change      CURRENT NUTRITION DIAGNOSIS  Predicted inadequate nutrient intake (energy/protein/micronutrients) related to variable appetite and food intake, reliance on similar food items for meals and intermittently declining meals    INTERVENTIONS  Recommendations / Nutrition Prescription  Continue regular diet   Encouraged adequate PO - can take it easy today if tummy upset     Implementation  Composition of Meals  and Snacks    Goals  Intake of 75% of meals at least BID with 3+ items per meal.       MONITORING AND EVALUATION:  Progress towards goals will be monitored and evaluated per protocol and Practice Guidelines      Sho Vo RD, LD  Clinical Dietitian   [facility] [place], [facility]o [facility]  Pager: [phone]

## 2022-05-27 NOTE — PLAN OF CARE
Goal Outcome Evaluation:                    DATE & TIME: 5/26/22-5/27/22 4209-4786  Cognitive Concerns/ Orientation : A&Ox 4   BEHAVIOR & AGGRESSION TOOL COLOR: Green  CIWA SCORE: NA   ABNL VS/O2: VSS - done daily  MOBILITY: Independent, walking halls often  PAIN MANAGMENT: Denies  DIET: Regular, tolerating, good appetite  BOWEL/BLADDER: Continent  ABNL LAB/BG: None new  DRAIN/DEVICES: None  TELEMETRY RHYTHM: NA  SKIN: Intact, scattered bruising, flaky scalp.  1+ edema on feet/legs.    TESTS/PROCEDURES: None  D/C DATE: Discharge pending MA paperwork, has group home placement now, SW following. Pt reported she wants to shower on Friday.

## 2022-05-27 NOTE — PLAN OF CARE
Goal Outcome Evaluation:    Plan of Care Reviewed With: patient     Overall Patient Progress: improving    DATE & TIME: 5/27/22 1636-3327  Cognitive Concerns/ Orientation : A&Ox 4   BEHAVIOR & AGGRESSION TOOL COLOR: Green  CIWA SCORE: NA   ABNL VS/O2: BP remains soft 92/52, asyptomatic, other VSS - vitals done daily  MOBILITY: Independent, walking halls often  PAIN MANAGMENT: Denies  DIET: Regular, tolerating, good appetite  BOWEL/BLADDER: Continent  ABNL LAB/BG: None new  DRAIN/DEVICES: None  TELEMETRY RHYTHM: NA  SKIN: Intact, scattered bruising, flaky scalp.  1+ edema on feet/legs.    TESTS/PROCEDURES: None  D/C DATE: Discharge pending placement, SW following.

## 2022-05-27 NOTE — PROGRESS NOTES
[facility]    Medicine Progress Note - Hospitalist Service    Date of Admission:  1/22/2022    Assessment & Plan        Miranda Dover is a 51 year old female with PMH Down Syndrome who was admitted on 1/22/2022 with COVID-19 pneumonia and acute hypoxic respiratory failure.      Hospitalization has been prolonged due to need to establish guardianship following deaths of her parents. Awaiting placement.     Patient is in hospital for longtime given problem with placement at this time, She remain medically stable at this time without change in her clinical status.      Patient remains overall stable at this time.      Down syndrome  Developmental Delay  Failure to thrive  Had significant emotional, psychiatric issues during this stay due to the death of her parents and prolonged hospitalization. Discharge planning has been complicated by need for guardianship and payor source for placement.   -Patient's brother, Maxi, was granted 60 days of emergency guardianship on 2/15. Completed psychologic testing on 3/2/22 to assess current cognitive function and adaptive abilities. Permanent guardianship on 4/1/2022.  - social work and clinical coordinator assisting with disposition  - we are working on getting her into our group home and family has been in agreement, not able to find any place for her at this time.      Severe malnutrition  Anorexia  Chronic abdominal pain  Chronic constipation  BMI ~13 on early this hospitalization with poor PO intake. SLP was consulted for possible dysphagia, and no evidence of dysphagia was noted.   -On 2/20, discussed with pt's brother; overall, felt that oral intake was probably acceptable at this point for discharge and did not feel we needed to pursue enteral feedings/g-tube at this point; desired workup for etiology of abdominal pain which he felt was contributing to her decreased PO intake.  - CT abd/pelvis 2/21 showed large amount of stool. EGD 2/22  relatively unremarkable. US abd no acute pathology. BMI up to 14.7 2/27.  - Encourage PO intake  - PPI  - Bowel regimen ,  Miralax to BID, continue Senna bid, use suppository as needed.      Thyroid nodule  TSH has been elevated but normal T4 in the past   - Repeat TSH ordered but patient has been refusing blood draws.  This lab is not urgent and can be done as an outpatient     COVID-19 recovered  Initially diagnosed 1/19/2022     E coli UTI, resolved    5/27- stable, continue present care       Diet: Combination Diet Regular Diet; High Kcal/High Protein Diet, ADULT    DVT Prophylaxis: Low Risk/Ambulatory with no VTE prophylaxis indicated  Zhu Catheter: Not present  Central Lines: None  Cardiac Monitoring: None  Code Status: Full Code      Disposition Plan   Expected Discharge: 05/31/2022     Anticipated discharge location: group home    Delays:     Placement - Group Homes            The patient's care was discussed with the Care Coordinator/.    Darien Eugene MD  Hospitalist Service  [facility]  Securely message with the Vocera Web Console (learn more here)  Text page via [employer] Paging/Directory         Clinically Significant Risk Factors Present on Admission                    ______________________________________________________________________    Interval History   No complaints     Data reviewed today: I reviewed all medications, new labs and imaging results over the last 24 hours. I personally reviewed no images or EKG's today.    Physical Exam   Vital Signs: Temp: 97.5  F (36.4  C) Temp src: Oral BP: 92/52     Resp: 16 SpO2: 98 % O2 Device: None (Room air)    Weight: 88 lbs 14.4 oz  Constitutional: awake, alert, cooperative, no apparent distress  Neurologic: Awake, alert      Data   No lab results found in last 7 days.    No results found for this or any previous visit (from the past 24 hour(s)).  Medications       melatonin  1 mg Oral At Bedtime      multivitamin w/minerals  1 tablet Oral Daily     pantoprazole  40 mg Oral QAM AC     polyethylene glycol  17 g Oral BID     senna-docusate  1 tablet Oral BID     cholecalciferol  50 mcg Oral Daily

## 2022-05-28 PROCEDURE — 250N000013 HC RX MED GY IP 250 OP 250 PS 637: Performed by: INTERNAL MEDICINE

## 2022-05-28 PROCEDURE — 120N000001 HC R&B MED SURG/OB

## 2022-05-28 PROCEDURE — 250N000013 HC RX MED GY IP 250 OP 250 PS 637: Performed by: HOSPITALIST

## 2022-05-28 PROCEDURE — 99232 SBSQ HOSP IP/OBS MODERATE 35: CPT | Performed by: HOSPITALIST

## 2022-05-28 RX ADMIN — PANTOPRAZOLE SODIUM 40 MG: 40 TABLET, DELAYED RELEASE ORAL at 11:24

## 2022-05-28 RX ADMIN — SENNOSIDES AND DOCUSATE SODIUM 1 TABLET: 8.6; 5 TABLET ORAL at 22:09

## 2022-05-28 RX ADMIN — MULTIPLE VITAMINS W/ MINERALS TAB 1 TABLET: TAB at 22:09

## 2022-05-28 RX ADMIN — POLYETHYLENE GLYCOL 3350 17 G: 17 POWDER, FOR SOLUTION ORAL at 11:24

## 2022-05-28 RX ADMIN — POLYETHYLENE GLYCOL 3350 17 G: 17 POWDER, FOR SOLUTION ORAL at 22:09

## 2022-05-28 RX ADMIN — Medication 50 MCG: at 11:24

## 2022-05-28 RX ADMIN — SENNOSIDES AND DOCUSATE SODIUM 1 TABLET: 8.6; 5 TABLET ORAL at 11:24

## 2022-05-28 RX ADMIN — Medication 1 MG: at 22:09

## 2022-05-28 ASSESSMENT — ACTIVITIES OF DAILY LIVING (ADL)
ADLS_ACUITY_SCORE: 33

## 2022-05-28 NOTE — PLAN OF CARE
Goal Outcome Evaluation:      DATE & TIME: 5/27/22 3-11pm shift  Cognitive Concerns/ Orientation : A&Ox 4   BEHAVIOR & AGGRESSION TOOL COLOR: Green  CIWA SCORE: NA   ABNL VS/O2: BP remains soft in AM asyptomatic, other VSS - vitals done daily  MOBILITY: Independent, walking halls often  PAIN MANAGMENT: Denies  DIET: Regular, tolerating, good appetite  BOWEL/BLADDER: Continent-had a BM today  ABNL LAB/BG: None new  DRAIN/DEVICES: None  TELEMETRY RHYTHM: NA  SKIN: Intact, scattered bruising, flaky scalp.  1+ edema on feet/legs.    TESTS/PROCEDURES: None  D/C DATE: Discharge pending placement, SW following.

## 2022-05-28 NOTE — PLAN OF CARE
Goal Outcome Evaluation:      DATE & TIME: 5/27/22 6138-0708  Cognitive Concerns/ Orientation : A&Ox 4   BEHAVIOR & AGGRESSION TOOL COLOR: Green  CIWA SCORE: NA   ABNL VS/O2: BP remains soft in AM asyptomatic, other VSS - vitals done daily  MOBILITY: Independent, walking halls often  PAIN MANAGMENT: Denies  DIET: Regular, tolerating, good appetite  BOWEL/BLADDER: Continent-had a BM today  ABNL LAB/BG: None new  DRAIN/DEVICES: None  TELEMETRY RHYTHM: NA  SKIN: Intact, scattered bruising, flaky scalp.  1+ edema on feet/legs.    TESTS/PROCEDURES: None  D/C DATE: Discharge pending placement, SW following.

## 2022-05-28 NOTE — PROGRESS NOTES
[facility]    Medicine Progress Note - Hospitalist Service    Date of Admission:  1/22/2022    Assessment & Plan        Miranda Dover is a 51 year old female with PMH Down Syndrome who was admitted on 1/22/2022 with COVID-19 pneumonia and acute hypoxic respiratory failure.      Hospitalization has been prolonged due to need to establish guardianship following deaths of her parents. Awaiting placement.     Patient is in hospital for longtime given problem with placement at this time, She remain medically stable at this time without change in her clinical status.      Patient remains overall stable at this time.      Down syndrome  Developmental Delay  Failure to thrive  Had significant emotional, psychiatric issues during this stay due to the death of her parents and prolonged hospitalization. Discharge planning has been complicated by need for guardianship and payor source for placement.   -Patient's brother, Maxi, was granted 60 days of emergency guardianship on 2/15. Completed psychologic testing on 3/2/22 to assess current cognitive function and adaptive abilities. Permanent guardianship on 4/1/2022.  - social work and clinical coordinator assisting with disposition  - we are working on getting her into our group home and family has been in agreement, not able to find any place for her at this time.      Severe malnutrition  Anorexia  Chronic abdominal pain  Chronic constipation  BMI ~13 on early this hospitalization with poor PO intake. SLP was consulted for possible dysphagia, and no evidence of dysphagia was noted.   -On 2/20, discussed with pt's brother; overall, felt that oral intake was probably acceptable at this point for discharge and did not feel we needed to pursue enteral feedings/g-tube at this point; desired workup for etiology of abdominal pain which he felt was contributing to her decreased PO intake.  - CT abd/pelvis 2/21 showed large amount of stool. EGD 2/22  relatively unremarkable. US abd no acute pathology. BMI up to 14.7 2/27.  - Encourage PO intake  - PPI  - Bowel regimen ,  Miralax to BID, continue Senna bid, use suppository as needed.      Thyroid nodule  TSH has been elevated but normal T4 in the past   - Repeat TSH ordered but patient has been refusing blood draws.  This lab is not urgent and can be done as an outpatient     COVID-19 recovered  Initially diagnosed 1/19/2022     E coli UTI, resolved    5/27- stable, continue present care       Diet: Combination Diet Regular Diet; High Kcal/High Protein Diet, ADULT    DVT Prophylaxis: Low Risk/Ambulatory with no VTE prophylaxis indicated  Zhu Catheter: Not present  Central Lines: None  Cardiac Monitoring: None  Code Status: Full Code      Disposition Plan   Expected Discharge: 05/31/2022     Anticipated discharge location: group [place]    Delays:     Placement - Group Homes              Darien Eugene MD  Hospitalist Service  [facility]  Securely message with the Vocera Web Console (learn more here)  Text page via [employer] Paging/Directory         Clinically Significant Risk Factors Present on Admission                    ______________________________________________________________________    Interval History    Said she did not want to be awakened.      Data reviewed today: I reviewed all medications, new labs and imaging results over the last 24 hours. I personally reviewed no images or EKG's today.    Physical Exam   Vital Signs: Temp: 97.5  F (36.4  C) Temp src: Oral BP: 92/52     Resp: 16 SpO2: 98 % O2 Device: None (Room air)    Weight: 97 lbs 0 oz  Constitutional: awake, no apparent distress  Neurologic: Awake, alert, talking     Data   No lab results found in last 7 days.    No results found for this or any previous visit (from the past 24 hour(s)).  Medications       melatonin  1 mg Oral At Bedtime     multivitamin w/minerals  1 tablet Oral Daily     pantoprazole  40 mg  Oral QAM AC     polyethylene glycol  17 g Oral BID     senna-docusate  1 tablet Oral BID     cholecalciferol  50 mcg Oral Daily

## 2022-05-28 NOTE — PLAN OF CARE
Goal Outcome Evaluation:                    DATE & TIME: 5/28/22 1600  Cognitive Concerns/ Orientation : A&Ox 4   BEHAVIOR & AGGRESSION TOOL COLOR: Green  CIWA SCORE: NA   ABNL VS/O2: BP remains soft in AM, HR wendie, asyptomatic, other VSS - vitals done daily  MOBILITY: Independent, walking halls often  PAIN MANAGMENT: Denies  DIET: Regular, tolerating, slept through breakfast, good appetite for lunch.  BOWEL/BLADDER: Continent-had a BM today  ABNL LAB/BG: None new  DRAIN/DEVICES: None  TELEMETRY RHYTHM: NA  SKIN: Intact, scattered bruising, flaky scalp.  1+ edema on feet/legs.    TESTS/PROCEDURES: None  D/C DATE: Discharge pending placement, SW following.

## 2022-05-29 PROCEDURE — 120N000001 HC R&B MED SURG/OB

## 2022-05-29 PROCEDURE — 250N000013 HC RX MED GY IP 250 OP 250 PS 637: Performed by: HOSPITALIST

## 2022-05-29 PROCEDURE — 250N000013 HC RX MED GY IP 250 OP 250 PS 637: Performed by: INTERNAL MEDICINE

## 2022-05-29 PROCEDURE — 99232 SBSQ HOSP IP/OBS MODERATE 35: CPT | Performed by: HOSPITALIST

## 2022-05-29 RX ADMIN — MULTIPLE VITAMINS W/ MINERALS TAB 1 TABLET: TAB at 22:16

## 2022-05-29 RX ADMIN — Medication 50 MCG: at 10:34

## 2022-05-29 RX ADMIN — POLYETHYLENE GLYCOL 3350 17 G: 17 POWDER, FOR SOLUTION ORAL at 22:16

## 2022-05-29 RX ADMIN — SENNOSIDES AND DOCUSATE SODIUM 1 TABLET: 8.6; 5 TABLET ORAL at 10:34

## 2022-05-29 RX ADMIN — SENNOSIDES AND DOCUSATE SODIUM 1 TABLET: 8.6; 5 TABLET ORAL at 22:16

## 2022-05-29 RX ADMIN — Medication 1 MG: at 22:16

## 2022-05-29 RX ADMIN — PANTOPRAZOLE SODIUM 40 MG: 40 TABLET, DELAYED RELEASE ORAL at 10:34

## 2022-05-29 RX ADMIN — POLYETHYLENE GLYCOL 3350 17 G: 17 POWDER, FOR SOLUTION ORAL at 10:33

## 2022-05-29 ASSESSMENT — ACTIVITIES OF DAILY LIVING (ADL)
ADLS_ACUITY_SCORE: 33

## 2022-05-29 NOTE — PLAN OF CARE
Goal Outcome Evaluation:                  DATE & TIME: 5/29/22 1500  Cognitive Concerns/ Orientation : A&Ox 4   BEHAVIOR & AGGRESSION TOOL COLOR: Green  CIWA SCORE: NA   ABNL VS/O2: BP remains soft, other VSS - vitals done daily  MOBILITY: Independent, walking halls often  PAIN MANAGMENT: Denies  DIET: Regular, tolerating  BOWEL/BLADDER: Continent-had a BM today  ABNL LAB/BG: None new  DRAIN/DEVICES: None  TELEMETRY RHYTHM: NA  SKIN: Intact, scattered bruising, flaky scalp.  1+ edema on feet/legs.    TESTS/PROCEDURES: None  D/C DATE: Discharge pending placement, SW following. Patient was told to go to bed earlier, was in bed by 11 pm, still slept in til 1030 am.

## 2022-05-29 NOTE — PLAN OF CARE
Goal Outcome Evaluation:      DATE & TIME: 5/28/22 4815-3157  Cognitive Concerns/ Orientation : A&Ox 4   BEHAVIOR & AGGRESSION TOOL COLOR: Green  CIWA SCORE: NA   ABNL VS/O2: BP remains soft in AM, HR wendie, asyptomatic, other VSS - vitals done daily  MOBILITY: Independent, walking halls often  PAIN MANAGMENT: Denies  DIET: Regular, tolerating  BOWEL/BLADDER: Continent-had a BM today  ABNL LAB/BG: None new  DRAIN/DEVICES: None  TELEMETRY RHYTHM: NA  SKIN: Intact, scattered bruising, flaky scalp.  1+ edema on feet/legs.    TESTS/PROCEDURES: None  D/C DATE: Discharge pending placement, SW following. Patient was told to go to bed earlier, was in bed by 11 pm

## 2022-05-29 NOTE — PROGRESS NOTES
[facility]    Medicine Progress Note - Hospitalist Service    Date of Admission:  1/22/2022    Assessment & Plan        Miranda Dover is a 51 year old female with PMH Down Syndrome who was admitted on 1/22/2022 with COVID-19 pneumonia and acute hypoxic respiratory failure.      Hospitalization has been prolonged due to need to establish guardianship following deaths of her parents. Awaiting placement.     Patient is in hospital for longtime given problem with placement at this time, She remain medically stable at this time without change in her clinical status.      Patient remains overall stable at this time.      Down syndrome  Developmental Delay  Failure to thrive  Had significant emotional, psychiatric issues during this stay due to the death of her parents and prolonged hospitalization. Discharge planning has been complicated by need for guardianship and payor source for placement.   -Patient's brother, Maxi, was granted 60 days of emergency guardianship on 2/15. Completed psychologic testing on 3/2/22 to assess current cognitive function and adaptive abilities. Permanent guardianship on 4/1/2022.  - social work and clinical coordinator assisting with disposition  - we are working on getting her into our group home and family has been in agreement, not able to find any place for her at this time.      Severe malnutrition  Anorexia  Chronic abdominal pain  Chronic constipation  BMI ~13 on early this hospitalization with poor PO intake. SLP was consulted for possible dysphagia, and no evidence of dysphagia was noted.   -On 2/20, discussed with pt's brother; overall, felt that oral intake was probably acceptable at this point for discharge and did not feel we needed to pursue enteral feedings/g-tube at this point; desired workup for etiology of abdominal pain which he felt was contributing to her decreased PO intake.  - CT abd/pelvis 2/21 showed large amount of stool. EGD 2/22  relatively unremarkable. US abd no acute pathology. BMI up to 14.7 2/27.  - Encourage PO intake  - PPI  - Bowel regimen ,  Miralax to BID, continue Senna bid, use suppository as needed.      Thyroid nodule  TSH has been elevated but normal T4 in the past   - Repeat TSH ordered but patient has been refusing blood draws.  This lab is not urgent and can be done as an outpatient     COVID-19 recovered  Initially diagnosed 1/19/2022     E coli UTI, resolved    5/29- stable, continue present care.       Diet: Combination Diet Regular Diet; High Kcal/High Protein Diet, ADULT    DVT Prophylaxis: Low Risk/Ambulatory with no VTE prophylaxis indicated  Zhu Catheter: Not present  Central Lines: None  Cardiac Monitoring: None  Code Status: Full Code      Disposition Plan   Expected Discharge: 05/31/2022     Anticipated discharge location: group home    Delays:     Placement - Group Homes              Darien Eugene MD  Hospitalist Service  [facility]  Securely message with the Vocera Web Console (learn more here)  Text page via [employer] Paging/Directory         Clinically Significant Risk Factors Present on Admission                    ______________________________________________________________________    Interval History    No complaints     Data reviewed today: I reviewed all medications, new labs and imaging results over the last 24 hours. I personally reviewed no images or EKG's today.    Physical Exam   Vital Signs: Temp: 97.2  F (36.2  C) Temp src: Oral BP: 98/53 Pulse: 64   Resp: 16 SpO2: 98 % O2 Device: None (Room air)    Weight: 97 lbs 0 oz  Constitutional: awake, no apparent distress  Neurologic: Awake, alert, talking     Data   No lab results found in last 7 days.    No results found for this or any previous visit (from the past 24 hour(s)).  Medications       melatonin  1 mg Oral At Bedtime     multivitamin w/minerals  1 tablet Oral Daily     pantoprazole  40 mg Oral QAM AC      polyethylene glycol  17 g Oral BID     senna-docusate  1 tablet Oral BID     cholecalciferol  50 mcg Oral Daily

## 2022-05-30 PROCEDURE — 250N000013 HC RX MED GY IP 250 OP 250 PS 637: Performed by: INTERNAL MEDICINE

## 2022-05-30 PROCEDURE — 120N000001 HC R&B MED SURG/OB

## 2022-05-30 PROCEDURE — 250N000013 HC RX MED GY IP 250 OP 250 PS 637: Performed by: HOSPITALIST

## 2022-05-30 PROCEDURE — 99232 SBSQ HOSP IP/OBS MODERATE 35: CPT | Performed by: INTERNAL MEDICINE

## 2022-05-30 RX ADMIN — Medication 50 MCG: at 09:55

## 2022-05-30 RX ADMIN — SENNOSIDES AND DOCUSATE SODIUM 1 TABLET: 8.6; 5 TABLET ORAL at 21:52

## 2022-05-30 RX ADMIN — POLYETHYLENE GLYCOL 3350 17 G: 17 POWDER, FOR SOLUTION ORAL at 09:55

## 2022-05-30 RX ADMIN — Medication 1 MG: at 21:52

## 2022-05-30 RX ADMIN — MULTIPLE VITAMINS W/ MINERALS TAB 1 TABLET: TAB at 21:52

## 2022-05-30 RX ADMIN — PANTOPRAZOLE SODIUM 40 MG: 40 TABLET, DELAYED RELEASE ORAL at 09:55

## 2022-05-30 RX ADMIN — SENNOSIDES AND DOCUSATE SODIUM 1 TABLET: 8.6; 5 TABLET ORAL at 09:55

## 2022-05-30 RX ADMIN — POLYETHYLENE GLYCOL 3350 17 G: 17 POWDER, FOR SOLUTION ORAL at 21:52

## 2022-05-30 ASSESSMENT — ACTIVITIES OF DAILY LIVING (ADL)
ADLS_ACUITY_SCORE: 30
ADLS_ACUITY_SCORE: 30
ADLS_ACUITY_SCORE: 34
ADLS_ACUITY_SCORE: 30
ADLS_ACUITY_SCORE: 34
ADLS_ACUITY_SCORE: 33
ADLS_ACUITY_SCORE: 34
ADLS_ACUITY_SCORE: 33
ADLS_ACUITY_SCORE: 34

## 2022-05-30 NOTE — PLAN OF CARE
DATE & TIME: 5/30/2022 8828-1547  Cognitive Concerns/ Orientation : A&Ox 4   BEHAVIOR & AGGRESSION TOOL COLOR: Green  CIWA SCORE: NA   ABNL VS/O2: BP remains soft, other VSS - vitals done daily  MOBILITY: Independent, walking halls often  PAIN MANAGMENT: Denies  DIET: Regular, tolerating  BOWEL/BLADDER: Continent-had a small BM this AM  ABNL LAB/BG: None new  DRAIN/DEVICES: None  TELEMETRY RHYTHM: NA  SKIN: Intact, scattered bruising, flaky scalp.  1+ edema on feet/legs.    TESTS/PROCEDURES: None  D/C DATE: Discharge pending placement, SW following.

## 2022-05-30 NOTE — PLAN OF CARE
Goal Outcome Evaluation:    DATE & TIME: 5/29/22 0345-9749  Cognitive Concerns/ Orientation : A&Ox 4   BEHAVIOR & AGGRESSION TOOL COLOR: Green  CIWA SCORE: NA   ABNL VS/O2: BP remains soft, other VSS - vitals done daily  MOBILITY: Independent, walking halls often  PAIN MANAGMENT: Denies  DIET: Regular, tolerating  BOWEL/BLADDER: Continent-had a BM this afternoon  ABNL LAB/BG: None new  DRAIN/DEVICES: None  TELEMETRY RHYTHM: NA  SKIN: Intact, scattered bruising, flaky scalp.  1+ edema on feet/legs.    TESTS/PROCEDURES: None  D/C DATE: Discharge pending placement, SW following. Patient was told to go to bed earlier, was in bed by 11:30 pm

## 2022-05-30 NOTE — PROGRESS NOTES
[facility]    Hospitalist Progress Note    Assessment & Plan   Date of Admission:  1/22/2022        Assessment & Plan          Miranda Dover is a 51 year old female with PMH Down Syndrome who was admitted on 1/22/2022 with COVID-19 pneumonia and acute hypoxic respiratory failure.      Hospitalization has been prolonged due to need to establish guardianship following deaths of her parents. Awaiting placement.     Patient is in hospital for longtime given problem with placement at this time, She remain medically stable at this time without change in her clinical status.      Patient remains overall stable at this time.      Down syndrome  Developmental Delay  Failure to thrive  Had significant emotional, psychiatric issues during this stay due to the death of her parents and prolonged hospitalization. Discharge planning has been complicated by need for guardianship and payor source for placement.   -Patient's brother, Maxi, was granted 60 days of emergency guardianship on 2/15. Completed psychologic testing on 3/2/22 to assess current cognitive function and adaptive abilities. Permanent guardianship on 4/1/2022.  - social work and clinical coordinator assisting with disposition  - we are working on getting her into our group home and family has been in agreement, not able to find any place for her at this time.   -no RN concerns. Taking po. Sleeping well. Busy with activities during the day     Severe malnutrition  Anorexia  Chronic abdominal pain  Chronic constipation  BMI ~13 on early this hospitalization with poor PO intake. SLP was consulted for possible dysphagia, and no evidence of dysphagia was noted.   -On 2/20, discussed with pt's brother; overall, felt that oral intake was probably acceptable at this point for discharge and did not feel we needed to pursue enteral feedings/g-tube at this point; desired workup for etiology of abdominal pain which he felt was contributing to her  decreased PO intake.  - CT abd/pelvis 2/21 showed large amount of stool. EGD 2/22 relatively unremarkable. US abd no acute pathology. BMI up to 14.7 2/27.  - Encourage PO intake  - PPI  - Bowel regimen ,  Miralax to BID, continue Senna bid, use suppository as needed.   -had BM  - will check am labs.      Thyroid nodule  TSH has been elevated but normal T4 in the past   - Repeat TSH ordered but patient has been refusing blood draws.  This lab is not urgent and can be done as an outpatient     COVID-19 recovered  Initially diagnosed 1/19/2022     E coli UTI, resolved     5/29- stable, continue present care.           Diet: Combination Diet Regular Diet; High Kcal/High Protein Diet, ADULT    DVT Prophylaxis: Low Risk/Ambulatory with no VTE prophylaxis indicated  Zhu Catheter: Not present  Central Lines: None  Cardiac Monitoring: None  Code Status: Full Code          Disposition Plan     Expected Discharge: 05/31/2022     Anticipated discharge location: group [facility]    Delays:     Placement - Group Homes              discussed with RN    Sascha Bullard MD, MD  Text Page  (7am to 6pm)  Interval History   No new issues.   Drawing,. No RN concerns. No labs for >1 month    -Data reviewed today: I reviewed all new labs and imaging results over the last 24 hours. I personally reviewed last set of labs from March    Physical Exam   Temp: 97.8  F (36.6  C) Temp src: Oral BP: 91/63 Pulse: 61   Resp: 16 SpO2: 99 % O2 Device: None (Room air)    Vitals:    02/14/22 1329 04/14/22 1552 05/27/22 1900   Weight: 37.7 kg (83 lb 3.2 oz) 40.3 kg (88 lb 14.4 oz) 44 kg (97 lb)     Vital Signs with Ranges  Temp:  [97.8  F (36.6  C)] 97.8  F (36.6  C)  Pulse:  [61] 61  Resp:  [16] 16  BP: (91)/(63) 91/63  SpO2:  [99 %] 99 %  I/O last 3 completed shifts:  In: 270 [P.O.:270]  Out: -     Constitutional: Nad, up in chair, drawing  Respiratory: CTAB  Cardiovascular: RRR no r/g/m  GI: soft, nt, nd  Skin/Integumen: no rash or edema  Neuro; alert,  conversant, at baseline cognitive impairment.   Psych: calm, cooperative with exam      Medications       melatonin  1 mg Oral At Bedtime     multivitamin w/minerals  1 tablet Oral Daily     pantoprazole  40 mg Oral QAM AC     polyethylene glycol  17 g Oral BID     senna-docusate  1 tablet Oral BID     cholecalciferol  50 mcg Oral Daily       Data   No lab results found in last 7 days.    Imaging:   No results found for this or any previous visit (from the past 24 hour(s)).

## 2022-05-31 PROCEDURE — 250N000013 HC RX MED GY IP 250 OP 250 PS 637: Performed by: INTERNAL MEDICINE

## 2022-05-31 PROCEDURE — 99232 SBSQ HOSP IP/OBS MODERATE 35: CPT | Performed by: INTERNAL MEDICINE

## 2022-05-31 PROCEDURE — 250N000013 HC RX MED GY IP 250 OP 250 PS 637: Performed by: HOSPITALIST

## 2022-05-31 PROCEDURE — 120N000001 HC R&B MED SURG/OB

## 2022-05-31 RX ADMIN — PANTOPRAZOLE SODIUM 40 MG: 40 TABLET, DELAYED RELEASE ORAL at 10:55

## 2022-05-31 RX ADMIN — Medication 50 MCG: at 10:55

## 2022-05-31 RX ADMIN — SENNOSIDES AND DOCUSATE SODIUM 1 TABLET: 8.6; 5 TABLET ORAL at 10:55

## 2022-05-31 RX ADMIN — SENNOSIDES AND DOCUSATE SODIUM 1 TABLET: 8.6; 5 TABLET ORAL at 20:39

## 2022-05-31 RX ADMIN — MULTIPLE VITAMINS W/ MINERALS TAB 1 TABLET: TAB at 20:39

## 2022-05-31 RX ADMIN — POLYETHYLENE GLYCOL 3350 17 G: 17 POWDER, FOR SOLUTION ORAL at 10:55

## 2022-05-31 RX ADMIN — Medication 1 MG: at 20:39

## 2022-05-31 ASSESSMENT — ACTIVITIES OF DAILY LIVING (ADL)
ADLS_ACUITY_SCORE: 34

## 2022-05-31 NOTE — PLAN OF CARE
Goal Outcome Evaluation:      DATE & TIME: 5/31/22 (4421-3793)  Cognitive Concerns/ Orientation : A&Ox 4   BEHAVIOR & AGGRESSION TOOL COLOR: Green  CIWA SCORE: NA   ABNL VS/O2: BP remains soft, other VSS - vitals done daily  MOBILITY: Independent/SBA, walking halls often, likes to walk with staff  PAIN MANAGMENT: Denies  DIET: Regular, tolerating  BOWEL/BLADDER: Continent-had BM today x2  ABNL LAB/BG: None new, declined labs this am, declining COVID swab test  DRAIN/DEVICES: None  TELEMETRY RHYTHM: NA  SKIN: Intact, scattered bruising, flaky scalp.  1+ edema on feet/legs.    TESTS/PROCEDURES: None  D/C DATE: Discharge pending placement/funding, SW following.

## 2022-05-31 NOTE — PLAN OF CARE
Goal Outcome Evaluation:      DATE & TIME: 5/30/2022 0881-3152  Cognitive Concerns/ Orientation : A&Ox 4   BEHAVIOR & AGGRESSION TOOL COLOR: Green  CIWA SCORE: NA   ABNL VS/O2: BP remains soft, other VSS - vitals done daily  MOBILITY: Independent, walking halls often  PAIN MANAGMENT: Denies  DIET: Regular, tolerating  BOWEL/BLADDER: Continent-had a small BM this PM  ABNL LAB/BG: None new  DRAIN/DEVICES: None  TELEMETRY RHYTHM: NA  SKIN: Intact, scattered bruising, flaky scalp.  1+ edema on feet/legs.  Pt does not like having socks removed, reassurance provided   TESTS/PROCEDURES: None  D/C DATE: Discharge pending placement, SW following.

## 2022-05-31 NOTE — PROGRESS NOTES
[facility]    Hospitalist Progress Note    Assessment & Plan   Date of Admission:  1/22/2022        Assessment & Plan          Miranda Dover is a 51 year old female with PMH Down Syndrome who was admitted on 1/22/2022 with COVID-19 pneumonia and acute hypoxic respiratory failure.      Hospitalization has been prolonged due to need to establish guardianship following deaths of her parents. Awaiting placement.     Patient is in hospital for longtime given problem with placement at this time, She remain medically stable at this time without change in her clinical status.      Patient remains overall stable at this time.      Down syndrome  Developmental Delay  Failure to thrive  Had significant emotional, psychiatric issues during this stay due to the death of her parents and prolonged hospitalization. Discharge planning has been complicated by need for guardianship and payor source for placement.   -Patient's brother, Maxi, was granted 60 days of emergency guardianship on 2/15. Completed psychologic testing on 3/2/22 to assess current cognitive function and adaptive abilities. Permanent guardianship on 4/1/2022.  - social work and clinical coordinator assisting with disposition  - we are working on getting her into our group home and family has been in agreement, not able to find any place for her at this time.   -no RN concerns. Taking po. Sleeping well. Busy with activities during the day  -declined labs.      Severe malnutrition  Anorexia  Chronic abdominal pain  Chronic constipation  BMI ~13 on early this hospitalization with poor PO intake. SLP was consulted for possible dysphagia, and no evidence of dysphagia was noted.   -On 2/20, discussed with pt's brother; overall, felt that oral intake was probably acceptable at this point for discharge and did not feel we needed to pursue enteral feedings/g-tube at this point; desired workup for etiology of abdominal pain which he felt was  contributing to her decreased PO intake.  - CT abd/pelvis 2/21 showed large amount of stool. EGD 2/22 relatively unremarkable. US abd no acute pathology. BMI up to 14.7 2/27.  - Encourage PO intake  - PPI  - Bowel regimen ,  Miralax to BID, continue Senna bid, use suppository as needed.   -had BM  - will check am labs.  Pt declined. Will ask again tomorrow. Pt hesitant and typically declines     Thyroid nodule  TSH has been elevated but normal T4 in the past   - Repeat TSH ordered but patient has been refusing blood draws.  This lab is not urgent and can be done as an outpatient     COVID-19 recovered  Initially diagnosed 1/19/2022     E coli UTI, resolved     5/29- stable, continue present care.           Diet: Combination Diet Regular Diet; High Kcal/High Protein Diet, ADULT    DVT Prophylaxis: Low Risk/Ambulatory with no VTE prophylaxis indicated  Zhu Catheter: Not present  Central Lines: None  Cardiac Monitoring: None  Code Status: Full Code          Disposition Plan     Expected Discharge: 05/31/2022     Anticipated discharge location: group home    Delays:     Placement - Group Homes              discussed with RN    Sascha Bullard MD, MD  Text Page  (7am to 6pm)  Interval History   No new issues.   No RN concerns. Up walking in halls. Taking po    -Data reviewed today: I reviewed all new labs and imaging results over the last 24 hours. I personally reviewed last set of labs from March    Physical Exam   Temp: 97.9  F (36.6  C) Temp src: Oral BP: 92/59 Pulse: 64   Resp: 16 SpO2: 99 % O2 Device: None (Room air)    Vitals:    02/14/22 1329 04/14/22 1552 05/27/22 1900   Weight: 37.7 kg (83 lb 3.2 oz) 40.3 kg (88 lb 14.4 oz) 44 kg (97 lb)     Vital Signs with Ranges  Temp:  [97.9  F (36.6  C)] 97.9  F (36.6  C)  Pulse:  [64] 64  Resp:  [16] 16  BP: (92)/(59) 92/59  SpO2:  [99 %] 99 %  I/O last 3 completed shifts:  In: 290 [P.O.:290]  Out: -     Constitutional: Nad, up walking in room,    Respiratory: CTAB  Cardiovascular: RRR no r/g/m  GI: soft, nt, nd  Skin/Integumen: no rash or edema  Neuro; alert, conversant, at baseline cognitive impairment.   Psych: calm, cooperative with exam      Medications       melatonin  1 mg Oral At Bedtime     multivitamin w/minerals  1 tablet Oral Daily     pantoprazole  40 mg Oral QAM AC     polyethylene glycol  17 g Oral BID     senna-docusate  1 tablet Oral BID     cholecalciferol  50 mcg Oral Daily       Data   No lab results found in last 7 days.    Imaging:   No results found for this or any previous visit (from the past 24 hour(s)).

## 2022-06-01 PROCEDURE — 120N000001 HC R&B MED SURG/OB

## 2022-06-01 PROCEDURE — 250N000013 HC RX MED GY IP 250 OP 250 PS 637: Performed by: INTERNAL MEDICINE

## 2022-06-01 PROCEDURE — 99232 SBSQ HOSP IP/OBS MODERATE 35: CPT | Performed by: INTERNAL MEDICINE

## 2022-06-01 PROCEDURE — 250N000013 HC RX MED GY IP 250 OP 250 PS 637: Performed by: HOSPITALIST

## 2022-06-01 RX ADMIN — Medication 1 MG: at 22:30

## 2022-06-01 RX ADMIN — MULTIPLE VITAMINS W/ MINERALS TAB 1 TABLET: TAB at 22:29

## 2022-06-01 RX ADMIN — POLYETHYLENE GLYCOL 3350 17 G: 17 POWDER, FOR SOLUTION ORAL at 11:04

## 2022-06-01 RX ADMIN — SENNOSIDES AND DOCUSATE SODIUM 1 TABLET: 8.6; 5 TABLET ORAL at 22:29

## 2022-06-01 RX ADMIN — SENNOSIDES AND DOCUSATE SODIUM 1 TABLET: 8.6; 5 TABLET ORAL at 11:04

## 2022-06-01 RX ADMIN — Medication 50 MCG: at 11:04

## 2022-06-01 RX ADMIN — PANTOPRAZOLE SODIUM 40 MG: 40 TABLET, DELAYED RELEASE ORAL at 11:04

## 2022-06-01 RX ADMIN — POLYETHYLENE GLYCOL 3350 17 G: 17 POWDER, FOR SOLUTION ORAL at 22:30

## 2022-06-01 ASSESSMENT — ACTIVITIES OF DAILY LIVING (ADL)
ADLS_ACUITY_SCORE: 34
ADLS_ACUITY_SCORE: 33
ADLS_ACUITY_SCORE: 33
ADLS_ACUITY_SCORE: 34
ADLS_ACUITY_SCORE: 33
ADLS_ACUITY_SCORE: 34
ADLS_ACUITY_SCORE: 33
ADLS_ACUITY_SCORE: 33
ADLS_ACUITY_SCORE: 34
ADLS_ACUITY_SCORE: 34

## 2022-06-01 NOTE — PLAN OF CARE
Goal Outcome Evaluation:      DATE & TIME: 5/31/22 5599-8582  Cognitive Concerns/ Orientation : A/Ox 4   BEHAVIOR & AGGRESSION TOOL COLOR: Green, calm/cooperative  CIWA SCORE: NA   ABNL VS/O2: no VS done this shift per MD order  MOBILITY: Independent, ambulates in hallways frequently  PAIN MANAGMENT: Denies  DIET: Regular, good po  BOWEL/BLADDER: Continent  ABNL LAB/BG: None   DRAIN/DEVICES: None  TELEMETRY RHYTHM: NA  SKIN: Intact, scattered bruising, flaky scalp.  1+ edema on feet/legs.    TESTS/PROCEDURES: None  D/C DATE: Discharge pending placement, SW following.  Pt particular with cares, pleasant, denies pain, slept well.

## 2022-06-01 NOTE — PROGRESS NOTES
[facility]    Hospitalist Progress Note    Assessment & Plan   Date of Admission:  1/22/2022        Assessment & Plan          Miranda Dover is a 51 year old female with PMH Down Syndrome who was admitted on 1/22/2022 with COVID-19 pneumonia and acute hypoxic respiratory failure.      Hospitalization has been prolonged due to need to establish guardianship following deaths of her parents. Awaiting placement.     Patient is in hospital for longtime given problem with placement at this time, She remain medically stable at this time without change in her clinical status.      Patient remains overall stable at this time.      Down syndrome  Developmental Delay  Failure to thrive  Had significant emotional, psychiatric issues during this stay due to the death of her parents and prolonged hospitalization. Discharge planning has been complicated by need for guardianship and payor source for placement.   -Patient's brother, Maxi, was granted 60 days of emergency guardianship on 2/15. Completed psychologic testing on 3/2/22 to assess current cognitive function and adaptive abilities. Permanent guardianship on 4/1/2022.  - social work and clinical coordinator assisting with disposition  - we are working on getting her into our group home and family has been in agreement, not able to find any place for her at this time.   -no RN concerns. Taking po. Sleeping well. Busy with activities during the day, up walking halls.   -declined labs.      Severe malnutrition  Anorexia  Chronic abdominal pain  Chronic constipation  BMI ~13 on early this hospitalization with poor PO intake. SLP was consulted for possible dysphagia, and no evidence of dysphagia was noted.   -On 2/20, discussed with pt's brother; overall, felt that oral intake was probably acceptable at this point for discharge and did not feel we needed to pursue enteral feedings/g-tube at this point; desired workup for etiology of abdominal pain  which he felt was contributing to her decreased PO intake.  - CT abd/pelvis 2/21 showed large amount of stool. EGD 2/22 relatively unremarkable. US abd no acute pathology. BMI up to 14.7 2/27.  - Encourage PO intake  - PPI  - Bowel regimen ,  Miralax to BID, continue Senna bid, use suppository as needed.   -having regular BMs. No GI sxs apparent  - will check am labs.  Pt declined. Pt hesitant and typically declines     Thyroid nodule  TSH has been elevated at 9.95 on 2/21/22 but normal T4 in the past   - Repeat TSH ordered but patient has been refusing blood draws.  This lab is not urgent and can be done as an outpatient     COVID-19 recovered  Initially diagnosed 1/19/2022     E coli UTI, resolved     5/29- stable, continue present care.           Diet: Combination Diet Regular Diet; High Kcal/High Protein Diet, ADULT    DVT Prophylaxis: Low Risk/Ambulatory with no VTE prophylaxis indicated  Zhu Catheter: Not present  Central Lines: None  Cardiac Monitoring: None  Code Status: Full Code          Disposition Plan     Expected Discharge: 05/31/2022     Anticipated discharge location: group home    Delays:     Placement - Group Homes              discussed with RN    Sascah Bullard MD, MD  Text Page  (7am to 6pm)  Interval History   No new issues.   No RN concerns. Up walking in halls now    -Data reviewed today: I reviewed all new labs and imaging results over the last 24 hours. I personally reviewed last set of labs from March    Physical Exam   Temp: 97.8  F (36.6  C) Temp src: Oral BP: 94/58 Pulse: 61   Resp: 18 SpO2: 97 % O2 Device: None (Room air)    Vitals:    02/14/22 1329 04/14/22 1552 05/27/22 1900   Weight: 37.7 kg (83 lb 3.2 oz) 40.3 kg (88 lb 14.4 oz) 44 kg (97 lb)     Vital Signs with Ranges  Temp:  [97.8  F (36.6  C)] 97.8  F (36.6  C)  Pulse:  [61] 61  Resp:  [18] 18  BP: (94)/(58) 94/58  SpO2:  [97 %] 97 %  I/O last 3 completed shifts:  In: 290 [P.O.:290]  Out: -     Constitutional: Nad, up  walking in [place]  Respiratory: CTAB  Cardiovascular: RRR no r/g/m  GI: soft, nt, nd  Skin/Integumen: no rash or edema  Neuro; alert, conversant, at baseline cognitive impairment.   Psych: calm,      Medications       melatonin  1 mg Oral At Bedtime     multivitamin w/minerals  1 tablet Oral Daily     pantoprazole  40 mg Oral QAM AC     polyethylene glycol  17 g Oral BID     senna-docusate  1 tablet Oral BID     cholecalciferol  50 mcg Oral Daily       Data   No lab results found in last 7 days.    Imaging:   No results found for this or any previous visit (from the past 24 hour(s)).

## 2022-06-01 NOTE — PLAN OF CARE
Goal Outcome Evaluation:    DATE & TIME: 6/1/22 (7857-2215)  Cognitive Concerns/ Orientation : A/Ox 4   BEHAVIOR & AGGRESSION TOOL COLOR: Green, calm/cooperative  CIWA SCORE: NA   ABNL VS/O2:VSS, daily vitals  MOBILITY: Independent, ambulates in hallways frequently  PAIN MANAGMENT: Denies  DIET: Regular, good po  BOWEL/BLADDER: Continent, large BM today 6/1  ABNL LAB/BG: None   DRAIN/DEVICES: None  TELEMETRY RHYTHM: NA  SKIN: Intact, scattered bruising, flaky scalp.  1+ edema on feet/legs.    TESTS/PROCEDURES: None  D/C DATE: Discharge pending placement, SW following.  Pt particular with cares.

## 2022-06-02 PROCEDURE — 250N000013 HC RX MED GY IP 250 OP 250 PS 637: Performed by: INTERNAL MEDICINE

## 2022-06-02 PROCEDURE — 250N000013 HC RX MED GY IP 250 OP 250 PS 637: Performed by: HOSPITALIST

## 2022-06-02 PROCEDURE — 99232 SBSQ HOSP IP/OBS MODERATE 35: CPT | Performed by: INTERNAL MEDICINE

## 2022-06-02 PROCEDURE — 120N000001 HC R&B MED SURG/OB

## 2022-06-02 RX ADMIN — SENNOSIDES AND DOCUSATE SODIUM 1 TABLET: 8.6; 5 TABLET ORAL at 22:06

## 2022-06-02 RX ADMIN — POLYETHYLENE GLYCOL 3350 17 G: 17 POWDER, FOR SOLUTION ORAL at 10:42

## 2022-06-02 RX ADMIN — PANTOPRAZOLE SODIUM 40 MG: 40 TABLET, DELAYED RELEASE ORAL at 10:42

## 2022-06-02 RX ADMIN — MULTIPLE VITAMINS W/ MINERALS TAB 1 TABLET: TAB at 22:06

## 2022-06-02 RX ADMIN — Medication 1 MG: at 22:06

## 2022-06-02 RX ADMIN — Medication 50 MCG: at 10:42

## 2022-06-02 RX ADMIN — POLYETHYLENE GLYCOL 3350 17 G: 17 POWDER, FOR SOLUTION ORAL at 22:05

## 2022-06-02 RX ADMIN — SENNOSIDES AND DOCUSATE SODIUM 1 TABLET: 8.6; 5 TABLET ORAL at 10:42

## 2022-06-02 ASSESSMENT — ACTIVITIES OF DAILY LIVING (ADL)
ADLS_ACUITY_SCORE: 33

## 2022-06-02 NOTE — PLAN OF CARE
Goal Outcome Evaluation:        DATE & TIME: 6/1/22-6/2/22 6289-8827  Cognitive Concerns/ Orientation : A/Ox 4   BEHAVIOR & AGGRESSION TOOL COLOR: Green, calm/cooperative  CIWA SCORE: NA   ABNL VS/O2:VSS, daily vitals  MOBILITY: Independent, ambulates in hallways frequently  PAIN MANAGMENT: Denies  DIET: Regular, good po  BOWEL/BLADDER: Continent  ABNL LAB/BG: None   DRAIN/DEVICES: None  TELEMETRY RHYTHM: NA  SKIN: Intact, scattered bruising, flaky scalp.  1+ edema on feet/legs.    TESTS/PROCEDURES: None  D/C DATE: Discharge pending placement, SW following.  Pt particular with cares.

## 2022-06-02 NOTE — PLAN OF CARE
Goal Outcome Evaluation:      DATE & TIME: 6/2/22 (5580-7086)  Cognitive Concerns/ Orientation : A/Ox 4   BEHAVIOR & AGGRESSION TOOL COLOR: Green, calm/cooperative  CIWA SCORE: NA   ABNL VS/O2:VSS, daily vitals  MOBILITY: Independent, ambulates in hallways frequently  PAIN MANAGMENT: Denies  DIET: Regular, good po  BOWEL/BLADDER: Continent, had BM  ABNL LAB/BG: None   DRAIN/DEVICES: None  TELEMETRY RHYTHM: NA  SKIN: Intact, scattered bruising, flaky scalp.  1+ edema on feet/legs.    TESTS/PROCEDURES: None  D/C DATE: Discharge pending placement, SW following.  Pt particular with cares.

## 2022-06-02 NOTE — PROGRESS NOTES
[facility]    Hospitalist Progress Note    Assessment & Plan   Date of Admission:  1/22/2022        Assessment & Plan          Miranda Dover is a 51 year old female with PMH Down Syndrome who was admitted on 1/22/2022 with COVID-19 pneumonia and acute hypoxic respiratory failure.      Hospitalization has been prolonged due to need to establish guardianship following deaths of her parents. Awaiting placement.     Patient is in hospital for longtime given problem with placement at this time, She remain medically stable at this time without change in her clinical status.      Patient remains overall stable at this time.      Down syndrome  Developmental Delay  Failure to thrive  Had significant emotional, psychiatric issues during this stay due to the death of her parents and prolonged hospitalization. Discharge planning has been complicated by need for guardianship and payor source for placement.   -Patient's brother, Maxi, was granted 60 days of emergency guardianship on 2/15. Completed psychologic testing on 3/2/22 to assess current cognitive function and adaptive abilities. Permanent guardianship on 4/1/2022.  - social work and clinical coordinator assisting with disposition  - we are working on getting her into our group home and family has been in agreement, not able to find any place for her at this time.   -no RN concerns. Taking po. Sleeping well. Busy with activities during the day, up walking halls.   -declined labs.   -no complaints     Severe malnutrition  Anorexia  Chronic abdominal pain  Chronic constipation  BMI ~13 on early this hospitalization with poor PO intake. SLP was consulted for possible dysphagia, and no evidence of dysphagia was noted.   -On 2/20, discussed with pt's brother; overall, felt that oral intake was probably acceptable at this point for discharge and did not feel we needed to pursue enteral feedings/g-tube at this point; desired workup for etiology of  abdominal pain which he felt was contributing to her decreased PO intake.  - CT abd/pelvis 2/21 showed large amount of stool. EGD 2/22 relatively unremarkable. US abd no acute pathology. BMI up to 14.7 2/27.  - Encourage PO intake  - PPI  - Bowel regimen ,  Miralax to BID, continue Senna bid, use suppository as needed.   -having regular BMs. No GI sxs apparent  Pt declined labs. Pt hesitant and typically declines     Thyroid nodule  TSH has been elevated at 9.95 on 2/21/22 but normal T4 in the past   - Repeat TSH ordered but patient has been refusing blood draws.  This lab is not urgent and can be done as an outpatient     COVID-19 recovered  Initially diagnosed 1/19/2022     E coli UTI, resolved     5/29- stable, continue present care.           Diet: Combination Diet Regular Diet; High Kcal/High Protein Diet, ADULT    DVT Prophylaxis: Low Risk/Ambulatory with no VTE prophylaxis indicated  Zhu Catheter: Not present  Central Lines: None  Cardiac Monitoring: None  Code Status: Full Code          Disposition Plan     Expected Discharge: 05/31/2022     Anticipated discharge location: group home    Delays:     Placement - Group Homes              discussed with RN today    Sascha Bullard MD, MD  Text Page  (7am to 6pm)  Interval History   No new issues.   No RN concerns.  Pt eating fine      -Data reviewed today: I reviewed all new labs and imaging results over the last 24 hours. I personally reviewed last set of labs from March    Physical Exam   Temp: 97.5  F (36.4  C) Temp src: Oral BP: 96/55     Resp: 18 SpO2: 97 % O2 Device: None (Room air)    Vitals:    02/14/22 1329 04/14/22 1552 05/27/22 1900   Weight: 37.7 kg (83 lb 3.2 oz) 40.3 kg (88 lb 14.4 oz) 44 kg (97 lb)     Vital Signs with Ranges  Temp:  [97.5  F (36.4  C)] 97.5  F (36.4  C)  Resp:  [18] 18  BP: (96)/(55) 96/55  SpO2:  [97 %] 97 %  I/O last 3 completed shifts:  In: 300 [P.O.:300]  Out: -     Constitutional: Nad,  Up chair  drawing  Respiratory: CTAB  Cardiovascular: RRR no r/g/m  GI: soft, nl bS  Skin/Integumen: no rash or edema  Neuro; alert, conversant, at baseline cognitive impairment.   Psych: calm,      Medications       melatonin  1 mg Oral At Bedtime     multivitamin w/minerals  1 tablet Oral Daily     pantoprazole  40 mg Oral QAM AC     polyethylene glycol  17 g Oral BID     senna-docusate  1 tablet Oral BID     cholecalciferol  50 mcg Oral Daily       Data   No lab results found in last 7 days.    Imaging:   No results found for this or any previous visit (from the past 24 hour(s)).

## 2022-06-03 PROCEDURE — 250N000013 HC RX MED GY IP 250 OP 250 PS 637: Performed by: HOSPITALIST

## 2022-06-03 PROCEDURE — 120N000001 HC R&B MED SURG/OB

## 2022-06-03 PROCEDURE — 99232 SBSQ HOSP IP/OBS MODERATE 35: CPT | Performed by: INTERNAL MEDICINE

## 2022-06-03 PROCEDURE — 250N000013 HC RX MED GY IP 250 OP 250 PS 637: Performed by: INTERNAL MEDICINE

## 2022-06-03 RX ADMIN — PANTOPRAZOLE SODIUM 40 MG: 40 TABLET, DELAYED RELEASE ORAL at 10:33

## 2022-06-03 RX ADMIN — SENNOSIDES AND DOCUSATE SODIUM 1 TABLET: 8.6; 5 TABLET ORAL at 10:33

## 2022-06-03 RX ADMIN — Medication 1 MG: at 22:24

## 2022-06-03 RX ADMIN — SENNOSIDES AND DOCUSATE SODIUM 1 TABLET: 8.6; 5 TABLET ORAL at 22:24

## 2022-06-03 RX ADMIN — MULTIPLE VITAMINS W/ MINERALS TAB 1 TABLET: TAB at 22:24

## 2022-06-03 RX ADMIN — Medication 50 MCG: at 10:33

## 2022-06-03 ASSESSMENT — ACTIVITIES OF DAILY LIVING (ADL)
ADLS_ACUITY_SCORE: 34

## 2022-06-03 NOTE — PLAN OF CARE
DATE & TIME: 6/3/22, 2102-2576  Cognitive Concerns/ Orientation : A/Ox 4   BEHAVIOR & AGGRESSION TOOL COLOR: Green, calm/cooperative  CIWA SCORE: NA   ABNL VS/O2:VSS RA, daily vitals  MOBILITY: Independent, ambulates in hallways frequently  PAIN MANAGMENT: Denies  DIET: Regular  BOWEL/BLADDER: Continent, no BM this shift  ABNL LAB/BG: None   DRAIN/DEVICES: None  TELEMETRY RHYTHM: NA  SKIN: Intact, scattered bruising, flaky scalp.    TESTS/PROCEDURES: None  D/C DATE: Discharge pending placement, SW following.  OTHER: Pt particular with cares, prefers female NA.

## 2022-06-03 NOTE — PLAN OF CARE
Goal Outcome Evaluation:    Plan of Care Reviewed With: patient     DATE & TIME: 6/3/22, 0185-2685  Cognitive Concerns/ Orientation : A/Ox 4   BEHAVIOR & AGGRESSION TOOL COLOR: Green, calm/cooperative  CIWA SCORE: NA   ABNL VS/O2:VSS RA, daily vitals  MOBILITY: Independent, ambulates in hallways frequently  PAIN MANAGMENT: Denies  DIET: Regular, good po  BOWEL/BLADDER: Continent, no BM this shift-senna given  ABNL LAB/BG: None   DRAIN/DEVICES: None  TELEMETRY RHYTHM: NA  SKIN: Intact, scattered bruising, flaky scalp.    TESTS/PROCEDURES: None  D/C DATE: Discharge pending placement, SW following.  OTHER: Pt particular with cares.

## 2022-06-03 NOTE — PROGRESS NOTES
CLINICAL NUTRITION SERVICES BRIEF NOTE  Refer to RD note dated 5/27 for full reassessment     New Findings  - Chart reviewed. Patient continues to tolerate PO, much of the time eating 100% of trays delivered. Receives 2-3 adequate trays daily, with good variety of foods.   - Still receives Thera vit M daily for micronutrient needs.   - Weight is up to 97 lb, measured 5/27.     Previous Goal -   Intake of 75% of meals at least BID with 3+ items per meal - Met    Intervention  - None needed at this time. Continue current diet order and encourage a variety of foods.   - RD will sign off in light of prolonged admission, medical stability, and consistent intakes.   Please consult should needs arise.     Rosalba Adams RD, LD  [facility], [facility], [facility]  Pager: [phone]  Weekend Pager: [phone]

## 2022-06-03 NOTE — PLAN OF CARE
DATE & TIME: 6/2/22 1900- 6/03/22 0730  Cognitive Concerns/ Orientation : A/Ox 4   BEHAVIOR & AGGRESSION TOOL COLOR: Green, calm/cooperative  CIWA SCORE: NA   ABNL VS/O2:VSS RA, daily vitals  MOBILITY: Independent, ambulates in hallways frequently  PAIN MANAGMENT: Denies  DIET: Regular, good po  BOWEL/BLADDER: Continent, no BM this shift.  ABNL LAB/BG: None   DRAIN/DEVICES: None  TELEMETRY RHYTHM: NA  SKIN: Intact, scattered bruising, flaky scalp.  1+ edema on feet/legs.    TESTS/PROCEDURES: None  D/C DATE: Discharge pending placement, SW following.  Pt particular with cares.

## 2022-06-03 NOTE — PROGRESS NOTES
[facility]    Hospitalist Progress Note    Assessment & Plan   Date of Admission:  1/22/2022        Assessment & Plan          Miranda Dover is a 51 year old female with PMH Down Syndrome who was admitted on 1/22/2022 with COVID-19 pneumonia and acute hypoxic respiratory failure.      Hospitalization has been prolonged due to need to establish guardianship following deaths of her parents. Awaiting placement.     Patient is in hospital for longtime given problem with placement at this time, She remain medically stable at this time without change in her clinical status.      Patient remains overall stable at this time.      Down syndrome  Developmental Delay  Failure to thrive  Had significant emotional, psychiatric issues during this stay due to the death of her parents and prolonged hospitalization. Discharge planning has been complicated by need for guardianship and payor source for placement.   -Patient's brother, Maxi, was granted 60 days of emergency guardianship on 2/15. Completed psychologic testing on 3/2/22 to assess current cognitive function and adaptive abilities. Permanent guardianship on 4/1/2022.  - social work and clinical coordinator assisting with disposition  - we are working on getting her into our group home and family has been in agreement, not able to find any place for her at this time.   -no RN concerns. Taking po. Sleeping well. Busy with activities during the day, up walking halls.   -declined labs.   -no complaints     Severe malnutrition  Anorexia  Chronic abdominal pain  Chronic constipation  BMI ~13 on early this hospitalization with poor PO intake. SLP was consulted for possible dysphagia, and no evidence of dysphagia was noted.   -On 2/20, discussed with pt's brother; overall, felt that oral intake was probably acceptable at this point for discharge and did not feel we needed to pursue enteral feedings/g-tube at this point; desired workup for etiology of  abdominal pain which he felt was contributing to her decreased PO intake.  - CT abd/pelvis 2/21 showed large amount of stool. EGD 2/22 relatively unremarkable. US abd no acute pathology. BMI up to 14.7 2/27.  - Encourage PO intake  - PPI  - Bowel regimen ,  Miralax to BID, continue Senna bid, use suppository as needed.   -having regular BMs. No GI sxs apparent  Pt declined labs. Pt hesitant and typically declines     Thyroid nodule  TSH has been elevated at 9.95 on 2/21/22 but normal T4 in the past   - Repeat TSH ordered but patient has been refusing blood draws.  This lab is not urgent and can be done as an outpatient     COVID-19 recovered  Initially diagnosed 1/19/2022     E coli UTI, resolved     5/29- stable, continue present care.           Diet: Combination Diet Regular Diet; High Kcal/High Protein Diet, ADULT    DVT Prophylaxis: Low Risk/Ambulatory with no VTE prophylaxis indicated  Zhu Catheter: Not present  Central Lines: None  Cardiac Monitoring: None  Code Status: Full Code          Disposition Plan     Expected Discharge: 05/31/2022     Anticipated discharge location: group home    Delays:     Placement - Group Homes              discussed with RN today    Sascha Bullard MD, MD  Text Page  (7am to 6pm)  Interval History   Feels fine. No new issues.     -Data reviewed today: I reviewed all new labs and imaging results over the last 24 hours. I personally reviewed last set of labs from March    Physical Exam   Temp: 97.9  F (36.6  C) Temp src: Oral BP: 104/61 Pulse: 69   Resp: 18 SpO2: 97 % O2 Device: None (Room air)    Vitals:    02/14/22 1329 04/14/22 1552 05/27/22 1900   Weight: 37.7 kg (83 lb 3.2 oz) 40.3 kg (88 lb 14.4 oz) 44 kg (97 lb)     Vital Signs with Ranges  Temp:  [97.9  F (36.6  C)] 97.9  F (36.6  C)  Pulse:  [69] 69  Resp:  [18] 18  BP: (104)/(61) 104/61  SpO2:  [97 %] 97 %  I/O last 3 completed shifts:  In: 300 [P.O.:300]  Out: -     Constitutional: Nad,  Up chair  eating,  Respiratory: CTAB  Cardiovascular: RRR no r/g/m  GI: soft, nl bS  Skin/Integumen: no rash or edema  Neuro; alert, conversant, at baseline cognitive impairment.   Psych: calm,      Medications       melatonin  1 mg Oral At Bedtime     multivitamin w/minerals  1 tablet Oral Daily     pantoprazole  40 mg Oral QAM AC     polyethylene glycol  17 g Oral BID     senna-docusate  1 tablet Oral BID     cholecalciferol  50 mcg Oral Daily       Data   No lab results found in last 7 days.    Imaging:   No results found for this or any previous visit (from the past 24 hour(s)).

## 2022-06-04 PROCEDURE — 99231 SBSQ HOSP IP/OBS SF/LOW 25: CPT | Performed by: STUDENT IN AN ORGANIZED HEALTH CARE EDUCATION/TRAINING PROGRAM

## 2022-06-04 PROCEDURE — 250N000013 HC RX MED GY IP 250 OP 250 PS 637: Performed by: HOSPITALIST

## 2022-06-04 PROCEDURE — 120N000001 HC R&B MED SURG/OB

## 2022-06-04 PROCEDURE — 250N000013 HC RX MED GY IP 250 OP 250 PS 637: Performed by: INTERNAL MEDICINE

## 2022-06-04 RX ADMIN — SENNOSIDES AND DOCUSATE SODIUM 1 TABLET: 8.6; 5 TABLET ORAL at 11:02

## 2022-06-04 RX ADMIN — SENNOSIDES AND DOCUSATE SODIUM 1 TABLET: 8.6; 5 TABLET ORAL at 21:43

## 2022-06-04 RX ADMIN — PANTOPRAZOLE SODIUM 40 MG: 40 TABLET, DELAYED RELEASE ORAL at 11:02

## 2022-06-04 RX ADMIN — MULTIPLE VITAMINS W/ MINERALS TAB 1 TABLET: TAB at 21:43

## 2022-06-04 RX ADMIN — Medication 1 MG: at 21:43

## 2022-06-04 RX ADMIN — Medication 50 MCG: at 11:01

## 2022-06-04 ASSESSMENT — ACTIVITIES OF DAILY LIVING (ADL)
ADLS_ACUITY_SCORE: 33
ADLS_ACUITY_SCORE: 33
ADLS_ACUITY_SCORE: 34
ADLS_ACUITY_SCORE: 33
ADLS_ACUITY_SCORE: 34
ADLS_ACUITY_SCORE: 34
ADLS_ACUITY_SCORE: 33
ADLS_ACUITY_SCORE: 33
ADLS_ACUITY_SCORE: 34
ADLS_ACUITY_SCORE: 34
ADLS_ACUITY_SCORE: 33
ADLS_ACUITY_SCORE: 34

## 2022-06-04 NOTE — PLAN OF CARE
DATE & TIME: 6/4/22, 0681-5024  Cognitive Concerns/ Orientation : A/Ox 4, forgetful     BEHAVIOR & AGGRESSION TOOL COLOR: Green, calm/cooperative  CIWA SCORE: NA   ABNL VS/O2:Daily VSS RA ex BP- rechecksoft/asymptomatic- denies lightheadedness/dizzy. LS clear.   MOBILITY: Independent. Walks around unit   PAIN MANAGMENT: Denies  DIET: Regular.   BOWEL/BLADDER: Continent,  ABNL LAB/BG: None   DRAIN/DEVICES: None  TELEMETRY RHYTHM: NA  SKIN: Pale. Scattered bruising, flaky scalp, refused full skin assessment. Dependent edema    TESTS/PROCEDURES: None  D/C DATE: Discharge pending placement, SW following.  OTHER: Pt particular with cares at times.

## 2022-06-04 NOTE — PLAN OF CARE
Goal Outcome Evaluation:  Plan of Care Reviewed With: patient   Overall Patient Progress: improving  Cognitive Concerns/ Orientation : A/Ox 4   BEHAVIOR & AGGRESSION TOOL COLOR: Green, calm/cooperative  CIWA SCORE: NA   ABNL VS/O2:VSS RA, daily vitals  MOBILITY: Independent, ambulates frequently in the hallway  PAIN MANAGMENT: Denies  DIET: Regular,good appetite  BOWEL/BLADDER: Continent, had a Medium BM  ABNL LAB/BG: None   DRAIN/DEVICES: None  TELEMETRY RHYTHM: NA  SKIN: Intact, scattered bruising, flaky scalp.    TESTS/PROCEDURES: None  D/C DATE: Discharge pending placement, SW following.  OTHER: Pt particular with cares.

## 2022-06-04 NOTE — PROGRESS NOTES
[facility]    Hospitalist Progress Note    Date of Admission:  1/22/2022  Date of Service: 06/04/2022    Assessment & Plan      Miranda Dover is a 51 year old female with PMH Down Syndrome who was admitted on 1/22/2022 with COVID-19 pneumonia and acute hypoxic respiratory failure.      Hospitalization has been prolonged due to need to establish guardianship following deaths of her parents. Awaiting placement. Patient is in hospital for longtime given problem with placement at this time, She remain medically stable at this time without change in her clinical status.      Down syndrome  Developmental Delay  Failure to thrive  Had significant emotional, psychiatric issues during this stay due to the death of her parents and prolonged hospitalization. Discharge planning has been complicated by need for guardianship and payor source for placement.   -Patient's brother, Maxi, was granted 60 days of emergency guardianship on 2/15. Completed psychologic testing on 3/2/22 to assess current cognitive function and adaptive abilities. Permanent guardianship on 4/1/2022.  - social work and clinical coordinator assisting with disposition  - we are working on getting her into our group home and family has been in agreement, not able to find any place for her at this time.   -no RN concerns. Taking po. Sleeping well. Busy with activities during the day, up walking halls.   -declined labs.   -no complaints     Severe malnutrition  Anorexia  Chronic abdominal pain  Chronic constipation  BMI ~13 on early this hospitalization with poor PO intake. SLP was consulted for possible dysphagia, and no evidence of dysphagia was noted.   -On 2/20, discussed with pt's brother; overall, felt that oral intake was probably acceptable at this point for discharge and did not feel we needed to pursue enteral feedings/g-tube at this point; desired workup for etiology of abdominal pain which he felt was contributing to her  decreased PO intake.  - CT abd/pelvis 2/21 showed large amount of stool. EGD 2/22 relatively unremarkable. US abd no acute pathology. BMI up to 14.7 2/27.  - Encourage PO intake  - PPI  - Bowel regimen ,  Miralax to BID, continue Senna bid, use suppository as needed.   -having regular BMs. No GI sxs apparent  Pt declined labs. Pt hesitant and typically declines     Thyroid nodule  TSH has been elevated at 9.95 on 2/21/22 but normal T4 in the past   - Repeat TSH ordered but patient has been refusing blood draws.  This lab is not urgent and can be done as an outpatient     COVID-19 recovered  Initially diagnosed 1/19/2022     E coli UTI, resolved     5/29- stable, continue present care.           Diet: Combination Diet Regular Diet; High Kcal/High Protein Diet, ADULT    DVT Prophylaxis: Low Risk/Ambulatory with no VTE prophylaxis indicated  Zhu Catheter: Not present  Central Lines: None  Cardiac Monitoring: None  Code Status: Full Code          Disposition Plan     Expected Discharge: 05/31/2022     Anticipated discharge location: group home    Delays:     Placement - Group Homes          Yg Blood MD, MD  Text Page  (7am to 6pm)  Interval History      No acute events overnight  Feels fine. No new issues.     -Data reviewed today: I reviewed all new labs and imaging results over the last 24 hours. I personally reviewed last set of labs from March    Physical Exam   Temp: 98  F (36.7  C) Temp src: Oral BP: 102/64 Pulse: 60   Resp: 18 SpO2: 98 % O2 Device: None (Room air)    Vitals:    02/14/22 1329 04/14/22 1552 05/27/22 1900   Weight: 37.7 kg (83 lb 3.2 oz) 40.3 kg (88 lb 14.4 oz) 44 kg (97 lb)     Vital Signs with Ranges  Temp:  [98  F (36.7  C)] 98  F (36.7  C)  Pulse:  [60] 60  Resp:  [18] 18  BP: ()/(58-64) 102/64  SpO2:  [98 %] 98 %  I/O last 3 completed shifts:  In: 240 [P.O.:240]  Out: -     Constitutional: Nad,  Up chair eating,  Respiratory: CTAB  Cardiovascular: RRR no r/g/m  GI: soft, nl  bS  Skin/Integumen: no rash or edema  Neuro; alert, conversant, at baseline cognitive impairment.   Psych: calm,      Medications       melatonin  1 mg Oral At Bedtime     multivitamin w/minerals  1 tablet Oral Daily     pantoprazole  40 mg Oral QAM AC     polyethylene glycol  17 g Oral BID     senna-docusate  1 tablet Oral BID     cholecalciferol  50 mcg Oral Daily       Data   No lab results found in last 7 days.    Imaging:   No results found for this or any previous visit (from the past 24 hour(s)).

## 2022-06-04 NOTE — PLAN OF CARE
Goal Outcome Evaluation:    Plan of Care Reviewed With: patient     DATE & TIME: 6/4/22, 6614-1819  Cognitive Concerns/ Orientation : A/Ox 4   BEHAVIOR & AGGRESSION TOOL COLOR: Green, calm/cooperative  CIWA SCORE: NA   ABNL VS/O2:VSS RA ex BP soft/asymptomatic, daily vitals  MOBILITY: Independent  PAIN MANAGMENT: Denies  DIET: Regular,good appetite  BOWEL/BLADDER: Continent, x 1 BM per patient  ABNL LAB/BG: None   DRAIN/DEVICES: None  TELEMETRY RHYTHM: NA  SKIN: Intact, scattered bruising, flaky scalp.    TESTS/PROCEDURES: None  D/C DATE: Discharge pending placement, SW following.  OTHER: Pt particular with cares at times.

## 2022-06-05 PROCEDURE — 99231 SBSQ HOSP IP/OBS SF/LOW 25: CPT | Performed by: STUDENT IN AN ORGANIZED HEALTH CARE EDUCATION/TRAINING PROGRAM

## 2022-06-05 PROCEDURE — 250N000013 HC RX MED GY IP 250 OP 250 PS 637: Performed by: HOSPITALIST

## 2022-06-05 PROCEDURE — 250N000013 HC RX MED GY IP 250 OP 250 PS 637: Performed by: INTERNAL MEDICINE

## 2022-06-05 PROCEDURE — 120N000001 HC R&B MED SURG/OB

## 2022-06-05 RX ADMIN — MULTIPLE VITAMINS W/ MINERALS TAB 1 TABLET: TAB at 21:43

## 2022-06-05 RX ADMIN — SENNOSIDES AND DOCUSATE SODIUM 1 TABLET: 8.6; 5 TABLET ORAL at 21:43

## 2022-06-05 RX ADMIN — PANTOPRAZOLE SODIUM 40 MG: 40 TABLET, DELAYED RELEASE ORAL at 10:44

## 2022-06-05 RX ADMIN — Medication 1 MG: at 21:43

## 2022-06-05 RX ADMIN — Medication 50 MCG: at 10:44

## 2022-06-05 RX ADMIN — SENNOSIDES AND DOCUSATE SODIUM 1 TABLET: 8.6; 5 TABLET ORAL at 10:44

## 2022-06-05 ASSESSMENT — ACTIVITIES OF DAILY LIVING (ADL)
ADLS_ACUITY_SCORE: 33
ADLS_ACUITY_SCORE: 34
ADLS_ACUITY_SCORE: 34
ADLS_ACUITY_SCORE: 33

## 2022-06-05 NOTE — PLAN OF CARE
DATE & TIME: 6/4/22-6/5/22 0989-2316  Cognitive Concerns/ Orientation : A/Ox 4, forgetful  BEHAVIOR & AGGRESSION TOOL COLOR: Green, calm/cooperative  CIWA SCORE: N/A  ABNL VS/O2: Daily VSS on RA.   LS clear  MOBILITY: Independent. Walks around unit   PAIN MANAGMENT: Denies  DIET: Regular. Ate most of dinner tonight.   BOWEL/BLADDER: Continent, 1 small BM.   ABNL LAB/BG: None   DRAIN/DEVICES: None  TELEMETRY RHYTHM: N/A  SKIN: Pale. Scattered bruising, flaky scalp, refused full skin assessment. Dependent edema, +1 BLE edema  TESTS/PROCEDURES: None  D/C DATE: Discharge pending placement, SW following.  OTHER: Pt particular with cares at times.

## 2022-06-05 NOTE — PLAN OF CARE
Goal Outcome Evaluation:    Plan of Care Reviewed With: patient     DATE & TIME: 6/5/22, 1272-4794  Cognitive Concerns/ Orientation : A/Ox 4, forgetful  BEHAVIOR & AGGRESSION TOOL COLOR: Green, calm/cooperative  CIWA SCORE: N/A  ABNL VS/O2: Daily VSS on RA. LS clear  MOBILITY: Independent. Walks around unit   PAIN MANAGEMENT: reporting mild stomach upset in the am, appeared to resolve  DIET: Regular. Fair appetite  BOWEL/BLADDER: Continent, 1 BM.   ABNL LAB/BG: None   DRAIN/DEVICES: None  TELEMETRY RHYTHM: N/A  SKIN: Pale. Scattered bruising, flaky scalp, refused full skin assessment. Dependent edema, +1 BLE edema  TESTS/PROCEDURES: None  D/C DATE: Discharge pending placement, SW following.  OTHER: Pt particular with cares at times.

## 2022-06-05 NOTE — PROGRESS NOTES
[facility]    Hospitalist Progress Note    Date of Admission:  1/22/2022  Date of Service: 06/05/2022    Assessment & Plan      Miranda Dover is a 51 year old female with PMH Down Syndrome who was admitted on 1/22/2022 with COVID-19 pneumonia and acute hypoxic respiratory failure.      Hospitalization has been prolonged due to need to establish guardianship following deaths of her parents. Awaiting placement. Patient is in hospital for longtime given problem with placement at this time, She remain medically stable at this time without change in her clinical status.      Down syndrome  Developmental Delay  Failure to thrive  Had significant emotional, psychiatric issues during this stay due to the death of her parents and prolonged hospitalization. Discharge planning has been complicated by need for guardianship and payor source for placement.   -Patient's brother, Maxi, was granted 60 days of emergency guardianship on 2/15. Completed psychologic testing on 3/2/22 to assess current cognitive function and adaptive abilities. Permanent guardianship on 4/1/2022.  - social work and clinical coordinator assisting with disposition  - we are working on getting her into our group home and family has been in agreement, not able to find any place for her at this time.   -no RN concerns. Taking po. Sleeping well. Busy with activities during the day, up walking halls.   -declined labs.   -no complaints     Severe malnutrition  Anorexia  Chronic abdominal pain  Chronic constipation  BMI ~13 on early this hospitalization with poor PO intake. SLP was consulted for possible dysphagia, and no evidence of dysphagia was noted.   -On 2/20, discussed with pt's brother; overall, felt that oral intake was probably acceptable at this point for discharge and did not feel we needed to pursue enteral feedings/g-tube at this point; desired workup for etiology of abdominal pain which he felt was contributing to her  decreased PO intake.  - CT abd/pelvis 2/21 showed large amount of stool. EGD 2/22 relatively unremarkable. US abd no acute pathology. BMI up to 14.7 2/27.  - Encourage PO intake  - PPI  - Bowel regimen ,  Miralax to BID, continue Senna bid, use suppository as needed.   -having regular BMs. No GI sxs apparent  Pt declined labs. Pt hesitant and typically declines     Thyroid nodule  TSH has been elevated at 9.95 on 2/21/22 but normal T4 in the past   - Repeat TSH ordered but patient has been refusing blood draws.  This lab is not urgent and can be done as an outpatient     COVID-19 recovered  Initially diagnosed 1/19/2022     E coli UTI, resolved     5/29- stable, continue present care.        Diet: Combination Diet Regular Diet; High Kcal/High Protein Diet, ADULT    DVT Prophylaxis: Low Risk/Ambulatory with no VTE prophylaxis indicated  Zhu Catheter: Not present  Central Lines: None  Cardiac Monitoring: None  Code Status: Full Code          Disposition Plan     Expected Discharge: TBD  Anticipated discharge location: group home    Delays:     Placement - Group Homes          Yg Blood MD, MD  Text Page  (7am to 6pm)  Interval History      No acute events overnight  Feels fine. No new issues.   Walking around floor.    -Data reviewed today: I reviewed all new labs and imaging results over the last 24 hours. I personally reviewed last set of labs from March    Physical Exam   Temp: 97.8  F (36.6  C) Temp src: Oral BP: 95/63 Pulse: 60   Resp: 16 SpO2: 97 % O2 Device: None (Room air)    Vitals:    02/14/22 1329 04/14/22 1552 05/27/22 1900   Weight: 37.7 kg (83 lb 3.2 oz) 40.3 kg (88 lb 14.4 oz) 44 kg (97 lb)     Vital Signs with Ranges  Temp:  [97.8  F (36.6  C)] 97.8  F (36.6  C)  Pulse:  [60] 60  Resp:  [16] 16  BP: ()/(58-63) 95/63  SpO2:  [97 %] 97 %  I/O last 3 completed shifts:  In: 240 [P.O.:240]  Out: -     Constitutional: Nad,  Up chair eating,  Respiratory: CTAB  Cardiovascular: RRR no r/g/m  GI:  soft, nl bS  Skin/Integumen: no rash or edema  Neuro; alert, conversant, at baseline cognitive impairment.   Psych: calm,      Medications       melatonin  1 mg Oral At Bedtime     multivitamin w/minerals  1 tablet Oral Daily     pantoprazole  40 mg Oral QAM AC     polyethylene glycol  17 g Oral BID     senna-docusate  1 tablet Oral BID     cholecalciferol  50 mcg Oral Daily       Data   No lab results found in last 7 days.    Imaging:   No results found for this or any previous visit (from the past 24 hour(s)).

## 2022-06-06 PROCEDURE — 250N000013 HC RX MED GY IP 250 OP 250 PS 637: Performed by: HOSPITALIST

## 2022-06-06 PROCEDURE — 250N000013 HC RX MED GY IP 250 OP 250 PS 637: Performed by: INTERNAL MEDICINE

## 2022-06-06 PROCEDURE — 120N000001 HC R&B MED SURG/OB

## 2022-06-06 RX ADMIN — SENNOSIDES AND DOCUSATE SODIUM 1 TABLET: 8.6; 5 TABLET ORAL at 21:42

## 2022-06-06 RX ADMIN — POLYETHYLENE GLYCOL 3350 17 G: 17 POWDER, FOR SOLUTION ORAL at 10:42

## 2022-06-06 RX ADMIN — CALCIUM CARBONATE (ANTACID) CHEW TAB 500 MG 500 MG: 500 CHEW TAB at 00:58

## 2022-06-06 RX ADMIN — PANTOPRAZOLE SODIUM 40 MG: 40 TABLET, DELAYED RELEASE ORAL at 10:42

## 2022-06-06 RX ADMIN — Medication 50 MCG: at 10:42

## 2022-06-06 RX ADMIN — SENNOSIDES AND DOCUSATE SODIUM 1 TABLET: 8.6; 5 TABLET ORAL at 10:42

## 2022-06-06 RX ADMIN — MULTIPLE VITAMINS W/ MINERALS TAB 1 TABLET: TAB at 21:42

## 2022-06-06 RX ADMIN — Medication 1 MG: at 21:42

## 2022-06-06 ASSESSMENT — ACTIVITIES OF DAILY LIVING (ADL)
ADLS_ACUITY_SCORE: 32
ADLS_ACUITY_SCORE: 32
ADLS_ACUITY_SCORE: 33
ADLS_ACUITY_SCORE: 33
ADLS_ACUITY_SCORE: 32
ADLS_ACUITY_SCORE: 32
ADLS_ACUITY_SCORE: 33
ADLS_ACUITY_SCORE: 33
ADLS_ACUITY_SCORE: 32
ADLS_ACUITY_SCORE: 32
ADLS_ACUITY_SCORE: 33
ADLS_ACUITY_SCORE: 33

## 2022-06-06 NOTE — PLAN OF CARE
DATE & TIME: 6/5/22 Evenings   Cognitive Concerns/ Orientation : A/Ox 4, forgetful. Particular and cooperative with cares.   BEHAVIOR & AGGRESSION TOOL COLOR: Green  CIWA SCORE: N/A  ABNL VS/O2: Daily VSS on RA. LS clear  MOBILITY: Independent. Walks around unit   PAIN MANAGEMENT: Minor complaint of R knee pain.   DIET: Regular. Fair appetite. Eating late and slowly this evening. Needs assist with ordering.   BOWEL/BLADDER: Continent, 1 BM reported in AM. Refused miralax this evening, took scheduled senna.   ABNL LAB/BG: None   DRAIN/DEVICES: None  TELEMETRY RHYTHM: N/A  SKIN: Pale. Scattered bruising, flaky scalp, refused full skin assessment. Dependent edema, +1 BLE edema  TESTS/PROCEDURES: None  D/C DATE: Discharge pending placement, SW following.  OTHER: Pt particular with cares at times. Asks for assists as needed. Rounded on freq.

## 2022-06-06 NOTE — PROGRESS NOTES
[facility]    Hospitalist Progress Note    Date of Admission:  1/22/2022  Date of Service: 06/06/2022    Assessment & Plan      Miranda Dover is a 51 year old female with PMH Down Syndrome who was admitted on 1/22/2022 with COVID-19 pneumonia and acute hypoxic respiratory failure.      Hospitalization has been prolonged due to need to establish guardianship following deaths of her parents. Awaiting placement. Patient is in hospital for longtime given problem with placement at this time, She remain medically stable at this time without change in her clinical status.      Down syndrome  Developmental Delay  Failure to thrive  Had significant emotional, psychiatric issues during this stay due to the death of her parents and prolonged hospitalization. Discharge planning has been complicated by need for guardianship and payor source for placement.   -Patient's brother, Maxi, was granted 60 days of emergency guardianship on 2/15. Completed psychologic testing on 3/2/22 to assess current cognitive function and adaptive abilities. Permanent guardianship on 4/1/2022.  - social work and clinical coordinator assisting with disposition  - we are working on getting her into our group home and family has been in agreement, not able to find any place for her at this time.   -no RN concerns. Taking po. Sleeping well. Busy with activities during the day, up walking halls.   -declined labs.   -no complaints     Severe malnutrition  Anorexia  Chronic abdominal pain  Chronic constipation  BMI ~13 on early this hospitalization with poor PO intake. SLP was consulted for possible dysphagia, and no evidence of dysphagia was noted.   -On 2/20, discussed with pt's brother; overall, felt that oral intake was probably acceptable at this point for discharge and did not feel we needed to pursue enteral feedings/g-tube at this point; desired workup for etiology of abdominal pain which he felt was contributing to her  decreased PO intake.  - CT abd/pelvis 2/21 showed large amount of stool. EGD 2/22 relatively unremarkable. US abd no acute pathology. BMI up to 14.7 2/27.  - Encourage PO intake  - PPI  - Bowel regimen ,  Miralax to BID, continue Senna bid, use suppository as needed.   -having regular BMs. No GI sxs apparent  Pt declined labs. Pt hesitant and typically declines     Thyroid nodule  TSH has been elevated at 9.95 on 2/21/22 but normal T4 in the past   - Repeat TSH ordered but patient has been refusing blood draws.  This lab is not urgent and can be done as an outpatient     COVID-19 recovered  Initially diagnosed 1/19/2022     E coli UTI, resolved     5/29- stable, continue present care.        Diet: Combination Diet Regular Diet; High Kcal/High Protein Diet, ADULT    DVT Prophylaxis: Low Risk/Ambulatory with no VTE prophylaxis indicated  Zhu Catheter: Not present  Central Lines: None  Cardiac Monitoring: None  Code Status: Full Code          Disposition Plan     Expected Discharge: TBD  Anticipated discharge location: group home    Delays:     Placement - Group Homes          Yg Blood MD, MD  Text Page  (7am to 6pm)  Interval History      No acute events overnight  Feels fine. No new issues.   Walking around floor.    -Data reviewed today: I reviewed all new labs and imaging results over the last 24 hours. I personally reviewed last set of labs from March    Physical Exam   Temp: 98.1  F (36.7  C)   BP: 98/55 Pulse: 67   Resp: 18 SpO2: 97 % O2 Device: None (Room air)    Vitals:    02/14/22 1329 04/14/22 1552 05/27/22 1900   Weight: 37.7 kg (83 lb 3.2 oz) 40.3 kg (88 lb 14.4 oz) 44 kg (97 lb)     Vital Signs with Ranges  Temp:  [98.1  F (36.7  C)] 98.1  F (36.7  C)  Pulse:  [67] 67  Resp:  [18] 18  BP: (98)/(55) 98/55  SpO2:  [97 %] 97 %  No intake/output data recorded.      Medications       melatonin  1 mg Oral At Bedtime     multivitamin w/minerals  1 tablet Oral Daily     pantoprazole  40 mg Oral QAM AC      polyethylene glycol  17 g Oral BID     senna-docusate  1 tablet Oral BID     cholecalciferol  50 mcg Oral Daily       Data   No lab results found in last 7 days.    Imaging:   No results found for this or any previous visit (from the past 24 hour(s)).

## 2022-06-06 NOTE — PROGRESS NOTES
DATE & TIME: 06/05/2022 Night    Cognitive Concerns/ Orientation : Alert/Oriented x 4; Hx of Down's syndrome; particular with cares  BEHAVIOR & AGGRESSION TOOL COLOR: Green   ABNL VS/O2: Once daily vitals  MOBILITY: Independent  PAIN MANAGEMENT: denies  DIET: Hi calorie, hi protein; needs assistance to order breakfast  BOWEL/BLADDER: Continent  ABNL LAB/BG: No new labs since 3/9/22  DRAIN/DEVICES: No IV access  SKIN: Scattered bruises; BLE +1 edema; pale  TESTS/PROCEDURES: n/a  D/C DATE: Awaiting placement for group hme

## 2022-06-06 NOTE — PLAN OF CARE
Goal Outcome Evaluation:    Plan of Care Reviewed With: patient     Overall Patient Progress: improving    Cognitive Concerns/ Orientation: A&Ox 4; pleasant. Hx of Down's syndrome; particular with cares  BEHAVIOR & AGGRESSION TOOL COLOR: Green   ABNL VS/O2: VSS, on room air  MOBILITY: Independent, ambulating in the room and halls, went outside with staff  PAIN MANAGEMENT: denies  DIET: High calorie, hi protein-ordering assistance provided, fair appetite  BOWEL/BLADDER: Continent  ABNL LAB/BG: NA  DRAIN/DEVICES: No IV access  SKIN: Scattered bruises; BLE +1 edema; pale  TESTS/PROCEDURES: n/a  D/C DATE: Awaiting placement for group home-SW involved. Showered today

## 2022-06-07 PROCEDURE — 250N000013 HC RX MED GY IP 250 OP 250 PS 637: Performed by: INTERNAL MEDICINE

## 2022-06-07 PROCEDURE — 250N000013 HC RX MED GY IP 250 OP 250 PS 637: Performed by: HOSPITALIST

## 2022-06-07 PROCEDURE — 120N000001 HC R&B MED SURG/OB

## 2022-06-07 RX ADMIN — Medication 50 MCG: at 10:27

## 2022-06-07 RX ADMIN — PANTOPRAZOLE SODIUM 40 MG: 40 TABLET, DELAYED RELEASE ORAL at 10:27

## 2022-06-07 RX ADMIN — POLYETHYLENE GLYCOL 3350 17 G: 17 POWDER, FOR SOLUTION ORAL at 21:56

## 2022-06-07 RX ADMIN — POLYETHYLENE GLYCOL 3350 17 G: 17 POWDER, FOR SOLUTION ORAL at 10:27

## 2022-06-07 RX ADMIN — MULTIPLE VITAMINS W/ MINERALS TAB 1 TABLET: TAB at 21:51

## 2022-06-07 RX ADMIN — SENNOSIDES AND DOCUSATE SODIUM 1 TABLET: 8.6; 5 TABLET ORAL at 21:51

## 2022-06-07 RX ADMIN — Medication 1 MG: at 21:51

## 2022-06-07 RX ADMIN — SENNOSIDES AND DOCUSATE SODIUM 1 TABLET: 8.6; 5 TABLET ORAL at 10:27

## 2022-06-07 ASSESSMENT — ACTIVITIES OF DAILY LIVING (ADL)
ADLS_ACUITY_SCORE: 33
ADLS_ACUITY_SCORE: 32
ADLS_ACUITY_SCORE: 33
ADLS_ACUITY_SCORE: 32
ADLS_ACUITY_SCORE: 33
ADLS_ACUITY_SCORE: 33
ADLS_ACUITY_SCORE: 32
ADLS_ACUITY_SCORE: 33
ADLS_ACUITY_SCORE: 33

## 2022-06-07 NOTE — PROGRESS NOTES
[facility]    Hospitalist Progress Note    Date of Admission:  1/22/2022  Date of Service: 06/07/2022    Assessment & Plan      Miranda Dover is a 51 year old female with PMH Down Syndrome who was admitted on 1/22/2022 with COVID-19 pneumonia and acute hypoxic respiratory failure.      Hospitalization has been prolonged due to need to establish guardianship following deaths of her parents. Awaiting placement. Patient is in hospital for longtime given problem with placement at this time, She remain medically stable at this time without change in her clinical status.      Down syndrome  Developmental Delay  Failure to thrive  Had significant emotional, psychiatric issues during this stay due to the death of her parents and prolonged hospitalization. Discharge planning has been complicated by need for guardianship and payor source for placement.   -Patient's brother, Maxi, was granted 60 days of emergency guardianship on 2/15. Completed psychologic testing on 3/2/22 to assess current cognitive function and adaptive abilities. Permanent guardianship on 4/1/2022.  - social work and clinical coordinator assisting with disposition  - we are working on getting her into our group home and family has been in agreement, not able to find any place for her at this time.   -no RN concerns. Taking po. Sleeping well. Busy with activities during the day, up walking halls.   -declined labs.   -no complaints     Severe malnutrition  Anorexia  Chronic abdominal pain  Chronic constipation  BMI ~13 on early this hospitalization with poor PO intake. SLP was consulted for possible dysphagia, and no evidence of dysphagia was noted.   -On 2/20, discussed with pt's brother; overall, felt that oral intake was probably acceptable at this point for discharge and did not feel we needed to pursue enteral feedings/g-tube at this point; desired workup for etiology of abdominal pain which he felt was contributing to her  decreased PO intake.  - CT abd/pelvis 2/21 showed large amount of stool. EGD 2/22 relatively unremarkable. US abd no acute pathology. BMI up to 14.7 2/27.  - Encourage PO intake  - PPI  - Bowel regimen ,  Miralax to BID, continue Senna bid, use suppository as needed.   -having regular BMs. No GI sxs apparent  Pt declined labs. Pt hesitant and typically declines     Thyroid nodule  TSH has been elevated at 9.95 on 2/21/22 but normal T4 in the past   - Repeat TSH ordered but patient has been refusing blood draws.  This lab is not urgent and can be done as an outpatient     COVID-19 recovered  Initially diagnosed 1/19/2022     E coli UTI, resolved     5/29- stable, continue present care.        Diet: Combination Diet Regular Diet; High Kcal/High Protein Diet, ADULT    DVT Prophylaxis: Low Risk/Ambulatory with no VTE prophylaxis indicated  Zhu Catheter: Not present  Central Lines: None  Cardiac Monitoring: None  Code Status: Full Code       Disposition Plan     Expected Discharge: TBD  Anticipated discharge location: group home    Delays:     Placement - Group Homes          Yg Blood MD, MD  Text Page  (7am to 6pm)  Interval History      No acute events overnight reported by RN  No new issues.   Walking around floor.    -Data reviewed today: I reviewed all new labs and imaging results over the last 24 hours. I personally reviewed last set of labs from March    Physical Exam   Temp: 98  F (36.7  C) Temp src: Oral BP: 101/61     Resp: 18 SpO2: 98 % O2 Device: None (Room air)    Vitals:    02/14/22 1329 04/14/22 1552 05/27/22 1900   Weight: 37.7 kg (83 lb 3.2 oz) 40.3 kg (88 lb 14.4 oz) 44 kg (97 lb)     Vital Signs with Ranges  Temp:  [98  F (36.7  C)] 98  F (36.7  C)  Resp:  [18] 18  BP: (101)/(61) 101/61  SpO2:  [98 %] 98 %  I/O last 3 completed shifts:  In: 120 [P.O.:120]  Out: -       Medications       melatonin  1 mg Oral At Bedtime     multivitamin w/minerals  1 tablet Oral Daily     pantoprazole  40 mg Oral  QAM AC     polyethylene glycol  17 g Oral BID     senna-docusate  1 tablet Oral BID     cholecalciferol  50 mcg Oral Daily       Data   No lab results found in last 7 days.    Imaging:   No results found for this or any previous visit (from the past 24 hour(s)).

## 2022-06-07 NOTE — PLAN OF CARE
DATE & TIME: 6/6/22 2790-3752    Cognitive Concerns/ Orientation : Pt A/Ox4   BEHAVIOR & AGGRESSION TOOL COLOR: Green   ABNL VS/O2: VSS on RA, daily  MOBILITY: Independent  PAIN MANAGMENT: Denies  DIET: High calorie, high protein   BOWEL/BLADDER: Continent  DRAIN/DEVICES: No IV access  SKIN: Bilateral lower extremity edema +1, pale  D/C DATE: Discharge pending group home, social work following  OTHER IMPORTANT INFO: Pt ambulated in hallway this evening and watched sunset at the end of the hallway.

## 2022-06-07 NOTE — PLAN OF CARE
Goal Outcome Evaluation:    Plan of Care Reviewed With: patient     Overall Patient Progress: improving  Cognitive Concerns/ Orientation : A&Ox4   BEHAVIOR & AGGRESSION TOOL COLOR: Green   ABNL VS/O2: VSS on room air  MOBILITY: Independent, frequently walks in [place], went outside with staff  PAIN MANAGMENT: Denies pain  DIET: High calorie, high protein   BOWEL/BLADDER: Continent  DRAIN/DEVICES: No IV access  SKIN: Bilateral lower extremity edema +1, pale  D/C DATE: Discharge pending group home, social work following  OTHER IMPORTANT INFO: Pt ambulated in hallway. Wonders around from staff to staff chatting

## 2022-06-08 PROCEDURE — 250N000013 HC RX MED GY IP 250 OP 250 PS 637: Performed by: HOSPITALIST

## 2022-06-08 PROCEDURE — 99231 SBSQ HOSP IP/OBS SF/LOW 25: CPT | Performed by: STUDENT IN AN ORGANIZED HEALTH CARE EDUCATION/TRAINING PROGRAM

## 2022-06-08 PROCEDURE — 250N000013 HC RX MED GY IP 250 OP 250 PS 637: Performed by: INTERNAL MEDICINE

## 2022-06-08 PROCEDURE — 120N000001 HC R&B MED SURG/OB

## 2022-06-08 RX ADMIN — Medication 50 MCG: at 10:18

## 2022-06-08 RX ADMIN — PANTOPRAZOLE SODIUM 40 MG: 40 TABLET, DELAYED RELEASE ORAL at 10:18

## 2022-06-08 RX ADMIN — SENNOSIDES AND DOCUSATE SODIUM 1 TABLET: 8.6; 5 TABLET ORAL at 10:18

## 2022-06-08 RX ADMIN — POLYETHYLENE GLYCOL 3350 17 G: 17 POWDER, FOR SOLUTION ORAL at 10:18

## 2022-06-08 RX ADMIN — Medication 1 MG: at 22:14

## 2022-06-08 RX ADMIN — SENNOSIDES AND DOCUSATE SODIUM 1 TABLET: 8.6; 5 TABLET ORAL at 22:14

## 2022-06-08 RX ADMIN — MULTIPLE VITAMINS W/ MINERALS TAB 1 TABLET: TAB at 22:14

## 2022-06-08 RX ADMIN — POLYETHYLENE GLYCOL 3350 17 G: 17 POWDER, FOR SOLUTION ORAL at 22:14

## 2022-06-08 ASSESSMENT — ACTIVITIES OF DAILY LIVING (ADL)
ADLS_ACUITY_SCORE: 34
ADLS_ACUITY_SCORE: 33
ADLS_ACUITY_SCORE: 33
ADLS_ACUITY_SCORE: 34
ADLS_ACUITY_SCORE: 33
ADLS_ACUITY_SCORE: 34
ADLS_ACUITY_SCORE: 33
ADLS_ACUITY_SCORE: 31

## 2022-06-08 NOTE — PROGRESS NOTES
[facility]    Hospitalist Progress Note    Date of Admission:  1/22/2022  Date of Service: 06/08/2022    Assessment & Plan      Miranda Dover is a 51 year old female with PMH Down Syndrome who was admitted on 1/22/2022 with COVID-19 pneumonia and acute hypoxic respiratory failure.      Hospitalization has been prolonged due to need to establish guardianship following deaths of her parents. Awaiting placement. Patient is in hospital for longtime given problem with placement at this time, She remain medically stable at this time without change in her clinical status.      Down syndrome  Developmental Delay  Failure to thrive  Had significant emotional, psychiatric issues during this stay due to the death of her parents and prolonged hospitalization. Discharge planning has been complicated by need for guardianship and payor source for placement.   -Patient's brother, Maxi, was granted 60 days of emergency guardianship on 2/15. Completed psychologic testing on 3/2/22 to assess current cognitive function and adaptive abilities. Permanent guardianship on 4/1/2022.  - Social work and clinical coordinator assisting with disposition  - We are working on getting her into our group home and family has been in agreement, not able to find any place for her at this time.   - No RN concerns. Taking po. Sleeping well. Busy with activities during the day, up walking halls.   - Declined labs.   - No complaints     Severe malnutrition  Anorexia  Chronic abdominal pain  Chronic constipation  BMI ~13 on early this hospitalization with poor PO intake. SLP was consulted for possible dysphagia, and no evidence of dysphagia was noted.   -On 2/20, discussed with pt's brother; overall, felt that oral intake was probably acceptable at this point for discharge and did not feel we needed to pursue enteral feedings/g-tube at this point; desired workup for etiology of abdominal pain which he felt was contributing to  "her decreased PO intake.  - CT abd/pelvis 2/21 showed large amount of stool. EGD 2/22 relatively unremarkable. US abd no acute pathology. BMI up to 14.7 2/27.  - Encourage PO intake  - PPI  - Bowel regimen ,  Miralax to BID, continue Senna bid, use suppository as needed.   -having regular BMs. No GI sxs apparent  Pt declined labs. Pt hesitant and typically declines     Thyroid nodule  TSH has been elevated at 9.95 on 2/21/22 but normal T4 in the past   - Repeat TSH ordered but patient has been refusing blood draws.  This lab is not urgent and can be done as an outpatient     COVID-19 recovered  Initially diagnosed 1/19/2022     E coli UTI, resolved     5/29- stable, continue present care.        Diet: Combination Diet Regular Diet; High Kcal/High Protein Diet, ADULT    DVT Prophylaxis: Low Risk/Ambulatory with no VTE prophylaxis indicated  Zhu Catheter: Not present  Central Lines: None  Cardiac Monitoring: None  Code Status: Full Code       Disposition Plan     Expected Discharge: TBD  Anticipated discharge location: group home    Delays:     Placement - Group Homes          Yg Blood MD, MD  Text Page  (7am to 6pm)  Interval History      No acute events overnight reported by RN  No new issues.   Walking around floor.    -Data reviewed today: I reviewed all new labs and imaging results over the last 24 hours. I personally reviewed last set of labs from March    Physical Exam                      Vitals:    02/14/22 1329 04/14/22 1552 05/27/22 1900   Weight: 37.7 kg (83 lb 3.2 oz) 40.3 kg (88 lb 14.4 oz) 44 kg (97 lb)     /61 (BP Location: Left arm, Patient Position: Supine)   Pulse 67   Temp 98  F (36.7  C) (Oral)   Resp 18   Ht 1.6 m (5' 3\")   Wt 44 kg (97 lb)   SpO2 98%   BMI 17.18 kg/m      Constitutional: Nad,  Up chair eating,  Respiratory:     CTAB  Cardiovascular: RRR no r/g/m  GI: soft, nl bS  Skin/Integumen: no rash or edema  Neuro; alert, conversant, at baseline cognitive impairment. "   Psych: calm       No intake/output data recorded.      Medications       melatonin  1 mg Oral At Bedtime     multivitamin w/minerals  1 tablet Oral Daily     pantoprazole  40 mg Oral QAM AC     polyethylene glycol  17 g Oral BID     senna-docusate  1 tablet Oral BID     cholecalciferol  50 mcg Oral Daily       Data   No lab results found in last 7 days.    Imaging:   No results found for this or any previous visit (from the past 24 hour(s)).

## 2022-06-08 NOTE — PROGRESS NOTES
Care Management Follow Up    Length of Stay (days): 137    Expected Discharge Date: 06/09/2022     Concerns to be Addressed: discharge planning     Patient plan of care discussed at interdisciplinary rounds: Yes    Anticipated Discharge Disposition:  [facility]     Anticipated Discharge Services:    Anticipated Discharge DME:      Patient/family educated on Medicare website which has current facility and service quality ratings: no  Education Provided on the Discharge Plan:    Patient/Family in Agreement with the Plan: yes    Referrals Placed by CM/SW: Post Acute Facilities  Private pay costs discussed: Not applicable    Additional Information:  Contacted HUSSAIN [employer] CM at [phone] requesting an update.  She reports the [facility] has submitted their funding request which is now under consideration by a review department.  Once approved at the [facility] level it is sent to a state agency.  Ms Roblero, can only speculate the process may be completed by mid to later next week.  Today writer faxed to [employer] two health forms required for admission.       Connie Masterson, BARBIESW

## 2022-06-08 NOTE — PLAN OF CARE
DATE & TIME: 6/7/22 6168-9483    Cognitive Concerns/ Orientation : Pt A/Ox4   BEHAVIOR & AGGRESSION TOOL COLOR: Green   ABNL VS/O2: VSS on RA  MOBILITY: Independent  PAIN MANAGMENT: Denies  DIET: High calorie, high protein  BOWEL/BLADDER: Continent  DRAIN/DEVICES: No IV access  SKIN: Bilateral lower extremity edema +1  D/C DATE: Discharge pending group home, social work following.

## 2022-06-08 NOTE — PLAN OF CARE
Goal Outcome Evaluation:        DATE & TIME: 6/8/22 4576-7224   Cognitive Concerns/ Orientation : Pt A/Ox4   BEHAVIOR & AGGRESSION TOOL COLOR: Green   ABNL VS/O2: VSS on RA  MOBILITY: Independent, ambulating hallways throughout shift  PAIN MANAGMENT: Denies  DIET: High calorie, high protein, refused to eat lunch, said she was full from late breakfast.   BOWEL/BLADDER: Continent  DRAIN/DEVICES: No IV access  SKIN: Bilateral lower extremity edema +1  D/C DATE: Discharge pending group home, social work following.

## 2022-06-09 LAB — SARS-COV-2 RNA RESP QL NAA+PROBE: NEGATIVE

## 2022-06-09 PROCEDURE — 120N000001 HC R&B MED SURG/OB

## 2022-06-09 PROCEDURE — 250N000013 HC RX MED GY IP 250 OP 250 PS 637: Performed by: HOSPITALIST

## 2022-06-09 PROCEDURE — 250N000013 HC RX MED GY IP 250 OP 250 PS 637: Performed by: INTERNAL MEDICINE

## 2022-06-09 PROCEDURE — U0005 INFEC AGEN DETEC AMPLI PROBE: HCPCS | Performed by: INTERNAL MEDICINE

## 2022-06-09 RX ADMIN — PANTOPRAZOLE SODIUM 40 MG: 40 TABLET, DELAYED RELEASE ORAL at 09:29

## 2022-06-09 RX ADMIN — POLYETHYLENE GLYCOL 3350 17 G: 17 POWDER, FOR SOLUTION ORAL at 09:28

## 2022-06-09 RX ADMIN — Medication 50 MCG: at 09:29

## 2022-06-09 RX ADMIN — SENNOSIDES AND DOCUSATE SODIUM 1 TABLET: 8.6; 5 TABLET ORAL at 22:16

## 2022-06-09 RX ADMIN — MULTIPLE VITAMINS W/ MINERALS TAB 1 TABLET: TAB at 22:17

## 2022-06-09 RX ADMIN — SENNOSIDES AND DOCUSATE SODIUM 1 TABLET: 8.6; 5 TABLET ORAL at 09:29

## 2022-06-09 RX ADMIN — Medication 1 MG: at 22:16

## 2022-06-09 ASSESSMENT — ACTIVITIES OF DAILY LIVING (ADL)
ADLS_ACUITY_SCORE: 30
ADLS_ACUITY_SCORE: 33
ADLS_ACUITY_SCORE: 30
ADLS_ACUITY_SCORE: 30
ADLS_ACUITY_SCORE: 31
ADLS_ACUITY_SCORE: 30
ADLS_ACUITY_SCORE: 31
ADLS_ACUITY_SCORE: 31
ADLS_ACUITY_SCORE: 32
ADLS_ACUITY_SCORE: 31

## 2022-06-09 NOTE — PLAN OF CARE
Goal Outcome Evaluation:    Cognitive Concerns/ Orientation : Patient alert and oriented x 4   BEHAVIOR & AGGRESSION TOOL COLOR: Green   ABNL VS/O2: VSS on RA  MOBILITY: Independent   PAIN MANAGMENT: Denies  DIET: High calorie, high protein  BOWEL/BLADDER: Continent  DRAIN/DEVICES: No IV access  SKIN: Bilateral lower extremity edema +1  D/C DATE: Discharge pending group home, social work following.

## 2022-06-09 NOTE — PLAN OF CARE
Goal Outcome Evaluation:      DATE & TIME: 6/9/2022 3299-0098  Cognitive Concerns/ Orientation : Patient alert and oriented x 4   BEHAVIOR & AGGRESSION TOOL COLOR: Green   ABNL VS/O2: VSS on RA  MOBILITY: Independent   PAIN MANAGMENT: Denies  DIET: High calorie, high protein  BOWEL/BLADDER: Continent, had BM today  DRAIN/DEVICES: No IV access  SKIN: Bilateral lower extremity edema +1  D/C DATE: Discharge pending group home, social work following. Covid test done today/negative

## 2022-06-09 NOTE — PROGRESS NOTES
[facility]    Hospitalist Progress Note    Date of Admission:  1/22/2022  Date of Service: 06/09/2022    Assessment & Plan      Miranda Dover is a 51 year old female with PMH Down Syndrome who was admitted on 1/22/2022 with COVID-19 pneumonia and acute hypoxic respiratory failure.      Hospitalization has been prolonged due to need to establish guardianship following deaths of her parents. Awaiting placement. Patient is in hospital for longtime given problem with placement at this time, She remain medically stable at this time without change in her clinical status.      Down syndrome  Developmental Delay  Failure to thrive  Had significant emotional, psychiatric issues during this stay due to the death of her parents and prolonged hospitalization. Discharge planning has been complicated by need for guardianship and payor source for placement.   -Patient's brother, Maxi, was granted 60 days of emergency guardianship on 2/15. Completed psychologic testing on 3/2/22 to assess current cognitive function and adaptive abilities. Permanent guardianship on 4/1/2022.  - Social work and clinical coordinator assisting with disposition  - We are working on getting her into our group home and family has been in agreement, not able to find any place for her at this time.   - No RN concerns. Taking po. Sleeping well. Busy with activities during the day, up walking halls.   - Declined labs.   - No complaints     Severe malnutrition  Anorexia  Chronic abdominal pain  Chronic constipation  BMI ~13 on early this hospitalization with poor PO intake. SLP was consulted for possible dysphagia, and no evidence of dysphagia was noted.   -On 2/20, discussed with pt's brother; overall, felt that oral intake was probably acceptable at this point for discharge and did not feel we needed to pursue enteral feedings/g-tube at this point; desired workup for etiology of abdominal pain which he felt was contributing to  "her decreased PO intake.  - CT abd/pelvis 2/21 showed large amount of stool. EGD 2/22 relatively unremarkable. US abd no acute pathology. BMI up to 14.7 2/27.  - Encourage PO intake  - PPI  - Bowel regimen ,  Miralax to BID, continue Senna bid, use suppository as needed.   -having regular BMs. No GI sxs apparent  Pt declined labs. Pt hesitant and typically declines     Thyroid nodule  TSH has been elevated at 9.95 on 2/21/22 but normal T4 in the past   - Repeat TSH ordered but patient has been refusing blood draws.  This lab is not urgent and can be done as an outpatient     COVID-19 recovered  Initially diagnosed 1/19/2022     E coli UTI, resolved     5/29- stable, continue present care.        Diet: Combination Diet Regular Diet; High Kcal/High Protein Diet, ADULT    DVT Prophylaxis: Low Risk/Ambulatory with no VTE prophylaxis indicated  Zhu Catheter: Not present  Central Lines: None  Cardiac Monitoring: None  Code Status: Full Code       Disposition Plan     Expected Discharge: TBD  Anticipated discharge location: group home    Delays:     Placement - Group Homes          Yg Blood MD, MD  Text Page  (7am to 6pm)  Interval History      No acute events overnight reported by RN  No new issues.     -Data reviewed today: I reviewed all new labs and imaging results over the last 24 hours. I personally reviewed last set of labs from March    Physical Exam   Temp: 97.7  F (36.5  C) Temp src: Oral BP: 98/58 Pulse: 55   Resp: 18 SpO2: 98 % O2 Device: None (Room air)    Vitals:    02/14/22 1329 04/14/22 1552 05/27/22 1900   Weight: 37.7 kg (83 lb 3.2 oz) 40.3 kg (88 lb 14.4 oz) 44 kg (97 lb)     BP 98/58 (BP Location: Left arm)   Pulse 55   Temp 97.7  F (36.5  C) (Oral)   Resp 18   Ht 1.6 m (5' 3\")   Wt 44 kg (97 lb)   SpO2 98%   BMI 17.18 kg/m        Temp:  [97.7  F (36.5  C)] 97.7  F (36.5  C)  Pulse:  [55] 55  Resp:  [18] 18  BP: (98)/(58) 98/58  SpO2:  [98 %] 98 %  I/O last 3 completed shifts:  In: 520 " [P.O.:520]  Out: -       Medications       melatonin  1 mg Oral At Bedtime     multivitamin w/minerals  1 tablet Oral Daily     pantoprazole  40 mg Oral QAM AC     polyethylene glycol  17 g Oral BID     senna-docusate  1 tablet Oral BID     cholecalciferol  50 mcg Oral Daily       Data   No lab results found in last 7 days.    Imaging:   No results found for this or any previous visit (from the past 24 hour(s)).

## 2022-06-10 PROCEDURE — 250N000013 HC RX MED GY IP 250 OP 250 PS 637: Performed by: INTERNAL MEDICINE

## 2022-06-10 PROCEDURE — 250N000013 HC RX MED GY IP 250 OP 250 PS 637: Performed by: HOSPITALIST

## 2022-06-10 PROCEDURE — 120N000001 HC R&B MED SURG/OB

## 2022-06-10 RX ADMIN — Medication 50 MCG: at 10:07

## 2022-06-10 RX ADMIN — MULTIPLE VITAMINS W/ MINERALS TAB 1 TABLET: TAB at 21:43

## 2022-06-10 RX ADMIN — POLYETHYLENE GLYCOL 3350 17 G: 17 POWDER, FOR SOLUTION ORAL at 21:43

## 2022-06-10 RX ADMIN — SENNOSIDES AND DOCUSATE SODIUM 1 TABLET: 8.6; 5 TABLET ORAL at 10:06

## 2022-06-10 RX ADMIN — SENNOSIDES AND DOCUSATE SODIUM 1 TABLET: 8.6; 5 TABLET ORAL at 21:43

## 2022-06-10 RX ADMIN — PANTOPRAZOLE SODIUM 40 MG: 40 TABLET, DELAYED RELEASE ORAL at 10:06

## 2022-06-10 RX ADMIN — Medication 1 MG: at 21:43

## 2022-06-10 RX ADMIN — POLYETHYLENE GLYCOL 3350 17 G: 17 POWDER, FOR SOLUTION ORAL at 10:07

## 2022-06-10 ASSESSMENT — ACTIVITIES OF DAILY LIVING (ADL)
ADLS_ACUITY_SCORE: 34
ADLS_ACUITY_SCORE: 30
ADLS_ACUITY_SCORE: 34

## 2022-06-10 NOTE — PLAN OF CARE
Goal Outcome Evaluation:    Plan of Care Reviewed With: patient     Overall Patient Progress: improving    DATE & TIME: 6/10/22 3239-0908  Cognitive Concerns/ Orientation : Patient alert and oriented x 4   BEHAVIOR & AGGRESSION TOOL COLOR: Green   ABNL VS/O2: None  MOBILITY: Independent   PAIN MANAGMENT: Denies  DIET: High calorie, high protein  BOWEL/BLADDER: Continent, uses BR as needed.  DRAIN/DEVICES: No IV access  SKIN: Bilateral lower extremity edema +1, Refused full body assessment  D/C DATE: Discharge pending group home, social work following.

## 2022-06-10 NOTE — PROGRESS NOTES
DATE & TIME: 6/10/2022 0475-4225   Cognitive Concerns/ Orientation : A&Ox4  BEHAVIOR & AGGRESSION TOOL COLOR: Green  ABNL VS/O2: VSS on room air  MOBILITY: Independent  PAIN MANAGMENT: Denies  DIET: Regular high protein/high calorie; tolerated 80% breakfast  BOWEL/BLADDER: Continent; had a BM this shift  SKIN: BLE 1+edema  D/C DATE: Unknown due to difficulty with funding for   OTHER IMPORTANT INFO: Patient had a good shift and ambulated in [place]. Patient refused to take shower this shift. No other concerns.

## 2022-06-10 NOTE — PROGRESS NOTES
[facility]    Hospitalist Progress Note    Date of Admission:  1/22/2022  Date of Service: 06/10/2022    Assessment & Plan      Miranda Dover is a 51 year old female with PMH Down Syndrome who was admitted on 1/22/2022 with COVID-19 pneumonia and acute hypoxic respiratory failure.      Hospitalization has been prolonged due to need to establish guardianship following deaths of her parents. Awaiting placement. Patient is in hospital for longtime given problem with placement at this time, She remain medically stable at this time without change in her clinical status.      Down syndrome  Developmental Delay  Failure to thrive  Had significant emotional, psychiatric issues during this stay due to the death of her parents and prolonged hospitalization. Discharge planning has been complicated by need for guardianship and payor source for placement.   -Patient's brother, Maxi, was granted 60 days of emergency guardianship on 2/15. Completed psychologic testing on 3/2/22 to assess current cognitive function and adaptive abilities. Permanent guardianship on 4/1/2022.  - Social work and clinical coordinator assisting with disposition  - We are working on getting her into our group home and family has been in agreement, not able to find any place for her at this time.   - No RN concerns. Taking po. Sleeping well. Busy with activities during the day, up walking halls.   - Declined labs.   - No complaints     Severe malnutrition  Anorexia  Chronic abdominal pain  Chronic constipation  BMI ~13 on early this hospitalization with poor PO intake. SLP was consulted for possible dysphagia, and no evidence of dysphagia was noted.   -On 2/20, discussed with pt's brother; overall, felt that oral intake was probably acceptable at this point for discharge and did not feel we needed to pursue enteral feedings/g-tube at this point; desired workup for etiology of abdominal pain which he felt was contributing to  "her decreased PO intake.  - CT abd/pelvis 2/21 showed large amount of stool. EGD 2/22 relatively unremarkable. US abd no acute pathology. BMI up to 14.7 2/27.  - Encourage PO intake  - PPI  - Bowel regimen ,  Miralax to BID, continue Senna bid, use suppository as needed.   -having regular BMs. No GI sxs apparent  Pt declined labs. Pt hesitant and typically declines     Thyroid nodule  TSH has been elevated at 9.95 on 2/21/22 but normal T4 in the past   - Repeat TSH ordered but patient has been refusing blood draws.  This lab is not urgent and can be done as an outpatient     COVID-19 recovered  Initially diagnosed 1/19/2022     E coli UTI, resolved     5/29- stable, continue present care.        Diet: Combination Diet Regular Diet; High Kcal/High Protein Diet, ADULT    DVT Prophylaxis: Low Risk/Ambulatory with no VTE prophylaxis indicated  Zhu Catheter: Not present  Central Lines: None  Cardiac Monitoring: None  Code Status: Full Code       Disposition Plan     Expected Discharge: TBD  Anticipated discharge location: group home    Delays:     Placement - Group Homes          Yg Blood MD, MD  Text Page  (7am to 6pm)  Interval History      No acute events overnight reported by RN  No new issues.     -Data reviewed today: I reviewed all new labs and imaging results over the last 24 hours. I personally reviewed last set of labs from March    Physical Exam   Temp: 97.4  F (36.3  C) Temp src: Oral BP: 97/63 Pulse: 60   Resp: 20 SpO2: 97 % O2 Device: None (Room air)    Vitals:    02/14/22 1329 04/14/22 1552 05/27/22 1900   Weight: 37.7 kg (83 lb 3.2 oz) 40.3 kg (88 lb 14.4 oz) 44 kg (97 lb)     BP 97/63 (BP Location: Left arm)   Pulse 60   Temp 97.4  F (36.3  C) (Oral)   Resp 20   Ht 1.6 m (5' 3\")   Wt 44 kg (97 lb)   SpO2 97%   BMI 17.18 kg/m        Temp:  [97.4  F (36.3  C)] 97.4  F (36.3  C)  Pulse:  [60] 60  Resp:  [18-20] 20  BP: (97)/(63) 97/63  SpO2:  [97 %] 97 %  I/O last 3 completed shifts:  In: 400 " [P.O.:400]  Out: -       Medications       melatonin  1 mg Oral At Bedtime     multivitamin w/minerals  1 tablet Oral Daily     pantoprazole  40 mg Oral QAM AC     polyethylene glycol  17 g Oral BID     senna-docusate  1 tablet Oral BID     cholecalciferol  50 mcg Oral Daily       Data   No lab results found in last 7 days.    Imaging:   No results found for this or any previous visit (from the past 24 hour(s)).

## 2022-06-10 NOTE — PLAN OF CARE
Goal Outcome Evaluation:      DATE & TIME: 6/9/22-6/10/22 3403-5648  Cognitive Concerns/ Orientation : Patient alert and oriented x 4   BEHAVIOR & AGGRESSION TOOL COLOR: Green   ABNL VS/O2: None  MOBILITY: Independent   PAIN MANAGMENT: Denies  DIET: High calorie, high protein  BOWEL/BLADDER: Continent, uses BR as needed.  DRAIN/DEVICES: No IV access  SKIN: Bilateral lower extremity edema +1, Refused full body assessment  D/C DATE: Discharge pending group home, social work following. Covid test Negative

## 2022-06-11 PROCEDURE — 250N000013 HC RX MED GY IP 250 OP 250 PS 637: Performed by: HOSPITALIST

## 2022-06-11 PROCEDURE — 250N000013 HC RX MED GY IP 250 OP 250 PS 637: Performed by: INTERNAL MEDICINE

## 2022-06-11 PROCEDURE — 120N000001 HC R&B MED SURG/OB

## 2022-06-11 RX ADMIN — POLYETHYLENE GLYCOL 3350 17 G: 17 POWDER, FOR SOLUTION ORAL at 08:23

## 2022-06-11 RX ADMIN — MULTIPLE VITAMINS W/ MINERALS TAB 1 TABLET: TAB at 22:50

## 2022-06-11 RX ADMIN — Medication 1 MG: at 22:49

## 2022-06-11 RX ADMIN — SENNOSIDES AND DOCUSATE SODIUM 1 TABLET: 8.6; 5 TABLET ORAL at 22:49

## 2022-06-11 RX ADMIN — PANTOPRAZOLE SODIUM 40 MG: 40 TABLET, DELAYED RELEASE ORAL at 08:23

## 2022-06-11 RX ADMIN — Medication 50 MCG: at 08:23

## 2022-06-11 RX ADMIN — SENNOSIDES AND DOCUSATE SODIUM 1 TABLET: 8.6; 5 TABLET ORAL at 08:23

## 2022-06-11 ASSESSMENT — ACTIVITIES OF DAILY LIVING (ADL)
ADLS_ACUITY_SCORE: 33
ADLS_ACUITY_SCORE: 34
ADLS_ACUITY_SCORE: 33

## 2022-06-11 NOTE — PLAN OF CARE
Goal Outcome Evaluation:   Took over care at 1100. MATEUS. Pt ambulating halls. No new changes. SW following, waiting for placement.

## 2022-06-11 NOTE — PLAN OF CARE
Goal Outcome Evaluation:                    DATE & TIME: 6/10/22-6/11/22 3868-7281  Cognitive Concerns/ Orientation: A&Ox4  BEHAVIOR & AGGRESSION TOOL COLOR: Green   ABNL VS/O2: None  MOBILITY: Independent   PAIN MANAGMENT: Denies  DIET: High calorie, high protein  BOWEL/BLADDER: Continent, uses BR as needed.  DRAIN/DEVICES: No IV access  SKIN: Bilateral lower extremity edema +1, Refused full body assessment  D/C DATE: Discharge pending group home, social work following.

## 2022-06-11 NOTE — PLAN OF CARE
DATE & TIME: 6/11/22 6857-5334  Cognitive Concerns/ Orientation: A&Ox4, tearful and withdrawn today.  BEHAVIOR & AGGRESSION TOOL COLOR: Green             ABNL VS/O2: None  MOBILITY: Independent-ambulated  PAIN MANAGMENT: LT/RT leg/knee tenderness reported, Aspercreme applied PRN  DIET: High calorie, high protein- refused breakfast  BOWEL/BLADDER: Continent, uses BR as needed.  DRAIN/DEVICES: No IV access  SKIN: Bilateral lower extremity edema +1  D/C DATE: Discharge pending group home funding, social work following.

## 2022-06-11 NOTE — PROGRESS NOTES
[facility]    Hospitalist Progress Note    Date of Admission:  1/22/2022  Date of Service: 06/11/2022    Assessment & Plan      Miranda Dover is a 51 year old female with PMH Down Syndrome who was admitted on 1/22/2022 with COVID-19 pneumonia and acute hypoxic respiratory failure.      Hospitalization has been prolonged due to need to establish guardianship following deaths of her parents. Awaiting placement. Patient is in hospital for longtime given problem with placement at this time, She remain medically stable at this time without change in her clinical status.      Down syndrome  Developmental Delay  Failure to thrive  Had significant emotional, psychiatric issues during this stay due to the death of her parents and prolonged hospitalization. Discharge planning has been complicated by need for guardianship and payor source for placement.   -Patient's brother, Maxi, was granted 60 days of emergency guardianship on 2/15. Completed psychologic testing on 3/2/22 to assess current cognitive function and adaptive abilities. Permanent guardianship on 4/1/2022.  - Social work and clinical coordinator assisting with disposition  - We are working on getting her into our group home and family has been in agreement, not able to find any place for her at this time.   - No RN concerns. Taking po. Sleeping well. Busy with activities during the day, up walking halls.   - Declined labs.   - No complaints     Severe malnutrition  Anorexia  Chronic abdominal pain  Chronic constipation  BMI ~13 on early this hospitalization with poor PO intake. SLP was consulted for possible dysphagia, and no evidence of dysphagia was noted.   -On 2/20, discussed with pt's brother; overall, felt that oral intake was probably acceptable at this point for discharge and did not feel we needed to pursue enteral feedings/g-tube at this point; desired workup for etiology of abdominal pain which he felt was contributing to  "her decreased PO intake.  - CT abd/pelvis 2/21 showed large amount of stool. EGD 2/22 relatively unremarkable. US abd no acute pathology. BMI up to 14.7 2/27.  - Encourage PO intake  - PPI  - Bowel regimen ,  Miralax to BID, continue Senna bid, use suppository as needed.   -having regular BMs. No GI sxs apparent  Pt declined labs. Pt hesitant and typically declines     Thyroid nodule  TSH has been elevated at 9.95 on 2/21/22 but normal T4 in the past   - Repeat TSH ordered but patient has been refusing blood draws.  This lab is not urgent and can be done as an outpatient     COVID-19 recovered  Initially diagnosed 1/19/2022     E coli UTI, resolved     5/29- stable, continue present care.        Diet: Combination Diet Regular Diet; High Kcal/High Protein Diet, ADULT    DVT Prophylaxis: Low Risk/Ambulatory with no VTE prophylaxis indicated  Zhu Catheter: Not present  Central Lines: None  Cardiac Monitoring: None  Code Status: Full Code       Disposition Plan     Expected Discharge: TBD  Anticipated discharge location: group home    Delays:     Placement - Group Homes          Yg Blood MD, MD  Text Page  (7am to 6pm)  Interval History      No acute events overnight reported by RN  No new issues.     -Data reviewed today: I reviewed all new labs and imaging results over the last 24 hours. I personally reviewed last set of labs from March    Physical Exam   Temp: 98.5  F (36.9  C) Temp src: Oral BP: 98/60 Pulse: 85     SpO2: 95 % O2 Device: None (Room air)    Vitals:    02/14/22 1329 04/14/22 1552 05/27/22 1900   Weight: 37.7 kg (83 lb 3.2 oz) 40.3 kg (88 lb 14.4 oz) 44 kg (97 lb)     BP 98/60 (BP Location: Left arm, Patient Position: Sitting)   Pulse 85   Temp 98.5  F (36.9  C) (Oral)   Resp 20   Ht 1.6 m (5' 3\")   Wt 44 kg (97 lb)   SpO2 95%   BMI 17.18 kg/m        Temp:  [98.5  F (36.9  C)] 98.5  F (36.9  C)  Pulse:  [85] 85  BP: (98)/(60) 98/60  SpO2:  [95 %] 95 %  I/O last 3 completed shifts:  In: 240 " [P.O.:240]  Out: -       Medications       melatonin  1 mg Oral At Bedtime     multivitamin w/minerals  1 tablet Oral Daily     pantoprazole  40 mg Oral QAM AC     polyethylene glycol  17 g Oral BID     senna-docusate  1 tablet Oral BID     cholecalciferol  50 mcg Oral Daily       Data   No lab results found in last 7 days.    Imaging:   No results found for this or any previous visit (from the past 24 hour(s)).

## 2022-06-12 PROCEDURE — 250N000013 HC RX MED GY IP 250 OP 250 PS 637: Performed by: HOSPITALIST

## 2022-06-12 PROCEDURE — 120N000001 HC R&B MED SURG/OB

## 2022-06-12 PROCEDURE — 250N000013 HC RX MED GY IP 250 OP 250 PS 637: Performed by: INTERNAL MEDICINE

## 2022-06-12 RX ADMIN — PANTOPRAZOLE SODIUM 40 MG: 40 TABLET, DELAYED RELEASE ORAL at 10:49

## 2022-06-12 RX ADMIN — Medication 50 MCG: at 10:49

## 2022-06-12 RX ADMIN — MULTIPLE VITAMINS W/ MINERALS TAB 1 TABLET: TAB at 21:55

## 2022-06-12 RX ADMIN — POLYETHYLENE GLYCOL 3350 17 G: 17 POWDER, FOR SOLUTION ORAL at 10:49

## 2022-06-12 RX ADMIN — SENNOSIDES AND DOCUSATE SODIUM 1 TABLET: 8.6; 5 TABLET ORAL at 10:49

## 2022-06-12 RX ADMIN — SENNOSIDES AND DOCUSATE SODIUM 1 TABLET: 8.6; 5 TABLET ORAL at 21:55

## 2022-06-12 RX ADMIN — Medication 1 MG: at 21:55

## 2022-06-12 ASSESSMENT — ACTIVITIES OF DAILY LIVING (ADL)
ADLS_ACUITY_SCORE: 31

## 2022-06-12 NOTE — PLAN OF CARE
[id number] shift update:   Alert and oriented. Daily VS. Denies chest pain and SOB. Tolerating reg diet. Up independently. Ambulated in halls. Awaiting placement.

## 2022-06-12 NOTE — PLAN OF CARE
Goal Outcome Evaluation:      DATE & TIME: -6/11/22 3810-8839  Cognitive Concerns/ Orientation: A&Ox4  BEHAVIOR & AGGRESSION TOOL COLOR: Green   ABNL VS/O2: None  MOBILITY: Independent in room and hallway.  PAIN MANAGMENT: Denies  DIET: High calorie, high protein,eats dinner late.  BOWEL/BLADDER: Continent, uses BR as needed.efused Miralax at HS stating she had a BM today.  DRAIN/DEVICES: No IV access  SKIN: Bilateral lower extremity edema +1, Refused assessment  D/C DATE: Discharge pending group home, funding approval ,social work following.

## 2022-06-12 NOTE — PLAN OF CARE
Goal Outcome Evaluation:      DATE & TIME: -6/11/22 5008-2455  Cognitive Concerns/ Orientation: A&Ox4  BEHAVIOR & AGGRESSION TOOL COLOR: Green, calm/cooperative   ABNL VS/O2: none   MOBILITY: ambulates independently  PAIN MANAGMENT: Denies  DIET: High calorie/protein, good po   BOWEL/BLADDER: Continent, up to BR  DRAIN/DEVICES: none  SKIN: Bilateral lower extremity edema +1, Refused full skin assessment  D/C DATE: Discharge pending group home, funding approval, SW following

## 2022-06-12 NOTE — PROGRESS NOTES
[facility]    Hospitalist Progress Note    Date of Admission:  1/22/2022  Date of Service: 06/12/2022    Assessment & Plan      Miranda Dover is a 51 year old female with PMH Down Syndrome who was admitted on 1/22/2022 with COVID-19 pneumonia and acute hypoxic respiratory failure.      Hospitalization has been prolonged due to need to establish guardianship following deaths of her parents. Awaiting placement. Patient is in hospital for longtime given problem with placement at this time, She remain medically stable at this time without change in her clinical status.      Down syndrome  Developmental Delay  Failure to thrive  Had significant emotional, psychiatric issues during this stay due to the death of her parents and prolonged hospitalization. Discharge planning has been complicated by need for guardianship and payor source for placement.   -Patient's brother, Maxi, was granted 60 days of emergency guardianship on 2/15. Completed psychologic testing on 3/2/22 to assess current cognitive function and adaptive abilities. Permanent guardianship on 4/1/2022.  - Social work and clinical coordinator assisting with disposition  - We are working on getting her into our group home and family has been in agreement, not able to find any place for her at this time.   - No RN concerns. Taking po. Sleeping well. Busy with activities during the day, up walking halls.   - Declined labs.   - No complaints     Severe malnutrition  Anorexia  Chronic abdominal pain  Chronic constipation  BMI ~13 on early this hospitalization with poor PO intake. SLP was consulted for possible dysphagia, and no evidence of dysphagia was noted.   -On 2/20, discussed with pt's brother; overall, felt that oral intake was probably acceptable at this point for discharge and did not feel we needed to pursue enteral feedings/g-tube at this point; desired workup for etiology of abdominal pain which he felt was contributing to  "her decreased PO intake.  - CT abd/pelvis 2/21 showed large amount of stool. EGD 2/22 relatively unremarkable. US abd no acute pathology. BMI up to 14.7 2/27.  - Encourage PO intake  - PPI  - Bowel regimen ,  Miralax to BID, continue Senna bid, use suppository as needed.   -having regular BMs. No GI sxs apparent  Pt declined labs. Pt hesitant and typically declines     Thyroid nodule  TSH has been elevated at 9.95 on 2/21/22 but normal T4 in the past   - Repeat TSH ordered but patient has been refusing blood draws.  This lab is not urgent and can be done as an outpatient     COVID-19 recovered  Initially diagnosed 1/19/2022     E coli UTI, resolved     5/29- stable, continue present care.        Diet: Combination Diet Regular Diet; High Kcal/High Protein Diet, ADULT    DVT Prophylaxis: Low Risk/Ambulatory with no VTE prophylaxis indicated  Zhu Catheter: Not present  Central Lines: None  Cardiac Monitoring: None  Code Status: Full Code       Disposition Plan     Expected Discharge: TBD  Anticipated discharge location: group home    Delays:     Placement - Group Homes          Yg Blood MD, MD  Text Page  (7am to 6pm)  Interval History      No acute events overnight reported by RN  No new issues.     -Data reviewed today: I reviewed all new labs and imaging results over the last 24 hours. I personally reviewed last set of labs from March    Physical Exam   Temp: 97.8  F (36.6  C) Temp src: Oral BP: 105/48 Pulse: 61   Resp: 18 SpO2: 98 % O2 Device: None (Room air)    Vitals:    02/14/22 1329 04/14/22 1552 05/27/22 1900   Weight: 37.7 kg (83 lb 3.2 oz) 40.3 kg (88 lb 14.4 oz) 44 kg (97 lb)     /48 (BP Location: Left arm, Patient Position: Sitting)   Pulse 61   Temp 97.8  F (36.6  C) (Oral)   Resp 18   Ht 1.6 m (5' 3\")   Wt 44 kg (97 lb)   SpO2 98%   BMI 17.18 kg/m        Temp:  [97.8  F (36.6  C)] 97.8  F (36.6  C)  Pulse:  [61] 61  Resp:  [18] 18  BP: (105)/(48) 105/48  SpO2:  [98 %] 98 %  No " intake/output data recorded.      Medications       melatonin  1 mg Oral At Bedtime     multivitamin w/minerals  1 tablet Oral Daily     pantoprazole  40 mg Oral QAM AC     polyethylene glycol  17 g Oral BID     senna-docusate  1 tablet Oral BID     cholecalciferol  50 mcg Oral Daily       Data   No lab results found in last 7 days.    Imaging:   No results found for this or any previous visit (from the past 24 hour(s)).

## 2022-06-12 NOTE — PROGRESS NOTES
DATE & TIME: 6/12/22  8849-4325  Cognitive Concerns/ Orientation: A&Ox4  BEHAVIOR & AGGRESSION TOOL COLOR: Green, calm/cooperative       ABNL VS/O2: none   MOBILITY: ambulates independently  PAIN MANAGMENT: states that she has slight aches in her knees. Did not want anything at this time.  DIET: High calorie/protein, did not want breakfast - had only plain/dry turkey sandwich for lunch. Needs some help with eating.   BOWEL/BLADDER: Continent, up to BR. Had medium sized BM this afternoon.   DRAIN/DEVICES: none  SKIN: Bilateral lower extremity edema +1, Refused full skin assessment  D/C DATE: Discharge pending group home, funding approval, SW following

## 2022-06-13 PROCEDURE — 250N000013 HC RX MED GY IP 250 OP 250 PS 637: Performed by: INTERNAL MEDICINE

## 2022-06-13 PROCEDURE — 250N000013 HC RX MED GY IP 250 OP 250 PS 637: Performed by: HOSPITALIST

## 2022-06-13 PROCEDURE — 120N000001 HC R&B MED SURG/OB

## 2022-06-13 PROCEDURE — 99231 SBSQ HOSP IP/OBS SF/LOW 25: CPT | Performed by: STUDENT IN AN ORGANIZED HEALTH CARE EDUCATION/TRAINING PROGRAM

## 2022-06-13 RX ADMIN — MULTIPLE VITAMINS W/ MINERALS TAB 1 TABLET: TAB at 21:29

## 2022-06-13 RX ADMIN — POLYETHYLENE GLYCOL 3350 17 G: 17 POWDER, FOR SOLUTION ORAL at 21:29

## 2022-06-13 RX ADMIN — POLYETHYLENE GLYCOL 3350 17 G: 17 POWDER, FOR SOLUTION ORAL at 10:38

## 2022-06-13 RX ADMIN — Medication 1 MG: at 21:29

## 2022-06-13 RX ADMIN — SENNOSIDES AND DOCUSATE SODIUM 1 TABLET: 8.6; 5 TABLET ORAL at 10:38

## 2022-06-13 RX ADMIN — CALCIUM CARBONATE (ANTACID) CHEW TAB 500 MG 500 MG: 500 CHEW TAB at 01:19

## 2022-06-13 RX ADMIN — Medication 50 MCG: at 10:38

## 2022-06-13 RX ADMIN — SENNOSIDES AND DOCUSATE SODIUM 1 TABLET: 8.6; 5 TABLET ORAL at 21:29

## 2022-06-13 RX ADMIN — PANTOPRAZOLE SODIUM 40 MG: 40 TABLET, DELAYED RELEASE ORAL at 10:38

## 2022-06-13 ASSESSMENT — ACTIVITIES OF DAILY LIVING (ADL)
ADLS_ACUITY_SCORE: 31
ADLS_ACUITY_SCORE: 30
ADLS_ACUITY_SCORE: 31

## 2022-06-13 NOTE — PROGRESS NOTES
[facility]    Hospitalist Progress Note    Date of Admission:  1/22/2022  Date of Service: 06/13/2022    Assessment & Plan      Miranda Dover is a 51 year old female with PMH Down Syndrome who was admitted on 1/22/2022 with COVID-19 pneumonia and acute hypoxic respiratory failure.      Hospitalization has been prolonged due to need to establish guardianship following deaths of her parents. Awaiting placement. Patient is in hospital for longtime given problem with placement at this time, She remain medically stable at this time without change in her clinical status.      Down syndrome  Developmental Delay  Failure to thrive  Had significant emotional, psychiatric issues during this stay due to the death of her parents and prolonged hospitalization. Discharge planning has been complicated by need for guardianship and payor source for placement.   -Patient's brother, Maxi, was granted 60 days of emergency guardianship on 2/15. Completed psychologic testing on 3/2/22 to assess current cognitive function and adaptive abilities. Permanent guardianship on 4/1/2022.  - Social work and clinical coordinator assisting with disposition  - We are working on getting her into our group home and family has been in agreement, not able to find any place for her at this time.   - No RN concerns. Taking po. Sleeping well. Busy with activities during the day, up walking halls.   - Declined labs.   - No complaints     Severe malnutrition  Anorexia  Chronic abdominal pain  Chronic constipation  BMI ~13 on early this hospitalization with poor PO intake. SLP was consulted for possible dysphagia, and no evidence of dysphagia was noted.   -On 2/20, discussed with pt's brother; overall, felt that oral intake was probably acceptable at this point for discharge and did not feel we needed to pursue enteral feedings/g-tube at this point; desired workup for etiology of abdominal pain which he felt was contributing to  "her decreased PO intake.  - CT abd/pelvis 2/21 showed large amount of stool. EGD 2/22 relatively unremarkable. US abd no acute pathology. BMI up to 14.7 2/27.  - Encourage PO intake  - PPI  - Bowel regimen ,  Miralax to BID, continue Senna bid, use suppository as needed.   -having regular BMs. No GI sxs apparent  Pt declined labs. Pt hesitant and typically declines     Thyroid nodule  TSH has been elevated at 9.95 on 2/21/22 but normal T4 in the past   - Repeat TSH ordered but patient has been refusing blood draws.  This lab is not urgent and can be done as an outpatient     COVID-19 recovered  Initially diagnosed 1/19/2022     E coli UTI, resolved     5/29- stable, continue present care.        Diet: Combination Diet Regular Diet; High Kcal/High Protein Diet, ADULT    DVT Prophylaxis: Low Risk/Ambulatory with no VTE prophylaxis indicated  Zhu Catheter: Not present  Central Lines: None  Cardiac Monitoring: None  Code Status: Full Code       Disposition Plan     Expected Discharge: TBD  Anticipated discharge location: group home    Delays:     Placement - Group Homes          Yg Blood MD, MD  Text Page  (7am to 6pm)  Interval History      No acute events overnight reported by RN  No new issues.     -Data reviewed today: I reviewed all new labs and imaging results over the last 24 hours. I personally reviewed last set of labs from March    Physical Exam   Temp: 97.9  F (36.6  C) Temp src: Oral BP: 91/60 Pulse: 63   Resp: 16 SpO2: 97 % O2 Device: None (Room air)    Vitals:    02/14/22 1329 04/14/22 1552 05/27/22 1900   Weight: 37.7 kg (83 lb 3.2 oz) 40.3 kg (88 lb 14.4 oz) 44 kg (97 lb)     BP 91/60 (BP Location: Left arm)   Pulse 63   Temp 97.9  F (36.6  C) (Oral)   Resp 16   Ht 1.6 m (5' 3\")   Wt 44 kg (97 lb)   SpO2 97%   BMI 17.18 kg/m      Constitutional: no apparent distress  Respiratory: CTABL   Cardiovascular: RRR with no m/r/g   GI: Normal bowel sounds, soft, non-distended, non-tender. "   Neurologic: Marley  Neuropsychiatric: normal mood and affect      Temp:  [97.9  F (36.6  C)] 97.9  F (36.6  C)  Pulse:  [63] 63  Resp:  [16] 16  BP: (91)/(60) 91/60  SpO2:  [97 %] 97 %  I/O last 3 completed shifts:  In: 240 [P.O.:240]  Out: -       Medications       melatonin  1 mg Oral At Bedtime     multivitamin w/minerals  1 tablet Oral Daily     pantoprazole  40 mg Oral QAM AC     polyethylene glycol  17 g Oral BID     senna-docusate  1 tablet Oral BID     cholecalciferol  50 mcg Oral Daily       Data   No lab results found in last 7 days.    Imaging:   No results found for this or any previous visit (from the past 24 hour(s)).

## 2022-06-13 NOTE — PLAN OF CARE
Goal Outcome Evaluation:  ATE & TIME: -6/13/22 0855-6556  Cognitive Concerns/ Orientation: A&Ox4  BEHAVIOR & AGGRESSION TOOL COLOR: Green, calm/cooperative   ABNL VS/O2: none this shift  MOBILITY: ambulates independently  PAIN MANAGMENT: Denies  DIET: High calorie/protein  BOWEL/BLADDER: Continent, up to BR, had BM today  DRAIN/DEVICES: none  SKIN: Bilateral lower extremity edema +1  D/C DATE: pending group home/funding, SW following.

## 2022-06-13 NOTE — PLAN OF CARE
Goal Outcome Evaluation:      Cognitive Concerns/ Orientation: A&Ox4  BEHAVIOR & AGGRESSION TOOL COLOR: Green, calm/cooperative   ABNL VS/O2: none this shift  MOBILITY: ambulates independently  PAIN MANAGMENT: Denies  DIET: High calorie/protein, good po   BOWEL/BLADDER: Continent, up to BR  DRAIN/DEVICES: none  SKIN: Bilateral lower extremity edema +1  D/C DATE: pending group home/funding  Pt slept well, c/o heartburn at hs-Tums given with good relief, refused full skin assessment, SW following.

## 2022-06-14 PROCEDURE — 120N000001 HC R&B MED SURG/OB

## 2022-06-14 PROCEDURE — 250N000013 HC RX MED GY IP 250 OP 250 PS 637: Performed by: INTERNAL MEDICINE

## 2022-06-14 PROCEDURE — 99231 SBSQ HOSP IP/OBS SF/LOW 25: CPT | Performed by: HOSPITALIST

## 2022-06-14 PROCEDURE — 250N000013 HC RX MED GY IP 250 OP 250 PS 637: Performed by: HOSPITALIST

## 2022-06-14 RX ADMIN — MULTIPLE VITAMINS W/ MINERALS TAB 1 TABLET: TAB at 21:30

## 2022-06-14 RX ADMIN — SENNOSIDES AND DOCUSATE SODIUM 1 TABLET: 8.6; 5 TABLET ORAL at 13:08

## 2022-06-14 RX ADMIN — Medication 50 MCG: at 13:08

## 2022-06-14 RX ADMIN — CALCIUM CARBONATE (ANTACID) CHEW TAB 500 MG 500 MG: 500 CHEW TAB at 02:01

## 2022-06-14 RX ADMIN — SENNOSIDES AND DOCUSATE SODIUM 1 TABLET: 8.6; 5 TABLET ORAL at 21:30

## 2022-06-14 RX ADMIN — Medication 1 MG: at 21:30

## 2022-06-14 RX ADMIN — PANTOPRAZOLE SODIUM 40 MG: 40 TABLET, DELAYED RELEASE ORAL at 12:21

## 2022-06-14 RX ADMIN — POLYETHYLENE GLYCOL 3350 17 G: 17 POWDER, FOR SOLUTION ORAL at 21:30

## 2022-06-14 ASSESSMENT — ACTIVITIES OF DAILY LIVING (ADL)
ADLS_ACUITY_SCORE: 32
ADLS_ACUITY_SCORE: 30
ADLS_ACUITY_SCORE: 32

## 2022-06-14 NOTE — PLAN OF CARE
DATE & TIME: 6/14/22 0700-1930  Cognitive Concerns/ Orientation: A&Ox4  BEHAVIOR & AGGRESSION TOOL COLOR: Green   ABNL VS/O2: VSS on RA  MOBILITY: ambulates independently- will occasionally ask to walk with someone   PAIN MANAGMENT: Denies pain   DIET: High calorie/protein, tolerating   BOWEL/BLADDER: Continent, up to bathroom, 2 small BMs today.    DRAIN/DEVICES: none  SKIN: Bilateral lower extremity edema +1  D/C DATE: pending group home/funding, SW following.

## 2022-06-14 NOTE — PLAN OF CARE
Goal Outcome Evaluation:    Plan of Care Reviewed With: patient     Overall Patient Progress: no change     DATE & TIME: 6/14/22 1930-0730  Cognitive Concerns/ Orientation: A&Ox4  BEHAVIOR & AGGRESSION TOOL COLOR: Green, calm/cooperative  ABNL VS/O2: daily vitals- stable   MOBILITY: ambulates independently- will occasionally ask to walk with someone   PAIN MANAGMENT: Denies  DIET: High calorie/protein  BOWEL/BLADDER: Continent, up to BR- small BM   DRAIN/DEVICES: none  SKIN: Bilateral lower extremity edema +1  D/C DATE: pending group home/funding, SW following. Tums given x1- good relief.

## 2022-06-14 NOTE — PROGRESS NOTES
[facility]    Medicine Progress Note - Hospitalist Service    Date of Admission:  1/22/2022    Assessment & Plan        Miranda Dover is a 51 year old female with PMH Down Syndrome who was admitted on 1/22/2022 with COVID-19 pneumonia and acute hypoxic respiratory failure.      Hospitalization has been prolonged due to need to establish guardianship following deaths of her parents. Awaiting placement. Patient is in hospital for longtime given problem with placement at this time, She remain medically stable at this time without change in her clinical status.      Down syndrome  Developmental Delay  Failure to thrive  Had significant emotional, psychiatric issues during this stay due to the death of her parents and prolonged hospitalization. Discharge planning has been complicated by need for guardianship and payor source for placement.   -Patient's brother, Maxi, was granted 60 days of emergency guardianship on 2/15. Completed psychologic testing on 3/2/22 to assess current cognitive function and adaptive abilities. Permanent guardianship on 4/1/2022.  - Social work and clinical coordinator assisting with disposition.  - We are working on getting her into our group home and family has been in agreement, not able to find any place for her at this time.   - No RN concerns. Taking po. Sleeping well. Busy with activities during the day, up walking halls.   - Declines labs.     Severe malnutrition  Anorexia  Chronic abdominal pain  Chronic constipation  BMI ~13 on early this hospitalization with poor PO intake. SLP was consulted for possible dysphagia, and no evidence of dysphagia was noted.   -On 2/20, discussed with pt's brother; overall, felt that oral intake was probably acceptable at this point for discharge and did not feel we needed to pursue enteral feedings/g-tube at this point; desired workup for etiology of abdominal pain which he felt was contributing to her decreased PO  intake.  - CT abd/pelvis 2/21 showed large amount of stool. EGD 2/22 relatively unremarkable. US abd no acute pathology. BMI up to 14.7 2/27.  - Encourage PO intake.  - PPI.  - Bowel regimen ,  Miralax to BID, continue Senna bid, use suppository as needed.   - Having regular BMs. No GI sxs apparent.  - Patient declined labs. Pt hesitant and typically declines.     Thyroid nodule  TSH has been elevated at 9.95 on 2/21/22 but normal T4 in the past   - Repeat TSH ordered but patient has been refusing blood draws.  This lab is not urgent and can be done as an outpatient.     COVID-19 recovered  - Initially diagnosed 1/19/2022.     E coli UTI, resolved  - Completed course of antibiotics on 2/15/22.       Diet: Combination Diet Regular Diet; High Kcal/High Protein Diet, ADULT    DVT Prophylaxis: Low Risk/Ambulatory with no VTE prophylaxis indicated  Zhu Catheter: Not present  Central Lines: None  Cardiac Monitoring: None  Code Status: Full Code      Disposition Plan   Expected Discharge: 06/15/2022     Anticipated discharge location: group home    Delays:     Placement - Group Homes            The patient's care was discussed with the Bedside Nurse and Patient.    Kendrick Martinez MD  Hospitalist Service  [facility]  Securely message with the [employer] Web Console (learn more here)  Text page via [employer] Paging/Directory         Clinically Significant Risk Factors Present on Admission                    ______________________________________________________________________    Interval History   Miranda Dover was seen today. She feels OK. Wanted a different kind of milk with her lunch, otherwise no complaints/concerns. Denies fevers, chest pain, shortness of breath, nausea.    Data reviewed today: I reviewed all medications, new labs and imaging results over the last 24 hours. I personally reviewed no images or EKG's today.    Physical Exam   Vital Signs: Temp: 97.7  F (36.5  C) Temp src: Oral  BP: (!) 85/51 Pulse: 59   Resp: 18        Weight: 97 lbs 0 oz  Constitutional: awake, alert, cooperative, no apparent distress, sitting up in a chair    Data   Medications       melatonin  1 mg Oral At Bedtime     multivitamin w/minerals  1 tablet Oral Daily     pantoprazole  40 mg Oral QAM AC     polyethylene glycol  17 g Oral BID     senna-docusate  1 tablet Oral BID     cholecalciferol  50 mcg Oral Daily

## 2022-06-15 PROCEDURE — 120N000001 HC R&B MED SURG/OB

## 2022-06-15 PROCEDURE — 250N000013 HC RX MED GY IP 250 OP 250 PS 637: Performed by: INTERNAL MEDICINE

## 2022-06-15 PROCEDURE — 250N000013 HC RX MED GY IP 250 OP 250 PS 637: Performed by: HOSPITALIST

## 2022-06-15 PROCEDURE — 99231 SBSQ HOSP IP/OBS SF/LOW 25: CPT | Performed by: HOSPITALIST

## 2022-06-15 RX ADMIN — SENNOSIDES AND DOCUSATE SODIUM 1 TABLET: 8.6; 5 TABLET ORAL at 09:41

## 2022-06-15 RX ADMIN — POLYETHYLENE GLYCOL 3350 17 G: 17 POWDER, FOR SOLUTION ORAL at 22:06

## 2022-06-15 RX ADMIN — POLYETHYLENE GLYCOL 3350 17 G: 17 POWDER, FOR SOLUTION ORAL at 09:41

## 2022-06-15 RX ADMIN — PANTOPRAZOLE SODIUM 40 MG: 40 TABLET, DELAYED RELEASE ORAL at 09:41

## 2022-06-15 RX ADMIN — SENNOSIDES AND DOCUSATE SODIUM 1 TABLET: 8.6; 5 TABLET ORAL at 22:06

## 2022-06-15 RX ADMIN — Medication 50 MCG: at 09:41

## 2022-06-15 RX ADMIN — MULTIPLE VITAMINS W/ MINERALS TAB 1 TABLET: TAB at 22:06

## 2022-06-15 RX ADMIN — Medication 1 MG: at 22:06

## 2022-06-15 ASSESSMENT — ACTIVITIES OF DAILY LIVING (ADL)
ADLS_ACUITY_SCORE: 33
ADLS_ACUITY_SCORE: 32
ADLS_ACUITY_SCORE: 33
ADLS_ACUITY_SCORE: 33

## 2022-06-15 NOTE — PLAN OF CARE
Goal Outcome Evaluation:      DATE & TIME: 6/14/22-6/15/22 0559-2407  Cognitive Concerns/ Orientation: A&Ox4  BEHAVIOR & AGGRESSION TOOL COLOR: Green   ABNL VS/O2: VSS on RA  MOBILITY: ambulates independently- will occasionally ask to walk with someone   PAIN MANAGMENT: Denies pain   DIET: High calorie/protein, tolerating   BOWEL/BLADDER: Continent, up to bathroom, BM x1   DRAIN/DEVICES: none  SKIN: Bilateral lower extremity edema +1  D/C DATE: pending group home/funding, SW following.  OTHER IMPORTANT INFO:

## 2022-06-15 NOTE — PLAN OF CARE
DATE & TIME: 6/15/22 9054-4863  Cognitive Concerns/ Orientation: A&Ox4  BEHAVIOR & AGGRESSION TOOL COLOR: Green             ABNL VS/O2: VSS on RA  MOBILITY: ambulates independently- will occasionally ask to walk with someone   PAIN MANAGMENT: Denies pain  DIET: High calorie/protein, tolerating   BOWEL/BLADDER: Continent, up to bathroom, no BM   DRAIN/DEVICES: none  SKIN: Bilateral lower extremity edema +1  D/C DATE: pending group home/funding, SW following.  OTHER IMPORTANT INFO: self-reporting bilateral knee tenderness, radiating to abdomen.

## 2022-06-15 NOTE — PROGRESS NOTES
[facility]    Medicine Progress Note - Hospitalist Service    Date of Admission:  1/22/2022    Assessment & Plan        Miranda Dover is a 51 year old female with PMH Down Syndrome who was admitted on 1/22/2022 with COVID-19 pneumonia and acute hypoxic respiratory failure.      Hospitalization has been prolonged due to need to establish guardianship following deaths of her parents. Awaiting placement. Patient is in hospital for longtime given problem with placement at this time, She remain medically stable at this time without change in her clinical status.      Down syndrome  Developmental Delay  Failure to thrive  Had significant emotional, psychiatric issues during this stay due to the death of her parents and prolonged hospitalization. Discharge planning has been complicated by need for guardianship and payor source for placement.   -Patient's brother, Maxi, was granted 60 days of emergency guardianship on 2/15. Completed psychologic testing on 3/2/22 to assess current cognitive function and adaptive abilities. Permanent guardianship on 4/1/2022.  - Social work and clinical coordinator assisting with disposition.  - We are working on getting her into our group home and family has been in agreement, not able to find any place for her at this time.   - No RN concerns. Taking po. Sleeping well. Busy with activities during the day, up walking halls.   - Declines labs.     Severe malnutrition  Anorexia  Chronic abdominal pain  Chronic constipation  BMI ~13 on early this hospitalization with poor PO intake. SLP was consulted for possible dysphagia, and no evidence of dysphagia was noted.   -On 2/20, discussed with pt's brother; overall, felt that oral intake was probably acceptable at this point for discharge and did not feel we needed to pursue enteral feedings/g-tube at this point; desired workup for etiology of abdominal pain which he felt was contributing to her decreased PO  intake.  - CT abd/pelvis 2/21 showed large amount of stool. EGD 2/22 relatively unremarkable. US abd no acute pathology. BMI up to 14.7 2/27.  - Encourage PO intake.  - PPI.  - Bowel regimen ,  Miralax to BID, continue Senna bid, use suppository as needed.   - Having regular BMs. No GI sxs apparent.  - Patient declined labs. Pt hesitant and typically declines.     Thyroid nodule  TSH has been elevated at 9.95 on 2/21/22 but normal T4 in the past   - Repeat TSH ordered but patient has been refusing blood draws.  This lab is not urgent and can be done as an outpatient.     COVID-19 recovered  - Initially diagnosed 1/19/2022.     E coli UTI, resolved  - Completed course of antibiotics on 2/15/22.       Diet: Combination Diet Regular Diet; High Kcal/High Protein Diet, ADULT    DVT Prophylaxis: Low Risk/Ambulatory with no VTE prophylaxis indicated  Zhu Catheter: Not present  Central Lines: None  Cardiac Monitoring: None  Code Status: Full Code      Disposition Plan   Expected Discharge: 06/30/2022     Anticipated discharge location: group home    Delays:     Placement - Group Homes            The patient's care was discussed with the Bedside Nurse and Patient.    Kendrick Martinez MD  Hospitalist Service  [facility]  Securely message with the [employer] Web Console (learn more here)  Text page via [employer] Paging/Directory         Clinically Significant Risk Factors Present on Admission                    ______________________________________________________________________    Interval History   Miranda Dover was seen this morning. No new complaints/concerns. Denies pain, shortness of breath, nausea, abdominal pain.    Data reviewed today: I reviewed all medications, new labs and imaging results over the last 24 hours. I personally reviewed no images or EKG's today.    Physical Exam   Vital Signs: Temp: 97.6  F (36.4  C) Temp src: Oral BP: 103/69 Pulse: 60   Resp: 18        Weight: 97 lbs 0  oz  Constitutional: awake, alert, cooperative, no apparent distress, sitting up in the hospital bed  Respiratory: clear to auscultation bilaterally, no crackles or wheezing  Cardiovascular: regular rate and rhythm, normal S1 and S2, no murmur noted  GI: normal bowel sounds, soft, non-distended, non-tender  Skin: warm, dry  Musculoskeletal: no lower extremity pitting edema present  Neurologic: awake, alert, answered questions appropriately, moves all extremities    Data   Medications       melatonin  1 mg Oral At Bedtime     multivitamin w/minerals  1 tablet Oral Daily     pantoprazole  40 mg Oral QAM AC     polyethylene glycol  17 g Oral BID     senna-docusate  1 tablet Oral BID     cholecalciferol  50 mcg Oral Daily

## 2022-06-16 PROCEDURE — 99231 SBSQ HOSP IP/OBS SF/LOW 25: CPT | Performed by: HOSPITALIST

## 2022-06-16 PROCEDURE — 250N000013 HC RX MED GY IP 250 OP 250 PS 637: Performed by: INTERNAL MEDICINE

## 2022-06-16 PROCEDURE — 250N000013 HC RX MED GY IP 250 OP 250 PS 637: Performed by: HOSPITALIST

## 2022-06-16 PROCEDURE — 120N000001 HC R&B MED SURG/OB

## 2022-06-16 RX ADMIN — SENNOSIDES AND DOCUSATE SODIUM 1 TABLET: 8.6; 5 TABLET ORAL at 10:04

## 2022-06-16 RX ADMIN — Medication 50 MCG: at 10:04

## 2022-06-16 RX ADMIN — POLYETHYLENE GLYCOL 3350 17 G: 17 POWDER, FOR SOLUTION ORAL at 21:53

## 2022-06-16 RX ADMIN — MULTIPLE VITAMINS W/ MINERALS TAB 1 TABLET: TAB at 21:53

## 2022-06-16 RX ADMIN — POLYETHYLENE GLYCOL 3350 17 G: 17 POWDER, FOR SOLUTION ORAL at 10:04

## 2022-06-16 RX ADMIN — SENNOSIDES AND DOCUSATE SODIUM 1 TABLET: 8.6; 5 TABLET ORAL at 21:53

## 2022-06-16 RX ADMIN — CALCIUM CARBONATE (ANTACID) CHEW TAB 500 MG 500 MG: 500 CHEW TAB at 00:39

## 2022-06-16 RX ADMIN — PANTOPRAZOLE SODIUM 40 MG: 40 TABLET, DELAYED RELEASE ORAL at 10:04

## 2022-06-16 RX ADMIN — Medication 1 MG: at 21:53

## 2022-06-16 ASSESSMENT — ACTIVITIES OF DAILY LIVING (ADL)
ADLS_ACUITY_SCORE: 33

## 2022-06-16 NOTE — PROGRESS NOTES
Care Management Follow Up    Length of Stay (days): 145    Expected Discharge Date: 06/30/2022     Concerns to be Addressed: discharge planning     Patient plan of care discussed at interdisciplinary rounds: Yes    Anticipated Discharge Disposition: Group Home     Anticipated Discharge Services:    Anticipated Discharge DME:      Patient/family educated on Medicare website which has current facility and service quality ratings: no  Education Provided on the Discharge Plan:    Patient/Family in Agreement with the Plan: yes    Referrals Placed by CM/SW: Post Acute Facilities  Private pay costs discussed: Not applicable    Additional Information:  Placed a call to [facility]  asking for an update.      Connie Masterson, BARBIESW

## 2022-06-16 NOTE — PROGRESS NOTES
[facility]    Medicine Progress Note - Hospitalist Service    Date of Admission:  1/22/2022    Assessment & Plan        Miranda Dover is a 51 year old female with PMH Down Syndrome who was admitted on 1/22/2022 with COVID-19 pneumonia and acute hypoxic respiratory failure.      Hospitalization has been prolonged due to need to establish guardianship following deaths of her parents. Awaiting placement. Patient is in hospital for longtime given problem with placement at this time, She remain medically stable at this time without change in her clinical status.      Down syndrome  Developmental Delay  Failure to thrive  Had significant emotional, psychiatric issues during this stay due to the death of her parents and prolonged hospitalization. Discharge planning has been complicated by need for guardianship and payor source for placement.   -Patient's brother, Maxi, was granted 60 days of emergency guardianship on 2/15. Completed psychologic testing on 3/2/22 to assess current cognitive function and adaptive abilities. Permanent guardianship on 4/1/2022.  - Social work and clinical coordinator assisting with disposition.  - We are working on getting her into our group home and family has been in agreement, not able to find any place for her at this time.   - No RN concerns. Taking po. Sleeping well. Busy with activities during the day, up walking halls.   - Declines labs.     Severe malnutrition  Anorexia  Chronic abdominal pain  Chronic constipation  BMI ~13 on early this hospitalization with poor PO intake. SLP was consulted for possible dysphagia, and no evidence of dysphagia was noted.   -On 2/20, discussed with pt's brother; overall, felt that oral intake was probably acceptable at this point for discharge and did not feel we needed to pursue enteral feedings/g-tube at this point; desired workup for etiology of abdominal pain which he felt was contributing to her decreased PO  intake.  - CT abd/pelvis 2/21 showed large amount of stool. EGD 2/22 relatively unremarkable. US abd no acute pathology. BMI up to 14.7 2/27.  - Encourage PO intake.  - PPI.  - Bowel regimen ,  Miralax to BID, continue Senna bid, use suppository as needed.   - Having regular BMs. No GI sxs apparent.  - Patient declined labs. Pt hesitant and typically declines.     Thyroid nodule  TSH has been elevated at 9.95 on 2/21/22 but normal T4 in the past   - Repeat TSH ordered but patient has been refusing blood draws.  This lab is not urgent and can be done as an outpatient.     COVID-19 recovered  - Initially diagnosed 1/19/2022.     E coli UTI, resolved  - Completed course of antibiotics on 2/15/22.       Diet: Combination Diet Regular Diet; High Kcal/High Protein Diet, ADULT    DVT Prophylaxis: Low Risk/Ambulatory with no VTE prophylaxis indicated  Zhu Catheter: Not present  Central Lines: None  Cardiac Monitoring: None  Code Status: Full Code      Disposition Plan   Expected Discharge: 06/30/2022     Anticipated discharge location: group home    Delays:     Placement - Group Homes            The patient's care was discussed with the Bedside Nurse and Patient.    Kendrick Martinez MD  Hospitalist Service  [facility]  Securely message with the [employer] Web Console (learn more here)  Text page via [employer] Paging/Directory         Clinically Significant Risk Factors Present on Admission                    ______________________________________________________________________    Interval History   Miranda Dover was seen this morning. She was laying in the hospital bed. I asked how she was doing and she asked me to go away. Discussed with nursing, no new concerns.    Data reviewed today: I reviewed all medications, new labs and imaging results over the last 24 hours. I personally reviewed no images or EKG's today.    Physical Exam   Vital Signs:                    Weight: 97 lbs 0  oz  Constitutional: resting comfortably in the hospital bed  Patient declined any further exam.    Data   Medications       melatonin  1 mg Oral At Bedtime     multivitamin w/minerals  1 tablet Oral Daily     pantoprazole  40 mg Oral QAM AC     polyethylene glycol  17 g Oral BID     senna-docusate  1 tablet Oral BID     cholecalciferol  50 mcg Oral Daily

## 2022-06-16 NOTE — PLAN OF CARE
Goal Outcome Evaluation:                    DATE & TIME: 6/15/22-6/16/22 5840-4173  Cognitive Concerns/ Orientation: A&Ox4  BEHAVIOR & AGGRESSION TOOL COLOR: Green             ABNL VS/O2: VSS on RA  MOBILITY: ambulates independently- will occasionally ask to walk with someone   PAIN MANAGMENT: Denies pain  DIET: High calorie/protein, tolerating   BOWEL/BLADDER: Continent, up to bathroom, small BM this shift  DRAIN/DEVICES: none  SKIN: Bilateral lower extremity edema +1  D/C DATE: pending group home/funding, SW following.  OTHER IMPORTANT INFO: Tums given for heartburn with relief.

## 2022-06-16 NOTE — PLAN OF CARE
DATE & TIME: 6/16/2022 2487-2492  Cognitive Concerns/ Orientation: A&Ox4  BEHAVIOR & AGGRESSION TOOL COLOR: Green             ABNL VS/O2: VSS on RA  MOBILITY: ambulates independently- often asks staff to walk with her   PAIN MANAGMENT: Denies pain  DIET: High calorie/protein, tolerating with fair appetite. States stomach hurts sometimes and doesn't want to eat  BOWEL/BLADDER: Continent, up to bathroom, small BM this shift  DRAIN/DEVICES: none  SKIN: Bilateral lower extremity edema +1  D/C DATE: pending group home/funding, SW following.  OTHER IMPORTANT INFO: Patient is particular with cares and assistance, likes routines.

## 2022-06-17 PROCEDURE — 250N000013 HC RX MED GY IP 250 OP 250 PS 637: Performed by: INTERNAL MEDICINE

## 2022-06-17 PROCEDURE — 120N000001 HC R&B MED SURG/OB

## 2022-06-17 PROCEDURE — 250N000013 HC RX MED GY IP 250 OP 250 PS 637: Performed by: HOSPITALIST

## 2022-06-17 PROCEDURE — 99231 SBSQ HOSP IP/OBS SF/LOW 25: CPT | Performed by: HOSPITALIST

## 2022-06-17 RX ADMIN — SENNOSIDES AND DOCUSATE SODIUM 1 TABLET: 8.6; 5 TABLET ORAL at 11:05

## 2022-06-17 RX ADMIN — Medication 1 MG: at 22:51

## 2022-06-17 RX ADMIN — CALCIUM CARBONATE (ANTACID) CHEW TAB 500 MG 500 MG: 500 CHEW TAB at 00:05

## 2022-06-17 RX ADMIN — POLYETHYLENE GLYCOL 3350 17 G: 17 POWDER, FOR SOLUTION ORAL at 22:50

## 2022-06-17 RX ADMIN — PANTOPRAZOLE SODIUM 40 MG: 40 TABLET, DELAYED RELEASE ORAL at 11:04

## 2022-06-17 RX ADMIN — SENNOSIDES AND DOCUSATE SODIUM 1 TABLET: 8.6; 5 TABLET ORAL at 22:51

## 2022-06-17 RX ADMIN — POLYETHYLENE GLYCOL 3350 17 G: 17 POWDER, FOR SOLUTION ORAL at 11:05

## 2022-06-17 RX ADMIN — Medication 50 MCG: at 11:04

## 2022-06-17 RX ADMIN — MULTIPLE VITAMINS W/ MINERALS TAB 1 TABLET: TAB at 22:51

## 2022-06-17 ASSESSMENT — ACTIVITIES OF DAILY LIVING (ADL)
ADLS_ACUITY_SCORE: 33

## 2022-06-17 NOTE — PLAN OF CARE
Goal Outcome Evaluation:                    DATE & TIME: 6/16/22-6/17/22 9119-6273  Cognitive Concerns/ Orientation: A&Ox4  BEHAVIOR & AGGRESSION TOOL COLOR: Green             ABNL VS/O2: VSS on RA  MOBILITY: ambulates independently- often asks staff to walk with her   PAIN MANAGMENT: Denies pain  DIET: High calorie/protein, tolerating with fair appetite.  BOWEL/BLADDER: Continent, up to bathroom, medium BM this shift  DRAIN/DEVICES: none  SKIN: Bilateral lower extremity edema +1  D/C DATE: pending group home/funding, SW following.  OTHER IMPORTANT INFO: Patient is particular with cares and assistance, likes routines. TUMS given for heartburn

## 2022-06-17 NOTE — PROGRESS NOTES
[facility]    Medicine Progress Note - Hospitalist Service    Date of Admission:  1/22/2022    Assessment & Plan        Miranda Dover is a 51 year old female with PMH Down Syndrome who was admitted on 1/22/2022 with COVID-19 pneumonia and acute hypoxic respiratory failure.      Hospitalization has been prolonged due to need to establish guardianship following deaths of her parents. Awaiting placement. Patient is in hospital for longtime given problem with placement at this time, She remain medically stable at this time without change in her clinical status.      Down syndrome  Developmental Delay  Failure to thrive  Had significant emotional, psychiatric issues during this stay due to the death of her parents and prolonged hospitalization. Discharge planning has been complicated by need for guardianship and payor source for placement.   -Patient's brother, Maxi, was granted 60 days of emergency guardianship on 2/15. Completed psychologic testing on 3/2/22 to assess current cognitive function and adaptive abilities. Permanent guardianship on 4/1/2022.  - Social work and clinical coordinator assisting with disposition.  - We are working on getting her into our group home and family has been in agreement, not able to find any place for her at this time.   - No RN concerns. Taking po. Sleeping well. Busy with activities during the day, up walking halls.   - Declines labs.     Severe malnutrition  Anorexia  Chronic abdominal pain  Chronic constipation  BMI ~13 on early this hospitalization with poor PO intake. SLP was consulted for possible dysphagia, and no evidence of dysphagia was noted.   -On 2/20, discussed with pt's brother; overall, felt that oral intake was probably acceptable at this point for discharge and did not feel we needed to pursue enteral feedings/g-tube at this point; desired workup for etiology of abdominal pain which he felt was contributing to her decreased PO  intake.  - CT abd/pelvis 2/21 showed large amount of stool. EGD 2/22 relatively unremarkable. US abd no acute pathology. BMI up to 14.7 2/27.  - Encourage PO intake.  - PPI.  - Bowel regimen ,  Miralax to BID, continue Senna bid, use suppository as needed.   - Having regular BMs. No GI sxs apparent.  - Patient declined labs. Patient hesitant and typically declines.     Thyroid nodule  TSH has been elevated at 9.95 on 2/21/22 but normal T4 in the past   - Repeat TSH ordered but patient has been refusing blood draws.  This lab is not urgent and can be done as an outpatient.     COVID-19 recovered  - Initially diagnosed 1/19/2022.     E coli UTI, resolved  - Completed course of antibiotics on 2/15/22.       Diet: Combination Diet Regular Diet; High Kcal/High Protein Diet, ADULT    DVT Prophylaxis: Low Risk/Ambulatory with no VTE prophylaxis indicated  Zhu Catheter: Not present  Central Lines: None  Cardiac Monitoring: None  Code Status: Full Code      Disposition Plan   Expected Discharge: 06/27/2022     Anticipated discharge location: group home    Delays:     Placement - Group Homes            The patient's care was discussed with the Bedside Nurse and Patient.    Kendrick Martinez MD  Hospitalist Service  [facility]  Securely message with the Vocera Web Console (learn more here)  Text page via [employer] Paging/Directory         Clinically Significant Risk Factors Present on Admission                    ______________________________________________________________________    Interval History   Miranda Dover was seen this morning. Feels OK. Right leg is a little stiff, no other complaints/concerns. Denies chest pain, shortness of breath, nausea.    Data reviewed today: I reviewed all medications, new labs and imaging results over the last 24 hours. I personally reviewed no images or EKG's today.    Physical Exam   Vital Signs: Temp: 98  F (36.7  C) Temp src: Oral BP: 99/57 Pulse: 56    Resp: 18 SpO2: 100 % O2 Device: None (Room air)    Weight: 97 lbs 0 oz  Constitutional: awake, alert, cooperative, no apparent distress, walking around the hospital room  Respiratory: clear to auscultation bilaterally, no crackles or wheezing  Cardiovascular: regular rate and rhythm, normal S1 and S2, no murmur noted  GI: normal bowel sounds, soft, non-distended, non-tender  Skin: warm, dry  Musculoskeletal: no lower extremity pitting edema present  Neurologic: awake, alert, answers questions appropriately, moves all extremities    Data   No lab results found in last 7 days.    Medications       melatonin  1 mg Oral At Bedtime     multivitamin w/minerals  1 tablet Oral Daily     pantoprazole  40 mg Oral QAM AC     polyethylene glycol  17 g Oral BID     senna-docusate  1 tablet Oral BID     cholecalciferol  50 mcg Oral Daily

## 2022-06-17 NOTE — PLAN OF CARE
Goal Outcome Evaluation:    DATE & TIME: 6/17/22   Cognitive Concerns/ Orientation: A&Ox4, baseline cognition.  BEHAVIOR & AGGRESSION TOOL COLOR: Green             ABNL VS/O2: VSS x HR 50's on RA.  MOBILITY: Ambulates independently-likes to walk in halls with staff.   PAIN MANAGMENT: Denies pain.   DIET: High calorie/protein, tolerating with fair appetite.  BOWEL/BLADDER: Continent, up to bathroom, BM x1.  DRAIN/DEVICES: none  SKIN: Vernon LE edema +1.  D/C DATE: pending group home/funding, SW following.  OTHER IMPORTANT INFO: Patient is particular with cares and assistance, very routine with habits.

## 2022-06-18 PROCEDURE — 99231 SBSQ HOSP IP/OBS SF/LOW 25: CPT | Performed by: HOSPITALIST

## 2022-06-18 PROCEDURE — 250N000013 HC RX MED GY IP 250 OP 250 PS 637: Performed by: HOSPITALIST

## 2022-06-18 PROCEDURE — 250N000013 HC RX MED GY IP 250 OP 250 PS 637: Performed by: INTERNAL MEDICINE

## 2022-06-18 PROCEDURE — 120N000001 HC R&B MED SURG/OB

## 2022-06-18 RX ADMIN — PANTOPRAZOLE SODIUM 40 MG: 40 TABLET, DELAYED RELEASE ORAL at 10:05

## 2022-06-18 RX ADMIN — SENNOSIDES AND DOCUSATE SODIUM 1 TABLET: 8.6; 5 TABLET ORAL at 22:38

## 2022-06-18 RX ADMIN — SENNOSIDES AND DOCUSATE SODIUM 1 TABLET: 8.6; 5 TABLET ORAL at 10:05

## 2022-06-18 RX ADMIN — MULTIPLE VITAMINS W/ MINERALS TAB 1 TABLET: TAB at 22:38

## 2022-06-18 RX ADMIN — POLYETHYLENE GLYCOL 3350 17 G: 17 POWDER, FOR SOLUTION ORAL at 10:05

## 2022-06-18 RX ADMIN — Medication 1 MG: at 22:38

## 2022-06-18 RX ADMIN — Medication 50 MCG: at 10:05

## 2022-06-18 ASSESSMENT — ACTIVITIES OF DAILY LIVING (ADL)
ADLS_ACUITY_SCORE: 32
ADLS_ACUITY_SCORE: 33
ADLS_ACUITY_SCORE: 31
ADLS_ACUITY_SCORE: 32
ADLS_ACUITY_SCORE: 33
ADLS_ACUITY_SCORE: 32
ADLS_ACUITY_SCORE: 31
ADLS_ACUITY_SCORE: 33
ADLS_ACUITY_SCORE: 32
ADLS_ACUITY_SCORE: 33
ADLS_ACUITY_SCORE: 33
ADLS_ACUITY_SCORE: 32

## 2022-06-18 NOTE — PLAN OF CARE
DATE & TIME: 6/17/22 1900- 6/18/22 5735  Cognitive Concerns/ Orientation: A&Ox4, baseline cognition.  BEHAVIOR & AGGRESSION TOOL COLOR: Green             ABNL VS/O2: Vitals done daily, stable on RA  MOBILITY: Ambulates independently-likes to walk in halls with staff.   PAIN MANAGMENT: Denies pain.   DIET: High calorie/protein, tolerating with fair appetite.  BOWEL/BLADDER: Continent, up to bathroom, no BM this shift.  DRAIN/DEVICES: none  SKIN: BLE edema +1.  D/C DATE: pending group home/funding, SW following.  OTHER IMPORTANT INFO: Patient likes routines and is particular with cares and assistance

## 2022-06-18 NOTE — PROGRESS NOTES
[facility]    Medicine Progress Note - Hospitalist Service    Date of Admission:  1/22/2022    Assessment & Plan               Miranda Dover is a 51 year old female with PMH Down Syndrome who was admitted on 1/22/2022 with COVID-19 pneumonia and acute hypoxic respiratory failure.      Hospitalization has been prolonged due to need to establish guardianship following deaths of her parents. Awaiting placement. Patient is in hospital for longtime given problem with placement at this time, She remain medically stable at this time without change in her clinical status.      Down syndrome  Developmental Delay  Failure to thrive  Had significant emotional, psychiatric issues during this stay due to the death of her parents and prolonged hospitalization. Discharge planning has been complicated by need for guardianship and payor source for placement.   -Patient's brother, Maxi, was granted 60 days of emergency guardianship on 2/15. Completed psychologic testing on 3/2/22 to assess current cognitive function and adaptive abilities. Permanent guardianship on 4/1/2022.  - Social work and clinical coordinator assisting with disposition.  - We are working on getting her into our group home and family has been in agreement, not able to find any place for her at this time.   - No RN concerns. Taking po. Sleeping well. Busy with activities during the day, up walking halls.   -I again asked her about the labs and she declined     Severe malnutrition  Anorexia  Chronic abdominal pain  Chronic constipation  BMI ~13 on early this hospitalization with poor PO intake. SLP was consulted for possible dysphagia, and no evidence of dysphagia was noted.   -On 2/20, discussed with pt's brother; overall, felt that oral intake was probably acceptable at this point for discharge and did not feel we needed to pursue enteral feedings/g-tube at this point; desired workup for etiology of abdominal pain which he felt was  contributing to her decreased PO intake.  - CT abd/pelvis 2/21 showed large amount of stool. EGD 2/22 relatively unremarkable. US abd no acute pathology. BMI up to 14.7 2/27.  - Encourage PO intake.  - PPI.  - Bowel regimen ,  Miralax to BID, continue Senna bid, use suppository as needed.   - Having regular BMs. No GI sxs apparent.  - Patient declined labs. Pt hesitant and typically declines.  -We will again asked the patient tomorrow if he is interested in doing regular lab     Thyroid nodule  TSH has been elevated at 9.95 on 2/21/22 but normal T4 in the past   - Repeat TSH ordered but patient has been refusing blood draws.  This lab is not urgent and can be done as an outpatient.     COVID-19 recovered  - Initially diagnosed 1/19/2022.     E coli UTI, resolved  - Completed course of antibiotics on 2/15/22.          Diet: Combination Diet Regular Diet; High Kcal/High Protein Diet, ADULT    DVT Prophylaxis: she is ambulating all the time  Zhu Catheter: Not present  Central Lines: None  Cardiac Monitoring: None  Code Status: Full Code      Disposition Plan   Expected Discharge: 06/27/2022     Anticipated discharge location: group home    Delays:     Placement - Group Homes            The patient's care was discussed with the Bedside Nurse and Patient.    Vijay Hoffmann MD  Hospitalist Service  [facility]  Securely message with the Vocera Web Console (learn more here)  Text page via [employer] Paging/Directory         Clinically Significant Risk Factors Present on Admission                    ______________________________________________________________________    Interval History     I saw the patient today and she has been walking in the hallway and did mention to me to speak in a lower tone and does have a flat affect and denied any chest pain or shortness of breath and I did listen to her while she was in the hallway as she did not want to go in her room and I spoke with the nurses and  patient has been appropriate    Data reviewed today: I reviewed all medications, new labs and imaging results over the last 24 hours. I personally reviewed no images or EKG's today.    Physical Exam   Vital Signs: Temp: 98.2  F (36.8  C) Temp src: Oral BP: 101/65 Pulse: 62   Resp: 18 SpO2: 98 % O2 Device: None (Room air)    Weight: 97 lbs 0 oz        General: Patient was walking in the hallway  HEENT: Head is atraumatic, normocephalic.    Neck: Neck is supple and No Lymphadenopathy   Respiratory: Lungs are clear to auscultation bilaterally   Cardiovascular: Regular rate , S1 and S2 normal with no murmer or rubs or gallops  Abdomen:   soft , non tender , non distended   Neurologic:  No facial droop  Musculoskeletal: She has been walking in the hallway  Psychiatric: cooperative     Data   No lab results found in last 7 days.    No results found for this or any previous visit (from the past 24 hour(s)).  Medications       melatonin  1 mg Oral At Bedtime     multivitamin w/minerals  1 tablet Oral Daily     pantoprazole  40 mg Oral QAM AC     polyethylene glycol  17 g Oral BID     senna-docusate  1 tablet Oral BID     cholecalciferol  50 mcg Oral Daily

## 2022-06-19 PROCEDURE — 250N000013 HC RX MED GY IP 250 OP 250 PS 637: Performed by: HOSPITALIST

## 2022-06-19 PROCEDURE — 250N000013 HC RX MED GY IP 250 OP 250 PS 637: Performed by: INTERNAL MEDICINE

## 2022-06-19 PROCEDURE — 120N000001 HC R&B MED SURG/OB

## 2022-06-19 PROCEDURE — 99231 SBSQ HOSP IP/OBS SF/LOW 25: CPT | Performed by: HOSPITALIST

## 2022-06-19 RX ADMIN — MULTIPLE VITAMINS W/ MINERALS TAB 1 TABLET: TAB at 22:25

## 2022-06-19 RX ADMIN — POLYETHYLENE GLYCOL 3350 17 G: 17 POWDER, FOR SOLUTION ORAL at 22:25

## 2022-06-19 RX ADMIN — SENNOSIDES AND DOCUSATE SODIUM 1 TABLET: 8.6; 5 TABLET ORAL at 09:37

## 2022-06-19 RX ADMIN — SENNOSIDES AND DOCUSATE SODIUM 1 TABLET: 8.6; 5 TABLET ORAL at 22:25

## 2022-06-19 RX ADMIN — Medication 50 MCG: at 09:37

## 2022-06-19 RX ADMIN — Medication 1 MG: at 22:25

## 2022-06-19 RX ADMIN — POLYETHYLENE GLYCOL 3350 17 G: 17 POWDER, FOR SOLUTION ORAL at 09:37

## 2022-06-19 RX ADMIN — PANTOPRAZOLE SODIUM 40 MG: 40 TABLET, DELAYED RELEASE ORAL at 09:37

## 2022-06-19 ASSESSMENT — ACTIVITIES OF DAILY LIVING (ADL)
ADLS_ACUITY_SCORE: 32

## 2022-06-19 NOTE — PROGRESS NOTES
[facility]    Medicine Progress Note - Hospitalist Service    Date of Admission:  1/22/2022    Assessment & Plan               Miranda Dover is a 51 year old female with PMH Down Syndrome who was admitted on 1/22/2022 with COVID-19 pneumonia and acute hypoxic respiratory failure.      Hospitalization has been prolonged due to need to establish guardianship following deaths of her parents. Awaiting placement. Patient is in hospital for longtime given problem with placement at this time, She remain medically stable at this time without change in her clinical status.      Down syndrome  Developmental Delay  Failure to thrive  Had significant emotional, psychiatric issues during this stay due to the death of her parents and prolonged hospitalization. Discharge planning has been complicated by need for guardianship and payor source for placement.   -Patient's brother, Maxi, was granted 60 days of emergency guardianship on 2/15. Completed psychologic testing on 3/2/22 to assess current cognitive function and adaptive abilities. Permanent guardianship on 4/1/2022.  - Social work and clinical coordinator assisting with disposition.  - We are working on getting her into our group home and family has been in agreement, not able to find any place for her at this time.   - No RN concerns. Taking po. Sleeping well. Busy with activities during the day, up walking halls.   -I again asked her about the labs and she declined     Severe malnutrition  Anorexia  Chronic abdominal pain  Chronic constipation  BMI ~13 on early this hospitalization with poor PO intake. SLP was consulted for possible dysphagia, and no evidence of dysphagia was noted.   -On 2/20, discussed with pt's brother; overall, felt that oral intake was probably acceptable at this point for discharge and did not feel we needed to pursue enteral feedings/g-tube at this point; desired workup for etiology of abdominal pain which he felt was  contributing to her decreased PO intake.  - CT abd/pelvis 2/21 showed large amount of stool. EGD 2/22 relatively unremarkable. US abd no acute pathology. BMI up to 14.7 2/27.  - Encourage PO intake.  - PPI.  - Bowel regimen ,  Miralax to BID, continue Senna bid, use suppository as needed.   - Having regular BMs. No GI sxs apparent.  - Patient declined labs. Pt hesitant and typically declines.  -We will again asked the patient tomorrow if he is interested in doing regular lab     Thyroid nodule  TSH has been elevated at 9.95 on 2/21/22 but normal T4 in the past   - Repeat TSH ordered but patient has been refusing blood draws.  This lab is not urgent and can be done as an outpatient.     COVID-19 recovered  - Initially diagnosed 1/19/2022.     E coli UTI, resolved  - Completed course of antibiotics on 2/15/22.          Diet: Combination Diet Regular Diet; High Kcal/High Protein Diet, ADULT    DVT Prophylaxis: she is ambulating all the time  Zhu Catheter: Not present  Central Lines: None  Cardiac Monitoring: None  Code Status: Full Code      Disposition Plan   Expected Discharge: 06/27/2022     Anticipated discharge location: group home    Delays:     Placement - Group Homes            The patient's care was discussed with the Bedside Nurse and Patient.    Vijay Hoffmann MD  Hospitalist Service  [facility]  Securely message with the Vocera Web Console (learn more here)  Text page via [employer] Paging/Directory         Clinically Significant Risk Factors Present on Admission                    ______________________________________________________________________    Interval History     I did see her today and she was in her room and was standing and she wrote on the paper that mildred saw sunrise.  She denies any fever, chills, nausea or vomiting and again declined labs.    Data reviewed today: I reviewed all medications, new labs and imaging results over the last 24 hours. I personally reviewed no  images or EKG's today.    Physical Exam   Vital Signs: Temp: 98  F (36.7  C) Temp src: Oral BP: 92/59 Pulse: 74   Resp: 16 SpO2: 96 % O2 Device: None (Room air)    Weight: 97 lbs 0 oz        General: Patient was standing in her room  HEENT: Head is atraumatic, normocephalic.    Neck: Neck is supple   Respiratory: Lungs are clear to auscultation bilaterally   Cardiovascular: Regular rate , S1 and S2 normal with no murmer or rubs or gallops  Abdomen:   soft ,   Neurologic:  No facial droop  Musculoskeletal: She has been walking in the hallway and in her room  Psychiatric: cooperative     Data   No lab results found in last 7 days.    No results found for this or any previous visit (from the past 24 hour(s)).  Medications       melatonin  1 mg Oral At Bedtime     multivitamin w/minerals  1 tablet Oral Daily     pantoprazole  40 mg Oral QAM AC     polyethylene glycol  17 g Oral BID     senna-docusate  1 tablet Oral BID     cholecalciferol  50 mcg Oral Daily

## 2022-06-19 NOTE — PROGRESS NOTES
DATE & TIME: 6/19/22, 2070-5855  Cognitive Concerns/ Orientation: A&Ox4  BEHAVIOR & AGGRESSION TOOL COLOR: Green             ABNL VS/O2: VSS on RA  MOBILITY: Ambulates independently-likes to walk in halls with staff. Went for a wheelchair ride outside today and went on an outing with her brother.   PAIN MANAGMENT: Denies pain.   DIET: High calorie/protein, tolerating with fair appetite.  BOWEL/BLADDER: Continent- 2 Bms this shift.  DRAIN/DEVICES: none  SKIN: BLE edema +1.  D/C DATE: pending group home/funding, SW following.  OTHER IMPORTANT INFO: No concerns.

## 2022-06-19 NOTE — PLAN OF CARE
Goal Outcome Evaluation:  Plan of Care Reviewed With: patient   Overall Patient Progress: improving  Cognitive Concerns/ Orientation: A&Ox4, calm and cooperative  BEHAVIOR & AGGRESSION TOOL COLOR: Green            ABNL VS/O2: VSS on RA, daily  MOBILITY: Ambulates independently-likes to walk in halls with staff.   PAIN MANAGMENT: Denies pain.   DIET: High calorie/protein, tolerating with fair appetite.  BOWEL/BLADDER: Continent  DRAIN/DEVICES: none  SKIN: BLE edema +1.  D/C DATE: pending group home/funding, SW following.  OTHER IMPORTANT INFO: No concerns

## 2022-06-20 PROCEDURE — 250N000013 HC RX MED GY IP 250 OP 250 PS 637: Performed by: INTERNAL MEDICINE

## 2022-06-20 PROCEDURE — 99231 SBSQ HOSP IP/OBS SF/LOW 25: CPT | Performed by: HOSPITALIST

## 2022-06-20 PROCEDURE — 120N000001 HC R&B MED SURG/OB

## 2022-06-20 PROCEDURE — 250N000013 HC RX MED GY IP 250 OP 250 PS 637: Performed by: HOSPITALIST

## 2022-06-20 RX ADMIN — MULTIPLE VITAMINS W/ MINERALS TAB 1 TABLET: TAB at 23:32

## 2022-06-20 RX ADMIN — Medication 50 MCG: at 10:34

## 2022-06-20 RX ADMIN — SENNOSIDES AND DOCUSATE SODIUM 1 TABLET: 8.6; 5 TABLET ORAL at 23:32

## 2022-06-20 RX ADMIN — POLYETHYLENE GLYCOL 3350 17 G: 17 POWDER, FOR SOLUTION ORAL at 10:34

## 2022-06-20 RX ADMIN — PANTOPRAZOLE SODIUM 40 MG: 40 TABLET, DELAYED RELEASE ORAL at 10:34

## 2022-06-20 RX ADMIN — SENNOSIDES AND DOCUSATE SODIUM 1 TABLET: 8.6; 5 TABLET ORAL at 10:34

## 2022-06-20 RX ADMIN — POLYETHYLENE GLYCOL 3350 17 G: 17 POWDER, FOR SOLUTION ORAL at 23:32

## 2022-06-20 RX ADMIN — Medication 1 MG: at 23:32

## 2022-06-20 ASSESSMENT — ACTIVITIES OF DAILY LIVING (ADL)
ADLS_ACUITY_SCORE: 28
ADLS_ACUITY_SCORE: 33
ADLS_ACUITY_SCORE: 28
ADLS_ACUITY_SCORE: 33
ADLS_ACUITY_SCORE: 33
ADLS_ACUITY_SCORE: 28
ADLS_ACUITY_SCORE: 33
ADLS_ACUITY_SCORE: 28
ADLS_ACUITY_SCORE: 32
ADLS_ACUITY_SCORE: 28

## 2022-06-20 NOTE — PROGRESS NOTES
Care Management Follow Up    Length of Stay (days): 149    Expected Discharge Date: 06/22/2022     Concerns to be Addressed: discharge planning     Patient plan of care discussed at interdisciplinary rounds: Yes    Anticipated Discharge Disposition: Group Home     Anticipated Discharge Services:    Anticipated Discharge DME:      Patient/family educated on Medicare website which has current facility and service quality ratings: no  Education Provided on the Discharge Plan:    Patient/Family in Agreement with the Plan: yes    Referrals Placed by CM/SW: Post Acute Facilities  Private pay costs discussed: Not applicable    Additional Information:  Patient's brother Matt has told [employer] that patient has an Intake scheduled with the [facility] on Wednesday June 22 and he is not sure if she admits that day.    Writer called patient's [facility] however the office is closed today.  Writer has left a message with Matt asking for a  at [facility] in order to ask when the facility can admit patient and to coordinate the discharge.        Connie Masterson, BARBIESW

## 2022-06-20 NOTE — PROGRESS NOTES
[facility]    Medicine Progress Note - Hospitalist Service    Date of Admission:  1/22/2022    Assessment & Plan               Miranda Dover is a 51 year old female with PMH Down Syndrome who was admitted on 1/22/2022 with COVID-19 pneumonia and acute hypoxic respiratory failure.      Hospitalization has been prolonged due to need to establish guardianship following deaths of her parents. Awaiting placement. Patient is in hospital for longtime given problem with placement at this time, She remain medically stable at this time without change in her clinical status.      Down syndrome  Developmental Delay  Failure to thrive  Had significant emotional, psychiatric issues during this stay due to the death of her parents and prolonged hospitalization. Discharge planning has been complicated by need for guardianship and payor source for placement.   -Patient's brother, Maxi, was granted 60 days of emergency guardianship on 2/15. Completed psychologic testing on 3/2/22 to assess current cognitive function and adaptive abilities. Permanent guardianship on 4/1/2022.  - Social work and clinical coordinator assisting with disposition.  - We are working on getting her into our group home and family has been in agreement, not able to find any place for her at this time.   - No RN concerns. Taking po. Sleeping well. Busy with activities during the day, up walking halls.   -I again asked her about the labs and she declined     Severe malnutrition  Anorexia  Chronic abdominal pain  Chronic constipation  BMI ~13 on early this hospitalization with poor PO intake. SLP was consulted for possible dysphagia, and no evidence of dysphagia was noted.   -On 2/20, discussed with pt's brother; overall, felt that oral intake was probably acceptable at this point for discharge and did not feel we needed to pursue enteral feedings/g-tube at this point; desired workup for etiology of abdominal pain which he felt was  contributing to her decreased PO intake.  - CT abd/pelvis 2/21 showed large amount of stool. EGD 2/22 relatively unremarkable. US abd no acute pathology. BMI up to 14.7 2/27.  - Encourage PO intake.  - PPI.  - Bowel regimen ,  Miralax to BID, continue Senna bid, use suppository as needed.   - Having regular BMs. No GI sxs apparent.  - Patient declined labs. Pt hesitant and typically declines.  -We will again asked the patient tomorrow if he is interested in doing regular lab     Thyroid nodule  TSH has been elevated at 9.95 on 2/21/22 but normal T4 in the past   - Repeat TSH ordered but patient has been refusing blood draws.  This lab is not urgent and can be done as an outpatient.     COVID-19 recovered  - Initially diagnosed 1/19/2022.     E coli UTI, resolved  - Completed course of antibiotics on 2/15/22.          Diet: Combination Diet Regular Diet; High Kcal/High Protein Diet, ADULT    DVT Prophylaxis: she is ambulating all the time  Zhu Catheter: Not present  Central Lines: None  Cardiac Monitoring: None  Code Status: Full Code      Disposition Plan   Expected Discharge: 06/22/2022     Anticipated discharge location: group home    Delays:     Placement - Group Homes            The patient's care was discussed with the Bedside Nurse and Patient.    Vijay Hoffmann MD  Hospitalist Service  [facility]  Securely message with the Vocera Web Console (learn more here)  Text page via [employer] Paging/Directory         Clinically Significant Risk Factors Present on Admission                    ______________________________________________________________________    Interval History     I saw her this morning and she was in her room and seems cooperative and denies any fever, chills and she has been walking in the hallway and again refused to do labs and her blood pressure is stable  We are still waiting for placement    Data reviewed today: I reviewed all medications, new labs and imaging results  over the last 24 hours. I personally reviewed no images or EKG's today.    Physical Exam   Vital Signs: Temp: 98  F (36.7  C) Temp src: Oral BP: 105/60 Pulse: 66   Resp: 16 SpO2: 94 % O2 Device: None (Room air)    Weight: 97 lbs 0 oz        General: Patient was standing in her room  HEENT: Head is atraumatic, normocephalic.    Neck: Neck is supple   Respiratory: Lungs are clear to auscultation bilaterally   Cardiovascular: Regular rate , S1 and S2 normal with no murmer or rubs or gallops  Abdomen:   soft ,   Neurologic:  No facial droop  Musculoskeletal: She has been walking in the hallway and in her room  Psychiatric: cooperative     Data   No lab results found in last 7 days.    No results found for this or any previous visit (from the past 24 hour(s)).  Medications       melatonin  1 mg Oral At Bedtime     multivitamin w/minerals  1 tablet Oral Daily     pantoprazole  40 mg Oral QAM AC     polyethylene glycol  17 g Oral BID     senna-docusate  1 tablet Oral BID     cholecalciferol  50 mcg Oral Daily

## 2022-06-20 NOTE — PROGRESS NOTES
Date/Time: 06/20/2022 0700-1930  Summary: pending placement  Cognitive Concerns/Orientation: A&Ox4   Behavior and Aggression Color: green  ABNL VS/O2: VSS on RA  Mobility: independent. Went on multiple walks today.  Pain Management: denies  Diet: regular  Bowel/Bladder: continent. 1 BM today   ABNL Labs/BG: NA  Drain/Devices: NA  Telemetry Rhythm: NA  Skin: no concerns  Tests/Procedures: NA   Discharge Date: pending placement.   Other Info:   She wanted to take a walk downstairs to the cafe but they closed earlier and she got upset that they were closed.

## 2022-06-20 NOTE — PLAN OF CARE
Goal Outcome Evaluation:      DATE & TIME: 6/19/22-6/20/22, 4025-7311  Cognitive Concerns/ Orientation: A&Ox4  BEHAVIOR & AGGRESSION TOOL COLOR: Green             ABNL VS/O2: VSS on RA  MOBILITY: Ambulates independently-likes to walk in halls with staff.  PAIN MANAGMENT: Denies pain.   DIET: High calorie/protein, tolerating with fair appetite.  BOWEL/BLADDER: Continent- 1 BM this shift.  DRAIN/DEVICES: none  SKIN: BLE/generalized edema +1.  D/C DATE: pending group home/funding, SW following.  OTHER IMPORTANT INFO: No concerns

## 2022-06-21 PROCEDURE — 250N000013 HC RX MED GY IP 250 OP 250 PS 637: Performed by: INTERNAL MEDICINE

## 2022-06-21 PROCEDURE — 99231 SBSQ HOSP IP/OBS SF/LOW 25: CPT | Performed by: HOSPITALIST

## 2022-06-21 PROCEDURE — 120N000001 HC R&B MED SURG/OB

## 2022-06-21 PROCEDURE — 250N000013 HC RX MED GY IP 250 OP 250 PS 637: Performed by: HOSPITALIST

## 2022-06-21 RX ADMIN — SENNOSIDES AND DOCUSATE SODIUM 1 TABLET: 8.6; 5 TABLET ORAL at 22:01

## 2022-06-21 RX ADMIN — Medication 1 MG: at 22:01

## 2022-06-21 RX ADMIN — PANTOPRAZOLE SODIUM 40 MG: 40 TABLET, DELAYED RELEASE ORAL at 09:19

## 2022-06-21 RX ADMIN — Medication 50 MCG: at 09:19

## 2022-06-21 RX ADMIN — SENNOSIDES AND DOCUSATE SODIUM 1 TABLET: 8.6; 5 TABLET ORAL at 09:19

## 2022-06-21 RX ADMIN — MULTIPLE VITAMINS W/ MINERALS TAB 1 TABLET: TAB at 22:01

## 2022-06-21 ASSESSMENT — ACTIVITIES OF DAILY LIVING (ADL)
ADLS_ACUITY_SCORE: 32
ADLS_ACUITY_SCORE: 28
ADLS_ACUITY_SCORE: 32
ADLS_ACUITY_SCORE: 28
ADLS_ACUITY_SCORE: 28
ADLS_ACUITY_SCORE: 32

## 2022-06-21 NOTE — PLAN OF CARE
Goal Outcome Evaluation:    DATE & TIME: 6/21/22 0300-7689  Cognitive Concerns/ Orientation: A&Ox4  BEHAVIOR & AGGRESSION TOOL COLOR: Green             ABNL VS/O2: VSS on RA  MOBILITY: Ambulates independently-likes to walk in halls with staff.  PAIN MANAGMENT: Denies pain.   DIET: High calorie/protein, tolerating with fair appetite.  BOWEL/BLADDER: Continent- 1 BM this shift.  DRAIN/DEVICES: none  SKIN: BLE/generalized edema +1.  D/C DATE: Intake with [facility] to be completed tomorrow. Once intake is done, admit date will be scheduled.  OTHER IMPORTANT INFO: MD tried to order labs today but pt refused. SW following

## 2022-06-21 NOTE — PROGRESS NOTES
Care Management Follow Up    Length of Stay (days): 150    Expected Discharge Date: 06/22/2022     Concerns to be Addressed: discharge planning     Patient plan of care discussed at interdisciplinary rounds: Yes    Anticipated Discharge Disposition: [facility]     Anticipated Discharge Services:    Anticipated Discharge DME:      Patient/family educated on Medicare website which has current facility and service quality ratings: no  Education Provided on the Discharge Plan:    Patient/Family in Agreement with the Plan: yes    Referrals Placed by CM/SW: Post Acute Facilities  Private pay costs discussed: Not applicable    Additional Information:  Today writer spoke with Leydi Christopher,( [phone]) the  regarding patient's admission to the [facility]. Writer asked if they have an anticipated admission date.  Ms Christopher explained after the Intake has been completed on Wednesday their next step will be to schedule an admit date.  Plan: Writer will contact Ms Christopher on Thursday.      BARBIE MullinsSW

## 2022-06-21 NOTE — PLAN OF CARE
Goal Outcome Evaluation:      DATE & TIME: 6/20/22-6/21/22, 5825-9466  Cognitive Concerns/ Orientation: A&Ox4  BEHAVIOR & AGGRESSION TOOL COLOR: Green             ABNL VS/O2: VSS on RA  MOBILITY: Ambulates independently-likes to walk in halls with staff.  PAIN MANAGMENT: Denies pain.   DIET: High calorie/protein, tolerating with fair appetite.  BOWEL/BLADDER: Continent- No BM this shift.  DRAIN/DEVICES: none  SKIN: BLE/generalized edema +1. Removed socks for bed.  D/C DATE: pending group home/funding, SW following.  OTHER IMPORTANT INFO: No concerns

## 2022-06-21 NOTE — PROGRESS NOTES
[facility]    Medicine Progress Note - Hospitalist Service    Date of Admission:  1/22/2022    Assessment & Plan               Mirnada Dover is a 51 year old female with PMH Down Syndrome who was admitted on 1/22/2022 with COVID-19 pneumonia and acute hypoxic respiratory failure.      Hospitalization has been prolonged due to need to establish guardianship following deaths of her parents. Awaiting placement. Patient is in hospital for longtime given problem with placement at this time, She remain medically stable at this time without change in her clinical status.      Down syndrome  Developmental Delay  Failure to thrive  Had significant emotional, psychiatric issues during this stay due to the death of her parents and prolonged hospitalization. Discharge planning has been complicated by need for guardianship and payor source for placement.   -Patient's brother, Maxi, was granted 60 days of emergency guardianship on 2/15. Completed psychologic testing on 3/2/22 to assess current cognitive function and adaptive abilities. Permanent guardianship on 4/1/2022.  - Social work and clinical coordinator assisting with disposition.  - We are working on getting her into our group home and family has been in agreement, not able to find any place for her at this time.   - No RN concerns. Taking po. Sleeping well. Busy with activities during the day, up walking halls.   -I again asked her about the labs and she declined     Severe malnutrition  Anorexia  Chronic abdominal pain  Chronic constipation  BMI ~13 on early this hospitalization with poor PO intake. SLP was consulted for possible dysphagia, and no evidence of dysphagia was noted.   -On 2/20, discussed with pt's brother; overall, felt that oral intake was probably acceptable at this point for discharge and did not feel we needed to pursue enteral feedings/g-tube at this point; desired workup for etiology of abdominal pain which he felt was  MA tried to call the patient to inform her of the message below, however her VM was full. contributing to her decreased PO intake.  - CT abd/pelvis 2/21 showed large amount of stool. EGD 2/22 relatively unremarkable. US abd no acute pathology. BMI up to 14.7 2/27.  - Encourage PO intake.  - PPI.  - Bowel regimen ,  Miralax to BID, continue Senna bid, use suppository as needed.   - Having regular BMs. No GI sxs apparent.  - Patient declined labs. Pt hesitant and typically declines.  -We will again asked the patient tomorrow if he is interested in doing regular lab     Thyroid nodule  TSH has been elevated at 9.95 on 2/21/22 but normal T4 in the past   - Repeat TSH ordered but patient has been refusing blood draws.  This lab is not urgent and can be done as an outpatient.     COVID-19 recovered  - Initially diagnosed 1/19/2022.     E coli UTI, resolved  - Completed course of antibiotics on 2/15/22.          Diet: Combination Diet Regular Diet; High Kcal/High Protein Diet, ADULT    DVT Prophylaxis: she is ambulating all the time  Zhu Catheter: Not present  Central Lines: None  Cardiac Monitoring: None  Code Status: Full Code      Disposition Plan   Expected Discharge: 06/22/2022     Anticipated discharge location: group home    Delays:     Placement - Group Homes            The patient's care was discussed with the Bedside Nurse and Patient.  I did review the social work note and her intake will happen on Wednesday and at that time it will be decided if patient can be accepted    Vijay Hoffmann MD  Hospitalist Service  [facility]  Securely message with the Vocera Web Console (learn more here)  Text page via [employer] Paging/Directory         Clinically Significant Risk Factors Present on Admission                    ______________________________________________________________________    Interval History     I saw the patient this morning and she was waking up from sleep and nurse was present in the room and vitals were taken and I asked patient for labs and she refused again    Data  reviewed today: I reviewed all medications, new labs and imaging results over the last 24 hours. I personally reviewed no images or EKG's today.    Physical Exam   Vital Signs: Temp: 97.8  F (36.6  C) Temp src: Oral BP: 97/63 Pulse: 64   Resp: 16 SpO2: 95 % O2 Device: None (Room air)    Weight: 97 lbs 0 oz        General: Patient was standing in her room  HEENT: Head is atraumatic, normocephalic.    Neck: Neck is supple   Respiratory: Lungs are clear to auscultation bilaterally   Cardiovascular: Regular rate , S1 and S2 normal with no murmer or rubs or gallops  Abdomen:   soft ,   Neurologic:  No facial droop  Musculoskeletal: She has been walking in the hallway and in her room  Psychiatric: cooperative     Data   No lab results found in last 7 days.    No results found for this or any previous visit (from the past 24 hour(s)).  Medications       melatonin  1 mg Oral At Bedtime     multivitamin w/minerals  1 tablet Oral Daily     pantoprazole  40 mg Oral QAM AC     polyethylene glycol  17 g Oral BID     senna-docusate  1 tablet Oral BID     cholecalciferol  50 mcg Oral Daily

## 2022-06-22 ENCOUNTER — APPOINTMENT (OUTPATIENT)
Dept: GENERAL RADIOLOGY | Facility: CLINIC | Age: 52
DRG: 177 | End: 2022-06-22
Attending: HOSPITALIST
Payer: COMMERCIAL

## 2022-06-22 PROCEDURE — 99231 SBSQ HOSP IP/OBS SF/LOW 25: CPT | Performed by: HOSPITALIST

## 2022-06-22 PROCEDURE — 250N000013 HC RX MED GY IP 250 OP 250 PS 637: Performed by: INTERNAL MEDICINE

## 2022-06-22 PROCEDURE — 120N000001 HC R&B MED SURG/OB

## 2022-06-22 PROCEDURE — 73560 X-RAY EXAM OF KNEE 1 OR 2: CPT | Mod: LT

## 2022-06-22 PROCEDURE — 250N000013 HC RX MED GY IP 250 OP 250 PS 637: Performed by: HOSPITALIST

## 2022-06-22 RX ADMIN — SENNOSIDES AND DOCUSATE SODIUM 1 TABLET: 8.6; 5 TABLET ORAL at 13:33

## 2022-06-22 RX ADMIN — SENNOSIDES AND DOCUSATE SODIUM 1 TABLET: 8.6; 5 TABLET ORAL at 22:21

## 2022-06-22 RX ADMIN — POLYETHYLENE GLYCOL 3350 17 G: 17 POWDER, FOR SOLUTION ORAL at 22:21

## 2022-06-22 RX ADMIN — MULTIPLE VITAMINS W/ MINERALS TAB 1 TABLET: TAB at 22:21

## 2022-06-22 RX ADMIN — Medication 1 MG: at 22:21

## 2022-06-22 RX ADMIN — PANTOPRAZOLE SODIUM 40 MG: 40 TABLET, DELAYED RELEASE ORAL at 13:33

## 2022-06-22 RX ADMIN — POLYETHYLENE GLYCOL 3350 17 G: 17 POWDER, FOR SOLUTION ORAL at 13:33

## 2022-06-22 RX ADMIN — Medication 50 MCG: at 13:33

## 2022-06-22 ASSESSMENT — ACTIVITIES OF DAILY LIVING (ADL)
ADLS_ACUITY_SCORE: 35
ADLS_ACUITY_SCORE: 32
ADLS_ACUITY_SCORE: 35

## 2022-06-22 NOTE — PROGRESS NOTES
[facility]    Medicine Progress Note - Hospitalist Service    Date of Admission:  1/22/2022    Assessment & Plan               Miranda Dover is a 51 year old female with PMH Down Syndrome who was admitted on 1/22/2022 with COVID-19 pneumonia and acute hypoxic respiratory failure.      Hospitalization has been prolonged due to need to establish guardianship following deaths of her parents. Awaiting placement. Patient is in hospital for longtime given problem with placement at this time, She remain medically stable at this time without change in her clinical status.      Down syndrome  Developmental Delay  Failure to thrive  Had significant emotional, psychiatric issues during this stay due to the death of her parents and prolonged hospitalization. Discharge planning has been complicated by need for guardianship and payor source for placement.   -Patient's brother, Maxi, was granted 60 days of emergency guardianship on 2/15. Completed psychologic testing on 3/2/22 to assess current cognitive function and adaptive abilities. Permanent guardianship on 4/1/2022.  - Social work and clinical coordinator assisting with disposition.  - We are working on getting her into our group home and family has been in agreement, not able to find any place for her at this time.   - No RN concerns. Taking po. Sleeping well. Busy with activities during the day, up walking halls.   -I again asked her about the labs and she declined     Severe malnutrition  Anorexia  Chronic abdominal pain  Chronic constipation  BMI ~13 on early this hospitalization with poor PO intake. SLP was consulted for possible dysphagia, and no evidence of dysphagia was noted.   -On 2/20, discussed with pt's brother; overall, felt that oral intake was probably acceptable at this point for discharge and did not feel we needed to pursue enteral feedings/g-tube at this point; desired workup for etiology of abdominal pain which he felt was  contributing to her decreased PO intake.  - CT abd/pelvis 2/21 showed large amount of stool. EGD 2/22 relatively unremarkable. US abd no acute pathology. BMI up to 14.7 2/27.  - Encourage PO intake.  - continue with PP,prn bisacodyl , miralax, senna   - has been declining labs for the past many days      Thyroid nodule  TSH has been elevated at 9.95 on 2/21/22 but normal T4 in the past   - Repeat TSH ordered but patient has been refusing blood draws.  This lab is not urgent and can be done as an outpatient.     COVID-19 recovered  - Initially diagnosed 1/19/2022.     E coli UTI, resolved  - Completed course of antibiotics on 2/15/22.    ?Left knee stiffness  -I was paged by the nurse that group home and the patient's brother wanted to know if there has been any x-rays done for the knee.  Of note patient has been admitted to the hospital since January and I only assumed her care 4 days back and she has not complained of any pain to me and has been declining labs every day I have asked and I did not find any prior documentation of concern for the left knee  -I spoke with the patient's nurse and patient does not even want any Tylenol and I did order x-ray of the left knee          Diet: Combination Diet Regular Diet; High Kcal/High Protein Diet, ADULT    DVT Prophylaxis: she is ambulating all the time  Zhu Catheter: Not present  Central Lines: None  Cardiac Monitoring: None  Code Status: Full Code      Disposition Plan   Expected Discharge:    Expected Discharge Date: 06/23/2022    Discharge Delays: Placement - Group Homes  Destination: group home  Discharge Comments: To  pending funding. Possibly Next week?     Anticipated discharge location: group home    Delays:       Discharge Delays: Placement - Group Homes          The patient's care was discussed with the Bedside Nurse and Patient.  [facility] and after that admit date will be decided    Vijay Hoffmann  MD  Hospitalist Service  [facility]  Securely message with the [employer] Web Console (learn more here)  Text page via [employer] Paging/Directory         Clinically Significant Risk Factors Present on Admission                    ______________________________________________________________________    Interval History     I did see the patient this morning and she was in her room and refused to allow me to do the exam but later in the day she was in the hallway and was with the charge nurse and I was able to listen to her lungs and heart and she denies any complaints    I again saw the patient and she did not complain anything to me    Data reviewed today: I reviewed all medications, new labs and imaging results over the last 24 hours. I personally reviewed no images or EKG's today.    Physical Exam   Vital Signs:                    Weight: 97 lbs 0 oz        General: Patient was standing in the hallway  HEENT: .    Neck: Neck is supple   Respiratory: Lungs are clear to auscultation bilaterally   Cardiovascular: S1-S2 normal  Abdomen:   soft ,   Neurologic:  No facial droop  Musculoskeletal: She has been walking in the hallway and in her room  Psychiatric: cooperative but has flat affect    Data   No lab results found in last 7 days.    No results found for this or any previous visit (from the past 24 hour(s)).  Medications       melatonin  1 mg Oral At Bedtime     multivitamin w/minerals  1 tablet Oral Daily     pantoprazole  40 mg Oral QAM AC     polyethylene glycol  17 g Oral BID     senna-docusate  1 tablet Oral BID     cholecalciferol  50 mcg Oral Daily

## 2022-06-22 NOTE — PLAN OF CARE
Goal Outcome Evaluation:    BEHAVIOR & AGGRESSION TOOL COLOR: Green   ABNL VS/O2: VSS on room air  MOBILITY: Independent  PAIN MANAGMENT: Denies  DIET: High calorie/protein-needs encouragement to eat  BOWEL/BLADDER: Continent. One small BM today.  SKIN:+1   BLE edema  D/C DATE: Next Tuesday to group home  OTHER IMPORTANT INFO: discussed discharging to the group home-causes anxiety, encouragement provided. Ambulates in the halls with staff frequently.  Xray of right knee due to stiffness, nothing acute noted.

## 2022-06-22 NOTE — PLAN OF CARE
DATE & TIME: 6/21/22 1900-2300    Cognitive Concerns/ Orientation : Pt A/Ox4   BEHAVIOR & AGGRESSION TOOL COLOR: Green   ABNL VS/O2: VSS on RA  MOBILITY: Independent, ambulated in hallway this evening  PAIN MANAGMENT: Denies  DIET: High calorie/protein  BOWEL/BLADDER: Continent  SKIN: Bilateral lower extremity edema +1  D/C DATE: Discharge pending intake with [facility] tomorrow. Once intake complete then admit date will be scheduled.  OTHER IMPORTANT INFO: KRYSTIN following

## 2022-06-22 NOTE — PLAN OF CARE
Goal Outcome Evaluation:    DATE & TIME: 6/21/22-6/22/2022 4214-2231   Cognitive Concerns/ Orientation : Pt A/Ox4   BEHAVIOR & AGGRESSION TOOL COLOR: Green   ABNL VS/O2: VSS on RA  MOBILITY: Independent  PAIN MANAGMENT: Denies  DIET: High calorie/protein  BOWEL/BLADDER: Continent  SKIN: Bilateral lower extremity edema +1  D/C DATE: Discharge pending intake with [facility] tomorrow. Once intake complete then admit date will be scheduled.  OTHER IMPORTANT INFO: KRYSTIN following

## 2022-06-23 PROCEDURE — 250N000013 HC RX MED GY IP 250 OP 250 PS 637: Performed by: HOSPITALIST

## 2022-06-23 PROCEDURE — 250N000013 HC RX MED GY IP 250 OP 250 PS 637: Performed by: INTERNAL MEDICINE

## 2022-06-23 PROCEDURE — 120N000001 HC R&B MED SURG/OB

## 2022-06-23 PROCEDURE — 99232 SBSQ HOSP IP/OBS MODERATE 35: CPT | Performed by: HOSPITALIST

## 2022-06-23 RX ADMIN — POLYETHYLENE GLYCOL 3350 17 G: 17 POWDER, FOR SOLUTION ORAL at 10:38

## 2022-06-23 RX ADMIN — DICLOFENAC SODIUM 2 G: 10 GEL TOPICAL at 16:32

## 2022-06-23 RX ADMIN — PANTOPRAZOLE SODIUM 40 MG: 40 TABLET, DELAYED RELEASE ORAL at 10:37

## 2022-06-23 RX ADMIN — Medication 50 MCG: at 10:37

## 2022-06-23 RX ADMIN — Medication 1 MG: at 21:33

## 2022-06-23 RX ADMIN — MULTIPLE VITAMINS W/ MINERALS TAB 1 TABLET: TAB at 21:33

## 2022-06-23 RX ADMIN — DICLOFENAC SODIUM 2 G: 10 GEL TOPICAL at 21:33

## 2022-06-23 RX ADMIN — SENNOSIDES AND DOCUSATE SODIUM 1 TABLET: 8.6; 5 TABLET ORAL at 21:33

## 2022-06-23 RX ADMIN — POLYETHYLENE GLYCOL 3350 17 G: 17 POWDER, FOR SOLUTION ORAL at 21:33

## 2022-06-23 RX ADMIN — SENNOSIDES AND DOCUSATE SODIUM 1 TABLET: 8.6; 5 TABLET ORAL at 10:37

## 2022-06-23 ASSESSMENT — ACTIVITIES OF DAILY LIVING (ADL)
ADLS_ACUITY_SCORE: 35

## 2022-06-23 NOTE — PLAN OF CARE
Goal Outcome Evaluation:    DATE & TIME: 6/22-6/23/22 4479-1940   Cognitive Concerns/ Orientation : A&Ox4   BEHAVIOR & AGGRESSION TOOL COLOR: Green   ABNL VS/O2: VSS on room air  MOBILITY: Independent  PAIN MANAGMENT: Denies  DIET: High calorie/protein-needs encouragement to eat  BOWEL/BLADDER: Continent  SKIN: Bilateral lower extremity edema +1  D/C DATE: Next Tuesday to group home  OTHER IMPORTANT INFO: Pt ate all of her dinner tonight.

## 2022-06-23 NOTE — PROGRESS NOTES
Care Management Follow Up    Length of Stay (days): 152    Expected Discharge Date: 06/28/2022     Concerns to be Addressed: discharge planning     Patient plan of care discussed at interdisciplinary rounds: Yes    [facility]  [facility]  [phone] )      Anticipated Discharge Services:    Anticipated Discharge DME:      Patient/family educated on Medicare website which has current facility and service quality ratings: no  Education Provided on the Discharge Plan:    Patient/Family in Agreement with the Plan: yes    Referrals Placed by CM/SW: Post Acute Facilities  Private pay costs discussed: Not applicable    Additional Information:  Patient's brother relayed to nursing staff that patient will be admitted next Tuesday 6/28 to [facility].   [facility] to confirm patient's admit date and to ask if patient can admit sooner.  She stated due to other appointments and due to the fact that Tuesday has been formally set as the date  they cannot admit before Tuesday at 6/28 at 1100 or later.  Writer has left a message for brother to ask if he will be transporting and if he is to please be here before 1100 so that papers can be reviewed and patient will leave by 1100.  Bryn requests a 30 day supply of medication be filled here.  Dis[email or web address]  On the day of discharge our bedside RN can call with report to Bryn at [phone].    Reviewed plan today with bedside RN Nasima Gore.    Addendum:  Patient's brother had mentioned to Bryn that patient was complaining of knee pain.  Bryn asked for a x-ray or MRI before patient leaves. Writer was able to relay to the results of the x-ray per RN note on 6/22.      Connie Masterson [facility]

## 2022-06-23 NOTE — PLAN OF CARE
Goal Outcome Evaluation:  Cognitive Concerns/ Orientation: A&Ox4   BEHAVIOR & AGGRESSION TOOL COLOR: Green   ABNL VS/O2: VSS on room air  MOBILITY: Independent  PAIN MANAGMENT: Denies  DIET: High calorie/protein-needs encouragement to eat  BOWEL/BLADDER: Continent  SKIN: Bilateral lower extremity edema +1  D/C DATE: Next Tuesday to group home, anxious and talks about being very scared to go on Tuesday to her group home  OTHER IMPORTANT INFO: took a bath with much encouragement from staff.

## 2022-06-23 NOTE — PROGRESS NOTES
[facility]    Medicine Progress Note - Hospitalist Service    Date of Admission:  1/22/2022    Assessment & Plan               Miranda Dover is a 51 year old female with PMH Down Syndrome who was admitted on 1/22/2022 with COVID-19 pneumonia and acute hypoxic respiratory failure.      Hospitalization has been prolonged due to need to establish guardianship following deaths of her parents. Awaiting placement. Patient is in hospital for longtime given problem with placement at this time, She remain medically stable at this time without change in her clinical status.      Down syndrome  Developmental Delay  Failure to thrive  Had significant emotional, psychiatric issues during this stay due to the death of her parents and prolonged hospitalization. Discharge planning has been complicated by need for guardianship and payor source for placement.   -Patient's brother, Maxi, was granted 60 days of emergency guardianship on 2/15. Completed psychologic testing on 3/2/22 to assess current cognitive function and adaptive abilities. Permanent guardianship on 4/1/2022.  - Social work and clinical coordinator assisting with disposition.  - We are working on getting her into our group home and family has been in agreement, not able to find any place for her at this time.   - No RN concerns. Taking po. Sleeping well. Busy with activities during the day, up walking halls.   -I again asked her about the labs and she declined     Severe malnutrition  Anorexia  Chronic abdominal pain  Chronic constipation  BMI ~13 on early this hospitalization with poor PO intake. SLP was consulted for possible dysphagia, and no evidence of dysphagia was noted.   -On 2/20, discussed with pt's brother; overall, felt that oral intake was probably acceptable at this point for discharge and did not feel we needed to pursue enteral feedings/g-tube at this point; desired workup for etiology of abdominal pain which he felt was  contributing to her decreased PO intake.  - CT abd/pelvis 2/21 showed large amount of stool. EGD 2/22 relatively unremarkable. US abd no acute pathology. BMI up to 14.7 2/27.  - Encourage PO intake.  - continue with PP,prn bisacodyl , miralax, senna   - has been declining labs for the past many days      Thyroid nodule  TSH has been elevated at 9.95 on 2/21/22 but normal T4 in the past   - Repeat TSH ordered but patient has been refusing blood draws.  This lab is not urgent and can be done as an outpatient.     COVID-19 recovered  - Initially diagnosed 1/19/2022.     E coli UTI, resolved  - Completed course of antibiotics on 2/15/22.    ?Left knee stiffness  -  6/22I was paged by the nurse that group home and the patient's brother wanted to know if there has been any x-rays done for the knee.  Of note patient has been admitted to the hospital since January and I only assumed her care 4 days back and she has not complained of any pain to me and has been declining labs every day I have asked and I did not find any prior documentation of concern for the left knee  -x ray of the left knee was done on 6/22 and it showed normal joint spacing and alignment and no acute fracture or joint effusion  -I did examine her knee and there is no local effusion but she is very hesitant for range of motion but did admit that her knee has mild stiffness  -I did order diclofenac gel to be applied 4 times a day and we will see the response as it can be musculoskeletal  -I did discuss with patient's brother Matt today and updated him on the results of x-ray and plan and I did offer consult orthopedics and did mention they may offer MRI but I am not sure if Miranda will be able to lay still for a long period of time and Matt agrees that it might be that she is getting old and wants to hold on for further work-up and see the response to diclofenac.  I also told Matt that we have tried to get labs on her and last labs were done in March and  we will continue to attempt to get labs if we can and he understands          Diet: Combination Diet Regular Diet; High Kcal/High Protein Diet, ADULT    DVT Prophylaxis: she is ambulating all the time  Zhu Catheter: Not present  Central Lines: None  Cardiac Monitoring: None  Code Status: Full Code      Disposition Plan   Expected Discharge:    Expected Discharge Date: 06/28/2022    Discharge Delays: Placement - Group Homes  Destination: group home  Discharge Comments: To  pending funding. Possibly Next week?     Anticipated discharge location: group home    Delays:       Discharge Delays: Placement - Group Homes          The patient's care was discussed with the Bedside Nurse and Patient.  [facility] [place] is going to do her intake tomorrow and after that admit date will be decided    Vijay Hoffmann MD  Hospitalist Service  [facility]  Securely message with the Vocera Web Console (learn more here)  Text page via [employer] Paging/Directory         Clinically Significant Risk Factors Present on Admission                    ______________________________________________________________________    Interval History     I saw the patient this morning and that she was standing near the nurses desk and denies any shortness of breath but this is the first time she did mention to me that she has mild stiffness of the knee.  She denies any tingling or numbness.    I did offer labs and patient again declined    I did discuss the plan of care with patient's nurse    Data reviewed today: I reviewed all medications, new labs and imaging results over the last 24 hours. I personally reviewed no images or EKG's today.    Physical Exam   Vital Signs: Temp: 97.8  F (36.6  C) Temp src: Oral BP: (!) 87/56 Pulse: 63   Resp: 16 SpO2: 96 % O2 Device: None (Room air)    Weight: 97 lbs 0 oz        General: Patient was standing in the hallway  HEENT: .    Neck: Neck is supple   Respiratory: Lungs are clear to  auscultation bilaterally   Cardiovascular: S1-S2 normal  Abdomen:   soft ,   Neurologic:  No facial droop  Musculoskeletal: She has been walking in the hallway and in her room  Left knee: There is no joint effusion but patient did point that she has some stiffness  Psychiatric: cooperative but has flat affect    Data   No lab results found in last 7 days.    Recent Results (from the past 24 hour(s))   XR Knee Left 1/2 Views    Narrative    XR KNEE LT 1/2 VW   6/22/2022 4:37 PM     HISTORY: knee stiffness    COMPARISON: None.      Impression    IMPRESSION: Normal joint spacing and alignment. No acute fracture or  joint effusion.    MONY SANZ MD         SYSTEM ID:  [id number]     Medications       diclofenac  2 g Topical 4x Daily     melatonin  1 mg Oral At Bedtime     multivitamin w/minerals  1 tablet Oral Daily     pantoprazole  40 mg Oral QAM AC     polyethylene glycol  17 g Oral BID     senna-docusate  1 tablet Oral BID     cholecalciferol  50 mcg Oral Daily

## 2022-06-24 PROCEDURE — 250N000013 HC RX MED GY IP 250 OP 250 PS 637: Performed by: INTERNAL MEDICINE

## 2022-06-24 PROCEDURE — 120N000001 HC R&B MED SURG/OB

## 2022-06-24 PROCEDURE — 99231 SBSQ HOSP IP/OBS SF/LOW 25: CPT | Performed by: HOSPITALIST

## 2022-06-24 PROCEDURE — 250N000013 HC RX MED GY IP 250 OP 250 PS 637: Performed by: HOSPITALIST

## 2022-06-24 RX ADMIN — MULTIPLE VITAMINS W/ MINERALS TAB 1 TABLET: TAB at 21:40

## 2022-06-24 RX ADMIN — POLYETHYLENE GLYCOL 3350 17 G: 17 POWDER, FOR SOLUTION ORAL at 12:38

## 2022-06-24 RX ADMIN — POLYETHYLENE GLYCOL 3350 17 G: 17 POWDER, FOR SOLUTION ORAL at 21:40

## 2022-06-24 RX ADMIN — Medication 1 MG: at 21:40

## 2022-06-24 RX ADMIN — Medication 50 MCG: at 12:37

## 2022-06-24 RX ADMIN — SENNOSIDES AND DOCUSATE SODIUM 1 TABLET: 8.6; 5 TABLET ORAL at 21:40

## 2022-06-24 RX ADMIN — PANTOPRAZOLE SODIUM 40 MG: 40 TABLET, DELAYED RELEASE ORAL at 12:37

## 2022-06-24 RX ADMIN — DICLOFENAC SODIUM 2 G: 10 GEL TOPICAL at 12:38

## 2022-06-24 RX ADMIN — SENNOSIDES AND DOCUSATE SODIUM 1 TABLET: 8.6; 5 TABLET ORAL at 12:37

## 2022-06-24 ASSESSMENT — ACTIVITIES OF DAILY LIVING (ADL)
ADLS_ACUITY_SCORE: 33
ADLS_ACUITY_SCORE: 35
ADLS_ACUITY_SCORE: 34
ADLS_ACUITY_SCORE: 35
ADLS_ACUITY_SCORE: 33
ADLS_ACUITY_SCORE: 35

## 2022-06-24 NOTE — PLAN OF CARE
Goal Outcome Evaluation:  DATE & TIME: 6/24/22 0083-6059   Cognitive Concerns/ Orientation: A&Ox4   BEHAVIOR & AGGRESSION TOOL COLOR: Green   ABNL VS/O2: VSS on room air  MOBILITY: Independent  PAIN MANAGMENT: Denies  DIET: High calorie/protein-needs encouragement to eat  BOWEL/BLADDER: Continent  SKIN: intact  D/C DATE: Next Tuesday 6/28 to group home, anxious and talks about being very scared to go on Tuesday  OTHER IMPORTANT INFO:                     (0) independent

## 2022-06-24 NOTE — PLAN OF CARE
Goal Outcome Evaluation:      DATE & TIME: 6/23/22-6/24/22 [id number]   Cognitive Concerns/ Orientation: A&Ox4   BEHAVIOR & AGGRESSION TOOL COLOR: Green   ABNL VS/O2: VSS on room air  MOBILITY: Independent  PAIN MANAGMENT: Denies  DIET: High calorie/protein-needs encouragement to eat  BOWEL/BLADDER: Continent  SKIN: Bilateral lower extremity edema +1  D/C DATE: Next Tuesday 6/28 to group home, anxious and talks about being very scared to go on Tuesday  OTHER IMPORTANT INFO:

## 2022-06-24 NOTE — PROGRESS NOTES
[facility]    Medicine Progress Note - Hospitalist Service    Date of Admission:  1/22/2022    Assessment & Plan               Miranda Dover is a 51 year old female with PMH Down Syndrome who was admitted on 1/22/2022 with COVID-19 pneumonia and acute hypoxic respiratory failure.      Hospitalization has been prolonged due to need to establish guardianship following deaths of her parents. Awaiting placement. Patient is in hospital for longtime given problem with placement at this time, She remain medically stable at this time without change in her clinical status.      Down syndrome  Developmental Delay  Failure to thrive  Had significant emotional, psychiatric issues during this stay due to the death of her parents and prolonged hospitalization. Discharge planning has been complicated by need for guardianship and payor source for placement.   -Patient's brother, Maxi, was granted 60 days of emergency guardianship on 2/15. Completed psychologic testing on 3/2/22 to assess current cognitive function and adaptive abilities. Permanent guardianship on 4/1/2022.  - Social work and clinical coordinator assisting with disposition.  - We are working on getting her into our group home and family has been in agreement, not able to find any place for her at this time.   - No RN concerns. Taking po. Sleeping well. Busy with activities during the day, up walking halls.   -I again asked her about the labs and she declined     Severe malnutrition  Anorexia  Chronic abdominal pain  Chronic constipation  BMI ~13 on early this hospitalization with poor PO intake. SLP was consulted for possible dysphagia, and no evidence of dysphagia was noted.   -On 2/20, discussed with pt's brother; overall, felt that oral intake was probably acceptable at this point for discharge and did not feel we needed to pursue enteral feedings/g-tube at this point; desired workup for etiology of abdominal pain which he felt was  contributing to her decreased PO intake.  - CT abd/pelvis 2/21 showed large amount of stool. EGD 2/22 relatively unremarkable. US abd no acute pathology. BMI up to 14.7 2/27.  - Encourage PO intake.  - continue with PP,prn bisacodyl , miralax, senna   - has been declining labs for the past many days      Thyroid nodule  TSH has been elevated at 9.95 on 2/21/22 but normal T4 in the past   - Repeat TSH ordered but patient has been refusing blood draws.  This lab is not urgent and can be done as an outpatient.     COVID-19 recovered  - Initially diagnosed 1/19/2022.     E coli UTI, resolved  - Completed course of antibiotics on 2/15/22.    ?Left knee stiffness  -  6/22I was paged by the nurse that group home and the patient's brother wanted to know if there has been any x-rays done for the knee.  Of note patient has been admitted to the hospital since January and I only assumed her care 4 days back and she has not complained of any pain to me and has been declining labs every day I have asked and I did not find any prior documentation of concern for the left knee  -x ray of the left knee was done on 6/22 and it showed normal joint spacing and alignment and no acute fracture or joint effusion  -I did examine her knee and there is no local effusion but she is very hesitant for range of motion but did admit that her knee has mild stiffness  -I did order diclofenac gel to be applied 4 times a day and we will see the response as it can be musculoskeletal  - 6/23-I did discuss with patient's brother Matt today and updated him on the results of x-ray and plan and I did offer consult orthopedics and did mention they may offer MRI but I am not sure if Miranda will be able to lay still for a long period of time and Matt agrees that it might be that she is getting old and wants to hold on for further work-up and see the response to diclofenac.  I also told Matt that we have tried to get labs on her and last labs were done in  March and we will continue to attempt to get labs if we can and he understands  - we will continue with local ibuprofen          Diet: Combination Diet Regular Diet; High Kcal/High Protein Diet, ADULT    DVT Prophylaxis: she is ambulating all the time  Zhu Catheter: Not present  Central Lines: None  Cardiac Monitoring: None  Code Status: Full Code      Disposition Plan   Expected Discharge:    Expected Discharge Date: 06/28/2022    Discharge Delays: Placement - Group Homes  Destination: group home  Discharge Comments: group home will accept on Tuesday 6/28     Anticipated discharge location: group home    Delays:       Discharge Delays: Placement - Group Homes          The patient's care was discussed with the Bedside Nurse and Patient.  [facility] group home is going to do her intake tomorrow and after that admit date will be decided    Vijay Hoffmann MD  Hospitalist Service  [facility]  Securely message with the Vocera Web Console (learn more here)  Text page via [employer] Paging/Directory         Clinically Significant Risk Factors Present on Admission                    ______________________________________________________________________    Interval History     The patient this morning and she denies any fever, chills and mentioned to me that he is not looking forward to going to the group home.  When I asked her about the need and she mentioned that it is so-so and I told her to continue using diclofenac gel     spoke with patient's nurse and the charge nurse and they told me that they will try to visit the patient when she goes to the group home as she has been in the hospital for the past 6 months and will help in her transition.    Data reviewed today: I reviewed all medications, new labs and imaging results over the last 24 hours. I personally reviewed no images or EKG's today.    Physical Exam   Vital Signs: Temp: 98.6  F (37  C) Temp src: Oral BP: 94/66 Pulse: 67   Resp: 16 SpO2: 99  % O2 Device: None (Room air)    Weight: 97 lbs 0 oz        General: Patient was standing in the hallway  HEENT: .    Neck: Neck is supple   Respiratory: Lungs are clear to auscultation bilaterally   Cardiovascular: S1-S2 normal  Abdomen:   soft ,   Neurologic:  No facial droop  Musculoskeletal: She has been walking in the hallway and in her room  Left knee: There is no joint effusion but patient did point that she has some stiffness  Psychiatric: cooperative but has flat affect    Data   No lab results found in last 7 days.    No results found for this or any previous visit (from the past 24 hour(s)).  Medications       diclofenac  2 g Topical 4x Daily     melatonin  1 mg Oral At Bedtime     multivitamin w/minerals  1 tablet Oral Daily     pantoprazole  40 mg Oral QAM AC     polyethylene glycol  17 g Oral BID     senna-docusate  1 tablet Oral BID     cholecalciferol  50 mcg Oral Daily

## 2022-06-25 PROCEDURE — 120N000001 HC R&B MED SURG/OB

## 2022-06-25 PROCEDURE — 250N000013 HC RX MED GY IP 250 OP 250 PS 637: Performed by: INTERNAL MEDICINE

## 2022-06-25 PROCEDURE — 250N000013 HC RX MED GY IP 250 OP 250 PS 637: Performed by: HOSPITALIST

## 2022-06-25 PROCEDURE — 99231 SBSQ HOSP IP/OBS SF/LOW 25: CPT | Performed by: HOSPITALIST

## 2022-06-25 RX ADMIN — PANTOPRAZOLE SODIUM 40 MG: 40 TABLET, DELAYED RELEASE ORAL at 12:13

## 2022-06-25 RX ADMIN — Medication 1 MG: at 21:43

## 2022-06-25 RX ADMIN — POLYETHYLENE GLYCOL 3350 17 G: 17 POWDER, FOR SOLUTION ORAL at 21:43

## 2022-06-25 RX ADMIN — SENNOSIDES AND DOCUSATE SODIUM 1 TABLET: 8.6; 5 TABLET ORAL at 21:42

## 2022-06-25 RX ADMIN — MULTIPLE VITAMINS W/ MINERALS TAB 1 TABLET: TAB at 21:42

## 2022-06-25 RX ADMIN — SENNOSIDES AND DOCUSATE SODIUM 1 TABLET: 8.6; 5 TABLET ORAL at 12:13

## 2022-06-25 RX ADMIN — Medication 50 MCG: at 12:13

## 2022-06-25 RX ADMIN — CALCIUM CARBONATE (ANTACID) CHEW TAB 500 MG 500 MG: 500 CHEW TAB at 04:13

## 2022-06-25 ASSESSMENT — ACTIVITIES OF DAILY LIVING (ADL)
ADLS_ACUITY_SCORE: 35
ADLS_ACUITY_SCORE: 33
ADLS_ACUITY_SCORE: 35
ADLS_ACUITY_SCORE: 35
ADLS_ACUITY_SCORE: 33
ADLS_ACUITY_SCORE: 33
ADLS_ACUITY_SCORE: 35
ADLS_ACUITY_SCORE: 35
ADLS_ACUITY_SCORE: 33
ADLS_ACUITY_SCORE: 35

## 2022-06-25 NOTE — PROGRESS NOTES
[facility]    Medicine Progress Note - Hospitalist Service    Date of Admission:  1/22/2022    Assessment & Plan               Miranda Dover is a 51 year old female with PMH Down Syndrome who was admitted on 1/22/2022 with COVID-19 pneumonia and acute hypoxic respiratory failure.      Hospitalization has been prolonged due to need to establish guardianship following deaths of her parents. Awaiting placement. Patient is in hospital for longtime given problem with placement at this time, She remain medically stable at this time without change in her clinical status.      Down syndrome  Developmental Delay  Failure to thrive  Had significant emotional, psychiatric issues during this stay due to the death of her parents and prolonged hospitalization. Discharge planning has been complicated by need for guardianship and payor source for placement.   -Patient's brother, Maxi, was granted 60 days of emergency guardianship on 2/15. Completed psychologic testing on 3/2/22 to assess current cognitive function and adaptive abilities. Permanent guardianship on 4/1/2022.  - Social work and clinical coordinator assisting with disposition.  - We are working on getting her into our group home and family has been in agreement, not able to find any place for her at this time.   - No RN concerns. Taking po. Sleeping well. Busy with activities during the day, up walking halls.   -I again asked her about the labs and she declined     Severe malnutrition  Anorexia  Chronic abdominal pain  Chronic constipation  BMI ~13 on early this hospitalization with poor PO intake. SLP was consulted for possible dysphagia, and no evidence of dysphagia was noted.   -On 2/20, discussed with pt's brother; overall, felt that oral intake was probably acceptable at this point for discharge and did not feel we needed to pursue enteral feedings/g-tube at this point; desired workup for etiology of abdominal pain which he felt was  contributing to her decreased PO intake.  - CT abd/pelvis 2/21 showed large amount of stool. EGD 2/22 relatively unremarkable. US abd no acute pathology. BMI up to 14.7 2/27.  - Encourage PO intake.  - continue with PP,prn bisacodyl , miralax, senna   - has been declining labs for the past many days      Thyroid nodule  TSH has been elevated at 9.95 on 2/21/22 but normal T4 in the past   - Repeat TSH ordered but patient has been refusing blood draws.  This lab is not urgent and can be done as an outpatient.     COVID-19 recovered  - Initially diagnosed 1/19/2022.     E coli UTI, resolved  - Completed course of antibiotics on 2/15/22.    ?Left knee stiffness  -  6/22I was paged by the nurse that group home and the patient's brother wanted to know if there has been any x-rays done for the knee.  Of note patient has been admitted to the hospital since January and I only assumed her care 4 days back and she has not complained of any pain to me and has been declining labs every day I have asked and I did not find any prior documentation of concern for the left knee  -x ray of the left knee was done on 6/22 and it showed normal joint spacing and alignment and no acute fracture or joint effusion  -I did examine her knee and there is no local effusion but she is very hesitant for range of motion but did admit that her knee has mild stiffness  -I did order diclofenac gel to be applied 4 times a day and we will see the response as it can be musculoskeletal  - 6/23-I did discuss with patient's brother Matt today and updated him on the results of x-ray and plan and I did offer consult orthopedics and did mention they may offer MRI but I am not sure if Miranda will be able to lay still for a long period of time and Matt agrees that it might be that she is getting old and wants to hold on for further work-up and see the response to diclofenac.  I also told Matt that we have tried to get labs on her and last labs were done in  March and we will continue to attempt to get labs if we can and he understands  - we will continue with local diclofenac gel          Diet: Combination Diet Regular Diet; High Kcal/High Protein Diet, ADULT    DVT Prophylaxis: she is ambulating all the time  Zhu Catheter: Not present  Central Lines: None  Cardiac Monitoring: None  Code Status: Full Code      Disposition Plan   Expected Discharge:   Expected Discharge Date: 06/28/2022    Discharge Delays: Placement - Group Homes  Destination: group home  Discharge Comments: group home will accept on Tuesday 6/28     Anticipated discharge location: group home    Delays:       Discharge Delays: Placement - Group Homes          The patient's care was discussed with the Bedside Nurse and Patient.      Vijay Hoffmann MD  Hospitalist Service  [facility]  Securely message with the Vocera Web Console (learn more here)  Text page via [employer] Paging/Directory         Clinically Significant Risk Factors Present on Admission                    ______________________________________________________________________    Interval History     Patient this morning and I spoke with the nurses and they mentioned that the patient expressed overnight that she does not feel going to the group home.  Patient was seen in her room and did not complain of any knee stiffness but according to the nurses he does not like the ibuprofen.  I did request the patient to do labs and she agrees that she will do tomorrow    Data reviewed today: I reviewed all medications, new labs and imaging results over the last 24 hours. I personally reviewed no images or EKG's today.    Physical Exam   Vital Signs: Temp: 97.4  F (36.3  C) Temp src: Oral BP: 97/53 Pulse: 58   Resp: 20 SpO2: 99 % O2 Device: None (Room air)    Weight: 97 lbs 0 oz        General: Patient was standing in the hallway  Neck: Neck is supple   Respiratory: Lungs are clear to auscultation bilaterally   Cardiovascular:  S1-S2 normal  Abdomen:   soft ,   Neurologic:  No facial droop  Musculoskeletal: She has been walking in the hallway and in her room  Left knee: There is no joint effusion but patient did point that she has some stiffness  Psychiatric: cooperative but has flat affect    Data   No lab results found in last 7 days.    No results found for this or any previous visit (from the past 24 hour(s)).  Medications       diclofenac  2 g Topical 4x Daily     melatonin  1 mg Oral At Bedtime     multivitamin w/minerals  1 tablet Oral Daily     pantoprazole  40 mg Oral QAM AC     polyethylene glycol  17 g Oral BID     senna-docusate  1 tablet Oral BID     cholecalciferol  50 mcg Oral Daily

## 2022-06-25 NOTE — PLAN OF CARE
DATE & TIME: 6/24/22, 1173 - 0335    Cognitive Concerns/ Orientation : A&O x 4   BEHAVIOR & AGGRESSION TOOL COLOR: Green   ABNL VS/O2: VS done daily  MOBILITY: Independent  PAIN MANAGMENT: Prn Tums given as requested by patient for stomach  DIET: Regular, high calorie/protein  BOWEL/BLADDER: Continent  ABNL LAB/BG: NA  DRAIN/DEVICES: None  SKIN: +1 edema to BLE  TESTS/PROCEDURES: NA  D/C DATE: For discharge next Tuesday to group home  OTHER IMPORTANT INFO: NA    Goal Outcome Evaluation:    Plan of Care Reviewed With: patient     Overall Patient Progress: improving

## 2022-06-25 NOTE — PROGRESS NOTES
Care Management Follow Up    Length of Stay (days): 154    Expected Discharge Date: 06/28/2022     Concerns to be Addressed: discharge planning     Patient plan of care discussed at interdisciplinary rounds: Yes    Anticipated Discharge Disposition:  ([facility])     Anticipated Discharge Services:    Anticipated Discharge DME:      Patient/family educated on Medicare website which has current facility and service quality ratings: no  Education Provided on the Discharge Plan:    Patient/Family in Agreement with the Plan: yes    Referrals Placed by CM/SW: Post Acute Facilities  Private pay costs discussed: Not applicable    Additional Information:  Spoke with pt's brother/guardian Alfreda Confirmed discharge for Tuesday, 6/28, at 1100. He will come directly to pt's room.     JOSE Richardson, [facility]  [phone] Desk phone  [phone] Cell/text (Preferred)  [facility]

## 2022-06-26 PROCEDURE — 250N000013 HC RX MED GY IP 250 OP 250 PS 637: Performed by: INTERNAL MEDICINE

## 2022-06-26 PROCEDURE — 250N000013 HC RX MED GY IP 250 OP 250 PS 637: Performed by: HOSPITALIST

## 2022-06-26 PROCEDURE — 99232 SBSQ HOSP IP/OBS MODERATE 35: CPT | Performed by: HOSPITALIST

## 2022-06-26 PROCEDURE — 120N000001 HC R&B MED SURG/OB

## 2022-06-26 RX ADMIN — CALCIUM CARBONATE (ANTACID) CHEW TAB 500 MG 500 MG: 500 CHEW TAB at 01:10

## 2022-06-26 RX ADMIN — SENNOSIDES AND DOCUSATE SODIUM 1 TABLET: 8.6; 5 TABLET ORAL at 22:27

## 2022-06-26 RX ADMIN — Medication 50 MCG: at 09:05

## 2022-06-26 RX ADMIN — Medication 1 MG: at 22:27

## 2022-06-26 RX ADMIN — SENNOSIDES AND DOCUSATE SODIUM 1 TABLET: 8.6; 5 TABLET ORAL at 09:05

## 2022-06-26 RX ADMIN — POLYETHYLENE GLYCOL 3350 17 G: 17 POWDER, FOR SOLUTION ORAL at 09:07

## 2022-06-26 RX ADMIN — PANTOPRAZOLE SODIUM 40 MG: 40 TABLET, DELAYED RELEASE ORAL at 09:04

## 2022-06-26 RX ADMIN — MULTIPLE VITAMINS W/ MINERALS TAB 1 TABLET: TAB at 22:27

## 2022-06-26 ASSESSMENT — ACTIVITIES OF DAILY LIVING (ADL)
ADLS_ACUITY_SCORE: 35

## 2022-06-26 NOTE — PLAN OF CARE
Goal Outcome Evaluation:    Plan of Care Reviewed With: patient     Overall Patient Progress: improving  DATE & TIME: 6/25/22 11-540a  Cognitive Concerns/ Orientation: A&Ox4   BEHAVIOR & AGGRESSION TOOL COLOR: Green   ABNL VS/O2: VSS on room air  MOBILITY: Independent  PAIN MANAGMENT: abdominal discomfort-tums given   DIET: High calorie/protein-needs BOWEL/BLADDER: Continent  SKIN: intact  D/C DATE: Next Tuesday 6/28 @ 1100  to group home,   OTHER IMPORTANT INFO:

## 2022-06-26 NOTE — PROGRESS NOTES
DATE & TIME: 6/25/22 1900-2300:  Cognitive Concerns/ Orientation: A&Ox4   BEHAVIOR & AGGRESSION TOOL COLOR: Green             ABNL VS/O2: VSS on room air  MOBILITY: Independent  PAIN MANAGMENT: Denies  DIET: High calorie/protein-needs encouragement to eat  BOWEL/BLADDER: Continent  SKIN: intact  D/C DATE: Next Tuesday 6/28 @ 1100  to group home, anxious and talks about being very scared to go on Tuesday  OTHER IMPORTANT INFO:

## 2022-06-26 NOTE — PLAN OF CARE
Goal Outcome Evaluation:      DATE & TIME: 6/25/22 8449-0360  Cognitive Concerns/ Orientation: A&Ox4   BEHAVIOR & AGGRESSION TOOL COLOR: Green   ABNL VS/O2: VSS on room air  MOBILITY: Independent  PAIN MANAGMENT: Denies  DIET: High calorie/protein-needs encouragement to eat  BOWEL/BLADDER: Continent  SKIN: intact  D/C DATE: Next Tuesday 6/28 @ 1100  to group home, anxious and talks about being very scared to go on Tuesday  OTHER IMPORTANT INFO:

## 2022-06-26 NOTE — PROGRESS NOTES
[facility]    Medicine Progress Note - Hospitalist Service    Date of Admission:  1/22/2022    Assessment & Plan               Miranda Dover is a 51 year old female with PMH Down Syndrome who was admitted on 1/22/2022 with COVID-19 pneumonia and acute hypoxic respiratory failure.      Hospitalization has been prolonged due to need to establish guardianship following deaths of her parents. Awaiting placement. Patient is in hospital for longtime given problem with placement at this time, She remain medically stable at this time without change in her clinical status.      Down syndrome  Developmental Delay  Failure to thrive  Had significant emotional, psychiatric issues during this stay due to the death of her parents and prolonged hospitalization. Discharge planning has been complicated by need for guardianship and payor source for placement.   -Patient's brother, Maxi, was granted 60 days of emergency guardianship on 2/15. Completed psychologic testing on 3/2/22 to assess current cognitive function and adaptive abilities. Permanent guardianship on 4/1/2022.  - Social work and clinical coordinator assisting with disposition.  - We are working on getting her into our group home and family has been in agreement, not able to find any place for her at this time.   - No RN concerns. Taking po. Sleeping well. Busy with activities during the day, up walking halls.   -I again asked her about the labs and she declined     Severe malnutrition  Anorexia  Chronic abdominal pain  Chronic constipation  BMI ~13 on early this hospitalization with poor PO intake. SLP was consulted for possible dysphagia, and no evidence of dysphagia was noted.   -On 2/20, discussed with pt's brother; overall, felt that oral intake was probably acceptable at this point for discharge and did not feel we needed to pursue enteral feedings/g-tube at this point; desired workup for etiology of abdominal pain which he felt was  contributing to her decreased PO intake.  - CT abd/pelvis 2/21 showed large amount of stool. EGD 2/22 relatively unremarkable. US abd no acute pathology. BMI up to 14.7 2/27.  - Encourage PO intake.  - continue with PP,prn bisacodyl , miralax, senna   - has been declining labs for the past many days      Thyroid nodule  TSH has been elevated at 9.95 on 2/21/22 but normal T4 in the past   - Repeat TSH ordered but patient has been refusing blood draws.  This lab is not urgent and can be done as an outpatient.     COVID-19 recovered  - Initially diagnosed 1/19/2022.     E coli UTI, resolved  - Completed course of antibiotics on 2/15/22.    ?Left knee stiffness  -  6/22I was paged by the nurse that group home and the patient's brother wanted to know if there has been any x-rays done for the knee.  Of note patient has been admitted to the hospital since January and I only assumed her care 4 days back and she has not complained of any pain to me and has been declining labs every day I have asked and I did not find any prior documentation of concern for the left knee  -x ray of the left knee was done on 6/22 and it showed normal joint spacing and alignment and no acute fracture or joint effusion  -I did examine her knee and there is no local effusion but she is very hesitant for range of motion but did admit that her knee has mild stiffness  -I did order diclofenac gel to be applied 4 times a day and we will see the response as it can be musculoskeletal  - 6/23-I did discuss with patient's brother Matt today and updated him on the results of x-ray and plan and I did offer consult orthopedics and did mention they may offer MRI but I am not sure if Miranda will be able to lay still for a long period of time and Matt agrees that it might be that she is getting old and wants to hold on for further work-up and see the response to diclofenac.  I also told Matt that we have tried to get labs on her and last labs were done in  March and we will continue to attempt to get labs if we can and he understands  - we will continue with local diclofenac gel  -I did offer MRI to the patient but he does not want to do it today          Diet: Combination Diet Regular Diet; High Kcal/High Protein Diet, ADULT    DVT Prophylaxis: she is ambulating all the time  Zhu Catheter: Not present  Central Lines: None  Cardiac Monitoring: None  Code Status: Full Code      Disposition Plan   Expected Discharge:    Expected Discharge Date: 06/28/2022, 11:00 AM  Discharge Delays: Placement - Group Homes  *Early Discharge Anticipated  Destination: group home  Discharge Comments: group home will accept on Tuesday 6/28     Anticipated discharge location: group home    Delays:       Discharge Delays: Placement - Group Homes  *Early Discharge Anticipated          The patient's care was discussed with the Bedside Nurse and Patient.      Vijay Hoffmann MD  Hospitalist Service  [facility]  Securely message with the Vocera Web Console (learn more here)  Text page via Direct Access Software[employer]     I am off service from tomorrow morning and her care will be taken over by hospital medicine team    Clinically Significant Risk Factors Present on Admission                    ______________________________________________________________________    Interval History     I saw the patient today and spoke with the patient's nurse and did mention that as her day is getting closer to go to group home she is having a lot of complaints and was saying that she is having stomach upset and mentioned to me that milk helps, but the fact that does not help her pain.    I requested labs to the patient and she was noncommittal but I have ordered the same and also as far as knee is concerned she says on and off but feels better and I offered MRI but she does not want to do it    Data reviewed today: I reviewed all medications, new labs and imaging results over the last 24  hours. I personally reviewed no images or EKG's today.    Physical Exam   Vital Signs: Temp: 97.5  F (36.4  C) Temp src: Oral BP: 99/66 Pulse: 72   Resp: 20 SpO2: 98 % O2 Device: None (Room air)    Weight: 97 lbs 0 oz        General: Patient was standing in the hallway  Neck: Neck is supple   Respiratory: Lungs are clear to auscultation bilaterally   Cardiovascular: S1-S2 normal  Abdomen:   soft ,   Neurologic:  No facial droop  Musculoskeletal: She has been walking in the hallway and in her room  Left knee: There is no joint effusion but patient did point that she has some stiffness  Psychiatric: cooperative but has flat affect    Data   No lab results found in last 7 days.    No results found for this or any previous visit (from the past 24 hour(s)).  Medications       diclofenac  2 g Topical 4x Daily     melatonin  1 mg Oral At Bedtime     multivitamin w/minerals  1 tablet Oral Daily     pantoprazole  40 mg Oral QAM AC     polyethylene glycol  17 g Oral BID     senna-docusate  1 tablet Oral BID     cholecalciferol  50 mcg Oral Daily

## 2022-06-27 PROCEDURE — 99232 SBSQ HOSP IP/OBS MODERATE 35: CPT | Performed by: INTERNAL MEDICINE

## 2022-06-27 PROCEDURE — 250N000013 HC RX MED GY IP 250 OP 250 PS 637: Performed by: INTERNAL MEDICINE

## 2022-06-27 PROCEDURE — 120N000001 HC R&B MED SURG/OB

## 2022-06-27 PROCEDURE — 250N000013 HC RX MED GY IP 250 OP 250 PS 637: Performed by: HOSPITALIST

## 2022-06-27 RX ORDER — ACETAMINOPHEN 325 MG/1
650 TABLET ORAL EVERY 6 HOURS PRN
Qty: 90 TABLET | Refills: 0 | Status: SHIPPED | OUTPATIENT
Start: 2022-06-27

## 2022-06-27 RX ORDER — PANTOPRAZOLE SODIUM 40 MG/1
40 TABLET, DELAYED RELEASE ORAL
Qty: 30 TABLET | Refills: 0 | Status: SHIPPED | OUTPATIENT
Start: 2022-06-28

## 2022-06-27 RX ORDER — MULTIPLE VITAMINS W/ MINERALS TAB 9MG-400MCG
1 TAB ORAL DAILY
Qty: 30 TABLET | Refills: 0 | Status: SHIPPED | OUTPATIENT
Start: 2022-06-27

## 2022-06-27 RX ORDER — BISACODYL 10 MG
10 SUPPOSITORY, RECTAL RECTAL DAILY PRN
Qty: 5 SUPPOSITORY | Refills: 0 | Status: SHIPPED | OUTPATIENT
Start: 2022-06-27

## 2022-06-27 RX ORDER — POLYETHYLENE GLYCOL 3350 17 G/17G
17 POWDER, FOR SOLUTION ORAL DAILY
Qty: 510 G | Refills: 0 | Status: SHIPPED | OUTPATIENT
Start: 2022-06-27

## 2022-06-27 RX ORDER — CALCIUM CARBONATE 500 MG/1
1 TABLET, CHEWABLE ORAL DAILY PRN
Qty: 30 TABLET | Refills: 0 | Status: SHIPPED | OUTPATIENT
Start: 2022-06-27

## 2022-06-27 RX ORDER — CHOLECALCIFEROL (VITAMIN D3) 50 MCG
50 TABLET ORAL DAILY
Qty: 30 TABLET | Refills: 0 | Status: SHIPPED | OUTPATIENT
Start: 2022-06-28

## 2022-06-27 RX ORDER — AMOXICILLIN 250 MG
1 CAPSULE ORAL 2 TIMES DAILY
Qty: 60 TABLET | Refills: 0 | Status: SHIPPED | OUTPATIENT
Start: 2022-06-27

## 2022-06-27 RX ADMIN — Medication 1 MG: at 22:09

## 2022-06-27 RX ADMIN — Medication 50 MCG: at 08:49

## 2022-06-27 RX ADMIN — SENNOSIDES AND DOCUSATE SODIUM 1 TABLET: 8.6; 5 TABLET ORAL at 22:08

## 2022-06-27 RX ADMIN — SENNOSIDES AND DOCUSATE SODIUM 1 TABLET: 8.6; 5 TABLET ORAL at 08:49

## 2022-06-27 RX ADMIN — CALCIUM CARBONATE (ANTACID) CHEW TAB 500 MG 500 MG: 500 CHEW TAB at 01:40

## 2022-06-27 RX ADMIN — MULTIPLE VITAMINS W/ MINERALS TAB 1 TABLET: TAB at 22:07

## 2022-06-27 RX ADMIN — POLYETHYLENE GLYCOL 3350 17 G: 17 POWDER, FOR SOLUTION ORAL at 08:59

## 2022-06-27 RX ADMIN — PANTOPRAZOLE SODIUM 40 MG: 40 TABLET, DELAYED RELEASE ORAL at 08:49

## 2022-06-27 ASSESSMENT — ACTIVITIES OF DAILY LIVING (ADL)
ADLS_ACUITY_SCORE: 35

## 2022-06-27 NOTE — PLAN OF CARE
Goal Outcome Evaluation:      DATE & TIME: 6/26/22 6193-3677  Cognitive Concerns/ Orientation: A&Ox4   BEHAVIOR & AGGRESSION TOOL COLOR: Green   ABNL VS/O2: VSS on room air  MOBILITY: Independent  PAIN MANAGMENT: Denies  DIET: High calorie/protein-needs encouragement to eat  BOWEL/BLADDER: Continent  SKIN: intact  D/C DATE: Next Tuesday 6/28 @ 1100  to group home, anxious and talks about being very scared to go on Tuesday  OTHER IMPORTANT INFO:

## 2022-06-27 NOTE — PROGRESS NOTES
[facility]    Hospitalist Progress Note    Interval History   - Anxious today due to discharge to group home tomorrow. Otherwise no acute issues today  - Discharge orders placed today in anticipation of morning discharge tomorrow    Assessment & Plan   Down syndrome  Developmental Delay  Failure to thrive  COVID-19, resolved    Miranda Dover is a 51 year old female with PMH Down Syndrome who was admitted on 2022 with COVID-19 pneumonia and acute hypoxic respiratory failure.   Patient made a full recovery from COVID-19. However, this was followed by a prolonged hospitalization as both her parents  during her hospital stay. Patient was dependent on her parents prior to admission. Hospitalization was prolonged due to need to establish guardianship following deaths of her parents.    Patient had significant emotional, psychiatric issues during this stay due to the above. Discharge planning has been complicated by need for guardianship and payor source for placement. Patient's brother, Maxi, was given emergency guardianship on 2/15/2022, and was given permanent guardianship on 2022.   Patient remained inpatient with social work, clinical coordinator assisting with disposition. Patient did not have any significant medical complications during this period. Patient now discharging to group home on 2022.     Severe malnutrition  Anorexia  Chronic abdominal pain  Chronic constipation  BMI ~13 on early this hospitalization with poor PO intake. SLP was consulted for possible dysphagia, and no evidence of dysphagia was noted. On , discussed with pt's brother; overall, felt that oral intake was probably acceptable at this point for discharge and did not feel we needed to pursue enteral feedings/g-tube at this point; desired workup for etiology of abdominal pain which he felt was contributing to her decreased PO intake.   CT abd/pelvis  showed large amount of stool. EGD   relatively unremarkable. US abd no acute pathology. BMI up to 14.7 2/27.  - Encourage PO intake  - Melatonin for sleep  - continue with PPI, scheduled miralax, senna   - Continue multivitamin and vitamin D. Recommend checking vitamin D as outpatient within a month     Thyroid nodule  TSH has been elevated at 9.95 on 2/21/22 but normal T4 in the past   - Repeat TSH ordered but patient has been refusing blood draws.  This lab is not urgent and can be done as an outpatient.    E coli UTI, resolved: Completed course of antibiotics on 2/15/22.    Left knee stiffness  X ray of the left knee was done on 6/22 and it showed normal joint spacing and alignment and no acute fracture or joint effusion. On exam no local effusion but she is very hesitant for range of motion but did admit that her knee has mild stiffness. Patient did not tolerate Voltaren gel. A MRI of the knee was offered for further evaluation however patient declined this testing. Brother/Guardian Matt agreed to hold further workup for now.  - Tylenol PRN    DVT Prophylaxis: Ambulatory  Code Status: Full Code  PT/OT: ordered  Diet: Combination Diet Regular Diet; High Kcal/High Protein Diet, ADULT  Diet      Disposition: Expected discharge tomorrow to group home    - I spent 25 minutes, with greater than 50% spent in counseling and coordination of care with the patient and nurse.    Maximino Deng MD, MD  Text Page  (7am to 6pm)  -Data reviewed today: I reviewed all new labs and imaging results over the last 24 hours.    Physical Exam   Temp: 98.2  F (36.8  C)   BP: 101/60     Resp: 18 SpO2: 98 % O2 Device: None (Room air)    Vitals:    02/14/22 1329 04/14/22 1552 05/27/22 1900   Weight: 37.7 kg (83 lb 3.2 oz) 40.3 kg (88 lb 14.4 oz) 44 kg (97 lb)     Vital Signs with Ranges  Temp:  [98.2  F (36.8  C)] 98.2  F (36.8  C)  Resp:  [18] 18  BP: (101)/(60) 101/60  SpO2:  [98 %] 98 %  No intake/output data recorded.  O2 requirements: none    Constitutional: Thin  female in NAD  HEENT: Eyes nonicteric, oral mucosa moist  Cardiovascular: RRR, normal S1/2, no m/r/g  Respiratory: CTAB, no wheezing or crackles  Vascular: Trace BLE pitting edema  GI: Nondistended  Skin/Integumen: No rashes  Neuro/Psych: Appropriate affect and mood. Moves all extremities    Medications       diclofenac  2 g Topical 4x Daily     melatonin  1 mg Oral At Bedtime     multivitamin w/minerals  1 tablet Oral Daily     pantoprazole  40 mg Oral QAM AC     polyethylene glycol  17 g Oral BID     senna-docusate  1 tablet Oral BID     cholecalciferol  50 mcg Oral Daily       Data   No lab results found in last 7 days.    Imaging:   No results found for this or any previous visit (from the past 24 hour(s)).

## 2022-06-27 NOTE — PLAN OF CARE
Goal Outcome Evaluation:    Cognitive Concerns/ Orientation: A&Ox4   BEHAVIOR & AGGRESSION TOOL COLOR: Green   ABNL VS/O2: VSS, on room air  MOBILITY: Independent  PAIN MANAGMENT: denies  DIET: High calorie/protein-needs BOWEL/BLADDER: Continent  SKIN: intact  D/C DATE: Maxi will be here tomorrow morning at 10 am to go over discharge instructions  OTHER IMPORTANT INFO: anxious, states she doesn't want to leave and that she's scared, looking for empty rooms to move to . Reassurance provided. Packing her belongings to assist in smooth transition tomorrow to group home. Maxi updated.

## 2022-06-27 NOTE — PLAN OF CARE
Goal Outcome Evaluation:      DATE & TIME: 6/26/22-6/27/22 [id number]  Cognitive Concerns/ Orientation: A&Ox4   BEHAVIOR & AGGRESSION TOOL COLOR: Green   ABNL VS/O2: BID vitals, on RA  MOBILITY: Independent  PAIN MANAGMENT: denies  DIET: High calorie/protein-needs BOWEL/BLADDER: Continent  SKIN: intact  D/C DATE: Next Tuesday 6/28 @ 1100  to group home,   OTHER IMPORTANT INFO:

## 2022-06-28 VITALS
BODY MASS INDEX: 17.19 KG/M2 | RESPIRATION RATE: 18 BRPM | HEIGHT: 63 IN | HEART RATE: 72 BPM | TEMPERATURE: 98.2 F | SYSTOLIC BLOOD PRESSURE: 101 MMHG | DIASTOLIC BLOOD PRESSURE: 60 MMHG | WEIGHT: 97 LBS | OXYGEN SATURATION: 98 %

## 2022-06-28 PROCEDURE — 250N000013 HC RX MED GY IP 250 OP 250 PS 637: Performed by: HOSPITALIST

## 2022-06-28 PROCEDURE — 99238 HOSP IP/OBS DSCHRG MGMT 30/<: CPT | Performed by: INTERNAL MEDICINE

## 2022-06-28 PROCEDURE — 250N000013 HC RX MED GY IP 250 OP 250 PS 637: Performed by: INTERNAL MEDICINE

## 2022-06-28 RX ADMIN — SENNOSIDES AND DOCUSATE SODIUM 1 TABLET: 8.6; 5 TABLET ORAL at 09:01

## 2022-06-28 RX ADMIN — POLYETHYLENE GLYCOL 3350 17 G: 17 POWDER, FOR SOLUTION ORAL at 09:02

## 2022-06-28 RX ADMIN — PANTOPRAZOLE SODIUM 40 MG: 40 TABLET, DELAYED RELEASE ORAL at 09:01

## 2022-06-28 RX ADMIN — CALCIUM CARBONATE (ANTACID) CHEW TAB 500 MG 500 MG: 500 CHEW TAB at 00:04

## 2022-06-28 RX ADMIN — Medication 50 MCG: at 09:01

## 2022-06-28 ASSESSMENT — ACTIVITIES OF DAILY LIVING (ADL)
ADLS_ACUITY_SCORE: 36
ADLS_ACUITY_SCORE: 35
ADLS_ACUITY_SCORE: 36
ADLS_ACUITY_SCORE: 35
ADLS_ACUITY_SCORE: 36

## 2022-06-28 NOTE — PLAN OF CARE
DATE & TIME: 6/28/22 8735-3882  Cognitive Concerns/ Orientation : Pt A/Ox4, tearful about discharge   BEHAVIOR & AGGRESSION TOOL COLOR: Green   ABNL VS/O2: VSS on RA  MOBILITY: Independent  PAIN MANAGMENT: Denies  DIET: High calorie/protein, tolerating   BOWEL/BLADDER: Continent, up to bathroom, BM x1.   SKIN: Edema bilateral lower extremities +1  D/C DATE: Discharge later today to group home, brother Maxi will transport.

## 2022-06-28 NOTE — PROGRESS NOTES
Care Management Discharge Note    Discharge Date: 06/28/2022       Discharge Disposition:  ([facility])    Discharge Services:      Discharge DME:      Discharge Transportation: family or friend will provide    Private pay costs discussed: Not applicable    PAS Confirmation Code:  (n/a)  Patient/family educated on Medicare website which has current facility and service quality ratings: no    Education Provided on the Discharge Plan:  yes  Persons Notified of Discharge Plans: bedside RN has been communicating with patient   Patient/Family in Agreement with the Plan: yes    Handoff Referral Completed: Yes    Additional Information:  Brother Matt called this morning stating he cannot be here at 1100 to transport his sister.  He was able to call [facility] and arranged to bring patient later.  He will arrive to door #6 at 1600.  The orders have been [facility] to [email or web address].        ROGER Mullins

## 2022-06-28 NOTE — PLAN OF CARE
DATE & TIME: 6/27/22 4567-1863    Cognitive Concerns/ Orientation : Pt A/Ox4, tearful about discharge   BEHAVIOR & AGGRESSION TOOL COLOR: Green   ABNL VS/O2: VSS on RA, daily  MOBILITY: Independent  PAIN MANAGMENT: Denies  DIET: High calorie/protein  BOWEL/BLADDER: Continent  SKIN: Edema bilateral lower extremities +1  D/C DATE: Discharge tomorrow morning at 10am. Maxi will transport.  OTHER IMPORTANT INFO: Pt anxious about leaving today. Continuing to encourage pt to eat dinner so that she is able to go to bed earlier so she can be awake in the morning for breakfast before discharge.

## 2022-06-28 NOTE — DISCHARGE SUMMARY
[facility]    Hospitalist Discharge Summary       Date of Admission:  2022  Date of Discharge:  2022  Discharging Provider: Maximino Deng MD      Discharge Diagnoses   COVID-19, resolved  Down syndrome  Developmental Delay  Failure to thrive    Follow-ups Needed After Discharge   Follow-up Appointments     Follow-up and recommended labs and tests       Follow up with primary care provider, Lissy Vang, within 7 days for   hospital follow- up.  Check Vitamin D level and TSH and BMP sometime   within the next month,.           Hospital Course   Miranda Dover is a 51 year old female with PMH Down Syndrome who was admitted on 2022 with COVID-19 pneumonia and acute hypoxic respiratory failure.   Patient made a full recovery from COVID-19. However, this was followed by a prolonged hospitalization as both her parents  during her hospital stay. Patient was dependent on her parents prior to admission. Hospitalization was prolonged due to need to establish guardianship following deaths of her parents.    Patient had significant emotional, psychiatric issues during this stay due to the above. Discharge planning has been complicated by need for guardianship and payor source for placement. Patient's brother, Maxi, was given emergency guardianship on 2/15/2022, and was given permanent guardianship on 2022.   Patient remained inpatient with social work, clinical coordinator assisting with disposition. Patient did not have any significant medical complications during this period. Patient now discharging to group home on 2022.     Severe malnutrition  Anorexia  Chronic abdominal pain  Chronic constipation  BMI ~13 on early this hospitalization with poor PO intake. SLP was consulted for possible dysphagia, and no evidence of dysphagia was noted. On , discussed with pt's brother; overall, felt that oral intake was probably acceptable at this point for discharge and  did not feel we needed to pursue enteral feedings/g-tube at this point; desired workup for etiology of abdominal pain which he felt was contributing to her decreased PO intake.   CT abd/pelvis 2/21 showed large amount of stool. EGD 2/22 relatively unremarkable. US abd no acute pathology. BMI up to 14.7 2/27.  - Encourage PO intake  - Melatonin for sleep  - continue with PPI, scheduled miralax, senna   - Continue multivitamin and vitamin D. Recommend checking vitamin D as outpatient within a month     Thyroid nodule  TSH has been elevated at 9.95 on 2/21/22 but normal T4 in the past   - Repeat TSH ordered but patient has been refusing blood draws.  This lab is not urgent and can be done as an outpatient.    E coli UTI, resolved: Completed course of antibiotics on 2/15/22.    Left knee stiffness  X ray of the left knee was done on 6/22 and it showed normal joint spacing and alignment and no acute fracture or joint effusion. On exam no local effusion but she is very hesitant for range of motion but did admit that her knee has mild stiffness. Patient did not tolerate Voltaren gel. A MRI of the knee was offered for further evaluation however patient declined this testing. Brother/Guardian Matt agreed to hold further workup for now.  - Tylenol PRN    Consultations This Hospital Stay   CARE MANAGEMENT / SOCIAL WORK IP CONSULT  PHYSICAL THERAPY ADULT IP CONSULT  OCCUPATIONAL THERAPY ADULT IP CONSULT  CARE MANAGEMENT / SOCIAL WORK IP CONSULT  PALLIATIVE CARE ADULT IP CONSULT  SPEECH LANGUAGE PATH ADULT IP CONSULT  WOUND OSTOMY CONTINENCE NURSE  IP CONSULT  GASTROENTEROLOGY IP CONSULT  NEUROPSYCHOLOGY IP CONSULT  NEUROPSYCHOLOGY IP CONSULT  SPIRITUAL HEALTH SERVICES IP CONSULT    Code Status   Full Code    Time Spent on this Encounter   I, Maximino Deng, personally saw the patient today and spent approximately 25 minutes discharging this patient.       Maximino Deng MD  [facility]  Hospital  ______________________________________________________________________    Physical Exam   Vital Signs: Temp: (P) 98.3  F (36.8  C)   BP: (P) 110/65 Pulse: (P) 66   Resp: (P) 18 SpO2: (P) 95 % O2 Device: (P) None (Room air)    Weight: 97 lbs 0 oz    Constitutional: Thin female in NAD  HEENT: Eyes nonicteric, oral mucosa moist  Cardiovascular: RRR, normal S1/2, no m/r/g  Respiratory: CTAB, no wheezing or crackles  Vascular: Trace BLE pitting edema  GI: Nondistended, nontender  Skin/Integumen: No rashes  Neuro/Psych: Appropriate affect and mood, quiet, reserved. Moves all extremities       Primary Care Physician   Lissy Vang    Discharge Disposition   Discharged to group home  Condition at discharge: Stable    Significant Results and Procedures   Most Recent 3 CBC's:  Recent Labs   Lab Test 03/09/22  1221 02/08/22  0843 02/06/22  1247   WBC 3.8* 7.3 5.1   HGB 12.9 12.4 11.2*   MCV 96 94 94    140* 136*     Most Recent 3 BMP's:  Recent Labs   Lab Test 03/09/22  1221 02/22/22  1311 02/18/22  0956 02/12/22  2127 02/11/22  0911 02/10/22  1303 02/09/22  1002 02/04/22  0903 01/23/22  1050     --   --   --   --   --  141  --  140   POTASSIUM 4.2 4.0  --   --  3.4   < > 2.8*  --  4.4   CHLORIDE 106  --   --   --   --   --  104  --  105   CO2 28  --   --   --   --   --  31  --  32   BUN 16  --   --   --   --   --  6*  --  19   CR 0.99  --  0.63  --   --   --  0.64  0.58   < > 0.75   ANIONGAP 4  --   --   --   --   --  6  --  3   JOSUÉ 9.0  --   --   --   --   --  8.9  --  8.1*   GLC 66*  --   --  96  --   --  98  --  134*    < > = values in this interval not displayed.     Most Recent 2 LFT's:  Recent Labs   Lab Test 02/23/21  1931 10/08/19  1452   AST 25 27   ALT 38 31   ALKPHOS 75 65   BILITOTAL 1.1 0.4     Most Recent 3 INR's:No lab results found.  Most Recent 3 Troponin's:  Recent Labs   Lab Test 02/23/21  1931 10/08/19  1452   TROPI <0.015 <0.015     Most Recent 3 BNP's:No lab results found.  Most  Recent D-dimer:  Recent Labs   Lab Test 01/23/22  1050   DD 1.27*     Most Recent Cholesterol Panel:No lab results found.    Results for orders placed or performed during the hospital encounter of 01/22/22   CT Chest Pulmonary Embolism w Contrast    Narrative    EXAM: CT CHEST PULMONARY EMBOLISM WITH CONTRAST  LOCATION: [facility]  DATE/TIME: 01/22/2022, 1:01 PM    INDICATION: Shortness of breath. Positive d-dimer.  COMPARISON: None.  TECHNIQUE: CT chest pulmonary angiogram during arterial phase injection of IV contrast. Multiplanar reformats and MIP reconstructions were performed. Dose reduction techniques were used.   CONTRAST: 55 mL Isovue-370.    FINDINGS: Multiple images through the chest are degraded by artifact related to patient motion.    ANGIOGRAM CHEST: Pulmonary arteries are normal caliber and negative for pulmonary emboli. Thoracic aorta is negative for dissection. No CT evidence of right heart strain.    LUNGS AND PLEURA: Patchy predominantly groundglass opacities in both lungs are likely related to COVID-19 pneumonia. No pneumothorax. No pleural effusions.    MEDIASTINUM/AXILLAE: Indeterminate left thyroid nodule measures 1.6 cm, not well defined on this exam. No enlarged lymph nodes are identified in the chest. Small pericardial effusion.    CORONARY ARTERY CALCIFICATION: None.    UPPER ABDOMEN: Limited views of the upper abdomen are unremarkable.    MUSCULOSKELETAL: Mild thoracolumbar curve.      Impression    IMPRESSION:  1.  No evidence for pulmonary embolism.  2.  Patchy predominantly groundglass opacities in both lungs, likely related to COVID-19 pneumonia.  3.  Indeterminate left thyroid nodule measures 1.6 cm. Thyroid ultrasound may be helpful for further characterization.     XR Chest Port 1 View    Narrative    EXAM: XR CHEST PORT 1 VIEW  LOCATION: [facility]  DATE/TIME: 1/26/2022 8:30 PM    INDICATION: hypoxia  COMPARISON: 01/22/2022       Impression    IMPRESSION: Patchy infiltrates in the left upper lobe and bilateral lung bases, consistent with pneumonia, slightly increased since the CT on 01/22/2022. No pleural effusion or pneumothorax. Normal heart size.   CT Abdomen Pelvis w Contrast    Narrative    CT ABDOMEN PELVIS WITH CONTRAST 2/21/2022 2:52 PM    CLINICAL HISTORY: Abdominal pain, acute, nonlocalized.    TECHNIQUE: CT scan of the abdomen and pelvis was performed following  injection of IV contrast. Multiplanar reformats were obtained. Dose  reduction techniques were used.  CONTRAST: 45mL Isovue-370    COMPARISON: Chest CT on 1/22/2022.    FINDINGS:   LOWER CHEST: Linear and groundglass opacities in both lung bases,  improved compared to previous, consistent with sequelae of COVID-19  infection.    HEPATOBILIARY: Normal.    PANCREAS: Normal.    SPLEEN: Normal.    ADRENAL GLANDS: Normal.    KIDNEYS/BLADDER: Normal.    BOWEL: Large amount of stool throughout the colon likely indicates  constipation. No obstruction or inflammatory changes.    PELVIC ORGANS: Normal.    ADDITIONAL FINDINGS: None.    MUSCULOSKELETAL: Normal.      Impression    IMPRESSION:   1. Large amount of stool throughout the colon likely indicates  constipation. No other abnormality to explain abdominal pain.  2. Improved opacities in both lung bases, consistent with sequelae of  previous COVID-19 pneumonia.      ALHAJI GARCIA MD         SYSTEM ID:  [id number]   US Abdomen Limited    Narrative    US ABDOMEN LIMITED 2/22/2022 12:54 PM    CLINICAL HISTORY: Evaluate for epigastric abdominal pain.    TECHNIQUE: Limited abdominal ultrasound.    COMPARISON: None.    FINDINGS:    GALLBLADDER: Gallbladder sludge is present. No gallstones, wall  thickening, or pericholecystic fluid. Negative sonographic Dawson's  sign.    BILE DUCTS: There is no biliary dilatation. The common duct measures 1  mm.    LIVER: Unremarkable where seen.    RIGHT KIDNEY: No hydronephrosis.    PANCREAS: The  visualized portions of the pancreas are normal.    No ascites.      Impression    IMPRESSION:  No acute process demonstrated.    SHAINA DAVIS MD         SYSTEM ID:  [id number]   XR Knee Left 1/2 Views    Narrative    XR KNEE LT 1/2 VW   6/22/2022 4:37 PM     HISTORY: knee stiffness    COMPARISON: None.      Impression    IMPRESSION: Normal joint spacing and alignment. No acute fracture or  joint effusion.    MONY SANZ MD         SYSTEM ID:  [id number]       Discharge Orders      Reason for your hospital stay    You were hospitalized for COVID-19, and to find a new home for you.     Follow-up and recommended labs and tests     Follow up with primary care provider, Lissy Vang, within 7 days for hospital follow- up.  Check Vitamin D level and TSH and BMP sometime within the next month,.     Activity    Your activity upon discharge: activity as tolerated     Diet    Follow this diet upon discharge:       Combination Diet Regular Diet; High Kcal/High Protein Diet, ADULT     Discharge Medications   Current Discharge Medication List      START taking these medications    Details   acetaminophen (TYLENOL) 325 MG tablet Take 2 tablets (650 mg) by mouth every 6 hours as needed for mild pain or other (and adjunct with moderate or severe pain or per patient request)  Qty: 90 tablet, Refills: 0    Associated Diagnoses: Chronic constipation      bisacodyl (DULCOLAX) 10 MG suppository Place 1 suppository (10 mg) rectally daily as needed for constipation  Qty: 5 suppository, Refills: 0    Associated Diagnoses: Chronic constipation      calcium carbonate (TUMS) 500 MG chewable tablet Take 1 tablet (500 mg) by mouth daily as needed for heartburn  Qty: 30 tablet, Refills: 0    Associated Diagnoses: Chronic constipation      melatonin 1 MG TABS tablet Take 1 tablet (1 mg) by mouth At Bedtime  Qty: 30 tablet, Refills: 0    Associated Diagnoses: Down's syndrome      multivitamin w/minerals (THERA-VIT-M) tablet Take 1 tablet by mouth  daily  Qty: 30 tablet, Refills: 0    Associated Diagnoses: Chronic constipation      pantoprazole (PROTONIX) 40 MG EC tablet Take 1 tablet (40 mg) by mouth every morning (before breakfast)  Qty: 30 tablet, Refills: 0    Associated Diagnoses: Chronic constipation      polyethylene glycol (MIRALAX) 17 GM/Dose powder Take 17 g by mouth daily  Qty: 510 g, Refills: 0    Associated Diagnoses: Chronic constipation      senna-docusate (SENOKOT-S/PERICOLACE) 8.6-50 MG tablet Take 1 tablet by mouth 2 times daily  Qty: 60 tablet, Refills: 0    Associated Diagnoses: Chronic constipation      vitamin D3 (CHOLECALCIFEROL) 50 mcg (2000 units) tablet Take 1 tablet (50 mcg) by mouth daily  Qty: 30 tablet, Refills: 0    Associated Diagnoses: Chronic constipation         STOP taking these medications       cholecalciferol (VITAMIN D3) 125 mcg (5000 units) capsule Comments:   Reason for Stopping:         NONFORMULARY Comments:   Reason for Stopping:             Allergies   No Known Allergies

## 2022-08-03 NOTE — PLAN OF CARE
Goal Outcome Evaluation:  DATE & TIME: 03/02/22 [id number]  Cognitive Concerns/ Orientation : Alert and Oriented x 4, forgetful, calm and cooperative  BEHAVIOR & AGGRESSION TOOL COLOR: Green           ABNL VS/O2:  VSS on RA  PAIN MANAGMENT:mentions stomach ache, resting well  DIET: Regular, tolerating.  Good appetite  BOWEL/BLADDER: Continent.    SKIN: Pale. Dry. Scattered scabs and bruises to arms. Blanchable redness on coccyx.  Refused Mepilex  D/C DATE: Waiting on group home or guardianship  OTHER IMPORTANT INFO: Brother granted 60 day emergency guardianship. PT/OT/Neuropsychology following.  Neuro psych testing completed today.    [No Acute Distress] : no acute distress [Well Nourished] : well nourished [Normal Sclera/Conjunctiva] : normal sclera/conjunctiva [PERRL] : pupils equal, round and reactive to light [Normal Outer Ear/Nose] : the ears and nose were normal in appearance [No JVD] : no jugular venous distention [Supple] : the neck was supple [No LAD] : no lymphadenopathy [No Respiratory Distress] : no respiratory distress [Clear to Auscultation] : lungs were clear to auscultation bilaterally [No Accessory Muscle Use] : no accessory muscle use [Normal Rate] : heart rate was normal  [Normal Bowel Sounds] : normal bowel sounds [Non Tender] : non-tender [Soft] : abdomen soft [Normal Gait] : normal gait [No Joint Swelling] : no joint swelling seen [No Rash] : no rash [No Skin Lesions] : no skin lesions [Oriented x3] : oriented to person, place, and time [Normal Affect] : the affect was normal [de-identified] : 3+ pitting edema to b/l lower extremities, Irregular/Irregular

## 2023-12-13 NOTE — PROGRESS NOTES
Care Management Follow Up    Length of Stay (days): 18    Expected Discharge Date: 02/28/2022     Concerns to be Addressed: discharge planning     Patient plan of care discussed at interdisciplinary rounds: Yes    Anticipated Discharge Disposition: Transitional Care     Anticipated Discharge Services:    Anticipated Discharge DME:      Patient/family educated on Medicare website which has current facility and service quality ratings: no  Education Provided on the Discharge Plan:    Patient/Family in Agreement with the Plan: yes    Referrals Placed by CM/SW: Post Acute Facilities  Private pay costs discussed: Not applicable    Additional Information:    Sw followed up with pt's brother (next of kin), Maxi to discuss guardianship and MA application.  Maxi stated that his  provided him with the guardianship paperwork today.  Maxi also was sent the MA application by [employer].  Maxi needs to fill out both sets of paperwork and plans on working on this over the next several days.  Sw will f/u next week to check on progress made.      Connie Smalls, LICSW       Cardiac

## 2024-11-22 NOTE — PLAN OF CARE
"Goal Outcome Evaluation:                    Patient remain largely the same. Spent most of the shift on the unit. Spent some time in her room coloring and eating supper. She denies pain. Declines gel on her knee as she \"Doesn't like the smell.\"  States she is scared of discharging to the  on Tuesday as planned. States \"Maybe I will make myself sick so I don't have to go.\"  Reassurance provided. Regular diet. Up ad francis.   " [employer] Alert Outreach Telephone Call Attempt     Patient is being outreached by the Care Transitions Program for a clinical alert from the [employer] monitoring program.     Call Attempt Date: 11/22/2024    Call Attempt: First Attempt